# Patient Record
Sex: MALE | Race: BLACK OR AFRICAN AMERICAN | NOT HISPANIC OR LATINO | URBAN - METROPOLITAN AREA
[De-identification: names, ages, dates, MRNs, and addresses within clinical notes are randomized per-mention and may not be internally consistent; named-entity substitution may affect disease eponyms.]

---

## 2017-04-25 ENCOUNTER — INPATIENT (INPATIENT)
Facility: HOSPITAL | Age: 72
LOS: 0 days | Discharge: HOME | DRG: 682 | End: 2017-04-26
Attending: INTERNAL MEDICINE | Admitting: INTERNAL MEDICINE
Payer: COMMERCIAL

## 2017-04-25 VITALS
HEART RATE: 106 BPM | TEMPERATURE: 98 F | OXYGEN SATURATION: 97 % | DIASTOLIC BLOOD PRESSURE: 64 MMHG | RESPIRATION RATE: 18 BRPM | SYSTOLIC BLOOD PRESSURE: 112 MMHG

## 2017-04-25 DIAGNOSIS — I77.0 ARTERIOVENOUS FISTULA, ACQUIRED: Chronic | ICD-10-CM

## 2017-04-25 DIAGNOSIS — Z29.9 ENCOUNTER FOR PROPHYLACTIC MEASURES, UNSPECIFIED: ICD-10-CM

## 2017-04-25 DIAGNOSIS — Z98.89 OTHER SPECIFIED POSTPROCEDURAL STATES: Chronic | ICD-10-CM

## 2017-04-25 DIAGNOSIS — I48.91 UNSPECIFIED ATRIAL FIBRILLATION: ICD-10-CM

## 2017-04-25 DIAGNOSIS — Z86.39 PERSONAL HISTORY OF OTHER ENDOCRINE, NUTRITIONAL AND METABOLIC DISEASE: ICD-10-CM

## 2017-04-25 DIAGNOSIS — N18.6 END STAGE RENAL DISEASE: ICD-10-CM

## 2017-04-25 DIAGNOSIS — E78.5 HYPERLIPIDEMIA, UNSPECIFIED: ICD-10-CM

## 2017-04-25 DIAGNOSIS — I10 ESSENTIAL (PRIMARY) HYPERTENSION: ICD-10-CM

## 2017-04-25 DIAGNOSIS — D64.9 ANEMIA, UNSPECIFIED: ICD-10-CM

## 2017-04-25 DIAGNOSIS — Z78.9 OTHER SPECIFIED HEALTH STATUS: ICD-10-CM

## 2017-04-25 LAB
ABO RH CONFIRMATION: SIGNIFICANT CHANGE UP
ALBUMIN SERPL ELPH-MCNC: 1.8 G/DL — LOW (ref 3.5–5)
ALP SERPL-CCNC: 82 U/L — SIGNIFICANT CHANGE UP (ref 40–120)
ALT FLD-CCNC: 31 U/L DA — SIGNIFICANT CHANGE UP (ref 10–60)
ANION GAP SERPL CALC-SCNC: 6 MMOL/L — SIGNIFICANT CHANGE UP (ref 5–17)
APTT BLD: 24.1 SEC — LOW (ref 27.5–37.4)
AST SERPL-CCNC: 29 U/L — SIGNIFICANT CHANGE UP (ref 10–40)
BASOPHILS # BLD AUTO: 0.1 K/UL — SIGNIFICANT CHANGE UP (ref 0–0.2)
BASOPHILS NFR BLD AUTO: 1.2 % — SIGNIFICANT CHANGE UP (ref 0–2)
BILIRUB SERPL-MCNC: 0.3 MG/DL — SIGNIFICANT CHANGE UP (ref 0.2–1.2)
BUN SERPL-MCNC: 12 MG/DL — SIGNIFICANT CHANGE UP (ref 7–18)
CALCIUM SERPL-MCNC: 7.5 MG/DL — LOW (ref 8.4–10.5)
CHLORIDE SERPL-SCNC: 102 MMOL/L — SIGNIFICANT CHANGE UP (ref 96–108)
CO2 SERPL-SCNC: 32 MMOL/L — HIGH (ref 22–31)
CREAT SERPL-MCNC: 3.6 MG/DL — HIGH (ref 0.5–1.3)
EOSINOPHIL # BLD AUTO: 0.1 K/UL — SIGNIFICANT CHANGE UP (ref 0–0.5)
EOSINOPHIL NFR BLD AUTO: 0.8 % — SIGNIFICANT CHANGE UP (ref 0–6)
GLUCOSE SERPL-MCNC: 107 MG/DL — HIGH (ref 70–99)
HCT VFR BLD CALC: 22 % — LOW (ref 39–50)
HGB BLD-MCNC: 7 G/DL — CRITICAL LOW (ref 13–17)
INR BLD: 1.16 RATIO — SIGNIFICANT CHANGE UP (ref 0.88–1.16)
LYMPHOCYTES # BLD AUTO: 0.8 K/UL — LOW (ref 1–3.3)
LYMPHOCYTES # BLD AUTO: 7.6 % — LOW (ref 13–44)
MCHC RBC-ENTMCNC: 28.5 PG — SIGNIFICANT CHANGE UP (ref 27–34)
MCHC RBC-ENTMCNC: 31.8 GM/DL — LOW (ref 32–36)
MCV RBC AUTO: 89.7 FL — SIGNIFICANT CHANGE UP (ref 80–100)
MONOCYTES # BLD AUTO: 1.1 K/UL — HIGH (ref 0–0.9)
MONOCYTES NFR BLD AUTO: 10.3 % — SIGNIFICANT CHANGE UP (ref 2–14)
NEUTROPHILS # BLD AUTO: 8.4 K/UL — HIGH (ref 1.8–7.4)
NEUTROPHILS NFR BLD AUTO: 80.2 % — HIGH (ref 43–77)
OB PNL STL: NEGATIVE — SIGNIFICANT CHANGE UP
PLATELET # BLD AUTO: 181 K/UL — SIGNIFICANT CHANGE UP (ref 150–400)
POTASSIUM SERPL-MCNC: 3.2 MMOL/L — LOW (ref 3.5–5.3)
POTASSIUM SERPL-SCNC: 3.2 MMOL/L — LOW (ref 3.5–5.3)
PROT SERPL-MCNC: 6.8 G/DL — SIGNIFICANT CHANGE UP (ref 6–8.3)
PROTHROM AB SERPL-ACNC: 12.7 SEC — SIGNIFICANT CHANGE UP (ref 9.8–12.7)
RBC # BLD: 2.45 M/UL — LOW (ref 4.2–5.8)
RBC # FLD: 16.1 % — HIGH (ref 10.3–14.5)
SODIUM SERPL-SCNC: 140 MMOL/L — SIGNIFICANT CHANGE UP (ref 135–145)
WBC # BLD: 10.4 K/UL — SIGNIFICANT CHANGE UP (ref 3.8–10.5)
WBC # FLD AUTO: 10.4 K/UL — SIGNIFICANT CHANGE UP (ref 3.8–10.5)

## 2017-04-25 PROCEDURE — 71020: CPT | Mod: 26

## 2017-04-25 PROCEDURE — 99285 EMERGENCY DEPT VISIT HI MDM: CPT

## 2017-04-25 RX ORDER — INSULIN LISPRO 100/ML
VIAL (ML) SUBCUTANEOUS
Qty: 0 | Refills: 0 | Status: DISCONTINUED | OUTPATIENT
Start: 2017-04-25 | End: 2017-04-26

## 2017-04-25 RX ORDER — METOPROLOL TARTRATE 50 MG
12.5 TABLET ORAL
Qty: 0 | Refills: 0 | Status: DISCONTINUED | OUTPATIENT
Start: 2017-04-25 | End: 2017-04-26

## 2017-04-25 NOTE — ED PROVIDER NOTE - NS ED MD SCRIBE ATTENDING SCRIBE SECTIONS
VITAL SIGNS( Pullset)/HISTORY OF PRESENT ILLNESS/PHYSICAL EXAM/DISPOSITION/REVIEW OF SYSTEMS/PAST MEDICAL/SURGICAL/SOCIAL HISTORY

## 2017-04-25 NOTE — H&P ADULT. - NEUROLOGICAL DETAILS
responds to pain/sensation intact/cranial nerves intact/responds to verbal commands/alert and oriented x 3

## 2017-04-25 NOTE — ED ADULT NURSE NOTE - PMH
Atrial fibrillation    CAD (coronary artery disease)  LAD stent  ESRD (end stage renal disease) on dialysis    HLD (hyperlipidemia)    HTN (hypertension)    Obesity    Pre-diabetes    Prostate cancer  prostate cancer  PVD (peripheral vascular disease)

## 2017-04-25 NOTE — ED PROVIDER NOTE - PSH
AV fistula  h/o creation in 2005  AV fistula  creation in right arm on 12/1/2015  H/O cardiac catheterization  4/25/2014 non obstructive disease  History of prostatectomy

## 2017-04-25 NOTE — H&P ADULT. - RESPIRATORY COMMENTS
CTA b/l, no w/r/r, L upper chest (between midclavicular and midaxillary area) has ostomy bag, possibly ?pleurostomy; no fluid seen in bag, no blood seen.

## 2017-04-25 NOTE — H&P ADULT. - PROBLEM SELECTOR PLAN 4
-Patient does not know where his dialysis center is, and he does not know his medications. His pharmacy is [facility], on [facility] [address] [facility], [phone], primary team to PLEASE call pharmacy in order to finish admission med rec and for safe discharge.  -patient has meds already on med rec, but those were not entered by the admitting resident and it is unclear which medications he is currently taking.

## 2017-04-25 NOTE — H&P ADULT. - ASSESSMENT
71 M PMH ESRD on HD T/Th/Sat via L AV-graft (Dr. Shaffer primary nephrologist), a-fib (unknown if on AC or rate control, patient denies taking any “blood thinners” or “heart rate drugs such as lopressor”) , CAD, HTN, HLD, DM, prostate CA, PVD presented from dialysis center for Hb 6.1. Patient is asymptomatic and no active bleeding seen. Requires 2U PRBC with repeat CBC to monitor stability. Admitted for monitoring of transfusions in a dialysis-requiring patient with non-rate controlled a-fib.

## 2017-04-25 NOTE — H&P ADULT. - HISTORY OF PRESENT ILLNESS
71 M PMH ESRD on HD T/Th/Sat via L AV-graft (Dr. Shaffer primary nephrologist), a-fib (unknown if on AC or rate control, patient denies taking any “blood thinners” or “heart rate drugs such as lopressor”) , CAD, HTN, HLD, DM, prostate CA, PVD presented from dialysis center for Hb 6.1. Patient endorses dry cough developed 4 days ago, but denies any HA, CP, palpitations, sputum production, abdominal pain, hematochezia, melena, N/V/D, fevers/chills, or hematuria. Patient (AAOx3 at the bedside) states he was recently hospitalized (doesn’t know where) for altered mental status, where he received a L ?pleurostomy bag as well as a gastrostomy tube. He has a chronic suprapubic cath after having bladder surgery (unclear which kind).

## 2017-04-25 NOTE — H&P ADULT. - PROBLEM SELECTOR PLAN 2
-unknown what medication patient takes  -holding any AC for now in case patient was bleeding - follow up CBC to monitor appropriate response  -unknown if patient had rate controlled medication - will start low dose lopressor q12 with parameters (to give a stat dose now as pt's HR has been elevated and EKG showing a-fib).  -primary team to obtain actual medications for safe discharge. -unknown what medication patient takes  -holding any AC for now in case patient was bleeding - follow up CBC to monitor appropriate response  -unknown if patient had rate controlled medication - will start low dose lopressor q12 with parameters for now.  -primary team to obtain actual medications for safe discharge.

## 2017-04-25 NOTE — H&P ADULT. - PROBLEM SELECTOR PLAN 8
improve score 4+  chadsvasc score is at least 2. however, holding off on AC or DVT ppx for now as 1)unclear what patient takes at home for AC if at all, 2) low Hb, while bleed is doubtful, follow up am CBC. consider AC/obtain med regimen from primary pharmacy.

## 2017-04-25 NOTE — H&P ADULT. - ADDITIONAL PE
Patient received rectal exam by ED staff which was negative, and stool guiac was negative (sent by ED before patient was seen by admitting resident).

## 2017-04-25 NOTE — H&P ADULT. - PROBLEM SELECTOR PLAN 1
transfuse 2 PRBC (started after 9pm in the ED)  monitor am CBC  per Dr. Shaffer, renal consult, patient is cleared for discharge after 2U PRBC.

## 2017-04-25 NOTE — ED PROVIDER NOTE - OBJECTIVE STATEMENT
70 y/o M pt with PMHx of a-fib, CAD, ESRD on dialysis, HLD, HTN, prostate CA, DM, and PVD presents to the ED from dialysis center for low blood count. Pt notes non-productive cough. Pt reports he was home when he received a call saying his blood count was 6.1 and he should come in. Pt denies fever, chills, dark stool or any other complaints at this time. ALLERGIES: penicillin (hives)

## 2017-04-25 NOTE — H&P ADULT. - FAMILY HISTORY
Mother  Still living? Unknown  Family history of hypertension in mother, Age at diagnosis: Age Unknown

## 2017-04-26 ENCOUNTER — TRANSCRIPTION ENCOUNTER (OUTPATIENT)
Age: 72
End: 2017-04-26

## 2017-04-26 VITALS
RESPIRATION RATE: 16 BRPM | OXYGEN SATURATION: 100 % | TEMPERATURE: 98 F | HEART RATE: 90 BPM | SYSTOLIC BLOOD PRESSURE: 118 MMHG | DIASTOLIC BLOOD PRESSURE: 58 MMHG

## 2017-04-26 LAB
ANION GAP SERPL CALC-SCNC: 7 MMOL/L — SIGNIFICANT CHANGE UP (ref 5–17)
BUN SERPL-MCNC: 15 MG/DL — SIGNIFICANT CHANGE UP (ref 7–18)
CALCIUM SERPL-MCNC: 8.4 MG/DL — SIGNIFICANT CHANGE UP (ref 8.4–10.5)
CHLORIDE SERPL-SCNC: 102 MMOL/L — SIGNIFICANT CHANGE UP (ref 96–108)
CHOLEST SERPL-MCNC: 125 MG/DL — SIGNIFICANT CHANGE UP (ref 10–199)
CO2 SERPL-SCNC: 32 MMOL/L — HIGH (ref 22–31)
CREAT SERPL-MCNC: 4.63 MG/DL — HIGH (ref 0.5–1.3)
GLUCOSE SERPL-MCNC: 89 MG/DL — SIGNIFICANT CHANGE UP (ref 70–99)
HCT VFR BLD CALC: 22.3 % — LOW (ref 39–50)
HCT VFR BLD CALC: 31 % — LOW (ref 39–50)
HDLC SERPL-MCNC: 48 MG/DL — SIGNIFICANT CHANGE UP (ref 40–125)
HGB BLD-MCNC: 10 G/DL — LOW (ref 13–17)
HGB BLD-MCNC: 7.2 G/DL — LOW (ref 13–17)
LDH SERPL L TO P-CCNC: 177 U/L — SIGNIFICANT CHANGE UP (ref 120–225)
LIPID PNL WITH DIRECT LDL SERPL: 64 MG/DL — SIGNIFICANT CHANGE UP
MAGNESIUM SERPL-MCNC: 1.9 MG/DL — SIGNIFICANT CHANGE UP (ref 1.8–2.4)
MCHC RBC-ENTMCNC: 28.2 PG — SIGNIFICANT CHANGE UP (ref 27–34)
MCHC RBC-ENTMCNC: 28.5 PG — SIGNIFICANT CHANGE UP (ref 27–34)
MCHC RBC-ENTMCNC: 32.4 GM/DL — SIGNIFICANT CHANGE UP (ref 32–36)
MCHC RBC-ENTMCNC: 32.4 GM/DL — SIGNIFICANT CHANGE UP (ref 32–36)
MCV RBC AUTO: 87 FL — SIGNIFICANT CHANGE UP (ref 80–100)
MCV RBC AUTO: 88 FL — SIGNIFICANT CHANGE UP (ref 80–100)
PHOSPHATE SERPL-MCNC: 2.2 MG/DL — LOW (ref 2.5–4.5)
PLATELET # BLD AUTO: 178 K/UL — SIGNIFICANT CHANGE UP (ref 150–400)
PLATELET # BLD AUTO: 201 K/UL — SIGNIFICANT CHANGE UP (ref 150–400)
POTASSIUM SERPL-MCNC: 3 MMOL/L — LOW (ref 3.5–5.3)
POTASSIUM SERPL-SCNC: 3 MMOL/L — LOW (ref 3.5–5.3)
RBC # BLD: 2.56 M/UL — LOW (ref 4.2–5.8)
RBC # BLD: 3.52 M/UL — LOW (ref 4.2–5.8)
RBC # FLD: 15.3 % — HIGH (ref 10.3–14.5)
RBC # FLD: 15.4 % — HIGH (ref 10.3–14.5)
SODIUM SERPL-SCNC: 141 MMOL/L — SIGNIFICANT CHANGE UP (ref 135–145)
TOTAL CHOLESTEROL/HDL RATIO MEASUREMENT: 2.6 RATIO — LOW (ref 3.4–9.6)
TRIGL SERPL-MCNC: 67 MG/DL — SIGNIFICANT CHANGE UP (ref 10–149)
TSH SERPL-MCNC: 0.52 UU/ML — SIGNIFICANT CHANGE UP (ref 0.34–4.82)
WBC # BLD: 10.6 K/UL — HIGH (ref 3.8–10.5)
WBC # BLD: 11 K/UL — HIGH (ref 3.8–10.5)
WBC # FLD AUTO: 10.6 K/UL — HIGH (ref 3.8–10.5)
WBC # FLD AUTO: 11 K/UL — HIGH (ref 3.8–10.5)

## 2017-04-26 PROCEDURE — 84443 ASSAY THYROID STIM HORMONE: CPT

## 2017-04-26 PROCEDURE — 83036 HEMOGLOBIN GLYCOSYLATED A1C: CPT

## 2017-04-26 PROCEDURE — 71046 X-RAY EXAM CHEST 2 VIEWS: CPT

## 2017-04-26 PROCEDURE — 80048 BASIC METABOLIC PNL TOTAL CA: CPT

## 2017-04-26 PROCEDURE — 80061 LIPID PANEL: CPT

## 2017-04-26 PROCEDURE — 86900 BLOOD TYPING SEROLOGIC ABO: CPT

## 2017-04-26 PROCEDURE — P9040: CPT

## 2017-04-26 PROCEDURE — 83010 ASSAY OF HAPTOGLOBIN QUANT: CPT

## 2017-04-26 PROCEDURE — 99285 EMERGENCY DEPT VISIT HI MDM: CPT | Mod: 25

## 2017-04-26 PROCEDURE — 86901 BLOOD TYPING SEROLOGIC RH(D): CPT

## 2017-04-26 PROCEDURE — 36430 TRANSFUSION BLD/BLD COMPNT: CPT

## 2017-04-26 PROCEDURE — 85027 COMPLETE CBC AUTOMATED: CPT

## 2017-04-26 PROCEDURE — 86850 RBC ANTIBODY SCREEN: CPT

## 2017-04-26 PROCEDURE — 99261: CPT

## 2017-04-26 PROCEDURE — 85610 PROTHROMBIN TIME: CPT

## 2017-04-26 PROCEDURE — 80053 COMPREHEN METABOLIC PANEL: CPT

## 2017-04-26 PROCEDURE — 82272 OCCULT BLD FECES 1-3 TESTS: CPT

## 2017-04-26 PROCEDURE — 84100 ASSAY OF PHOSPHORUS: CPT

## 2017-04-26 PROCEDURE — 86920 COMPATIBILITY TEST SPIN: CPT

## 2017-04-26 PROCEDURE — 83735 ASSAY OF MAGNESIUM: CPT

## 2017-04-26 PROCEDURE — 83615 LACTATE (LD) (LDH) ENZYME: CPT

## 2017-04-26 PROCEDURE — 85730 THROMBOPLASTIN TIME PARTIAL: CPT

## 2017-04-26 PROCEDURE — 93005 ELECTROCARDIOGRAM TRACING: CPT

## 2017-04-26 PROCEDURE — 85045 AUTOMATED RETICULOCYTE COUNT: CPT

## 2017-04-26 RX ORDER — ACETAMINOPHEN 500 MG
650 TABLET ORAL ONCE
Qty: 0 | Refills: 0 | Status: COMPLETED | OUTPATIENT
Start: 2017-04-26 | End: 2017-04-26

## 2017-04-26 RX ORDER — FOLIC ACID 0.8 MG
1 TABLET ORAL
Qty: 0 | Refills: 0 | COMMUNITY

## 2017-04-26 RX ORDER — PANTOPRAZOLE SODIUM 20 MG/1
40 TABLET, DELAYED RELEASE ORAL
Qty: 0 | Refills: 0 | Status: DISCONTINUED | OUTPATIENT
Start: 2017-04-26 | End: 2017-04-26

## 2017-04-26 RX ADMIN — Medication 650 MILLIGRAM(S): at 02:31

## 2017-04-26 RX ADMIN — Medication 12.5 MILLIGRAM(S): at 18:28

## 2017-04-26 RX ADMIN — Medication 650 MILLIGRAM(S): at 01:05

## 2017-04-26 NOTE — DISCHARGE NOTE ADULT - HOSPITAL COURSE
PLEASE ADD REPEAT HEMOGLOBIN LEVEL TO HOSPITAL COURSE AT THE BLANK SPACE (SCROLL DOWN)    HPI:  71 M PMH ESRD on HD T/Th/Sat via L AV-graft (Dr. Shaffer primary nephrologist), a-fib (unknown if on AC or rate control, patient denies taking any “blood thinners” or “heart rate drugs such as lopressor”) , CAD, HTN, HLD, DM, prostate CA, PVD presented from dialysis center for Hb 6.1. Patient endorses dry cough developed 4 days ago, but denies any HA, CP, palpitations, sputum production, abdominal pain, hematochezia, melena, N/V/D, fevers/chills, or hematuria. Patient (AAOx3 at the bedside) states he was recently hospitalized (doesn’t know where) for altered mental status, where he received a L pleural-fluid drainage bag as well as a gastrostomy tube. He has a chronic suprapubic cath after having bladder surgery (unclear which kind).     Hospital Course:  Patient is asymptomatic and no active bleeding seen. Guaiac negative, no blood seen on rectal exam. Dr. Shaffer nephrology consulted, knows patient from dialysis, states patient requires 2U PRBC with repeat CBC to monitor stability. Admitted for monitoring of transfusions. Initial Hb was 7.0. Repeat CBC was 7.2 in the morning. Possible that there was correction with hemodilution. Patient was sent to inpatient dialysis by nephro where he received 2 more units PRBC. Repeat CBC showed Hb of _____. He is cleared for discharge. For history of A-fib, he should hold warfarin (was taking 7.5mg daily in February), as he had recently GI bleed (as per Dr. Shaffer). Per outpatient pharmacist, he was given prescriptions from [facility] and likely this is where he was seen. GI Dr. Evans was consulted, no inpatient workup recommended at this time. Patient to be discharged on his home medications. Per Dr. Shaffer, patient should next go to dialysis on SATRUDAY and should bring all of his current medications with him. PLEASE ADD REPEAT HEMOGLOBIN LEVEL TO HOSPITAL COURSE AT THE BLANK SPACE (SCROLL DOWN)    HPI:  71 M PMH ESRD on HD T/Th/Sat via L AV-graft (Dr. Shaffer primary nephrologist), a-fib (unknown if on AC or rate control, patient denies taking any “blood thinners” or “heart rate drugs such as lopressor”) , CAD, HTN, HLD, DM, prostate CA, PVD presented from dialysis center for Hb 6.1. Patient endorses dry cough developed 4 days ago, but denies any HA, CP, palpitations, sputum production, abdominal pain, hematochezia, melena, N/V/D, fevers/chills, or hematuria. Patient (AAOx3 at the bedside) states he was recently hospitalized (doesn’t know where) for altered mental status, where he received a L pleural-fluid drainage bag as well as a gastrostomy tube. He has a chronic suprapubic cath after having bladder surgery (unclear which kind).     Hospital Course:  Patient is asymptomatic and no active bleeding seen. Guaiac negative, no blood seen on rectal exam. Dr. Shaffer nephrology consulted, knows patient from dialysis, states patient requires 2U PRBC with repeat CBC to monitor stability. Admitted for monitoring of transfusions. Initial Hb was 7.0. Repeat CBC was 7.2 in the morning. Possible that there was correction with hemodilution. Patient was sent to inpatient dialysis by nephro where he received 2 more units PRBC. Repeat CBC showed Hb of 10.0. He is cleared for discharge. For history of A-fib, he should hold warfarin (was taking 7.5mg daily in February), as he had recently GI bleed (as per Dr. Shaffer). Per outpatient pharmacist, he was given prescriptions from [facility] and likely this is where he was seen. GI Dr. Evans was consulted, no inpatient workup recommended at this time. Patient to be discharged on his home medications. Per Dr. Shaffer, patient should next go to dialysis on SATRUDAY and should bring all of his current medications with him.

## 2017-04-26 NOTE — DISCHARGE NOTE ADULT - MEDICATION SUMMARY - MEDICATIONS TO TAKE
I will START or STAY ON the medications listed below when I get home from the hospital:    Imdur  -- 20 milligram(s) by mouth once a day  -- Indication: For Continuing outpatient medication    allopurinol 100 mg oral tablet  -- 1 tab(s) by mouth once a day  -- Indication: For Continue outpatient medication    furosemide 80 mg oral tablet  -- 1 tab(s) by mouth once a day  -- Indication: For continue outpatient medication (for hypertension/excess fluid)    Protonix 40 mg oral delayed release tablet  -- 1 tab(s) by mouth once a day  -- Indication: For Sent to your pharmacy by [facility], likely for GI bleed and or gastric reflux

## 2017-04-26 NOTE — DISCHARGE NOTE ADULT - CARE PLAN
Principal Discharge DX:	Anemia, unspecified type  Goal:	Hb above 9.0  Instructions for follow-up, activity and diet:	You were transfused a total of 4 units of packed red blood cells. Please follow up with your primary doctor within 1-2 days after discharge. You MUST bring a copy of the hospital discharge papers from [facility] as well as these discharge papers to your primary Dr's office. You MUST ALSO bring all of your current medications as prescribed from your last hospital stay ([facility]) to your primary doctor's office. Your pharmacy, [facility], has on file prescriptions from [facility] for Protonix 40 daily. You should continue this medicine as you were treated for a GI bleed there.  Secondary Diagnosis:	Atrial fibrillation  Goal:	Rate control  Instructions for follow-up, activity and diet:	Holding warfarin for now as hemoglobin was low and as per your nephrologist, you recently were treated for intestinal bleed at [facility]. You currently are not on any medications for rate control. Your heart rate was seen to be under 100bpm (normal limit is indeed  bpm) without medicine. PLEASE follow up with your primary doctor to see if you should restart any rate control medications.  Secondary Diagnosis:	ESRD (end stage renal disease) on dialysis  Goal:	Continued Dialysis  Instructions for follow-up, activity and diet:	You were dialyzed while in the hospital. Dr. Shaffer, your nephrologist, says that you MUST PLEASE bring all of your current medications to dialysis so that the dialysis center can also properly adjust your medication list. Bring to them a copy of both these discharge papers as well as your discharge papers from [facility].  Secondary Diagnosis:	HTN (hypertension)  Goal:	BP under 140/90  Instructions for follow-up, activity and diet:	Your BP was controlled during your admission. Imdur and furosemide should be continued. You MUST follow up, please, with your primary doctor to adjust and review your medicine and to have your BP monitored. Principal Discharge DX:	Anemia, unspecified type  Goal:	Hb above 9.0  Instructions for follow-up, activity and diet:	You were transfused a total of 4 units of packed red blood cells. Please follow up with your primary doctor within 1-2 days after discharge. You MUST bring a copy of the hospital discharge papers from [facility] as well as these discharge papers to your primary Dr's office. You MUST ALSO bring all of your current medications as prescribed from your last hospital stay ([facility]) to your primary doctor's office. Your pharmacy, [facility], has on file prescriptions from [facility] for Protonix 40 daily. You should continue this medicine as you were treated for a GI bleed there.  Secondary Diagnosis:	Atrial fibrillation  Goal:	Rate control  Instructions for follow-up, activity and diet:	Holding warfarin for now as hemoglobin was low and as per your nephrologist, you recently were treated for intestinal bleed at [facility]. You currently are not on any medications for rate control. Your heart rate was seen to be under 100bpm (normal limit is indeed  bpm) without medicine. PLEASE follow up with your primary doctor to see if you should restart any rate control medications.  Secondary Diagnosis:	ESRD (end stage renal disease) on dialysis  Goal:	Continued Dialysis  Instructions for follow-up, activity and diet:	You were dialyzed while in the hospital. Dr. Shaffer, your nephrologist, says that you MUST PLEASE bring all of your current medications to dialysis so that the dialysis center can also properly adjust your medication list. Bring to them a copy of both these discharge papers as well as your discharge papers from [facility].  Secondary Diagnosis:	HTN (hypertension)  Goal:	BP under 140/90  Instructions for follow-up, activity and diet:	Your BP was controlled during your admission. Imdur and furosemide should be continued. You MUST follow up, please, with your primary doctor to adjust and review your medicine and to have your BP monitored. Principal Discharge DX:	Anemia, unspecified type  Goal:	Hb above 9.0  Instructions for follow-up, activity and diet:	You were transfused a total of 4 units of packed red blood cells. Please follow up with your primary doctor within 1-2 days after discharge. You MUST bring a copy of the hospital discharge papers from [facility] as well as these discharge papers to your primary Dr's office. You MUST ALSO bring all of your current medications as prescribed from your last hospital stay ([facility]) to your primary doctor's office. Your pharmacy, [facility], has on file prescriptions from [facility] for Protonix 40 daily. You should continue this medicine as you were treated for a GI bleed there.  Secondary Diagnosis:	Atrial fibrillation  Goal:	Rate control  Instructions for follow-up, activity and diet:	Holding warfarin for now as hemoglobin was low and as per your nephrologist, you recently were treated for intestinal bleed at [facility]. You currently are not on any medications for rate control. Your heart rate was seen to be under 100bpm (normal limit is indeed  bpm) without medicine. PLEASE follow up with your primary doctor to see if you should restart any rate control medications.  Secondary Diagnosis:	ESRD (end stage renal disease) on dialysis  Goal:	Continued Dialysis  Instructions for follow-up, activity and diet:	You were dialyzed while in the hospital. Dr. Shaffer, your nephrologist, says that you MUST PLEASE bring all of your current medications to dialysis so that the dialysis center can also properly adjust your medication list. Bring to them a copy of both these discharge papers as well as your discharge papers from [facility].  Secondary Diagnosis:	HTN (hypertension)  Goal:	BP under 140/90  Instructions for follow-up, activity and diet:	Your BP was controlled during your admission. Imdur and furosemide should be continued. You MUST follow up, please, with your primary doctor to adjust and review your medicine and to have your BP monitored. Principal Discharge DX:	Anemia, unspecified type  Goal:	Hb above 9.0  Instructions for follow-up, activity and diet:	You were transfused a total of 4 units of packed red blood cells. Please follow up with your primary doctor within 1-2 days after discharge. You MUST bring a copy of the hospital discharge papers from [facility] as well as these discharge papers to your primary Dr's office. You MUST ALSO bring all of your current medications as prescribed from your last hospital stay ([facility]) to your primary doctor's office. Your pharmacy, [facility], has on file prescriptions from [facility] for Protonix 40 daily. You should continue this medicine as you were treated for a GI bleed there.  Secondary Diagnosis:	Atrial fibrillation  Goal:	Rate control  Instructions for follow-up, activity and diet:	Holding warfarin for now as hemoglobin was low and as per your nephrologist, you recently were treated for intestinal bleed at [facility]. You currently are not on any medications for rate control. Your heart rate was seen to be under 100bpm (normal limit is indeed  bpm) without medicine. PLEASE follow up with your primary doctor to see if you should restart any rate control medications.  Secondary Diagnosis:	ESRD (end stage renal disease) on dialysis  Goal:	Continued Dialysis  Instructions for follow-up, activity and diet:	You were dialyzed while in the hospital. Dr. Shaffer, your nephrologist, says that you MUST PLEASE bring all of your current medications to dialysis so that the dialysis center can also properly adjust your medication list. Bring to them a copy of both these discharge papers as well as your discharge papers from [facility].  Secondary Diagnosis:	HTN (hypertension)  Goal:	BP under 140/90  Instructions for follow-up, activity and diet:	Your BP was controlled during your admission. Imdur and furosemide should be continued. You MUST follow up, please, with your primary doctor to adjust and review your medicine and to have your BP monitored.

## 2017-04-26 NOTE — DISCHARGE NOTE ADULT - PLAN OF CARE
Hb above 9.0 You were transfused a total of 4 units of packed red blood cells. Please follow up with your primary doctor within 1-2 days after discharge. You MUST bring a copy of the hospital discharge papers from [facility] as well as these discharge papers to your primary Dr's office. You MUST ALSO bring all of your current medications as prescribed from your last hospital stay ([facility]) to your primary doctor's office. Your pharmacy, [facility], has on file prescriptions from [facility] for Protonix 40 daily. You should continue this medicine as you were treated for a GI bleed there. Rate control Holding warfarin for now as hemoglobin was low and as per your nephrologist, you recently were treated for intestinal bleed at [facility]. You currently are not on any medications for rate control. Your heart rate was seen to be under 100bpm (normal limit is indeed  bpm) without medicine. PLEASE follow up with your primary doctor to see if you should restart any rate control medications. Continued Dialysis You were dialyzed while in the hospital. Dr. Shaffer, your nephrologist, says that you MUST PLEASE bring all of your current medications to dialysis so that the dialysis center can also properly adjust your medication list. Bring to them a copy of both these discharge papers as well as your discharge papers from [facility]. BP under 140/90 Your BP was controlled during your admission. Imdur and furosemide should be continued. You MUST follow up, please, with your primary doctor to adjust and review your medicine and to have your BP monitored.

## 2017-04-26 NOTE — DISCHARGE NOTE ADULT - PATIENT PORTAL LINK FT
“You can access the [facility] Patient Portal, offered by [facility], by registering with the following website: [email or web address]”

## 2017-04-27 LAB
HAPTOGLOB SERPL-MCNC: 254 MG/DL — HIGH (ref 34–200)
RBC # BLD: 2.45 M/UL — LOW (ref 4.2–5.8)
RETICS #: 41.4 K/UL — SIGNIFICANT CHANGE UP (ref 25–125)
RETICS/RBC NFR: 1.7 % — SIGNIFICANT CHANGE UP (ref 0.5–2.5)

## 2017-04-28 LAB — HBA1C BLD-MCNC: 6.1 % — HIGH (ref 4–5.6)

## 2017-05-03 DIAGNOSIS — Z88.0 ALLERGY STATUS TO PENICILLIN: ICD-10-CM

## 2017-05-03 DIAGNOSIS — E11.22 TYPE 2 DIABETES MELLITUS WITH DIABETIC CHRONIC KIDNEY DISEASE: ICD-10-CM

## 2017-05-03 DIAGNOSIS — Z85.46 PERSONAL HISTORY OF MALIGNANT NEOPLASM OF PROSTATE: ICD-10-CM

## 2017-05-03 DIAGNOSIS — I73.9 PERIPHERAL VASCULAR DISEASE, UNSPECIFIED: ICD-10-CM

## 2017-05-03 DIAGNOSIS — I48.91 UNSPECIFIED ATRIAL FIBRILLATION: ICD-10-CM

## 2017-05-03 DIAGNOSIS — Z99.2 DEPENDENCE ON RENAL DIALYSIS: ICD-10-CM

## 2017-05-03 DIAGNOSIS — I25.10 ATHEROSCLEROTIC HEART DISEASE OF NATIVE CORONARY ARTERY WITHOUT ANGINA PECTORIS: ICD-10-CM

## 2017-05-03 DIAGNOSIS — D63.1 ANEMIA IN CHRONIC KIDNEY DISEASE: ICD-10-CM

## 2017-05-03 DIAGNOSIS — N18.6 END STAGE RENAL DISEASE: ICD-10-CM

## 2017-05-03 DIAGNOSIS — I12.0 HYPERTENSIVE CHRONIC KIDNEY DISEASE WITH STAGE 5 CHRONIC KIDNEY DISEASE OR END STAGE RENAL DISEASE: ICD-10-CM

## 2017-05-03 DIAGNOSIS — E78.5 HYPERLIPIDEMIA, UNSPECIFIED: ICD-10-CM

## 2017-06-16 ENCOUNTER — INPATIENT (INPATIENT)
Facility: HOSPITAL | Age: 72
LOS: 4 days | Discharge: HOME | DRG: 377 | End: 2017-06-21
Attending: INTERNAL MEDICINE | Admitting: INTERNAL MEDICINE
Payer: MEDICARE

## 2017-06-16 VITALS
RESPIRATION RATE: 18 BRPM | WEIGHT: 206.79 LBS | HEIGHT: 73 IN | DIASTOLIC BLOOD PRESSURE: 60 MMHG | TEMPERATURE: 98 F | SYSTOLIC BLOOD PRESSURE: 95 MMHG | HEART RATE: 116 BPM | OXYGEN SATURATION: 100 %

## 2017-06-16 DIAGNOSIS — Z29.9 ENCOUNTER FOR PROPHYLACTIC MEASURES, UNSPECIFIED: ICD-10-CM

## 2017-06-16 DIAGNOSIS — D64.9 ANEMIA, UNSPECIFIED: ICD-10-CM

## 2017-06-16 DIAGNOSIS — Z98.89 OTHER SPECIFIED POSTPROCEDURAL STATES: Chronic | ICD-10-CM

## 2017-06-16 DIAGNOSIS — I10 ESSENTIAL (PRIMARY) HYPERTENSION: ICD-10-CM

## 2017-06-16 DIAGNOSIS — D50.0 IRON DEFICIENCY ANEMIA SECONDARY TO BLOOD LOSS (CHRONIC): ICD-10-CM

## 2017-06-16 DIAGNOSIS — E11.9 TYPE 2 DIABETES MELLITUS WITHOUT COMPLICATIONS: ICD-10-CM

## 2017-06-16 DIAGNOSIS — I77.0 ARTERIOVENOUS FISTULA, ACQUIRED: Chronic | ICD-10-CM

## 2017-06-16 DIAGNOSIS — I48.0 PAROXYSMAL ATRIAL FIBRILLATION: ICD-10-CM

## 2017-06-16 DIAGNOSIS — N18.6 END STAGE RENAL DISEASE: ICD-10-CM

## 2017-06-16 DIAGNOSIS — I12.0 HYPERTENSIVE CHRONIC KIDNEY DISEASE WITH STAGE 5 CHRONIC KIDNEY DISEASE OR END STAGE RENAL DISEASE: ICD-10-CM

## 2017-06-16 DIAGNOSIS — K92.2 GASTROINTESTINAL HEMORRHAGE, UNSPECIFIED: ICD-10-CM

## 2017-06-16 LAB
ALBUMIN SERPL ELPH-MCNC: 1.8 G/DL — LOW (ref 3.5–5)
ALP SERPL-CCNC: 95 U/L — SIGNIFICANT CHANGE UP (ref 40–120)
ALT FLD-CCNC: 15 U/L DA — SIGNIFICANT CHANGE UP (ref 10–60)
ANION GAP SERPL CALC-SCNC: 9 MMOL/L — SIGNIFICANT CHANGE UP (ref 5–17)
ANISOCYTOSIS BLD QL: SLIGHT — SIGNIFICANT CHANGE UP
APTT BLD: 26.7 SEC — LOW (ref 27.5–37.4)
AST SERPL-CCNC: 23 U/L — SIGNIFICANT CHANGE UP (ref 10–40)
BASOPHILS # BLD AUTO: 0.1 K/UL — SIGNIFICANT CHANGE UP (ref 0–0.2)
BASOPHILS NFR BLD AUTO: 0.8 % — SIGNIFICANT CHANGE UP (ref 0–2)
BILIRUB SERPL-MCNC: 0.2 MG/DL — SIGNIFICANT CHANGE UP (ref 0.2–1.2)
BLD GP AB SCN SERPL QL: SIGNIFICANT CHANGE UP
BUN SERPL-MCNC: 17 MG/DL — SIGNIFICANT CHANGE UP (ref 7–18)
CALCIUM SERPL-MCNC: 8.3 MG/DL — LOW (ref 8.4–10.5)
CHLORIDE SERPL-SCNC: 98 MMOL/L — SIGNIFICANT CHANGE UP (ref 96–108)
CO2 SERPL-SCNC: 29 MMOL/L — SIGNIFICANT CHANGE UP (ref 22–31)
CREAT SERPL-MCNC: 4.91 MG/DL — HIGH (ref 0.5–1.3)
EOSINOPHIL # BLD AUTO: 0.2 K/UL — SIGNIFICANT CHANGE UP (ref 0–0.5)
EOSINOPHIL NFR BLD AUTO: 1.4 % — SIGNIFICANT CHANGE UP (ref 0–6)
GLUCOSE SERPL-MCNC: 150 MG/DL — HIGH (ref 70–99)
HCT VFR BLD CALC: 20 % — CRITICAL LOW (ref 39–50)
HGB BLD-MCNC: 6.2 G/DL — CRITICAL LOW (ref 13–17)
HYPOCHROMIA BLD QL: SLIGHT — SIGNIFICANT CHANGE UP
INR BLD: 1.34 RATIO — HIGH (ref 0.88–1.16)
IRON SATN MFR SERPL: 13 % — LOW (ref 20–55)
IRON SATN MFR SERPL: 18 UG/DL — LOW (ref 65–170)
LDH SERPL L TO P-CCNC: 136 U/L — SIGNIFICANT CHANGE UP (ref 120–225)
LYMPHOCYTES # BLD AUTO: 0.9 K/UL — LOW (ref 1–3.3)
LYMPHOCYTES # BLD AUTO: 8.5 % — LOW (ref 13–44)
MCHC RBC-ENTMCNC: 27 PG — SIGNIFICANT CHANGE UP (ref 27–34)
MCHC RBC-ENTMCNC: 31 GM/DL — LOW (ref 32–36)
MCV RBC AUTO: 86.8 FL — SIGNIFICANT CHANGE UP (ref 80–100)
MONOCYTES # BLD AUTO: 1.3 K/UL — HIGH (ref 0–0.9)
MONOCYTES NFR BLD AUTO: 11.6 % — SIGNIFICANT CHANGE UP (ref 2–14)
NEUTROPHILS # BLD AUTO: 8.7 K/UL — HIGH (ref 1.8–7.4)
NEUTROPHILS NFR BLD AUTO: 77.7 % — HIGH (ref 43–77)
OB PNL STL: POSITIVE
OVALOCYTES BLD QL SMEAR: SLIGHT — SIGNIFICANT CHANGE UP
PLAT MORPH BLD: NORMAL — SIGNIFICANT CHANGE UP
PLATELET # BLD AUTO: 196 K/UL — SIGNIFICANT CHANGE UP (ref 150–400)
POIKILOCYTOSIS BLD QL AUTO: SLIGHT — SIGNIFICANT CHANGE UP
POTASSIUM SERPL-MCNC: 3.3 MMOL/L — LOW (ref 3.5–5.3)
POTASSIUM SERPL-SCNC: 3.3 MMOL/L — LOW (ref 3.5–5.3)
PROT SERPL-MCNC: 6.4 G/DL — SIGNIFICANT CHANGE UP (ref 6–8.3)
PROTHROM AB SERPL-ACNC: 14.7 SEC — HIGH (ref 9.8–12.7)
RBC # BLD: 2.28 M/UL — LOW (ref 4.2–5.8)
RBC # BLD: 2.31 M/UL — LOW (ref 4.2–5.8)
RBC # FLD: 16.1 % — HIGH (ref 10.3–14.5)
RBC BLD AUTO: ABNORMAL
RETICS #: 47.7 K/UL — SIGNIFICANT CHANGE UP (ref 25–125)
RETICS/RBC NFR: 2.1 % — SIGNIFICANT CHANGE UP (ref 0.5–2.5)
SODIUM SERPL-SCNC: 136 MMOL/L — SIGNIFICANT CHANGE UP (ref 135–145)
TIBC SERPL-MCNC: 142 UG/DL — LOW (ref 250–450)
UIBC SERPL-MCNC: 124 UG/DL — SIGNIFICANT CHANGE UP (ref 110–370)
WBC # BLD: 11.2 K/UL — HIGH (ref 3.8–10.5)
WBC # FLD AUTO: 11.2 K/UL — HIGH (ref 3.8–10.5)

## 2017-06-16 PROCEDURE — 74177 CT ABD & PELVIS W/CONTRAST: CPT | Mod: 26

## 2017-06-16 PROCEDURE — 71010: CPT | Mod: 26

## 2017-06-16 PROCEDURE — 99223 1ST HOSP IP/OBS HIGH 75: CPT | Mod: AI,GC

## 2017-06-16 RX ORDER — FOLIC ACID 0.8 MG
1 TABLET ORAL DAILY
Qty: 0 | Refills: 0 | Status: DISCONTINUED | OUTPATIENT
Start: 2017-06-16 | End: 2017-06-21

## 2017-06-16 RX ORDER — PANTOPRAZOLE SODIUM 20 MG/1
40 TABLET, DELAYED RELEASE ORAL EVERY 12 HOURS
Qty: 0 | Refills: 0 | Status: DISCONTINUED | OUTPATIENT
Start: 2017-06-16 | End: 2017-06-16

## 2017-06-16 RX ORDER — ATENOLOL 25 MG/1
25 TABLET ORAL DAILY
Qty: 0 | Refills: 0 | Status: DISCONTINUED | OUTPATIENT
Start: 2017-06-16 | End: 2017-06-21

## 2017-06-16 RX ORDER — ERYTHROPOIETIN 10000 [IU]/ML
10000 INJECTION, SOLUTION INTRAVENOUS; SUBCUTANEOUS
Qty: 0 | Refills: 0 | Status: DISCONTINUED | OUTPATIENT
Start: 2017-06-16 | End: 2017-06-21

## 2017-06-16 RX ORDER — CILOSTAZOL 100 MG/1
50 TABLET ORAL DAILY
Qty: 0 | Refills: 0 | Status: DISCONTINUED | OUTPATIENT
Start: 2017-06-16 | End: 2017-06-21

## 2017-06-16 RX ORDER — BENZOCAINE AND MENTHOL 5; 1 G/100ML; G/100ML
1 LIQUID ORAL
Qty: 0 | Refills: 0 | Status: DISCONTINUED | OUTPATIENT
Start: 2017-06-16 | End: 2017-06-21

## 2017-06-16 RX ORDER — PANTOPRAZOLE SODIUM 20 MG/1
40 TABLET, DELAYED RELEASE ORAL
Qty: 0 | Refills: 0 | Status: DISCONTINUED | OUTPATIENT
Start: 2017-06-16 | End: 2017-06-21

## 2017-06-16 RX ADMIN — Medication 1 MILLIGRAM(S): at 21:56

## 2017-06-16 RX ADMIN — PANTOPRAZOLE SODIUM 40 MILLIGRAM(S): 20 TABLET, DELAYED RELEASE ORAL at 21:56

## 2017-06-16 RX ADMIN — ATENOLOL 25 MILLIGRAM(S): 25 TABLET ORAL at 21:55

## 2017-06-16 RX ADMIN — CILOSTAZOL 50 MILLIGRAM(S): 100 TABLET ORAL at 21:55

## 2017-06-16 RX ADMIN — ERYTHROPOIETIN 10000 UNIT(S): 10000 INJECTION, SOLUTION INTRAVENOUS; SUBCUTANEOUS at 18:20

## 2017-06-16 NOTE — CHART NOTE - NSCHARTNOTEFT_GEN_A_CORE
Patient is s/p 2 U PRBC for Hb: 6.2 at admission  Patient being a hard stick, RN could not draw blood despite couple attempts however finally got a sample that was found to be insufficient by the lab  Patient is now refusing another prick despite counselling  Hence Will get CBC with routine labs

## 2017-06-16 NOTE — ED ADULT NURSE NOTE - ED STAT RN HANDOFF DETAILS
Report given to Tomeka OLIVAREZ. Patient is stable, on continuos cardiac monitoring. No distress noted.

## 2017-06-16 NOTE — ED ADULT NURSE NOTE - OBJECTIVE STATEMENT
Patient went to dialysis and presented with low H/H and an appointment was made for patient to come in and have transfusion.

## 2017-06-16 NOTE — CONSULT NOTE ADULT - ASSESSMENT
70 y/o M  with ESRD on HD, HTN, DM, A fib (on coumadin), Prostate CA s/p sx with suprapubic catheter, Alzheimer's dementia, Anemia s/p 2 units prbc transfusion (2 weeks prior) presents for blood transfusion. a/w Anemia 2/2 GI bleed and Afib with RVR.

## 2017-06-16 NOTE — CONSULT NOTE ADULT - PROBLEM SELECTOR RECOMMENDATION 3
+FOBT. A/C held  Pt for CT abd/pelvis with  IV contrast  GI consulted (Dr. Fernández)  Pt with esophageal stent- plan for oupt removal as scheduled on 6/21/17  Monitor h/h

## 2017-06-16 NOTE — H&P ADULT - NEGATIVE CARDIOVASCULAR SYMPTOMS
no chest pain/no peripheral edema/no dyspnea on exertion/no orthopnea/no paroxysmal nocturnal dyspnea/no claudication/no palpitations

## 2017-06-16 NOTE — H&P ADULT - PROBLEM SELECTOR PLAN 5
pt doesn't take any medications for DM   c/w consistent carb diet  c/w HSS for further coverage   f/u HbA1C

## 2017-06-16 NOTE — H&P ADULT - ASSESSMENT
70 y/o M from home lives alone, walks with a cane with HHA 7hrs / 7 days. PMH of ESRD (on HD TTS via R AVF), HTN, DM, A fib (on coumadin), PNA, anemia (on iron supplements) and Alzheimer's dementia. Pt was recently admitted in [facility] for intra-abdominal fluid collections, likely pancreatic pseudocyst s/p cyst gastrostomy and possible percutaneous drainage. Patient is a poor historian and is unable to provide a clear hx, but reported was scheduled to get tube removed on 6/14 but missed his appointment and now rescheduled for 6/21. He reported intermittent black stool for the past 2 weeks, associated with mild epigastric pain. Pt also notes mild cough, and hiccups. Pt has received 2 pints of blood about 4 weeks ago. Pt states as he was presenting for dialysis yesterday when the doctors said that his blood levels were low. 70 y/o M from home lives alone, walks with a cane with HHA 7hrs / 7 days. PMH of ESRD (on HD TTS via R AVF), HTN, DM, A fib (no AC due to GIB?), PNA, anemia (on iron supplements), suprapubic catheter (placed 5 years ago, last changed 1 month ago), IVC filter and Alzheimer's dementia. Pt was recently admitted in [facility] for intra-abdominal fluid collections, likely pancreatic pseudocyst s/p cyst gastrostomy and possible percutaneous drainage. Patient is a poor historian and is unable to provide a clear hx, but was having difficulty swelling therefore had esophageal stent placed he said was scheduled to get esophageal stent removed on 6/14 but missed his appointment and now rescheduled for 6/21. He reported intermittent black stool for the past 2 weeks, associated with mild epigastric pain. Pt also notes mild cough, and hiccups. Pt has received 2 pints of blood about 4 weeks ago. Pt states as he was presenting for dialysis yesterday when the doctors said that his blood levels were low.

## 2017-06-16 NOTE — H&P ADULT - NEGATIVE NEUROLOGICAL SYMPTOMS
no tremors/no focal seizures/no confusion/no headache/no facial palsy/no weakness/no difficulty walking/no loss of consciousness/no hemiparesis/no syncope/no paresthesias/no generalized seizures/no vertigo/no loss of sensation/no transient paralysis

## 2017-06-16 NOTE — H&P ADULT - PROBLEM SELECTOR PLAN 1
pt presented with anemia likely 2/2 UGIB as pt reported recurrent melena for the past 2 weeks  pt currently taking coumadin 12mg daily  on admission Hb level of 6.2   will transfuse 2U of PRBC during HD and Epogen injections during HD   f/u CBC after transfusion with goal Hb level > 9   GI consulted Dr. Evans pt presented with anemia likely 2/2 UGIB as pt reported recurrent melena for the past 2 weeks  pt currently taking coumadin 12mg daily  on admission Hb level of 6.2   will transfuse 2U of PRBC during HD and Epogen injections during HD   f/u CBC after transfusion with goal Hb level > 8  f/u Hb level after transfusion   f/u anemia panel   f/u with outpatient GI on 6/21 at [facility]   GI consulted Dr. Evans pt presented with anemia likely 2/2 UGIB as pt reported recurrent melena for the past 2 weeks  pt currently taking coumadin 12mg daily   on admission Hb level of 6.2 and guaiac positive   will transfuse 2U of PRBC during HD and Epogen injections during HD   c/w clear diet and advance as tolerated   f/u CBC after transfusion with goal Hb level > 8  f/u Hb level after transfusion   f/u anemia panel   f/u with outpatient GI on 6/21 at [facility]   GI consulted Dr. Evans

## 2017-06-16 NOTE — H&P ADULT - GASTROINTESTINAL DETAILS
no rigidity/bowel sounds normal/no distention/no rebound tenderness/no organomegaly/no guarding/soft/normal/no bruit/no masses palpable

## 2017-06-16 NOTE — H&P ADULT - PMH
ESRD (end stage renal disease) on dialysis    Essential hypertension    Paroxysmal atrial fibrillation    Type 2 diabetes mellitus without complication, without long-term current use of insulin

## 2017-06-16 NOTE — H&P ADULT - ATTENDING COMMENTS
72 y/o M from home lives alone, walks with a cane with HHA 7hrs / 7 days. PMH of ESRD (on HD TTS via R AVF), HTN, DM, A fib (said on coumadin but last precripiton in january), PNA, anemia (on iron supplements), suprapubic catheter (placed 5 years ago, last changed 1 month ago) and Alzheimer's dementia. Pt was recently admitted in [facility] for intra-abdominal fluid collections, likely pancreatic pseudocyst s/p cyst gastrostomy and possible percutaneous drainage which has been removed. Patient is a poor historian and is unable to provide a clear hx, but was having difficulty swallowing therefore had esophageal stent placed he said was scheduled to get esophageal stent removed on 6/14 but missed his appointment and now rescheduled for 6/21. He reported intermittent black stool for the past 2 weeks, associated with mild epigastric pain. Pt also notes mild cough, and hiccups. Pt has received 2 pints of blood about 4 weeks ago. Pt states as he was presenting for dialysis yesterday when the doctors said that his blood levels were low. Pt denies SOB, fevers/chills, weakness, chest pain, hematuria, hematemesis or any other complaints. Off note pt still makes urine and currently with suprapubic catheter.  Patient intermittently takes NSAID's for pain    SH: Lives alone, independent, cooks sometimes; non smoker  FH: Non contributory to current presentation    ROS: As above; Limited Hx    Vitals: stable reviewed  Vital Signs Last 24 Hrs  T(C): 36.9, Max: 36.9 (06-16 @ 15:18)  T(F): 98.5, Max: 98.5 (06-16 @ 15:18)  HR: 76 (75 - 116)  BP: 100/50 (95/60 - 112/78)  RR: 18 (16 - 18)  SpO2: 96% (96% - 100%)    P/E:  As above  Neuro: AAO x3; but unable to clearly provide info  CVS: S1S2 present; Irregular  Resp: BLAE+, No wheeze or Rhonchi  Abdomen: soft, BS+, Non tender, old healed scars, suprapubic catheter  extr: No edema or calf tenderness b/l LE    labs: as above; low H/H stable  Complete Blood Count + Automated Diff (06.16.17 @ 09:23)    WBC Count: 11.2 K/uL    RBC Count: 2.31 M/uL    Mean Cell Volume: 86.8 fl    Mean Cell Hemoglobin: 27.0 pg    Mean Cell Hemoglobin Conc: 31.0 gm/dL    Red Cell Distrib Width: 16.1 %    Platelet Count - Automated: 196 K/uL    Comprehensive Metabolic Panel (06.16.17 @ 09:23)    Sodium, Serum: 136 mmol/L    Potassium, Serum: 3.3 mmol/L    Chloride, Serum: 98 mmol/L    Carbon Dioxide, Serum: 29 mmol/L    Anion Gap, Serum: 9 mmol/L    Blood Urea Nitrogen, Serum: 17 mg/dL    Creatinine, Serum: 4.91 mg/dL    Glucose, Serum: 150 mg/dL    Calcium, Total Serum: 8.3 mg/dL    Protein Total, Serum: 6.4 g/dL    Albumin, Serum: 1.8 g/dL    Bilirubin Total, Serum: 0.2 mg/dL    Alkaline Phosphatase, Serum: 95 U/L    Aspartate Aminotransferase (AST/SGOT): 23 U/L    Alanine Aminotransferase (ALT/SGPT): 15 U/L DA        CT Abdomen and Pelvis: IMPRESSION:   Acute appearing thrombus within the superior mesenteric vein.  Esophageal stent withinthe distal esophagus/proximal stomach. Debris is noted within the stent. No bowel obstruction.  Trace bilateral pleural effusions, right greater than left, with pleural thickening/enhancement which may be infectious or neoplastic in etiology.  Distended gallbladder with cholelithiasis. No wall thickening or ericholecystic fluid. Correlate clinically and with ultrasound if there is concern for cholecystitis.    D/D  Symptomatic anemia due to possible chronic blood loss anemia from possible gastritis with hemorrhage  A. fib with RVR  Hx dysphagia s/p Esophgeal stent s/p removal of G tube  Hx HTN with low normal BP    Plan:  Admit to Tele; Type and cross; Transfuse 2 units PRBC; IV PPI; Clear liquids; Hold anticoagulation  CT Abdomen and Pelvis done with contrast; Fir HD today s/p CT  Nephrology evaluation Dr. Cody appreciated; Epogen with HD  GI evaluation, Dr. Evans noted; No intervention planned as patient has scheduled appointment with outpatient GI at [facility] for Stent removal.  Decrease Atenolol to 25 ng daily due to low BP; Cariology eval Dr. Talavera    d/w PGY2 VIRAL Robertson  d/w Patient findings and plan of care

## 2017-06-16 NOTE — H&P ADULT - RS GEN PE MLT RESP DETAILS PC
no wheezes/no chest wall tenderness/normal/no rhonchi/airway patent/breath sounds equal/no rales/respirations non-labored/no subcutaneous emphysema/no intercostal retractions/good air movement/clear to auscultation bilaterally

## 2017-06-16 NOTE — H&P ADULT - NEGATIVE GASTROINTESTINAL SYMPTOMS
no jaundice/no constipation/no vomiting/no diarrhea/no steatorrhea/no hematochezia/no nausea/no change in bowel habits/no flatulence/no hiccoughs

## 2017-06-16 NOTE — CONSULT NOTE ADULT - SUBJECTIVE AND OBJECTIVE BOX
[facility] NEPHROLOGY- CONSULTATION NOTE    72 y/o M  with ESRD on HD (TTS at [facility] dialysis unit; Nephrologist Dr. Shaffer), HTN, DM, A fib (on coumadin), Prostate CA s/p sx with suprapubic catheter (placed 5 years ago, last changed 1 month ago), Alzheimer's dementia, Anemia s/p 2 units prbc transfusion (2 weeks prior) presents for blood transfusion. Pt c/o dark stools x 2 week. Pt c/o hiccups and epigastric tenderness x 1day. Pt denies any hematemesis, BRBPR, SOB, chest pain, n/v/d, fever or chills. Pt was accompanied by his sister Zuleima.  Pt was found go have hgb 6.2, +FOBT with Afib with RVR.  Pt agreed and consented for  2units prbc with HD today (off cycle), Cardiology and GI eval. Pt is a poor historian. Pt was advised previously to stop coumadin due to GI bleed but was still taking coumadin as of 2-3days prior to admission.     As per H&P, pt was recently admitted in [facility] for intra-abdominal fluid collections, likely pancreatic pseudocyst s/p cyst gastrostomy and possible percutaneous drainage. Patient is a poor historian and is unable to provide a clear hx, but was having difficulty swelling therefore had esophageal stent placed he said was scheduled to get esophageal stent removed on 6/14 but missed his appointment and now rescheduled for 6/21.       PAST MEDICAL & SURGICAL HISTORY:  Type 2 diabetes mellitus without complication, without long-term current use of insulin  Essential hypertension  Paroxysmal atrial fibrillation  ESRD (end stage renal disease) on dialysis    No Known Allergies    Home Medications Reviewed  Hospital Medications:   MEDICATIONS  (STANDING):  epoetin shell Injectable 92588Hxdh(s) IV Push <User Schedule>  pantoprazole    Tablet 40milliGRAM(s) Oral before breakfast  ATENolol  Tablet 25milliGRAM(s) Oral daily  folic acid 1milliGRAM(s) Oral daily  cilostazol 50milliGRAM(s) Oral daily    SOCIAL HISTORY:  Denies ETOh,Smoking,   FAMILY HISTORY:      REVIEW OF SYSTEMS:  Gen: no changes in weight  HEENT: no rhinorrhea  Neck: no sore throat  Cards: no chest pain  Resp: no dyspnea, + cough  GI: no nausea or vomiting or diarrhea + dark stools, +epigastric pain  : no dysuria or hematuria  Vascular: no LE edema  Derm: no rashes  Neuro: no numbness/tingling  All other review of systems is negative unless indicated above.    VITALS:  T(F): 98.5, Max: 98.5 (06-16 @ 15:18)  HR: 76  BP: 100/50  RR: 18  SpO2: 96%  Wt(kg): --    Height (cm): 185.4 (06-16 @ 08:04)  Weight (kg): 93.8 (06-16 @ 08:04)  BMI (kg/m2): 27.3 (06-16 @ 08:04)  BSA (m2): 2.18 (06-16 @ 08:04)    PHYSICAL EXAM:  Gen: NAD, calm  HEENT: MMM  Neck: no JVD  Cards: irregular, +S1/S2,   Resp: CTA B/L  GI: soft, ND, NABS +epigastric tenderness  : +supra pubic catheter with leg bad  Extremities: no LE edema B/L,   Vascular: +Rt AVG +thrill +bruit  Derm: no rashes  Neuro: non-focal    LABS:  06-16    136  |  98  |  17  ----------------------------<  150<H>  3.3<L>   |  29  |  4.91<H>    Ca    8.3<L>      16 Jun 2017 09:23    TPro  6.4  /  Alb  1.8<L>  /  TBili  0.2  /  DBili      /  AST  23  /  ALT  15  /  AlkPhos  95  06-16    Creatinine Trend: 4.91 <--                        6.2    11.2  )-----------( 196      ( 16 Jun 2017 09:23 )             20.0     Urine Studies:      RADIOLOGY & ADDITIONAL STUDIES:
HISTORY OF PRESENT ILLNESS: HPI:  72 y/o M from home lives alone, walks with a cane with HHA 7hrs / 7 days. PMH of ESRD (on HD TTS via R AVF), HTN, DM, A fib (on coumadin), PNA, anemia (on iron supplements), suprapubic catheter (placed 5 years ago, last changed 1 month ago) and Alzheimer's dementia. Pt was recently admitted in [facility] for intra-abdominal fluid collections, likely pancreatic pseudocyst s/p cyst gastrostomy and possible percutaneous drainage. Patient is a poor historian and is unable to provide a clear hx, but reported was scheduled to get tube removed on 6/14 but missed his appointment and now rescheduled for 6/21. He reported intermittent black stool for the past 2 weeks, associated with mild epigastric pain. Pt also notes mild cough, and hiccups. Pt has received 2 pints of blood about 4 weeks ago. Pt states as he was presenting for dialysis yesterday when the doctors said that his blood levels were low. Pt denies SOB, fevers/chills, weakness, chest pain, hematuria, hematemesis or any other complaints. Off note pt still makes urine and currently with Martínez catheter.     EGD / Colonoscopy: > 5 years ago was told WNL. (16 Jun 2017 12:06)      PAST MEDICAL & SURGICAL HISTORY:  Type 2 diabetes mellitus without complication, without long-term current use of insulin  Essential hypertension  Paroxysmal atrial fibrillation  ESRD (end stage renal disease) on dialysis          MEDICATIONS:  MEDICATIONS  (STANDING):  epoetin shell Injectable 16355Lsal(s) IV Push <User Schedule>  pantoprazole    Tablet 40milliGRAM(s) Oral before breakfast  ATENolol  Tablet 25milliGRAM(s) Oral daily  folic acid 1milliGRAM(s) Oral daily  cilostazol 50milliGRAM(s) Oral daily      Allergies    No Known Allergies    Intolerances        FAMILY HISTORY:    Non-contributary for premature coronary disease or sudden cardiac death    SOCIAL HISTORY:    [X ] Non-smoker  [ ] Smoker  [ ] Alcohol      REVIEW OF SYSTEMS:  [ ]chest pain  [  ]shortness of breath  [  ]palpitations  [  ]syncope  [ ]near syncope [ ]upper extremity weakness   [ ] lower extremity weakness  [  ]diplopia  [  ]altered mental status   [  ]fevers  [ ]chills [ ]nausea  [ ]vomitting  [  ]dysphagia    [ ]abdominal pain  [ ]melena  [ ]BRBPR    [  ]epistaxis  [  ]rash    [ ]lower extremity edema        [ X] All others negative	  [ ] Unable to obtain    PHYSICAL EXAM:  T(C): 36.4, Max: 36.8 (06-16 @ 08:04)  HR: 75 (75 - 116)  BP: 103/60 (95/60 - 112/78)  RR: 18 (16 - 18)  SpO2: 97% (97% - 100%)  Wt(kg): --  I&O's Summary        HEENT:   Normal oral mucosa, PERRL, EOMI	  Lymphatic: No obvious lymphadenopathy , no edema  Cardiovascular: Normal S1 S2, No JVD,  1/6 EDINSON murmur , Peripheral pulses palpable 2+ bilaterally  Respiratory: Lungs clear to auscultation, normal effort 	  Gastrointestinal:  Soft, Non-tender, + BS	  Skin: No rashes, No cyanosis, warm to touch  Musculoskeletal: Normal range of motion, normal strength  Psychiatry:  Appropriate Mood & affect     TELEMETRY: 	  afib  ECG:  	Afib 95 BPM RBBB  RADIOLOGY:         CXR:  Pending    	  	  LABS:	 	    CARDIAC MARKERS:                              6.2    11.2  )-----------( 196      ( 16 Jun 2017 09:23 )             20.0     Hb Trend: 6.2<--    06-16    136  |  98  |  17  ----------------------------<  150<H>  3.3<L>   |  29  |  4.91<H>    Ca    8.3<L>      16 Jun 2017 09:23    TPro  6.4  /  Alb  1.8<L>  /  TBili  0.2  /  DBili  x   /  AST  23  /  ALT  15  /  AlkPhos  95  06-16    Creatinine Trend: 4.91<--    Coags:  PT/INR - ( 16 Jun 2017 09:23 )   PT: 14.7 sec;   INR: 1.34 ratio         PTT - ( 16 Jun 2017 09:23 )  PTT:26.7 sec    proBNP:   Lipid Profile:   HgA1c:   TSH:     ASSESSMENT/PLAN: 	71y Male ALZ dementia, suprapubic catheter ESRD on HD, HTN, DM, Chol, afib on coumadin admitted with GI bleedd and rapid afib.    - Rapid likely due to profound anemia  - Tx per primary team  - AC on Hold  - Echo    I once again thank you for allowing me to participate in the care of your patient.  If you have any questions or concerns please do not hesitate to contact me.    Daniel Talavera MD, [facility], [facility]  [address] [place] [address]  PHONE:  [phone]  BEEPER : [phone]
Patient is a 71y old  Male with esrd of dialysis, recent hospitalization at [facility] for intrabodominal fluid collections, likely pancreatic pseudocyst s/p cyst gastrostomy and possible percutaneous drainage. Patient is a poor historian and is unable to provide a clear hx.  He reported intermittent black stool for 2 weeks.  No lightheadness or generalized weakness.  Mild epigastric pain x 2 weeks.  No nausea, vomiting or hematemesis.    MEDICATIONS  (STANDING):  epoetin shell Injectable 10283Nlpz(s) IV Push <User Schedule>    MEDICATIONS  (PRN):      Allergies    No Known Allergies    Intolerances        SOCIAL HISTORY:    FAMILY HISTORY:      REVIEW OF SYSTEMS:    CONSTITUTIONAL: No weakness, fevers or chills  EYES/ENT: No visual changes;  No vertigo or throat pain   NECK: No pain or stiffness  RESPIRATORY: No cough, wheezing, hemoptysis; No shortness of breath  CARDIOVASCULAR: No chest pain or palpitations  GENITOURINARY: No dysuria, frequency or hematuria  NEUROLOGICAL: No numbness or weakness  SKIN: No itching, burning, rashes, or lesions   All other review of systems is negative unless indicated above.    Vital Signs Last 24 Hrs  T(C): 36.8, Max: 36.8 (06-16 @ 08:04)  T(F): 98.2, Max: 98.2 (06-16 @ 08:04)  HR: 95 (95 - 116)  BP: 112/78 (95/60 - 112/78)  BP(mean): --  RR: 16 (16 - 18)  SpO2: 100% (100% - 100%)    PHYSICAL EXAM:    Constitutional: NAD, well-developed  Respiratory: CTA and P  Cardiovascular: S1 and S2,   Gastrointestinal: BS+, soft, mild epigastric tenderness  Extremities:1+ pittind edema b/l lower extremities  Vascular: 2+ peripheral pulses  Neurological: A/O x 3,     LABS:  CBC Full  -  ( 16 Jun 2017 09:23 )  WBC Count : 11.2 K/uL  Hemoglobin : 6.2 g/dL  Hematocrit : 20.0 %  Platelet Count - Automated : 196 K/uL  Mean Cell Volume : 86.8 fl  Mean Cell Hemoglobin : 27.0 pg  Mean Cell Hemoglobin Concentration : 31.0 gm/dL  Auto Neutrophil # : 8.7 K/uL  Auto Lymphocyte # : 0.9 K/uL  Auto Monocyte # : 1.3 K/uL  Auto Eosinophil # : 0.2 K/uL  Auto Basophil # : 0.1 K/uL  Auto Neutrophil % : 77.7 %  Auto Lymphocyte % : 8.5 %  Auto Monocyte % : 11.6 %  Auto Eosinophil % : 1.4 %  Auto Basophil % : 0.8 %    06-16    136  |  98  |  17  ----------------------------<  150<H>  3.3<L>   |  29  |  4.91<H>    Ca    8.3<L>      16 Jun 2017 09:23    TPro  6.4  /  Alb  1.8<L>  /  TBili  0.2  /  DBili  x   /  AST  23  /  ALT  15  /  AlkPhos  95  06-16    PT/INR - ( 16 Jun 2017 09:23 )   PT: 14.7 sec;   INR: 1.34 ratio         PTT - ( 16 Jun 2017 09:23 )  PTT:26.7 sec        RADIOLOGY & ADDITIONAL STUDIES:

## 2017-06-16 NOTE — H&P ADULT - PROBLEM SELECTOR PLAN 4
pt takes Atenolol 50mg daily  on admission BP low 103/60   c/w home medications with parameters  f/u TSH, lipid profile, HbA1C

## 2017-06-16 NOTE — H&P ADULT - NEGATIVE GENERAL SYMPTOMS
no weight gain/no anorexia/no malaise/no sweating/no polyuria/no chills/no weight loss/no fatigue/no polyphagia/no fever/no polydipsia

## 2017-06-16 NOTE — H&P ADULT - PROBLEM SELECTOR PLAN 2
CHADVasc score of 3   pt currently on coumadin 12mg daily and Atenolol 50mg daily  c/w home medications (atenolol with BP parameters) CHADVasc score of 3   on admission pt presented with rapid a fib with RVR likely 2/2 fluid depletion   pt currently on coumadin 12mg daily and Atenolol 50mg daily  c/w home medications (atenolol with BP parameters)  Cardiology consulted Dr. Talavera CHADVasc score of 3   on admission pt presented with rapid a fib with RVR likely 2/2 fluid depletion   pt currently not taking any AC as possible GIB (as per pharmacy last coumadin prescription filled in January 2017)  taking daily and Atenolol 50mg daily  c/w home medications (atenolol with BP parameters)  Cardiology consulted Dr. Talavera

## 2017-06-16 NOTE — H&P ADULT - NEGATIVE ENMT SYMPTOMS
no abnormal taste sensation/no nasal congestion/no post-nasal discharge/no gum bleeding/no dysphagia/no vertigo/no tinnitus/no recurrent cold sores/no ear pain/no nasal discharge/no hearing difficulty/no dry mouth/no nasal obstruction/no throat pain/no nose bleeds/no sinus symptoms

## 2017-06-16 NOTE — CONSULT NOTE ADULT - ATTENDING COMMENTS
[facility] NEPHROLOGY  Armando Shaffer M.D.  Kendall Morales D.O.  Lilliam Cody M.D.  Oxana Hernandez, MSN, ANP-C  [phone]    [address] [place] [address]

## 2017-06-16 NOTE — H&P ADULT - NEGATIVE MUSCULOSKELETAL SYMPTOMS
no arthritis/no joint swelling/no arthralgia/no muscle cramps/no neck pain/no muscle weakness/no myalgia/no stiffness

## 2017-06-16 NOTE — H&P ADULT - NEUROLOGICAL DETAILS
alert and oriented x 3/sensation intact/deep reflexes intact/cranial nerves intact/responds to pain/responds to verbal commands/superficial reflexes intact/normal strength/no spontaneous movement

## 2017-06-16 NOTE — H&P ADULT - HISTORY OF PRESENT ILLNESS
70 y/o M from home lives alone, walks with a cane with HHA 7hrs / 7 days. PMH of ESRD (on HD TTS via R AVF), HTN, DM, A fib (on coumadin), PNA, anemia (on iron supplements) and Alzheimer's dementia. Pt was recently admitted in [facility] for intra-abdominal fluid collections, likely pancreatic pseudocyst s/p cyst gastrostomy and possible percutaneous drainage. Patient is a poor historian and is unable to provide a clear hx, but reported was scheduled to get tube removed on 6/14 but missed his appointment and now rescheduled for 6/21. He reported intermittent black stool for the past 2 weeks, associated with mild epigastric pain. Pt also notes mild cough, and hiccups. Pt has received 2 pints of blood about 4 weeks ago. Pt states as he was presenting for dialysis yesterday when the doctors said that his blood levels were low. Pt denies SOB, fevers/chills, weakness, chest pain, hematuria, hematemesis or any other complaints. Off note pt still makes urine and currently with Martínez catheter.     EGD / Colonoscopy: > 5 years ago was told LOBO. 72 y/o M from home lives alone, walks with a cane with HHA 7hrs / 7 days. PMH of ESRD (on HD TTS via R AVF), HTN, DM, A fib (on coumadin), PNA, anemia (on iron supplements), suprapubic catheter (placed 5 years ago, last changed 1 month ago) and Alzheimer's dementia. Pt was recently admitted in [facility] for intra-abdominal fluid collections, likely pancreatic pseudocyst s/p cyst gastrostomy and possible percutaneous drainage. Patient is a poor historian and is unable to provide a clear hx, but reported was scheduled to get tube removed on 6/14 but missed his appointment and now rescheduled for 6/21. He reported intermittent black stool for the past 2 weeks, associated with mild epigastric pain. Pt also notes mild cough, and hiccups. Pt has received 2 pints of blood about 4 weeks ago. Pt states as he was presenting for dialysis yesterday when the doctors said that his blood levels were low. Pt denies SOB, fevers/chills, weakness, chest pain, hematuria, hematemesis or any other complaints. Off note pt still makes urine and currently with Martínez catheter.     EGD / Colonoscopy: > 5 years ago was told LOBO. 70 y/o M from home lives alone, walks with a cane with HHA 7hrs / 7 days. PMH of ESRD (on HD TTS via R AVF), HTN, DM, A fib (on coumadin), PNA, anemia (on iron supplements), suprapubic catheter (placed 5 years ago, last changed 1 month ago) and Alzheimer's dementia. Pt was recently admitted in [facility] for intra-abdominal fluid collections, likely pancreatic pseudocyst s/p cyst gastrostomy and possible percutaneous drainage. Patient is a poor historian and is unable to provide a clear hx, but was having difficulty swelling therefore had esophageal stent placed he said was scheduled to get esophageal stent removed on 6/14 but missed his appointment and now rescheduled for 6/21. He reported intermittent black stool for the past 2 weeks, associated with mild epigastric pain. Pt also notes mild cough, and hiccups. Pt has received 2 pints of blood about 4 weeks ago. Pt states as he was presenting for dialysis yesterday when the doctors said that his blood levels were low. Pt denies SOB, fevers/chills, weakness, chest pain, hematuria, hematemesis or any other complaints. Off note pt still makes urine and currently with Martínez catheter.     EGD / Colonoscopy: > 5 years ago was told LOBO. 70 y/o M from home lives alone, walks with a cane with HHA 7hrs / 7 days. PMH of ESRD (on HD TTS via R AVF), HTN, DM, A fib (no AC due to GIB?), PNA, anemia (on iron supplements), suprapubic catheter (placed 5 years ago), IVC filter and Alzheimer's dementia. Pt was recently admitted in [facility] for intra-abdominal fluid collections, likely pancreatic pseudocyst s/p cyst gastrostomy and possible percutaneous drainage. Patient is a poor historian and is unable to provide a clear hx, but was having difficulty swelling therefore had esophageal stent placed he said was scheduled to get esophageal stent removed on 6/14 but missed his appointment and now rescheduled for 6/21. He reported intermittent black stool for the past 2 weeks, associated with mild epigastric pain. Pt also notes mild cough, and hiccups. Pt has received 2 pints of blood about 4 weeks ago. Pt states as he was presenting for dialysis yesterday when the doctors said that his blood levels were low. Pt denies SOB, fevers/chills, weakness, chest pain, hematuria, hematemesis or any other complaints. Off note pt still makes urine and currently with suprapubic catheter, last changed 1 month ago.     EGD / Colonoscopy: > 5 years ago was told LOBO.

## 2017-06-16 NOTE — CONSULT NOTE ADULT - PROBLEM SELECTOR RECOMMENDATION 9
Last HD 6/15/17 at outpt facility. Pt usually TTS HD schedule. Will plan for HD today with 2 units PRBC after CT with contrast and dialyze in hospital on MWF schedule.   Check HepBsAg.  Check serum phos  Monitor BMP

## 2017-06-16 NOTE — CONSULT NOTE ADULT - PROBLEM SELECTOR RECOMMENDATION 5
Now rate controlled on Atenolol  A/C held due to GI bleed  Cardiology consulted (Dr. Talavera).  Pt should be off A/C due to recurrent GI bleeds. Now rate controlled on Atenolol  A/C held due to GI bleed  Cardiology consulted (Dr. Talavera).  Pt should be off A/C due to recurrent GI bleed.

## 2017-06-16 NOTE — H&P ADULT - NEGATIVE SKIN SYMPTOMS
no change in size/color of mole/no tumor/no pitted nails/no dryness/no hair loss/no rash/no itching/no brittle nails

## 2017-06-16 NOTE — CONSULT NOTE ADULT - PROBLEM SELECTOR RECOMMENDATION 2
in the setting of GI bleed  Plan for 2 units prbc with HD  Epogen 10,000 units tiw with HD  Check iron studies including ferritin  Monitor h/h. f/u GI

## 2017-06-16 NOTE — CONSULT NOTE ADULT - ASSESSMENT
71y old  Male with esrd of dialysis, recent hospitalization at [facility] for intrabodominal fluid collections, likely pancreatic pseudocyst s/p cyst gastrostomy and possible percutaneous drainage. Patient is a poor historian and is unable to provide a clear hx.  hx of melena    - CTA abd/pelvis  - transfuse to keep HGB > 9  - Renal follow up  - Obtain records from Outside hospital  - Protonix   - serial cbc's  - Call as needed

## 2017-06-16 NOTE — ED PROVIDER NOTE - OBJECTIVE STATEMENT
70 y/o w/ PMHx of CKD and an abdominal shunt presents to the ED for a blood transfusion today. Pt also notes melena, mild cough, and hiccups. Pt has received 2 pints of blood in the past. Pt states as he was presenting for dialysis yesterday when the doctors said that his blood levels were low. Pt denies SOB, fevers/chills, weakness, or any other complaints. NKDA.

## 2017-06-17 DIAGNOSIS — I48.91 UNSPECIFIED ATRIAL FIBRILLATION: ICD-10-CM

## 2017-06-17 DIAGNOSIS — D64.9 ANEMIA, UNSPECIFIED: ICD-10-CM

## 2017-06-17 DIAGNOSIS — K29.71 GASTRITIS, UNSPECIFIED, WITH BLEEDING: ICD-10-CM

## 2017-06-17 DIAGNOSIS — R13.10 DYSPHAGIA, UNSPECIFIED: ICD-10-CM

## 2017-06-17 DIAGNOSIS — E83.39 OTHER DISORDERS OF PHOSPHORUS METABOLISM: ICD-10-CM

## 2017-06-17 LAB
ALBUMIN SERPL ELPH-MCNC: 1.6 G/DL — LOW (ref 3.5–5)
ALP SERPL-CCNC: 94 U/L — SIGNIFICANT CHANGE UP (ref 40–120)
ALT FLD-CCNC: 12 U/L DA — SIGNIFICANT CHANGE UP (ref 10–60)
ANION GAP SERPL CALC-SCNC: 6 MMOL/L — SIGNIFICANT CHANGE UP (ref 5–17)
AST SERPL-CCNC: 14 U/L — SIGNIFICANT CHANGE UP (ref 10–40)
BASOPHILS # BLD AUTO: 0.1 K/UL — SIGNIFICANT CHANGE UP (ref 0–0.2)
BASOPHILS NFR BLD AUTO: 0.5 % — SIGNIFICANT CHANGE UP (ref 0–2)
BILIRUB SERPL-MCNC: 0.5 MG/DL — SIGNIFICANT CHANGE UP (ref 0.2–1.2)
BUN SERPL-MCNC: 12 MG/DL — SIGNIFICANT CHANGE UP (ref 7–18)
CALCIUM SERPL-MCNC: 8.3 MG/DL — LOW (ref 8.4–10.5)
CHLORIDE SERPL-SCNC: 102 MMOL/L — SIGNIFICANT CHANGE UP (ref 96–108)
CHOLEST SERPL-MCNC: 115 MG/DL — SIGNIFICANT CHANGE UP (ref 10–199)
CO2 SERPL-SCNC: 29 MMOL/L — SIGNIFICANT CHANGE UP (ref 22–31)
CREAT SERPL-MCNC: 3.91 MG/DL — HIGH (ref 0.5–1.3)
EOSINOPHIL # BLD AUTO: 0.2 K/UL — SIGNIFICANT CHANGE UP (ref 0–0.5)
EOSINOPHIL NFR BLD AUTO: 1.9 % — SIGNIFICANT CHANGE UP (ref 0–6)
FERRITIN SERPL-MCNC: 1549 NG/ML — HIGH (ref 30–400)
FOLATE SERPL-MCNC: 5.3 NG/ML — SIGNIFICANT CHANGE UP (ref 4.8–24.2)
GLUCOSE SERPL-MCNC: 86 MG/DL — SIGNIFICANT CHANGE UP (ref 70–99)
HAPTOGLOB SERPL-MCNC: 217 MG/DL — HIGH (ref 34–200)
HBA1C BLD-MCNC: 5.2 % — SIGNIFICANT CHANGE UP (ref 4–5.6)
HBV SURFACE AG SER-ACNC: SIGNIFICANT CHANGE UP
HCT VFR BLD CALC: 24 % — LOW (ref 39–50)
HDLC SERPL-MCNC: 25 MG/DL — LOW (ref 40–125)
HGB BLD-MCNC: 7.6 G/DL — LOW (ref 13–17)
LIPID PNL WITH DIRECT LDL SERPL: 71 MG/DL — SIGNIFICANT CHANGE UP
LYMPHOCYTES # BLD AUTO: 1 K/UL — SIGNIFICANT CHANGE UP (ref 1–3.3)
LYMPHOCYTES # BLD AUTO: 8.7 % — LOW (ref 13–44)
MAGNESIUM SERPL-MCNC: 1.9 MG/DL — SIGNIFICANT CHANGE UP (ref 1.6–2.6)
MCHC RBC-ENTMCNC: 27.7 PG — SIGNIFICANT CHANGE UP (ref 27–34)
MCHC RBC-ENTMCNC: 31.8 GM/DL — LOW (ref 32–36)
MCV RBC AUTO: 87 FL — SIGNIFICANT CHANGE UP (ref 80–100)
MONOCYTES # BLD AUTO: 1.2 K/UL — HIGH (ref 0–0.9)
MONOCYTES NFR BLD AUTO: 10.9 % — SIGNIFICANT CHANGE UP (ref 2–14)
NEUTROPHILS # BLD AUTO: 8.8 K/UL — HIGH (ref 1.8–7.4)
NEUTROPHILS NFR BLD AUTO: 77.9 % — HIGH (ref 43–77)
PHOSPHATE SERPL-MCNC: 1.8 MG/DL — LOW (ref 2.5–4.5)
PLATELET # BLD AUTO: 205 K/UL — SIGNIFICANT CHANGE UP (ref 150–400)
POTASSIUM SERPL-MCNC: 3.8 MMOL/L — SIGNIFICANT CHANGE UP (ref 3.5–5.3)
POTASSIUM SERPL-SCNC: 3.8 MMOL/L — SIGNIFICANT CHANGE UP (ref 3.5–5.3)
PROT SERPL-MCNC: 6.2 G/DL — SIGNIFICANT CHANGE UP (ref 6–8.3)
RBC # BLD: 2.75 M/UL — LOW (ref 4.2–5.8)
RBC # FLD: 15.3 % — HIGH (ref 10.3–14.5)
SODIUM SERPL-SCNC: 137 MMOL/L — SIGNIFICANT CHANGE UP (ref 135–145)
TOTAL CHOLESTEROL/HDL RATIO MEASUREMENT: 4.6 RATIO — SIGNIFICANT CHANGE UP (ref 3.4–9.6)
TRIGL SERPL-MCNC: 97 MG/DL — SIGNIFICANT CHANGE UP (ref 10–149)
TSH SERPL-MCNC: 0.75 UU/ML — SIGNIFICANT CHANGE UP (ref 0.34–4.82)
VIT B12 SERPL-MCNC: 596 PG/ML — SIGNIFICANT CHANGE UP (ref 243–894)
WBC # BLD: 11.3 K/UL — HIGH (ref 3.8–10.5)
WBC # FLD AUTO: 11.3 K/UL — HIGH (ref 3.8–10.5)

## 2017-06-17 PROCEDURE — 99233 SBSQ HOSP IP/OBS HIGH 50: CPT

## 2017-06-17 RX ORDER — SODIUM,POTASSIUM PHOSPHATES 278-250MG
1 POWDER IN PACKET (EA) ORAL
Qty: 0 | Refills: 0 | Status: COMPLETED | OUTPATIENT
Start: 2017-06-17 | End: 2017-06-19

## 2017-06-17 RX ADMIN — Medication 1 TABLET(S): at 21:56

## 2017-06-17 RX ADMIN — Medication 1 MILLIGRAM(S): at 12:49

## 2017-06-17 RX ADMIN — CILOSTAZOL 50 MILLIGRAM(S): 100 TABLET ORAL at 12:49

## 2017-06-17 RX ADMIN — PANTOPRAZOLE SODIUM 40 MILLIGRAM(S): 20 TABLET, DELAYED RELEASE ORAL at 06:17

## 2017-06-17 RX ADMIN — Medication 1 TABLET(S): at 12:49

## 2017-06-17 RX ADMIN — Medication 1 TABLET(S): at 18:01

## 2017-06-17 NOTE — PROGRESS NOTE ADULT - ATTENDING COMMENTS
Discussed with Patient at length and counseled for 10 mins, his clinical status and need for further PRBC transfusion and risk of active bleed and death. he finally verbalized understanding.   Discussed with GI and Nephro and on call PGY1 Dr. Perea

## 2017-06-17 NOTE — PROGRESS NOTE ADULT - SUBJECTIVE AND OBJECTIVE BOX
Patient is a 71y old  Male who presents with a chief complaint of low hemoglobin level (16 Jun 2017 12:06)  s/p 2 units PRBC during HD yesterday;       INTERVAL HPI/OVERNIGHT EVENTS: None; Patient wanted to leave this morning.       MEDICATIONS  (STANDING):  epoetin shell Injectable 34614Tkgt(s) IV Push <User Schedule>  pantoprazole    Tablet 40milliGRAM(s) Oral before breakfast  ATENolol  Tablet 25milliGRAM(s) Oral daily  folic acid 1milliGRAM(s) Oral daily  cilostazol 50milliGRAM(s) Oral daily  potassium acid phosphate/sodium acid phosphate tablet (K-PHOS No. 2) 1Tablet(s) Oral four times a day with meals    MEDICATIONS  (PRN):  guaiFENesin    Syrup 100milliGRAM(s) Oral every 6 hours PRN Cough  benzocaine 15 mG/menthol 3.6 mG Lozenge 1Lozenge Oral five times a day PRN Sore Throat    Allergies: No Known Allergies      REVIEW OF SYSTEMS:  CONSTITUTIONAL: No fever, weight loss, or fatigue  RESPIRATORY: No cough, wheezing, chills or hemoptysis; No shortness of breath  CARDIOVASCULAR: No chest pain, palpitations, dizziness, or leg swelling  GASTROINTESTINAL: No abdominal or epigastric pain. No nausea, vomiting, or hematemesis; No diarrhea or constipation. Guaiac Positive stools.  NEUROLOGICAL: No headaches, memory loss, loss of strength, numbness, or tremors  MUSCULOSKELETAL: No joint pain or swelling; No muscle, back, or extremity pain  SKIN: No rashes or lesions      Vital Signs Last 24 Hrs  T(C): 36.9, Max: 37.3 (06-17 @ 10:52)  T(F): 98.4, Max: 99.1 (06-17 @ 10:52)  HR: 81 (72 - 116)  BP: 91/42 (91/42 - 116/54)  RR: 16 (16 - 20)  SpO2: 95% (95% - 100%)    PHYSICAL EXAM:  GENERAL: NAD, well-groomed, well-developed  HEAD:  Atraumatic, Normocephalic  EYES: EOMI, PERRLA, conjunctiva and sclera clear  NECK: Supple, No JVD  NERVOUS SYSTEM:  Alert & Oriented X3, Forgetful;  No gross focal deficits  CHEST/LUNG: Clear to percussion bilaterally; No rales, rhonchi, wheezing, or rubs  HEART: Regular rate and rhythm; S1S2 present  ABDOMEN: Soft, Nontender, Nondistended; Bowel sounds present  EXTREMITIES:  No clubbing, cyanosis, or edema  SKIN: No rashes or lesions    LABS:                        7.6    11.3  )-----------( 205      ( 17 Jun 2017 06:20 )             24.0     06-17    137  |  102  |  12  ----------------------------<  86  3.8   |  29  |  3.91<H>    Ca    8.3<L>      17 Jun 2017 06:20  Phos  1.8     06-17  Mg     1.9     06-17    TPro  6.2  /  Alb  1.6<L>  /  TBili  0.5  /  DBili  x   /  AST  14  /  ALT  12  /  AlkPhos  94  06-17    PT/INR - ( 16 Jun 2017 09:23 )   PT: 14.7 sec;   INR: 1.34 ratio    PTT - ( 16 Jun 2017 09:23 )  PTT:26.7 sec    CAPILLARY BLOOD GLUCOSE  97 (17 Jun 2017 07:55)        Consultant(s) Notes Reviewed:  [x] YES  [ ] NO    Care Discussed with Consultants/Other Providers [x] YES  [ ] NO; Nephro; GI; RN;     Plan of Care discussed with Housestaff [ x]YES [ ] NO; On call PGY1

## 2017-06-17 NOTE — PROGRESS NOTE ADULT - PROBLEM SELECTOR PLAN 2
in the setting of GI bleed  low h/h despite 2 units prbc with HD on 6/16/17. Pt currently receiving a unit of PRBC with no signs of fluid overload.   c/w Epogen 10,000 units tiw with HD  Pt with elevated ferritin- no need for IV iron.   Monitor h/h. f/u GI.

## 2017-06-17 NOTE — PROGRESS NOTE ADULT - SUBJECTIVE AND OBJECTIVE BOX
Subjective:  pt seen and examined , no complaints, ROS -       epoetin shell Injectable [id number](s) IV Push <User Schedule>  pantoprazole    Tablet 40milliGRAM(s) Oral before breakfast  ATENolol  Tablet 25milliGRAM(s) Oral daily  folic acid 1milliGRAM(s) Oral daily  cilostazol 50milliGRAM(s) Oral daily  guaiFENesin    Syrup 100milliGRAM(s) Oral every 6 hours PRN  benzocaine 15 mG/menthol 3.6 mG Lozenge 1Lozenge Oral five times a day PRN                            6.2    11.2  )-----------( 196      ( 16 Jun 2017 09:23 )             20.0       Hemoglobin: 6.2 g/dL (06-16 @ 09:23)      06-16    136  |  98  |  17  ----------------------------<  150<H>  3.3<L>   |  29  |  4.91<H>    Ca    8.3<L>      16 Jun 2017 09:23    TPro  6.4  /  Alb  1.8<L>  /  TBili  0.2  /  DBili  x   /  AST  23  /  ALT  15  /  AlkPhos  95  06-16    Creatinine Trend: 4.91<--    COAGS:           T(C): 37.2, Max: 37.2 (06-17 @ 01:27)  HR: 74 (74 - 116)  BP: 97/50 (95/60 - 116/54)  RR: 18 (16 - 20)  SpO2: 98% (96% - 100%)  Wt(kg): --    I&O's Summary      Daily Height in cm: 185.42 (16 Jun 2017 08:04)    Daily     Appearance: Normal	  HEENT:   Normal oral mucosa, PERRL, EOMI	  Lymphatic: No lymphadenopathy , no edema  Cardiovascular: Normal S1 S2, No JVD, No murmurs , Peripheral pulses palpable 2+ bilaterally  Respiratory: Lungs clear to auscultation, normal effort 	  Gastrointestinal:  Soft, Non-tender, + BS	  Skin: No rashes, No ecchymoses, No cyanosis, warm to touch  Musculoskeletal: Normal range of motion, normal strength  Psychiatry:  Mood & affect appropriate    TELEMETRY: 	    ECG:  	  RADIOLOGY:   DIAGNOSTIC TESTING:  [ ] Echocardiogram:  [ ]  Catheterization:  [ ] Stress Test:    OTHER:     CT abd/ Pelvis:  IMPRESSION:     Acute appearing thrombus within the superior mesenteric vein.    Esophageal stent withinthe distal esophagus/proximal stomach. Debris is   noted within the stent. No bowel obstruction.    Trace bilateral pleural effusions, right greater than left, with pleural   thickening/enhancement which may be infectious or neoplastic in etiology.    Distended gallbladder with cholelithiasis. No wall thickening or   pericholecystic fluid. Correlate clinically and with ultrasound if there   is concern for cholecystitis.    Additional findings as above.    MARY MONTANO M.D., ATTENDING RADIOLOGIST  This document has been electronically signed. Jun 16 2017  1:46PM	        ASSESSMENT/PLAN: 	71y Male ALZ dementia, suprapubic catheter ESRD on HD, HTN, DM, Chol, esophageal stent,  afib on coumadin admitted with GI bleed  and rapid afib. . s/p PRBC    cont pletal  hold Systemic A/C due to anemia  cont B-blocker- rate controlled   HD per renal   Echo pending   D/W Dr Talavera

## 2017-06-17 NOTE — PROGRESS NOTE ADULT - ATTENDING COMMENTS
Patient seen and examined, agree with above assessment and plan as transcribed above.    - HR improved with improved hemoglobin   - F/u echo    Daniel Talavera MD, FACC  [facility], [facility]  [address] [address]  PHONE:  [phone]  BEEPER : [phone]

## 2017-06-17 NOTE — PROGRESS NOTE ADULT - ATTENDING COMMENTS
[facility] NEPHROLOGY  Armando Shaffer M.D.  Kendall Morales D.O.  Lilliam Cody M.D.  Oxana Hernandez, MSN, ANP-C  [phone]    [address] [place] [address]

## 2017-06-17 NOTE — PROGRESS NOTE ADULT - PROBLEM SELECTOR PLAN 1
Likely Chronic blood loss ongoing slow losses likely.  Patient with inadequate response to 2 units; d/w with GI and Nephrology.  Will transfuse one unit today and then one more unit tomorrow if Hgb less than 8.5gm and follow up with HD on monday as d/w Dr. mann  As per GI, will hold off any endoscopic interventions unless emergency, as patient scheduled for outpatient GI f/u on Wednesday for stent removal  F/U CBC in AM.

## 2017-06-17 NOTE — PROGRESS NOTE ADULT - PROBLEM SELECTOR PLAN 1
Last HD 6/16/17. Pt clotted dialyzer and finished 2 hours 46 mins of dialysis. Will plan for next maintenance HD 6/19/17. Monitor BMP.

## 2017-06-17 NOTE — PROGRESS NOTE ADULT - PROBLEM SELECTOR PLAN 5
rate controlled on Atenolol  A/C held due to GI bleed  Cardiology consulted (Dr. Talavera).  Pt should be off A/C due to recurrent GI bleed..

## 2017-06-17 NOTE — PROGRESS NOTE ADULT - PROBLEM SELECTOR PLAN 3
+FOBT. A/C held  s/p CT abd/pelvis with  IV contrast - pt esophageal stent.  Pt seen by Dr. Fernández; no interventions at this time.   Pt with esophageal stent- plan for oupt removal, f/u after discharge.   Monitor h/h.

## 2017-06-17 NOTE — PROGRESS NOTE ADULT - SUBJECTIVE AND OBJECTIVE BOX
[facility] NEPHROLOGY- PROGRESS NOTE    72 y/o M  with ESRD on HD, HTN, DM, A fib (on coumadin), Prostate CA s/p sx with suprapubic catheter, Alzheimer's dementia, Anemia s/p 2 units prbc transfusion (2 weeks prior) presents for blood transfusion. a/w Anemia 2/2 GI bleed and Afib with RVR.     Hospital Medications: Medications reviewed.  REVIEW OF SYSTEMS:  CONSTITUTIONAL: No fevers or chills  RESPIRATORY: No shortness of breath  CARDIOVASCULAR: No chest pain.  GASTROINTESTINAL: No nausea, vomiting, diarrhea or abdominal pain.   VASCULAR: No bilateral lower extremity edema.     VITALS:  T(F): 98.4, Max: 99.1 (06-17 @ 10:52)  HR: 81  BP: 91/42  RR: 16  SpO2: 95%  Wt(kg): --  Height (cm): 185.4 (06-16 @ 08:04)  Weight (kg): 93.8 (06-16 @ 08:04)  BMI (kg/m2): 27.3 (06-16 @ 08:04)  BSA (m2): 2.18 (06-16 @ 08:04)    PHYSICAL EXAM:  Gen: NAD, calm  Neck: no JVD  Cards: irregular, +S1/S2,   Resp: CTA B/L  GI: soft, ND, NABS +epigastric tenderness  : +supra pubic catheter   Extremities: trace LE edema B/L,   Vascular: +Rt AVG +thrill +bruit      LABS:  06-17    137  |  102  |  12  ----------------------------<  86  3.8   |  29  |  3.91<H>    Ca    8.3<L>      17 Jun 2017 06:20  Phos  1.8     06-17  Mg     1.9     06-17    TPro  6.2  /  Alb  1.6<L>  /  TBili  0.5  /  DBili      /  AST  14  /  ALT  12  /  AlkPhos  94  06-17    Creatinine Trend: 3.91 <--, 4.91 <--                        7.6    11.3  )-----------( 205      ( 17 Jun 2017 06:20 )             24.0     Urine Studies:      RADIOLOGY & ADDITIONAL STUDIES:

## 2017-06-18 DIAGNOSIS — I10 ESSENTIAL (PRIMARY) HYPERTENSION: ICD-10-CM

## 2017-06-18 LAB
ALBUMIN SERPL ELPH-MCNC: 1.8 G/DL — LOW (ref 3.5–5)
ALP SERPL-CCNC: 120 U/L — SIGNIFICANT CHANGE UP (ref 40–120)
ALT FLD-CCNC: 24 U/L DA — SIGNIFICANT CHANGE UP (ref 10–60)
ANION GAP SERPL CALC-SCNC: 9 MMOL/L — SIGNIFICANT CHANGE UP (ref 5–17)
ANION GAP SERPL CALC-SCNC: 9 MMOL/L — SIGNIFICANT CHANGE UP (ref 5–17)
AST SERPL-CCNC: 41 U/L — HIGH (ref 10–40)
BASOPHILS # BLD AUTO: 0.1 K/UL — SIGNIFICANT CHANGE UP (ref 0–0.2)
BASOPHILS NFR BLD AUTO: 0.4 % — SIGNIFICANT CHANGE UP (ref 0–2)
BILIRUB SERPL-MCNC: 0.4 MG/DL — SIGNIFICANT CHANGE UP (ref 0.2–1.2)
BUN SERPL-MCNC: 18 MG/DL — SIGNIFICANT CHANGE UP (ref 7–18)
BUN SERPL-MCNC: 24 MG/DL — HIGH (ref 7–18)
CALCIUM SERPL-MCNC: 8.4 MG/DL — SIGNIFICANT CHANGE UP (ref 8.4–10.5)
CALCIUM SERPL-MCNC: 8.6 MG/DL — SIGNIFICANT CHANGE UP (ref 8.4–10.5)
CHLORIDE SERPL-SCNC: 99 MMOL/L — SIGNIFICANT CHANGE UP (ref 96–108)
CHLORIDE SERPL-SCNC: 99 MMOL/L — SIGNIFICANT CHANGE UP (ref 96–108)
CK MB BLD-MCNC: <6.2 % — HIGH (ref 0–3.5)
CK MB CFR SERPL CALC: <1 NG/ML — SIGNIFICANT CHANGE UP (ref 0–3.6)
CK SERPL-CCNC: 16 U/L — LOW (ref 35–232)
CO2 SERPL-SCNC: 26 MMOL/L — SIGNIFICANT CHANGE UP (ref 22–31)
CO2 SERPL-SCNC: 28 MMOL/L — SIGNIFICANT CHANGE UP (ref 22–31)
CREAT SERPL-MCNC: 5.58 MG/DL — HIGH (ref 0.5–1.3)
CREAT SERPL-MCNC: 6.22 MG/DL — HIGH (ref 0.5–1.3)
EOSINOPHIL # BLD AUTO: 0.2 K/UL — SIGNIFICANT CHANGE UP (ref 0–0.5)
EOSINOPHIL NFR BLD AUTO: 1.3 % — SIGNIFICANT CHANGE UP (ref 0–6)
GLUCOSE SERPL-MCNC: 82 MG/DL — SIGNIFICANT CHANGE UP (ref 70–99)
GLUCOSE SERPL-MCNC: 99 MG/DL — SIGNIFICANT CHANGE UP (ref 70–99)
HCT VFR BLD CALC: 26.8 % — LOW (ref 39–50)
HCT VFR BLD CALC: 29.9 % — LOW (ref 39–50)
HGB BLD-MCNC: 8.6 G/DL — LOW (ref 13–17)
HGB BLD-MCNC: 9.7 G/DL — LOW (ref 13–17)
LACTATE SERPL-SCNC: 0.9 MMOL/L — SIGNIFICANT CHANGE UP (ref 0.7–2)
LYMPHOCYTES # BLD AUTO: 1.4 K/UL — SIGNIFICANT CHANGE UP (ref 1–3.3)
LYMPHOCYTES # BLD AUTO: 11.3 % — LOW (ref 13–44)
MAGNESIUM SERPL-MCNC: 2 MG/DL — SIGNIFICANT CHANGE UP (ref 1.6–2.6)
MCHC RBC-ENTMCNC: 28.1 PG — SIGNIFICANT CHANGE UP (ref 27–34)
MCHC RBC-ENTMCNC: 28.1 PG — SIGNIFICANT CHANGE UP (ref 27–34)
MCHC RBC-ENTMCNC: 32 GM/DL — SIGNIFICANT CHANGE UP (ref 32–36)
MCHC RBC-ENTMCNC: 32.5 GM/DL — SIGNIFICANT CHANGE UP (ref 32–36)
MCV RBC AUTO: 86.4 FL — SIGNIFICANT CHANGE UP (ref 80–100)
MCV RBC AUTO: 87.8 FL — SIGNIFICANT CHANGE UP (ref 80–100)
MONOCYTES # BLD AUTO: 1.1 K/UL — HIGH (ref 0–0.9)
MONOCYTES NFR BLD AUTO: 8.7 % — SIGNIFICANT CHANGE UP (ref 2–14)
NEUTROPHILS # BLD AUTO: 10 K/UL — HIGH (ref 1.8–7.4)
NEUTROPHILS NFR BLD AUTO: 78.3 % — HIGH (ref 43–77)
PHOSPHATE SERPL-MCNC: 2.3 MG/DL — LOW (ref 2.5–4.5)
PLATELET # BLD AUTO: 204 K/UL — SIGNIFICANT CHANGE UP (ref 150–400)
PLATELET # BLD AUTO: 207 K/UL — SIGNIFICANT CHANGE UP (ref 150–400)
POTASSIUM SERPL-MCNC: 4.3 MMOL/L — SIGNIFICANT CHANGE UP (ref 3.5–5.3)
POTASSIUM SERPL-MCNC: 4.4 MMOL/L — SIGNIFICANT CHANGE UP (ref 3.5–5.3)
POTASSIUM SERPL-SCNC: 4.3 MMOL/L — SIGNIFICANT CHANGE UP (ref 3.5–5.3)
POTASSIUM SERPL-SCNC: 4.4 MMOL/L — SIGNIFICANT CHANGE UP (ref 3.5–5.3)
PROT SERPL-MCNC: 7.2 G/DL — SIGNIFICANT CHANGE UP (ref 6–8.3)
RBC # BLD: 3.06 M/UL — LOW (ref 4.2–5.8)
RBC # BLD: 3.46 M/UL — LOW (ref 4.2–5.8)
RBC # FLD: 15.2 % — HIGH (ref 10.3–14.5)
RBC # FLD: 15.9 % — HIGH (ref 10.3–14.5)
SODIUM SERPL-SCNC: 134 MMOL/L — LOW (ref 135–145)
SODIUM SERPL-SCNC: 136 MMOL/L — SIGNIFICANT CHANGE UP (ref 135–145)
TROPONIN I SERPL-MCNC: <0.015 NG/ML — SIGNIFICANT CHANGE UP (ref 0–0.04)
WBC # BLD: 12.8 K/UL — HIGH (ref 3.8–10.5)
WBC # BLD: 15.1 K/UL — HIGH (ref 3.8–10.5)
WBC # FLD AUTO: 12.8 K/UL — HIGH (ref 3.8–10.5)
WBC # FLD AUTO: 15.1 K/UL — HIGH (ref 3.8–10.5)

## 2017-06-18 PROCEDURE — 99232 SBSQ HOSP IP/OBS MODERATE 35: CPT

## 2017-06-18 RX ORDER — INSULIN LISPRO 100/ML
VIAL (ML) SUBCUTANEOUS
Qty: 0 | Refills: 0 | Status: DISCONTINUED | OUTPATIENT
Start: 2017-06-18 | End: 2017-06-21

## 2017-06-18 RX ORDER — SODIUM CHLORIDE 9 MG/ML
1000 INJECTION, SOLUTION INTRAVENOUS
Qty: 0 | Refills: 0 | Status: DISCONTINUED | OUTPATIENT
Start: 2017-06-18 | End: 2017-06-21

## 2017-06-18 RX ORDER — DEXTROSE 50 % IN WATER 50 %
12.5 SYRINGE (ML) INTRAVENOUS ONCE
Qty: 0 | Refills: 0 | Status: DISCONTINUED | OUTPATIENT
Start: 2017-06-18 | End: 2017-06-21

## 2017-06-18 RX ORDER — ACETAMINOPHEN 500 MG
650 TABLET ORAL EVERY 6 HOURS
Qty: 0 | Refills: 0 | Status: DISCONTINUED | OUTPATIENT
Start: 2017-06-18 | End: 2017-06-21

## 2017-06-18 RX ORDER — DEXTROSE 50 % IN WATER 50 %
1 SYRINGE (ML) INTRAVENOUS ONCE
Qty: 0 | Refills: 0 | Status: DISCONTINUED | OUTPATIENT
Start: 2017-06-18 | End: 2017-06-21

## 2017-06-18 RX ORDER — DEXTROSE 50 % IN WATER 50 %
25 SYRINGE (ML) INTRAVENOUS ONCE
Qty: 0 | Refills: 0 | Status: DISCONTINUED | OUTPATIENT
Start: 2017-06-18 | End: 2017-06-21

## 2017-06-18 RX ORDER — GLUCAGON INJECTION, SOLUTION 0.5 MG/.1ML
1 INJECTION, SOLUTION SUBCUTANEOUS ONCE
Qty: 0 | Refills: 0 | Status: DISCONTINUED | OUTPATIENT
Start: 2017-06-18 | End: 2017-06-21

## 2017-06-18 RX ORDER — SODIUM CHLORIDE 9 MG/ML
250 INJECTION INTRAMUSCULAR; INTRAVENOUS; SUBCUTANEOUS ONCE
Qty: 0 | Refills: 0 | Status: COMPLETED | OUTPATIENT
Start: 2017-06-18 | End: 2017-06-18

## 2017-06-18 RX ADMIN — PANTOPRAZOLE SODIUM 40 MILLIGRAM(S): 20 TABLET, DELAYED RELEASE ORAL at 05:13

## 2017-06-18 RX ADMIN — Medication 1 TABLET(S): at 12:22

## 2017-06-18 RX ADMIN — Medication 1 TABLET(S): at 09:19

## 2017-06-18 RX ADMIN — Medication 650 MILLIGRAM(S): at 22:03

## 2017-06-18 RX ADMIN — SODIUM CHLORIDE 500 MILLILITER(S): 9 INJECTION INTRAMUSCULAR; INTRAVENOUS; SUBCUTANEOUS at 19:33

## 2017-06-18 RX ADMIN — SODIUM CHLORIDE 500 MILLILITER(S): 9 INJECTION INTRAMUSCULAR; INTRAVENOUS; SUBCUTANEOUS at 15:45

## 2017-06-18 RX ADMIN — Medication 1 TABLET(S): at 22:03

## 2017-06-18 RX ADMIN — Medication 1 MILLIGRAM(S): at 12:22

## 2017-06-18 RX ADMIN — Medication 1 TABLET(S): at 17:25

## 2017-06-18 RX ADMIN — CILOSTAZOL 50 MILLIGRAM(S): 100 TABLET ORAL at 12:23

## 2017-06-18 NOTE — CHART NOTE - NSCHARTNOTEFT_GEN_A_CORE
72 y/o M from home with PMH of ESRD (on HD TTS via R AVF), HTN, DM, A fib (no AC due to GIB?), PNA, anemia (on iron supplements), suprapubic catheter (placed 5 years ago-changed one month ago), SMV thrombus, IVC filter and Alzheimer's dementia, s/p esophageal stent for removal, s/p pancreatic pseudocyst drainage recently presented with low H/H and concern for GI bleed. So far transfused 3 U PRBC intermittently with HD along with epogen. Plan for outpatient stent removal and GI work up. The patient this morning had stable Hb of 9.7 with stable vitals however since 2pm today BP has been dropping to 88 (systolic initially) and then to 77. Received 250 ml x 2 bolus in total today. Assessed pt at bedside and pt c/o substernal chest discomfort and productive cough for 2 weeks. Feels warm, but denies fever, lightheadedness, dizziness, nausea, vomiting, diarrhoea. had 1 bowel movement today, non bloody. No appetite.     PHYSICAL EXAM:  GENERAL: NAD, poorly groomed. Moderately built.   HEAD:  Atraumatic, Normocephalic  EYES: EOMI, PERRLA, conjunctiva and sclera clear  ENMT: No tonsillar erythema, exudates, or enlargement; Moist mucous membranes, Poor dentition, No lesions  NECK: Supple, No JVD, Normal thyroid  NERVOUS SYSTEM:  MS: AAOx3, Higher cortical functions intact, CN 1-12 intact, Motor: 4/5 power B/L L/E. Sensory: sensation grossly intact  CHEST/LUNG: No rales, rhonchi, wheezing, or rubs  HEART: Irregular rate and rhythm; systolic murmur in the left 2nd ICS  ABDOMEN: Soft, Nontender, Nondistended; Bowel sounds present. Suprapubic catheter noted  EXTREMITIES:  1+ peripheral pulses. 2 failed fistula noted on the left arm with incision. One AVF on the right arm, could not feel thrill.   LYMPH: No lymphadenopathy noted    VITALS:  T-100.1 (rectal), HR- 87, BP-91/47, SaO2 - 100% on RA.   Orthostatics negative: Lyin/47, HR 87; Sittin/55, , Lyin/54, HR 81    LABS:  06-18    134<L>  |  99  |  18  ----------------------------<  82  4.3   |  26  |  5.58<H>    Ca    8.6      2017 07:14  Phos  2.3       Mg     2.0         TPro  7.2  /  Alb  1.8<L>  /  TBili  0.4  /  DBili      /  AST  41<H>  /  ALT  24  /  AlkPhos  120      Creatinine Trend: 5.58 <--, 3.91 <--, 4.91 <--                        9.7    12.8  )-----------( 207      ( 2017 07:14 )             29.9   Prothrombin Time, Plasma: 14.7 sec ( @ 09:23)  INR: 1.34 ratio ( @ 09:23)  Activated Partial Thromboplastin Time: 26.7 sec ( @ 09:23)    RADIOLOGY & ADDITIONAL TESTS:    1. CXR: There is a mild pleural density at the left base laterally. Heart obscures the left base.  2. CT abdomen: Acute appearing thrombus within the superior mesenteric vein. Esophageal stent within the distal esophagus/proximal stomach. Debris is noted within the stent. No bowel obstruction. Trace bilateral pleural effusions, right greater than left, with pleural thickening/enhancement which may be infectious or neoplastic in etiology. Distended gallbladder with cholelithiasis. No wall thickening or pericholecystic fluid.  3. EKG: No new changes compared to previous. Afib with RBBB, LAD, Non specific St-T wave changes.  4. Echo: . Mild concentric left ventricular hypertrophy. Normal Left Ventricular Systolic Function,  (EF = 55 to 60%) Grade II diastolic dysfunction. RV systolic pressure is mildly increased.=35mmhg. There is mild tricuspid regurgitation.      A/P:  1) Hypotension: Differentials can include Sepsis vs Acute bleed vs volume depletion vs MI. Pt has low baseline BP of systolic reading 90. Currently repeat BP performed by me at bedside is at pts baseline. rectal temp of 100.1, unlikely sepsis. However sent lactate and blood c/s. Also hypotensive episodes can be secondary to bleed. Will repeat CBC. Sent cardiac enzymes for chest pain. Will f/u EKG. 72 y/o M from home with PMH of ESRD (on HD TTS via R AVF), HTN, DM, A fib (no AC due to GIB?), PNA, anemia (on iron supplements), suprapubic catheter (placed 5 years ago-changed one month ago), SMV thrombus, IVC filter and Alzheimer's dementia, s/p esophageal stent for removal, s/p pancreatic pseudocyst drainage recently presented with low H/H and concern for GI bleed. So far transfused 3 U PRBC intermittently with HD along with epogen. Plan for outpatient stent removal and GI work up. The patient this morning had stable Hb of 9.7 with stable vitals however since 2pm today BP has been dropping to 88 (systolic initially) and then to 77. Received 250 ml x 2 bolus in total today. Assessed pt at bedside and pt c/o substernal chest discomfort and productive cough for 2 weeks. Feels warm, but denies fever, lightheadedness, dizziness, nausea, vomiting, diarrhoea. had 1 bowel movement today, non bloody. No appetite.     PHYSICAL EXAM:  GENERAL: NAD, poorly groomed. Moderately built.   HEAD:  Atraumatic, Normocephalic  EYES: EOMI, PERRLA, conjunctiva and sclera clear  ENMT: No tonsillar erythema, exudates, or enlargement; Moist mucous membranes, Poor dentition, No lesions  NECK: Supple, No JVD, Normal thyroid  NERVOUS SYSTEM:  MS: AAOx3, Higher cortical functions intact, CN 1-12 intact, Motor: 4/5 power B/L L/E. Sensory: sensation grossly intact  CHEST/LUNG: No rales, rhonchi, wheezing, or rubs  HEART: Irregular rate and rhythm; systolic murmur in the left 2nd ICS  ABDOMEN: Soft, Nontender, Nondistended; Bowel sounds present. Suprapubic catheter noted  EXTREMITIES:  1+ peripheral pulses. 2 failed fistula noted on the left arm with incision. One AVF on the right arm, could not feel thrill.   LYMPH: No lymphadenopathy noted    VITALS:  T-100.1 (rectal), HR- 87, BP-91/47, SaO2 - 100% on RA.   Orthostatics negative: Lyin/47, HR 87; Sittin/55, , Lyin/54, HR 81    LABS:  06-18    134<L>  |  99  |  18  ----------------------------<  82  4.3   |  26  |  5.58<H>    Ca    8.6      2017 07:14  Phos  2.3       Mg     2.0         TPro  7.2  /  Alb  1.8<L>  /  TBili  0.4  /  DBili      /  AST  41<H>  /  ALT  24  /  AlkPhos  120      Creatinine Trend: 5.58 <--, 3.91 <--, 4.91 <--                        9.7    12.8  )-----------( 207      ( 2017 07:14 )             29.9   Prothrombin Time, Plasma: 14.7 sec ( @ 09:23)  INR: 1.34 ratio ( @ 09:23)  Activated Partial Thromboplastin Time: 26.7 sec ( @ 09:23)    RADIOLOGY & ADDITIONAL TESTS:    1. CXR: There is a mild pleural density at the left base laterally. Heart obscures the left base.  2. CT abdomen: Acute appearing thrombus within the superior mesenteric vein. Esophageal stent within the distal esophagus/proximal stomach. Debris is noted within the stent. No bowel obstruction. Trace bilateral pleural effusions, right greater than left, with pleural thickening/enhancement which may be infectious or neoplastic in etiology. Distended gallbladder with cholelithiasis. No wall thickening or pericholecystic fluid.  3. EKG: No new changes compared to previous. Afib with RBBB, LAD, Non specific St-T wave changes.  4. Echo: . Mild concentric left ventricular hypertrophy. Normal Left Ventricular Systolic Function,  (EF = 55 to 60%) Grade II diastolic dysfunction. RV systolic pressure is mildly increased.=35mmhg. There is mild tricuspid regurgitation.      A/P:  1) Hypotension: Differentials can include Sepsis vs Acute bleed vs volume depletion vs MI. Pt has low baseline BP of systolic reading 90. Currently repeat BP performed by me at bedside is at pts baseline. rectal temp of 100.1, unlikely sepsis. However sent lactate and blood c/s. Also hypotensive episodes can be secondary to bleed. Will repeat CBC. Sent cardiac enzymes for chest pain. EKG no new changes compared to previous. - atenolol with BP parameters, so far on hold.    UPDATE: Hb 8.6, d/w Bob Condon, the previous Hb had overcorrected from 7 to 9 with one unit transfusion. Unlikely any active bleed. Also white count is trending up to 15 now. Could be reactive however given cough for 2 weeks and infiltrate in LLL on CXR, consider ABx if no improvement in pts vitals.

## 2017-06-18 NOTE — PROVIDER CONTACT NOTE (CHANGE IN STATUS NOTIFICATION) - ACTION/TREATMENT ORDERED:
250mLs NS bolus  Labs: CBC, lactate, troponin, and blood cultures (all drawn by Dr. Buck)  Orthostatics  EKG

## 2017-06-18 NOTE — PROGRESS NOTE ADULT - SUBJECTIVE AND OBJECTIVE BOX
[facility] NEPHROLOGY- PROGRESS NOTE    72 y/o M  with ESRD on HD, HTN, DM, A fib (on coumadin), Prostate CA s/p sx with suprapubic catheter, Alzheimer's dementia, Anemia s/p 2 units prbc transfusion (2 weeks prior) presents for blood transfusion. a/w Anemia 2/2 GI bleed and Afib with RVR.     Hospital Medications: Medications reviewed.  REVIEW OF SYSTEMS:  CONSTITUTIONAL: No fevers or chills  RESPIRATORY: No shortness of breath  CARDIOVASCULAR: No chest pain.  GASTROINTESTINAL: No nausea, vomiting, diarrhea or abdominal pain.   VASCULAR: No bilateral lower extremity edema.     VITALS:  T(F): 98.9, Max: 99.2 (06-18 @ 01:29)  HR: 91  BP: 95/56  RR: 16  SpO2: 99%  Wt(kg): --    I & Os for current day (as of 06-18 @ 13:33)  =============================================  IN: 590 ml / OUT: 50 ml / NET: 540 ml    PHYSICAL EXAM:  Gen: NAD, calm  Neck: no JVD  Cards: irregular, +S1/S2,   Resp: CTA B/L  GI: soft, ND, NT NABS   : +supra pubic catheter   Extremities: no LE edema B/L,   Vascular: +Rt AVG +thrill +bruit    LABS:  06-18    134<L>  |  99  |  18  ----------------------------<  82  4.3   |  26  |  5.58<H>    Ca    8.6      18 Jun 2017 07:14  Phos  2.3     06-18  Mg     2.0     06-18    TPro  7.2  /  Alb  1.8<L>  /  TBili  0.4  /  DBili      /  AST  41<H>  /  ALT  24  /  AlkPhos  120  06-18    Creatinine Trend: 5.58 <--, 3.91 <--, 4.91 <--                        9.7    12.8  )-----------( 207      ( 18 Jun 2017 07:14 )             29.9     Urine Studies:

## 2017-06-18 NOTE — PROGRESS NOTE ADULT - PROBLEM SELECTOR PLAN 5
pt takes Atenolol 50mg daily  reducing dose to 25mg with parameters due to borderline BP   f/u TSH pt takes Atenolol 50mg daily  reducing dose to 25mg with parameters due to borderline BP   f/u TSH;

## 2017-06-18 NOTE — PROGRESS NOTE ADULT - PROBLEM SELECTOR PLAN 4
Hb 5.2  not on home DM medications  c/w consistent carb diet  c/w HSS as needed  Fingersticks within normal limits

## 2017-06-18 NOTE — PROGRESS NOTE ADULT - ATTENDING COMMENTS
Spoke with pt and Zuleima (his helper): pt for possible d/c tomorrow after HD if stable. Plan for patient to be taken to [facility] to f/u his GI doctor; Dr. Mathew for esophageal stent removal and possible EGD to identify source of GI bleed. Patient and Zuleima agree with plan.     [facility] NEPHROLOGY  Armando Shaffer M.D.  Kendall Morales D.O.  Lilliam Cody M.D.  Oxana Hernandez, MSN, ANP-C  [phone]    [address] [place] [address]

## 2017-06-18 NOTE — PROGRESS NOTE ADULT - SUBJECTIVE AND OBJECTIVE BOX
Subjective:  pt seen and examined , no complaints,     epoetin shell Injectable [id number](s) IV Push <User Schedule>  pantoprazole    Tablet 40milliGRAM(s) Oral before breakfast  ATENolol  Tablet 25milliGRAM(s) Oral daily  folic acid 1milliGRAM(s) Oral daily  cilostazol 50milliGRAM(s) Oral daily  guaiFENesin    Syrup 100milliGRAM(s) Oral every 6 hours PRN  benzocaine 15 mG/menthol 3.6 mG Lozenge 1Lozenge Oral five times a day PRN  potassium acid phosphate/sodium acid phosphate tablet (K-PHOS No. 2) 1Tablet(s) Oral four times a day with meals                            7.6    11.3  )-----------( 205      ( 2017 06:20 )             24.0       Hemoglobin: 7.6 g/dL ( @ 06:20)  Hemoglobin: 6.2 g/dL ( @ 09:23)          137  |  102  |  12  ----------------------------<  86  3.8   |  29  |  3.91<H>    Ca    8.3<L>      2017 06:20  Phos  1.8       Mg     1.9         TPro  6.2  /  Alb  1.6<L>  /  TBili  0.5  /  DBili  x   /  AST  14  /  ALT  12  /  AlkPhos  94      Creatinine Trend: 3.91<--, 4.91<--    COAGS:           T(C): 36.5, Max: 37.3 ( @ 10:52)  HR: 74 (70 - 109)  BP: 103/54 (91/42 - 112/63)  RR: 17 (16 - 18)  SpO2: 98% (95% - 100%)  Wt(kg): --    I&O's Summary    I & Os for current day (as of 2017 05:45)  =============================================  IN: 590 ml / OUT: 0 ml / NET: 590 ml      Daily     Daily Weight in k.1 (2017 04:59)      Appearance: Normal	  HEENT:   Normal oral mucosa, PERRL, EOMI	  Lymphatic: No lymphadenopathy , no edema  Cardiovascular: Normal S1 S2, No JVD, No murmurs , Peripheral pulses palpable 2+ bilaterally  Respiratory: Lungs clear to auscultation, normal effort 	  Gastrointestinal:  Soft, Non-tender, + BS	  Skin: No rashes, No ecchymoses, No cyanosis, warm to touch  Musculoskeletal: Normal range of motion, normal strength  Psychiatry:  Mood & affect appropriate    TELEMETRY: 	    ECG:  	  RADIOLOGY:   DIAGNOSTIC TESTING:  [ ] Echocardiogram:  [ ]  Catheterization:  [ ] Stress Test:    OTHER:     CT abd/ Pelvis:  IMPRESSION:     Acute appearing thrombus within the superior mesenteric vein.    Esophageal stent withinthe distal esophagus/proximal stomach. Debris is   noted within the stent. No bowel obstruction.    Trace bilateral pleural effusions, right greater than left, with pleural   thickening/enhancement which may be infectious or neoplastic in etiology.    Distended gallbladder with cholelithiasis. No wall thickening or   pericholecystic fluid. Correlate clinically and with ultrasound if there   is concern for cholecystitis.    Additional findings as above.    MARY MONTANO M.D., ATTENDING RADIOLOGIST  This document has been electronically signed. 2017  1:46PM	        ASSESSMENT/PLAN: 	71y Male ALZ dementia, suprapubic catheter ESRD on HD, HTN, DM, Chol, esophageal stent,  afib on coumadin admitted with GI bleed  and rapid afib. . s/p PRBC    cont pletal  hold Systemic A/C due to anemia  cont B-blocker- rate controlled   HD per renal   Echo pending    GI / DVT prophylaxis.   keep K>4, mag >2.0   D/W Dr Talavera

## 2017-06-18 NOTE — PROGRESS NOTE ADULT - PROBLEM SELECTOR PLAN 3
dialyzed on Saturday and likely again will be dialyzed tomorrow  Dr. Cody- renal consult  c/w epogen dialyzed on Saturday and again to be dialyzed tomorrow  Dr. Cody- renal consult  c/w epogen

## 2017-06-18 NOTE — PROGRESS NOTE ADULT - PROBLEM SELECTOR PLAN 2
CHADVasc score of 3   rapid a fib with RVR likely 2/2 fluid depletion on admission, now rate controlled  holding coumadin due to suspicion for GI bleed  reduced atenolol to 25mg daily with parameters  Cardiology consulted Dr. Talavera CHADVasc score of 3   rapid a fib with RVR likely 2/2 fluid depletion on admission, now rate controlled  holding coumadin due to suspicion for GI bleed  reduced atenolol to 25mg daily with parameters being held  Cardiology consulted Dr. Talavera

## 2017-06-18 NOTE — PROGRESS NOTE ADULT - PROBLEM SELECTOR PLAN 2
in the setting of GI bleed  h/h improving s/p total 3 units prbc. Pt with no signs of fluid overload.   c/w Epogen 10,000 units tiw with HD  Pt with elevated ferritin- no need for IV iron.   Monitor h/h. Can transfuse prbc with HD tomorrow if needed. f/u GI.

## 2017-06-18 NOTE — PROGRESS NOTE ADULT - ATTENDING COMMENTS
Agree with above assessment and plan as outlined above.    -HR appropriate    Daniel Talavera MD, FACC  [facility], [facility]  [address] [place] [address]  PHONE:  [phone]  BEEPER : [phone]

## 2017-06-18 NOTE — PROGRESS NOTE ADULT - PROBLEM SELECTOR PROBLEM 5
Type 2 diabetes mellitus without complication, without long-term current use of insulin Hypertension

## 2017-06-18 NOTE — PROGRESS NOTE ADULT - PROBLEM SELECTOR PLAN 1
anemia likely 2/2 UGIB as pt reported recurrent melena for the past 2 weeks  as per GI, Dr. Evans, can follow up with patient's outpt GI at [facility] on 6/21 as scheduled for esophageal stent removal  holding patient's coumadin 12mg daily   on admission Hb level of 6.2 and guaiac positive --> s/p 3 PRBCs, now Hb 9.7   c/w epogen  advanced diet to mechanical soft from [facility]    transfuse if Hb < 8.5   anemia of chronic disease 2/2 CKD anemia likely 2/2 UGIB as pt reported recurrent melena for the past 2 weeks  as per GI, Dr. Evans, can follow up with patient's outpt GI at [facility] on 6/21 as scheduled for esophageal stent removal  holding patient's coumadin.  on admission Hb level of 6.2 and guaiac positive --> s/p 3 PRBCs, now Hb 9.7   c/w epogen  advanced diet to mechanical soft from clears.  transfuse if Hb < 8.5   anemia of chronic disease 2/2 CKD

## 2017-06-18 NOTE — PROGRESS NOTE ADULT - PROBLEM SELECTOR PROBLEM 4
ESRD (end stage renal disease) on dialysis Type 2 diabetes mellitus without complication, without long-term current use of insulin

## 2017-06-18 NOTE — PROGRESS NOTE ADULT - SUBJECTIVE AND OBJECTIVE BOX
70 y/o M with PMH of ESRD (on HD TTS via R AVF), HTN, DM, A fib (no AC due to GIB?), PNA, anemia (on iron supplements), suprapubic catheter (placed 5 years ago), IVC filter and Alzheimer's dementia, with chief complaint of black stool x 2wks, epigastric pain, and low Hb. Patient was seen and examined, was informed of results of Hb level this am, that we will recheck the level at night and that he will likely get another HD session tomorrow. He reports feeling well, is ready for solid diet. Denies fever, chills, SOB, palpitations, chest pain, abdominal pain, nausea, vomiting, diarrhea, constipation, dizziness.      INTERVAL HPI/OVERNIGHT EVENTS:  T(C): 36.5, Max: 37.3 (06-17 @ 10:52)  HR: 74 (70 - 109)  BP: 103/54 (91/42 - 112/63)  RR: 17 (16 - 17)  SpO2: 98% (95% - 100%)    I & Os for current day (as of 18 Jun 2017 09:49)  =============================================  IN: 590 ml / OUT: 50 ml / NET: 540 ml    MEDICATIONS  (STANDING):  epoetin shell Injectable 37803Azpi(s) IV Push <User Schedule>  pantoprazole    Tablet 40milliGRAM(s) Oral before breakfast  ATENolol  Tablet 25milliGRAM(s) Oral daily  folic acid 1milliGRAM(s) Oral daily  cilostazol 50milliGRAM(s) Oral daily  potassium acid phosphate/sodium acid phosphate tablet (K-PHOS No. 2) 1Tablet(s) Oral four times a day with meals    MEDICATIONS  (PRN):  guaiFENesin    Syrup 100milliGRAM(s) Oral every 6 hours PRN Cough  benzocaine 15 mG/menthol 3.6 mG Lozenge 1Lozenge Oral five times a day PRN Sore Throat    PHYSICAL EXAM:  GENERAL: NAD, well-groomed, well-developed  HEAD:  Atraumatic, Normocephalic  EYES: EOMI, PERRLA, conjunctiva and sclera clear  ENMT: No tonsillar erythema, exudates, or enlargement; Moist mucous membranes, Good dentition, No lesions  NECK: Supple, No JVD, Normal thyroid  NERVOUS SYSTEM:  Alert & Oriented X2, Motor Strength 5/5 B/L upper and lower extremities; DTRs 2+ intact and symmetric  CHEST/LUNG: Clear to percussion bilaterally; No rales, rhonchi, wheezing, or rubs  HEART: irregular rate and rhythm; No murmurs, rubs, or gallops  ABDOMEN: RLQ ventral hernia, Nontender, Nondistended; Bowel sounds present; suprapubic catheter  EXTREMITIES:  No clubbing, cyanosis; b/l LE venous stasis with skin changes, no edema noted  LYMPH: No lymphadenopathy noted  SKIN: b/l venous changes lower extremities  LABS:                        9.7    12.8  )-----------( 207      ( 18 Jun 2017 07:14 )             29.9     06-18    134<L>  |  99  |  18  ----------------------------<  82  4.3   |  26  |  5.58<H>    Ca    8.6      18 Jun 2017 07:14  Phos  2.3     06-18  Mg     2.0     06-18    TPro  7.2  /  Alb  1.8<L>  /  TBili  0.4  /  DBili  x   /  AST  41<H>  /  ALT  24  /  AlkPhos  120  06-18

## 2017-06-18 NOTE — PROGRESS NOTE ADULT - ATTENDING COMMENTS
Patient seen and examined earlier this afternoon. Agree with PGY1 A/P above; d/w PGY1 Dr. Martinez    Discussed with Dr. Cody earlier today who spoke with patients daughter friend (sister like); Plan for possible discharge after HD tomorrow with f/u outpatient with GI at [facility]    Patient was slightly hypotensive this afternoon given 250cc bolus; Atenolol was held    Informed by RN; Still running low BP after earlier response 94mm Hg Systolic; patient asymptomatic; will give another 250cc bolus  Will get CBC and Blood Cx; rectal temp    d/w RN; d/w PGY3 on call Dr. Buck    Monitor CBC q 12 hrs; If BP remains low, may hold off discharge tomorrow.

## 2017-06-18 NOTE — PROGRESS NOTE ADULT - PROBLEM SELECTOR PLAN 3
+FOBT. A/C held  s/p CT abd/pelvis with  IV contrast - pt esophageal stent.  Pt seen by Dr. Fernández; no interventions at this time.   Pt with esophageal stent- plan for oupt removal by Dr. Mathew at [facility] [phone]. Attempt to call but unable to reach. Will call again tomorrow.  f/u after discharge.   Monitor h/h.

## 2017-06-18 NOTE — PROGRESS NOTE ADULT - SUBJECTIVE AND OBJECTIVE BOX
stable  no melena  no major complaints  appropriate response to 3 units of prbcs    Vital Signs Last 24 Hrs  T(C): 37.8, Max: 37.8 (06-18 @ 18:47)  T(F): 100.1, Max: 100.1 (06-18 @ 18:47)  HR: 110 (70 - 112)  BP: 79/43 (79/43 - 112/63)  BP(mean): 52 (52 - 64)  RR: 16 (16 - 17)  SpO2: 98% (96% - 100%)    NAD  s1s2  bs b/l  abd: soft nt nd  ext ; 2 dps    Esophageal stent ( unknown reason why it was placed)  ESRD on dialysis    -awaiting records from outside hospital  -patient wants to f/u with GI at [facility] for further management  -If no evidence of active GI bleed, can follow up with his GI physician as oupatient  -Protonix BID

## 2017-06-19 DIAGNOSIS — N39.0 URINARY TRACT INFECTION, SITE NOT SPECIFIED: ICD-10-CM

## 2017-06-19 DIAGNOSIS — J18.9 PNEUMONIA, UNSPECIFIED ORGANISM: ICD-10-CM

## 2017-06-19 LAB
ALBUMIN SERPL ELPH-MCNC: 1.8 G/DL — LOW (ref 3.5–5)
ALP SERPL-CCNC: 135 U/L — HIGH (ref 40–120)
ALT FLD-CCNC: 27 U/L DA — SIGNIFICANT CHANGE UP (ref 10–60)
ANION GAP SERPL CALC-SCNC: 8 MMOL/L — SIGNIFICANT CHANGE UP (ref 5–17)
APPEARANCE UR: ABNORMAL
AST SERPL-CCNC: 40 U/L — SIGNIFICANT CHANGE UP (ref 10–40)
BASOPHILS # BLD AUTO: 0.1 K/UL — SIGNIFICANT CHANGE UP (ref 0–0.2)
BASOPHILS NFR BLD AUTO: 0.5 % — SIGNIFICANT CHANGE UP (ref 0–2)
BILIRUB SERPL-MCNC: 0.3 MG/DL — SIGNIFICANT CHANGE UP (ref 0.2–1.2)
BILIRUB UR-MCNC: NEGATIVE — SIGNIFICANT CHANGE UP
BUN SERPL-MCNC: 28 MG/DL — HIGH (ref 7–18)
CALCIUM SERPL-MCNC: 8.4 MG/DL — SIGNIFICANT CHANGE UP (ref 8.4–10.5)
CHLORIDE SERPL-SCNC: 100 MMOL/L — SIGNIFICANT CHANGE UP (ref 96–108)
CO2 SERPL-SCNC: 25 MMOL/L — SIGNIFICANT CHANGE UP (ref 22–31)
COLOR SPEC: YELLOW — SIGNIFICANT CHANGE UP
CREAT SERPL-MCNC: 7.12 MG/DL — HIGH (ref 0.5–1.3)
DIFF PNL FLD: ABNORMAL
EOSINOPHIL # BLD AUTO: 0.1 K/UL — SIGNIFICANT CHANGE UP (ref 0–0.5)
EOSINOPHIL NFR BLD AUTO: 0.7 % — SIGNIFICANT CHANGE UP (ref 0–6)
GLUCOSE SERPL-MCNC: 121 MG/DL — HIGH (ref 70–99)
GLUCOSE UR QL: NEGATIVE — SIGNIFICANT CHANGE UP
HCT VFR BLD CALC: 23.4 % — LOW (ref 39–50)
HCT VFR BLD CALC: 31 % — LOW (ref 39–50)
HGB BLD-MCNC: 7.6 G/DL — LOW (ref 13–17)
HGB BLD-MCNC: 9.6 G/DL — LOW (ref 13–17)
KETONES UR-MCNC: NEGATIVE — SIGNIFICANT CHANGE UP
LEUKOCYTE ESTERASE UR-ACNC: ABNORMAL
LYMPHOCYTES # BLD AUTO: 1 K/UL — SIGNIFICANT CHANGE UP (ref 1–3.3)
LYMPHOCYTES # BLD AUTO: 7.8 % — LOW (ref 13–44)
MAGNESIUM SERPL-MCNC: 2 MG/DL — SIGNIFICANT CHANGE UP (ref 1.6–2.6)
MCHC RBC-ENTMCNC: 27.3 PG — SIGNIFICANT CHANGE UP (ref 27–34)
MCHC RBC-ENTMCNC: 27.7 PG — SIGNIFICANT CHANGE UP (ref 27–34)
MCHC RBC-ENTMCNC: 31 GM/DL — LOW (ref 32–36)
MCHC RBC-ENTMCNC: 32.4 GM/DL — SIGNIFICANT CHANGE UP (ref 32–36)
MCV RBC AUTO: 85.4 FL — SIGNIFICANT CHANGE UP (ref 80–100)
MCV RBC AUTO: 88.2 FL — SIGNIFICANT CHANGE UP (ref 80–100)
MONOCYTES # BLD AUTO: 0.7 K/UL — SIGNIFICANT CHANGE UP (ref 0–0.9)
MONOCYTES NFR BLD AUTO: 5.5 % — SIGNIFICANT CHANGE UP (ref 2–14)
NEUTROPHILS # BLD AUTO: 11.4 K/UL — HIGH (ref 1.8–7.4)
NEUTROPHILS NFR BLD AUTO: 85.5 % — HIGH (ref 43–77)
NITRITE UR-MCNC: NEGATIVE — SIGNIFICANT CHANGE UP
PH UR: 7 — SIGNIFICANT CHANGE UP (ref 5–8)
PHOSPHATE SERPL-MCNC: 3.1 MG/DL — SIGNIFICANT CHANGE UP (ref 2.5–4.5)
PLATELET # BLD AUTO: 192 K/UL — SIGNIFICANT CHANGE UP (ref 150–400)
PLATELET # BLD AUTO: 222 K/UL — SIGNIFICANT CHANGE UP (ref 150–400)
POTASSIUM SERPL-MCNC: 4.3 MMOL/L — SIGNIFICANT CHANGE UP (ref 3.5–5.3)
POTASSIUM SERPL-SCNC: 4.3 MMOL/L — SIGNIFICANT CHANGE UP (ref 3.5–5.3)
PROT SERPL-MCNC: 7.2 G/DL — SIGNIFICANT CHANGE UP (ref 6–8.3)
PROT UR-MCNC: 500 MG/DL
RBC # BLD: 2.74 M/UL — LOW (ref 4.2–5.8)
RBC # BLD: 3.52 M/UL — LOW (ref 4.2–5.8)
RBC # FLD: 15.3 % — HIGH (ref 10.3–14.5)
RBC # FLD: 15.6 % — HIGH (ref 10.3–14.5)
SODIUM SERPL-SCNC: 133 MMOL/L — LOW (ref 135–145)
SP GR SPEC: 1.01 — SIGNIFICANT CHANGE UP (ref 1.01–1.02)
TRANSFERRIN SERPL-MCNC: 113 MG/DL — LOW (ref 200–360)
UROBILINOGEN FLD QL: NEGATIVE — SIGNIFICANT CHANGE UP
WBC # BLD: 13.3 K/UL — HIGH (ref 3.8–10.5)
WBC # BLD: 4.9 K/UL — SIGNIFICANT CHANGE UP (ref 3.8–10.5)
WBC # FLD AUTO: 13.3 K/UL — HIGH (ref 3.8–10.5)
WBC # FLD AUTO: 4.9 K/UL — SIGNIFICANT CHANGE UP (ref 3.8–10.5)

## 2017-06-19 PROCEDURE — 71010: CPT | Mod: 26

## 2017-06-19 RX ORDER — ATENOLOL 25 MG/1
1 TABLET ORAL
Qty: 0 | Refills: 0 | COMMUNITY
Start: 2017-06-19

## 2017-06-19 RX ORDER — CEFTRIAXONE 500 MG/1
1 INJECTION, POWDER, FOR SOLUTION INTRAMUSCULAR; INTRAVENOUS ONCE
Qty: 0 | Refills: 0 | Status: COMPLETED | OUTPATIENT
Start: 2017-06-19 | End: 2017-06-19

## 2017-06-19 RX ORDER — CEFTRIAXONE 500 MG/1
1 INJECTION, POWDER, FOR SOLUTION INTRAMUSCULAR; INTRAVENOUS
Qty: 0 | Refills: 0 | COMMUNITY
Start: 2017-06-19

## 2017-06-19 RX ORDER — CEFTRIAXONE 500 MG/1
1 INJECTION, POWDER, FOR SOLUTION INTRAMUSCULAR; INTRAVENOUS EVERY 24 HOURS
Qty: 0 | Refills: 0 | Status: DISCONTINUED | OUTPATIENT
Start: 2017-06-20 | End: 2017-06-21

## 2017-06-19 RX ORDER — CEFTRIAXONE 500 MG/1
INJECTION, POWDER, FOR SOLUTION INTRAMUSCULAR; INTRAVENOUS
Qty: 0 | Refills: 0 | Status: DISCONTINUED | OUTPATIENT
Start: 2017-06-19 | End: 2017-06-21

## 2017-06-19 RX ORDER — SODIUM CHLORIDE 9 MG/ML
250 INJECTION INTRAMUSCULAR; INTRAVENOUS; SUBCUTANEOUS ONCE
Qty: 0 | Refills: 0 | Status: COMPLETED | OUTPATIENT
Start: 2017-06-19 | End: 2017-06-19

## 2017-06-19 RX ADMIN — ERYTHROPOIETIN 10000 UNIT(S): 10000 INJECTION, SOLUTION INTRAVENOUS; SUBCUTANEOUS at 16:41

## 2017-06-19 RX ADMIN — CEFTRIAXONE 100 GRAM(S): 500 INJECTION, POWDER, FOR SOLUTION INTRAMUSCULAR; INTRAVENOUS at 11:42

## 2017-06-19 RX ADMIN — PANTOPRAZOLE SODIUM 40 MILLIGRAM(S): 20 TABLET, DELAYED RELEASE ORAL at 06:17

## 2017-06-19 RX ADMIN — Medication 1 MILLIGRAM(S): at 11:41

## 2017-06-19 RX ADMIN — CILOSTAZOL 50 MILLIGRAM(S): 100 TABLET ORAL at 11:41

## 2017-06-19 RX ADMIN — Medication 1 TABLET(S): at 10:24

## 2017-06-19 NOTE — PROGRESS NOTE ADULT - SUBJECTIVE AND OBJECTIVE BOX
Subjective:  pt seen and examined , no complaints,     epoetin shell Injectable 39126Puxh(s) IV Push <User Schedule>  pantoprazole    Tablet 40milliGRAM(s) Oral before breakfast  ATENolol  Tablet 25milliGRAM(s) Oral daily  folic acid 1milliGRAM(s) Oral daily  cilostazol 50milliGRAM(s) Oral daily  guaiFENesin    Syrup 100milliGRAM(s) Oral every 6 hours PRN  benzocaine 15 mG/menthol 3.6 mG Lozenge 1Lozenge Oral five times a day PRN  potassium acid phosphate/sodium acid phosphate tablet (K-PHOS No. 2) 1Tablet(s) Oral four times a day with meals  insulin lispro (HumaLOG) corrective regimen sliding scale  SubCutaneous three times a day before meals  dextrose 5%. 1000milliLiter(s) IV Continuous <Continuous>  dextrose Gel 1Dose(s) Oral once PRN  dextrose 50% Injectable 12.5Gram(s) IV Push once  dextrose 50% Injectable 25Gram(s) IV Push once  dextrose 50% Injectable 25Gram(s) IV Push once  glucagon  Injectable 1milliGRAM(s) IntraMuscular once PRN  acetaminophen   Tablet 650milliGRAM(s) Oral every 6 hours PRN                            8.6    15.1  )-----------( 204      ( 2017 19:35 )             26.8       Hemoglobin: 8.6 g/dL ( @ 19:35)  Hemoglobin: 9.7 g/dL ( @ 07:14)  Hemoglobin: 7.6 g/dL ( @ 06:20)  Hemoglobin: 6.2 g/dL ( @ 09:23)          136  |  99  |  24<H>  ----------------------------<  99  4.4   |  28  |  6.22<H>    Ca    8.4      2017 22:36  Phos  2.3       Mg     2.0         TPro  7.2  /  Alb  1.8<L>  /  TBili  0.4  /  DBili  x   /  AST  41<H>  /  ALT  24  /  AlkPhos  120      Creatinine Trend: 6.22<--, 5.58<--, 3.91<--, 4.91<--    COAGS:     CARDIAC MARKERS ( 2017 19:35 )  <0.015 ng/mL / x     / 16 U/L / x     / <1.0 ng/mL        T(C): 37.4, Max: 38.1 (-18 @ 21:32)  HR: 84 (74 - 112)  BP: 92/40 (79/43 - 108/55)  RR: 17 (16 - 19)  SpO2: 100% (96% - 100%)  Wt(kg): --    I&O's Summary  I & Os for 24h ending 2017 07:00  =============================================  IN: 590 ml / OUT: 50 ml / NET: 540 ml    I & Os for current day (as of 2017 02:18)  =============================================  IN: 500 ml / OUT: 0 ml / NET: 500 ml      Daily     Daily Weight in k.1 (2017 04:59)      Appearance: Normal	  HEENT:   Normal oral mucosa, PERRL, EOMI	  Lymphatic: No lymphadenopathy , no edema  Cardiovascular: Normal S1 S2, No JVD, No murmurs , Peripheral pulses palpable 2+ bilaterally  Respiratory: Lungs clear to auscultation, normal effort 	  Gastrointestinal:  Soft, Non-tender, + BS	  Skin: No rashes, No ecchymoses, No cyanosis, warm to touch  Musculoskeletal: Normal range of motion, normal strength  Psychiatry:  Mood & affect appropriate    TELEMETRY: 	    ECG:  	  RADIOLOGY:   DIAGNOSTIC TESTING:  [x ] Echocardiogram:   ------------------------------------------------------------------------  CONCLUSIONS:  1. Mild concentric left ventricular hypertrophy.  2. Normal Left Ventricular Systolic Function,  (EF = 55 to  60%)  3. Grade II diastolic dysfunction  4. RV systolic pressure is mildly increased.=35mmhg  5. There is mild tricuspid regurgitation.    ------------------------------------------------------------------------  Confirmed on  2017 - 12:25:51 by Pancho Dubois MD  ------------------------------------------------------------------------  [ ]  Catheterization:  [ ] Stress Test:    OTHER:     CT abd/ Pelvis:  IMPRESSION:     Acute appearing thrombus within the superior mesenteric vein.    Esophageal stent withinthe distal esophagus/proximal stomach. Debris is   noted within the stent. No bowel obstruction.    Trace bilateral pleural effusions, right greater than left, with pleural   thickening/enhancement which may be infectious or neoplastic in etiology.    Distended gallbladder with cholelithiasis. No wall thickening or   pericholecystic fluid. Correlate clinically and with ultrasound if there   is concern for cholecystitis.    Additional findings as above.    MARY MONTANO M.D., ATTENDING RADIOLOGIST  This document has been electronically signed. 2017  1:46PM	        ASSESSMENT/PLAN: 	71y Male ALZ dementia, suprapubic catheter ESRD on HD, HTN, DM, Chol, esophageal stent,  afib on coumadin admitted with GI bleed  and rapid afib. . s/p PRBC    cont pletal  hold Systemic A/C due to anemia  cont B-blocker- rate controlled   HD per renal   Echo noted- Nl LV fx - DD II.   GI / DVT prophylaxis.   keep K>4, mag >2.0   D/W Dr Talavera Subjective:  pt seen and examined , no complaints,     epoetin shell Injectable 24674Pmgc(s) IV Push <User Schedule>  pantoprazole    Tablet 40milliGRAM(s) Oral before breakfast  ATENolol  Tablet 25milliGRAM(s) Oral daily  folic acid 1milliGRAM(s) Oral daily  cilostazol 50milliGRAM(s) Oral daily  guaiFENesin    Syrup 100milliGRAM(s) Oral every 6 hours PRN  benzocaine 15 mG/menthol 3.6 mG Lozenge 1Lozenge Oral five times a day PRN  potassium acid phosphate/sodium acid phosphate tablet (K-PHOS No. 2) 1Tablet(s) Oral four times a day with meals  insulin lispro (HumaLOG) corrective regimen sliding scale  SubCutaneous three times a day before meals  dextrose 5%. 1000milliLiter(s) IV Continuous <Continuous>  dextrose Gel 1Dose(s) Oral once PRN  dextrose 50% Injectable 12.5Gram(s) IV Push once  dextrose 50% Injectable 25Gram(s) IV Push once  dextrose 50% Injectable 25Gram(s) IV Push once  glucagon  Injectable 1milliGRAM(s) IntraMuscular once PRN  acetaminophen   Tablet 650milliGRAM(s) Oral every 6 hours PRN                            8.6    15.1  )-----------( 204      ( 2017 19:35 )             26.8       Hemoglobin: 8.6 g/dL ( @ 19:35)  Hemoglobin: 9.7 g/dL ( @ 07:14)  Hemoglobin: 7.6 g/dL ( @ 06:20)  Hemoglobin: 6.2 g/dL ( @ 09:23)          136  |  99  |  24<H>  ----------------------------<  99  4.4   |  28  |  6.22<H>    Ca    8.4      2017 22:36  Phos  2.3       Mg     2.0         TPro  7.2  /  Alb  1.8<L>  /  TBili  0.4  /  DBili  x   /  AST  41<H>  /  ALT  24  /  AlkPhos  120      Creatinine Trend: 6.22<--, 5.58<--, 3.91<--, 4.91<--    COAGS:     CARDIAC MARKERS ( 2017 19:35 )  <0.015 ng/mL / x     / 16 U/L / x     / <1.0 ng/mL        T(C): 37.4, Max: 38.1 (-18 @ 21:32)  HR: 84 (74 - 112)  BP: 92/40 (79/43 - 108/55)  RR: 17 (16 - 19)  SpO2: 100% (96% - 100%)  Wt(kg): --    I&O's Summary  I & Os for 24h ending 2017 07:00  =============================================  IN: 590 ml / OUT: 50 ml / NET: 540 ml    I & Os for current day (as of 2017 02:18)  =============================================  IN: 500 ml / OUT: 0 ml / NET: 500 ml      Daily     Daily Weight in k.1 (2017 04:59)      Appearance: Normal	  HEENT:   Normal oral mucosa, PERRL, EOMI	  Lymphatic: No lymphadenopathy , no edema  Cardiovascular: Normal S1 S2, No JVD, No murmurs , Peripheral pulses palpable 2+ bilaterally  Respiratory: Lungs clear to auscultation, normal effort 	  Gastrointestinal:  Soft, Non-tender, + BS	  Skin: No rashes, No ecchymoses, No cyanosis, warm to touch  Musculoskeletal: Normal range of motion, normal strength  Psychiatry:  Mood & affect appropriate    TELEMETRY: 	  Afib    DIAGNOSTIC TESTING:  [x ] Echocardiogram:   ------------------------------------------------------------------------  CONCLUSIONS:  1. Mild concentric left ventricular hypertrophy.  2. Normal Left Ventricular Systolic Function,  (EF = 55 to  60%)  3. Grade II diastolic dysfunction  4. RV systolic pressure is mildly increased.=35mmhg  5. There is mild tricuspid regurgitation.    ------------------------------------------------------------------------  Confirmed on  2017 - 12:25:51 by Pancho Dubois MD  ------------------------------------------------------------------------  [ ]  Catheterization:  [ ] Stress Test:    OTHER:     CT abd/ Pelvis:  IMPRESSION:     Acute appearing thrombus within the superior mesenteric vein.    Esophageal stent withinthe distal esophagus/proximal stomach. Debris is   noted within the stent. No bowel obstruction.    Trace bilateral pleural effusions, right greater than left, with pleural   thickening/enhancement which may be infectious or neoplastic in etiology.    Distended gallbladder with cholelithiasis. No wall thickening or   pericholecystic fluid. Correlate clinically and with ultrasound if there   is concern for cholecystitis.    Additional findings as above.    MARY MONTANO M.D., ATTENDING RADIOLOGIST  This document has been electronically signed. 2017  1:46PM	        ASSESSMENT/PLAN: 	71y Male ALZ dementia, suprapubic catheter ESRD on HD, HTN, DM, Chol, esophageal stent,  afib on coumadin admitted with GI bleed  and rapid afib. . s/p PRBC    cont pletal  hold Systemic A/C due to anemia  cont B-blocker- rate controlled   HD per renal   Echo noted- Nl LV fx - DD II.   GI / DVT prophylaxis.   keep K>4, mag >2.0   D/W Dr Talavera

## 2017-06-19 NOTE — DISCHARGE NOTE ADULT - PROVIDER TOKENS
CARLOS:'[id number]' TOKEN:'[id number]',FREE:[LAST:[Puri],FIRST:[Edil],PHONE:[phone]],FAX:[(   )    -]]

## 2017-06-19 NOTE — PROGRESS NOTE ADULT - PROBLEM SELECTOR PLAN 3
fever 100.5 overnight  leukocytosis  mild non productive cough  CXR: mild L perihilar infiltrate  starting rocephin 1 g daily for CAP

## 2017-06-19 NOTE — DISCHARGE NOTE ADULT - MEDICATION SUMMARY - MEDICATIONS TO STOP TAKING
I will STOP taking the medications listed below when I get home from the hospital:    warfarin 10 mg oral tablet  -- 1 tab(s) by mouth once a day    warfarin 2.5 mg oral tablet  -- 1 tab(s) by mouth once a day    Percocet 10/325 oral tablet  -- 1 tab(s) by mouth every 6 hours, As Needed for pain    Lasix 80 mg oral tablet  -- 1 tab(s) by mouth once a day

## 2017-06-19 NOTE — DISCHARGE NOTE ADULT - CARE PLAN
Principal Discharge DX:	Anemia, unspecified type  Secondary Diagnosis:	Pneumonia  Secondary Diagnosis:	Urinary tract infection  Secondary Diagnosis:	Essential hypertension  Secondary Diagnosis:	ESRD (end stage renal disease) on dialysis  Secondary Diagnosis:	Paroxysmal atrial fibrillation  Secondary Diagnosis:	Type 2 diabetes mellitus without complication, without long-term current use of insulin Principal Discharge DX:	Anemia, unspecified type  Goal:	to have management of anemia  Instructions for follow-up, activity and diet:	monitor CBC, continue procrit, f/u with Dr. Gay at [facility] for esophageal stent removal and further GI work up  Secondary Diagnosis:	Pneumonia  Instructions for follow-up, activity and diet:	continue with antibiotics, follow up with PCP within 1 week  Secondary Diagnosis:	Urinary tract infection  Instructions for follow-up, activity and diet:	continue with antibiotics, follow up with PCP within 1 week  Secondary Diagnosis:	Essential hypertension  Instructions for follow-up, activity and diet:	take BP meds as directed with adjusted dosing, follow up with PCP wtihin 1 week and monitor BP  Secondary Diagnosis:	ESRD (end stage renal disease) on dialysis  Instructions for follow-up, activity and diet:	continue with dialysis as scheduled  Secondary Diagnosis:	Paroxysmal atrial fibrillation  Instructions for follow-up, activity and diet:	continue with lopressor, discontinue anticoagulation with coumadin due to GI bleed, follow up with PCP within 1 week to monitor status and to see if coumadin should be resumed  Secondary Diagnosis:	Type 2 diabetes mellitus without complication, without long-term current use of insulin  Instructions for follow-up, activity and diet:	HbA1C 5.2, encourage diet and lifestyle modification Principal Discharge DX:	Anemia, unspecified type  Goal:	to have management of anemia  Instructions for follow-up, activity and diet:	monitor CBC, continue procrit, f/u with Dr. Gay at [facility] for esophageal stent removal and further GI work up  Secondary Diagnosis:	Pneumonia  Instructions for follow-up, activity and diet:	continue with antibiotics, follow up with PCP within 1 week  Secondary Diagnosis:	Urinary tract infection  Instructions for follow-up, activity and diet:	continue with antibiotics, follow up with PCP within 1 week  Secondary Diagnosis:	Essential hypertension  Instructions for follow-up, activity and diet:	take BP meds as directed with adjusted dosing, follow up with PCP wtihin 1 week and monitor BP  Secondary Diagnosis:	ESRD (end stage renal disease) on dialysis  Instructions for follow-up, activity and diet:	continue with dialysis as scheduled  Secondary Diagnosis:	Paroxysmal atrial fibrillation  Instructions for follow-up, activity and diet:	continue with lopressor, discontinue anticoagulation with coumadin due to GI bleed, follow up with PCP within 1 week to monitor status and to see if coumadin should be resumed  Secondary Diagnosis:	Type 2 diabetes mellitus without complication, without long-term current use of insulin  Instructions for follow-up, activity and diet:	HbA1C 5.2, encourage diet and lifestyle modification Principal Discharge DX:	Anemia, unspecified type  Goal:	to have management of anemia  Instructions for follow-up, activity and diet:	monitor CBC, continue procrit, f/u with Dr. Gay at [facility] for esophageal stent removal and further GI work up  Secondary Diagnosis:	Pneumonia  Instructions for follow-up, activity and diet:	continue with antibiotics, follow up with PCP within 1 week  Secondary Diagnosis:	Urinary tract infection  Instructions for follow-up, activity and diet:	continue with antibiotics, follow up with PCP within 1 week  Secondary Diagnosis:	Essential hypertension  Instructions for follow-up, activity and diet:	take BP meds as directed with adjusted dosing, follow up with PCP wtihin 1 week and monitor BP  Secondary Diagnosis:	ESRD (end stage renal disease) on dialysis  Instructions for follow-up, activity and diet:	continue with dialysis as scheduled  Secondary Diagnosis:	Paroxysmal atrial fibrillation  Instructions for follow-up, activity and diet:	continue with lopressor, discontinue anticoagulation with coumadin due to GI bleed, follow up with PCP within 1 week to monitor status and to see if coumadin should be resumed  Secondary Diagnosis:	Type 2 diabetes mellitus without complication, without long-term current use of insulin  Instructions for follow-up, activity and diet:	HbA1C 5.2, encourage diet and lifestyle modification Principal Discharge DX:	Anemia, unspecified type  Goal:	to have management of anemia  Instructions for follow-up, activity and diet:	monitor CBC, continue procrit, f/u with Dr. Gay/ Dr. Puri at [facility] for esophageal stent removal and further GI work up on July 6th, Thursday, 9:30am  Secondary Diagnosis:	Pneumonia  Instructions for follow-up, activity and diet:	continue with antibiotics, follow up with PCP within 1 week  Secondary Diagnosis:	Urinary tract infection  Instructions for follow-up, activity and diet:	continue with antibiotics, follow up with PCP within 1 week  Secondary Diagnosis:	Essential hypertension  Instructions for follow-up, activity and diet:	take BP meds as directed with adjusted dosing, follow up with PCP within 1 week and monitor BP  Secondary Diagnosis:	ESRD (end stage renal disease) on dialysis  Instructions for follow-up, activity and diet:	continue with dialysis as scheduled  Secondary Diagnosis:	Paroxysmal atrial fibrillation  Instructions for follow-up, activity and diet:	continue with lopressor, discontinue anticoagulation with coumadin due to GI bleed, follow up with PCP within 1 week to monitor status and to see if coumadin should be resumed  Secondary Diagnosis:	Type 2 diabetes mellitus without complication, without long-term current use of insulin  Instructions for follow-up, activity and diet:	HbA1C 5.2, encourage diet and lifestyle modification Principal Discharge DX:	Anemia, unspecified type  Goal:	to have management of anemia  Instructions for follow-up, activity and diet:	monitor CBC, continue procrit, f/u with Dr. Gay/ Dr. Puri at [facility] for esophageal stent removal and further GI work up on July 6th, Thursday, 9:30am  Secondary Diagnosis:	Pneumonia  Instructions for follow-up, activity and diet:	continue with antibiotics, follow up with PCP within 1 week  Secondary Diagnosis:	Urinary tract infection  Instructions for follow-up, activity and diet:	continue with antibiotics, follow up with PCP within 1 week  Secondary Diagnosis:	Essential hypertension  Instructions for follow-up, activity and diet:	take BP meds as directed with adjusted dosing, follow up with PCP within 1 week and monitor BP  Secondary Diagnosis:	ESRD (end stage renal disease) on dialysis  Instructions for follow-up, activity and diet:	continue with dialysis as scheduled  Secondary Diagnosis:	Paroxysmal atrial fibrillation  Instructions for follow-up, activity and diet:	continue with atenolol at reduced dose, discontinue anticoagulation with coumadin due to GI bleed, follow up with PCP within 1 week to monitor status and to see if coumadin should be resumed  Secondary Diagnosis:	Type 2 diabetes mellitus without complication, without long-term current use of insulin  Instructions for follow-up, activity and diet:	HbA1C 5.2, encourage diet and lifestyle modification

## 2017-06-19 NOTE — PROGRESS NOTE ADULT - ATTENDING COMMENTS
Pt for possible transfer to [facility] for further GI management by patient's outpt GI doctor.     [facility] NEPHROLOGY  Armando Shaffer M.D.  Kendall Morales D.O.  Lilliam Cody M.D.  Oxana Hernandez, MSN, ANP-C  [phone]    [address] [place] [address]

## 2017-06-19 NOTE — DISCHARGE NOTE ADULT - HOSPITAL COURSE
72 y/o M from home lives alone, walks with a cane with HHA 7hrs / 7 days. PMH of ESRD (on HD TTS via R AVF), HTN, DM, A fib (no AC due to GIB?), PNA, anemia (on iron supplements), suprapubic catheter (placed 5 years ago), IVC filter and Alzheimer's dementia. Pt was recently admitted in [facility] for intra-abdominal fluid collections, likely pancreatic pseudocyst s/p cyst gastrostomy and possible percutaneous drainage. Patient is a poor historian and is unable to provide a clear hx, but was having difficulty swelling therefore had esophageal stent placed he said was scheduled to get esophageal stent removed on 6/14 but missed his appointment and now rescheduled for 6/21. He reported intermittent black stool for the past 2 weeks, associated with mild epigastric pain. Pt also notes mild cough, and hiccups. Pt has received 2 pints of blood about 4 weeks ago. Pt states as he was presenting for dialysis yesterday when the doctors said that his blood levels were low. Pt denies SOB, fevers/chills, weakness, chest pain, hematuria, hematemesis or any other complaints. Off note pt still makes urine and currently with suprapubic catheter, last changed 1 month ago. EGD / Colonoscopy: > 5 years ago was told WNL. (16 Jun 2017 12:06)    Problem/Plan - 1:  ·  Problem: Anemia, unspecified type.  Plan: anemia likely 2/2 UGIB as pt reported recurrent melena for the past 2 weeks  as per GI, Dr. Evans, can follow up with patient's outpt GI at [facility] on 6/21 as scheduled for esophageal stent removal  f/u Dr. Gay at [facility] [phone] as patient may never have been on the schedule  holding patient's coumadin  on admission Hb level of 6.2 and guaiac positive --> s/p 3 PRBCs, now Hb 9.7   c/w epogen  advanced diet to mechanical soft from clears.  transfuse if Hb < 8.5   anemia of chronic disease 2/2 CKD  off tele.     Problem/Plan - 2:  ·  Problem: Urinary tract infection.  Plan: fever 100.5 overnight  leukocytosis  UA+  f/u UCx  starting rocephin 1 g daily.     Problem/Plan - 3:  ·  Problem: Pneumonia.  Plan: fever 100.5 overnight  leukocytosis  mild non productive cough  CXR: mild L perihilar infiltrate  starting rocephin 1 g daily for CAP.     Problem/Plan - 4:  ·  Problem: Paroxysmal atrial fibrillation.  Plan: CHADVasc score of 3   rapid a fib with RVR likely 2/2 fluid depletion on admission, now rate controlled  holding coumadin due to suspicion for GI bleed  reduced atenolol to 25mg daily with parameters being held  Cardiology consulted Dr. Talavera  off tele.     Problem/Plan - 5:  ·  Problem: ESRD (end stage renal disease) on dialysis.  Plan: HD today  Dr. Cody- renal consult  c/w epogen.     Problem/Plan - 6:  Problem: Type 2 diabetes mellitus without complication, without long-term current use of insulin. Plan: HbA1C 5.2  not on home DM medications  c/w consistent carb diet  c/w HSS as needed  Fingersticks within normal limits.    Problem/Plan - 7:  ·  Problem: Hypertension.  Plan: pt takes Atenolol 50mg daily  reducing dose to 25mg with parameters due to borderline BP   TSH within normal limits.     Problem/Plan - 8:  ·  Problem: Prophylactic measure.  Plan: DVT ppx (Improve score of 3, will c/w SCD as concern for active GIB)  GI ppx- protonix. 70 y/o M from home lives alone, walks with a cane with HHA 7hrs / 7 days. PMH of ESRD (on HD TTS via R AVF), HTN, DM, A fib (no AC due to GIB?), PNA, anemia (on iron supplements), suprapubic catheter (placed 5 years ago), IVC filter and Alzheimer's dementia. Pt was recently admitted in [facility] for intra-abdominal fluid collections, likely pancreatic pseudocyst s/p cyst gastrostomy and possible percutaneous drainage. Patient is a poor historian and is unable to provide a clear hx, but was having difficulty swelling therefore had esophageal stent placed he said was scheduled to get esophageal stent removed on 6/14 but missed his appointment and now rescheduled for 6/21. He reported intermittent black stool for the past 2 weeks, associated with mild epigastric pain. Pt also notes mild cough, and hiccups. Pt has received 2 pints of blood about 4 weeks ago. Pt states as he was presenting for dialysis yesterday when the doctors said that his blood levels were low. Pt denies SOB, fevers/chills, weakness, chest pain, hematuria, hematemesis or any other complaints. Off note pt still makes urine and currently with suprapubic catheter, last changed 1 month ago. EGD / Colonoscopy: > 5 years ago was told WNL. (16 Jun 2017 12:06)    Was admitted to [facility] and treated for:    Problem/Plan - 1:  ·  Problem: Anemia, unspecified type.    Plan: anemia likely 2/2 UGIB as pt reported recurrent melena for the past 2 weeks  as per GI, Dr. Evans, can follow up with patient's outpt GI at [facility], Dr Kate/Dr Puri as he was scheduled for esophageal stent removal tomorrow, [phone], however due to active infection will postpone; GI fellow Dr. Edil Puri [phone], appointment made at his office for July 6th at 9:30am  No anticoagulation; Patient does not report melanotic stool at this time.   on admission Hb level of 6.2 and guaiac positive --> s/p 4 PRBCs (1 prbc overnight)  c/w epogen  transfused if Hb < 8.5   anemia of chronic disease 2/2 CKD  off tele.     Problem/Plan - 2:  ·  Problem: Urinary tract infection.  Plan: afebrile overnight; Blood Cx neg; mild leukocytosis  UA+; UCx >100,000 ecoli ESBL, will be discharged on bactrim     Problem/Plan - 3:  ·  Problem: Pneumonia.  Plan: afebrile  mild leukocytosis, mild non productive cough  CXR: mild L perihilar infiltrate  c/w Rocephin 1 g daily for CAP; will add Zithromax to cover atypical; will be discharged with zithromaxafebrile  mild leukocytosis  mild non productive cough  CXR: mild L perihilar infiltrate  c/w Rocephin 1 g daily for CAP; will add Zithromax   Problem/Plan - 4:  ·  Problem: Paroxysmal atrial fibrillation.  Plan: CHADVasc score of 3   rapid a fib with RVR likely 2/2 fluid depletion on admission, now rate controlled  holding coumadin due to suspicion for GI bleed  reduced atenolol to 25mg daily with parameters being held last 3 days not given  Cardiology consulted Dr. Talavera  off teleCHADVasc score of 3   rapid a fib with RVR likely 2/2 fluid depletion on admission, now rate controlled  holding coumadin due to suspicion for GI bleed  reduced atenolol to 25mg daily with parameters being held  Cardiology consulted Dr. Talavera  off tele  Problem/Plan - 5:  ·  Problem: ESRD (end stage renal disease) on dialysis.  Plan: HD tmw  Dr. Cody- renal consult  c/w epogen.     Problem/Plan - 6:  Problem: Type 2 diabetes mellitus without complication, without long-term current use of insulin. Plan: HbA1C 5.2  not on home DM medications  c/w consistent carb diet  c/w HSS as needed  Fingersticks within normal limits.    Problem/Plan - 7:  ·  Problem: Hypertension.  Plan: pt takes Atenolol 50mg daily  reducing dose to 25mg with parameters due to borderline BP   TSH within normal limits.     Problem/Plan - 8:  ·  Problem: Prophylactic measure.  Plan: DVT prophylaxis  (Improve score of 3, will c/w SCD as concern for active GIB)  GI prophylaxis px- Protonix.

## 2017-06-19 NOTE — DISCHARGE NOTE ADULT - SECONDARY DIAGNOSIS.
Pneumonia Urinary tract infection Essential hypertension ESRD (end stage renal disease) on dialysis Paroxysmal atrial fibrillation Type 2 diabetes mellitus without complication, without long-term current use of insulin

## 2017-06-19 NOTE — DISCHARGE NOTE ADULT - MEDICATION SUMMARY - MEDICATIONS TO TAKE
I will START or STAY ON the medications listed below when I get home from the hospital:    atenolol 25 mg oral tablet  -- 1 tab(s) by mouth once a day  -- Indication: For HTN    cefTRIAXone  -- 1 gram(s) intravenous once a day  -- Indication: For UTI/PNA    guaiFENesin 100 mg/5 mL oral liquid  -- 5 milliliter(s) by mouth every 6 hours, As needed, Cough  -- Indication: For cough    cilostazol 50 mg oral tablet  -- 1 tab(s) by mouth once a day  -- Indication: For Antiplatelet    pantoprazole 40 mg oral delayed release tablet  -- 1 tab(s) by mouth once a day  -- Indication: For GI prophylaxis    folic acid 1 mg oral tablet  -- 1 tab(s) by mouth once a day  -- Indication: For Supplement I will START or STAY ON the medications listed below when I get home from the hospital:    atenolol 25 mg oral tablet  -- 1 tab(s) by mouth once a day  -- Indication: For Afib    azithromycin 250 mg oral tablet  -- 1 tab(s) by mouth once a day  -- Do not take dairy products, antacids, or iron preparations within one hour of this medication.  Finish all this medication unless otherwise directed by prescriber.    -- Indication: For Pneumonia    Bactrim  mg-160 mg oral tablet  -- 1 tab(s) by mouth once a day  -- Avoid prolonged or excessive exposure to direct and/or artificial sunlight while taking this medication.  Finish all this medication unless otherwise directed by prescriber.  Medication should be taken with plenty of water.    -- Indication: For UTI    cilostazol 50 mg oral tablet  -- 1 tab(s) by mouth once a day  -- Indication: For cardiovascular    pantoprazole 40 mg oral delayed release tablet  -- 1 tab(s) by mouth once a day  -- Indication: For GI prophylaxis    folic acid 1 mg oral tablet  -- 1 tab(s) by mouth once a day  -- Indication: For Supplement

## 2017-06-19 NOTE — PROGRESS NOTE ADULT - PROBLEM SELECTOR PLAN 3
+FOBT. A/C held  s/p CT abd/pelvis with  IV contrast - pt w/ esophageal stent.  Pt seen by Dr. Fernández; no interventions at this time. No signs of active bleeding.   Pt with esophageal stent- plan for oupt removal by Dr. Mathew at [facility] [phone].   Monitor h/h. +FOBT. A/C held  s/p CT abd/pelvis with  IV contrast - pt w/ esophageal stent.  Pt seen by Dr. Fernández; no interventions at this time. No signs of active bleeding.   Pt with esophageal stent- plan for oupt removal by Dr. Gay at [facility] [phone].   Monitor h/h.

## 2017-06-19 NOTE — PROGRESS NOTE ADULT - PROBLEM SELECTOR PLAN 5
rate controlled on Atenolol  A/C held due to GI bleed  Cardiology following- Dr. Talavera  Pt should be off A/C due to recurrent GI bleed..

## 2017-06-19 NOTE — DISCHARGE NOTE ADULT - PLAN OF CARE
to have management of anemia monitor CBC, continue procrit, f/u with Dr. Gay at [facility] for esophageal stent removal and further GI work up continue with antibiotics, follow up with PCP within 1 week take BP meds as directed with adjusted dosing, follow up with PCP wtihin 1 week and monitor BP continue with dialysis as scheduled continue with lopressor, discontinue anticoagulation with coumadin due to GI bleed, follow up with PCP within 1 week to monitor status and to see if coumadin should be resumed HbA1C 5.2, encourage diet and lifestyle modification monitor CBC, continue procrit, f/u with Dr. Gay/ Dr. Puri at [facility] for esophageal stent removal and further GI work up on July 6th, Thursday, 9:30am take BP meds as directed with adjusted dosing, follow up with PCP within 1 week and monitor BP continue with atenolol at reduced dose, discontinue anticoagulation with coumadin due to GI bleed, follow up with PCP within 1 week to monitor status and to see if coumadin should be resumed

## 2017-06-19 NOTE — PROGRESS NOTE ADULT - SUBJECTIVE AND OBJECTIVE BOX
[facility] NEPHROLOGY- PROGRESS NOTE    72 y/o M  with ESRD on HD, HTN, DM, A fib (on coumadin), Prostate CA s/p sx with suprapubic catheter, Alzheimer's dementia, Anemia s/p 2 units prbc transfusion (2 weeks prior) presents for blood transfusion. a/w Anemia 2/2 GI bleed and Afib with RVR.   o/n pt febrile. Now found to have PNA and UTI.     Hospital Medications: Medications reviewed.  REVIEW OF SYSTEMS:  CONSTITUTIONAL: No fevers or chills  RESPIRATORY: No shortness of breath  CARDIOVASCULAR: No chest pain.  GASTROINTESTINAL: No nausea, vomiting, diarrhea or abdominal pain. No melena or BRBPR  VASCULAR: No bilateral lower extremity edema.     VITALS:  T(F): 97.8, Max: 100.5 (06-18 @ 21:32)  HR: 77  BP: 96/68  RR: 16  SpO2: 93%  Wt(kg): --    I & Os for current day (as of 06-19 @ 12:51)  =============================================  IN: 500 ml / OUT: 25 ml / NET: 475 ml        PHYSICAL EXAM:  Gen: NAD, calm  Neck: no JVD  Cards: irregular, +S1/S2,   Resp: CTA B/L  GI: soft, ND, NT NABS   : +supra pubic catheter w. leg bag  Extremities: no LE edema B/L,   Vascular: +Rt AVG +thrill +bruit    Hemoglobin: 9.6 g/dL (06-19 @ 11:10)  Hemoglobin: 8.6 g/dL (06-18 @ 19:35)    hemoglobin trend: 9.6 <---, 8.6 <---, 9.7 <---, 7.6 <---  Potassium, Serum: 4.3 mmol/L (06-19 @ 11:10)  Calcium, Total Serum: 8.4 mg/dL (06-19 @ 11:10)  Albumin, Serum: 1.8 g/dL (06-19 @ 11:10)  Phosphorus Level, Serum: 3.1 mg/dL (06-19 @ 11:10)  Potassium, Serum: 4.4 mmol/L (06-18 @ 22:36)  Calcium, Total Serum: 8.4 mg/dL (06-18 @ 22:36)

## 2017-06-19 NOTE — PROGRESS NOTE ADULT - PROBLEM SELECTOR PLAN 2
in the setting of GI bleed. s/p 3 units prbc  h/h stable. Monitor h/h.   c/w Epogen 10,000 units tiw with HD  Pt with elevated ferritin- no need for IV iron.   Monitor h/h.

## 2017-06-19 NOTE — PROGRESS NOTE ADULT - PROBLEM SELECTOR PLAN 1
anemia likely 2/2 UGIB as pt reported recurrent melena for the past 2 weeks  as per GI, Dr. Evans, can follow up with patient's outpt GI at [facility] on 6/21 as scheduled for esophageal stent removal  f/u Dr. Gay at [facility] [phone] as patient may never have been on the schedule  holding patient's coumadin  on admission Hb level of 6.2 and guaiac positive --> s/p 3 PRBCs, now Hb 9.7   c/w epogen  advanced diet to mechanical soft from clears.  transfuse if Hb < 8.5   anemia of chronic disease 2/2 CKD  off tele anemia likely 2/2 UGIB as pt reported recurrent melena for the past 2 weeks  as per GI, Dr. Evans, can follow up with patient's outpt GI at [facility] on 6/21 as scheduled for esophageal stent removal  f/u Dr. Gay at [facility] [phone], will return phone call regarding if patient should be transferred  holding patient's coumadin  on admission Hb level of 6.2 and guaiac positive --> s/p 3 PRBCs, now Hb 9.7   c/w epogen  advanced diet to mechanical soft from clears.  transfuse if Hb < 8.5   anemia of chronic disease 2/2 CKD  off tele

## 2017-06-19 NOTE — PROGRESS NOTE ADULT - PROBLEM SELECTOR PLAN 4
CHADVasc score of 3   rapid a fib with RVR likely 2/2 fluid depletion on admission, now rate controlled  holding coumadin due to suspicion for GI bleed  reduced atenolol to 25mg daily with parameters being held  Cardiology consulted Dr. Sadaf hall tele

## 2017-06-19 NOTE — PROGRESS NOTE ADULT - SUBJECTIVE AND OBJECTIVE BOX
72 y/o M from home lives alone, walks with a cane with HHA 7hrs / 7 days. PMH of ESRD (on HD TTS via R AVF), HTN, DM, A fib (no AC due to GIB?), PNA, anemia (on iron supplements), suprapubic catheter (placed 5 years ago), IVC filter and Alzheimer's dementia, with chief complaint of black stool x 2 weeks, mild epigastric pain, and low Hb. Patient was seen and examined, reports mild cough but denies fever, chills, SOB, palpitations, nausea, vomiting, diarrhea or constipation. Spoke to sister Zlueima at bedside, called Dr. Gay at [facility] [phone] who is GI doctor performing stent removal who states that he does not see patient on the list for Wednesday, will return phone call. Patient was informed that he has PNA and a UTI and will not be discharged today due to fever 100.5 overnight. He has no other complaints, both patient and sister understand plan for today.    INTERVAL HPI/OVERNIGHT EVENTS:  T(C): 36.6, Max: 38.1 (-18 @ 21:32)  HR: 77 (75 - 112)  BP: 96/68 (79/43 - 108/55)  RR: 16 (16 - 19)  SpO2: 93% (93% - 100%)    I & Os for current day (as of 2017 11:09)  =============================================  IN: 500 ml / OUT: 25 ml / NET: 475 ml    MEDICATIONS  (STANDING):  epoetin shell Injectable 83217Eyes(s) IV Push <User Schedule>  pantoprazole    Tablet 40milliGRAM(s) Oral before breakfast  ATENolol  Tablet 25milliGRAM(s) Oral daily  folic acid 1milliGRAM(s) Oral daily  cilostazol 50milliGRAM(s) Oral daily  insulin lispro (HumaLOG) corrective regimen sliding scale  SubCutaneous three times a day before meals  dextrose 5%. 1000milliLiter(s) IV Continuous <Continuous>  dextrose 50% Injectable 12.5Gram(s) IV Push once  dextrose 50% Injectable 25Gram(s) IV Push once  dextrose 50% Injectable 25Gram(s) IV Push once  cefTRIAXone   IVPB 1Gram(s) IV Intermittent once  cefTRIAXone   IVPB  IV Intermittent     MEDICATIONS  (PRN):  guaiFENesin    Syrup 100milliGRAM(s) Oral every 6 hours PRN Cough  benzocaine 15 mG/menthol 3.6 mG Lozenge 1Lozenge Oral five times a day PRN Sore Throat  dextrose Gel 1Dose(s) Oral once PRN Blood Glucose LESS THAN 70 milliGRAM(s)/deciliter  glucagon  Injectable 1milliGRAM(s) IntraMuscular once PRN Glucose LESS THAN 70 milligrams/deciliter  acetaminophen   Tablet 650milliGRAM(s) Oral every 6 hours PRN For Temp greater than 38 C (100.4 F)      PHYSICAL EXAM:  GENERAL: NAD, well-groomed, well-developed  HEAD:  Atraumatic, Normocephalic  EYES: EOMI, PERRLA, conjunctiva and sclera clear  ENMT: No tonsillar erythema, exudates, or enlargement; Moist mucous membranes, Good dentition, No lesions  NECK: Supple, No JVD, Normal thyroid  NERVOUS SYSTEM:  Alert & Oriented X3, Good concentration; Motor Strength 5/5 B/L upper and lower extremities; DTRs 2+ intact and symmetric  CHEST/LUNG: Clear to percussion bilaterally; No rales, rhonchi, wheezing, or rubs  HEART: Regular rate and rhythm; No murmurs, rubs, or gallops  ABDOMEN: Soft, Nontender, Nondistended; Bowel sounds present  EXTREMITIES:  2+ Peripheral Pulses, No clubbing, cyanosis, or edema  LYMPH: No lymphadenopathy noted  SKIN: No rashes or lesions  LABS:                        8.6    15.1  )-----------( 204      ( 2017 19:35 )             26.8     06-18    136  |  99  |  24<H>  ----------------------------<  99  4.4   |  28  |  6.22<H>    Ca    8.4      2017 22:36  Phos  2.3     06-18  Mg     2.0     -18    TPro  7.2  /  Alb  1.8<L>  /  TBili  0.4  /  DBili  x   /  AST  41<H>  /  ALT  24  /  AlkPhos  120  06-18      Urinalysis Basic - ( 2017 10:02 )    Color: Yellow / Appearance: very cloudy / S.010 / pH: x  Gluc: x / Ketone: Negative  / Bili: Negative / Urobili: Negative   Blood: x / Protein: 500 mg/dL / Nitrite: Negative   Leuk Esterase: Moderate / RBC: Negative /HPF / WBC >50 /HPF   Sq Epi: x / Non Sq Epi: Negative / Bacteria: Many /HPF      CAPILLARY BLOOD GLUCOSE  102 (2017 07:50)  97 (2017 21:32)  129 (2017 16:19)  102 (2017 12:21)        Urinalysis Basic - ( 2017 10:02 )    Color: Yellow / Appearance: very cloudy / S.010 / pH: x  Gluc: x / Ketone: Negative  / Bili: Negative / Urobili: Negative   Blood: x / Protein: 500 mg/dL / Nitrite: Negative   Leuk Esterase: Moderate / RBC: Negative /HPF / WBC >50 /HPF   Sq Epi: x / Non Sq Epi: Negative / Bacteria: Many /HPF 70 y/o M from home lives alone, walks with a cane with HHA 7hrs / 7 days. PMH of ESRD (on HD TTS via R AVF), HTN, DM, A fib (no AC due to GIB?), PNA, anemia (on iron supplements), suprapubic catheter (placed 5 years ago), IVC filter and Alzheimer's dementia, with chief complaint of black stool x 2 weeks, mild epigastric pain, and low Hb. Patient was seen and examined, reports mild cough but denies fever, chills, SOB, palpitations, nausea, vomiting, diarrhea or constipation. Spoke to sister Zuleima at bedside, called Dr. Gay at [facility] [phone] who is GI doctor performing stent removal who states that he does not see patient on the list for Wednesday, will return phone call. Patient was informed that he has PNA and a UTI and will not be discharged today due to fever 100.5 overnight. He has no other complaints, both patient and sister understand plan for today.    INTERVAL HPI/OVERNIGHT EVENTS:  T(C): 36.6, Max: 38.1 (-18 @ 21:32)  HR: 77 (75 - 112)  BP: 96/68 (79/43 - 108/55)  RR: 16 (16 - 19)  SpO2: 93% (93% - 100%)    I & Os for current day (as of 2017 11:09)  =============================================  IN: 500 ml / OUT: 25 ml / NET: 475 ml    MEDICATIONS  (STANDING):  epoetin shell Injectable 48142Pmxh(s) IV Push <User Schedule>  pantoprazole    Tablet 40milliGRAM(s) Oral before breakfast  ATENolol  Tablet 25milliGRAM(s) Oral daily  folic acid 1milliGRAM(s) Oral daily  cilostazol 50milliGRAM(s) Oral daily  insulin lispro (HumaLOG) corrective regimen sliding scale  SubCutaneous three times a day before meals  dextrose 5%. 1000milliLiter(s) IV Continuous <Continuous>  dextrose 50% Injectable 12.5Gram(s) IV Push once  dextrose 50% Injectable 25Gram(s) IV Push once  dextrose 50% Injectable 25Gram(s) IV Push once  cefTRIAXone   IVPB 1Gram(s) IV Intermittent once  cefTRIAXone   IVPB  IV Intermittent     MEDICATIONS  (PRN):  guaiFENesin    Syrup 100milliGRAM(s) Oral every 6 hours PRN Cough  benzocaine 15 mG/menthol 3.6 mG Lozenge 1Lozenge Oral five times a day PRN Sore Throat  dextrose Gel 1Dose(s) Oral once PRN Blood Glucose LESS THAN 70 milliGRAM(s)/deciliter  glucagon  Injectable 1milliGRAM(s) IntraMuscular once PRN Glucose LESS THAN 70 milligrams/deciliter  acetaminophen   Tablet 650milliGRAM(s) Oral every 6 hours PRN For Temp greater than 38 C (100.4 F)      PHYSICAL EXAM:  GENERAL: NAD, well-groomed, well-developed  HEAD:  Atraumatic, Normocephalic  EYES: EOMI, PERRLA, conjunctiva and sclera clear  ENMT: No tonsillar erythema, exudates, or enlargement; Moist mucous membranes, Good dentition, No lesions  NECK: Supple, No JVD, Normal thyroid  NERVOUS SYSTEM:  Alert & Oriented X2, Motor Strength 5/5 B/L upper and lower extremities; DTRs 2+ intact and symmetric  CHEST/LUNG: Clear to percussion bilaterally; No rales, rhonchi, wheezing, or rubs  HEART: irregular rate and rhythm; No murmurs, rubs, or gallops  ABDOMEN: Soft, RLQ ventral hernia, Nontender, Nondistended; Bowel sounds present; suprapubic catheter  EXTREMITIES:  2+ Peripheral Pulses, No clubbing, cyanosis; 1+ peripheral edema non pitting  SKIN: b/l LE venous stasis  LABS:                        8.6    15.1  )-----------( 204      ( 2017 19:35 )             26.8     06-18    136  |  99  |  24<H>  ----------------------------<  99  4.4   |  28  |  6.22<H>    Ca    8.4      2017 22:36  Phos  2.3     06-18  Mg     2.0     06-18    TPro  7.2  /  Alb  1.8<L>  /  TBili  0.4  /  DBili  x   /  AST  41<H>  /  ALT  24  /  AlkPhos  120        Urinalysis Basic - ( 2017 10:02 )    Color: Yellow / Appearance: very cloudy / S.010 / pH: x  Gluc: x / Ketone: Negative  / Bili: Negative / Urobili: Negative   Blood: x / Protein: 500 mg/dL / Nitrite: Negative   Leuk Esterase: Moderate / RBC: Negative /HPF / WBC >50 /HPF   Sq Epi: x / Non Sq Epi: Negative / Bacteria: Many /HPF      CAPILLARY BLOOD GLUCOSE  102 (2017 07:50)  97 (2017 21:32)  129 (2017 16:19)  102 (2017 12:21)      EXAM:  CHEST-PORTABLE URGENT                            PROCEDURE DATE:  2017        INTERPRETATION:  Portable chest x-ray    Indication: Fever.    Portable chest x-ray is compared to a previous examination dated   2017.    Impression: Mild left pleural effusion, unchanged.    New small pleural effusion at the right costophrenic angle.    Mild left perihilar pulmonary infiltrate, grossly unchanged. Follow-up   chest x-rays until resolution are recommended.    No evidence for pneumothorax.    The trachea is midline.    Stable cardiac silhouette. 72 y/o M from home lives alone, walks with a cane with HHA 7hrs / 7 days. PMH of ESRD (on HD TTS via R AVF), HTN, DM, A fib (no AC due to GIB?), PNA, anemia (on iron supplements), suprapubic catheter (placed 5 years ago), IVC filter and Alzheimer's dementia, with chief complaint of black stool x 2 weeks, mild epigastric pain, and low Hb. Patient was seen and examined, reports mild cough but denies fever, chills, SOB, palpitations, nausea, vomiting, diarrhea or constipation. Spoke to sister Zuleima at bedside, called Dr. Gay at [facility] [phone] who is GI doctor performing stent removal, on the schedule for . Patient was informed that he has PNA and a UTI and will not be discharged today due to fever 100.5 overnight. He has no other complaints, both patient and sister understand plan for today.    INTERVAL HPI/OVERNIGHT EVENTS:  T(C): 36.6, Max: 38.1 (-18 @ 21:32)  HR: 77 (75 - 112)  BP: 96/68 (79/43 - 108/55)  RR: 16 (16 - 19)  SpO2: 93% (93% - 100%)    I & Os for current day (as of 2017 11:09)  =============================================  IN: 500 ml / OUT: 25 ml / NET: 475 ml    MEDICATIONS  (STANDING):  epoetin shell Injectable 13846Ecie(s) IV Push <User Schedule>  pantoprazole    Tablet 40milliGRAM(s) Oral before breakfast  ATENolol  Tablet 25milliGRAM(s) Oral daily  folic acid 1milliGRAM(s) Oral daily  cilostazol 50milliGRAM(s) Oral daily  insulin lispro (HumaLOG) corrective regimen sliding scale  SubCutaneous three times a day before meals  dextrose 5%. 1000milliLiter(s) IV Continuous <Continuous>  dextrose 50% Injectable 12.5Gram(s) IV Push once  dextrose 50% Injectable 25Gram(s) IV Push once  dextrose 50% Injectable 25Gram(s) IV Push once  cefTRIAXone   IVPB 1Gram(s) IV Intermittent once  cefTRIAXone   IVPB  IV Intermittent     MEDICATIONS  (PRN):  guaiFENesin    Syrup 100milliGRAM(s) Oral every 6 hours PRN Cough  benzocaine 15 mG/menthol 3.6 mG Lozenge 1Lozenge Oral five times a day PRN Sore Throat  dextrose Gel 1Dose(s) Oral once PRN Blood Glucose LESS THAN 70 milliGRAM(s)/deciliter  glucagon  Injectable 1milliGRAM(s) IntraMuscular once PRN Glucose LESS THAN 70 milligrams/deciliter  acetaminophen   Tablet 650milliGRAM(s) Oral every 6 hours PRN For Temp greater than 38 C (100.4 F)      PHYSICAL EXAM:  GENERAL: NAD, well-groomed, well-developed  HEAD:  Atraumatic, Normocephalic  EYES: EOMI, PERRLA, conjunctiva and sclera clear  ENMT: No tonsillar erythema, exudates, or enlargement; Moist mucous membranes, Good dentition, No lesions  NECK: Supple, No JVD, Normal thyroid  NERVOUS SYSTEM:  Alert & Oriented X2, Motor Strength 5/5 B/L upper and lower extremities; DTRs 2+ intact and symmetric  CHEST/LUNG: Clear to percussion bilaterally; No rales, rhonchi, wheezing, or rubs  HEART: irregular rate and rhythm; No murmurs, rubs, or gallops  ABDOMEN: Soft, RLQ ventral hernia, Nontender, Nondistended; Bowel sounds present; suprapubic catheter  EXTREMITIES:  2+ Peripheral Pulses, No clubbing, cyanosis; 1+ peripheral edema non pitting  SKIN: b/l LE venous stasis  LABS:                        8.6    15.1  )-----------( 204      ( 2017 19:35 )             26.8     -18    136  |  99  |  24<H>  ----------------------------<  99  4.4   |  28  |  6.22<H>    Ca    8.4      2017 22:36  Phos  2.3     -18  Mg     2.0     -18    TPro  7.2  /  Alb  1.8<L>  /  TBili  0.4  /  DBili  x   /  AST  41<H>  /  ALT  24  /  AlkPhos  120  06-18      Urinalysis Basic - ( 2017 10:02 )    Color: Yellow / Appearance: very cloudy / S.010 / pH: x  Gluc: x / Ketone: Negative  / Bili: Negative / Urobili: Negative   Blood: x / Protein: 500 mg/dL / Nitrite: Negative   Leuk Esterase: Moderate / RBC: Negative /HPF / WBC >50 /HPF   Sq Epi: x / Non Sq Epi: Negative / Bacteria: Many /HPF      CAPILLARY BLOOD GLUCOSE  102 (2017 07:50)  97 (2017 21:32)  129 (2017 16:19)  102 (2017 12:21)      EXAM:  CHEST-PORTABLE URGENT                            PROCEDURE DATE:  2017        INTERPRETATION:  Portable chest x-ray    Indication: Fever.    Portable chest x-ray is compared to a previous examination dated   2017.    Impression: Mild left pleural effusion, unchanged.    New small pleural effusion at the right costophrenic angle.    Mild left perihilar pulmonary infiltrate, grossly unchanged. Follow-up   chest x-rays until resolution are recommended.    No evidence for pneumothorax.    The trachea is midline.    Stable cardiac silhouette.

## 2017-06-19 NOTE — DISCHARGE NOTE ADULT - PATIENT PORTAL LINK FT
“You can access the [employer] Patient Portal, offered by [facility], by registering with the following website: [email or web address]”

## 2017-06-19 NOTE — PROGRESS NOTE ADULT - ATTENDING COMMENTS
Patient was seen and examined by myself with team. Case was discussed with house staff in details. Patient was seen and examined by myself with team. Case was discussed with house staff in details.  This is a 72 y/o M admitted for anemia with possible GI bleed; received PRBC transfusion with appropriate increase in Hb.   Had fever overnight; etiology - possible PNA and UTI. Cultures sent; empirically started on IV Rocephin. Follow up cultures.  - H/O esophageal stent- outpatient follow up with primary gastroenterologists upon discharge.  - ESRD on HD- plan for dialysis today  - Prostate cancer- stable; has chronic indwelling Martínez catheter; urology follow up outpatient to reassess further need for indwelling Martínez.  Other plan as detailed above in Dr Bustillo note.   Plan discussed with patient and family in details at bedside and all their questions / concerns were addressed  GI consult note and recommendations reviewed.

## 2017-06-19 NOTE — PROGRESS NOTE ADULT - SUBJECTIVE AND OBJECTIVE BOX
stable   no evidence of active GI bleed  spiked temp last night    Vital Signs Last 24 Hrs  T(C): 36.6, Max: 38.1 (06-18 @ 21:32)  T(F): 97.8, Max: 100.5 (06-18 @ 21:32)  HR: 77 (75 - 112)  BP: 96/68 (79/43 - 108/55)  BP(mean): 52 (52 - 52)  RR: 16 (16 - 19)  SpO2: 93% (93% - 100%)    NAD  s1s2  bs b/l  abd: soft NT nd  Ext : 2 + dps    Esophageal stent   PEG  ESRD on Dialysis  ANEMIA  UTI    -stable hgb with adequate response to blood transfusion  -no further episodes of melena  -f/u regarding UTI, ? fever  -call as needed  -follow up with GI outpatient

## 2017-06-19 NOTE — PROGRESS NOTE ADULT - ATTENDING COMMENTS
Patient seen and examined, agree with above assessment and plan as transcribed above.    - HR much improved with PRBC  - D/C tele    Daniel Talavera MD, FACC  [facility], [facility]  [address] [place] [address]  PHONE:  [phone]  BEEPER : [phone]

## 2017-06-19 NOTE — DISCHARGE NOTE ADULT - CARE PROVIDER_API CALL
Tommie Nava (MD), Surgery; Thoracic Surgery; Vascular Surgery  [address] [place] [address]  Phone: [phone]  Fax: [phone] Tommie Nava (MD), Surgery; Thoracic Surgery; Vascular Surgery  [address] [place] [address]  Phone: [phone]  Fax: [phone]    Edil Puri  Phone: [phone]  Fax: (       -

## 2017-06-19 NOTE — PROGRESS NOTE ADULT - PROBLEM SELECTOR PLAN 8
DVT ppx (Improve score of 3, will c/w SCD as concern for active GIB)  GI ppx- protonix DVT prophylaxis  (Improve score of 3, will c/w SCD as concern for active GIB)  GI prophylaxis px- Protonix

## 2017-06-20 LAB
ANION GAP SERPL CALC-SCNC: 6 MMOL/L — SIGNIFICANT CHANGE UP (ref 5–17)
BASOPHILS # BLD AUTO: 0.1 K/UL — SIGNIFICANT CHANGE UP (ref 0–0.2)
BASOPHILS NFR BLD AUTO: 0.5 % — SIGNIFICANT CHANGE UP (ref 0–2)
BUN SERPL-MCNC: 18 MG/DL — SIGNIFICANT CHANGE UP (ref 7–18)
CALCIUM SERPL-MCNC: 8 MG/DL — LOW (ref 8.4–10.5)
CHLORIDE SERPL-SCNC: 105 MMOL/L — SIGNIFICANT CHANGE UP (ref 96–108)
CO2 SERPL-SCNC: 27 MMOL/L — SIGNIFICANT CHANGE UP (ref 22–31)
CREAT SERPL-MCNC: 5.21 MG/DL — HIGH (ref 0.5–1.3)
EOSINOPHIL # BLD AUTO: 0.1 K/UL — SIGNIFICANT CHANGE UP (ref 0–0.5)
EOSINOPHIL NFR BLD AUTO: 0.9 % — SIGNIFICANT CHANGE UP (ref 0–6)
GLUCOSE SERPL-MCNC: 137 MG/DL — HIGH (ref 70–99)
HCT VFR BLD CALC: 27.5 % — LOW (ref 39–50)
HCT VFR BLD CALC: 28.3 % — LOW (ref 39–50)
HGB BLD-MCNC: 8.9 G/DL — LOW (ref 13–17)
HGB BLD-MCNC: 9 G/DL — LOW (ref 13–17)
LYMPHOCYTES # BLD AUTO: 1.2 K/UL — SIGNIFICANT CHANGE UP (ref 1–3.3)
LYMPHOCYTES # BLD AUTO: 10.2 % — LOW (ref 13–44)
MAGNESIUM SERPL-MCNC: 1.9 MG/DL — SIGNIFICANT CHANGE UP (ref 1.6–2.6)
MCHC RBC-ENTMCNC: 27.7 PG — SIGNIFICANT CHANGE UP (ref 27–34)
MCHC RBC-ENTMCNC: 28.5 PG — SIGNIFICANT CHANGE UP (ref 27–34)
MCHC RBC-ENTMCNC: 31.4 GM/DL — LOW (ref 32–36)
MCHC RBC-ENTMCNC: 32.6 GM/DL — SIGNIFICANT CHANGE UP (ref 32–36)
MCV RBC AUTO: 87.5 FL — SIGNIFICANT CHANGE UP (ref 80–100)
MCV RBC AUTO: 88.1 FL — SIGNIFICANT CHANGE UP (ref 80–100)
MONOCYTES # BLD AUTO: 0.7 K/UL — SIGNIFICANT CHANGE UP (ref 0–0.9)
MONOCYTES NFR BLD AUTO: 6.1 % — SIGNIFICANT CHANGE UP (ref 2–14)
NEUTROPHILS # BLD AUTO: 9.4 K/UL — HIGH (ref 1.8–7.4)
NEUTROPHILS NFR BLD AUTO: 82.4 % — HIGH (ref 43–77)
PHOSPHATE SERPL-MCNC: 2.5 MG/DL — SIGNIFICANT CHANGE UP (ref 2.5–4.5)
PLATELET # BLD AUTO: 185 K/UL — SIGNIFICANT CHANGE UP (ref 150–400)
PLATELET # BLD AUTO: 190 K/UL — SIGNIFICANT CHANGE UP (ref 150–400)
POTASSIUM SERPL-MCNC: 3.8 MMOL/L — SIGNIFICANT CHANGE UP (ref 3.5–5.3)
POTASSIUM SERPL-SCNC: 3.8 MMOL/L — SIGNIFICANT CHANGE UP (ref 3.5–5.3)
RBC # BLD: 3.14 M/UL — LOW (ref 4.2–5.8)
RBC # BLD: 3.21 M/UL — LOW (ref 4.2–5.8)
RBC # FLD: 15.3 % — HIGH (ref 10.3–14.5)
RBC # FLD: 15.3 % — HIGH (ref 10.3–14.5)
SODIUM SERPL-SCNC: 138 MMOL/L — SIGNIFICANT CHANGE UP (ref 135–145)
WBC # BLD: 11.4 K/UL — HIGH (ref 3.8–10.5)
WBC # BLD: 11.5 K/UL — HIGH (ref 3.8–10.5)
WBC # FLD AUTO: 11.4 K/UL — HIGH (ref 3.8–10.5)
WBC # FLD AUTO: 11.5 K/UL — HIGH (ref 3.8–10.5)

## 2017-06-20 PROCEDURE — 99233 SBSQ HOSP IP/OBS HIGH 50: CPT | Mod: GC

## 2017-06-20 RX ORDER — AZITHROMYCIN 500 MG/1
500 TABLET, FILM COATED ORAL ONCE
Qty: 0 | Refills: 0 | Status: COMPLETED | OUTPATIENT
Start: 2017-06-20 | End: 2017-06-20

## 2017-06-20 RX ORDER — AZITHROMYCIN 500 MG/1
TABLET, FILM COATED ORAL
Qty: 0 | Refills: 0 | Status: DISCONTINUED | OUTPATIENT
Start: 2017-06-20 | End: 2017-06-21

## 2017-06-20 RX ORDER — AZITHROMYCIN 500 MG/1
500 TABLET, FILM COATED ORAL EVERY 24 HOURS
Qty: 0 | Refills: 0 | Status: DISCONTINUED | OUTPATIENT
Start: 2017-06-21 | End: 2017-06-21

## 2017-06-20 RX ADMIN — AZITHROMYCIN 250 MILLIGRAM(S): 500 TABLET, FILM COATED ORAL at 18:47

## 2017-06-20 RX ADMIN — PANTOPRAZOLE SODIUM 40 MILLIGRAM(S): 20 TABLET, DELAYED RELEASE ORAL at 05:54

## 2017-06-20 RX ADMIN — Medication 650 MILLIGRAM(S): at 21:01

## 2017-06-20 RX ADMIN — CILOSTAZOL 50 MILLIGRAM(S): 100 TABLET ORAL at 14:26

## 2017-06-20 RX ADMIN — Medication 1 MILLIGRAM(S): at 14:26

## 2017-06-20 RX ADMIN — CEFTRIAXONE 100 GRAM(S): 500 INJECTION, POWDER, FOR SOLUTION INTRAMUSCULAR; INTRAVENOUS at 14:25

## 2017-06-20 NOTE — PROGRESS NOTE ADULT - SUBJECTIVE AND OBJECTIVE BOX
70 y/o M with PMH of ESRD (on HD TTS via R AVF), HTN, DM, A fib (no AC due to GIB?), PNA, anemia (on iron supplements), suprapubic catheter (placed 5 years ago), IVC filter and Alzheimer's dementia, with chief complaint of black stool x 2wks, epigastric pain, and low Hb. Patient was seen and examined, forgot that we told him he has pneumonia and UTI. AAOx2. Informed that he received 1 unit of blood overnight and that we need to repeat blood work and he agreed. No episodes of melena overnight. He denies fever, chills, SOB, palpitations, nausea, vomiting, diarrhea, constipation, dysuria, abdominal pain, chest pain or any other issues. Feels well. Informed that the esophageal stent removal is not an emergency as per Dr. Gay at [facility] and that he will have an appointment in 1 month to meet with him and schedule the procedure. Was informed that after the result of the urine culture, he will likely be discharged.     INTERVAL HPI/OVERNIGHT EVENTS:  T(C): 36.1, Max: 37.2 (06-20 @ 00:35)  HR: 110 (16 - 113)  BP: 97/54 (80/56 - 109/54)  RR: 15 (15 - 18)  SpO2: 98% (96% - 100%)    MEDICATIONS  (STANDING):  epoetin shell Injectable 25403Naac(s) IV Push <User Schedule>  pantoprazole    Tablet 40milliGRAM(s) Oral before breakfast  ATENolol  Tablet 25milliGRAM(s) Oral daily  folic acid 1milliGRAM(s) Oral daily  cilostazol 50milliGRAM(s) Oral daily  insulin lispro (HumaLOG) corrective regimen sliding scale  SubCutaneous three times a day before meals  dextrose 5%. 1000milliLiter(s) IV Continuous <Continuous>  dextrose 50% Injectable 12.5Gram(s) IV Push once  dextrose 50% Injectable 25Gram(s) IV Push once  dextrose 50% Injectable 25Gram(s) IV Push once  cefTRIAXone   IVPB 1Gram(s) IV Intermittent every 24 hours  cefTRIAXone   IVPB  IV Intermittent     MEDICATIONS  (PRN):  guaiFENesin    Syrup 100milliGRAM(s) Oral every 6 hours PRN Cough  benzocaine 15 mG/menthol 3.6 mG Lozenge 1Lozenge Oral five times a day PRN Sore Throat  dextrose Gel 1Dose(s) Oral once PRN Blood Glucose LESS THAN 70 milliGRAM(s)/deciliter  glucagon  Injectable 1milliGRAM(s) IntraMuscular once PRN Glucose LESS THAN 70 milligrams/deciliter  acetaminophen   Tablet 650milliGRAM(s) Oral every 6 hours PRN For Temp greater than 38 C (100.4 F)    PHYSICAL EXAM:    GENERAL: NAD, well-groomed, well-developed  HEAD:  Atraumatic, Normocephalic  EYES: EOMI, PERRLA, conjunctiva and sclera clear  ENMT: No tonsillar erythema, exudates, or enlargement; Moist mucous membranes  NECK: Supple, No JVD, Normal thyroid  NERVOUS SYSTEM:  Alert & Oriented X2, Motor Strength 5/5 B/L upper and lower extremities; DTRs 2+ intact and symmetric  CHEST/LUNG: Clear to percussion bilaterally; No rales, rhonchi, wheezing, or rubs  HEART: irregular rate and rhythm; No murmurs, rubs, or gallops  ABDOMEN: RLQ ventral hernia, Nontender, Nondistended; Bowel sounds present; suprapubic catheter  EXTREMITIES:  No clubbing, cyanosis; b/l LE venous stasis with skin changes, no edema noted  LYMPH: No lymphadenopathy noted  SKIN: b/l venous changes lower extremities    LABS:                        9.0    11.4  )-----------( 190      ( 2017 11:02 )             27.5     06-20    138  |  105  |  18  ----------------------------<  137<H>  3.8   |  27  |  5.21<H>    Ca    8.0<L>      2017 11:02  Phos  2.5     06-20  Mg     1.9     06-20    TPro  7.2  /  Alb  1.8<L>  /  TBili  0.3  /  DBili  x   /  AST  40  /  ALT  27  /  AlkPhos  135<H>  06-19      Urinalysis Basic - ( 2017 10:02 )    Color: Yellow / Appearance: very cloudy / S.010 / pH: x  Gluc: x / Ketone: Negative  / Bili: Negative / Urobili: Negative   Blood: x / Protein: 500 mg/dL / Nitrite: Negative   Leuk Esterase: Moderate / RBC: Negative /HPF / WBC >50 /HPF   Sq Epi: x / Non Sq Epi: Negative / Bacteria: Many /HPF      CAPILLARY BLOOD GLUCOSE  133 (2017 11:29)  97 (2017 07:54)  136 (2017 21:45)  104 (2017 16:00) 70 y/o M with PMH of ESRD (on HD TTS via R AVF), HTN, DM, A fib (no AC due to GIB?), PNA, anemia (on iron supplements), suprapubic catheter (placed 5 years ago), IVC filter and Alzheimer's dementia, with chief complaint of black stool x 2wks, epigastric pain, and low Hb. Patient was seen and examined, forgot that we told him he has pneumonia and UTI. AAOx2. Informed that he received 1 unit of blood overnight and that we need to repeat blood work and he agreed. No episodes of melena overnight. He denies fever, chills, SOB, palpitations, nausea, vomiting, diarrhea, constipation, dysuria, abdominal pain, chest pain or any other issues. Feels well. Informed that the esophageal stent removal is not an emergency as per Dr. Gay at [facility] and that he will have an appointment  with Dr. Puri (GI fellow) to schedule the procedure. Was informed that after the result of the urine culture, he will likely be discharged.     INTERVAL HPI/OVERNIGHT EVENTS:  T(C): 36.1, Max: 37.2 (06-20 @ 00:35)  HR: 110 (16 - 113)  BP: 97/54 (80/56 - 109/54)  RR: 15 (15 - 18)  SpO2: 98% (96% - 100%)    MEDICATIONS  (STANDING):  epoetin shell Injectable 39092Imqs(s) IV Push <User Schedule>  pantoprazole    Tablet 40milliGRAM(s) Oral before breakfast  ATENolol  Tablet 25milliGRAM(s) Oral daily  folic acid 1milliGRAM(s) Oral daily  cilostazol 50milliGRAM(s) Oral daily  insulin lispro (HumaLOG) corrective regimen sliding scale  SubCutaneous three times a day before meals  dextrose 5%. 1000milliLiter(s) IV Continuous <Continuous>  dextrose 50% Injectable 12.5Gram(s) IV Push once  dextrose 50% Injectable 25Gram(s) IV Push once  dextrose 50% Injectable 25Gram(s) IV Push once  cefTRIAXone   IVPB 1Gram(s) IV Intermittent every 24 hours  cefTRIAXone   IVPB  IV Intermittent     MEDICATIONS  (PRN):  guaiFENesin    Syrup 100milliGRAM(s) Oral every 6 hours PRN Cough  benzocaine 15 mG/menthol 3.6 mG Lozenge 1Lozenge Oral five times a day PRN Sore Throat  dextrose Gel 1Dose(s) Oral once PRN Blood Glucose LESS THAN 70 milliGRAM(s)/deciliter  glucagon  Injectable 1milliGRAM(s) IntraMuscular once PRN Glucose LESS THAN 70 milligrams/deciliter  acetaminophen   Tablet 650milliGRAM(s) Oral every 6 hours PRN For Temp greater than 38 C (100.4 F)    PHYSICAL EXAM:    GENERAL: NAD, well-groomed, well-developed  HEAD:  Atraumatic, Normocephalic  EYES: EOMI, PERRLA, conjunctiva and sclera clear  ENMT: No tonsillar erythema, exudates, or enlargement; Moist mucous membranes  NECK: Supple, No JVD, Normal thyroid  NERVOUS SYSTEM:  Alert & Oriented X2, Motor Strength 5/5 B/L upper and lower extremities; DTRs 2+ intact and symmetric  CHEST/LUNG: Clear to percussion bilaterally; No rales, rhonchi, wheezing, or rubs  HEART: irregular rate and rhythm; No murmurs, rubs, or gallops  ABDOMEN: RLQ ventral hernia, Nontender, Nondistended; Bowel sounds present; suprapubic catheter  EXTREMITIES:  No clubbing, cyanosis; b/l LE venous stasis with skin changes, no edema noted  LYMPH: No lymphadenopathy noted  SKIN: b/l venous changes lower extremities    LABS:                        9.0    11.4  )-----------( 190      ( 2017 11:02 )             27.5     06-20    138  |  105  |  18  ----------------------------<  137<H>  3.8   |  27  |  5.21<H>    Ca    8.0<L>      2017 11:02  Phos  2.5     06-20  Mg     1.9     06-20    TPro  7.2  /  Alb  1.8<L>  /  TBili  0.3  /  DBili  x   /  AST  40  /  ALT  27  /  AlkPhos  135<H>  06-19      Urinalysis Basic - ( 2017 10:02 )    Color: Yellow / Appearance: very cloudy / S.010 / pH: x  Gluc: x / Ketone: Negative  / Bili: Negative / Urobili: Negative   Blood: x / Protein: 500 mg/dL / Nitrite: Negative   Leuk Esterase: Moderate / RBC: Negative /HPF / WBC >50 /HPF   Sq Epi: x / Non Sq Epi: Negative / Bacteria: Many /HPF      CAPILLARY BLOOD GLUCOSE  133 (2017 11:29)  97 (2017 07:54)  136 (2017 21:45)  104 (2017 16:00) 72 y/o M with PMH of ESRD (on HD TTS via R AVF), HTN, DM, A fib (no AC due to GIB?), PNA, anemia (on iron supplements), suprapubic catheter (placed 5 years ago), IVC filter and Alzheimer's dementia, with chief complaint of black stool x 2wks, epigastric pain, and low Hb. Patient was seen and examined, forgot that we told him he has pneumonia and UTI. AAOx2. Informed that he received 1 unit of blood overnight and that we need to repeat blood work and he agreed. No episodes of melena overnight. He denies fever, chills, SOB, palpitations, nausea, vomiting, diarrhea, constipation, dysuria, abdominal pain, chest pain or any other issues. Feels well. Informed that the esophageal stent removal is not an emergency as per Dr. Gay at [facility] and that he will have an appointment  with Dr. Puri (GI fellow) to schedule the procedure. Was informed that after the result of the urine culture, he will likely be discharged.     INTERVAL HPI/OVERNIGHT EVENTS:  T(C): 36.1, Max: 37.2 (06-20 @ 00:35)  HR: 110 (16 - 113)  BP: 97/54 (80/56 - 109/54)  RR: 15 (15 - 18)  SpO2: 98% (96% - 100%)    MEDICATIONS  (STANDING):  epoetin shell Injectable 71312Gxwg(s) IV Push <User Schedule>  pantoprazole    Tablet 40milliGRAM(s) Oral before breakfast  ATENolol  Tablet 25milliGRAM(s) Oral daily  folic acid 1milliGRAM(s) Oral daily  cilostazol 50milliGRAM(s) Oral daily  insulin lispro (HumaLOG) corrective regimen sliding scale  SubCutaneous three times a day before meals  dextrose 5%. 1000milliLiter(s) IV Continuous <Continuous>  dextrose 50% Injectable 12.5Gram(s) IV Push once  dextrose 50% Injectable 25Gram(s) IV Push once  dextrose 50% Injectable 25Gram(s) IV Push once  cefTRIAXone   IVPB 1Gram(s) IV Intermittent every 24 hours  cefTRIAXone   IVPB  IV Intermittent     MEDICATIONS  (PRN):  guaiFENesin    Syrup 100milliGRAM(s) Oral every 6 hours PRN Cough  benzocaine 15 mG/menthol 3.6 mG Lozenge 1Lozenge Oral five times a day PRN Sore Throat  dextrose Gel 1Dose(s) Oral once PRN Blood Glucose LESS THAN 70 milliGRAM(s)/deciliter  glucagon  Injectable 1milliGRAM(s) IntraMuscular once PRN Glucose LESS THAN 70 milligrams/deciliter  acetaminophen   Tablet 650milliGRAM(s) Oral every 6 hours PRN For Temp greater than 38 C (100.4 F)    PHYSICAL EXAM: OOB to chair    GENERAL: NAD, well-groomed, well-developed  HEAD:  Atraumatic, Normocephalic  EYES: EOMI, PERRLA, conjunctiva and sclera clear  ENMT: No tonsillar erythema, exudates, or enlargement; Moist mucous membranes  NECK: Supple, No JVD, Normal thyroid  NERVOUS SYSTEM:  Alert & Oriented X 2, Motor Strength 5/5 B/L upper and lower extremities; DTRs 2+ intact and symmetric  CHEST/LUNG: Clear to percussion bilaterally; No rales, rhonchi, wheezing, or rubs  HEART: irregular rate and rhythm; No murmurs, rubs, or gallops  ABDOMEN: RLQ ventral hernia, Nontender, Nondistended; Bowel sounds present; suprapubic catheter  EXTREMITIES:  No clubbing, cyanosis; b/l LE venous stasis with skin changes, no edema noted  LYMPH: No lymphadenopathy noted  SKIN: b/l venous changes lower extremities    LABS:                        9.0    11.4  )-----------( 190      ( 2017 11:02 )             27.5     06-20    138  |  105  |  18  ----------------------------<  137<H>  3.8   |  27  |  5.21<H>    Ca    8.0<L>      2017 11:02  Phos  2.5     06-20  Mg     1.9     06-20    TPro  7.2  /  Alb  1.8<L>  /  TBili  0.3  /  DBili  x   /  AST  40  /  ALT  27  /  AlkPhos  135<H>  06-19      Urinalysis Basic - ( 2017 10:02 )    Color: Yellow / Appearance: very cloudy / S.010 / pH: x  Gluc: x / Ketone: Negative  / Bili: Negative / Urobili: Negative   Blood: x / Protein: 500 mg/dL / Nitrite: Negative   Leuk Esterase: Moderate / RBC: Negative /HPF / WBC >50 /HPF   Sq Epi: x / Non Sq Epi: Negative / Bacteria: Many /HPF      CAPILLARY BLOOD GLUCOSE  133 (2017 11:29)  97 (2017 07:54)  136 (2017 21:45)  104 (2017 16:00)

## 2017-06-20 NOTE — PROGRESS NOTE ADULT - PROBLEM SELECTOR PLAN 2
afebrile overnight  BCx neg  mild leukocytosis  UA+  UCx >100,000 ecoli, pending sensitivity  c/w rocephin 1 g daily afebrile overnight; Blood Cx neg; mild leukocytosis  UA+; UCx >100,000 ecoli, pending sensitivity  c/w rocephin 1 g daily

## 2017-06-20 NOTE — PROGRESS NOTE ADULT - ATTENDING COMMENTS
[facility] NEPHROLOGY  Armando Shaffer M.D.  Kendall Morales D.O.  Lilliam Cody M.D.  Oxana Hernandez, MSN, ANP-C  [phone]    [address] [place] [address]

## 2017-06-20 NOTE — PROGRESS NOTE ADULT - PROBLEM SELECTOR PLAN 5
Pt on Atenolol for rate control  A/C held due to GI bleed  Cardiology following- Dr. Talavera  Pt should be off A/C due to recurrent GI bleed..

## 2017-06-20 NOTE — PROGRESS NOTE ADULT - PROBLEM SELECTOR PLAN 4
CHADVasc score of 3   rapid a fib with RVR likely 2/2 fluid depletion on admission, now rate controlled  holding coumadin due to suspicion for GI bleed  reduced atenolol to 25mg daily with parameters being held  Cardiology consulted Dr. Sadaf hall tele CHADVasc score of 3   rapid a fib with RVR likely 2/2 fluid depletion on admission, now rate controlled  holding coumadin due to suspicion for GI bleed  reduced atenolol to 25mg daily with parameters being held last 3 days not given  Cardiology consulted Dr. Talavera  off tele

## 2017-06-20 NOTE — PROGRESS NOTE ADULT - SUBJECTIVE AND OBJECTIVE BOX
[facility] NEPHROLOGY- PROGRESS NOTE    72 y/o M  with ESRD on HD, HTN, DM, A fib (on coumadin), Prostate CA s/p sx with suprapubic catheter, Alzheimer's dementia, Anemia s/p 2 units prbc transfusion (2 weeks prior) presents for blood transfusion. a/w Anemia 2/2 GI bleed and Afib with RVR. Now found to have PNA and UTI.     Hospital Medications: Medications reviewed.  REVIEW OF SYSTEMS:  CONSTITUTIONAL: No fevers or chills  RESPIRATORY: No shortness of breath  CARDIOVASCULAR: No chest pain.  GASTROINTESTINAL: No nausea, vomiting, diarrhea or abdominal pain. No melena or BRBPR  VASCULAR: No bilateral lower extremity edema.     VITALS:  T(F): 98.7, Max: 98.9 (06-20 @ 00:35)  HR: 109  BP: 99/55  RR: 16  SpO2: 100%  Wt(kg): --      PHYSICAL EXAM:  Gen: NAD, calm  Neck: no JVD  Cards: irregular, +S1/S2,   Resp: CTA B/L  GI: soft, ND, NT NABS   : +supra pubic catheter w. leg bag  Extremities: no LE edema B/L,   Vascular: +Rt AVG +thrill +bruit    Hemoglobin: 9.0 g/dL (06-20 @ 11:02)  Hemoglobin: 7.6 g/dL (06-19 @ 18:14)    hemoglobin trend: 9.0 <---, 7.6 <---, 9.6 <---, 8.6 <---, 9.7 <---  Calcium, Total Serum: 8.0 mg/dL (06-20 @ 11:02)  Potassium, Serum: 3.8 mmol/L (06-20 @ 11:02)  Phosphorus Level, Serum: 2.5 mg/dL (06-20 @ 11:02)

## 2017-06-20 NOTE — PROGRESS NOTE ADULT - PROBLEM SELECTOR PLAN 3
afebrile  mild leukocytosis  mild non productive cough  CXR: mild L perihilar infiltrate  c/w rocephin 1 g daily for CAP afebrile  mild leukocytosis  mild non productive cough  CXR: mild L perihilar infiltrate  c/w Rocephin 1 g daily for CAP; will add Zithromax afebrile  mild leukocytosis  mild non productive cough  CXR: mild L perihilar infiltrate  c/w Rocephin 1 g daily for CAP; will add Zithromax to cover atypicals

## 2017-06-20 NOTE — PROGRESS NOTE ADULT - ATTENDING COMMENTS
Patient seen and examined with close family friend Zuleima at bedside; OOB to chair; Agree with PGY1 A/P above with editing as needed.    Cough + No other complaints    Vitals: BP in 90 to 100 systolic    P/E: As above    Labs: reviewed    CXR: Impression: Mild left pleural effusion, unchanged. New small pleural effusion at the right costophrenic angle.  Mild left perihilar pulmonary infiltrate, grossly unchanged. Follow-up chest x-rays until resolution are recommended.  No evidence for pneumothorax. The trachea is midline. Stable cardiac silhouette.    Culture Results:   Urine Cx: >100,000 CFU/ml Escherichia coli (06.19.17 @ 14:33)    A/P:  Hypotension concerning for infectious etiology UTI sec. to E. Coli plus Pneumonia Left sided  Agree with Ceftriaxone; add Zithromax to cover atypicals  Continue to hold Atenolol; tachycardic  ESRD on HD d/w nephro; No evidence of fluid overload. Will put back on TTS schedule.  may need to HD here on Thursday and then discharge if BP continues to be low tomorrow; Renal agrees.   will d/w Zuleima tomorrow depending on Vitals next 24 hrs    d/w PGY1/ RN/ Zuleima earlier today and Nephro

## 2017-06-20 NOTE — PROGRESS NOTE ADULT - PROBLEM SELECTOR PLAN 8
DVT prophylaxis  (Improve score of 3, will c/w SCD as concern for active GIB)  GI prophylaxis px- Protonix

## 2017-06-20 NOTE — PROGRESS NOTE ADULT - PROBLEM SELECTOR PLAN 3
+FOBT. A/C held  s/p CT abd/pelvis with  IV contrast - pt w/ esophageal stent.  Pt seen by Dr. Fernández; no interventions at this time. No signs of active bleeding.   Pt with esophageal stent- plan for oupt removal by Dr. Gay/ J[facility]son at [facility] [phone].   Monitor h/h.

## 2017-06-20 NOTE — PROGRESS NOTE ADULT - ATTENDING COMMENTS
Patient seen and examined, agree with above assessment and plan as transcribed above.    - Off AC secondary to GI Bleed  - ? If safe to use  QD    Daniel Talavera MD, FACC  [facility], [facility]  [address] [place] [address]  PHONE:  [phone]  BEEPER : [phone]

## 2017-06-20 NOTE — PROGRESS NOTE ADULT - PROBLEM SELECTOR PLAN 2
in the setting of GI bleed. s/p 1 unit prbc last night. h/h improved. (total 4 units prbc during this admission)  c/w Epogen 10,000 units tiw with HD  Pt with elevated ferritin- no need for IV iron.   Monitor h/h.

## 2017-06-20 NOTE — PROGRESS NOTE ADULT - PROBLEM SELECTOR PLAN 1
Last HD 6/19/17. Will plan for next maintenance HD 6/22/17 to switch back to TTS schedule. Monitor BMP.

## 2017-06-20 NOTE — PROGRESS NOTE ADULT - PROBLEM SELECTOR PLAN 1
anemia likely 2/2 UGIB as pt reported recurrent melena for the past 2 weeks  as per GI, Dr. Evans, can follow up with patient's outpt GI at [facility], Dr Kate as he was scheduled for esophageal stent removal tomorrow, [phone], however due to active infection will postpone; as per Dr. Kate outpatient office visit scheduled for 11:30am July 18th  holding patient's coumadin  on admission Hb level of 6.2 and guaiac positive --> s/p 4 PRBCs (1 prbc overnight)  c/w epogen  advanced diet to mechanical soft from clears  transfuse if Hb < 8.5   anemia of chronic disease 2/2 CKD  off tele anemia likely 2/2 UGIB as pt reported recurrent melena for the past 2 weeks  as per GI, Dr. Evans, can follow up with patient's outpt GI at [facility], Dr Kate as he was scheduled for esophageal stent removal tomorrow, [phone], however due to active infection will postpone; GI fellow Dr. Edil Puri  holding patient's coumadin  on admission Hb level of 6.2 and guaiac positive --> s/p 4 PRBCs (1 prbc overnight)  c/w epogen  advanced diet to mechanical soft from clears  transfuse if Hb < 8.5   anemia of chronic disease 2/2 CKD  off tele anemia likely 2/2 UGIB as pt reported recurrent melena for the past 2 weeks  as per GI, Dr. Evans, can follow up with patient's outpt GI at [facility], Dr Kate as he was scheduled for esophageal stent removal tomorrow, [phone], however due to active infection will postpone; GI fellow Dr. Edil Puri [phone], appointment made at his office for July 6th at 9:30am  holding patient's coumadin  on admission Hb level of 6.2 and guaiac positive --> s/p 4 PRBCs (1 prbc overnight)  c/w epogen  advanced diet to mechanical soft from clears  transfuse if Hb < 8.5   anemia of chronic disease 2/2 CKD  off tele anemia likely 2/2 UGIB as pt reported recurrent melena for the past 2 weeks  as per GI, Dr. Evans, can follow up with patient's outpt GI at [facility], Dr Kate as he was scheduled for esophageal stent removal tomorrow, [phone], however due to active infection will postpone; GI fellow Dr. Edil Puri [phone], appointment made at his office for July 6th at 9:30am  No anticoagulation; Patient does not report melanotic stool at this time.   on admission Hb level of 6.2 and guaiac positive --> s/p 4 PRBCs (1 prbc overnight)  c/w epogen  advanced diet to mechanical soft from clears  transfuse if Hb < 8.5   anemia of chronic disease 2/2 CKD  off tele

## 2017-06-20 NOTE — PROGRESS NOTE ADULT - PROBLEM SELECTOR PLAN 7
pt takes Atenolol 50mg daily  reducing dose to 25mg with parameters due to borderline BP   TSH within normal limits
pt takes Atenolol 50mg daily  reducing dose to 25mg with parameters due to borderline BP   TSH within normal limits
s/p esophageal stent for exchange next Wednesday.  s/p removal of Peg tube eating well orally.

## 2017-06-21 VITALS
HEART RATE: 94 BPM | SYSTOLIC BLOOD PRESSURE: 107 MMHG | RESPIRATION RATE: 18 BRPM | DIASTOLIC BLOOD PRESSURE: 54 MMHG | TEMPERATURE: 98 F | OXYGEN SATURATION: 100 %

## 2017-06-21 LAB
-  AMIKACIN: SIGNIFICANT CHANGE UP
-  AMPICILLIN/SULBACTAM: SIGNIFICANT CHANGE UP
-  AMPICILLIN: SIGNIFICANT CHANGE UP
-  AZTREONAM: SIGNIFICANT CHANGE UP
-  CEFAZOLIN: SIGNIFICANT CHANGE UP
-  CEFEPIME: SIGNIFICANT CHANGE UP
-  CEFOXITIN: SIGNIFICANT CHANGE UP
-  CEFTAZIDIME: SIGNIFICANT CHANGE UP
-  CEFTRIAXONE: SIGNIFICANT CHANGE UP
-  CIPROFLOXACIN: SIGNIFICANT CHANGE UP
-  ERTAPENEM: SIGNIFICANT CHANGE UP
-  GENTAMICIN: SIGNIFICANT CHANGE UP
-  IMIPENEM: SIGNIFICANT CHANGE UP
-  LEVOFLOXACIN: SIGNIFICANT CHANGE UP
-  MEROPENEM: SIGNIFICANT CHANGE UP
-  NITROFURANTOIN: SIGNIFICANT CHANGE UP
-  PIPERACILLIN/TAZOBACTAM: SIGNIFICANT CHANGE UP
-  TOBRAMYCIN: SIGNIFICANT CHANGE UP
-  TRIMETHOPRIM/SULFAMETHOXAZOLE: SIGNIFICANT CHANGE UP
ANION GAP SERPL CALC-SCNC: 8 MMOL/L — SIGNIFICANT CHANGE UP (ref 5–17)
BASOPHILS # BLD AUTO: 0 K/UL — SIGNIFICANT CHANGE UP (ref 0–0.2)
BASOPHILS NFR BLD AUTO: 0.4 % — SIGNIFICANT CHANGE UP (ref 0–2)
BUN SERPL-MCNC: 24 MG/DL — HIGH (ref 7–18)
CALCIUM SERPL-MCNC: 8.1 MG/DL — LOW (ref 8.4–10.5)
CHLORIDE SERPL-SCNC: 105 MMOL/L — SIGNIFICANT CHANGE UP (ref 96–108)
CO2 SERPL-SCNC: 25 MMOL/L — SIGNIFICANT CHANGE UP (ref 22–31)
CREAT SERPL-MCNC: 6.31 MG/DL — HIGH (ref 0.5–1.3)
CULTURE RESULTS: SIGNIFICANT CHANGE UP
EOSINOPHIL # BLD AUTO: 0.1 K/UL — SIGNIFICANT CHANGE UP (ref 0–0.5)
EOSINOPHIL NFR BLD AUTO: 1.1 % — SIGNIFICANT CHANGE UP (ref 0–6)
GLUCOSE SERPL-MCNC: 92 MG/DL — SIGNIFICANT CHANGE UP (ref 70–99)
HCT VFR BLD CALC: 28.3 % — LOW (ref 39–50)
HGB BLD-MCNC: 9.2 G/DL — LOW (ref 13–17)
LYMPHOCYTES # BLD AUTO: 1.3 K/UL — SIGNIFICANT CHANGE UP (ref 1–3.3)
LYMPHOCYTES # BLD AUTO: 10.7 % — LOW (ref 13–44)
MAGNESIUM SERPL-MCNC: 2 MG/DL — SIGNIFICANT CHANGE UP (ref 1.6–2.6)
MCHC RBC-ENTMCNC: 28 PG — SIGNIFICANT CHANGE UP (ref 27–34)
MCHC RBC-ENTMCNC: 32.4 GM/DL — SIGNIFICANT CHANGE UP (ref 32–36)
MCV RBC AUTO: 86.3 FL — SIGNIFICANT CHANGE UP (ref 80–100)
METHOD TYPE: SIGNIFICANT CHANGE UP
MONOCYTES # BLD AUTO: 0.8 K/UL — SIGNIFICANT CHANGE UP (ref 0–0.9)
MONOCYTES NFR BLD AUTO: 6.9 % — SIGNIFICANT CHANGE UP (ref 2–14)
NEUTROPHILS # BLD AUTO: 9.5 K/UL — HIGH (ref 1.8–7.4)
NEUTROPHILS NFR BLD AUTO: 80.9 % — HIGH (ref 43–77)
ORGANISM # SPEC MICROSCOPIC CNT: SIGNIFICANT CHANGE UP
ORGANISM # SPEC MICROSCOPIC CNT: SIGNIFICANT CHANGE UP
PLATELET # BLD AUTO: 232 K/UL — SIGNIFICANT CHANGE UP (ref 150–400)
POTASSIUM SERPL-MCNC: 4.6 MMOL/L — SIGNIFICANT CHANGE UP (ref 3.5–5.3)
POTASSIUM SERPL-SCNC: 4.6 MMOL/L — SIGNIFICANT CHANGE UP (ref 3.5–5.3)
RBC # BLD: 3.28 M/UL — LOW (ref 4.2–5.8)
RBC # FLD: 15.3 % — HIGH (ref 10.3–14.5)
SODIUM SERPL-SCNC: 138 MMOL/L — SIGNIFICANT CHANGE UP (ref 135–145)
SPECIMEN SOURCE: SIGNIFICANT CHANGE UP
WBC # BLD: 11.8 K/UL — HIGH (ref 3.8–10.5)
WBC # FLD AUTO: 11.8 K/UL — HIGH (ref 3.8–10.5)

## 2017-06-21 PROCEDURE — 99239 HOSP IP/OBS DSCHRG MGMT >30: CPT

## 2017-06-21 RX ORDER — AZITHROMYCIN 500 MG/1
1 TABLET, FILM COATED ORAL
Qty: 3 | Refills: 0 | OUTPATIENT
Start: 2017-06-21 | End: 2017-06-24

## 2017-06-21 RX ORDER — ATENOLOL 25 MG/1
25 TABLET ORAL ONCE
Qty: 0 | Refills: 0 | Status: DISCONTINUED | OUTPATIENT
Start: 2017-06-21 | End: 2017-06-21

## 2017-06-21 RX ORDER — ERYTHROPOIETIN 10000 [IU]/ML
10000 INJECTION, SOLUTION INTRAVENOUS; SUBCUTANEOUS
Qty: 0 | Refills: 0 | Status: DISCONTINUED | OUTPATIENT
Start: 2017-06-21 | End: 2017-06-21

## 2017-06-21 RX ORDER — AZTREONAM 2 G
1 VIAL (EA) INJECTION
Qty: 7 | Refills: 0 | OUTPATIENT
Start: 2017-06-21 | End: 2017-06-28

## 2017-06-21 RX ORDER — ATENOLOL 25 MG/1
1 TABLET ORAL
Qty: 30 | Refills: 0 | OUTPATIENT
Start: 2017-06-21 | End: 2017-07-21

## 2017-06-21 RX ADMIN — CILOSTAZOL 50 MILLIGRAM(S): 100 TABLET ORAL at 14:07

## 2017-06-21 RX ADMIN — Medication 1 MILLIGRAM(S): at 14:06

## 2017-06-21 RX ADMIN — ATENOLOL 25 MILLIGRAM(S): 25 TABLET ORAL at 05:20

## 2017-06-21 RX ADMIN — PANTOPRAZOLE SODIUM 40 MILLIGRAM(S): 20 TABLET, DELAYED RELEASE ORAL at 05:20

## 2017-06-21 NOTE — PROGRESS NOTE ADULT - SUBJECTIVE AND OBJECTIVE BOX
[facility] NEPHROLOGY- PROGRESS NOTE    72 y/o M  with ESRD on HD, HTN, DM, A fib (on coumadin), Prostate CA s/p sx with suprapubic catheter, Alzheimer's dementia, Anemia s/p 2 units prbc transfusion (2 weeks prior) presents for blood transfusion. a/w Anemia 2/2 GI bleed and Afib with RVR. Now found to have PNA and UTI.     Hospital Medications: Medications reviewed.  REVIEW OF SYSTEMS:  CONSTITUTIONAL: No fevers or chills  RESPIRATORY: No shortness of breath  CARDIOVASCULAR: No chest pain.  GASTROINTESTINAL: No nausea, vomiting, diarrhea or abdominal pain. No melena or BRBPR  VASCULAR: No bilateral lower extremity edema.     VITALS:  T(F): 98, Max: 98.7 (06-20 @ 14:28)  HR: 94  BP: 107/54  RR: 18  SpO2: 100%  Wt(kg): --      PHYSICAL EXAM:  Gen: NAD, calm  Neck: no JVD  Cards: irregular, +S1/S2,   Resp: CTA B/L  GI: soft, ND, NT NABS   : +supra pubic catheter w. leg bag  Extremities: trace LE edema B/L,   Vascular: +Rt AVG +thrill +bruit    Labs:   Hemoglobin: 9.2 g/dL (06-21 @ 11:11)  Hemoglobin: 8.9 g/dL (06-20 @ 21:29)    hemoglobin trend: 9.2 <---, 8.9 <---, 9.0 <---, 7.6 <---, 9.6 <---, 8.6 <---

## 2017-06-21 NOTE — PROGRESS NOTE ADULT - SUBJECTIVE AND OBJECTIVE BOX
Subjective:  pt seen and examined , no complaints,     epoetin shell Injectable [id number](s) IV Push <User Schedule>  pantoprazole    Tablet 40milliGRAM(s) Oral before breakfast  ATENolol  Tablet 25milliGRAM(s) Oral daily  folic acid 1milliGRAM(s) Oral daily  cilostazol 50milliGRAM(s) Oral daily  guaiFENesin    Syrup 100milliGRAM(s) Oral every 6 hours PRN  benzocaine 15 mG/menthol 3.6 mG Lozenge 1Lozenge Oral five times a day PRN  insulin lispro (HumaLOG) corrective regimen sliding scale  SubCutaneous three times a day before meals  dextrose 5%. 1000milliLiter(s) IV Continuous <Continuous>  dextrose Gel 1Dose(s) Oral once PRN  dextrose 50% Injectable 12.5Gram(s) IV Push once  dextrose 50% Injectable 25Gram(s) IV Push once  dextrose 50% Injectable 25Gram(s) IV Push once  glucagon  Injectable 1milliGRAM(s) IntraMuscular once PRN  acetaminophen   Tablet 650milliGRAM(s) Oral every 6 hours PRN  cefTRIAXone   IVPB 1Gram(s) IV Intermittent every 24 hours  cefTRIAXone   IVPB  IV Intermittent   azithromycin  IVPB 500milliGRAM(s) IV Intermittent every 24 hours  azithromycin  IVPB  IV Intermittent                             8.9    11.5  )-----------( 185      ( 20 Jun 2017 21:29 )             28.3       Hemoglobin: 8.9 g/dL (06-20 @ 21:29)  Hemoglobin: 9.0 g/dL (06-20 @ 11:02)  Hemoglobin: 7.6 g/dL (06-19 @ 18:14)  Hemoglobin: 9.6 g/dL (06-19 @ 11:10)  Hemoglobin: 8.6 g/dL (06-18 @ 19:35)      06-20    138  |  105  |  18  ----------------------------<  137<H>  3.8   |  27  |  5.21<H>    Ca    8.0<L>      20 Jun 2017 11:02  Phos  2.5     06-20  Mg     1.9     06-20    TPro  7.2  /  Alb  1.8<L>  /  TBili  0.3  /  DBili  x   /  AST  40  /  ALT  27  /  AlkPhos  135<H>  06-19    Creatinine Trend: 5.21<--, 7.12<--, 6.22<--, 5.58<--, 3.91<--, 4.91<--    COAGS:     CARDIAC MARKERS ( 18 Jun 2017 19:35 )  <0.015 ng/mL / x     / 16 U/L / x     / <1.0 ng/mL        T(C): 36.6, Max: 37.1 (06-20 @ 14:28)  HR: 107 (107 - 110)  BP: 116/69 (97/54 - 116/69)  RR: 16 (15 - 16)  SpO2: 100% (98% - 100%)  Wt(kg): --    I&O's Summary      Daily     Daily     Appearance: Normal	  HEENT:   Normal oral mucosa, PERRL, EOMI	  Lymphatic: No lymphadenopathy , no edema  Cardiovascular: Normal S1 S2, No JVD, No murmurs , Peripheral pulses palpable 2+ bilaterally  Respiratory: Lungs clear to auscultation, normal effort 	  Gastrointestinal:  Soft, Non-tender, + BS	  Skin: No rashes, No ecchymoses, No cyanosis, warm to touch  Musculoskeletal: Normal range of motion, normal strength  Psychiatry:  Mood & affect appropriate    TELEMETRY: 	  off     DIAGNOSTIC TESTING:  [x ] Echocardiogram:   ------------------------------------------------------------------------  CONCLUSIONS:  1. Mild concentric left ventricular hypertrophy.  2. Normal Left Ventricular Systolic Function,  (EF = 55 to  60%)  3. Grade II diastolic dysfunction  4. RV systolic pressure is mildly increased.=35mmhg  5. There is mild tricuspid regurgitation.    ------------------------------------------------------------------------  Confirmed on  6/18/2017 - 12:25:51 by Pancho Dubois MD  ------------------------------------------------------------------------  [ ]  Catheterization:  [ ] Stress Test:    OTHER:     CT abd/ Pelvis:  IMPRESSION:     Acute appearing thrombus within the superior mesenteric vein.    Esophageal stent withinthe distal esophagus/proximal stomach. Debris is   noted within the stent. No bowel obstruction.    Trace bilateral pleural effusions, right greater than left, with pleural   thickening/enhancement which may be infectious or neoplastic in etiology.    Distended gallbladder with cholelithiasis. No wall thickening or   pericholecystic fluid. Correlate clinically and with ultrasound if there   is concern for cholecystitis.    Additional findings as above.    MARY MONTANO M.D., ATTENDING RADIOLOGIST  This document has been electronically signed. Jun 16 2017  1:46PM	        ASSESSMENT/PLAN: 	71y Male ALZ dementia, suprapubic catheter ESRD on HD, HTN, DM, Chol, esophageal stent,  afib on coumadin admitted with GI bleed  and rapid afib. . s/p PRBC    cont pletal  hold Systemic A/C due to anemia  B-blocker- rate controlled   HD per renal   Echo noted- Nl LV fx - DD II.   GI / DVT prophylaxis.   keep K>4, mag >2.0  no further cardiac work up needed  ABX per 1 team   start  if OK by GI for AFIB   D/W Dr Talavera

## 2017-06-21 NOTE — PROGRESS NOTE ADULT - PROBLEM SELECTOR PLAN 6
HbA1C 5.2  not on home DM medications  c/w consistent carb diet  c/w HSS as needed  Fingersticks within normal limits
HbA1C 5.2  not on home DM medications  c/w consistent carb diet  c/w HSS as needed  Fingersticks within normal limits
Pt given Kphos repletion. Monitor serum phos. (Not on low phos diet).
Serum phos wnl. Monitor serum phos. (No need for low phos diet or binders at this time)
Replaced orally for 2 days.
Pt given Kphos repletion. Monitor serum phos. (Not on low phos diet).
DVT ppx (Improve score of 3, will c/w SCD as concern for active GIB)  GI ppx- protonix

## 2017-06-21 NOTE — PROGRESS NOTE ADULT - PROBLEM SELECTOR PLAN 2
in the setting of GI bleed. h/h stable. s/p tota 4 unit prbc during this admission  c/w Epogen 10,000 units tiw with HD  Pt with elevated ferritin- no need for IV iron.   Monitor h/h.

## 2017-06-21 NOTE — PROGRESS NOTE ADULT - PROBLEM SELECTOR PLAN 3
+FOBT. A/C held. No overt signs of bleeding.   s/p CT abd/pelvis with  IV contrast - pt w/ esophageal stent.  Pt seen by Dr. Fernández; no interventions at this time. No signs of active bleeding.   Pt with esophageal stent- plan for oupt removal by Dr. Gay/ Jaxson at [facility] [phone] on July 6th  Monitor h/h.

## 2017-06-21 NOTE — PROGRESS NOTE ADULT - ATTENDING COMMENTS
[facility] NEPHROLOGY  Armando Shaffer M.D.  Kendall Morales D.O.  Lilliam Cody M.D.  Oxana Hernandez, MSN, ANP-C  [phone]    [address] [place] [address]

## 2017-06-21 NOTE — PROGRESS NOTE ADULT - PROBLEM SELECTOR PROBLEM 1
Anemia, unspecified type
Anemia, unspecified type
ESRD (end stage renal disease) on dialysis
Anemia, unspecified type
ESRD (end stage renal disease) on dialysis
Symptomatic anemia

## 2017-06-21 NOTE — PROGRESS NOTE ADULT - ATTENDING COMMENTS
Patient seen and examined, agree with above assessment and plan as transcribed above.    - GI f/u off AC secondary to GI bleed  - ? if atleast ASA is acceptable if he is deemed unsafe to restart AC    Daniel Talavera MD, FACC  [facility], [facility]  [address] [address]  PHONE:  [phone]  BEEPER : [phone]

## 2017-06-21 NOTE — PROGRESS NOTE ADULT - ASSESSMENT
70 y/o M  with ESRD on HD, HTN, DM, A fib (on coumadin), Prostate CA s/p sx with suprapubic catheter, Alzheimer's dementia, Anemia s/p 2 units prbc transfusion (2 weeks prior) presents for blood transfusion. a/w Anemia 2/2 GI bleed and Afib with RVR.
70 y/o M from home admitted with chief complaint of black stool x 2 weeks, mild epigastric pain, and low Hb. Has required PRBC transfusion with improved Hb. Admit for GI bleed, UTI and PNA.
72 y/o M  with ESRD on HD, HTN, DM, A fib (on coumadin), Prostate CA s/p sx with suprapubic catheter, Alzheimer's dementia, Anemia s/p 2 units prbc transfusion (2 weeks prior) presents for blood transfusion. a/w Anemia 2/2 GI bleed and Afib with RVR.
72 y/o M  with ESRD on HD, HTN, DM, A fib (on coumadin), Prostate CA s/p sx with suprapubic catheter, Alzheimer's dementia, Anemia s/p 2 units prbc transfusion (2 weeks prior) presents for blood transfusion. a/w Anemia 2/2 GI bleed and Afib with RVR. Now with PNA/ UTI.
Esophageal stent   PEG  ESRD on Dialysis  ANEMIA  UTI    -no episodes of melena, hematemesis or hematochezia  -obtain records from outside hosp  -call as needed  -resume medications as needed for cardiac condition  -follow up with GI outpatient
Patient is a 71y old  Male who presents with a chief complaint of low hemoglobin level (16 Jun 2017 12:06)  Found to have Melena and Guaiac positive stools; Inadequate response to PRBC
72 y/o M from home admitted with chief complaint of black stool x 2 weeks, mild epigastric pain, and low Hb. Has required PRBC transfusion with improved Hb.    Developed fever overnight with temp of 100.5 - cultures sent and results pending. Started on empiric antibiotics.  .

## 2017-06-21 NOTE — PROGRESS NOTE ADULT - SUBJECTIVE AND OBJECTIVE BOX
stable   no evidence of active GI bleed          PAST MEDICAL & SURGICAL HISTORY:  Type 2 diabetes mellitus without complication, without long-term current use of insulin  Essential hypertension  Paroxysmal atrial fibrillation  ESRD (end stage renal disease) on dialysis  No significant past surgical history      MEDICATIONS  (STANDING):  epoetin shell Injectable 77729Vsec(s) IV Push <User Schedule>  pantoprazole    Tablet 40milliGRAM(s) Oral before breakfast  ATENolol  Tablet 25milliGRAM(s) Oral daily  folic acid 1milliGRAM(s) Oral daily  cilostazol 50milliGRAM(s) Oral daily  insulin lispro (HumaLOG) corrective regimen sliding scale  SubCutaneous three times a day before meals  dextrose 5%. 1000milliLiter(s) IV Continuous <Continuous>  dextrose 50% Injectable 12.5Gram(s) IV Push once  dextrose 50% Injectable 25Gram(s) IV Push once  dextrose 50% Injectable 25Gram(s) IV Push once  cefTRIAXone   IVPB 1Gram(s) IV Intermittent every 24 hours  cefTRIAXone   IVPB  IV Intermittent   azithromycin  IVPB 500milliGRAM(s) IV Intermittent every 24 hours  azithromycin  IVPB  IV Intermittent     MEDICATIONS  (PRN):  guaiFENesin    Syrup 100milliGRAM(s) Oral every 6 hours PRN Cough  benzocaine 15 mG/menthol 3.6 mG Lozenge 1Lozenge Oral five times a day PRN Sore Throat  dextrose Gel 1Dose(s) Oral once PRN Blood Glucose LESS THAN 70 milliGRAM(s)/deciliter  glucagon  Injectable 1milliGRAM(s) IntraMuscular once PRN Glucose LESS THAN 70 milligrams/deciliter  acetaminophen   Tablet 650milliGRAM(s) Oral every 6 hours PRN For Temp greater than 38 C (100.4 F)      Allergies        Vital Signs Last 24 Hrs  T(C): 36.7, Max: 37.1 (06-20 @ 14:28)  T(F): 98, Max: 98.7 (06-20 @ 14:28)  HR: 94 (94 - 110)  BP: 107/54 (97/54 - 116/69)  BP(mean): --  RR: 18 (15 - 18)  SpO2: 100% (98% - 100%)    PHYSICAL EXAM:    Constitutional: NAD  Respiratory: CTA and P  Cardiovascular: S1 and S2  Gastrointestinal: BS+, soft, NT  Vascular: 2+ peripheral pulses  Neurological: A/O x 3,    LABS:  CBC Full  -  ( 20 Jun 2017 21:29 )  WBC Count : 11.5 K/uL  Hemoglobin : 8.9 g/dL  Hematocrit : 28.3 %  Platelet Count - Automated : 185 K/uL  Mean Cell Volume : 88.1 fl  Mean Cell Hemoglobin : 27.7 pg  Mean Cell Hemoglobin Concentration : 31.4 gm/dL  Auto Neutrophil # : x  Auto Lymphocyte # : x  Auto Monocyte # : x  Auto Eosinophil # : x  Auto Basophil # : x  Auto Neutrophil % : x  Auto Lymphocyte % : x  Auto Monocyte % : x  Auto Eosinophil % : x  Auto Basophil % : x    06-20    138  |  105  |  18  ----------------------------<  137<H>  3.8   |  27  |  5.21<H>    Ca    8.0<L>      20 Jun 2017 11:02  Phos  2.5     06-20  Mg     1.9     06-20    TPro  7.2  /  Alb  1.8<L>  /  TBili  0.3  /  DBili  x   /  AST  40  /  ALT  27  /  AlkPhos  135<H>  06-19            RADIOLOGY & ADDITIONAL STUDIES:

## 2017-06-23 DIAGNOSIS — Z93.1 GASTROSTOMY STATUS: ICD-10-CM

## 2017-06-23 DIAGNOSIS — J18.9 PNEUMONIA, UNSPECIFIED ORGANISM: ICD-10-CM

## 2017-06-23 DIAGNOSIS — Z96.89 PRESENCE OF OTHER SPECIFIED FUNCTIONAL IMPLANTS: ICD-10-CM

## 2017-06-23 DIAGNOSIS — Z99.2 DEPENDENCE ON RENAL DIALYSIS: ICD-10-CM

## 2017-06-23 DIAGNOSIS — N39.0 URINARY TRACT INFECTION, SITE NOT SPECIFIED: ICD-10-CM

## 2017-06-23 DIAGNOSIS — E11.22 TYPE 2 DIABETES MELLITUS WITH DIABETIC CHRONIC KIDNEY DISEASE: ICD-10-CM

## 2017-06-23 DIAGNOSIS — K29.71 GASTRITIS, UNSPECIFIED, WITH BLEEDING: ICD-10-CM

## 2017-06-23 DIAGNOSIS — J91.8 PLEURAL EFFUSION IN OTHER CONDITIONS CLASSIFIED ELSEWHERE: ICD-10-CM

## 2017-06-23 DIAGNOSIS — Z85.46 PERSONAL HISTORY OF MALIGNANT NEOPLASM OF PROSTATE: ICD-10-CM

## 2017-06-23 DIAGNOSIS — F02.80 DEMENTIA IN OTHER DISEASES CLASSIFIED ELSEWHERE, UNSPECIFIED SEVERITY, WITHOUT BEHAVIORAL DISTURBANCE, PSYCHOTIC DISTURBANCE, MOOD DISTURBANCE, AND ANXIETY: ICD-10-CM

## 2017-06-23 DIAGNOSIS — Z79.01 LONG TERM (CURRENT) USE OF ANTICOAGULANTS: ICD-10-CM

## 2017-06-23 DIAGNOSIS — I12.0 HYPERTENSIVE CHRONIC KIDNEY DISEASE WITH STAGE 5 CHRONIC KIDNEY DISEASE OR END STAGE RENAL DISEASE: ICD-10-CM

## 2017-06-23 DIAGNOSIS — D50.0 IRON DEFICIENCY ANEMIA SECONDARY TO BLOOD LOSS (CHRONIC): ICD-10-CM

## 2017-06-23 DIAGNOSIS — I48.0 PAROXYSMAL ATRIAL FIBRILLATION: ICD-10-CM

## 2017-06-23 DIAGNOSIS — I95.9 HYPOTENSION, UNSPECIFIED: ICD-10-CM

## 2017-06-23 DIAGNOSIS — N18.6 END STAGE RENAL DISEASE: ICD-10-CM

## 2017-06-23 DIAGNOSIS — G30.9 ALZHEIMER'S DISEASE, UNSPECIFIED: ICD-10-CM

## 2017-06-23 DIAGNOSIS — B96.20 UNSPECIFIED ESCHERICHIA COLI [E. COLI] AS THE CAUSE OF DISEASES CLASSIFIED ELSEWHERE: ICD-10-CM

## 2017-06-23 LAB
CULTURE RESULTS: SIGNIFICANT CHANGE UP
CULTURE RESULTS: SIGNIFICANT CHANGE UP
SPECIMEN SOURCE: SIGNIFICANT CHANGE UP
SPECIMEN SOURCE: SIGNIFICANT CHANGE UP

## 2017-06-28 RX ORDER — FUROSEMIDE 40 MG
1 TABLET ORAL
Qty: 0 | Refills: 0 | COMMUNITY

## 2017-06-28 RX ORDER — WARFARIN SODIUM 2.5 MG/1
1 TABLET ORAL
Qty: 0 | Refills: 0 | COMMUNITY

## 2017-06-28 RX ORDER — ATENOLOL 25 MG/1
1 TABLET ORAL
Qty: 0 | Refills: 0 | COMMUNITY

## 2017-07-03 ENCOUNTER — EMERGENCY (EMERGENCY)
Facility: HOSPITAL | Age: 72
LOS: 1 days | Discharge: HOME | End: 2017-07-03
Attending: EMERGENCY MEDICINE
Payer: MEDICARE

## 2017-07-03 VITALS
TEMPERATURE: 98 F | DIASTOLIC BLOOD PRESSURE: 59 MMHG | OXYGEN SATURATION: 98 % | HEART RATE: 69 BPM | RESPIRATION RATE: 18 BRPM | SYSTOLIC BLOOD PRESSURE: 114 MMHG

## 2017-07-03 VITALS
HEIGHT: 73 IN | RESPIRATION RATE: 18 BRPM | HEART RATE: 90 BPM | SYSTOLIC BLOOD PRESSURE: 141 MMHG | TEMPERATURE: 98 F | OXYGEN SATURATION: 100 % | DIASTOLIC BLOOD PRESSURE: 94 MMHG | WEIGHT: 217.6 LBS

## 2017-07-03 DIAGNOSIS — E78.00 PURE HYPERCHOLESTEROLEMIA, UNSPECIFIED: ICD-10-CM

## 2017-07-03 DIAGNOSIS — I25.10 ATHEROSCLEROTIC HEART DISEASE OF NATIVE CORONARY ARTERY WITHOUT ANGINA PECTORIS: ICD-10-CM

## 2017-07-03 DIAGNOSIS — N18.6 END STAGE RENAL DISEASE: ICD-10-CM

## 2017-07-03 DIAGNOSIS — I12.0 HYPERTENSIVE CHRONIC KIDNEY DISEASE WITH STAGE 5 CHRONIC KIDNEY DISEASE OR END STAGE RENAL DISEASE: ICD-10-CM

## 2017-07-03 DIAGNOSIS — Z90.79 ACQUIRED ABSENCE OF OTHER GENITAL ORGAN(S): ICD-10-CM

## 2017-07-03 DIAGNOSIS — M79.89 OTHER SPECIFIED SOFT TISSUE DISORDERS: ICD-10-CM

## 2017-07-03 DIAGNOSIS — E66.9 OBESITY, UNSPECIFIED: ICD-10-CM

## 2017-07-03 DIAGNOSIS — Z98.89 OTHER SPECIFIED POSTPROCEDURAL STATES: Chronic | ICD-10-CM

## 2017-07-03 DIAGNOSIS — I77.0 ARTERIOVENOUS FISTULA, ACQUIRED: Chronic | ICD-10-CM

## 2017-07-03 DIAGNOSIS — Z88.0 ALLERGY STATUS TO PENICILLIN: ICD-10-CM

## 2017-07-03 DIAGNOSIS — Z85.46 PERSONAL HISTORY OF MALIGNANT NEOPLASM OF PROSTATE: ICD-10-CM

## 2017-07-03 DIAGNOSIS — I73.9 PERIPHERAL VASCULAR DISEASE, UNSPECIFIED: ICD-10-CM

## 2017-07-03 DIAGNOSIS — I82.5Y2 CHRONIC EMBOLISM AND THROMBOSIS OF UNSPECIFIED DEEP VEINS OF LEFT PROXIMAL LOWER EXTREMITY: ICD-10-CM

## 2017-07-03 DIAGNOSIS — Z99.2 DEPENDENCE ON RENAL DIALYSIS: ICD-10-CM

## 2017-07-03 DIAGNOSIS — I48.91 UNSPECIFIED ATRIAL FIBRILLATION: ICD-10-CM

## 2017-07-03 PROCEDURE — 99284 EMERGENCY DEPT VISIT MOD MDM: CPT | Mod: 25

## 2017-07-03 PROCEDURE — 99284 EMERGENCY DEPT VISIT MOD MDM: CPT

## 2017-07-03 PROCEDURE — 93971 EXTREMITY STUDY: CPT

## 2017-07-03 PROCEDURE — 93005 ELECTROCARDIOGRAM TRACING: CPT

## 2017-07-03 PROCEDURE — 93971 EXTREMITY STUDY: CPT | Mod: 26,LT

## 2017-07-03 NOTE — ED PROVIDER NOTE - PMH
Atrial fibrillation    CAD (coronary artery disease)  LAD stent  ESRD (end stage renal disease) on dialysis    ESRD (end stage renal disease) on dialysis    Essential hypertension    HLD (hyperlipidemia)    HTN (hypertension)    Obesity    Paroxysmal atrial fibrillation    Pre-diabetes    Prostate cancer  prostate cancer  PVD (peripheral vascular disease)    Type 2 diabetes mellitus without complication, without long-term current use of insulin

## 2017-07-03 NOTE — ED PROVIDER NOTE - PSH
AV fistula  creation in right arm on 12/1/2015  AV fistula  h/o creation in 2005  H/O cardiac catheterization  4/25/2014 non obstructive disease  History of prostatectomy    No significant past surgical history

## 2017-07-03 NOTE — ED PROVIDER NOTE - CARE PLAN
Principal Discharge DX:	Leg swelling  Secondary Diagnosis:	Chronic deep vein thrombosis (DVT) of proximal vein of left lower extremity

## 2017-07-03 NOTE — ED PROVIDER NOTE - OBJECTIVE STATEMENT
72 y/o male with PMHx of CAD, HTN, HLD, ESRD on dialysis and PMSx of AV fistula presents to the ED c/o lower extremity swelling x 4 days. Pt notes swelling in both lower extremities but worse in the L. Pt notes while trying to walk today he felt heaviness in his legs, which prompted him to visit the ED. Pt notes his last dialysis was yesterday, he has an indwelling wright catheter and used to be on Lasix until he was taken off. Pt denies SOB, fever, chills or any other complaints. Pt allergic to penicillin (hives). 72 y/o male with PMHx of CAD, HTN, HLD, ESRD on dialysis and PMSx of AV fistula presents to the ED c/o L lower extremity swelling x 4 days. Chronic LE swelling. Pt notes his last dialysis was yesterday, he has an indwelling wright catheter and used to be on Lasix until he was taken off. Pt denies SOB, fever, chills or any other complaints. Pt allergic to penicillin (hives).

## 2017-07-03 NOTE — ED ADULT NURSE NOTE - OBJECTIVE STATEMENT
Patient stated about 4 days now he hasn't been having difficulty putting his shoe on to left foot due to swelling so he came in to ER.

## 2017-07-03 NOTE — ED PROVIDER NOTE - PHYSICAL EXAMINATION
CON: ao x 3, HENMT: clear oropharynx, soft neck, HEAD: atraumatic, CV: rrr, equal pulses b/l, RESP: cta b/l, GI: +BS, soft, nontender, no rebound, no guarding, SKIN: LE b/l discoloration, c/w chronic venous insufficiency findings, MSK: b/l 2+ pitting edema to pedal area, L>R, mild tenderness to L calf

## 2017-07-03 NOTE — ED PROVIDER NOTE - MEDICAL DECISION MAKING DETAILS
chronic LE swelling, now 4 days of LLE swelling > R w/ tenderness, no erythema or discharge on exam, no obvious deformity, will obtain US eval for DVT

## 2017-10-08 ENCOUNTER — INPATIENT (INPATIENT)
Facility: HOSPITAL | Age: 72
LOS: 2 days | Discharge: HOME | DRG: 353 | End: 2017-10-11
Attending: FAMILY MEDICINE | Admitting: FAMILY MEDICINE
Payer: MEDICARE

## 2017-10-08 VITALS
SYSTOLIC BLOOD PRESSURE: 149 MMHG | OXYGEN SATURATION: 96 % | HEART RATE: 76 BPM | DIASTOLIC BLOOD PRESSURE: 93 MMHG | WEIGHT: 214.95 LBS | RESPIRATION RATE: 18 BRPM | TEMPERATURE: 98 F | HEIGHT: 73 IN

## 2017-10-08 DIAGNOSIS — Z98.89 OTHER SPECIFIED POSTPROCEDURAL STATES: Chronic | ICD-10-CM

## 2017-10-08 DIAGNOSIS — D63.1 ANEMIA IN CHRONIC KIDNEY DISEASE: ICD-10-CM

## 2017-10-08 DIAGNOSIS — I77.0 ARTERIOVENOUS FISTULA, ACQUIRED: Chronic | ICD-10-CM

## 2017-10-08 DIAGNOSIS — K43.9 VENTRAL HERNIA WITHOUT OBSTRUCTION OR GANGRENE: ICD-10-CM

## 2017-10-08 DIAGNOSIS — N25.81 SECONDARY HYPERPARATHYROIDISM OF RENAL ORIGIN: ICD-10-CM

## 2017-10-08 DIAGNOSIS — K46.9 UNSPECIFIED ABDOMINAL HERNIA WITHOUT OBSTRUCTION OR GANGRENE: ICD-10-CM

## 2017-10-08 DIAGNOSIS — I12.0 HYPERTENSIVE CHRONIC KIDNEY DISEASE WITH STAGE 5 CHRONIC KIDNEY DISEASE OR END STAGE RENAL DISEASE: ICD-10-CM

## 2017-10-08 DIAGNOSIS — N18.6 END STAGE RENAL DISEASE: ICD-10-CM

## 2017-10-08 LAB
ALBUMIN SERPL ELPH-MCNC: 3.4 G/DL — LOW (ref 3.5–5)
ALP SERPL-CCNC: 294 U/L — HIGH (ref 40–120)
ALT FLD-CCNC: 23 U/L DA — SIGNIFICANT CHANGE UP (ref 10–60)
ANION GAP SERPL CALC-SCNC: 10 MMOL/L — SIGNIFICANT CHANGE UP (ref 5–17)
AST SERPL-CCNC: 13 U/L — SIGNIFICANT CHANGE UP (ref 10–40)
BASOPHILS # BLD AUTO: 0 K/UL — SIGNIFICANT CHANGE UP (ref 0–0.2)
BASOPHILS NFR BLD AUTO: 0.8 % — SIGNIFICANT CHANGE UP (ref 0–2)
BILIRUB SERPL-MCNC: 0.3 MG/DL — SIGNIFICANT CHANGE UP (ref 0.2–1.2)
BUN SERPL-MCNC: 48 MG/DL — HIGH (ref 7–18)
CALCIUM SERPL-MCNC: 9.8 MG/DL — SIGNIFICANT CHANGE UP (ref 8.4–10.5)
CHLORIDE SERPL-SCNC: 98 MMOL/L — SIGNIFICANT CHANGE UP (ref 96–108)
CO2 SERPL-SCNC: 28 MMOL/L — SIGNIFICANT CHANGE UP (ref 22–31)
CREAT SERPL-MCNC: 6.98 MG/DL — HIGH (ref 0.5–1.3)
EOSINOPHIL # BLD AUTO: 0 K/UL — SIGNIFICANT CHANGE UP (ref 0–0.5)
EOSINOPHIL NFR BLD AUTO: 0.8 % — SIGNIFICANT CHANGE UP (ref 0–6)
GLUCOSE SERPL-MCNC: 109 MG/DL — HIGH (ref 70–99)
HCT VFR BLD CALC: 36.8 % — LOW (ref 39–50)
HGB BLD-MCNC: 10.9 G/DL — LOW (ref 13–17)
LACTATE SERPL-SCNC: 1.4 MMOL/L — SIGNIFICANT CHANGE UP (ref 0.7–2)
LIDOCAIN IGE QN: 174 U/L — SIGNIFICANT CHANGE UP (ref 73–393)
LYMPHOCYTES # BLD AUTO: 1.1 K/UL — SIGNIFICANT CHANGE UP (ref 1–3.3)
LYMPHOCYTES # BLD AUTO: 23.2 % — SIGNIFICANT CHANGE UP (ref 13–44)
MCHC RBC-ENTMCNC: 27 PG — SIGNIFICANT CHANGE UP (ref 27–34)
MCHC RBC-ENTMCNC: 29.6 GM/DL — LOW (ref 32–36)
MCV RBC AUTO: 91.2 FL — SIGNIFICANT CHANGE UP (ref 80–100)
MONOCYTES # BLD AUTO: 0.5 K/UL — SIGNIFICANT CHANGE UP (ref 0–0.9)
MONOCYTES NFR BLD AUTO: 10.8 % — SIGNIFICANT CHANGE UP (ref 2–14)
NEUTROPHILS # BLD AUTO: 3 K/UL — SIGNIFICANT CHANGE UP (ref 1.8–7.4)
NEUTROPHILS NFR BLD AUTO: 64.4 % — SIGNIFICANT CHANGE UP (ref 43–77)
PLATELET # BLD AUTO: 120 K/UL — LOW (ref 150–400)
POTASSIUM SERPL-MCNC: 4.4 MMOL/L — SIGNIFICANT CHANGE UP (ref 3.5–5.3)
POTASSIUM SERPL-SCNC: 4.4 MMOL/L — SIGNIFICANT CHANGE UP (ref 3.5–5.3)
PROT SERPL-MCNC: 8.7 G/DL — HIGH (ref 6–8.3)
RBC # BLD: 4.03 M/UL — LOW (ref 4.2–5.8)
RBC # FLD: 17.6 % — HIGH (ref 10.3–14.5)
SODIUM SERPL-SCNC: 136 MMOL/L — SIGNIFICANT CHANGE UP (ref 135–145)
TROPONIN I SERPL-MCNC: <0.015 NG/ML — SIGNIFICANT CHANGE UP (ref 0–0.04)
WBC # BLD: 4.6 K/UL — SIGNIFICANT CHANGE UP (ref 3.8–10.5)
WBC # FLD AUTO: 4.6 K/UL — SIGNIFICANT CHANGE UP (ref 3.8–10.5)

## 2017-10-08 PROCEDURE — 99285 EMERGENCY DEPT VISIT HI MDM: CPT

## 2017-10-08 PROCEDURE — 74176 CT ABD & PELVIS W/O CONTRAST: CPT | Mod: 26

## 2017-10-08 RX ORDER — HYDROMORPHONE HYDROCHLORIDE 2 MG/ML
0.5 INJECTION INTRAMUSCULAR; INTRAVENOUS; SUBCUTANEOUS ONCE
Qty: 0 | Refills: 0 | Status: DISCONTINUED | OUTPATIENT
Start: 2017-10-08 | End: 2017-10-08

## 2017-10-08 RX ORDER — SODIUM CHLORIDE 9 MG/ML
250 INJECTION INTRAMUSCULAR; INTRAVENOUS; SUBCUTANEOUS ONCE
Qty: 0 | Refills: 0 | Status: DISCONTINUED | OUTPATIENT
Start: 2017-10-08 | End: 2017-10-09

## 2017-10-08 RX ADMIN — HYDROMORPHONE HYDROCHLORIDE 0.5 MILLIGRAM(S): 2 INJECTION INTRAMUSCULAR; INTRAVENOUS; SUBCUTANEOUS at 16:07

## 2017-10-08 RX ADMIN — HYDROMORPHONE HYDROCHLORIDE 0.5 MILLIGRAM(S): 2 INJECTION INTRAMUSCULAR; INTRAVENOUS; SUBCUTANEOUS at 16:31

## 2017-10-08 NOTE — ED PROVIDER NOTE - PROGRESS NOTE DETAILS
spoke to surgical consult, dr olivas aware of consult. advised to keep patient NPO. discussed results with dr callahan from nephro, agrees with plan to admit.

## 2017-10-08 NOTE — ED PROVIDER NOTE - OBJECTIVE STATEMENT
71yoM with h/o ESRD on HD Tu,Th,Sa (completed yesterday), HTN, DM, afib not on anticoagulation, presents with LUQ abd pain, onset this AM, constant, P-like. Recently seen and started on phenazopyridine and cipro. Denies vomiting, fever, CP, SOB.

## 2017-10-08 NOTE — ED PROVIDER NOTE - PHYSICAL EXAMINATION
Afebrile, hemodynamically stable  NAD, well appearing  Head NCAT  EOMI grossly  MM dry  RRR, nml S1/S2, no m/r/g  Lungs CTAB, no w/r/r  Abd: Noted L sided abdominal bulge c/w hernia, TTP, no overlying skin changes, nml BS  AAO, CN's 3-12 grossly intact  HALL spontaneously, symmetric b/l LE 1+ edema  Skin warm, dry, no rashes or hives

## 2017-10-08 NOTE — CONSULT NOTE ADULT - ATTENDING COMMENTS
[facility] NEPHROLOGY  Armando Shaffer M.D.  Kendall Morales D.O.  Lilliam Cody M.D.  Oxana Hernandez, MSN, ANP-C    Telephone: [phone]  Facsimile: [phone]    [address] [place] [address]

## 2017-10-08 NOTE — ED ADULT TRIAGE NOTE - CHIEF COMPLAINT QUOTE
c/o LLQ abdominal pain started this am with no nausea/vomiting pt dialysis  pt last dialysis was yestyerday

## 2017-10-08 NOTE — CONSULT NOTE ADULT - PROBLEM SELECTOR RECOMMENDATION 4
Continue phosphate binders. Monitor serum phosphorus.   Continue Vitamin D analog. Monitor serum calcium.

## 2017-10-08 NOTE — ED PROVIDER NOTE - CARE PLAN
Principal Discharge DX:	Abdominal hernia  Secondary Diagnosis:	ESRD (end stage renal disease) on dialysis

## 2017-10-09 ENCOUNTER — TRANSCRIPTION ENCOUNTER (OUTPATIENT)
Age: 72
End: 2017-10-09

## 2017-10-09 DIAGNOSIS — I73.9 PERIPHERAL VASCULAR DISEASE, UNSPECIFIED: ICD-10-CM

## 2017-10-09 DIAGNOSIS — I10 ESSENTIAL (PRIMARY) HYPERTENSION: ICD-10-CM

## 2017-10-09 DIAGNOSIS — I25.10 ATHEROSCLEROTIC HEART DISEASE OF NATIVE CORONARY ARTERY WITHOUT ANGINA PECTORIS: ICD-10-CM

## 2017-10-09 DIAGNOSIS — E11.9 TYPE 2 DIABETES MELLITUS WITHOUT COMPLICATIONS: ICD-10-CM

## 2017-10-09 DIAGNOSIS — I48.91 UNSPECIFIED ATRIAL FIBRILLATION: ICD-10-CM

## 2017-10-09 DIAGNOSIS — E78.5 HYPERLIPIDEMIA, UNSPECIFIED: ICD-10-CM

## 2017-10-09 DIAGNOSIS — K56.600 PARTIAL INTESTINAL OBSTRUCTION, UNSPECIFIED AS TO CAUSE: ICD-10-CM

## 2017-10-09 DIAGNOSIS — K43.0 INCISIONAL HERNIA WITH OBSTRUCTION, WITHOUT GANGRENE: ICD-10-CM

## 2017-10-09 DIAGNOSIS — I48.0 PAROXYSMAL ATRIAL FIBRILLATION: ICD-10-CM

## 2017-10-09 DIAGNOSIS — K43.6 OTHER AND UNSPECIFIED VENTRAL HERNIA WITH OBSTRUCTION, WITHOUT GANGRENE: ICD-10-CM

## 2017-10-09 DIAGNOSIS — N18.6 END STAGE RENAL DISEASE: ICD-10-CM

## 2017-10-09 DIAGNOSIS — Z29.9 ENCOUNTER FOR PROPHYLACTIC MEASURES, UNSPECIFIED: ICD-10-CM

## 2017-10-09 LAB
ALBUMIN SERPL ELPH-MCNC: 3.3 G/DL — LOW (ref 3.5–5)
ALP SERPL-CCNC: 304 U/L — HIGH (ref 40–120)
ALT FLD-CCNC: 14 U/L DA — SIGNIFICANT CHANGE UP (ref 10–60)
ANION GAP SERPL CALC-SCNC: 12 MMOL/L — SIGNIFICANT CHANGE UP (ref 5–17)
APPEARANCE UR: ABNORMAL
APTT BLD: 30.3 SEC — SIGNIFICANT CHANGE UP (ref 27.5–37.4)
AST SERPL-CCNC: 13 U/L — SIGNIFICANT CHANGE UP (ref 10–40)
BACTERIA # UR AUTO: ABNORMAL /HPF
BASOPHILS # BLD AUTO: 0 K/UL — SIGNIFICANT CHANGE UP (ref 0–0.2)
BASOPHILS NFR BLD AUTO: 0.9 % — SIGNIFICANT CHANGE UP (ref 0–2)
BILIRUB SERPL-MCNC: 0.5 MG/DL — SIGNIFICANT CHANGE UP (ref 0.2–1.2)
BILIRUB UR-MCNC: NEGATIVE — SIGNIFICANT CHANGE UP
BUN SERPL-MCNC: 56 MG/DL — HIGH (ref 7–18)
CALCIUM SERPL-MCNC: 9.5 MG/DL — SIGNIFICANT CHANGE UP (ref 8.4–10.5)
CALCIUM SERPL-MCNC: 9.9 MG/DL — SIGNIFICANT CHANGE UP (ref 8.4–10.5)
CHLORIDE SERPL-SCNC: 100 MMOL/L — SIGNIFICANT CHANGE UP (ref 96–108)
CHOLEST SERPL-MCNC: 172 MG/DL — SIGNIFICANT CHANGE UP (ref 10–199)
CO2 SERPL-SCNC: 24 MMOL/L — SIGNIFICANT CHANGE UP (ref 22–31)
COLOR SPEC: YELLOW — SIGNIFICANT CHANGE UP
CREAT SERPL-MCNC: 8.54 MG/DL — HIGH (ref 0.5–1.3)
DIFF PNL FLD: ABNORMAL
EOSINOPHIL # BLD AUTO: 0.1 K/UL — SIGNIFICANT CHANGE UP (ref 0–0.5)
EOSINOPHIL NFR BLD AUTO: 2.3 % — SIGNIFICANT CHANGE UP (ref 0–6)
EPI CELLS # UR: ABNORMAL
FOLATE SERPL-MCNC: 6.9 NG/ML — SIGNIFICANT CHANGE UP (ref 4.8–24.2)
GLUCOSE SERPL-MCNC: 85 MG/DL — SIGNIFICANT CHANGE UP (ref 70–99)
GLUCOSE UR QL: NEGATIVE — SIGNIFICANT CHANGE UP
GRAN CASTS # UR COMP ASSIST: ABNORMAL
HAPTOGLOB SERPL-MCNC: 118 MG/DL — SIGNIFICANT CHANGE UP (ref 34–200)
HBA1C BLD-MCNC: 5.8 % — HIGH (ref 4–5.6)
HCT VFR BLD CALC: 36.3 % — LOW (ref 39–50)
HDLC SERPL-MCNC: 71 MG/DL — SIGNIFICANT CHANGE UP (ref 40–125)
HGB BLD-MCNC: 10.8 G/DL — LOW (ref 13–17)
HYALINE CASTS # UR AUTO: ABNORMAL
INR BLD: 1.14 RATIO — SIGNIFICANT CHANGE UP (ref 0.88–1.16)
IRON SATN MFR SERPL: 22 % — SIGNIFICANT CHANGE UP (ref 20–55)
IRON SATN MFR SERPL: 49 UG/DL — LOW (ref 65–170)
KETONES UR-MCNC: NEGATIVE — SIGNIFICANT CHANGE UP
LDH SERPL L TO P-CCNC: 158 U/L — SIGNIFICANT CHANGE UP (ref 120–225)
LEUKOCYTE ESTERASE UR-ACNC: ABNORMAL
LIPID PNL WITH DIRECT LDL SERPL: 85 MG/DL — SIGNIFICANT CHANGE UP
LYMPHOCYTES # BLD AUTO: 1.2 K/UL — SIGNIFICANT CHANGE UP (ref 1–3.3)
LYMPHOCYTES # BLD AUTO: 29.9 % — SIGNIFICANT CHANGE UP (ref 13–44)
MAGNESIUM SERPL-MCNC: 2.2 MG/DL — SIGNIFICANT CHANGE UP (ref 1.6–2.6)
MCHC RBC-ENTMCNC: 26.7 PG — LOW (ref 27–34)
MCHC RBC-ENTMCNC: 29.8 GM/DL — LOW (ref 32–36)
MCV RBC AUTO: 89.8 FL — SIGNIFICANT CHANGE UP (ref 80–100)
MONOCYTES # BLD AUTO: 0.4 K/UL — SIGNIFICANT CHANGE UP (ref 0–0.9)
MONOCYTES NFR BLD AUTO: 9.7 % — SIGNIFICANT CHANGE UP (ref 2–14)
NEUTROPHILS # BLD AUTO: 2.2 K/UL — SIGNIFICANT CHANGE UP (ref 1.8–7.4)
NEUTROPHILS NFR BLD AUTO: 57.2 % — SIGNIFICANT CHANGE UP (ref 43–77)
NITRITE UR-MCNC: NEGATIVE — SIGNIFICANT CHANGE UP
PH UR: 8 — SIGNIFICANT CHANGE UP (ref 5–8)
PHOSPHATE SERPL-MCNC: 5.3 MG/DL — HIGH (ref 2.5–4.5)
PLATELET # BLD AUTO: 144 K/UL — LOW (ref 150–400)
POTASSIUM SERPL-MCNC: 4.3 MMOL/L — SIGNIFICANT CHANGE UP (ref 3.5–5.3)
POTASSIUM SERPL-SCNC: 4.3 MMOL/L — SIGNIFICANT CHANGE UP (ref 3.5–5.3)
PROT SERPL-MCNC: 8.5 G/DL — HIGH (ref 6–8.3)
PROT UR-MCNC: 500 MG/DL
PROTHROM AB SERPL-ACNC: 12.5 SEC — SIGNIFICANT CHANGE UP (ref 9.8–12.7)
PTH-INTACT FLD-MCNC: 1101 PG/ML — HIGH (ref 15–65)
RBC # BLD: 3.96 M/UL — LOW (ref 4.2–5.8)
RBC # BLD: 4.05 M/UL — LOW (ref 4.2–5.8)
RBC # FLD: 16.9 % — HIGH (ref 10.3–14.5)
RBC CASTS # UR COMP ASSIST: ABNORMAL /HPF (ref 0–2)
RETICS #: 47.9 K/UL — SIGNIFICANT CHANGE UP (ref 25–125)
RETICS/RBC NFR: 1.2 % — SIGNIFICANT CHANGE UP (ref 0.5–2.5)
SODIUM SERPL-SCNC: 136 MMOL/L — SIGNIFICANT CHANGE UP (ref 135–145)
SP GR SPEC: 1.01 — SIGNIFICANT CHANGE UP (ref 1.01–1.02)
TIBC SERPL-MCNC: 225 UG/DL — LOW (ref 250–450)
TOTAL CHOLESTEROL/HDL RATIO MEASUREMENT: 2.4 RATIO — LOW (ref 3.4–9.6)
TRIGL SERPL-MCNC: 82 MG/DL — SIGNIFICANT CHANGE UP (ref 10–149)
TSH SERPL-MCNC: 1.18 UU/ML — SIGNIFICANT CHANGE UP (ref 0.34–4.82)
UIBC SERPL-MCNC: 176 UG/DL — SIGNIFICANT CHANGE UP (ref 110–370)
UROBILINOGEN FLD QL: NEGATIVE — SIGNIFICANT CHANGE UP
VIT B12 SERPL-MCNC: 296 PG/ML — SIGNIFICANT CHANGE UP (ref 243–894)
WBC # BLD: 3.9 K/UL — SIGNIFICANT CHANGE UP (ref 3.8–10.5)
WBC # FLD AUTO: 3.9 K/UL — SIGNIFICANT CHANGE UP (ref 3.8–10.5)
WBC UR QL: ABNORMAL /HPF (ref 0–5)

## 2017-10-09 RX ORDER — DEXTROSE 50 % IN WATER 50 %
25 SYRINGE (ML) INTRAVENOUS ONCE
Qty: 0 | Refills: 0 | Status: DISCONTINUED | OUTPATIENT
Start: 2017-10-09 | End: 2017-10-09

## 2017-10-09 RX ORDER — SODIUM CHLORIDE 9 MG/ML
1000 INJECTION, SOLUTION INTRAVENOUS
Qty: 0 | Refills: 0 | Status: DISCONTINUED | OUTPATIENT
Start: 2017-10-09 | End: 2017-10-11

## 2017-10-09 RX ORDER — INFLUENZA VIRUS VACCINE 15; 15; 15; 15 UG/.5ML; UG/.5ML; UG/.5ML; UG/.5ML
0.5 SUSPENSION INTRAMUSCULAR ONCE
Qty: 0 | Refills: 0 | Status: DISCONTINUED | OUTPATIENT
Start: 2017-10-09 | End: 2017-10-11

## 2017-10-09 RX ORDER — CHLORHEXIDINE GLUCONATE 213 G/1000ML
1 SOLUTION TOPICAL ONCE
Qty: 0 | Refills: 0 | Status: COMPLETED | OUTPATIENT
Start: 2017-10-09 | End: 2017-10-09

## 2017-10-09 RX ORDER — ERYTHROPOIETIN 10000 [IU]/ML
10000 INJECTION, SOLUTION INTRAVENOUS; SUBCUTANEOUS
Qty: 0 | Refills: 0 | Status: DISCONTINUED | OUTPATIENT
Start: 2017-10-09 | End: 2017-10-10

## 2017-10-09 RX ORDER — PANTOPRAZOLE SODIUM 20 MG/1
1 TABLET, DELAYED RELEASE ORAL
Qty: 0 | Refills: 0 | COMMUNITY

## 2017-10-09 RX ORDER — ATENOLOL 25 MG/1
0 TABLET ORAL
Qty: 30 | Refills: 0 | COMMUNITY

## 2017-10-09 RX ORDER — FOLIC ACID 0.8 MG
1 TABLET ORAL
Qty: 0 | Refills: 0 | COMMUNITY

## 2017-10-09 RX ORDER — FUROSEMIDE 40 MG
80 TABLET ORAL DAILY
Qty: 0 | Refills: 0 | Status: DISCONTINUED | OUTPATIENT
Start: 2017-10-09 | End: 2017-10-09

## 2017-10-09 RX ORDER — DEXTROSE 50 % IN WATER 50 %
50 SYRINGE (ML) INTRAVENOUS ONCE
Qty: 0 | Refills: 0 | Status: COMPLETED | OUTPATIENT
Start: 2017-10-09 | End: 2017-10-09

## 2017-10-09 RX ORDER — SEVELAMER CARBONATE 2400 MG/1
800 POWDER, FOR SUSPENSION ORAL
Qty: 0 | Refills: 0 | Status: DISCONTINUED | OUTPATIENT
Start: 2017-10-09 | End: 2017-10-10

## 2017-10-09 RX ORDER — METOPROLOL TARTRATE 50 MG
2.5 TABLET ORAL EVERY 6 HOURS
Qty: 0 | Refills: 0 | Status: DISCONTINUED | OUTPATIENT
Start: 2017-10-09 | End: 2017-10-10

## 2017-10-09 RX ORDER — CILOSTAZOL 100 MG/1
50 TABLET ORAL DAILY
Qty: 0 | Refills: 0 | Status: DISCONTINUED | OUTPATIENT
Start: 2017-10-09 | End: 2017-10-10

## 2017-10-09 RX ORDER — CINACALCET 30 MG/1
60 TABLET, FILM COATED ORAL DAILY
Qty: 0 | Refills: 0 | Status: DISCONTINUED | OUTPATIENT
Start: 2017-10-09 | End: 2017-10-10

## 2017-10-09 RX ORDER — HEPARIN SODIUM 5000 [USP'U]/ML
5000 INJECTION INTRAVENOUS; SUBCUTANEOUS EVERY 8 HOURS
Qty: 0 | Refills: 0 | Status: DISCONTINUED | OUTPATIENT
Start: 2017-10-09 | End: 2017-10-10

## 2017-10-09 RX ORDER — ASPIRIN/CALCIUM CARB/MAGNESIUM 324 MG
81 TABLET ORAL DAILY
Qty: 0 | Refills: 0 | Status: DISCONTINUED | OUTPATIENT
Start: 2017-10-09 | End: 2017-10-10

## 2017-10-09 RX ORDER — INSULIN LISPRO 100/ML
VIAL (ML) SUBCUTANEOUS
Qty: 0 | Refills: 0 | Status: DISCONTINUED | OUTPATIENT
Start: 2017-10-09 | End: 2017-10-10

## 2017-10-09 RX ORDER — ALLOPURINOL 300 MG
1 TABLET ORAL
Qty: 0 | Refills: 0 | COMMUNITY

## 2017-10-09 RX ORDER — ATENOLOL 25 MG/1
50 TABLET ORAL DAILY
Qty: 0 | Refills: 0 | Status: DISCONTINUED | OUTPATIENT
Start: 2017-10-09 | End: 2017-10-09

## 2017-10-09 RX ORDER — FUROSEMIDE 40 MG
40 TABLET ORAL DAILY
Qty: 0 | Refills: 0 | Status: DISCONTINUED | OUTPATIENT
Start: 2017-10-09 | End: 2017-10-09

## 2017-10-09 RX ADMIN — Medication 50 MILLILITER(S): at 22:55

## 2017-10-09 RX ADMIN — SODIUM CHLORIDE 30 MILLILITER(S): 9 INJECTION, SOLUTION INTRAVENOUS at 12:07

## 2017-10-09 RX ADMIN — CHLORHEXIDINE GLUCONATE 1 APPLICATION(S): 213 SOLUTION TOPICAL at 21:21

## 2017-10-09 RX ADMIN — HEPARIN SODIUM 5000 UNIT(S): 5000 INJECTION INTRAVENOUS; SUBCUTANEOUS at 16:15

## 2017-10-09 RX ADMIN — Medication 2.5 MILLIGRAM(S): at 08:35

## 2017-10-09 RX ADMIN — Medication 2.5 MILLIGRAM(S): at 17:52

## 2017-10-09 RX ADMIN — HEPARIN SODIUM 5000 UNIT(S): 5000 INJECTION INTRAVENOUS; SUBCUTANEOUS at 08:10

## 2017-10-09 RX ADMIN — Medication 2.5 MILLIGRAM(S): at 23:30

## 2017-10-09 NOTE — CONSULT NOTE ADULT - SUBJECTIVE AND OBJECTIVE BOX
HPI:   71yoM with h/o ESRD on HD Tu,Th,Sa (completed yesterday), HTN, DM, afib not on anticoagulation, presents with LUQ abd pain, onset this AM, constant. Recently seen and started on phenazopyridine and cipro. Denies vomiting, fever, CP, SOB.    PAST MEDICAL & SURGICAL HISTORY:  Type 2 diabetes mellitus without complication, without long-term current use of insulin  Essential hypertension  Paroxysmal atrial fibrillation  ESRD (end stage renal disease) on dialysis  Pre-diabetes  CAD (coronary artery disease): LAD stent  PVD (peripheral vascular disease)  Atrial fibrillation  Obesity  Prostate cancer: prostate cancer  ESRD (end stage renal disease) on dialysis  HLD (hyperlipidemia)  HTN (hypertension)  No significant past surgical history  H/O cardiac catheterization: 4/25/2014 non obstructive disease  AV fistula: creation in right arm on 12/1/2015  AV fistula: h/o creation in 2005  History of prostatectomy      MEDICATIONS  (STANDING):  sodium chloride 0.9% Bolus 250 milliLiter(s) IV Bolus once    MEDICATIONS  (PRN):      Allergies    penicillin (Hives)    Intolerances        SOCIAL HISTORY:  No drug use    FAMILY HISTORY:  Family history of hypertension in mother      REVIEW OF SYSTEMS:  CONSTITUTIONAL: In no acute distress.  EYES: No eye pain, visual disturbances, or discharge  ENMT:  No difficulty hearing, tinnitus, vertigo; No sinus or throat pain  NECK: No pain or stiffness  BREASTS: No pain, masses, or nipple discharge  RESPIRATORY: No cough, wheezing, chills or hemoptysis; No shortness of breath  CARDIOVASCULAR: No chest pain, palpitations, dizziness, or leg swelling  GASTROINTESTINAL: abd pain  GENITOURINARY: No dysuria, frequency, hematuria, or incontinence  NEUROLOGICAL: No headaches, memory loss, loss of strength, numbness, or tremors  SKIN: No itching, burning, rashes, or lesions   LYMPH NODES: No enlarged glands  ENDOCRINE: No heat or cold intolerance; No hair loss  MUSCULOSKELETAL: No joint pain or swelling; No muscle, back, or extremity pain  PSYCHIATRIC: No depression, anxiety, mood swings, or difficulty sleeping  HEME/LYMPH: No easy bruising, or bleeding gums  ALLERGY AND IMMUNOLOGIC: No hives or eczema      Vital Signs Last 24 Hrs  T(C): 36.4 (09 Oct 2017 00:12), Max: 36.8 (08 Oct 2017 13:32)  T(F): 97.6 (09 Oct 2017 00:12), Max: 98.3 (08 Oct 2017 13:32)  HR: 106 (09 Oct 2017 00:12) (71 - 106)  BP: 115/90 (09 Oct 2017 00:12) (115/90 - 152/75)  BP(mean): --  RR: 18 (09 Oct 2017 00:12) (18 - 18)  SpO2: 97% (09 Oct 2017 00:12) (96% - 99%)    Daily Height in cm: 185.42 (08 Oct 2017 13:32)    Daily     PHYSICAL EXAM:  GENERAL: NAD, well-groomed, well-developed  HEAD:  Atraumatic, Normocephalic  EYES: EOMI, PERRL, conjunctiva and sclera clear  ENMT: Moist mucous membranes, Good dentition, No lesions  NECK: Supple, No JVD  NERVOUS SYSTEM:  Alert & Oriented X3, Good concentration  CHEST/LUNG: Clear to percussion bilaterally; Normal respiratory effort  HEART: Regular rate and rhythm  ABDOMEN: Soft, Nontender, Nondistended; Bowel sounds present  chronically incarcerated ventral hernia, non tender  : normal external genitalia  BREASTS: no breast lumps  EXTREMITIES:  No clubbing, cyanosis, or edema  VASC: equal peripheral pulses  LYMPH: No lymphadenopathy noted  SKIN: No rashes or lesions  PSYCH: normal affect    LABS:                        10.9   4.6   )-----------( 120      ( 08 Oct 2017 14:21 )             36.8     Neutrophil %:   10-08    136  |  98  |  48<H>  ----------------------------<  109<H>  4.4   |  28  |  6.98<H>    Ca    9.8      08 Oct 2017 14:21    TPro  8.7<H>  /  Alb  3.4<L>  /  TBili  0.3  /  DBili  x   /  AST  13  /  ALT  23  /  AlkPhos  294<H>  10-08          RADIOLOGY & ADDITIONAL STUDIES:  < from: CT Abdomen and Pelvis No Cont (10.08.17 @ 22:03) >  Bowel: Oral contrast isseen as distally as the sigmoid colon. There are   mildly dilated contrast-filled loops of small bowel loops proximal to a   ventral herniation containing small bowel. Some of the proximal small   bowel loops demonstrate mild thickening and interloopfluid. The exiting   terminal ileum is collapsed. The patient is status post right   hemicolectomy.    Vasculature: The aorta is not dilated. There is mild atherosclerotic   vascular calcification. There is an IVC filter in place.  There is no significant adenopathy.  Bones: Chronic degenerative changes of the spine.  Subcutaneous tissues: Numerous subcutaneous collaterals.    IMPRESSION:    Partial small bowel obstruction related to a central ventral herniation   of small bowel and fat. The hernia neck measures 6.8 x 5.5 cm. There is   an adjacent right ventral hernia containing fat and saline reservoir from     < end of copied text >
CHIEF COMPLAINT:Patient is a 71y old  Male who presents with a chief complaint of LLQ pain (09 Oct 2017 04:52)      HPI:71 yr old Male  from home live alone ambulate with cane AAO x3 with h/o ESRD on HD Tu,Th,Sa (completed yesterday), HTN, DM, afib (not on anticoagulation, unknown reason), presents with LUQ abd pain today. Pain is constant, non radiating and confined to LLQ with tenderness as per patient. Patient denied nausea, vomiting, diarrhea, constipation and any other complaint. Patient had last BM yesterday AM. Colonoscopy done 6 year ago outpatient for screening and normal as per patient. No recent travel or antri biotics use.   CT abd done which showed Partial small bowel obstruction related to a central ventral herniation of small bowel and fat. The hernia neck measures 6.8 x 5.5 cm. There is an adjacent right ventral hernia containing fat and saline reservoir from   the patient's prosthesis. Contrast is seen as distally as sigmoid colon. Proximal small bowel loops are mildly distended with mild thickening and mild interloop fluid. (09 Oct 2017 04:52)      PAST MEDICAL & SURGICAL HISTORY:  Type 2 diabetes mellitus without complication, without long-term current use of insulin  Essential hypertension  Paroxysmal atrial fibrillation  ESRD (end stage renal disease) on dialysis  PVD (peripheral vascular disease)  Atrial fibrillation  Obesity  Prostate cancer: prostate cancer  ESRD (end stage renal disease) on dialysis  HLD (hyperlipidemia  H/O cardiac catheterization: 4/25/2014 non obstructive disease  AV fistula: creation in right arm on 12/1/2015  AV fistula: h/o creation in 2005  History of prostatectomy      MEDICATIONS  (STANDING):  cilostazol 50 milliGRAM(s) Oral daily  cinacalcet 60 milliGRAM(s) Oral daily  dextrose 5% + sodium chloride 0.9%. 1000 milliLiter(s) (30 mL/Hr) IV Continuous <Continuous>  heparin  Injectable 5000 Unit(s) SubCutaneous every 8 hours  influenza   Vaccine 0.5 milliLiter(s) IntraMuscular once  insulin lispro (HumaLOG) corrective regimen sliding scale   SubCutaneous three times a day before meals  metoprolol Injectable 2.5 milliGRAM(s) IV Push every 6 hours  sevelamer hydrochloride 800 milliGRAM(s) Oral three times a day with meals    MEDICATIONS  (PRN):      FAMILY HISTORY:  Family history of hypertension in mother (Mother)      SOCIAL HISTORY:    [X ] Non-smoker    [X ] Alcohol-social    Allergies    penicillin (Hives)    Intolerances    	    REVIEW OF SYSTEMS:  CONSTITUTIONAL: No fever, weight loss, or fatigue  EYES: No eye pain, visual disturbances, or discharge  ENT:  No difficulty hearing, tinnitus, vertigo; No sinus or throat pain  NECK: No pain or stiffness  RESPIRATORY: No cough, wheezing, chills or hemoptysis; No Shortness of Breath  CARDIOVASCULAR: No chest pain, palpitations, passing out, dizziness, or leg swelling  GASTROINTESTINAL: + abdominal pain. No nausea, vomiting, or hematemesis; No diarrhea or constipation. No melena or hematochezia.  GENITOURINARY: No dysuria, frequency, hematuria, or incontinence  NEUROLOGICAL: No headaches, memory loss, loss of strength, numbness, or tremors  SKIN: No itching, burning, rashes, or lesions   LYMPH Nodes: No enlarged glands  ENDOCRINE: No heat or cold intolerance; No hair loss  MUSCULOSKELETAL: No joint pain or swelling; No muscle, back, or extremity pain  PSYCHIATRIC: No depression, anxiety, mood swings, or difficulty sleeping  HEME/LYMPH: No easy bruising, or bleeding gums  ALLERGY AND IMMUNOLOGIC: No hives or eczema	    PHYSICAL EXAM:  T(C): 36.3 (10-09-17 @ 05:25), Max: 36.8 (10-09-17 @ 04:18)  HR: 70 (10-09-17 @ 11:19) (70 - 107)  BP: 127/66 (10-09-17 @ 11:19) (111/70 - 152/75)  RR: 14 (10-09-17 @ 11:19) (14 - 18)  SpO2: 100% (10-09-17 @ 11:19) (97% - 100%)      Appearance: Normal	  HEENT:   Normal oral mucosa, PERRL, EOMI	  Lymphatic: No lymphadenopathy  Cardiovascular: Normal S1 S2, No JVD, No murmurs, No edema  Respiratory: Lungs clear to auscultation	  Psychiatry: A & O x 3, Mood & affect appropriate  Gastrointestinal:  Soft, Non-tender, + BS	  Skin: No rashes, No ecchymoses, No cyanosis	  Neurologic: Non-focal  Extremities: Normal range of motion, No clubbing, cyanosis or edema  Vascular: Peripheral pulses palpable 2+ bilaterally    	    ECG:  	Sinus tach,,RBBB,LAFB,LVH  	  LABS:	 	    CARDIAC MARKERS:  CARDIAC MARKERS ( 08 Oct 2017 14:21 )  <0.015 ng/mL / x     / x     / x     / x                            10.8   3.9   )-----------( 144      ( 09 Oct 2017 10:57 )             36.3     10-09    136  |  100  |  56<H>  ----------------------------<  85  4.3   |  24  |  8.54<H>    Ca    9.5      09 Oct 2017 10:57  Phos  5.3     10-09  Mg     2.2     10-09    TPro  8.5<H>  /  Alb  3.3<L>  /  TBili  0.5  /  DBili  x   /  AST  13  /  ALT  14  /  AlkPhos  304<H>  10-09      Lipid Profile: Cholesterol 172  LDL 85  HDL 71  TG 82      TSH: Thyroid Stimulating Hormone, Serum: 1.18 uU/mL (10-09 @ 10:57)        [X ] Echocardiogram:6/17    CONCLUSIONS:  1. Mild concentric left ventricular hypertrophy.  2. Normal Left Ventricular Systolic Function,  (EF = 55 to  60%)  3. Grade II diastolic dysfunction  4. RV systolic pressure is mildly increased.=35mmhg  5. There is mild tricuspid regurgitation.    CT scan:    IMPRESSION:    Partial small bowel obstruction related to a central ventral herniation   of small bowel and fat. The hernia neck measures 6.8 x 5.5 cm. There is   an adjacent right ventral hernia containing fat and saline reservoir from   the patient's prosthesis. Contrast is seen as distally as sigmoid colon.   Proximal small bowel loops are mildly distended with mild thickening and   mild interloop fluid.
[facility] NEPHROLOGY- CONSULTATION NOTE    Patient is a 71y Male with ESRD on HD who presented to the hospital with abd pain X 1 day and points to a ventral hernia.  He had HD yesterday, uneventful and felt well after the procedure.  Abd pain started overnight.  Poor po intake today.    PAST MEDICAL & SURGICAL HISTORY:  Type 2 diabetes mellitus without complication, without long-term current use of insulin  Essential hypertension  Paroxysmal atrial fibrillation  ESRD (end stage renal disease) on dialysis  Pre-diabetes  CAD (coronary artery disease): LAD stent  PVD (peripheral vascular disease)  Atrial fibrillation  Obesity  Prostate cancer: prostate cancer  ESRD (end stage renal disease) on dialysis  HLD (hyperlipidemia)  HTN (hypertension)  No significant past surgical history  H/O cardiac catheterization: 4/25/2014 non obstructive disease  AV fistula: creation in right arm on 12/1/2015  AV fistula: h/o creation in 2005  History of prostatectomy    penicillin (Hives)    Home Medications Reviewed    SOCIAL HISTORY:  Denies ETOh,Smoking,   FAMILY HISTORY:  Family history of hypertension in mother (Mother)    REVIEW OF SYSTEMS:  CONSTITUTIONAL: No weakness, fevers or chills  EYES/ENT: No visual changes;  No vertigo or throat pain   NECK: No pain or stiffness  RESPIRATORY: No cough, wheezing, hemoptysis; No shortness of breath  CARDIOVASCULAR: No chest pain or palpitations.  GASTROINTESTINAL:+ abd pain  GENITOURINARY: No dysuria, frequency, foamy urine, urinary urgency, incontinence or hematuria  NEUROLOGICAL: No numbness or weakness.  Poor memory at baseline per [facility].  SKIN: No itching, burning, rashes, or lesions   VASCULAR: No bilateral lower extremity edema.   All other review of systems is negative unless indicated above.    VITALS:  T(F): 97.9 (10-08-17 @ 15:26), Max: 98.3 (10-08-17 @ 13:32)  HR: 71 (10-08-17 @ 15:26)  BP: 152/75 (10-08-17 @ 15:26)  RR: 18 (10-08-17 @ 15:26)  SpO2: 99% (10-08-17 @ 15:26)  Wt(kg): --    Height (cm): 185.42 (10-08 @ 13:32)  Weight (kg): 97.5 (10-08 @ 13:32)  BMI (kg/m2): 28.4 (10-08 @ 13:32)  BSA (m2): 2.22 (10-08 @ 13:32)  PHYSICAL EXAM:  Constitutional: NAD  HEENT: anicteric sclera, oropharynx clear, MMM  Neck: No JVD  Respiratory: CTAB, no wheezes, rales or rhonchi  Cardiovascular: S1, S2, RRR  Gastrointestinal: BS+, soft, NT/ND, + large ventral hernia, not reducible, tender.  Extremities: No cyanosis or clubbing. No peripheral edema  Neurological: A/O x 3, no focal deficits  Psychiatric: Normal mood, normal affect  : No CVA tenderness. No wright.   Skin: No rashes  Vascular Access:  RUE AVG benign, +thrill/bruit    LABS:  10-08    136  |  98  |  48<H>  ----------------------------<  109<H>  4.4   |  28  |  6.98<H>    Ca    9.8      08 Oct 2017 14:21    TPro  8.7<H>  /  Alb  3.4<L>  /  TBili  0.3  /  DBili      /  AST  13  /  ALT  23  /  AlkPhos  294<H>  10-08    Creatinine Trend: 6.98 <--                        10.9   4.6   )-----------( 120      ( 08 Oct 2017 14:21 )             36.8         RADIOLOGY & ADDITIONAL STUDIES:  PENDING
[  ] STAT REQUEST              [  ] ROUTINE REQUEST    Patient is a 71 year old male admitted with abdominal pain. GI consulted to evaluate.      HPI:  71 year old with multiple medical problem including ESRD on H.D.  presented with abdominal pain in his let side. Patient denies nausea, vomiting, hematemesis, hematochezia, melena, fever, chills, chest pain, SOB, cough or diarrhea.        PAIN MANAGEMENT:  Pain Scale:                 3-4/10  Pain Location:  Left side abdominal pain      Prior Colonoscopy:  6 years ago      PAST MEDICAL HISTORY  DM  HTN  Paroxysmal atrial fibrillation  ESRD    CAD (coronary artery disease)  PVD (peripheral vascular disease)  Atrial fibrillation  Obesity  Prostate cancer  HLD (hyperlipidemia)         PAST SURGICAL HISTORY  H/O cardiac catheterization  AV fistula  Prostatectomy      Allergies    penicillin (Hives)       MEDICATIONS  (STANDING):  aspirin  chewable 81 milliGRAM(s) Oral daily  chlorhexidine 4% Liquid 1 Application(s) Topical once  cilostazol 50 milliGRAM(s) Oral daily  cinacalcet 60 milliGRAM(s) Oral daily  dextrose 5% + sodium chloride 0.9%. 1000 milliLiter(s) (30 mL/Hr) IV Continuous <Continuous>  heparin  Injectable 5000 Unit(s) SubCutaneous every 8 hours  influenza   Vaccine 0.5 milliLiter(s) IntraMuscular once  insulin lispro (HumaLOG) corrective regimen sliding scale   SubCutaneous three times a day before meals  metoprolol Injectable 2.5 milliGRAM(s) IV Push every 6 hours  sevelamer hydrochloride 800 milliGRAM(s) Oral three times a day with meals    MEDICATIONS  (PRN):      SOCIAL HISTORY  Advanced Directives:       [ X ] Full Code       [  ] DNR  Marital Status:         [ X ] M      [  ] S      [  ] D       [  ] W  Children:       [ X ] Yes      [  ] No  Occupation:        [  ] Employed       [ X ] Unemployed       [  ] Retired  Diet:       [ X ] Regular       [  ] PEG feeding          [  ] NG tube feeding  Drug Use:           [ X ] Patient denied          [  ] Yes  Alcohol:           [ X ] No             [  ] Yes (socially)         [  ] Yes (chronic)  Tobacco:           [  ] Yes           [ X ] No    FAMILY HISTORY  [ X ] Heart Disease  (father)            [  ] Diabetes             [  ] HTN             [  ] Colon Cancer             [  ] Stomach Cancer              [  ] Pancreatic Cancer    VITAL SIGNS   Vital Signs Last 24 Hrs  T(C): 36.8 (09 Oct 2017 14:38), Max: 36.8 (09 Oct 2017 04:18)  T(F): 98.2 (09 Oct 2017 14:38), Max: 98.2 (09 Oct 2017 04:18)  HR: 73 (09 Oct 2017 14:38) (70 - 107)  BP: 127/67 (09 Oct 2017 14:38) (111/70 - 152/75)  RR: 17 (09 Oct 2017 14:38) (14 - 18)  SpO2: 99% (09 Oct 2017 14:38) (97% - 100%)    Daily Weight in k.3 (09 Oct 2017 05:25)         CBC Full  -  ( 09 Oct 2017 10:57 )  WBC Count : 3.9 K/uL  Hemoglobin : 10.8 g/dL  Hematocrit : 36.3 %  Platelet Count - Automated : 144 K/uL  Mean Cell Volume : 89.8 fl  Mean Cell Hemoglobin : 26.7 pg  Mean Cell Hemoglobin Concentration : 29.8 gm/dL  Auto Neutrophil # : 2.2 K/uL  Auto Lymphocyte # : 1.2 K/uL  Auto Monocyte # : 0.4 K/uL  Auto Eosinophil # : 0.1 K/uL  Auto Basophil # : 0.0 K/uL  Auto Neutrophil % : 57.2 %  Auto Lymphocyte % : 29.9 %  Auto Monocyte % : 9.7 %  Auto Eosinophil % : 2.3 %  Auto Basophil % : 0.9 %      10-09    136  |  100  |  56<H>  ----------------------------<  85  4.3   |  24  |  8.54<H>    Ca    9.5      09 Oct 2017 10:57  Phos  5.3     10-09  Mg     2.2     10-09    TPro  8.5<H>  /  Alb  3.3<L>  /  TBili  0.5  /  DBili  x   /  AST  13  /  ALT  14  /  AlkPhos  304<H>  10-09    PT/INR - ( 09 Oct 2017 10:57 )   PT: 12.5 sec;   INR: 1.14 ratio       PTT - ( 09 Oct 2017 10:57 )  PTT:30.3 sec    Occult Blood, Feces (17 @ 12:54)    Occult Blood, Feces: Positive  Iron with Total Binding Capacity (10.09.17 @ 10:57)    Iron - Total Binding Capacity.: 225 ug/dL    % Saturation, Iron: 22 %    Iron Total, Serum: 49 ug/dL    Unsaturated Iron Binding Capacity: 176 ug/dL    Urinalysis (17 @ 10:02)    Glucose Qualitative, Urine: Negative    Blood, Urine: Moderate    pH Urine: 7.0    Color: Yellow    Urine Appearance: very cloudy    Bilirubin: Negative    Ketone - Urine: Negative    Specific Gravity: 1.010    Protein, Urine: 500 mg/dL    Urobilinogen: Negative    Nitrite: Negative    Leukocyte Esterase Concentration: Moderate      ECG  Ventricular Rate 95 BPM    Atrial Rate 95 BPM    P-R Interval 232 ms    QRS Duration 152 ms     ms    QTc 483 ms    P Axis -59 degrees    R Axis 47 degrees    T Axis 73 degrees    Diagnosis Line Unusual P axis, possible ectopic atrial rhythm with undetermined rhythm irregularity  Right bundle branch block  Anteroseptal infarct , age undetermined  Abnormal ECG      RADIOLOGY/IMAGING                EXAM:  CT ABDOMEN AND PELVIS                            PROCEDURE DATE:  10/08/2017          INTERPRETATION:  Abdominal/Pelvic CT     Indication: Left lower quadrant pain, history of hernia, evaluate for   small bowel obstruction      Technique:  Axial images were obtained from lung bases through pubic   symphysis without contrast.  Reformatted coronal and sagittal images were   submitted.    COMPARISON: CT of 2017    FINDINGS:    Lung bases:  There are trace bilateral pleural effusions.. Coronary   atherosclerosis. Interval removal of previously visualized esophageal   stent. There is mild distal esophageal soft tissue thickening.  Peritoneum:  There is no free air.  No free fluid.    Evaluation of solid abdominal organ is diminished without IV contrast.    Liver: Unremarkable.  Spleen: Unremarkable.  Gallbladder: Gallstones.  Biliary tree: Unremarkable.  Pancreas: Unremarkable.  Adrenal glands: Mild nonspecific adrenal gland thickening.    Kidneys: Atrophic with innumerable small cysts and subcentimeter   lucencies too small to characterize    Urinary bladder: Decompressed.  Pelvic organs: Penile implant again is noted. Extensive streak artifact   in the pelvis likely related to cholecystectomy.    Bowel: Oral contrast isseen as distally as the sigmoid colon. There are   mildly dilated contrast-filled loops of small bowel loops proximal to a   ventral herniation containing small bowel. Some of the proximal small   bowel loops demonstrate mild thickening and interloopfluid. The exiting   terminal ileum is collapsed. The patient is status post right   hemicolectomy.    Vasculature: The aorta is not dilated. There is mild atherosclerotic   vascular calcification. There is an IVC filter in place.  There is no significant adenopathy.  Bones: Chronic degenerative changes of the spine.  Subcutaneous tissues: Numerous subcutaneous collaterals.    IMPRESSION:    Partial small bowel obstruction related to a central ventral herniation   of small bowel and fat. The hernia neck measures 6.8 x 5.5 cm. There is   an adjacent right ventral hernia containing fat and saline reservoir from   the patient's prosthesis. Contrast is seen as distally as sigmoid colon.   Proximal small bowel loops are mildly distended with mild thickening and   mild interloop fluid.          EXAM:  CHEST-PORTABLE URGENT                            PROCEDURE DATE:  2017        INTERPRETATION:  Portable chest x-ray    Indication: Fever.    Portable chest x-ray is compared to a previous examination dated   2017.    Impression: Mild left pleural effusion, unchanged.    New small pleural effusion at the right costophrenic angle.    Mild left perihilar pulmonary infiltrate, grossly unchanged. Follow-up   chest x-rays until resolution are recommended.    No evidence for pneumothorax.    The trachea is midline.    Stable cardiac silhouette.

## 2017-10-09 NOTE — H&P ADULT - PROBLEM SELECTOR PLAN 5
Continue Lasix and atenolol as per pharmacy record.   F/u social work to get patient most recent medication from home. Continue as IV metoprolol instead of atenolol as pt is NPO.  F/u social work to get patient most recent medication from home.

## 2017-10-09 NOTE — H&P ADULT - HISTORY OF PRESENT ILLNESS
71yoM from home live alone ambulate with cane AAO x3 with h/o ESRD on HD Tu,Th,Sa (completed yesterday), HTN, DM, afib (not on anticoagulation, unknown reason), presents with LUQ abd pain today. Pain is constant, non radiating and confined to LLQ with tenderness as per patient. Patient denied nausea, vomiting, diarrhea, constipation and any other complaint. Patient had last BM yesterday AM. Colonoscopy done 6 year ago outpatient for screening and normal as per patient. No recent travel or antri biotics use.   CT abd done which showed Partial small bowel obstruction related to a central ventral herniation of small bowel and fat. The hernia neck measures 6.8 x 5.5 cm. There is an adjacent right ventral hernia containing fat and saline reservoir from   the patient's prosthesis. Contrast is seen as distally as sigmoid colon. Proximal small bowel loops are mildly distended with mild thickening and mild interloop fluid.

## 2017-10-09 NOTE — CONSULT NOTE ADULT - ASSESSMENT
71 year-old man with ESRD on HD TTS with ventral hernia with possible incarceration.  HTN.  Anemia of esrd.  Secondary hyperparathyroidism.
71 yr old Male  from home live alone ambulate with cane AAO x3 with h/o ESRD on HD Tu,Th,Sa (completed yesterday), HTN, DM, afib (not on anticoagulation, unknown reason), presents with LUQ abd pain ,found to have SBO.  1.Pt reports rescent stress test with Dr. Olsen, called to have report faxed.  2.Add ASA 81mg qd.  3.HD as per renal.  4.Continue cardiac medication.  5.If recent stress test negative  for ischemia then can proceed for surgery at moderate risk for karuna-operative cardiac complication.  6.GI and DVT prophylaxis.
1. Abdominal pain  2. Partial small bowel obstruction  3. Ventral hernia  4. Gall stone (pt is asymptomatic)    Suggestions:    1. NPO  2. IVF hydration  3. Monitor electrolytes  4. Avoid NSAID  5. Surgical follow up  6. DVT prophylaxis

## 2017-10-09 NOTE — CONSULT NOTE ADULT - NEGATIVE ENMT SYMPTOMS
no nose bleeds/no hearing difficulty/no ear pain/no dry mouth/no dysphagia/no gum bleeding/no throat pain

## 2017-10-09 NOTE — H&P ADULT - PROBLEM SELECTOR PLAN 1
with ventral wall hernia. Bowel rest.   No IVF as patient is not dehydrated and ESRD.   Currently not in pain and never vomited. electrolytes monitor. NPO.   Surgery seen. No surgical intervention and medical management for now. with ventral wall hernia. Bowel rest.   No IVF as patient is not dehydrated and ESRD.   Currently not in pain and never vomited. electrolytes monitor. NPO.   Surgery seen. No surgical intervention and medical management for now.  Dr Hodgson for GI with ventral wall hernia. Bowel rest.   Slow IVF after discussed with Dr Shaffer as pt is NPO for partial obstruction and ESRD.   Currently not in pain and never vomited. electrolytes monitor. NPO.   Surgery seen. No surgical intervention and medical management for now.  Dr Hodgson for GI  f/u patient home medication list with Dr Shaffer.

## 2017-10-09 NOTE — CONSULT NOTE ADULT - NEGATIVE CARDIOVASCULAR SYMPTOMS
no orthopnea/no peripheral edema/no paroxysmal nocturnal dyspnea/no chest pain/no claudication/no palpitations

## 2017-10-09 NOTE — DISCHARGE NOTE ADULT - SECONDARY DIAGNOSIS.
Atrial fibrillation CAD (coronary artery disease) ESRD (end stage renal disease) on dialysis Essential hypertension HLD (hyperlipidemia) Ventral hernia with obstruction but no gangrene

## 2017-10-09 NOTE — CONSULT NOTE ADULT - RS GEN PE MLT RESP DETAILS PC
breath sounds equal/no wheezes/no rales/no rhonchi/airway patent/good air movement/respirations non-labored

## 2017-10-09 NOTE — PROGRESS NOTE ADULT - PROBLEM SELECTOR PLAN 7
-continue insulin sliding scale regimen  -monitor fingersticks per floor protocol  -Diabetic diet when patient taking PO

## 2017-10-09 NOTE — CONSULT NOTE ADULT - PROBLEM SELECTOR RECOMMENDATION 9
Bowel rest (NPO)  IV Fluid  Serial exam  Plan per clinical course
Continue with maintenance hemodialysis treatment. Monitor BMP.  Next dialysis treatment on tuesday, 10/10

## 2017-10-09 NOTE — PROGRESS NOTE ADULT - SUBJECTIVE AND OBJECTIVE BOX
Patient is a 71y old  Male who presents with a chief complaint of LLQ pain (09 Oct 2017 04:52)    penicillin (Hives)      INTERVAL HPI /OVERNIGHT EVENTS: no acute overnight events. Plan is for patient to have HD tomorrow AM, then surgery. Patient is now in agreement with this plan and will no longer leave AMA. Aware we are still waiting for stress test to be faxed from cardiologist office. Cardiologist Raúl called again at [phone].     T(C): 36.8 (10-09-17 @ 14:38), Max: 36.8 (10-09-17 @ 04:18)  HR: 73 (10-09-17 @ 14:38) (70 - 107)  BP: 127/67 (10-09-17 @ 14:38) (111/70 - 129/65)  RR: 17 (10-09-17 @ 14:38) (14 - 18)  SpO2: 99% (10-09-17 @ 14:38) (97% - 100%)  I&O's Summary    Vital Signs Last 24 Hrs  T(C): 36.8 (09 Oct 2017 14:38), Max: 36.8 (09 Oct 2017 04:18)  T(F): 98.2 (09 Oct 2017 14:38), Max: 98.2 (09 Oct 2017 04:18)  HR: 73 (09 Oct 2017 14:38) (70 - 107)  BP: 127/67 (09 Oct 2017 14:38) (111/70 - 129/65)  BP(mean): --  RR: 17 (09 Oct 2017 14:38) (14 - 18)  SpO2: 99% (09 Oct 2017 14:38) (97% - 100%)  PAST MEDICAL & SURGICAL HISTORY:  Type 2 diabetes mellitus without complication, without long-term current use of insulin  Essential hypertension  Paroxysmal atrial fibrillation  ESRD (end stage renal disease) on dialysis  Pre-diabetes  CAD (coronary artery disease): LAD stent  PVD (peripheral vascular disease)  Atrial fibrillation  Obesity  Prostate cancer: prostate cancer  ESRD (end stage renal disease) on dialysis  HLD (hyperlipidemia)  HTN (hypertension)  No significant past surgical history  H/O cardiac catheterization: 4/25/2014 non obstructive disease  AV fistula: creation in right arm on 12/1/2015  AV fistula: h/o creation in 2005  History of prostatectomy      SOCIAL HISTORY  Alcohol:  Tobacco:  Illicit substance use:      FAMILY HISTORY:      LABS:                        10.8   3.9   )-----------( 144      ( 09 Oct 2017 10:57 )             36.3     10-09    136  |  100  |  56<H>  ----------------------------<  85  4.3   |  24  |  8.54<H>    Ca    9.5      09 Oct 2017 10:57  Phos  5.3     10-09  Mg     2.2     10-09    TPro  8.5<H>  /  Alb  3.3<L>  /  TBili  0.5  /  DBili  x   /  AST  13  /  ALT  14  /  AlkPhos  304<H>  10-09    PT/INR - ( 09 Oct 2017 10:57 )   PT: 12.5 sec;   INR: 1.14 ratio         PTT - ( 09 Oct 2017 10:57 )  PTT:30.3 sec  CAPILLARY BLOOD GLUCOSE  79 (09 Oct 2017 11:19)  86 (09 Oct 2017 07:38)                MEDICATIONS  (STANDING):  aspirin  chewable 81 milliGRAM(s) Oral daily  chlorhexidine 4% Liquid 1 Application(s) Topical once  cilostazol 50 milliGRAM(s) Oral daily  cinacalcet 60 milliGRAM(s) Oral daily  dextrose 5% + sodium chloride 0.9%. 1000 milliLiter(s) (30 mL/Hr) IV Continuous <Continuous>  heparin  Injectable 5000 Unit(s) SubCutaneous every 8 hours  influenza   Vaccine 0.5 milliLiter(s) IntraMuscular once  insulin lispro (HumaLOG) corrective regimen sliding scale   SubCutaneous three times a day before meals  metoprolol Injectable 2.5 milliGRAM(s) IV Push every 6 hours  sevelamer hydrochloride 800 milliGRAM(s) Oral three times a day with meals    MEDICATIONS  (PRN):      REVIEW OF SYSTEMS:  CONSTITUTIONAL: No fever, weight loss, or fatigue  EYES: No eye pain, visual disturbances, or discharge  ENMT:  No difficulty hearing, tinnitus, vertigo; No sinus or throat pain  NECK: No pain or stiffness  RESPIRATORY: No cough, wheezing, chills or hemoptysis; No shortness of breath  CARDIOVASCULAR: No chest pain, palpitations, dizziness, or leg swelling  GASTROINTESTINAL: No abdominal or epigastric pain. No nausea, vomiting, or hematemesis; No diarrhea or constipation. No melena or hematochezia.  GENITOURINARY: No dysuria, frequency, hematuria, or incontinence  NEUROLOGICAL: No headaches, memory loss, loss of strength, numbness, or tremors  SKIN: No itching, burning, rashes, or lesions   LYMPH NODES: No enlarged glands  ENDOCRINE: No heat or cold intolerance; No hair loss  MUSCULOSKELETAL: No joint pain or swelling; No muscle, back, or extremity pain  PSYCHIATRIC: No depression, anxiety, mood swings, or difficulty sleeping  HEME/LYMPH: No easy bruising, or bleeding gums  ALLERGY AND IMMUNOLOGIC: No hives or eczema    RADIOLOGY & ADDITIONAL TESTS:      Consultant(s) Notes Reviewed:  [ ] YES  [ ] NO    PHYSICAL EXAM:  GENERAL: NAD, well-groomed, well-developed  HEAD:  Atraumatic, Normocephalic  EYES: EOMI, PERRLA, conjunctiva and sclera clear  ENMT: No tonsillar erythema, exudates, or enlargement; Moist mucous membranes, Good dentition, No lesions  NECK: Supple, No JVD, Normal thyroid  NERVOUS SYSTEM:  Alert & Oriented X3, Good concentration; Motor Strength 5/5 B/L upper and lower extremities; DTRs 2+ intact and symmetric  CHEST/LUNG: Good air movement bilaterally; No rales, rhonchi, wheezing, or rubs  HEART: S1, S2; No murmurs, rubs, or gallops  ABDOMEN: Soft, Nontender, Nondistended; Bowel sounds present  EXTREMITIES:  2+ Peripheral Pulses, No clubbing, cyanosis, or edema  LYMPH: No lymphadenopathy noted  SKIN: No rashes or lesions    Care Collaborated Discussed with Consultants/Other Providers [ ] YES  [ ] NO Patient is a 71y old  Male who presents with a chief complaint of LLQ pain (09 Oct 2017 04:52)    penicillin (Hives)      INTERVAL HPI /OVERNIGHT EVENTS: no acute overnight events. Plan is for patient to have HD tomorrow AM, then surgery. Patient is now in agreement with this plan and will no longer leave AMA. Aware we are still waiting for stress test to be faxed from cardiologist office. Cardiologist Raúl called again at [phone]. Office states that they do not have any records for patient except a few progress notes; patient is non-compliant and has missed several appointments. Discussed with MD Del Valle and stress test ordered for tomorrow AM. Patient aware.     T(C): 36.8 (10-09-17 @ 14:38), Max: 36.8 (10-09-17 @ 04:18)  HR: 73 (10-09-17 @ 14:38) (70 - 107)  BP: 127/67 (10-09-17 @ 14:38) (111/70 - 129/65)  RR: 17 (10-09-17 @ 14:38) (14 - 18)  SpO2: 99% (10-09-17 @ 14:38) (97% - 100%)  I&O's Summary    Vital Signs Last 24 Hrs  T(C): 36.8 (09 Oct 2017 14:38), Max: 36.8 (09 Oct 2017 04:18)  T(F): 98.2 (09 Oct 2017 14:38), Max: 98.2 (09 Oct 2017 04:18)  HR: 73 (09 Oct 2017 14:38) (70 - 107)  BP: 127/67 (09 Oct 2017 14:38) (111/70 - 129/65)  BP(mean): --  RR: 17 (09 Oct 2017 14:38) (14 - 18)  SpO2: 99% (09 Oct 2017 14:38) (97% - 100%)  PAST MEDICAL & SURGICAL HISTORY:  Type 2 diabetes mellitus without complication, without long-term current use of insulin  Essential hypertension  Paroxysmal atrial fibrillation  ESRD (end stage renal disease) on dialysis  Pre-diabetes  CAD (coronary artery disease): LAD stent  PVD (peripheral vascular disease)  Atrial fibrillation  Obesity  Prostate cancer: prostate cancer  ESRD (end stage renal disease) on dialysis  HLD (hyperlipidemia)  HTN (hypertension)  No significant past surgical history  H/O cardiac catheterization: 4/25/2014 non obstructive disease  AV fistula: creation in right arm on 12/1/2015  AV fistula: h/o creation in 2005  History of prostatectomy      SOCIAL HISTORY  Alcohol:  Tobacco:  Illicit substance use:      FAMILY HISTORY:      LABS:                        10.8   3.9   )-----------( 144      ( 09 Oct 2017 10:57 )             36.3     10-09    136  |  100  |  56<H>  ----------------------------<  85  4.3   |  24  |  8.54<H>    Ca    9.5      09 Oct 2017 10:57  Phos  5.3     10-09  Mg     2.2     10-09    TPro  8.5<H>  /  Alb  3.3<L>  /  TBili  0.5  /  DBili  x   /  AST  13  /  ALT  14  /  AlkPhos  304<H>  10-09    PT/INR - ( 09 Oct 2017 10:57 )   PT: 12.5 sec;   INR: 1.14 ratio         PTT - ( 09 Oct 2017 10:57 )  PTT:30.3 sec  CAPILLARY BLOOD GLUCOSE  79 (09 Oct 2017 11:19)  86 (09 Oct 2017 07:38)                MEDICATIONS  (STANDING):  aspirin  chewable 81 milliGRAM(s) Oral daily  chlorhexidine 4% Liquid 1 Application(s) Topical once  cilostazol 50 milliGRAM(s) Oral daily  cinacalcet 60 milliGRAM(s) Oral daily  dextrose 5% + sodium chloride 0.9%. 1000 milliLiter(s) (30 mL/Hr) IV Continuous <Continuous>  heparin  Injectable 5000 Unit(s) SubCutaneous every 8 hours  influenza   Vaccine 0.5 milliLiter(s) IntraMuscular once  insulin lispro (HumaLOG) corrective regimen sliding scale   SubCutaneous three times a day before meals  metoprolol Injectable 2.5 milliGRAM(s) IV Push every 6 hours  sevelamer hydrochloride 800 milliGRAM(s) Oral three times a day with meals    MEDICATIONS  (PRN):      REVIEW OF SYSTEMS:  CONSTITUTIONAL: No fever, weight loss, or fatigue  EYES: No eye pain, visual disturbances, or discharge  ENMT:  No difficulty hearing, tinnitus, vertigo; No sinus or throat pain  NECK: No pain or stiffness  RESPIRATORY: No cough, wheezing, chills or hemoptysis; No shortness of breath  CARDIOVASCULAR: No chest pain, palpitations, dizziness, or leg swelling  GASTROINTESTINAL: No abdominal or epigastric pain. No nausea, vomiting, or hematemesis; No diarrhea or constipation. No melena or hematochezia.  GENITOURINARY: No dysuria, frequency, hematuria, or incontinence  NEUROLOGICAL: No headaches, memory loss, loss of strength, numbness, or tremors  SKIN: No itching, burning, rashes, or lesions   LYMPH NODES: No enlarged glands  ENDOCRINE: No heat or cold intolerance; No hair loss  MUSCULOSKELETAL: No joint pain or swelling; No muscle, back, or extremity pain  PSYCHIATRIC: No depression, anxiety, mood swings, or difficulty sleeping  HEME/LYMPH: No easy bruising, or bleeding gums  ALLERGY AND IMMUNOLOGIC: No hives or eczema    RADIOLOGY & ADDITIONAL TESTS:  < from: CT Abdomen and Pelvis No Cont (10.08.17 @ 22:03) >  IMPRESSION:  Partial small bowel obstruction related to a central ventral herniation   of small bowel and fat. The hernia neck measures 6.8 x 5.5 cm. There is   an adjacent right ventral hernia containing fat and saline reservoir from   the patient's prosthesis. Contrast is seen as distally as sigmoid colon.   Proximal small bowel loops are mildly distended with mild thickening and   mild interloop fluid.          Consultant(s) Notes Reviewed:  [x] YES  [ ] NO    PHYSICAL EXAM: Limited due to patient level of cooperation  GENERAL: NAD, well-groomed, well-developed male sitting OOB to chair  HEAD:  Atraumatic, Normocephalic  EYES: EOMI, PERRLA, conjunctiva and sclera clear  ENMT: No tonsillar erythema, exudates, or enlargement; Moist mucous membranes, Good dentition, No lesions  NECK: Supple, No JVD, Normal thyroid  NERVOUS SYSTEM:  Alert & Oriented X3,  Motor Strength 5/5 B/L upper and lower extremities; DTRs 2+ intact and symmetric  CHEST/LUNG: Good air movement bilaterally; No rales, rhonchi, wheezing, or rubs  HEART: S1, S2; No murmurs, rubs, or gallops  ABDOMEN: Soft, Nontender, Nondistended; Bowel sounds present      Care Collaborated Discussed with Consultants/Other Providers [ ] YES  [ ] NO Patient is a 71y old  Male who presents with a chief complaint of LLQ pain (09 Oct 2017 04:52)    penicillin (Hives)      INTERVAL HPI /OVERNIGHT EVENTS: no acute overnight events. Plan is for patient to have HD tomorrow AM, then surgery. Patient is now in agreement with this plan and will no longer leave AMA. Aware we are still waiting for stress test to be faxed from cardiologist office. Cardiologist Raúl called again at [phone]. Office states that they do not have any records for patient except a few progress notes; patient is non-compliant and has missed several appointments. Discussed with MD Del Valle and stress test ordered for tomorrow AM. Patient aware.     T(C): 36.8 (10-09-17 @ 14:38), Max: 36.8 (10-09-17 @ 04:18)  HR: 73 (10-09-17 @ 14:38) (70 - 107)  BP: 127/67 (10-09-17 @ 14:38) (111/70 - 129/65)  RR: 17 (10-09-17 @ 14:38) (14 - 18)  SpO2: 99% (10-09-17 @ 14:38) (97% - 100%)  I&O's Summary    Vital Signs Last 24 Hrs  T(C): 36.8 (09 Oct 2017 14:38), Max: 36.8 (09 Oct 2017 04:18)  T(F): 98.2 (09 Oct 2017 14:38), Max: 98.2 (09 Oct 2017 04:18)  HR: 73 (09 Oct 2017 14:38) (70 - 107)  BP: 127/67 (09 Oct 2017 14:38) (111/70 - 129/65)  BP(mean): --  RR: 17 (09 Oct 2017 14:38) (14 - 18)  SpO2: 99% (09 Oct 2017 14:38) (97% - 100%)  PAST MEDICAL & SURGICAL HISTORY:  Type 2 diabetes mellitus without complication, without long-term current use of insulin  Essential hypertension  Paroxysmal atrial fibrillation  ESRD (end stage renal disease) on dialysis  Pre-diabetes  CAD (coronary artery disease): LAD stent  PVD (peripheral vascular disease)  Atrial fibrillation  Obesity  Prostate cancer: prostate cancer  ESRD (end stage renal disease) on dialysis  HLD (hyperlipidemia)  HTN (hypertension)  No significant past surgical history  H/O cardiac catheterization: 4/25/2014 non obstructive disease  AV fistula: creation in right arm on 12/1/2015  AV fistula: h/o creation in 2005  History of prostatectomy          LABS:                        10.8   3.9   )-----------( 144      ( 09 Oct 2017 10:57 )             36.3     10-09    136  |  100  |  56<H>  ----------------------------<  85  4.3   |  24  |  8.54<H>    Ca    9.5      09 Oct 2017 10:57  Phos  5.3     10-09  Mg     2.2     10-09    TPro  8.5<H>  /  Alb  3.3<L>  /  TBili  0.5  /  DBili  x   /  AST  13  /  ALT  14  /  AlkPhos  304<H>  10-09    PT/INR - ( 09 Oct 2017 10:57 )   PT: 12.5 sec;   INR: 1.14 ratio         PTT - ( 09 Oct 2017 10:57 )  PTT:30.3 sec  CAPILLARY BLOOD GLUCOSE  79 (09 Oct 2017 11:19)  86 (09 Oct 2017 07:38)                MEDICATIONS  (STANDING):  aspirin  chewable 81 milliGRAM(s) Oral daily  chlorhexidine 4% Liquid 1 Application(s) Topical once  cilostazol 50 milliGRAM(s) Oral daily  cinacalcet 60 milliGRAM(s) Oral daily  dextrose 5% + sodium chloride 0.9%. 1000 milliLiter(s) (30 mL/Hr) IV Continuous <Continuous>  heparin  Injectable 5000 Unit(s) SubCutaneous every 8 hours  influenza   Vaccine 0.5 milliLiter(s) IntraMuscular once  insulin lispro (HumaLOG) corrective regimen sliding scale   SubCutaneous three times a day before meals  metoprolol Injectable 2.5 milliGRAM(s) IV Push every 6 hours  sevelamer hydrochloride 800 milliGRAM(s) Oral three times a day with meals    MEDICATIONS  (PRN):      REVIEW OF SYSTEMS:  CONSTITUTIONAL: No fever, weight loss, or fatigue  EYES: No eye pain, visual disturbances, or discharge  ENMT:  No difficulty hearing, tinnitus, vertigo; No sinus or throat pain  NECK: No pain or stiffness  RESPIRATORY: No cough, wheezing, chills or hemoptysis; No shortness of breath  CARDIOVASCULAR: No chest pain, palpitations, dizziness, or leg swelling  GASTROINTESTINAL: No abdominal or epigastric pain. No nausea, vomiting, or hematemesis; No diarrhea or constipation. No melena or hematochezia.  GENITOURINARY: No dysuria, frequency, hematuria, or incontinence  NEUROLOGICAL: No headaches, memory loss, loss of strength, numbness, or tremors  SKIN: No itching, burning, rashes, or lesions   LYMPH NODES: No enlarged glands  ENDOCRINE: No heat or cold intolerance; No hair loss  MUSCULOSKELETAL: No joint pain or swelling; No muscle, back, or extremity pain  PSYCHIATRIC: No depression, anxiety, mood swings, or difficulty sleeping  HEME/LYMPH: No easy bruising, or bleeding gums  ALLERGY AND IMMUNOLOGIC: No hives or eczema    RADIOLOGY & ADDITIONAL TESTS:  < from: CT Abdomen and Pelvis No Cont (10.08.17 @ 22:03) >  IMPRESSION:  Partial small bowel obstruction related to a central ventral herniation   of small bowel and fat. The hernia neck measures 6.8 x 5.5 cm. There is   an adjacent right ventral hernia containing fat and saline reservoir from   the patient's prosthesis. Contrast is seen as distally as sigmoid colon.   Proximal small bowel loops are mildly distended with mild thickening and   mild interloop fluid.          Consultant(s) Notes Reviewed:  [x] YES  [ ] NO    PHYSICAL EXAM: Limited due to patient level of cooperation  GENERAL: NAD, well-groomed, well-developed male sitting OOB to chair  HEAD:  Atraumatic, Normocephalic  EYES: EOMI, PERRLA, conjunctiva and sclera clear  ENMT: No tonsillar erythema, exudates, or enlargement; Moist mucous membranes, No lesions  NECK: Supple, No JVD, Normal thyroid  NERVOUS SYSTEM:  Alert & Oriented X3 - does not seem oriented to situation  CHEST/LUNG: Good air movement bilaterally; No rales, rhonchi, wheezing, or rubs  HEART: S1, S2; No murmurs, rubs, or gallops        Care Collaborated Discussed with Consultants/Other Providers [x] YES  [ ] NO

## 2017-10-09 NOTE — PROGRESS NOTE ADULT - SUBJECTIVE AND OBJECTIVE BOX
PRE-OP NOTE    Dx: PSBO, incisional hernia  Procedure: Repair of Incisional hernia, possible mesh, possible bowel resection  Surgeon: Dr Adamson	    patient will need medical optimization  as per cardio if recent stress is neg for ischemia, pt is cleared @ mod cardiac risk. awaiting fax results of old stress  informed consent to be obtained  PRE-OP for 10/10 with Dr Adamson

## 2017-10-09 NOTE — ED ADULT NURSE REASSESSMENT NOTE - NS ED NURSE REASSESS COMMENT FT1
pt resting comfortably no distress noted, av fistula right arm positive thrill and bruit, ed observation continues.

## 2017-10-09 NOTE — CONSULT NOTE ADULT - PROBLEM SELECTOR PROBLEM 2
Ventral hernia with obstruction but no gangrene
Hypertensive kidney disease with stage 5 chronic kidney disease

## 2017-10-09 NOTE — PROGRESS NOTE ADULT - SUBJECTIVE AND OBJECTIVE BOX
INTERVAL HPI/OVERNIGHT EVENTS:  Pt stable.   NPO.   No flatus/BM.  No further nausea or vomiting    Vital Signs Last 24 Hrs  T(C): 36.3 (09 Oct 2017 05:25), Max: 36.8 (08 Oct 2017 13:32)  T(F): 97.3 (09 Oct 2017 05:25), Max: 98.3 (08 Oct 2017 13:32)  HR: 70 (09 Oct 2017 11:19) (70 - 107)  BP: 127/66 (09 Oct 2017 11:19) (111/70 - 152/75)  BP(mean): --  RR: 14 (09 Oct 2017 11:19) (14 - 18)  SpO2: 100% (09 Oct 2017 11:19) (96% - 100%)    Physical:  Abdomen: Soft nondistended, nontender.  Large incisional hernia, non tender, defect is relatively small, reducible  I&O's Summary      LABS:                        10.8   3.9   )-----------( 144      ( 09 Oct 2017 10:57 )             36.3             10-09    136  |  100  |  56<H>  ----------------------------<  85  4.3   |  24  |  8.54<H>    Ca    9.5      09 Oct 2017 10:57  Phos  5.3     10-09  Mg     2.2     10-09    TPro  8.5<H>  /  Alb  3.3<L>  /  TBili  0.5  /  DBili  x   /  AST  13  /  ALT  14  /  AlkPhos  304<H>  10-09

## 2017-10-09 NOTE — PATIENT PROFILE ADULT. - PRO PAIN LIFE ADAPT
decreased activity level/inability to sleep/withdrawal from social contact/inability or reluctance to perform ADLs/decreased appetite/inability to enjoy life

## 2017-10-09 NOTE — H&P ADULT - NSHPPHYSICALEXAM_GEN_ALL_CORE
PHYSICAL EXAM:    GENERAL: NAD, well-groomed, well-developed  HEAD:  Atraumatic, Normocephalic  EYES: EOMI, PERRL, conjunctiva and sclera clear  ENMT: Moist mucous membranes, Good dentition, No lesions  NECK: Supple, No JVD  NERVOUS SYSTEM:  Alert & Oriented X3, Good concentration  CHEST/LUNG: Clear to percussion bilaterally; Normal respiratory effort  HEART: Regular rate and rhythm  ABDOMEN: Soft, Nontender, Nondistended; Bowel sounds present. (patient stated that he was having pain on admission and now that it gets better.)  + large ventral hernia, not reducible, tender.  chronically incarcerated ventral hernia, non tender  : normal external genitalia  BREASTS: no breast lumps  EXTREMITIES:  No clubbing, cyanosis, or edema  VASC: equal peripheral pulses  LYMPH: No lymphadenopathy noted  SKIN: No rashes or lesions  PSYCH: normal affect

## 2017-10-09 NOTE — H&P ADULT - NSHPREVIEWOFSYSTEMS_GEN_ALL_CORE
REVIEW OF SYSTEMS:    CONSTITUTIONAL: In no acute distress.  EYES: No eye pain, visual disturbances, or discharge  ENMT:  No difficulty hearing, tinnitus, vertigo; No sinus or throat pain  NECK: No pain or stiffness  BREASTS: No pain, masses, or nipple discharge  RESPIRATORY: No cough, wheezing, chills or hemoptysis; No shortness of breath  CARDIOVASCULAR: No chest pain, palpitations, dizziness, or leg swelling  GASTROINTESTINAL: LLQ abd pain  GENITOURINARY: No dysuria, frequency, hematuria, or incontinence  NEUROLOGICAL: No headaches, memory loss, loss of strength, numbness, or tremors  SKIN: No itching, burning, rashes, or lesions   LYMPH NODES: No enlarged glands  ENDOCRINE: No heat or cold intolerance; No hair loss  MUSCULOSKELETAL: No joint pain or swelling; No muscle, back, or extremity pain  PSYCHIATRIC: No depression, anxiety, mood swings, or difficulty sleeping  HEME/LYMPH: No easy bruising, or bleeding gums  ALLERGY AND IMMUNOLOGIC: No hives or eczema

## 2017-10-09 NOTE — H&P ADULT - ATTENDING COMMENTS
Patient seen and examined. Case fully discussed with  ER attending and medical resident. Reviewed chief complaint. Reviewed review of systems. Reviewed list of medications.  Reviewed physical exam.  Reviewed assessment and plan. agree with full H and P. Will follow up clinically. Will follow up with surgery and GI For small bowel obstruction.

## 2017-10-09 NOTE — H&P ADULT - ASSESSMENT
71yoM from home live alone ambulate with cane AAO x3 with h/o ESRD on HD Tu,Th,Sa (completed yesterday), HTN, DM, afib (not on anticoagulation, unknown reason), presents with LUQ abd pain today. Pain is constant, non radiating and confined to LLQ with tenderness as per patient. Patient denied nausea, vomiting, diarrhea, constipation and any other complaint. Patient had last BM yesterday AM. Colonoscopy done 6 year ago outpatient for screening and normal as per patient. No recent travel or antri biotics use.   CT abd done which showed Partial small bowel obstruction

## 2017-10-09 NOTE — H&P ADULT - NSHPLABSRESULTS_GEN_ALL_CORE
Vital Signs Last 24 Hrs      T(C): 36.8 (09 Oct 2017 04:18), Max: 36.8 (08 Oct 2017 13:32)  T(F): 98.2 (09 Oct 2017 04:18), Max: 98.3 (08 Oct 2017 13:32)  HR: 107 (09 Oct 2017 04:18) (71 - 107)  BP: 111/70 (09 Oct 2017 04:18) (111/70 - 152/75)  BP(mean): --  RR: 18 (09 Oct 2017 04:18) (18 - 18)  SpO2: 97% (09 Oct 2017 04:18) (96% - 99%)          Labs:                        10.9   4.6   )-----------( 120      ( 08 Oct 2017 14:21 )             36.8     10-08    136  |  98  |  48<H>  ----------------------------<  109<H>  4.4   |  28  |  6.98<H>    Ca    9.8      08 Oct 2017 14:21    TPro  8.7<H>  /  Alb  3.4<L>  /  TBili  0.3  /  DBili  x   /  AST  13  /  ALT  23  /  AlkPhos  294<H>  10-08

## 2017-10-10 ENCOUNTER — RESULT REVIEW (OUTPATIENT)
Age: 72
End: 2017-10-10

## 2017-10-10 LAB
ALBUMIN SERPL ELPH-MCNC: 3.3 G/DL — LOW (ref 3.5–5)
ALP SERPL-CCNC: 300 U/L — HIGH (ref 40–120)
ALT FLD-CCNC: 13 U/L DA — SIGNIFICANT CHANGE UP (ref 10–60)
ANION GAP SERPL CALC-SCNC: 13 MMOL/L — SIGNIFICANT CHANGE UP (ref 5–17)
AST SERPL-CCNC: 13 U/L — SIGNIFICANT CHANGE UP (ref 10–40)
BASOPHILS # BLD AUTO: 0 K/UL — SIGNIFICANT CHANGE UP (ref 0–0.2)
BASOPHILS NFR BLD AUTO: 1.2 % — SIGNIFICANT CHANGE UP (ref 0–2)
BILIRUB SERPL-MCNC: 0.4 MG/DL — SIGNIFICANT CHANGE UP (ref 0.2–1.2)
BUN SERPL-MCNC: 74 MG/DL — HIGH (ref 7–18)
CALCIUM SERPL-MCNC: 9 MG/DL — SIGNIFICANT CHANGE UP (ref 8.4–10.5)
CHLORIDE SERPL-SCNC: 99 MMOL/L — SIGNIFICANT CHANGE UP (ref 96–108)
CO2 SERPL-SCNC: 23 MMOL/L — SIGNIFICANT CHANGE UP (ref 22–31)
CREAT SERPL-MCNC: 10.5 MG/DL — HIGH (ref 0.5–1.3)
EOSINOPHIL # BLD AUTO: 0 K/UL — SIGNIFICANT CHANGE UP (ref 0–0.5)
EOSINOPHIL NFR BLD AUTO: 1.1 % — SIGNIFICANT CHANGE UP (ref 0–6)
GLUCOSE BLDC GLUCOMTR-MCNC: 127 MG/DL — HIGH (ref 70–99)
GLUCOSE BLDC GLUCOMTR-MCNC: 76 MG/DL — SIGNIFICANT CHANGE UP (ref 70–99)
GLUCOSE BLDC GLUCOMTR-MCNC: 78 MG/DL — SIGNIFICANT CHANGE UP (ref 70–99)
GLUCOSE BLDC GLUCOMTR-MCNC: 96 MG/DL — SIGNIFICANT CHANGE UP (ref 70–99)
GLUCOSE SERPL-MCNC: 77 MG/DL — SIGNIFICANT CHANGE UP (ref 70–99)
HCT VFR BLD CALC: 37 % — LOW (ref 39–50)
HGB BLD-MCNC: 11.2 G/DL — LOW (ref 13–17)
LYMPHOCYTES # BLD AUTO: 0.9 K/UL — LOW (ref 1–3.3)
LYMPHOCYTES # BLD AUTO: 21.7 % — SIGNIFICANT CHANGE UP (ref 13–44)
MAGNESIUM SERPL-MCNC: 2.2 MG/DL — SIGNIFICANT CHANGE UP (ref 1.6–2.6)
MCHC RBC-ENTMCNC: 27.1 PG — SIGNIFICANT CHANGE UP (ref 27–34)
MCHC RBC-ENTMCNC: 30.1 GM/DL — LOW (ref 32–36)
MCV RBC AUTO: 90 FL — SIGNIFICANT CHANGE UP (ref 80–100)
MONOCYTES # BLD AUTO: 0.4 K/UL — SIGNIFICANT CHANGE UP (ref 0–0.9)
MONOCYTES NFR BLD AUTO: 9.5 % — SIGNIFICANT CHANGE UP (ref 2–14)
NEUTROPHILS # BLD AUTO: 2.6 K/UL — SIGNIFICANT CHANGE UP (ref 1.8–7.4)
NEUTROPHILS NFR BLD AUTO: 66.5 % — SIGNIFICANT CHANGE UP (ref 43–77)
PHOSPHATE SERPL-MCNC: 6 MG/DL — HIGH (ref 2.5–4.5)
PLATELET # BLD AUTO: 141 K/UL — LOW (ref 150–400)
POTASSIUM SERPL-MCNC: 4.5 MMOL/L — SIGNIFICANT CHANGE UP (ref 3.5–5.3)
POTASSIUM SERPL-SCNC: 4.5 MMOL/L — SIGNIFICANT CHANGE UP (ref 3.5–5.3)
PROT SERPL-MCNC: 8.4 G/DL — HIGH (ref 6–8.3)
RBC # BLD: 4.11 M/UL — LOW (ref 4.2–5.8)
RBC # FLD: 17 % — HIGH (ref 10.3–14.5)
SODIUM SERPL-SCNC: 135 MMOL/L — SIGNIFICANT CHANGE UP (ref 135–145)
WBC # BLD: 4 K/UL — SIGNIFICANT CHANGE UP (ref 3.8–10.5)
WBC # FLD AUTO: 4 K/UL — SIGNIFICANT CHANGE UP (ref 3.8–10.5)

## 2017-10-10 PROCEDURE — 71010: CPT | Mod: 26

## 2017-10-10 PROCEDURE — 88302 TISSUE EXAM BY PATHOLOGIST: CPT | Mod: 26

## 2017-10-10 PROCEDURE — 49568: CPT | Mod: AS

## 2017-10-10 PROCEDURE — 49561: CPT | Mod: AS

## 2017-10-10 RX ORDER — HEPARIN SODIUM 5000 [USP'U]/ML
5000 INJECTION INTRAVENOUS; SUBCUTANEOUS EVERY 8 HOURS
Qty: 0 | Refills: 0 | Status: DISCONTINUED | OUTPATIENT
Start: 2017-10-10 | End: 2017-10-11

## 2017-10-10 RX ORDER — INSULIN LISPRO 100/ML
VIAL (ML) SUBCUTANEOUS
Qty: 0 | Refills: 0 | Status: DISCONTINUED | OUTPATIENT
Start: 2017-10-10 | End: 2017-10-11

## 2017-10-10 RX ORDER — SEVELAMER CARBONATE 2400 MG/1
1600 POWDER, FOR SUSPENSION ORAL
Qty: 0 | Refills: 0 | Status: DISCONTINUED | OUTPATIENT
Start: 2017-10-10 | End: 2017-10-10

## 2017-10-10 RX ORDER — SODIUM CHLORIDE 9 MG/ML
1000 INJECTION, SOLUTION INTRAVENOUS
Qty: 0 | Refills: 0 | Status: DISCONTINUED | OUTPATIENT
Start: 2017-10-10 | End: 2017-10-10

## 2017-10-10 RX ORDER — SEVELAMER CARBONATE 2400 MG/1
1600 POWDER, FOR SUSPENSION ORAL
Qty: 0 | Refills: 0 | Status: DISCONTINUED | OUTPATIENT
Start: 2017-10-10 | End: 2017-10-11

## 2017-10-10 RX ORDER — CINACALCET 30 MG/1
60 TABLET, FILM COATED ORAL DAILY
Qty: 0 | Refills: 0 | Status: DISCONTINUED | OUTPATIENT
Start: 2017-10-10 | End: 2017-10-11

## 2017-10-10 RX ORDER — HEPARIN SODIUM 5000 [USP'U]/ML
5000 INJECTION INTRAVENOUS; SUBCUTANEOUS EVERY 8 HOURS
Qty: 0 | Refills: 0 | Status: DISCONTINUED | OUTPATIENT
Start: 2017-10-10 | End: 2017-10-10

## 2017-10-10 RX ORDER — METOPROLOL TARTRATE 50 MG
2.5 TABLET ORAL EVERY 6 HOURS
Qty: 0 | Refills: 0 | Status: DISCONTINUED | OUTPATIENT
Start: 2017-10-10 | End: 2017-10-11

## 2017-10-10 RX ORDER — CILOSTAZOL 100 MG/1
50 TABLET ORAL DAILY
Qty: 0 | Refills: 0 | Status: DISCONTINUED | OUTPATIENT
Start: 2017-10-10 | End: 2017-10-11

## 2017-10-10 RX ORDER — ERYTHROPOIETIN 10000 [IU]/ML
10000 INJECTION, SOLUTION INTRAVENOUS; SUBCUTANEOUS
Qty: 0 | Refills: 0 | Status: DISCONTINUED | OUTPATIENT
Start: 2017-10-10 | End: 2017-10-11

## 2017-10-10 RX ORDER — OXYCODONE AND ACETAMINOPHEN 5; 325 MG/1; MG/1
1 TABLET ORAL EVERY 6 HOURS
Qty: 0 | Refills: 0 | Status: DISCONTINUED | OUTPATIENT
Start: 2017-10-10 | End: 2017-10-11

## 2017-10-10 RX ORDER — HYDROMORPHONE HYDROCHLORIDE 2 MG/ML
0.5 INJECTION INTRAMUSCULAR; INTRAVENOUS; SUBCUTANEOUS
Qty: 0 | Refills: 0 | Status: DISCONTINUED | OUTPATIENT
Start: 2017-10-10 | End: 2017-10-10

## 2017-10-10 RX ORDER — ASPIRIN/CALCIUM CARB/MAGNESIUM 324 MG
81 TABLET ORAL DAILY
Qty: 0 | Refills: 0 | Status: DISCONTINUED | OUTPATIENT
Start: 2017-10-10 | End: 2017-10-11

## 2017-10-10 RX ORDER — DEXTROSE 50 % IN WATER 50 %
25 SYRINGE (ML) INTRAVENOUS ONCE
Qty: 0 | Refills: 0 | Status: COMPLETED | OUTPATIENT
Start: 2017-10-10 | End: 2017-10-10

## 2017-10-10 RX ORDER — FENTANYL CITRATE 50 UG/ML
25 INJECTION INTRAVENOUS
Qty: 0 | Refills: 0 | Status: DISCONTINUED | OUTPATIENT
Start: 2017-10-10 | End: 2017-10-10

## 2017-10-10 RX ADMIN — Medication 2.5 MILLIGRAM(S): at 22:57

## 2017-10-10 RX ADMIN — Medication 25 MILLILITER(S): at 13:19

## 2017-10-10 RX ADMIN — HEPARIN SODIUM 5000 UNIT(S): 5000 INJECTION INTRAVENOUS; SUBCUTANEOUS at 22:57

## 2017-10-10 NOTE — PROGRESS NOTE ADULT - SUBJECTIVE AND OBJECTIVE BOX
CHIEF COMPLAINT:Patient is a 71y old  Male who presents with a chief complaint of LLQ pain .Pt appears comfortable.    	  REVIEW OF SYSTEMS:  CONSTITUTIONAL: No fever, weight loss, or fatigue  EYES: No eye pain, visual disturbances, or discharge  ENT:  No difficulty hearing, tinnitus, vertigo; No sinus or throat pain  NECK: No pain or stiffness  RESPIRATORY: No cough, wheezing, chills or hemoptysis; No Shortness of Breath  CARDIOVASCULAR: No chest pain, palpitations, passing out, dizziness, or leg swelling  GASTROINTESTINAL: No abdominal or epigastric pain. No nausea, vomiting, or hematemesis; No diarrhea or constipation. No melena or hematochezia.  GENITOURINARY: No dysuria, frequency, hematuria, or incontinence  NEUROLOGICAL: No headaches, memory loss, loss of strength, numbness, or tremors  SKIN: No itching, burning, rashes, or lesions   LYMPH Nodes: No enlarged glands  ENDOCRINE: No heat or cold intolerance; No hair loss  MUSCULOSKELETAL: No joint pain or swelling; No muscle, back, or extremity pain  PSYCHIATRIC: No depression, anxiety, mood swings, or difficulty sleeping  HEME/LYMPH: No easy bruising, or bleeding gums  ALLERGY AND IMMUNOLOGIC: No hives or eczema	      PHYSICAL EXAM:  T(C): 36.9 (10-10-17 @ 05:48), Max: 36.9 (10-10-17 @ 05:48)  HR: 82 (10-10-17 @ 05:48) (70 - 87)  BP: 107/63 (10-10-17 @ 05:48) (107/63 - 135/65)  RR: 16 (10-10-17 @ 05:48) (14 - 18)  SpO2: 95% (10-10-17 @ 05:48) (95% - 100%)      Appearance: Normal	  HEENT:   Normal oral mucosa, PERRL, EOMI	  Lymphatic: No lymphadenopathy  Cardiovascular: Normal S1 S2, No JVD, No murmurs, No edema  Respiratory: Lungs clear to auscultation	  Psychiatry: A & O x 3, Mood & affect appropriate  Gastrointestinal:  Soft, Non-tender, + BS	  Skin: No rashes, No ecchymoses, No cyanosis	  Neurologic: Non-focal  Extremities: Normal range of motion, No clubbing, cyanosis or edema  Vascular: Peripheral pulses palpable 2+ bilaterally    MEDICATIONS  (STANDING):  aspirin  chewable 81 milliGRAM(s) Oral daily  cilostazol 50 milliGRAM(s) Oral daily  cinacalcet 60 milliGRAM(s) Oral daily  dextrose 5% + sodium chloride 0.9%. 1000 milliLiter(s) (30 mL/Hr) IV Continuous <Continuous>  epoetin shell Injectable 93333 Unit(s) IV Push <User Schedule>  heparin  Injectable 5000 Unit(s) SubCutaneous every 8 hours  influenza   Vaccine 0.5 milliLiter(s) IntraMuscular once  insulin lispro (HumaLOG) corrective regimen sliding scale   SubCutaneous three times a day before meals  metoprolol Injectable 2.5 milliGRAM(s) IV Push every 6 hours  sevelamer hydrochloride 800 milliGRAM(s) Oral three times a day with meals      	  LABS:	 	    CARDIAC MARKERS:  CARDIAC MARKERS ( 08 Oct 2017 14:21 )  <0.015 ng/mL / x     / x     / x     / x                               10.8   3.9   )-----------( 144      ( 09 Oct 2017 10:57 )             36.3     10-09    136  |  100  |  56<H>  ----------------------------<  85  4.3   |  24  |  8.54<H>    Ca    9.5      09 Oct 2017 10:57  Phos  5.3     10-09  Mg     2.2     10-09    TPro  8.5<H>  /  Alb  3.3<L>  /  TBili  0.5  /  DBili  x   /  AST  13  /  ALT  14  /  AlkPhos  304<H>  10-09      Lipid Profile: Cholesterol 172  LDL 85  HDL 71  TG 82    HgA1c: Hemoglobin A1C, Whole Blood: 5.8 % (10-09 @ 13:18)    TSH: Thyroid Stimulating Hormone, Serum: 1.18 uU/mL (10-09 @ 10:57)

## 2017-10-10 NOTE — PROGRESS NOTE ADULT - SUBJECTIVE AND OBJECTIVE BOX
[facility] NEPHROLOGY- CONSULTATION NOTE    Patient is a 71y Male with ESRD on HD with ventral hernia with parital SBO.  Pt for HD today and then surgery afterwards.     REVIEW OF SYSTEMS: Denies any fevers, chills, SOB, chest pain, abd pain or LE edema     VITALS:  T(F): 98.5 (10-10-17 @ 05:48), Max: 98.5 (10-10-17 @ 05:48)  HR: 82 (10-10-17 @ 05:48)  BP: 107/63 (10-10-17 @ 05:48)  RR: 16 (10-10-17 @ 05:48)  SpO2: 95% (10-10-17 @ 05:48)  Wt(kg): --      PHYSICAL EXAM:  Constitutional: NAD  HEENT: anicteric sclera,  Neck: No JVD  Respiratory: CTA b/l No rales  Cardiovascular: S1, S2, RRR  Gastrointestinal: BS+, soft, ND, + large ventral hernia, not reducible, tender.  Extremities: No peripheral edema  Vascular Access:  RUE AVG benign, +thrill/bruit    LABS:  10-09    136  |  100  |  56<H>  ----------------------------<  85  4.3   |  24  |  8.54<H>    Ca    9.5      09 Oct 2017 10:57  Phos  5.3     10-09  Mg     2.2     10-09    TPro  8.5<H>  /  Alb  3.3<L>  /  TBili  0.5  /  DBili      /  AST  13  /  ALT  14  /  AlkPhos  304<H>  10-09    Creatinine Trend: 8.54 <--, 6.98 <--                        10.8   3.9   )-----------( 144      ( 09 Oct 2017 10:57 )             36.3     Urine Studies:  Urinalysis Basic - ( 09 Oct 2017 19:47 )    Color: Yellow / Appearance: Slightly Turbid / S.010 / pH:   Gluc:  / Ketone: Negative  / Bili: Negative / Urobili: Negative   Blood:  / Protein: 500 mg/dL / Nitrite: Negative   Leuk Esterase: Moderate / RBC: 5-10 /HPF / WBC 26-50 /HPF   Sq Epi:  / Non Sq Epi: Moderate / Bacteria: Many /HPF

## 2017-10-10 NOTE — PROGRESS NOTE ADULT - SUBJECTIVE AND OBJECTIVE BOX
CHIEF COMPLAINT:Patient is a 71y old  Male who presents with a chief complaint of LLQ pain (09 Oct 2017 04:52)    	  REVIEW OF SYSTEMS:  CONSTITUTIONAL: No fever, weight loss, or fatigue  EYES: No eye pain, visual disturbances, or discharge  ENMT:  No difficulty hearing, tinnitus, vertigo; No sinus or throat pain  NECK: No pain or stiffness  RESPIRATORY: No cough, wheezing, chills or hemoptysis; No Shortness of Breath  CARDIOVASCULAR: No chest pain, palpitations, passing out, dizziness, or leg swelling  GASTROINTESTINAL: No abdominal or epigastric pain. No nausea, vomiting, or hematemesis; No diarrhea or constipation. No melena or hematochezia.  GENITOURINARY: No dysuria, frequency, hematuria, or incontinence  NEUROLOGICAL: No headaches, memory loss, loss of strength, numbness, or tremors  SKIN: No itching, burning, rashes, or lesions   LYMPH Nodes: No enlarged glands  ENDOCRINE: No heat or cold intolerance; No hair loss  MUSCULOSKELETAL: No joint pain or swelling; No muscle, back, or extremity pain  PSYCHIATRIC: No depression, anxiety, mood swings, or difficulty sleeping  HEME/LYMPH: No easy bruising, or bleeding gums  ALLERY AND IMMUNOLOGIC: No hives or eczema	    [ ] All others negative	  [ ] Unable to obtain    PHYSICAL EXAM:  T(C): 36.4 (10-10-17 @ 22:35), Max: 36.9 (10-10-17 @ 05:48)  HR: 62 (10-10-17 @ 22:35) (56 - 93)  BP: 158/80 (10-10-17 @ 22:35) (107/63 - 161/84)  RR: 16 (10-10-17 @ 22:35) (16 - 19)  SpO2: 100% (10-10-17 @ 22:35) (95% - 100%)  Wt(kg): --  I&O's Summary    10 Oct 2017 07:01  -  10 Oct 2017 23:52  --------------------------------------------------------  IN: 1000 mL / OUT: 50 mL / NET: 950 mL        Appearance: Normal	  HEENT:   Normal oral mucosa, PERRL, EOMI	  Lymphatic: No lymphadenopathy  Cardiovascular: Normal S1 S2, No JVD, No murmurs, No edema  Respiratory: Lungs clear to auscultation	  Psychiatry: A & O x 3, Mood & affect appropriate  Gastrointestinal:  Soft, Non-tender, + BS	  Skin: No rashes, No ecchymoses, No cyanosis	  Neurologic: Non-focal  Extremities: Normal range of motion, No clubbing, cyanosis or edema  Vascular: Peripheral pulses palpable 2+ bilaterally    MEDICATIONS  (STANDING):  aspirin  chewable 81 milliGRAM(s) Oral daily  cilostazol 50 milliGRAM(s) Oral daily  cinacalcet 60 milliGRAM(s) Oral daily  dextrose 5% + sodium chloride 0.9%. 1000 milliLiter(s) (30 mL/Hr) IV Continuous <Continuous>  epoetin shell Injectable 76547 Unit(s) IV Push <User Schedule>  heparin  Injectable 5000 Unit(s) SubCutaneous every 8 hours  influenza   Vaccine 0.5 milliLiter(s) IntraMuscular once  insulin lispro (HumaLOG) corrective regimen sliding scale   SubCutaneous three times a day before meals  metoprolol Injectable 2.5 milliGRAM(s) IV Push every 6 hours  sevelamer hydrochloride 1600 milliGRAM(s) Oral three times a day with meals      TELEMETRY: 	    ECG:  	  RADIOLOGY:  OTHER: 	  	  CBC Full  -  ( 10 Oct 2017 11:40 )  WBC Count : 4.0 K/uL  Hemoglobin : 11.2 g/dL  Hematocrit : 37.0 %  Platelet Count - Automated : 141 K/uL  Mean Cell Volume : 90.0 fl  Mean Cell Hemoglobin : 27.1 pg  Mean Cell Hemoglobin Concentration : 30.1 gm/dL  Auto Neutrophil # : 2.6 K/uL  Auto Lymphocyte # : 0.9 K/uL  Auto Monocyte # : 0.4 K/uL  Auto Eosinophil # : 0.0 K/uL  Auto Basophil # : 0.0 K/uL  Auto Neutrophil % : 66.5 %  Auto Lymphocyte % : 21.7 %  Auto Monocyte % : 9.5 %  Auto Eosinophil % : 1.1 %  Auto Basophil % : 1.2 %        CARDIAC MARKERS:                              11.2   4.0   )-----------( 141      ( 10 Oct 2017 11:40 )             37.0       10-10    135  |  99  |  74<H>  ----------------------------<  77  4.5   |  23  |  10.50<H>    Ca    9.0      10 Oct 2017 11:40  Phos  6.0     10-10  Mg     2.2     10-10    TPro  8.4<H>  /  Alb  3.3<L>  /  TBili  0.4  /  DBili  x   /  AST  13  /  ALT  13  /  AlkPhos  300<H>  10-10      PT/INR - ( 09 Oct 2017 10:57 )   PT: 12.5 sec;   INR: 1.14 ratio         PTT - ( 09 Oct 2017 10:57 )  PTT:30.3 sec    Urinalysis Basic - ( 09 Oct 2017 19:47 )    Color: Yellow / Appearance: Slightly Turbid / S.010 / pH: x  Gluc: x / Ketone: Negative  / Bili: Negative / Urobili: Negative   Blood: x / Protein: 500 mg/dL / Nitrite: Negative   Leuk Esterase: Moderate / RBC: 5-10 /HPF / WBC 26-50 /HPF   Sq Epi: x / Non Sq Epi: Moderate / Bacteria: Many /HPF      proBNP:   Lipid Profile: Cholesterol 172  LDL 85  HDL 71  TG 82    HgA1c: Hemoglobin A1C, Whole Blood: 5.8 % (10-09 @ 13:18)    TSH: Thyroid Stimulating Hormone, Serum: 1.18 uU/mL (10-09 @ 10:57)

## 2017-10-10 NOTE — BRIEF OPERATIVE NOTE - PROCEDURE
<<-----Click on this checkbox to enter Procedure Central line placement  10/10/2017    Active  RLIND  Incisional hernia repair with mesh  10/10/2017    Active  RLIND  Lysis of abdominal adhesions  10/10/2017    Active  RLIND

## 2017-10-11 ENCOUNTER — TRANSCRIPTION ENCOUNTER (OUTPATIENT)
Age: 72
End: 2017-10-11

## 2017-10-11 VITALS
RESPIRATION RATE: 16 BRPM | SYSTOLIC BLOOD PRESSURE: 112 MMHG | OXYGEN SATURATION: 100 % | HEART RATE: 16 BPM | DIASTOLIC BLOOD PRESSURE: 63 MMHG | TEMPERATURE: 98 F

## 2017-10-11 DIAGNOSIS — K43.2 INCISIONAL HERNIA WITHOUT OBSTRUCTION OR GANGRENE: ICD-10-CM

## 2017-10-11 LAB — GLUCOSE BLDC GLUCOMTR-MCNC: 196 MG/DL — HIGH (ref 70–99)

## 2017-10-11 PROCEDURE — 83735 ASSAY OF MAGNESIUM: CPT

## 2017-10-11 PROCEDURE — 86901 BLOOD TYPING SEROLOGIC RH(D): CPT

## 2017-10-11 PROCEDURE — 83550 IRON BINDING TEST: CPT

## 2017-10-11 PROCEDURE — 85610 PROTHROMBIN TIME: CPT

## 2017-10-11 PROCEDURE — 80053 COMPREHEN METABOLIC PANEL: CPT

## 2017-10-11 PROCEDURE — 83010 ASSAY OF HAPTOGLOBIN QUANT: CPT

## 2017-10-11 PROCEDURE — 36415 COLL VENOUS BLD VENIPUNCTURE: CPT

## 2017-10-11 PROCEDURE — 71045 X-RAY EXAM CHEST 1 VIEW: CPT

## 2017-10-11 PROCEDURE — 96374 THER/PROPH/DIAG INJ IV PUSH: CPT

## 2017-10-11 PROCEDURE — 82310 ASSAY OF CALCIUM: CPT

## 2017-10-11 PROCEDURE — 84100 ASSAY OF PHOSPHORUS: CPT

## 2017-10-11 PROCEDURE — 82962 GLUCOSE BLOOD TEST: CPT

## 2017-10-11 PROCEDURE — 82746 ASSAY OF FOLIC ACID SERUM: CPT

## 2017-10-11 PROCEDURE — 85027 COMPLETE CBC AUTOMATED: CPT

## 2017-10-11 PROCEDURE — 83970 ASSAY OF PARATHORMONE: CPT

## 2017-10-11 PROCEDURE — 84443 ASSAY THYROID STIM HORMONE: CPT

## 2017-10-11 PROCEDURE — C1781: CPT

## 2017-10-11 PROCEDURE — 74176 CT ABD & PELVIS W/O CONTRAST: CPT

## 2017-10-11 PROCEDURE — 83605 ASSAY OF LACTIC ACID: CPT

## 2017-10-11 PROCEDURE — 83036 HEMOGLOBIN GLYCOSYLATED A1C: CPT

## 2017-10-11 PROCEDURE — 88302 TISSUE EXAM BY PATHOLOGIST: CPT

## 2017-10-11 PROCEDURE — 99285 EMERGENCY DEPT VISIT HI MDM: CPT | Mod: 25

## 2017-10-11 PROCEDURE — 93017 CV STRESS TEST TRACING ONLY: CPT

## 2017-10-11 PROCEDURE — A9502: CPT

## 2017-10-11 PROCEDURE — 93005 ELECTROCARDIOGRAM TRACING: CPT

## 2017-10-11 PROCEDURE — 80061 LIPID PANEL: CPT

## 2017-10-11 PROCEDURE — 99261: CPT

## 2017-10-11 PROCEDURE — 82607 VITAMIN B-12: CPT

## 2017-10-11 PROCEDURE — 86900 BLOOD TYPING SEROLOGIC ABO: CPT

## 2017-10-11 PROCEDURE — 85730 THROMBOPLASTIN TIME PARTIAL: CPT

## 2017-10-11 PROCEDURE — 83615 LACTATE (LD) (LDH) ENZYME: CPT

## 2017-10-11 PROCEDURE — 81001 URINALYSIS AUTO W/SCOPE: CPT

## 2017-10-11 PROCEDURE — 78452 HT MUSCLE IMAGE SPECT MULT: CPT

## 2017-10-11 PROCEDURE — 83690 ASSAY OF LIPASE: CPT

## 2017-10-11 PROCEDURE — 84484 ASSAY OF TROPONIN QUANT: CPT

## 2017-10-11 PROCEDURE — 86850 RBC ANTIBODY SCREEN: CPT

## 2017-10-11 PROCEDURE — 85045 AUTOMATED RETICULOCYTE COUNT: CPT

## 2017-10-11 RX ORDER — ERYTHROPOIETIN 10000 [IU]/ML
5000 INJECTION, SOLUTION INTRAVENOUS; SUBCUTANEOUS
Qty: 0 | Refills: 0 | Status: DISCONTINUED | OUTPATIENT
Start: 2017-10-11 | End: 2017-10-11

## 2017-10-11 RX ADMIN — OXYCODONE AND ACETAMINOPHEN 1 TABLET(S): 5; 325 TABLET ORAL at 05:47

## 2017-10-11 RX ADMIN — SEVELAMER CARBONATE 1600 MILLIGRAM(S): 2400 POWDER, FOR SUSPENSION ORAL at 08:26

## 2017-10-11 RX ADMIN — HEPARIN SODIUM 5000 UNIT(S): 5000 INJECTION INTRAVENOUS; SUBCUTANEOUS at 05:44

## 2017-10-11 RX ADMIN — Medication 1: at 11:41

## 2017-10-11 RX ADMIN — Medication 2.5 MILLIGRAM(S): at 05:45

## 2017-10-11 RX ADMIN — CILOSTAZOL 50 MILLIGRAM(S): 100 TABLET ORAL at 11:15

## 2017-10-11 RX ADMIN — CINACALCET 60 MILLIGRAM(S): 30 TABLET, FILM COATED ORAL at 11:16

## 2017-10-11 RX ADMIN — OXYCODONE AND ACETAMINOPHEN 1 TABLET(S): 5; 325 TABLET ORAL at 06:15

## 2017-10-11 RX ADMIN — Medication 2.5 MILLIGRAM(S): at 11:40

## 2017-10-11 RX ADMIN — Medication 81 MILLIGRAM(S): at 11:16

## 2017-10-11 RX ADMIN — SEVELAMER CARBONATE 1600 MILLIGRAM(S): 2400 POWDER, FOR SUSPENSION ORAL at 11:40

## 2017-10-11 NOTE — DISCHARGE NOTE ADULT - COMMUNITY RESOURCES
[facility]/ [address] [place] [address], [phone], 10/12/2017. 6:00 AM.

## 2017-10-11 NOTE — PROGRESS NOTE ADULT - SUBJECTIVE AND OBJECTIVE BOX
INTERVAL HPI/OVERNIGHT EVENTS:  Pt resting comfortably. No acute complaints.   NPO  Denies N/V    MEDICATIONS  (STANDING):  aspirin  chewable 81 milliGRAM(s) Oral daily  cilostazol 50 milliGRAM(s) Oral daily  cinacalcet 60 milliGRAM(s) Oral daily  dextrose 5% + sodium chloride 0.9%. 1000 milliLiter(s) (30 mL/Hr) IV Continuous <Continuous>  epoetin shell Injectable 92311 Unit(s) IV Push <User Schedule>  heparin  Injectable 5000 Unit(s) SubCutaneous every 8 hours  influenza   Vaccine 0.5 milliLiter(s) IntraMuscular once  insulin lispro (HumaLOG) corrective regimen sliding scale   SubCutaneous three times a day before meals  metoprolol Injectable 2.5 milliGRAM(s) IV Push every 6 hours  sevelamer hydrochloride 1600 milliGRAM(s) Oral three times a day with meals    MEDICATIONS  (PRN):  oxyCODONE    5 mG/acetaminophen 325 mG 1 Tablet(s) Oral every 6 hours PRN Moderate Pain (4 - 6)      Vital Signs Last 24 Hrs  T(C): 36.9 (11 Oct 2017 06:01), Max: 36.9 (11 Oct 2017 06:01)  T(F): 98.5 (11 Oct 2017 06:01), Max: 98.5 (11 Oct 2017 06:01)  HR: 97 (11 Oct 2017 06:01) (56 - 97)  BP: 125/65 (11 Oct 2017 06:01) (113/72 - 161/84)  BP(mean): 103 (10 Oct 2017 22:35) (93 - 107)  RR: 16 (11 Oct 2017 06:01) (16 - 19)  SpO2: 96% (11 Oct 2017 06:01) (96% - 100%)    Physical:  General: A&Ox3. NAD.  Abdomen: Soft nondistended, Dressing C/D/I     I&O's Detail    10 Oct 2017 07:01  -  11 Oct 2017 07:00  --------------------------------------------------------  IN:    0.9% NaCl: 1000 mL  Total IN: 1000 mL    OUT:    Bulb: 80 mL serosanguinous    Estimated Blood Loss: 20 mL    Indwelling Catheter - Suprapubic: 50 mL  Total OUT: 150 mL    Total NET: 850 mL          LABS:                        11.2   4.0   )-----------( 141      ( 10 Oct 2017 11:40 )             37.0             10-10    135  |  99  |  74<H>  ----------------------------<  77  4.5   |  23  |  10.50<H>    Ca    9.0      10 Oct 2017 11:40  Phos  6.0     10-10  Mg     2.2     10-10    TPro  8.4<H>  /  Alb  3.3<L>  /  TBili  0.4  /  DBili  x   /  AST  13  /  ALT  13  /  AlkPhos  300<H>  10-10

## 2017-10-11 NOTE — PROGRESS NOTE ADULT - PROBLEM SELECTOR PLAN 4
-patient not on any AC  -continue BB  -cardiology Ivon consulted for cardiac clearance
Continue phosphate binders. Monitor serum phosphorus.   Continue Vitamin D analog. Monitor serum calcium.
Serum calcium acceptable. Elevated serum phosphorus. c/w Sensipar. Renagel increased to 1600mg PO tid. Monitor serum phosphorus and serum calcium.
Serum calcium and serum phosphorus acceptable. c/w Sensipar and Renagel. Monitor serum phosphorus and serum calcium.

## 2017-10-11 NOTE — PROGRESS NOTE ADULT - PROBLEM SELECTOR PROBLEM 1
End-stage renal disease (ESRD)
Incisional hernia
Incisional hernia with obstruction
Incisional hernia with obstruction
Ventral hernia with obstruction but no gangrene

## 2017-10-11 NOTE — PROGRESS NOTE ADULT - PROBLEM SELECTOR PLAN 1
CT abd showed Partial small bowel obstruction related to a central ventral herniation of small bowel and fat. The hernia neck measures 6.8 x 5.5 cm.  SOPHY Hodgson consulted and following  Surgery Snow consulted and following: plan is for possible surgery tomorrow pending cardiac clearance  -continue IVF as patient is NPO
HD tomorrow am.  Continue with maintenance hemodialysis treatment. Monitor BMP.    While NPO, can give IVF NS @ 30ml/hr X 12h/day.
1-npo after midnight  2- informed consent to be obtained  3- pre op labs ordered  4- f/u med clearance  5- ivf while npo
Advance to clear liquid diet  Dressing change POD#2  Pain control prn  OOB
Last HD 10/10/17. Pt tolerated 2 hours of HD with net 1100 ml fluid removal (tx cut short due to OR schedule). Plan for next maintenance HD on 10/12/17.  Monitor BMP.
OOB, IS  CLD  Pain management  GI/DVT prophylaxis
Plan for maintenance hemodialysis today. Monitor BMP.  Please d/c IVF now.

## 2017-10-11 NOTE — PROGRESS NOTE ADULT - PROBLEM SELECTOR PLAN 5
-continue ASA and BB  -cardiology Ivon consulted for cardiac clearance  -follow up NP stress test
Surgery tomorrow.
For Surgery today
s/p incisional hernia repair with mesh 10/10/17.    f/u surgery

## 2017-10-11 NOTE — PROGRESS NOTE ADULT - ASSESSMENT
71 year-old man with ESRD on HD TTS with ventral hernia with SBO.  HTN.  Anemia of esrd.  Secondary hyperparathyroidism.
71 yr old Male  from home live alone ambulate with cane AAO x3 with h/o ESRD on HD Tu,Th,Sat (completed yesterday), HTN, DM, afib (not on anticoagulation, unknown reason), presents with LUQ abd pain ,found to have SBO-s/p hernia repair.  1.Advance diet as tolerated.  2.HD as per renal.  3.Continue cardiac medication.  4.GI and DVT prophylaxis.
71 yr old Male  from home live alone ambulate with cane AAO x3 with h/o ESRD on HD Tu,Th,Sat (completed yesterday), HTN, DM, afib (not on anticoagulation, unknown reason), presents with LUQ abd pain ,found to have SBO.  1.Pt for stress test this AM.  2.Add ASA 81mg qd.  3.HD as per renal.  4.Continue cardiac medication.  5.If stress test negative  for ischemia then can proceed for surgery at moderate risk for karuna-operative cardiac complication.  6.GI and DVT prophylaxis.
71y.o. Male s/p incisional hernia repair with mesh POD#1
Condition discussed with patient, options risks and benefits explained  Incisional hernia repair was advised if cleared by Medicine
Patient is going for hemodialysis and then to OR for hernia repair. Will follow up clinically.
Preop for 10/10
S/P incisional hernia repair with mesh, LOS, left subclavian CVL  Hemodynamically stable
71yoM from home lives alone ambulate with cane AAO x3 with h/o ESRD on HD Tu,Th,Sa,, HTN, DM, afib (not on anticoagulation, unknown reason), presents with LUQ abd pain x 1 day. Pain is constant, non radiating and confined to LLQ with tenderness as per patient. Patient denied nausea, vomiting, diarrhea, constipation and any other complaint. Patient had last BM AM prior to admission. Colonoscopy done 6 year ago outpatient for screening and normal as per patient. No recent travel or antibiotics use.   CT abd done which showed Partial small bowel obstruction related to a central ventral herniation of small bowel and fat. The hernia neck measures 6.8 x 5.5 cm. There is an adjacent right ventral hernia containing fat and saline reservoir from   the patient's prosthesis. Contrast is seen as distally as sigmoid colon. Proximal small bowel loops are mildly distended with mild thickening and mild interloop fluid.    Patient admitted to medicine for further management

## 2017-10-11 NOTE — DISCHARGE NOTE ADULT - PATIENT PORTAL LINK FT
“You can access the [employer] Patient Portal, offered by [facility], by registering with the following website: [email or web address]”
“You can access the [employer] Patient Portal, offered by [facility], by registering with the following website: [email or web address]”

## 2017-10-11 NOTE — PROGRESS NOTE ADULT - PROBLEM SELECTOR PROBLEM 2
ESRD (end stage renal disease) on dialysis
Hypertensive kidney disease with stage 5 chronic kidney disease

## 2017-10-11 NOTE — PROGRESS NOTE ADULT - SUBJECTIVE AND OBJECTIVE BOX
Post Op    Pt comfortable, pin well-controlled, good UO via suprapubic catheter    PE: comfortable, NAD  Vital Signs Last 24 Hrs  T(C): 36.6 (11 Oct 2017 00:28), Max: 36.9 (10 Oct 2017 05:48)  T(F): 97.9 (11 Oct 2017 00:28), Max: 98.5 (10 Oct 2017 05:48)  HR: 60 (11 Oct 2017 00:28) (56 - 93)  BP: 134/67 (11 Oct 2017 00:28) (107/63 - 161/84)  BP(mean): 103 (10 Oct 2017 22:35) (93 - 107)  RR: 16 (11 Oct 2017 00:28) (16 - 19)  SpO2: 100% (11 Oct 2017 00:28) (95% - 100%)    Abdomen: soft, ND, mild incisional tenderness; no BS, dressing C/D/I; LUDIVINA with  rachid    I&O's Detail    10 Oct 2017 07:01  -  11 Oct 2017 03:24  --------------------------------------------------------  IN:    0.9% NaCl: 1000 mL  Total IN: 1000 mL    OUT:    Bulb: 30 mL    Estimated Blood Loss: 20 mL  Total OUT: 50 mL    Total NET: 950 mL      MEDICATIONS  (STANDING):  aspirin  chewable 81 milliGRAM(s) Oral daily  cilostazol 50 milliGRAM(s) Oral daily  cinacalcet 60 milliGRAM(s) Oral daily  dextrose 5% + sodium chloride 0.9%. 1000 milliLiter(s) (30 mL/Hr) IV Continuous <Continuous>  epoetin shell Injectable 44594 Unit(s) IV Push <User Schedule>  heparin  Injectable 5000 Unit(s) SubCutaneous every 8 hours  influenza   Vaccine 0.5 milliLiter(s) IntraMuscular once  insulin lispro (HumaLOG) corrective regimen sliding scale   SubCutaneous three times a day before meals  metoprolol Injectable 2.5 milliGRAM(s) IV Push every 6 hours  sevelamer hydrochloride 1600 milliGRAM(s) Oral three times a day with meals    MEDICATIONS  (PRN):  oxyCODONE    5 mG/acetaminophen 325 mG 1 Tablet(s) Oral every 6 hours PRN Moderate Pain (4 - 6)

## 2017-10-11 NOTE — DISCHARGE NOTE ADULT - PLAN OF CARE
wound healing Please follow up with Dr. Adamson within 1 week   You will be discharged with LUDIVINA drains. You will need to empty them and record outputs accurately. This will be taught to you by the nursing staff. Please do not remove the LUDIVINA drains. They will be removed in the office. Please bring to the office accurate records of output.  Diet as tolerated   no heavy lifting or straining continue home regimen and follow up with PCP

## 2017-10-11 NOTE — DISCHARGE NOTE ADULT - HOSPITAL COURSE
71yoM from home live alone ambulate with cane AAO x3 with h/o ESRD on HD Tu,Th,Sa (completed yesterday), HTN, DM, afib (not on anticoagulation, unknown reason), presents with LUQ abd pain today. Pain is constant, non radiating and confined to LLQ with tenderness as per patient. Patient denied nausea, vomiting, diarrhea, constipation and any other complaint. Patient had last BM yesterday AM. Colonoscopy done 6 year ago outpatient for screening and normal as per patient. No recent travel or antri biotics use.   CT abd done which showed Partial small bowel obstruction related to a central ventral herniation of small bowel and fat. The hernia neck measures 6.8 x 5.5 cm. There is an adjacent right ventral hernia containing fat and saline reservoir from   the patient's prosthesis. Contrast is seen as distally as sigmoid colon. Proximal small bowel loops are mildly distended with mild thickening and mild interloop fluid.    Patient underwent HD on 10/10 and was then taken to the OR on 10/10 and underwent incisional hernia repair with mesh. Patient tolerated procedure well. Patients diet was advanced and tolerated. Patient for discharge home with LUDIVINA and follow up with Dr. Adamson within 1 week.

## 2017-10-11 NOTE — PROGRESS NOTE ADULT - PROBLEM SELECTOR PROBLEM 4
Atrial fibrillation
Secondary hyperparathyroidism

## 2017-10-11 NOTE — DISCHARGE NOTE ADULT - SECONDARY DIAGNOSIS.
CAD (coronary artery disease) Atrial fibrillation ESRD (end stage renal disease) on dialysis Essential hypertension

## 2017-10-11 NOTE — PROGRESS NOTE ADULT - PROBLEM SELECTOR PLAN 3
patient currently normotensive  -continue metoprolol   -continue to monitor BP per floor protocol  -DASH diet when patient taking PO
Continue OZZIE with hemodialysis. Monitor hemoglobin.
hgb elevated. Will decrease Epogen from 10,000 units to 5000 units IV tiw with HD. Monitor hemoglobin.
hgb stable. Continue OZZIE with hemodialysis. Monitor hemoglobin.

## 2017-10-11 NOTE — DISCHARGE NOTE ADULT - CARE PLAN
Principal Discharge DX:	Partial small bowel obstruction  Secondary Diagnosis:	Atrial fibrillation  Secondary Diagnosis:	CAD (coronary artery disease)  Secondary Diagnosis:	ESRD (end stage renal disease) on dialysis  Secondary Diagnosis:	Essential hypertension  Secondary Diagnosis:	HLD (hyperlipidemia)  Secondary Diagnosis:	Ventral hernia with obstruction but no gangrene
Principal Discharge DX:	Incisional hernia with obstruction  Goal:	wound healing  Instructions for follow-up, activity and diet:	Please follow up with Dr. Adamson within 1 week   You will be discharged with LUDIVINA drains. You will need to empty them and record outputs accurately. This will be taught to you by the nursing staff. Please do not remove the LUDIVINA drains. They will be removed in the office. Please bring to the office accurate records of output.  Diet as tolerated   no heavy lifting or straining  Secondary Diagnosis:	CAD (coronary artery disease)  Goal:	continue home regimen and follow up with PCP  Secondary Diagnosis:	Atrial fibrillation  Goal:	continue home regimen and follow up with PCP  Secondary Diagnosis:	ESRD (end stage renal disease) on dialysis  Goal:	continue home regimen and follow up with PCP  Secondary Diagnosis:	Essential hypertension  Goal:	continue home regimen and follow up with PCP

## 2017-10-11 NOTE — PROGRESS NOTE ADULT - PROBLEM SELECTOR PLAN 2
Neph Dr Shaffer following patient  -next HD 10/10/17 prior to surgery  -continue renagel  -continue epogen with HD session
Continue with current anti-hypertensive medications. Monitor BP.
BP controlled. Continue with current anti-hypertensive medications. Monitor BP.
BP controlled. Continue with current anti-hypertensive medications. Monitor BP.

## 2017-10-11 NOTE — PROGRESS NOTE ADULT - PROVIDER SPECIALTY LIST ADULT
Cardiology
Cardiology
Internal Medicine
Internal Medicine
Nephrology
Nephrology
Surgery
Nephrology

## 2017-10-11 NOTE — PROGRESS NOTE ADULT - SUBJECTIVE AND OBJECTIVE BOX
[facility] NEPHROLOGY- CONSULTATION NOTE    Patient is a 71y Male with ESRD on HD with ventral hernia with parital SBO.  s/p incisional hernia repair with mesh 10/10/17.      REVIEW OF SYSTEMS: Denies any fevers, chills, SOB, chest pain, abd pain or LE edema     VITALS:  T(F): 98.5 (10-11-17 @ 06:01), Max: 98.5 (10-11-17 @ 06:01)  HR: 97 (10-11-17 @ 06:01)  BP: 125/65 (10-11-17 @ 06:01)  RR: 16 (10-11-17 @ 06:01)  SpO2: 96% (10-11-17 @ 06:01)  Wt(kg): --    10-10 @ 07:01  -  10-11 @ 07:00  --------------------------------------------------------  IN: 1000 mL / OUT: 150 mL / NET: 850 mL      PHYSICAL EXAM:  Constitutional: NAD  HEENT: anicteric sclera,  Neck: No JVD  Respiratory: CTA b/l No rales  Cardiovascular: S1, S2, RRR  Gastrointestinal: + abd dressing CDI; +LUDIVINA drain with sanginous fluid  Extremities: No peripheral edema  Vascular Access:  RUE AVG benign, +thrill/bruit    LABS:  10-10    135  |  99  |  74<H>  ----------------------------<  77  4.5   |  23  |  10.50<H>    Ca    9.0      10 Oct 2017 11:40  Phos  6.0     10-10  Mg     2.2     10-10    TPro  8.4<H>  /  Alb  3.3<L>  /  TBili  0.4  /  DBili      /  AST  13  /  ALT  13  /  AlkPhos  300<H>  10-10    Creatinine Trend: 10.50 <--, 8.54 <--, 6.98 <--                        11.2   4.0   )-----------( 141      ( 10 Oct 2017 11:40 )             37.0     Urine Studies:  Urinalysis Basic - ( 09 Oct 2017 19:47 )    Color: Yellow / Appearance: Slightly Turbid / S.010 / pH:   Gluc:  / Ketone: Negative  / Bili: Negative / Urobili: Negative   Blood:  / Protein: 500 mg/dL / Nitrite: Negative   Leuk Esterase: Moderate / RBC: 5-10 /HPF / WBC 26-50 /HPF   Sq Epi:  / Non Sq Epi: Moderate / Bacteria: Many /HPF [facility] NEPHROLOGY- CONSULTATION NOTE    Patient is a 71y Male with ESRD on HD with ventral hernia with parital SBO.  s/p incisional hernia repair with mesh 10/10/17.      REVIEW OF SYSTEMS: Denies any fevers, chills, SOB, chest pain, abd pain or LE edema     VITALS:  T(F): 98.5 (10-11-17 @ 06:01), Max: 98.5 (10-11-17 @ 06:01)  HR: 97 (10-11-17 @ 06:01)  BP: 125/65 (10-11-17 @ 06:01)  RR: 16 (10-11-17 @ 06:01)  SpO2: 96% (10-11-17 @ 06:01)  Wt(kg): --    10-10 @ 07:01  -  10-11 @ 07:00  --------------------------------------------------------  IN: 1000 mL / OUT: 150 mL / NET: 850 mL      PHYSICAL EXAM:  Constitutional: NAD  HEENT: anicteric sclera,  Neck: left subclavian TLC- benign  Respiratory: CTA b/l No rales  Cardiovascular: S1, S2, RRR  Gastrointestinal: + abd dressing CDI; +LUDIVINA drain with sanginous fluid  Extremities: No peripheral edema  Vascular Access:  RUE AVG benign, +thrill/bruit    LABS:  10-10    135  |  99  |  74<H>  ----------------------------<  77  4.5   |  23  |  10.50<H>    Ca    9.0      10 Oct 2017 11:40  Phos  6.0     10-10  Mg     2.2     10-10    TPro  8.4<H>  /  Alb  3.3<L>  /  TBili  0.4  /  DBili      /  AST  13  /  ALT  13  /  AlkPhos  300<H>  10-10    Creatinine Trend: 10.50 <--, 8.54 <--, 6.98 <--                        11.2   4.0   )-----------( 141      ( 10 Oct 2017 11:40 )             37.0     Urine Studies:  Urinalysis Basic - ( 09 Oct 2017 19:47 )    Color: Yellow / Appearance: Slightly Turbid / S.010 / pH:   Gluc:  / Ketone: Negative  / Bili: Negative / Urobili: Negative   Blood:  / Protein: 500 mg/dL / Nitrite: Negative   Leuk Esterase: Moderate / RBC: 5-10 /HPF / WBC 26-50 /HPF   Sq Epi:  / Non Sq Epi: Moderate / Bacteria: Many /HPF

## 2017-10-11 NOTE — DISCHARGE NOTE ADULT - MEDICATION SUMMARY - MEDICATIONS TO TAKE
I will START or STAY ON the medications listed below when I get home from the hospital:    Imdur  -- 20 milligram(s) by mouth once a day  -- Indication: For Essential hypertension    atenolol 50 mg oral tablet  -- 1 tab(s) by mouth once a day  -- Indication: For Paroxysmal atrial fibrillation    SENSIPAR 60 MG TABLET  -- Indication: For Need for prophylactic measure    FUROSEMIDE 80 MG TABLET  -- Indication: For Need for prophylactic measure    cilostazol 50 mg oral tablet  -- 1 tab(s) by mouth once a day  -- Indication: For Need for prophylactic measure    RENVELA 800 MG TABLET  -- Indication: For ESRD (end stage renal disease) on dialysis
I will START or STAY ON the medications listed below when I get home from the hospital:    oxyCODONE-acetaminophen 5 mg-325 mg oral tablet  -- 1 tab(s) by mouth every 6 hours, As needed, Moderate Pain (4 - 6) MDD:4 tabs  -- Indication: For Prn pain     Imdur  -- 20 milligram(s) by mouth once a day  -- Indication: For CAD (coronary artery disease)    atenolol 50 mg oral tablet  -- 1 tab(s) by mouth once a day  -- Indication: For Htn     SENSIPAR 60 MG TABLET  -- Indication: For ESRD (end stage renal disease) on dialysis    FUROSEMIDE 80 MG TABLET  -- Indication: For End-stage renal disease (ESRD)    cilostazol 50 mg oral tablet  -- 1 tab(s) by mouth once a day  -- Indication: For CAD (coronary artery disease)    RENVELA 800 MG TABLET  -- Indication: For ESRD (end stage renal disease) on dialysis

## 2017-10-11 NOTE — PROGRESS NOTE ADULT - PROBLEM SELECTOR PROBLEM 5
CAD (coronary artery disease)
Ventral hernia without obstruction or gangrene

## 2017-10-11 NOTE — PROGRESS NOTE ADULT - ATTENDING COMMENTS
[facility] NEPHROLOGY  Armando Shaffer M.D.  Kendall Morales D.O.  Lilliam Cody M.D.  Oxana Hernandez, MSN, ANP-C    Telephone: [phone]  Facsimile: [phone]    [address] [place] [address]
[facility] NEPHROLOGY  Armando Shaffer M.D.  Kendall Morales D.O.  Lilliam Cody M.D.  Oxana Hernandez, MSN, ANP-C    Telephone: [phone]  Facsimile: [phone]    [address] [place] [address]
[facility] NEPHROLOGY  Armando Shaffer M.D.  Kendall Morales D.O.  Lilliam Cody M.D.  Oxana Hernandez, MSN, ANP-C    Telephone: [phone]  Facsimile: [phone]    [address] [place] [address]
Patient is seen and examined. Case reviewed with the medical team. Above note is appreciated. Will follow up clinically. Continue DVT prophylaxis. Case discussed with Cardiology, and GI and surgery. Patient needs cardiology clearance before going for above Hernia surgery. Patient had refused to go for stress test. Spoke with patient in detail. Patient now agrees to go for stress test prior to surgery. Nephrology follow up is appreciated.

## 2017-10-12 LAB — SURGICAL PATHOLOGY FINAL REPORT - CH: SIGNIFICANT CHANGE UP

## 2017-10-15 DIAGNOSIS — N25.81 SECONDARY HYPERPARATHYROIDISM OF RENAL ORIGIN: ICD-10-CM

## 2017-10-15 DIAGNOSIS — E13.22 OTHER SPECIFIED DIABETES MELLITUS WITH DIABETIC CHRONIC KIDNEY DISEASE: ICD-10-CM

## 2017-10-15 DIAGNOSIS — E66.9 OBESITY, UNSPECIFIED: ICD-10-CM

## 2017-10-15 DIAGNOSIS — Z83.79 FAMILY HISTORY OF OTHER DISEASES OF THE DIGESTIVE SYSTEM: ICD-10-CM

## 2017-10-15 DIAGNOSIS — Z90.79 ACQUIRED ABSENCE OF OTHER GENITAL ORGAN(S): ICD-10-CM

## 2017-10-15 DIAGNOSIS — Z82.49 FAMILY HISTORY OF ISCHEMIC HEART DISEASE AND OTHER DISEASES OF THE CIRCULATORY SYSTEM: ICD-10-CM

## 2017-10-15 DIAGNOSIS — D63.1 ANEMIA IN CHRONIC KIDNEY DISEASE: ICD-10-CM

## 2017-10-15 DIAGNOSIS — I25.10 ATHEROSCLEROTIC HEART DISEASE OF NATIVE CORONARY ARTERY WITHOUT ANGINA PECTORIS: ICD-10-CM

## 2017-10-15 DIAGNOSIS — I73.9 PERIPHERAL VASCULAR DISEASE, UNSPECIFIED: ICD-10-CM

## 2017-10-15 DIAGNOSIS — E78.5 HYPERLIPIDEMIA, UNSPECIFIED: ICD-10-CM

## 2017-10-15 DIAGNOSIS — K43.0 INCISIONAL HERNIA WITH OBSTRUCTION, WITHOUT GANGRENE: ICD-10-CM

## 2017-10-15 DIAGNOSIS — Z99.2 DEPENDENCE ON RENAL DIALYSIS: ICD-10-CM

## 2017-10-15 DIAGNOSIS — Z88.0 ALLERGY STATUS TO PENICILLIN: ICD-10-CM

## 2017-10-15 DIAGNOSIS — Z85.46 PERSONAL HISTORY OF MALIGNANT NEOPLASM OF PROSTATE: ICD-10-CM

## 2017-10-15 DIAGNOSIS — N18.6 END STAGE RENAL DISEASE: ICD-10-CM

## 2017-10-15 DIAGNOSIS — I12.0 HYPERTENSIVE CHRONIC KIDNEY DISEASE WITH STAGE 5 CHRONIC KIDNEY DISEASE OR END STAGE RENAL DISEASE: ICD-10-CM

## 2017-10-15 DIAGNOSIS — I48.0 PAROXYSMAL ATRIAL FIBRILLATION: ICD-10-CM

## 2017-10-18 PROBLEM — Z86.2 HISTORY OF ANEMIA: Status: RESOLVED | Noted: 2017-10-18 | Resolved: 2017-10-18

## 2017-10-18 PROBLEM — Z86.79 HISTORY OF ESSENTIAL HYPERTENSION: Status: RESOLVED | Noted: 2017-10-18 | Resolved: 2017-10-18

## 2017-10-18 PROBLEM — Z00.00 ENCOUNTER FOR PREVENTIVE HEALTH EXAMINATION: Status: ACTIVE | Noted: 2017-10-18

## 2017-10-18 PROBLEM — N18.6 ESRD ON DIALYSIS: Status: RESOLVED | Noted: 2017-10-18 | Resolved: 2017-10-18

## 2017-10-19 ENCOUNTER — APPOINTMENT (OUTPATIENT)
Dept: SURGERY | Facility: CLINIC | Age: 72
End: 2017-10-19

## 2017-10-19 VITALS
TEMPERATURE: 98.1 F | HEIGHT: 73 IN | BODY MASS INDEX: 28.9 KG/M2 | DIASTOLIC BLOOD PRESSURE: 82 MMHG | WEIGHT: 218.03 LBS | OXYGEN SATURATION: 98 % | HEART RATE: 91 BPM | SYSTOLIC BLOOD PRESSURE: 136 MMHG

## 2017-10-19 DIAGNOSIS — Z86.2 PERSONAL HISTORY OF DISEASES OF THE BLOOD AND BLOOD-FORMING ORGANS AND CERTAIN DISORDERS INVOLVING THE IMMUNE MECHANISM: ICD-10-CM

## 2017-10-19 DIAGNOSIS — N18.6 END STAGE RENAL DISEASE: ICD-10-CM

## 2017-10-19 DIAGNOSIS — Z99.2 END STAGE RENAL DISEASE: ICD-10-CM

## 2017-10-19 DIAGNOSIS — Z86.79 PERSONAL HISTORY OF OTHER DISEASES OF THE CIRCULATORY SYSTEM: ICD-10-CM

## 2017-10-19 DIAGNOSIS — Z63.4 DISAPPEARANCE AND DEATH OF FAMILY MEMBER: ICD-10-CM

## 2017-10-19 DIAGNOSIS — Z83.3 FAMILY HISTORY OF DIABETES MELLITUS: ICD-10-CM

## 2017-10-19 DIAGNOSIS — Z78.9 OTHER SPECIFIED HEALTH STATUS: ICD-10-CM

## 2017-10-19 DIAGNOSIS — Z86.39 PERSONAL HISTORY OF OTHER ENDOCRINE, NUTRITIONAL AND METABOLIC DISEASE: ICD-10-CM

## 2017-10-19 SDOH — SOCIAL STABILITY - SOCIAL INSECURITY: DISSAPEARANCE AND DEATH OF FAMILY MEMBER: Z63.4

## 2017-10-26 ENCOUNTER — INPATIENT (INPATIENT)
Facility: HOSPITAL | Age: 72
LOS: 0 days | Discharge: LEFT BEFORE DISCHARGE | DRG: 871 | End: 2017-10-26
Attending: SPECIALIST | Admitting: SPECIALIST
Payer: MEDICARE

## 2017-10-26 ENCOUNTER — APPOINTMENT (OUTPATIENT)
Dept: SURGERY | Facility: CLINIC | Age: 72
End: 2017-10-26

## 2017-10-26 VITALS
HEART RATE: 112 BPM | OXYGEN SATURATION: 100 % | SYSTOLIC BLOOD PRESSURE: 109 MMHG | TEMPERATURE: 99 F | HEIGHT: 73 IN | DIASTOLIC BLOOD PRESSURE: 89 MMHG | WEIGHT: 179.9 LBS | RESPIRATION RATE: 18 BRPM

## 2017-10-26 VITALS — TEMPERATURE: 102 F

## 2017-10-26 DIAGNOSIS — Z98.89 OTHER SPECIFIED POSTPROCEDURAL STATES: Chronic | ICD-10-CM

## 2017-10-26 DIAGNOSIS — E83.39 OTHER DISORDERS OF PHOSPHORUS METABOLISM: ICD-10-CM

## 2017-10-26 DIAGNOSIS — N18.6 END STAGE RENAL DISEASE: ICD-10-CM

## 2017-10-26 DIAGNOSIS — N25.81 SECONDARY HYPERPARATHYROIDISM OF RENAL ORIGIN: ICD-10-CM

## 2017-10-26 DIAGNOSIS — I77.0 ARTERIOVENOUS FISTULA, ACQUIRED: Chronic | ICD-10-CM

## 2017-10-26 DIAGNOSIS — D63.1 ANEMIA IN CHRONIC KIDNEY DISEASE: ICD-10-CM

## 2017-10-26 DIAGNOSIS — I12.0 HYPERTENSIVE CHRONIC KIDNEY DISEASE WITH STAGE 5 CHRONIC KIDNEY DISEASE OR END STAGE RENAL DISEASE: ICD-10-CM

## 2017-10-26 DIAGNOSIS — Z98.890 OTHER SPECIFIED POSTPROCEDURAL STATES: Chronic | ICD-10-CM

## 2017-10-26 DIAGNOSIS — K65.1 PERITONEAL ABSCESS: ICD-10-CM

## 2017-10-26 LAB
ALBUMIN SERPL ELPH-MCNC: 2.7 G/DL — LOW (ref 3.5–5)
ALP SERPL-CCNC: 198 U/L — HIGH (ref 40–120)
ALT FLD-CCNC: 16 U/L DA — SIGNIFICANT CHANGE UP (ref 10–60)
ANION GAP SERPL CALC-SCNC: 8 MMOL/L — SIGNIFICANT CHANGE UP (ref 5–17)
APTT BLD: 24.8 SEC — LOW (ref 27.5–37.4)
AST SERPL-CCNC: 19 U/L — SIGNIFICANT CHANGE UP (ref 10–40)
BASOPHILS # BLD AUTO: 0.1 K/UL — SIGNIFICANT CHANGE UP (ref 0–0.2)
BASOPHILS NFR BLD AUTO: 0.6 % — SIGNIFICANT CHANGE UP (ref 0–2)
BILIRUB SERPL-MCNC: 0.6 MG/DL — SIGNIFICANT CHANGE UP (ref 0.2–1.2)
BUN SERPL-MCNC: 26 MG/DL — HIGH (ref 7–18)
CALCIUM SERPL-MCNC: 9.2 MG/DL — SIGNIFICANT CHANGE UP (ref 8.4–10.5)
CHLORIDE SERPL-SCNC: 93 MMOL/L — LOW (ref 96–108)
CO2 SERPL-SCNC: 31 MMOL/L — SIGNIFICANT CHANGE UP (ref 22–31)
CREAT SERPL-MCNC: 4.46 MG/DL — HIGH (ref 0.5–1.3)
EOSINOPHIL # BLD AUTO: 0 K/UL — SIGNIFICANT CHANGE UP (ref 0–0.5)
EOSINOPHIL NFR BLD AUTO: 0.2 % — SIGNIFICANT CHANGE UP (ref 0–6)
GLUCOSE SERPL-MCNC: 116 MG/DL — HIGH (ref 70–99)
HCT VFR BLD CALC: 32.1 % — LOW (ref 39–50)
HGB BLD-MCNC: 9.8 G/DL — LOW (ref 13–17)
INR BLD: 1.4 RATIO — HIGH (ref 0.88–1.16)
LACTATE SERPL-SCNC: 1.6 MMOL/L — SIGNIFICANT CHANGE UP (ref 0.7–2)
LYMPHOCYTES # BLD AUTO: 0.7 K/UL — LOW (ref 1–3.3)
LYMPHOCYTES # BLD AUTO: 6.2 % — LOW (ref 13–44)
MCHC RBC-ENTMCNC: 26.4 PG — LOW (ref 27–34)
MCHC RBC-ENTMCNC: 30.6 GM/DL — LOW (ref 32–36)
MCV RBC AUTO: 86.3 FL — SIGNIFICANT CHANGE UP (ref 80–100)
MONOCYTES # BLD AUTO: 1.5 K/UL — HIGH (ref 0–0.9)
MONOCYTES NFR BLD AUTO: 12.3 % — SIGNIFICANT CHANGE UP (ref 2–14)
NEUTROPHILS # BLD AUTO: 9.7 K/UL — HIGH (ref 1.8–7.4)
NEUTROPHILS NFR BLD AUTO: 80.7 % — HIGH (ref 43–77)
PLATELET # BLD AUTO: 133 K/UL — LOW (ref 150–400)
POTASSIUM SERPL-MCNC: 3 MMOL/L — LOW (ref 3.5–5.3)
POTASSIUM SERPL-SCNC: 3 MMOL/L — LOW (ref 3.5–5.3)
PROT SERPL-MCNC: 7.9 G/DL — SIGNIFICANT CHANGE UP (ref 6–8.3)
PROTHROM AB SERPL-ACNC: 15.4 SEC — HIGH (ref 9.8–12.7)
RBC # BLD: 3.72 M/UL — LOW (ref 4.2–5.8)
RBC # FLD: 16.3 % — HIGH (ref 10.3–14.5)
SODIUM SERPL-SCNC: 132 MMOL/L — LOW (ref 135–145)
WBC # BLD: 12 K/UL — HIGH (ref 3.8–10.5)
WBC # FLD AUTO: 12 K/UL — HIGH (ref 3.8–10.5)

## 2017-10-26 PROCEDURE — 74176 CT ABD & PELVIS W/O CONTRAST: CPT | Mod: 26

## 2017-10-26 PROCEDURE — 71010: CPT | Mod: 26

## 2017-10-26 PROCEDURE — 99285 EMERGENCY DEPT VISIT HI MDM: CPT

## 2017-10-26 RX ORDER — PIPERACILLIN AND TAZOBACTAM 4; .5 G/20ML; G/20ML
3.38 INJECTION, POWDER, LYOPHILIZED, FOR SOLUTION INTRAVENOUS ONCE
Qty: 0 | Refills: 0 | Status: COMPLETED | OUTPATIENT
Start: 2017-10-26 | End: 2017-10-26

## 2017-10-26 RX ORDER — SODIUM CHLORIDE 9 MG/ML
3 INJECTION INTRAMUSCULAR; INTRAVENOUS; SUBCUTANEOUS ONCE
Qty: 0 | Refills: 0 | Status: COMPLETED | OUTPATIENT
Start: 2017-10-26 | End: 2017-10-26

## 2017-10-26 RX ORDER — ACETAMINOPHEN 500 MG
975 TABLET ORAL ONCE
Qty: 0 | Refills: 0 | Status: COMPLETED | OUTPATIENT
Start: 2017-10-26 | End: 2017-10-26

## 2017-10-26 RX ORDER — VANCOMYCIN HCL 1 G
1000 VIAL (EA) INTRAVENOUS ONCE
Qty: 0 | Refills: 0 | Status: COMPLETED | OUTPATIENT
Start: 2017-10-26 | End: 2017-10-26

## 2017-10-26 RX ORDER — SODIUM CHLORIDE 9 MG/ML
1000 INJECTION INTRAMUSCULAR; INTRAVENOUS; SUBCUTANEOUS ONCE
Qty: 0 | Refills: 0 | Status: COMPLETED | OUTPATIENT
Start: 2017-10-26 | End: 2017-10-26

## 2017-10-26 RX ADMIN — Medication 975 MILLIGRAM(S): at 14:44

## 2017-10-26 RX ADMIN — PIPERACILLIN AND TAZOBACTAM 200 GRAM(S): 4; .5 INJECTION, POWDER, LYOPHILIZED, FOR SOLUTION INTRAVENOUS at 14:22

## 2017-10-26 RX ADMIN — SODIUM CHLORIDE 3 MILLILITER(S): 9 INJECTION INTRAMUSCULAR; INTRAVENOUS; SUBCUTANEOUS at 14:02

## 2017-10-26 RX ADMIN — SODIUM CHLORIDE 2000 MILLILITER(S): 9 INJECTION INTRAMUSCULAR; INTRAVENOUS; SUBCUTANEOUS at 14:03

## 2017-10-26 RX ADMIN — Medication 250 MILLIGRAM(S): at 14:44

## 2017-10-26 NOTE — ED PROVIDER NOTE - PROGRESS NOTE DETAILS
Patient has very limited iv access, has fistulas in both arms. multiple attempts to place iv and draw blood by multiple nurses unsuccessful. I attempted IV in arm, unsuccessfully, pt initially refused EJ placement, but eventually agreed and 20 gauge right EJ placed by me and labs drawn. Pe was evaluated by surgery. IV abx given, pt evaluated by surgery, will admit, only 1 L of NS given because of ESRD.

## 2017-10-26 NOTE — CONSULT NOTE ADULT - ASSESSMENT
Patient is a 70yo Male with ESRD on HD s/p recent incisional hernia repair presents from the dialysis unit with purulent, foul discharge from LUDIVINA drain removal site.

## 2017-10-26 NOTE — CONSULT NOTE ADULT - PROBLEM SELECTOR RECOMMENDATION 4
Check serum calcium and serum phosphorus. c/w Sensipar 60mg PO daily.   Monitor serum phosphorus and serum calcium.

## 2017-10-26 NOTE — CONSULT NOTE ADULT - ATTENDING COMMENTS
[facility] NEPHROLOGY  Armando Shaffer M.D.  Kendall Morales D.O.  Lilliam Cody M.D.  Oxana Hernandez, MSN, ANP-C  [phone]    [address] [place] [address]

## 2017-10-26 NOTE — CONSULT NOTE ADULT - PROBLEM SELECTOR RECOMMENDATION 9
Last HG 10/26/17 at outpt dialysis facility. Plan for next maintenance HD 10/28/17. Check HepBsAg. Check BMP.

## 2017-10-26 NOTE — PROGRESS NOTE ADULT - SUBJECTIVE AND OBJECTIVE BOX
Called to see this patient in the ED who came in from hemodialysis with purulent, foul smelling drainage from his abdominal site where his [place] drain was removed by Dr. Adamson last week (Thursday), he's s/p an Incisional Hernia repair with vargas on 10/9/17. Since last thursday, the site was draining a small amount of fluid but the drainage began to increase in amount over the past few days. He states the drainage was foul smelling and while in HD yesterday his pants all of a sudden became saturated with pus. His temperature was taken at HD and he said it was elevated but he was not sure of the actual number. When he came to the ED from dialysis, he had a temp of 101.8 and was tachycardic to 112bpm, BP was wnl. Code sepsis was called, he was given a dose of zosyn & vancomycin by the ED attending (Dr. Alberts). Labs were drawn and wbc count was 12, lactate was1.2 and potassium was 3.0. A CT was then obtained which revealed an abdominal wall collection/abscess that was 8cm x 6cm.     In the ED, consent was obtained and 1% lidocaine with Epi was injected into the skin overlying the incision site and an 18G needle was inserted & pus was aspirated & cultured. Then a 16Fr wright was placed into the abdominal wall collection via the site of spontaneous drainage under normal sterile conditions. 500cc of sterile saline was used to irrigate the cavity. The cavity was irrigated until clear of pus & debris. Pressure dressing was applied to obliterate the space and then am abdominal binder was placed.     I had a lengthy conversation with the patient about the importance of being admitted and the need for IV antibiotics as to prevent the mesh from getting infected. The patient understands the importance of being admitted but he continues to stress over the fact that he lives alone and has numerous animals at home that need to be cared for. He did not have his phonebook or his cell phone so he is unable to call someone to take care of his apartment and his animals. The patient lost his wife fairly recently and is very worried about his home and animals. He made the decision to sign out against medical advice so he can get his affairs in order. He states that he will return back to the hospital later this evening after getting everything at home taken care of. It was conveyed to him that since he is leaving [place], he needs to be aware that after leaving the hospital despite complete treatment, he may become very ill at home, that delaying treatment could lead to an infection of his mesh that would require mesh removal, potential return of the hernia, possible bacteremia, sepsis and even death. The patient understands these risks and still wishes to sign out to take care of his animals and to get his affairs in order before committing to admission. The patient states that he will return to the ED later tonight.    This was conveyed to Dr. Adamson & the ED attending.     Patient signed the necessary AMA form. Called to see this patient in the ED who came in from hemodialysis with purulent, foul smelling drainage from his abdominal site where his [place] drain was removed by Dr. Adamson last week (Thursday), he's s/p an Incisional Hernia repair with sepramesh on 10/9/17. Since last thursday, the site was draining a small amount of fluid but the drainage began to increase in amount over the past few days. He states the drainage was foul smelling and while in HD yesterday his pants all of a sudden became saturated with pus. His temperature was taken at HD and he said it was elevated but he was not sure of the actual number. When he came to the ED from dialysis, he had a temp of 101.8 and was tachycardic to 112bpm, BP was wnl. Code sepsis was called, he was given a dose of zosyn & vancomycin by the ED attending (Dr. Alberts). Labs were drawn and wbc count was 12, lactate was1.2 and potassium was 3.0. A CT was then obtained which revealed an abdominal wall collection/abscess that was 8cm x 6cm. Patient reports passing flatus and having regular BMs.     Abdominal exam was significant for an indurated and fluctuant area over the incision site. Small drainage hole where the the wound dehisced was actively draining foul smelling, dark brown pus. His underwear was completely saturated with pus. Rest of the abdomen was soft, nontender, nondistended, no rebound, no guarding. He has a catheter coming from his right lower abdomen which is draining urine to a leg bag (suprapubic tube vs ileal conduit?).       In the ED, consent was obtained and 1% lidocaine with Epi was injected into the skin overlying the incision site and an 18G needle was inserted & pus was aspirated & cultured. Then a 16Fr wright was placed into the abdominal wall collection via the site of spontaneous drainage under normal sterile conditions. 500cc of sterile saline was used to irrigate the cavity. The cavity was irrigated until clear of pus & debris. Pressure dressing was applied to obliterate the space and then am abdominal binder was placed.     I had a lengthy conversation with the patient about the importance of being admitted and the need for IV antibiotics as to prevent the mesh from getting infected. The patient understands the importance of being admitted but he continues to stress over the fact that he lives alone and has numerous animals at home that need to be cared for. He did not have his phonebook or his cell phone so he is unable to call someone to take care of his apartment and his animals. The patient lost his wife fairly recently and is very worried about his home and animals. He made the decision to sign out against medical advice so he can get his affairs in order. He states that he will return back to the hospital later this evening after getting everything at home taken care of. It was conveyed to him that since he is leaving [place], he needs to be aware that after leaving the hospital despite complete treatment, he may become very ill at home, that delaying treatment could lead to an infection of his mesh that would require mesh removal, potential return of the hernia, possible bacteremia, sepsis and even death. The patient understands these risks and still wishes to sign out to take care of his animals and to get his affairs in order before committing to admission. The patient states that he will return to the ED later tonight.    This was conveyed to Dr. Adamson & the ED attending.     Patient signed the necessary AMA form.

## 2017-10-26 NOTE — ED PROVIDER NOTE - OBJECTIVE STATEMENT
71 male with PMHx of Afib, CAD, HTN, HLD, Obesity, DM, Prostate Cancer, PVD, and ESRD, on dialysis, and PSHx of AV fistula, Cardiac Catheterization, Prostatectomy and Abdominal surgery on 10/10, had drain removed last week, BIB EMS to the ED sent by dialysis center after wound on abdomen was found draining foul smelling pus all over pt's clothing. Pt denies fever, chills, abdominal pain, nausea, vomiting, diarrhea, or any other complaints. Allergies: Penicillin (hives).

## 2017-10-26 NOTE — CONSULT NOTE ADULT - SUBJECTIVE AND OBJECTIVE BOX
[facility] NEPHROLOGY- CONSULTATION NOTE  Pt well known to our service.   Patient is a 72yo Male with ESRD on HD (TTS @ [facility]/ [facility] dialysis unit; Nephrologist Dr. Shaffer), s/p recent incisional hernia repair presents from the dialysis unit with purulent, foul discharge from LUDIVINA drain removal site.   Pt had a recent repair of incarcerated incisional hernia with Sepramesh and lysis of adhesions on 10/10/17. Pt was d/c from [facility] on 10/11/17 with LUDIVINA drain. As per pt, the LUDIVINA drain was removed last week with no issues. He was getting dialyzed tody and toward the end of dialysis he noticed his clothes were all wet and there was foul odourous discharge from the LUDIVINA removal site. Pt denies any discharge prior to today.   Pt denies any fevers, chills, SOB, chest pain, abd pain or LE edema.     Last HD 10/26/17 at [facility]    PAST MEDICAL & SURGICAL HISTORY:  Type 2 diabetes mellitus without complication, without long-term current use of insulin  Essential hypertension  Paroxysmal atrial fibrillation  ESRD (end stage renal disease) on dialysis  Pre-diabetes  CAD (coronary artery disease): LAD stent  PVD (peripheral vascular disease)  Atrial fibrillation  Obesity  Prostate cancer: prostate cancer  ESRD (end stage renal disease) on dialysis  HLD (hyperlipidemia)  HTN (hypertension)  H/O cardiac catheterization: 4/25/2014 non obstructive disease  AV fistula: creation in right arm on 12/1/2015  AV fistula: h/o creation in 2005  History of prostatectomy    penicillin (Hives)    Home Medications Reviewed  Hospital Medications:   MEDICATIONS  (STANDING):  acetaminophen   Tablet 975 milliGRAM(s) Oral once  piperacillin/tazobactam IVPB. 3.375 Gram(s) IV Intermittent once  sodium chloride 0.9% Bolus 1000 milliLiter(s) IV Bolus once  sodium chloride 0.9% lock flush 3 milliLiter(s) IV Push once  vancomycin  IVPB 1000 milliGRAM(s) IV Intermittent once    FAMILY HISTORY:  Family history of hypertension in mother (Mother)      REVIEW OF SYSTEMS:  Gen: no changes in weight  HEENT: no rhinorrhea  Neck: no sore throat  Cards: no chest pain  Resp: no dyspnea  GI: as above. Denies any n/v/d.  : no dysuria or hematuria  Vascular: no LE edema  Derm: no rashes  Neuro: no numbness/tingling  All other review of systems is negative unless indicated above.    VITALS:  T(F): 101.8 (10-26-17 @ 11:57), Max: 101.8 (10-26-17 @ 11:57)  HR: 112 (10-26-17 @ 11:27)  BP: 109/89 (10-26-17 @ 11:27)  RR: 18 (10-26-17 @ 11:27)  SpO2: 100% (10-26-17 @ 11:27)  Wt(kg): --    Height (cm): 185.42 (10-26 @ 11:27)  Weight (kg): 81.6 (10-26 @ 11:27)  BMI (kg/m2): 23.7 (10-26 @ 11:27)  BSA (m2): 2.06 (10-26 @ 11:27)    PHYSICAL EXAM:  Gen: NAD, calm  HEENT: MMM  Neck: no JVD  Cards: RRR, +S1/S2, no M/G/R  Resp: CTA B/L  GI: soft, NT/ND, + abd bandage with mild drainage  : +supra-pubic catheter with leg bag  Extremities: trace LE edema B/L  Access: Right UE AVG +thrill +bruit    LABS:        Creatinine Trend:                         9.8    12.0  )-----------( 133      ( 26 Oct 2017 13:44 )             32.1     Urine Studies:      RADIOLOGY & ADDITIONAL STUDIES:

## 2017-10-26 NOTE — ED PROVIDER NOTE - PSH
AV fistula  creation in right arm on 12/1/2015  AV fistula  h/o creation in 2005  H/O abdominal surgery  10/10/17  H/O cardiac catheterization  4/25/2014 non obstructive disease  History of prostatectomy

## 2017-10-27 ENCOUNTER — INPATIENT (INPATIENT)
Facility: HOSPITAL | Age: 72
LOS: 2 days | Discharge: HOME HEALTH | DRG: 862 | End: 2017-10-30
Attending: SPECIALIST | Admitting: SPECIALIST
Payer: MEDICARE

## 2017-10-27 VITALS
RESPIRATION RATE: 19 BRPM | DIASTOLIC BLOOD PRESSURE: 74 MMHG | HEART RATE: 108 BPM | WEIGHT: 218.48 LBS | TEMPERATURE: 97 F | OXYGEN SATURATION: 100 % | SYSTOLIC BLOOD PRESSURE: 124 MMHG | HEIGHT: 73 IN

## 2017-10-27 DIAGNOSIS — K65.1 PERITONEAL ABSCESS: ICD-10-CM

## 2017-10-27 DIAGNOSIS — T14.8XXA OTHER INJURY OF UNSPECIFIED BODY REGION, INITIAL ENCOUNTER: ICD-10-CM

## 2017-10-27 DIAGNOSIS — I25.118 ATHEROSCLEROTIC HEART DISEASE OF NATIVE CORONARY ARTERY WITH OTHER FORMS OF ANGINA PECTORIS: ICD-10-CM

## 2017-10-27 DIAGNOSIS — I48.0 PAROXYSMAL ATRIAL FIBRILLATION: ICD-10-CM

## 2017-10-27 DIAGNOSIS — Z98.890 OTHER SPECIFIED POSTPROCEDURAL STATES: Chronic | ICD-10-CM

## 2017-10-27 DIAGNOSIS — I77.0 ARTERIOVENOUS FISTULA, ACQUIRED: Chronic | ICD-10-CM

## 2017-10-27 DIAGNOSIS — I10 ESSENTIAL (PRIMARY) HYPERTENSION: ICD-10-CM

## 2017-10-27 DIAGNOSIS — Z29.9 ENCOUNTER FOR PROPHYLACTIC MEASURES, UNSPECIFIED: ICD-10-CM

## 2017-10-27 DIAGNOSIS — Z98.89 OTHER SPECIFIED POSTPROCEDURAL STATES: Chronic | ICD-10-CM

## 2017-10-27 DIAGNOSIS — T81.4XXA INFECTION FOLLOWING A PROCEDURE, INITIAL ENCOUNTER: ICD-10-CM

## 2017-10-27 DIAGNOSIS — N18.6 END STAGE RENAL DISEASE: ICD-10-CM

## 2017-10-27 LAB
ANION GAP SERPL CALC-SCNC: 11 MMOL/L — SIGNIFICANT CHANGE UP (ref 5–17)
BASOPHILS # BLD AUTO: 0.1 K/UL — SIGNIFICANT CHANGE UP (ref 0–0.2)
BASOPHILS NFR BLD AUTO: 0.5 % — SIGNIFICANT CHANGE UP (ref 0–2)
BUN SERPL-MCNC: 34 MG/DL — HIGH (ref 7–18)
CALCIUM SERPL-MCNC: 9.9 MG/DL — SIGNIFICANT CHANGE UP (ref 8.4–10.5)
CHLORIDE SERPL-SCNC: 95 MMOL/L — LOW (ref 96–108)
CO2 SERPL-SCNC: 23 MMOL/L — SIGNIFICANT CHANGE UP (ref 22–31)
CREAT SERPL-MCNC: 5.93 MG/DL — HIGH (ref 0.5–1.3)
EOSINOPHIL # BLD AUTO: 0.1 K/UL — SIGNIFICANT CHANGE UP (ref 0–0.5)
EOSINOPHIL NFR BLD AUTO: 0.7 % — SIGNIFICANT CHANGE UP (ref 0–6)
GLUCOSE SERPL-MCNC: 78 MG/DL — SIGNIFICANT CHANGE UP (ref 70–99)
HCT VFR BLD CALC: 33.8 % — LOW (ref 39–50)
HGB BLD-MCNC: 10.3 G/DL — LOW (ref 13–17)
LYMPHOCYTES # BLD AUTO: 1 K/UL — SIGNIFICANT CHANGE UP (ref 1–3.3)
LYMPHOCYTES # BLD AUTO: 8.7 % — LOW (ref 13–44)
MCHC RBC-ENTMCNC: 27 PG — SIGNIFICANT CHANGE UP (ref 27–34)
MCHC RBC-ENTMCNC: 30.4 GM/DL — LOW (ref 32–36)
MCV RBC AUTO: 88.6 FL — SIGNIFICANT CHANGE UP (ref 80–100)
MONOCYTES # BLD AUTO: 1.2 K/UL — HIGH (ref 0–0.9)
MONOCYTES NFR BLD AUTO: 10.1 % — SIGNIFICANT CHANGE UP (ref 2–14)
NEUTROPHILS # BLD AUTO: 9.5 K/UL — HIGH (ref 1.8–7.4)
NEUTROPHILS NFR BLD AUTO: 80.1 % — HIGH (ref 43–77)
PLATELET # BLD AUTO: 138 K/UL — LOW (ref 150–400)
POTASSIUM SERPL-MCNC: 4.9 MMOL/L — SIGNIFICANT CHANGE UP (ref 3.5–5.3)
POTASSIUM SERPL-SCNC: 4.9 MMOL/L — SIGNIFICANT CHANGE UP (ref 3.5–5.3)
RBC # BLD: 3.81 M/UL — LOW (ref 4.2–5.8)
RBC # FLD: 16.9 % — HIGH (ref 10.3–14.5)
SODIUM SERPL-SCNC: 129 MMOL/L — LOW (ref 135–145)
WBC # BLD: 11.9 K/UL — HIGH (ref 3.8–10.5)
WBC # FLD AUTO: 11.9 K/UL — HIGH (ref 3.8–10.5)

## 2017-10-27 PROCEDURE — 99285 EMERGENCY DEPT VISIT HI MDM: CPT | Mod: 25

## 2017-10-27 RX ORDER — SEVELAMER CARBONATE 2400 MG/1
800 POWDER, FOR SUSPENSION ORAL
Qty: 0 | Refills: 0 | Status: DISCONTINUED | OUTPATIENT
Start: 2017-10-27 | End: 2017-10-27

## 2017-10-27 RX ORDER — CILOSTAZOL 100 MG/1
50 TABLET ORAL DAILY
Qty: 0 | Refills: 0 | Status: ACTIVE | OUTPATIENT
Start: 2017-10-27 | End: 2018-09-25

## 2017-10-27 RX ORDER — ATENOLOL 25 MG/1
50 TABLET ORAL DAILY
Qty: 0 | Refills: 0 | Status: DISCONTINUED | OUTPATIENT
Start: 2017-10-27 | End: 2017-10-27

## 2017-10-27 RX ORDER — PIPERACILLIN AND TAZOBACTAM 4; .5 G/20ML; G/20ML
3.38 INJECTION, POWDER, LYOPHILIZED, FOR SOLUTION INTRAVENOUS EVERY 12 HOURS
Qty: 0 | Refills: 0 | Status: DISCONTINUED | OUTPATIENT
Start: 2017-10-27 | End: 2017-10-28

## 2017-10-27 RX ORDER — OXYCODONE AND ACETAMINOPHEN 5; 325 MG/1; MG/1
1 TABLET ORAL EVERY 6 HOURS
Qty: 0 | Refills: 0 | Status: DISCONTINUED | OUTPATIENT
Start: 2017-10-27 | End: 2017-10-27

## 2017-10-27 RX ORDER — SODIUM CHLORIDE 9 MG/ML
1000 INJECTION INTRAMUSCULAR; INTRAVENOUS; SUBCUTANEOUS
Qty: 0 | Refills: 0 | Status: ACTIVE | OUTPATIENT
Start: 2017-10-27 | End: 2018-09-25

## 2017-10-27 RX ORDER — SEVELAMER CARBONATE 2400 MG/1
800 POWDER, FOR SUSPENSION ORAL DAILY
Qty: 0 | Refills: 0 | Status: ACTIVE | OUTPATIENT
Start: 2017-10-27 | End: 2018-09-25

## 2017-10-27 RX ORDER — ISOSORBIDE MONONITRATE 60 MG/1
20 TABLET, EXTENDED RELEASE ORAL
Qty: 0 | Refills: 0 | Status: DISCONTINUED | OUTPATIENT
Start: 2017-10-27 | End: 2017-10-27

## 2017-10-27 RX ORDER — CINACALCET 30 MG/1
60 TABLET, FILM COATED ORAL DAILY
Qty: 0 | Refills: 0 | Status: ACTIVE | OUTPATIENT
Start: 2017-10-27 | End: 2018-09-25

## 2017-10-27 RX ORDER — CINACALCET 30 MG/1
60 TABLET, FILM COATED ORAL DAILY
Qty: 0 | Refills: 0 | Status: DISCONTINUED | OUTPATIENT
Start: 2017-10-27 | End: 2017-10-30

## 2017-10-27 RX ORDER — HEPARIN SODIUM 5000 [USP'U]/ML
5000 INJECTION INTRAVENOUS; SUBCUTANEOUS EVERY 8 HOURS
Qty: 0 | Refills: 0 | Status: DISCONTINUED | OUTPATIENT
Start: 2017-10-27 | End: 2017-10-30

## 2017-10-27 RX ORDER — CILOSTAZOL 100 MG/1
50 TABLET ORAL DAILY
Qty: 0 | Refills: 0 | Status: DISCONTINUED | OUTPATIENT
Start: 2017-10-27 | End: 2017-10-30

## 2017-10-27 RX ORDER — FUROSEMIDE 40 MG
80 TABLET ORAL DAILY
Qty: 0 | Refills: 0 | Status: ACTIVE | OUTPATIENT
Start: 2017-10-27 | End: 2018-09-25

## 2017-10-27 RX ORDER — ISOSORBIDE MONONITRATE 60 MG/1
20 TABLET, EXTENDED RELEASE ORAL DAILY
Qty: 0 | Refills: 0 | Status: DISCONTINUED | OUTPATIENT
Start: 2017-10-27 | End: 2017-10-27

## 2017-10-27 RX ORDER — SEVELAMER CARBONATE 2400 MG/1
1600 POWDER, FOR SUSPENSION ORAL
Qty: 0 | Refills: 0 | Status: DISCONTINUED | OUTPATIENT
Start: 2017-10-27 | End: 2017-10-29

## 2017-10-27 RX ORDER — ATENOLOL 25 MG/1
50 TABLET ORAL DAILY
Qty: 0 | Refills: 0 | Status: DISCONTINUED | OUTPATIENT
Start: 2017-10-27 | End: 2017-10-30

## 2017-10-27 RX ORDER — ATENOLOL 25 MG/1
50 TABLET ORAL DAILY
Qty: 0 | Refills: 0 | Status: ACTIVE | OUTPATIENT
Start: 2017-10-27 | End: 2018-09-25

## 2017-10-27 RX ORDER — ERYTHROPOIETIN 10000 [IU]/ML
5000 INJECTION, SOLUTION INTRAVENOUS; SUBCUTANEOUS
Qty: 0 | Refills: 0 | Status: DISCONTINUED | OUTPATIENT
Start: 2017-10-27 | End: 2017-10-30

## 2017-10-27 RX ORDER — ISOSORBIDE MONONITRATE 60 MG/1
30 TABLET, EXTENDED RELEASE ORAL DAILY
Qty: 0 | Refills: 0 | Status: DISCONTINUED | OUTPATIENT
Start: 2017-10-27 | End: 2017-10-30

## 2017-10-27 RX ORDER — FUROSEMIDE 40 MG
80 TABLET ORAL DAILY
Qty: 0 | Refills: 0 | Status: DISCONTINUED | OUTPATIENT
Start: 2017-10-27 | End: 2017-10-30

## 2017-10-27 RX ORDER — OXYCODONE AND ACETAMINOPHEN 5; 325 MG/1; MG/1
1 TABLET ORAL EVERY 6 HOURS
Qty: 0 | Refills: 0 | Status: DISCONTINUED | OUTPATIENT
Start: 2017-10-27 | End: 2017-10-30

## 2017-10-27 RX ORDER — ISOSORBIDE MONONITRATE 60 MG/1
20 TABLET, EXTENDED RELEASE ORAL
Qty: 0 | Refills: 0 | Status: ACTIVE | OUTPATIENT
Start: 2017-10-27 | End: 2018-09-25

## 2017-10-27 RX ADMIN — HEPARIN SODIUM 5000 UNIT(S): 5000 INJECTION INTRAVENOUS; SUBCUTANEOUS at 15:51

## 2017-10-27 RX ADMIN — SEVELAMER CARBONATE 1600 MILLIGRAM(S): 2400 POWDER, FOR SUSPENSION ORAL at 13:30

## 2017-10-27 RX ADMIN — ISOSORBIDE MONONITRATE 30 MILLIGRAM(S): 60 TABLET, EXTENDED RELEASE ORAL at 13:32

## 2017-10-27 RX ADMIN — SEVELAMER CARBONATE 1600 MILLIGRAM(S): 2400 POWDER, FOR SUSPENSION ORAL at 19:49

## 2017-10-27 RX ADMIN — CILOSTAZOL 50 MILLIGRAM(S): 100 TABLET ORAL at 13:31

## 2017-10-27 RX ADMIN — HEPARIN SODIUM 5000 UNIT(S): 5000 INJECTION INTRAVENOUS; SUBCUTANEOUS at 21:07

## 2017-10-27 RX ADMIN — PIPERACILLIN AND TAZOBACTAM 25 GRAM(S): 4; .5 INJECTION, POWDER, LYOPHILIZED, FOR SOLUTION INTRAVENOUS at 07:46

## 2017-10-27 RX ADMIN — CINACALCET 60 MILLIGRAM(S): 30 TABLET, FILM COATED ORAL at 13:31

## 2017-10-27 RX ADMIN — PIPERACILLIN AND TAZOBACTAM 25 GRAM(S): 4; .5 INJECTION, POWDER, LYOPHILIZED, FOR SOLUTION INTRAVENOUS at 20:23

## 2017-10-27 NOTE — CONSULT NOTE ADULT - SUBJECTIVE AND OBJECTIVE BOX
Patient is a 71yoM from home, ambulate with cane, PMH of ESRD on HD Tu,Th,Sa (completed yesterday), HTN, DM, afib (not on anticoagulation, unknown reason) and PSH of abdominal hernia repair on October 10.2017 by Dr Camilo presented with purulent drainage from the incision site that was noticed yesterday during dialysis. Patient has a  admitted to surgery for abdominal abscess. Medicine was called to follow up on medical issues.         PMH/PSH: Type 2 diabetes mellitus without complication, without long-term current use of insulin  Essential hypertension  Paroxysmal atrial fibrillation  ESRD (end stage renal disease) on dialysis  Pre-diabetes  CAD (coronary artery disease)  PVD (peripheral vascular disease)  Atrial fibrillation  Obesity  Prostate cancer  ESRD (end stage renal disease) on dialysis  HLD (hyperlipidemia)  HTN (hypertension)  H/O abdominal surgery  No significant past surgical history  H/O cardiac catheterization  AV fistula  AV fistula  History of prostatectomy  ,   Allergy: penicillin (Hives)  ,   Social history: ,   Family History: Family history of hypertension in mother (Mother)    Anesthesia reaction (prior) -    ECHO/ stress test: .   Colonoscopy/EGD- .   Code status:     Meds at home:  Meds in hospital:  ATENolol  Tablet 50 milliGRAM(s) Oral daily  cilostazol 50 milliGRAM(s) Oral daily  cinacalcet 60 milliGRAM(s) Oral daily  furosemide    Tablet 80 milliGRAM(s) Oral daily  heparin  Injectable 5000 Unit(s) SubCutaneous every 8 hours  isosorbide   mononitrate ER Tablet (IMDUR) 30 milliGRAM(s) Oral daily  oxyCODONE    5 mG/acetaminophen 325 mG 1 Tablet(s) Oral every 6 hours PRN  piperacillin/tazobactam IVPB. 3.375 Gram(s) IV Intermittent every 12 hours  sevelamer hydrochloride 800 milliGRAM(s) Oral three times a day with meals      VITALS:  T(F): 97.7 (10-27-17 @ 07:29), Max: 101.8 (10-26-17 @ 11:57)  HR: 104 (10-27-17 @ 07:29)  BP: 136/71 (10-27-17 @ 07:29)  RR: 19 (10-27-17 @ 07:29)  SpO2: 100% (10-27-17 @ 07:29)  Wt(kg): --    Height (cm): 185.42 (10-27 @ 05:08), 185.42 (10-26 @ 11:27)  Weight (kg): 99.1 (10-27 @ 05:08), 81.6 (10-26 @ 11:27)  BMI (kg/m2): 28.8 (10-27 @ 05:08), 23.7 (10-26 @ 11:27)  BSA (m2): 2.23 (10-27 @ 05:08), 2.06 (10-26 @ 11:27)    LABS:                        10.3   11.9  )-----------( 138      ( 27 Oct 2017 07:06 )             33.8     10-27    129<L>  |  95<L>  |  34<H>  ----------------------------<  78  4.9   |  23  |  5.93<H>    Ca    9.9      27 Oct 2017 06:35    TPro  7.9  /  Alb  2.7<L>  /  TBili  0.6  /  DBili  x   /  AST  19  /  ALT  16  /  AlkPhos  198<H>  10-26    PT/INR - ( 26 Oct 2017 13:44 )   PT: 15.4 sec;   INR: 1.40 ratio         PTT - ( 26 Oct 2017 13:44 )  PTT:24.8 sec  Lactate, Blood: 1.6 mmol/L (10-26 @ 13:44)    CAPILLARY BLOOD GLUCOSE          REVIEW OF SYSTEMS:  CONSTITUTIONAL: No fever, weight loss, or fatigue  EYES: No eye pain, visual disturbances, or discharge  ENMT:  No difficulty hearing, tinnitus, vertigo; No sinus or throat pain  NECK: No pain or stiffness  RESPIRATORY: No cough, wheezing, chills or hemoptysis; No shortness of breath  CARDIOVASCULAR: No chest pain, palpitations, dizziness, or leg swelling  GASTROINTESTINAL: Noticed foul smelling, pus like discharge from the hernia surgical site.  No abdominal or epigastric pain. No nausea, vomiting, or hematemesis; No diarrhea or constipation. No melena or hematochezia.  GENITOURINARY: Patient makes urine, has a wright's catheter. No dysuria, frequency, hematuria, or incontinence  NEUROLOGICAL: No headaches, memory loss, loss of strength, numbness,   SKIN: No itching, burning, rashes, or lesions       RADIOLOGY & ADDITIONAL TESTS:    1. CXR: Heart is likely enlarged. Elevated left hemidiaphragm again noted.    On October 10 there was slight fluid at the left base and amorphous   density in the left midlung field. Heart and rotation obscure the left   lung on both films.    Left lung findings show improvement with slight residual.    Possible nodule at theright base is similar to the earlier study.      2. CT abdomen: Cholelithiasis.    Collapse small hiatal hernia versus distal esophageal mural thickening.   If clinically indicated, EGD may be pursued for further evaluation.    Interval ventral hernia repair. There is a 5.8 x 8.0 cm complex   subcutaneous soft tissue density structure in the anterior abdominal wall   at the ventral hernia repair site; this may represent a phlegmon and/or   abscesses. Further differentiation is difficult without IV contrast.     New apparent mural thickening of the cecummay represent nonspecific   colitis.      Imaging Personally Reviewed:  [+ ] YES  [ ] NO    Consultant(s) Notes Reviewed:  [+ ] YES  [ ] NO    PHYSICAL EXAM:  GENERAL: NAD, well-groomed, well-developed  HEAD:  Atraumatic, Normocephalic  EYES: EOMI, PERRL, conjunctiva and sclera clear  ENMT: No tonsillar erythema, exudates, or enlargement; Moist mucous membranes, Good dentition  NECK: Supple, No JVD, Normal thyroid  NERVOUS SYSTEM:  Alert & Oriented X3, Motor Strength 5/5 B/L upper and lower extremities;  CHEST/LUNG: Clear to percussion bilaterally; No rales, rhonchi, wheezing, or rubs  HEART: Regular rate and rhythm; No murmurs, rubs, or gallops  ABDOMEN: Surgical site bandaged, TRUSS present. otherwise soft, Nontender, Nondistended;   EXTREMITIES:  2+ Peripheral Pulses, No clubbing, cyanosis, or edema  LYMPH: No lymphadenopathy noted  SKIN: No rashes or lesions    Care Discussed with Consultants/Other Providers [ +] YES  [ ] NO Patient is a 71yoM from home, ambulate with cane, PMH of ESRD on HD Tu,Th,Sa (completed yesterday), HTN, DM, afib (not on anticoagulation, unknown reason) and PSH of abdominal hernia repair on October 10.2017 by Dr Camilo presented with purulent drainage from the incision site that was noticed yesterday during dialysis. After signing out AMA yesterday(had a code sepsis, s/p vancomycin and zosyn x 1 dose), patient returned today with similar complaints. Patient has a  admitted to surgery for abdominal abscess. Medicine was called to follow up on medical issues.         PMH/PSH: Type 2 diabetes mellitus without complication, without long-term current use of insulin  Essential hypertension  Paroxysmal atrial fibrillation  ESRD (end stage renal disease) on dialysis  Pre-diabetes  CAD (coronary artery disease)  PVD (peripheral vascular disease)  Atrial fibrillation  Obesity  Prostate cancer  ESRD (end stage renal disease) on dialysis  HLD (hyperlipidemia)  HTN (hypertension)  H/O abdominal surgery  No significant past surgical history  H/O cardiac catheterization  AV fistula  AV fistula  History of prostatectomy  ,   Allergy: penicillin (Hives)  ,   Social history: ,   Family History: Family history of hypertension in mother (Mother)    Anesthesia reaction (prior) -    ECHO/ stress test: .   Colonoscopy/EGD- .   Code status:     Meds at home:  Meds in hospital:  ATENolol  Tablet 50 milliGRAM(s) Oral daily  cilostazol 50 milliGRAM(s) Oral daily  cinacalcet 60 milliGRAM(s) Oral daily  furosemide    Tablet 80 milliGRAM(s) Oral daily  heparin  Injectable 5000 Unit(s) SubCutaneous every 8 hours  isosorbide   mononitrate ER Tablet (IMDUR) 30 milliGRAM(s) Oral daily  oxyCODONE    5 mG/acetaminophen 325 mG 1 Tablet(s) Oral every 6 hours PRN  piperacillin/tazobactam IVPB. 3.375 Gram(s) IV Intermittent every 12 hours  sevelamer hydrochloride 800 milliGRAM(s) Oral three times a day with meals      VITALS:  T(F): 97.7 (10-27-17 @ 07:29), Max: 101.8 (10-26-17 @ 11:57)  HR: 104 (10-27-17 @ 07:29)  BP: 136/71 (10-27-17 @ 07:29)  RR: 19 (10-27-17 @ 07:29)  SpO2: 100% (10-27-17 @ 07:29)  Wt(kg): --    Height (cm): 185.42 (10-27 @ 05:08), 185.42 (10-26 @ 11:27)  Weight (kg): 99.1 (10-27 @ 05:08), 81.6 (10-26 @ 11:27)  BMI (kg/m2): 28.8 (10-27 @ 05:08), 23.7 (10-26 @ 11:27)  BSA (m2): 2.23 (10-27 @ 05:08), 2.06 (10-26 @ 11:27)    LABS:                        10.3   11.9  )-----------( 138      ( 27 Oct 2017 07:06 )             33.8     10-27    129<L>  |  95<L>  |  34<H>  ----------------------------<  78  4.9   |  23  |  5.93<H>    Ca    9.9      27 Oct 2017 06:35    TPro  7.9  /  Alb  2.7<L>  /  TBili  0.6  /  DBili  x   /  AST  19  /  ALT  16  /  AlkPhos  198<H>  10-26    PT/INR - ( 26 Oct 2017 13:44 )   PT: 15.4 sec;   INR: 1.40 ratio         PTT - ( 26 Oct 2017 13:44 )  PTT:24.8 sec  Lactate, Blood: 1.6 mmol/L (10-26 @ 13:44)    CAPILLARY BLOOD GLUCOSE          REVIEW OF SYSTEMS:  CONSTITUTIONAL: No fever, weight loss, or fatigue  EYES: No eye pain, visual disturbances, or discharge  ENMT:  No difficulty hearing, tinnitus, vertigo; No sinus or throat pain  NECK: No pain or stiffness  RESPIRATORY: No cough, wheezing, chills or hemoptysis; No shortness of breath  CARDIOVASCULAR: No chest pain, palpitations, dizziness, or leg swelling  GASTROINTESTINAL: Noticed foul smelling, pus like discharge from the hernia surgical site.  No abdominal or epigastric pain. No nausea, vomiting, or hematemesis; No diarrhea or constipation. No melena or hematochezia.  GENITOURINARY: Patient makes urine, has a wright's catheter. No dysuria, frequency, hematuria, or incontinence  NEUROLOGICAL: No headaches, memory loss, loss of strength, numbness,   SKIN: No itching, burning, rashes, or lesions       RADIOLOGY & ADDITIONAL TESTS:    1. CXR: Heart is likely enlarged. Elevated left hemidiaphragm again noted.    On October 10 there was slight fluid at the left base and amorphous   density in the left midlung field. Heart and rotation obscure the left   lung on both films.    Left lung findings show improvement with slight residual.    Possible nodule at theright base is similar to the earlier study.      2. CT abdomen: Cholelithiasis.    Collapse small hiatal hernia versus distal esophageal mural thickening.   If clinically indicated, EGD may be pursued for further evaluation.    Interval ventral hernia repair. There is a 5.8 x 8.0 cm complex   subcutaneous soft tissue density structure in the anterior abdominal wall   at the ventral hernia repair site; this may represent a phlegmon and/or   abscesses. Further differentiation is difficult without IV contrast.     New apparent mural thickening of the cecummay represent nonspecific   colitis.      Imaging Personally Reviewed:  [+ ] YES  [ ] NO    Consultant(s) Notes Reviewed:  [+ ] YES  [ ] NO    PHYSICAL EXAM:  GENERAL: NAD, well-groomed, well-developed  HEAD:  Atraumatic, Normocephalic  EYES: EOMI, PERRL, conjunctiva and sclera clear  ENMT: No tonsillar erythema, exudates, or enlargement; Moist mucous membranes, Good dentition  NECK: Supple, No JVD, Normal thyroid  NERVOUS SYSTEM:  Alert & Oriented X3, Motor Strength 5/5 B/L upper and lower extremities;  CHEST/LUNG: Clear to percussion bilaterally; No rales, rhonchi, wheezing, or rubs  HEART: Regular rate and rhythm; No murmurs, rubs, or gallops  ABDOMEN: Surgical site bandaged, TRUSS present. otherwise soft, Nontender, Nondistended;   EXTREMITIES:  2+ Peripheral Pulses, No clubbing, cyanosis, or edema  LYMPH: No lymphadenopathy noted  SKIN: No rashes or lesions    Care Discussed with Consultants/Other Providers [ +] YES  [ ] NO

## 2017-10-27 NOTE — H&P ADULT - NSHPPHYSICALEXAM_GEN_ALL_CORE
Abdominal exam was significant for an indurated and fluctuant area over the incision site. Small drainage hole where the the wound dehisced was actively draining foul smelling, dark brown pus. His underwear was completely saturated with pus. Rest of the abdomen was soft, nontender, nondistended, no rebound, no guarding. He has a catheter coming from his right lower abdomen which is draining urine to a leg bag (suprapubic tube vs ileal conduit?)

## 2017-10-27 NOTE — CONSULT NOTE ADULT - PROBLEM SELECTOR RECOMMENDATION 6
- IMPROVE VTE is 1, no need for chemical DVT ppx. ( if needs surgical intervention, expect being bedbound>24 hours then would need chemical DVTppx)
abdominal wound infection; abscesses vs phlegmon  Pt on Zosyn IV q12hrs.  As per ID/ primary team.

## 2017-10-27 NOTE — CONSULT NOTE ADULT - ASSESSMENT
Patient is a 71yoM from home, ambulate with cane, PMH of ESRD on HD Tu,Th,Sa (completed yesterday), HTN, DM, afib (not on anticoagulation, unknown reason) and PSH of abdominal hernia repair on October 10.2017 by Dr Camilo presented with purulent drainage from the incision site that was noticed yesterday during dialysis. Patient has a  admitted to surgery for abdominal abscess. Medicine was called to follow up on medical issues. Patient is a 71yoM from home, ambulate with cane, PMH of ESRD on HD Tu,Th,Sa (completed yesterday), HTN, DM, afib (not on anticoagulation, unknown reason) and PSH of abdominal hernia repair on October 10.2017 by Dr Camilo presented with purulent drainage from the incision site that was noticed yesterday during dialysis. After signing out AMA yesterday(had a code sepsis, s/p vancomycin and zosyn x 1 dose), patient returned today with similar complaints. Patient has a  admitted to surgery for abdominal abscess. Medicine was called to follow up on medical issues.

## 2017-10-27 NOTE — H&P ADULT - PROBLEM SELECTOR PLAN 1
Abdominal wall abscess  H/O Incisional hernia repair with mesh  Abd dressing  Vanco, Zosyn  Repeat labs  Follow cultures  Med & ID consult

## 2017-10-27 NOTE — CONSULT NOTE ADULT - PROBLEM SELECTOR RECOMMENDATION 9
- continue zosyn for abscess   - recommend lactate and blood cultures   - Dr Dr Rice - continue zosyn for abscess   - no current plans for surgical intervention   - recommend lactate and blood and abscess fluid cultures (since patient had antibiotics the cultures may not be accurate)  - patient lives alone and said that his daughter was staying with him during surgery and then he was alone, not sure if was keeping up with the surgical care as recommended on discharge. He might benefit for HHA/ visiting nurse.   - Dr Dr Riec - Patient had purulent discharge from the surgical site and CT scan showed 5.8 x 8.0 cm complex subcutaneous soft tissue density that could be post-op abscess or a phlegmon   - continue zosyn   - no current plans for surgical intervention   - recommend lactate and blood and abscess fluid cultures (since patient had antibiotics the cultures may not be accurate)  - patient lives alone and said that his daughter was staying with him during surgery and then he was alone, not sure if was keeping up with the surgical care as recommended on discharge. He might benefit for HHA/ visiting nurse.   - Dr Dr Rice

## 2017-10-27 NOTE — H&P ADULT - HISTORY OF PRESENT ILLNESS
71 who came in from hemodialysis with purulent, foul smelling drainage from his abdominal site where his [place] drain was removed by Dr. Adamson last week (Thursday), he's s/p an Incisional Hernia repair with sepramesh on 10/9/17. Since last thursday, the site was draining a small amount of fluid but the drainage began to increase in amount over the past few days. He states the drainage was foul smelling and while in HD yesterday his pants all of a sudden became saturated with pus. His temperature was taken at HD and he said it was elevated but he was not sure of the actual number. When he came to the ED from dialysis, he had a temp of 101.8 and was tachycardic to 112bpm, BP was wnl. Code sepsis was called, he was given a dose of zosyn & vancomycin by the ED attending (Dr. Alberts). Labs were drawn and wbc count was 12, lactate was1.2 and potassium was 3.0. A CT was then obtained which revealed an abdominal wall collection/abscess that was 8cm x 6cm. Patient reports passing flatus and having regular BMs.

## 2017-10-27 NOTE — CONSULT NOTE ADULT - ASSESSMENT
1.	Infected abdominal wall mesh ?abscesses v s phlegmon  2.	Fever  3.	Leukocytosis  ·	cont zosyn 3.375 gm IV q12h D2  ·	received a dose of vanco 1gm IV yesterday  ·	awaiting culture results

## 2017-10-27 NOTE — CONSULT NOTE ADULT - ASSESSMENT
Patient is a 70yo Male with ESRD on HD s/p recent incisional hernia repair a/w abdominal wound infection; infected abdominal wall mesh ?abscesses vs phlegmon. Nephrology consulted for ESRD status.

## 2017-10-27 NOTE — CONSULT NOTE ADULT - PROBLEM SELECTOR RECOMMENDATION 5
- continue aspirin,   - continue Imdur for heart failure - continue Aspirin,   - continue Imdur for heart failure

## 2017-10-27 NOTE — ED PROVIDER NOTE - OBJECTIVE STATEMENT
70 y/o M pt with PMHx of A-Fib, CAD, ESRD (last dialysis yesterday), HLD, HTN, DM, presents to ED c/o wound infection. Pt was seen in the ED yesterday but left AMA after being admitted for wound infection. At the time, pt stated he needed to take care of something personal and would return for admission. Pt is now back in the ED and denies any new complaints. Pt denies fever, chills, nausea, vomiting, diarrhea, rash, or any other complaints. ALLERGIES: Penicillin (Hives)

## 2017-10-27 NOTE — CONSULT NOTE ADULT - SUBJECTIVE AND OBJECTIVE BOX
[facility] NEPHROLOGY- CONSULTATION NOTE  Pt well known to our service.   Patient is a 70yo Male with ESRD on HD (TTS @ [facility]/ [facility] dialysis unit; Nephrologist Dr. Shaffer), s/p recent incisional hernia repair a/w abdominal wound infection; infected abdominal wall mesh ?abscesses v s phlegmon  Pt was sent to the ER  from the dialysis unit with purulent, foul discharge from [place] drain removal site yesterday but signed out AMA and returned to the ER this am. .   Pt had a recent repair of incarcerated incisional hernia with Sepramesh and lysis of adhesions on 10/10/17. Pt was d/c from [facility] on 10/11/17 with [place] drain. As per pt, the [place] drain was removed last week with no issues. He was getting dialyzed tody and toward the end of dialysis he noticed his clothes were all wet and there was foul odourous discharge from the LUDIV[place] removal site. Pt denies any discharge prior to yesterday and no further drainage since.   Pt denies any fevers, chills, SOB, chest pain, abd pain or LE edema.     Last HD 10/26/17 at [facility]    PAST MEDICAL & SURGICAL HISTORY:  Type 2 diabetes mellitus without complication, without long-term current use of insulin  Essential hypertension  Paroxysmal atrial fibrillation  ESRD (end stage renal disease) on dialysis  Pre-diabetes  CAD (coronary artery disease): LAD stent  PVD (peripheral vascular disease)  Atrial fibrillation  Obesity  Prostate cancer: prostate cancer  ESRD (end stage renal disease) on dialysis  HLD (hyperlipidemia)  HTN (hypertension)  H/O cardiac catheterization: 4/25/2014 non obstructive disease  AV fistula: creation in right arm on 12/1/2015  AV fistula: h/o creation in 2005  History of prostatectomy    penicillin (Hives)    Home Medications Reviewed  Hospital Medications:   MEDICATIONS  (STANDING):  acetaminophen   Tablet 975 milliGRAM(s) Oral once  piperacillin/tazobactam IVPB. 3.375 Gram(s) IV Intermittent once  sodium chloride 0.9% Bolus 1000 milliLiter(s) IV Bolus once  sodium chloride 0.9% lock flush 3 milliLiter(s) IV Push once  vancomycin  IVPB 1000 milliGRAM(s) IV Intermittent once    FAMILY HISTORY:  Family history of hypertension in mother (Mother)      REVIEW OF SYSTEMS:  Gen: no changes in weight  HEENT: no rhinorrhea  Neck: no sore throat  Cards: no chest pain  Resp: no dyspnea  GI: as above. Denies any n/v/d.  : no dysuria or hematuria  Vascular: no LE edema  Derm: no rashes  Neuro: no numbness/tingling  All other review of systems is negative unless indicated above.    VITALS:  T(F): 101.8 (10-26-17 @ 11:57), Max: 101.8 (10-26-17 @ 11:57)  HR: 112 (10-26-17 @ 11:27)  BP: 109/89 (10-26-17 @ 11:27)  RR: 18 (10-26-17 @ 11:27)  SpO2: 100% (10-26-17 @ 11:27)  Wt(kg): --    Height (cm): 185.42 (10-26 @ 11:27)  Weight (kg): 81.6 (10-26 @ 11:27)  BMI (kg/m2): 23.7 (10-26 @ 11:27)  BSA (m2): 2.06 (10-26 @ 11:27)    PHYSICAL EXAM:  Gen: NAD, calm  HEENT: MMM  Neck: no JVD  Cards: RRR, +S1/S2, no M/G/R  Resp: CTA B/L  GI: soft, NT/ND, + abd drainage   : +supra-pubic catheter with leg bag  Extremities: trace LE edema B/L  Access: Right UE AVG +thrill +bruit    LABS:  10-27    129<L>  |  95<L>  |  34<H>  ----------------------------<  78  4.9   |  23  |  5.93<H>    Ca    9.9      27 Oct 2017 06:35    TPro  7.9  /  Alb  2.7<L>  /  TBili  0.6  /  DBili      /  AST  19  /  ALT  16  /  AlkPhos  198<H>  10-26    Creatinine Trend: 5.93 <--, 4.46 <--                        10.3   11.9  )-----------( 138      ( 27 Oct 2017 07:06 )             33.8     Urine Studies:

## 2017-10-27 NOTE — CONSULT NOTE ADULT - CONSULT REASON
Infected abdominal mesh ?abscess; s/p hernia repair in 10/09/17 Infected abdominal mesh ?abscess; s/p hernia repair on 10/09/17

## 2017-10-27 NOTE — CONSULT NOTE ADULT - PROBLEM SELECTOR RECOMMENDATION 3
BP controlled. c/w home medications (Imdur/ Atenolol/ Lasix). Monitor BP.
-continue lasix and atenolol

## 2017-10-27 NOTE — CONSULT NOTE ADULT - ATTENDING COMMENTS
[facility] NEPHROLOGY  Armando Shaffer M.D.  Kendall Morales D.O.  Lilliam Cody M.D.  Oxana Hernandez, MSN, ANP-C  [phone]    [address] [place] [address]

## 2017-10-27 NOTE — ED ADULT NURSE REASSESSMENT NOTE - NS ED NURSE REASSESS COMMENT FT1
Patient received from JUANIS Delgadillo, alert, responsive, resting in stretcher. 20G to upper left arm. Awaiting inpatient bed.

## 2017-10-27 NOTE — PATIENT PROFILE ADULT. - PRO PAIN LIFE ADAPT
inability or reluctance to perform ADLs/inability to sleep/decreased activity level/decreased appetite/inability to enjoy life/withdrawal from social contact

## 2017-10-27 NOTE — CONSULT NOTE ADULT - PROBLEM SELECTOR RECOMMENDATION 4
Serum calcium acceptable. Check serum phosphorus. c/w Sensipar 60mg PO daily. Check PTHi in am.    Monitor serum phosphorus and serum calcium.
- rate control with atenolol

## 2017-10-27 NOTE — H&P ADULT - NSHPLABSRESULTS_GEN_ALL_CORE
< from: CT Abdomen and Pelvis No Cont (10.26.17 @ 15:34) >    Collapse small hiatal hernia versus distal esophageal mural thickening.   If clinically indicated, EGD may be pursued for further evaluation.    Interval ventral hernia repair. There is a 5.8 x 8.0 cm complex   subcutaneous soft tissue density structure in the anterior abdominal wall   at the ventral hernia repair site; this may represent a phlegmon and/or   abscesses. Further differentiation is difficult without IV contrast.     New apparent mural thickening of the cecummay represent nonspecific   colitis.    < end of copied text >

## 2017-10-27 NOTE — ED ADULT NURSE NOTE - OBJECTIVE STATEMENT
came to Ed due to abdominal pain s/p appendectomy a week ago as verbalized, pt on dialysis, TTH , AVG right arm, seen and examined by DR Hicks. Evaluated by surgery. Noted with suprapubic cath to ubag . came to Ed due to abdominal pain s/p appendectomy a 2-3 weeks ago as verbalized, pt on dialysis, TTH , AVG right arm, seen and examined by DR Hicks. Evaluated by surgery. Noted with suprapubic catheter to  to urobag . Urine yellow non bloody.

## 2017-10-27 NOTE — CONSULT NOTE ADULT - PROBLEM SELECTOR RECOMMENDATION 2
- continue HD TTS  - continue sevelemer and sensipar - continue HD TTS  - continue Sevelemer and Sensipar  - patient to get dialysis tomorrow   - f/u Hep B level in am   - Dr Cody, Nephrology - continue HD TTS  - continue Sevelemer and Sensipar  - patient to get dialysis tomorrow   - f/u Hep B level in am   - hyponatremia; likely hypovolemic as patient looked dry on examination. Patient is alert- oriented times 3   - as per Renal, scheduled for HD tomorrow; f/p BMP in am   - Dr Cody, Nephrology

## 2017-10-27 NOTE — CONSULT NOTE ADULT - PROBLEM SELECTOR PROBLEM 5
Hyperphosphatemia
Coronary artery disease involving native coronary artery with other forms of angina pectoris, unspecified whether native or transplanted heart

## 2017-10-27 NOTE — CONSULT NOTE ADULT - SUBJECTIVE AND OBJECTIVE BOX
HPI:  72 y/o male with multiple medical problems returns to [facility] early this AM after signing AMA yesterday afternoon. States he needed to leave all things at home organized and his pets settled before coming back to deal with his condition.  Patient originally came to ED yesterday after finding himself soaked with purulent, foul smelling drainage from his abdominal site while he was in HD. In first ED visit, he had temp of 101.8F, code sepsis was called.  +CT A/P showing soft tissue structure by the anterior abdominal wall by the repair site that can either be a phlegmon vs abscesses. Reports that his [place] drain was removed by Dr. Adamson last Thursday( 10/19), he's s/p an Incisional Hernia repair with mesh on 10/9/17. Since last Thursday, the site was draining a small amount of fluid but the drainage began to increase in amount over the past few days. No recurrent fevers since yesterday and wbc count has slightly improved.      REVIEW OF SYSTEMS:  [  ] Not able to illicit  General:	  Chest:	  GI:	  :  Skin:	  Musculoskeletal:	  Neuro:    PAST MEDICAL & SURGICAL HISTORY:  Type 2 diabetes mellitus without complication, without long-term current use of insulin  Essential hypertension  Paroxysmal atrial fibrillation  ESRD (end stage renal disease) on dialysis  Pre-diabetes  CAD (coronary artery disease): LAD stent  PVD (peripheral vascular disease)  Atrial fibrillation  Obesity  Prostate cancer: prostate cancer  ESRD (end stage renal disease) on dialysis  HLD (hyperlipidemia)  HTN (hypertension)  H/O abdominal surgery: 10/10/17  H/O cardiac catheterization: 4/25/2014 non obstructive disease  AV fistula: creation in right arm on 12/1/2015  AV fistula: h/o creation in 2005  History of prostatectomy    ALLERGIES: penicillin (Hives)    MEDS:  ATENolol  Tablet 50 milliGRAM(s) Oral daily  cilostazol 50 milliGRAM(s) Oral daily  cinacalcet 60 milliGRAM(s) Oral daily  furosemide    Tablet 80 milliGRAM(s) Oral daily  heparin  Injectable 5000 Unit(s) SubCutaneous every 8 hours  isosorbide   mononitrate ER Tablet (IMDUR) 30 milliGRAM(s) Oral daily  oxyCODONE    5 mG/acetaminophen 325 mG 1 Tablet(s) Oral every 6 hours PRN  piperacillin/tazobactam IVPB. 3.375 Gram(s) IV Intermittent every 12 hours(10/27)  sevelamer hydrochloride 800 milliGRAM(s) Oral three times a day with meals  Vanco 1gm IV x 1 on 10/26    SOCIAL HISTORY:  Smoker:      FAMILY HISTORY:  Family history of hypertension in mother (Mother): HTN    VITALS:  Vital Signs Last 24 Hrs  T(C): 36.5 (27 Oct 2017 07:29), Max: 38.8 (26 Oct 2017 11:57)  T(F): 97.7 (27 Oct 2017 07:29), Max: 101.8 (26 Oct 2017 11:57)  HR: 104 (27 Oct 2017 07:29) (104 - 112)  BP: 136/71 (27 Oct 2017 07:29) (109/89 - 136/71)  BP(mean): --  RR: 19 (27 Oct 2017 07:29) (18 - 19)  SpO2: 100% (27 Oct 2017 07:29) (100% - 100%)      PHYSICAL EXAM:  Constitutional:  HEENT:  Neck:  Respiratory:  Cardiovascular:  Gastrointestinal:  Extremities:  Skin:  Ortho:  Neuro:      LABS/DIAGNOSTIC TESTS:                        10.3   11.9  )-----------( 138      ( 27 Oct 2017 07:06 )             33.8     WBC Count: 11.9 K/uL (10-27 @ 07:06)  WBC Count: 12.0 K/uL (10-26 @ 13:44)    10-27    129<L>  |  95<L>  |  34<H>  ----------------------------<  78  4.9   |  23  |  5.93<H>    Ca    9.9      27 Oct 2017 06:35    TPro  7.9  /  Alb  2.7<L>  /  TBili  0.6  /  DBili  x   /  AST  19  /  ALT  16  /  AlkPhos  198<H>  10-26      LIVER FUNCTIONS - ( 26 Oct 2017 13:44 )  Alb: 2.7 g/dL / Pro: 7.9 g/dL / ALK PHOS: 198 U/L / ALT: 16 U/L DA / AST: 19 U/L / GGT: x           PT/INR - ( 26 Oct 2017 13:44 )   PT: 15.4 sec;   INR: 1.40 ratio         PTT - ( 26 Oct 2017 13:44 )  PTT:24.8 sec  Lactate, Blood: 1.6 mmol/L (10-26 @ 13:44)    ABG -     CULTURES:       RADIOLOGY: NOT COMPLETE      HPI:  70 y/o male with multiple medical problems returns to [facility] early this AM after signing AMA yesterday afternoon. States he needed to leave all things at home organized and his pets settled before coming back to deal with his condition.  Patient originally came to ED yesterday after finding himself soaked with purulent, foul smelling drainage from his abdominal site while he was in HD. In first ED visit, he had temp of 101.8F, code sepsis was called.  +CT A/P showing soft tissue structure by the anterior abdominal wall by the repair site that can either be a phlegmon vs abscesses. Reports that his [place] drain was removed by Dr. Adamson last Thursday( 10/19), he's s/p an Incisional Hernia repair with mesh on 10/9/17. Since last Thursday, the site was draining a small amount of fluid but the drainage began to increase in amount over the past few days. No recurrent fevers since yesterday and wbc count has slightly improved. Waiting on BC and abdominal culture results.      REVIEW OF SYSTEMS:  [  ] Not able to illicit  General:	  Chest:	  GI:	  :  Skin:	  Musculoskeletal:	  Neuro:    PAST MEDICAL & SURGICAL HISTORY:  Type 2 diabetes mellitus without complication, without long-term current use of insulin  Essential hypertension  Paroxysmal atrial fibrillation  ESRD (end stage renal disease) on dialysis  Pre-diabetes  CAD (coronary artery disease): LAD stent  PVD (peripheral vascular disease)  Atrial fibrillation  Obesity  Prostate cancer: prostate cancer  ESRD (end stage renal disease) on dialysis  HLD (hyperlipidemia)  HTN (hypertension)  H/O abdominal surgery: 10/10/17  H/O cardiac catheterization: 4/25/2014 non obstructive disease  AV fistula: creation in right arm on 12/1/2015  AV fistula: h/o creation in 2005  History of prostatectomy    ALLERGIES: penicillin (Hives)    MEDS:  ATENolol  Tablet 50 milliGRAM(s) Oral daily  cilostazol 50 milliGRAM(s) Oral daily  cinacalcet 60 milliGRAM(s) Oral daily  furosemide    Tablet 80 milliGRAM(s) Oral daily  heparin  Injectable 5000 Unit(s) SubCutaneous every 8 hours  isosorbide   mononitrate ER Tablet (IMDUR) 30 milliGRAM(s) Oral daily  oxyCODONE    5 mG/acetaminophen 325 mG 1 Tablet(s) Oral every 6 hours PRN  piperacillin/tazobactam IVPB. 3.375 Gram(s) IV Intermittent every 12 hours(10/27)  sevelamer hydrochloride 800 milliGRAM(s) Oral three times a day with meals  Vanco 1gm IV x 1 on 10/26    SOCIAL HISTORY:  Smoker:      FAMILY HISTORY:  Family history of hypertension in mother (Mother): HTN    VITALS:  Vital Signs Last 24 Hrs  T(C): 36.5 (27 Oct 2017 07:29), Max: 38.8 (26 Oct 2017 11:57)  T(F): 97.7 (27 Oct 2017 07:29), Max: 101.8 (26 Oct 2017 11:57)  HR: 104 (27 Oct 2017 07:29) (104 - 112)  BP: 136/71 (27 Oct 2017 07:29) (109/89 - 136/71)  BP(mean): --  RR: 19 (27 Oct 2017 07:29) (18 - 19)  SpO2: 100% (27 Oct 2017 07:29) (100% - 100%)      PHYSICAL EXAM:  Constitutional:  HEENT:  Neck:  Respiratory:  Cardiovascular:  Gastrointestinal:  Extremities:  Skin:  Ortho:  Neuro:      LABS/DIAGNOSTIC TESTS:                        10.3   11.9  )-----------( 138      ( 27 Oct 2017 07:06 )             33.8     WBC Count: 11.9 K/uL (10-27 @ 07:06)  WBC Count: 12.0 K/uL (10-26 @ 13:44)    10-27    129<L>  |  95<L>  |  34<H>  ----------------------------<  78  4.9   |  23  |  5.93<H>    Ca    9.9      27 Oct 2017 06:35    TPro  7.9  /  Alb  2.7<L>  /  TBili  0.6  /  DBili  x   /  AST  19  /  ALT  16  /  AlkPhos  198<H>  10-26      LIVER FUNCTIONS - ( 26 Oct 2017 13:44 )  Alb: 2.7 g/dL / Pro: 7.9 g/dL / ALK PHOS: 198 U/L / ALT: 16 U/L DA / AST: 19 U/L / GGT: x           PT/INR - ( 26 Oct 2017 13:44 )   PT: 15.4 sec;   INR: 1.40 ratio    PTT - ( 26 Oct 2017 13:44 )  PTT:24.8 sec    Lactate, Blood: 1.6 mmol/L (10-26 @ 13:44)      CULTURES:     10/26 BC - pending  10/26 abdominal culture - pending      RADIOLOGY:  EXAM:  CT ABDOMEN AND PELVIS                        PROCEDURE DATE:  10/26/2017    INTERPRETATION:  CT of the abdomen and pelvis without IV or oral contrast    Clinical Indication: abdominal abscess    Technique: Axial multidetector CT images of the abdomen and pelvis are   acquired following the administration of oral contrast only    Comparison: 10/8/2017.    Findings:    Abdomen: Limited sections through the lung bases demonstrates small   bilateral atelectasis. There is a small calcified granuloma in the right   lower lobe. Examination of the abdomen and pelvis is limited by lack of   IV contrast. IVC filter is present. Stable small gallstone in the   gallbladder. No CT evidence for thickened gallbladder wall or   pericholecystic fluid. Allowing for the non-IV contrast technique, the   liver, pancreas, spleen, and adrenals appear grossly unremarkable.    Multiple small hypodense lesions in the kidneys bilaterally, representing   probable cysts.    Surgical clips are again seen adjacent to the cecum. The appendix is not   visualized. No secondary signs for acute appendicitis. Collapse small   hiatal hernia versus distal esophageal mural thickening. No evidence for   bowel obstruction. New apparent mural thickening of the cecum may   represent nonspecific colitis.    Interval ventral hernia repair. There is a 5.8 x 8.0 cm complex   subcutaneous soft tissue density structure in the anterior abdominal wall   at the ventral hernia repair site; this may represent a phlegmon and/or   abscesses. Further differentiation is difficult without IV contrast. No   evidence for free air, ascites, or enlarged lymph node.    Pelvis: Multiple surgical clips are again seen in the pelvis with   associated streaky artifact which limits evaluation. The urinary bladder   is not visualized due to the streaky artifact. The bowel structures are   grossly intact. No gross pelvic free fluid, or enlarged lymph node.   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    Impression: Cholelithiasis.    Collapse small hiatal hernia versus distal esophageal mural thickening.   If clinically indicated, EGD may be pursued for further evaluation.    Interval ventral hernia repair. There is a 5.8 x 8.0 cm complex   subcutaneous soft tissue density structure in the anterior abdominal wall   at the ventral hernia repair site; this may represent a phlegmon and/or   abscesses. Further differentiation is difficult without IV contrast.     New apparent mural thickening of the cecum may represent nonspecific   colitis. HPI:  70 y/o male with multiple medical problems returns to [facility] early this AM after signing AMA yesterday afternoon. States he needed to leave all things at home organized and his pets settled before coming back to deal with his condition.  Patient originally came to ED yesterday after finding himself soaked with purulent, foul smelling drainage from his abdominal site while he was in HD. In first ED visit, he had temp of 101.8F, code sepsis was called.  +CT A/P showing soft tissue structure by the anterior abdominal wall by the repair site that can either be a phlegmon vs abscesses. Reports that his [place] drain was removed by Dr. Adamson last Thursday( 10/19), he's s/p an incisional hernia repair with mesh on 10/9/17. Since last Thursday, the site was draining a small amount of fluid but the drainage began to increase in amount over the past few days. No recurrent fevers since yesterday and wbc count has slightly improved. Waiting on BC and abdominal culture results.      REVIEW OF SYSTEMS:  [  ] Not able to illicit  General: +fevers no malaise	  Chest: no cough no sob  GI: no nvd +abd pain	  : no urinary symptoms   Skin: no rashes	  Musculoskeletal: no LBP no trauma	  Neuro: no ha's no dizziness    PAST MEDICAL & SURGICAL HISTORY:  Type 2 diabetes mellitus without complication, without long-term current use of insulin  Essential hypertension  Paroxysmal atrial fibrillation  ESRD (end stage renal disease) on dialysis  Pre-diabetes  CAD (coronary artery disease): LAD stent  PVD (peripheral vascular disease)  Atrial fibrillation  Obesity  Prostate cancer: prostate cancer  ESRD (end stage renal disease) on dialysis  HLD (hyperlipidemia)  HTN (hypertension)  H/O abdominal surgery: 10/10/17  H/O cardiac catheterization: 4/25/2014 non obstructive disease  AV fistula: creation in right arm on 12/1/2015  AV fistula: h/o creation in 2005  History of prostatectomy    ALLERGIES: penicillin (Hives)    MEDS:  ATENolol  Tablet 50 milliGRAM(s) Oral daily  cilostazol 50 milliGRAM(s) Oral daily  cinacalcet 60 milliGRAM(s) Oral daily  furosemide    Tablet 80 milliGRAM(s) Oral daily  heparin  Injectable 5000 Unit(s) SubCutaneous every 8 hours  isosorbide   mononitrate ER Tablet (IMDUR) 30 milliGRAM(s) Oral daily  oxyCODONE    5 mG/acetaminophen 325 mG 1 Tablet(s) Oral every 6 hours PRN  piperacillin/tazobactam IVPB. 3.375 Gram(s) IV Intermittent every 12 hours(10/27)  sevelamer hydrochloride 800 milliGRAM(s) Oral three times a day with meals  Vanco 1gm IV x 1 on 10/26    SOCIAL HISTORY:  Smoker:      FAMILY HISTORY:  Family history of hypertension in mother (Mother): HTN    VITALS:  Vital Signs Last 24 Hrs  T(C): 36.5 (27 Oct 2017 07:29), Max: 38.8 (26 Oct 2017 11:57)  T(F): 97.7 (27 Oct 2017 07:29), Max: 101.8 (26 Oct 2017 11:57)  HR: 104 (27 Oct 2017 07:29) (104 - 112)  BP: 136/71 (27 Oct 2017 07:29) (109/89 - 136/71)  BP(mean): --  RR: 19 (27 Oct 2017 07:29) (18 - 19)  SpO2: 100% (27 Oct 2017 07:29) (100% - 100%)      PHYSICAL EXAM:  Constitutional: thin, elderly male in NAD  HEENT: normocephalic with moist oral mucosa  Neck: supple no LN's  Respiratory: lungs clear no rales no rhonchi  Cardiovascular: S1 S2 reg  Gastrointestinal: +BS with indurated, tender lower anterior abdominal wall.  +new drain inserted with bloody purulent malodorous drainage going into bag +suprapubic cath with minimal clear yellow urine  Extremities: bilateral lower leg trace pitting edema  Skin: chronic bilateral venous stasis; +erythema and warmth along lower aspect of anterior abdominal wall.  Ortho: no jt swelling  Neuro: AAO x 3      LABS/DIAGNOSTIC TESTS:                        10.3   11.9  )-----------( 138      ( 27 Oct 2017 07:06 )             33.8     WBC Count: 11.9 K/uL (10-27 @ 07:06)  WBC Count: 12.0 K/uL (10-26 @ 13:44)    10-27    129<L>  |  95<L>  |  34<H>  ----------------------------<  78  4.9   |  23  |  5.93<H>    Ca    9.9      27 Oct 2017 06:35    TPro  7.9  /  Alb  2.7<L>  /  TBili  0.6  /  DBili  x   /  AST  19  /  ALT  16  /  AlkPhos  198<H>  10-26      LIVER FUNCTIONS - ( 26 Oct 2017 13:44 )  Alb: 2.7 g/dL / Pro: 7.9 g/dL / ALK PHOS: 198 U/L / ALT: 16 U/L DA / AST: 19 U/L / GGT: x           PT/INR - ( 26 Oct 2017 13:44 )   PT: 15.4 sec;   INR: 1.40 ratio    PTT - ( 26 Oct 2017 13:44 )  PTT:24.8 sec    Lactate, Blood: 1.6 mmol/L (10-26 @ 13:44)      CULTURES:     10/26 BC - pending  10/26 abdominal culture - pending      RADIOLOGY:  EXAM:  CT ABDOMEN AND PELVIS                        PROCEDURE DATE:  10/26/2017    INTERPRETATION:  CT of the abdomen and pelvis without IV or oral contrast    Clinical Indication: abdominal abscess    Technique: Axial multidetector CT images of the abdomen and pelvis are   acquired following the administration of oral contrast only    Comparison: 10/8/2017.    Findings:    Abdomen: Limited sections through the lung bases demonstrates small   bilateral atelectasis. There is a small calcified granuloma in the right   lower lobe. Examination of the abdomen and pelvis is limited by lack of   IV contrast. IVC filter is present. Stable small gallstone in the   gallbladder. No CT evidence for thickened gallbladder wall or   pericholecystic fluid. Allowing for the non-IV contrast technique, the   liver, pancreas, spleen, and adrenals appear grossly unremarkable.    Multiple small hypodense lesions in the kidneys bilaterally, representing   probable cysts.    Surgical clips are again seen adjacent to the cecum. The appendix is not   visualized. No secondary signs for acute appendicitis. Collapse small   hiatal hernia versus distal esophageal mural thickening. No evidence for   bowel obstruction. New apparent mural thickening of the cecum may   represent nonspecific colitis.    Interval ventral hernia repair. There is a 5.8 x 8.0 cm complex   subcutaneous soft tissue density structure in the anterior abdominal wall   at the ventral hernia repair site; this may represent a phlegmon and/or   abscesses. Further differentiation is difficult without IV contrast. No   evidence for free air, ascites, or enlarged lymph node.    Pelvis: Multiple surgical clips are again seen in the pelvis with   associated streaky artifact which limits evaluation. The urinary bladder   is not visualized due to the streaky artifact. The bowel structures are   grossly intact. No gross pelvic free fluid, or enlarged lymph node.   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    Impression: Cholelithiasis.    Collapse small hiatal hernia versus distal esophageal mural thickening.   If clinically indicated, EGD may be pursued for further evaluation.    Interval ventral hernia repair. There is a 5.8 x 8.0 cm complex   subcutaneous soft tissue density structure in the anterior abdominal wall   at the ventral hernia repair site; this may represent a phlegmon and/or   abscesses. Further differentiation is difficult without IV contrast.     New apparent mural thickening of the cecum may represent nonspecific   colitis.

## 2017-10-27 NOTE — CONSULT NOTE ADULT - PROBLEM SELECTOR RECOMMENDATION 9
Last HD 10/26/17 at outpt dialysis facility. Plan for next maintenance HD 10/28/17. Pt with mild hypovolemia; will minimize UF with HD. Check HepBsAg. Check BMP.

## 2017-10-27 NOTE — ED ADULT NURSE NOTE - ED STAT RN HANDOFF DETAILS 2
Endorsed to RN Anyi, alert, responsive, resting in stretcher. Awaiting inpatient bed. Endorsed to RN Anyi, alert, responsive, resting in stretcher. Awaiting inpatient bed. Patient tolerated meal well.

## 2017-10-28 LAB
-  AMPICILLIN: SIGNIFICANT CHANGE UP
-  CIPROFLOXACIN: SIGNIFICANT CHANGE UP
-  ERYTHROMYCIN: SIGNIFICANT CHANGE UP
-  TETRACYCLINE: SIGNIFICANT CHANGE UP
-  VANCOMYCIN: SIGNIFICANT CHANGE UP
ANION GAP SERPL CALC-SCNC: 9 MMOL/L — SIGNIFICANT CHANGE UP (ref 5–17)
BUN SERPL-MCNC: 55 MG/DL — HIGH (ref 7–18)
CALCIUM SERPL-MCNC: 8.1 MG/DL — LOW (ref 8.4–10.5)
CHLORIDE SERPL-SCNC: 96 MMOL/L — SIGNIFICANT CHANGE UP (ref 96–108)
CO2 SERPL-SCNC: 29 MMOL/L — SIGNIFICANT CHANGE UP (ref 22–31)
CREAT SERPL-MCNC: 8.11 MG/DL — HIGH (ref 0.5–1.3)
GLUCOSE SERPL-MCNC: 121 MG/DL — HIGH (ref 70–99)
HCT VFR BLD CALC: 28.5 % — LOW (ref 39–50)
HGB BLD-MCNC: 8.8 G/DL — LOW (ref 13–17)
MCHC RBC-ENTMCNC: 26.9 PG — LOW (ref 27–34)
MCHC RBC-ENTMCNC: 30.9 GM/DL — LOW (ref 32–36)
MCV RBC AUTO: 87.1 FL — SIGNIFICANT CHANGE UP (ref 80–100)
METHOD TYPE: SIGNIFICANT CHANGE UP
PHOSPHATE SERPL-MCNC: 2.8 MG/DL — SIGNIFICANT CHANGE UP (ref 2.5–4.5)
PLATELET # BLD AUTO: 160 K/UL — SIGNIFICANT CHANGE UP (ref 150–400)
POTASSIUM SERPL-MCNC: 3.7 MMOL/L — SIGNIFICANT CHANGE UP (ref 3.5–5.3)
POTASSIUM SERPL-SCNC: 3.7 MMOL/L — SIGNIFICANT CHANGE UP (ref 3.5–5.3)
RBC # BLD: 3.27 M/UL — LOW (ref 4.2–5.8)
RBC # FLD: 16.5 % — HIGH (ref 10.3–14.5)
SODIUM SERPL-SCNC: 134 MMOL/L — LOW (ref 135–145)
WBC # BLD: 6.3 K/UL — SIGNIFICANT CHANGE UP (ref 3.8–10.5)
WBC # FLD AUTO: 6.3 K/UL — SIGNIFICANT CHANGE UP (ref 3.8–10.5)

## 2017-10-28 PROCEDURE — 71010: CPT | Mod: 26

## 2017-10-28 RX ORDER — CEFUROXIME AXETIL 250 MG
250 TABLET ORAL EVERY 12 HOURS
Qty: 0 | Refills: 0 | Status: DISCONTINUED | OUTPATIENT
Start: 2017-10-28 | End: 2017-10-29

## 2017-10-28 RX ORDER — VANCOMYCIN HCL 1 G
VIAL (EA) INTRAVENOUS
Qty: 0 | Refills: 0 | Status: DISCONTINUED | OUTPATIENT
Start: 2017-10-28 | End: 2017-10-28

## 2017-10-28 RX ORDER — VANCOMYCIN HCL 1 G
1000 VIAL (EA) INTRAVENOUS ONCE
Qty: 0 | Refills: 0 | Status: COMPLETED | OUTPATIENT
Start: 2017-10-28 | End: 2017-10-28

## 2017-10-28 RX ORDER — VANCOMYCIN HCL 1 G
1000 VIAL (EA) INTRAVENOUS ONCE
Qty: 0 | Refills: 0 | Status: DISCONTINUED | OUTPATIENT
Start: 2017-10-28 | End: 2017-10-28

## 2017-10-28 RX ORDER — METRONIDAZOLE 500 MG
500 TABLET ORAL EVERY 8 HOURS
Qty: 0 | Refills: 0 | Status: DISCONTINUED | OUTPATIENT
Start: 2017-10-28 | End: 2017-10-29

## 2017-10-28 RX ADMIN — OXYCODONE AND ACETAMINOPHEN 1 TABLET(S): 5; 325 TABLET ORAL at 14:04

## 2017-10-28 RX ADMIN — HEPARIN SODIUM 5000 UNIT(S): 5000 INJECTION INTRAVENOUS; SUBCUTANEOUS at 13:02

## 2017-10-28 RX ADMIN — ISOSORBIDE MONONITRATE 30 MILLIGRAM(S): 60 TABLET, EXTENDED RELEASE ORAL at 13:01

## 2017-10-28 RX ADMIN — SEVELAMER CARBONATE 1600 MILLIGRAM(S): 2400 POWDER, FOR SUSPENSION ORAL at 08:13

## 2017-10-28 RX ADMIN — CINACALCET 60 MILLIGRAM(S): 30 TABLET, FILM COATED ORAL at 14:04

## 2017-10-28 RX ADMIN — Medication 500 MILLIGRAM(S): at 22:01

## 2017-10-28 RX ADMIN — HEPARIN SODIUM 5000 UNIT(S): 5000 INJECTION INTRAVENOUS; SUBCUTANEOUS at 22:00

## 2017-10-28 RX ADMIN — ERYTHROPOIETIN 5000 UNIT(S): 10000 INJECTION, SOLUTION INTRAVENOUS; SUBCUTANEOUS at 18:18

## 2017-10-28 RX ADMIN — OXYCODONE AND ACETAMINOPHEN 1 TABLET(S): 5; 325 TABLET ORAL at 14:23

## 2017-10-28 RX ADMIN — Medication 250 MILLIGRAM(S): at 19:23

## 2017-10-28 RX ADMIN — HEPARIN SODIUM 5000 UNIT(S): 5000 INJECTION INTRAVENOUS; SUBCUTANEOUS at 05:56

## 2017-10-28 RX ADMIN — SEVELAMER CARBONATE 1600 MILLIGRAM(S): 2400 POWDER, FOR SUSPENSION ORAL at 13:01

## 2017-10-28 RX ADMIN — Medication 250 MILLIGRAM(S): at 21:59

## 2017-10-28 RX ADMIN — CILOSTAZOL 50 MILLIGRAM(S): 100 TABLET ORAL at 14:04

## 2017-10-28 NOTE — PROGRESS NOTE ADULT - PROBLEM SELECTOR PLAN 4
Serum calcium acceptable. Check serum phosphorus. c/w Sensipar 60mg PO daily. F/U iPTH.   Monitor serum phosphorus and serum calcium.

## 2017-10-28 NOTE — PROGRESS NOTE ADULT - SUBJECTIVE AND OBJECTIVE BOX
SUBJECTIVE    Nausea: [ ] YES [ X] NO           Vomiting: [ ] YES [X ] NO  Flatus: [X ] YES [ ] NO             Bowel Movement: [X ] YES [ ] NO            Diarrhea: [ ] YES [X ] NO  Pain under control: [ X] YES [ ] NO  Tolerating renal diet  Ambulated: [X ] YES [ ]NO      VITALS  Vital Signs Last 24 Hrs  T(C): 36.6 (28 Oct 2017 05:29), Max: 37 (28 Oct 2017 00:10)  T(F): 97.9 (28 Oct 2017 05:29), Max: 98.6 (28 Oct 2017 00:10)  HR: 88 (28 Oct 2017 05:29) (77 - 108)  BP: 103/62 (28 Oct 2017 05:29) (103/62 - 140/68)  BP(mean): --  RR: 18 (28 Oct 2017 05:29) (17 - 18)  SpO2: 100% (28 Oct 2017 05:29) (100% - 100%)    I&O's Summary    27 Oct 2017 07:01  -  28 Oct 2017 07:00  --------------------------------------------------------  IN: 0 mL / OUT: 5 mL / NET: -5 mL        PHYSICAL EXAMINATION    Abdomen: obese, soft, NT, ND; wright catheter in the wound is draining pus      LABS                        10.3   11.9  )-----------( 138      ( 27 Oct 2017 07:06 )             33.8     10-27    129<L>  |  95<L>  |  34<H>  ----------------------------<  78  4.9   |  23  |  5.93<H>    Ca    9.9      27 Oct 2017 06:35    TPro  7.9  /  Alb  2.7<L>  /  TBili  0.6  /  DBili  x   /  AST  19  /  ALT  16  /  AlkPhos  198<H>  10-26     LIVER FUNCTIONS - ( 26 Oct 2017 13:44 )  Alb: 2.7 g/dL / Pro: 7.9 g/dL / ALK PHOS: 198 U/L / ALT: 16 U/L DA / AST: 19 U/L / GGT: x             MEDICATIONS  MEDICATIONS  (STANDING):  ATENolol  Tablet 50 milliGRAM(s) Oral daily  cilostazol 50 milliGRAM(s) Oral daily  cinacalcet 60 milliGRAM(s) Oral daily  epoetin shell Injectable 5000 Unit(s) IV Push <User Schedule>  furosemide    Tablet 80 milliGRAM(s) Oral daily  heparin  Injectable 5000 Unit(s) SubCutaneous every 8 hours  isosorbide   mononitrate ER Tablet (IMDUR) 30 milliGRAM(s) Oral daily  piperacillin/tazobactam IVPB. 3.375 Gram(s) IV Intermittent every 12 hours  sevelamer hydrochloride 1600 milliGRAM(s) Oral three times a day with meals    MEDICATIONS  (PRN):  oxyCODONE    5 mG/acetaminophen 325 mG 1 Tablet(s) Oral every 6 hours PRN Moderate Pain (4 - 6)

## 2017-10-28 NOTE — PROGRESS NOTE ADULT - ASSESSMENT
Patient is seen and examined. Case reviewed with the medical team. Above note is appreciated. Will follow up clinically. Continue DVT prophylaxis. Case discussed with Surgery, Nephrology and ID. Unable to place access toda Due to patient discomfort . Will give vancomycin during HD and continue ceftin and flagy. Patient needs a PICC line on monday for IV antibiotics.

## 2017-10-28 NOTE — PROGRESS NOTE ADULT - PROBLEM SELECTOR PLAN 1
Last HD 10/26/17 at outpt dialysis facility. Plan for next maintenance HD today. Monitor electrolytes.

## 2017-10-28 NOTE — PROGRESS NOTE ADULT - SUBJECTIVE AND OBJECTIVE BOX
[facility] NEPHROLOGY- PROGRESS NOTE    Patient is a 72 yo male with ESRD on HD s/p recent incisional hernia repair a/w abdominal wound infection; infected abdominal wall mesh ?abscesses vs phlegmon. Nephrology consulted for ESRD status.     REVIEW OF SYSTEMS:  Gen: no changes in weight  Cards: no chest pain  Resp: no dyspnea  GI: no nausea or vomiting or diarrhea  Vascular: no LE edema    penicillin (Hives)      Hospital Medications: Medications reviewed    VITALS:  T(F): 97.9 (10-28-17 @ 05:29), Max: 98.6 (10-28-17 @ 00:10)  HR: 88 (10-28-17 @ 05:29)  BP: 103/62 (10-28-17 @ 05:29)  RR: 18 (10-28-17 @ 05:29)  SpO2: 100% (10-28-17 @ 05:29)  Wt(kg): --  Height (cm): 185.42 (10-27 @ 05:08), 185.42 (10-26 @ 11:27), 185.42 (10-08 @ 13:32)  Weight (kg): 99.1 (10-27 @ 05:08), 81.6 (10-26 @ 11:27), 97.5 (10-08 @ 13:32)  BMI (kg/m2): 28.8 (10-27 @ 05:08), 23.7 (10-26 @ 11:27), 28.4 (10-08 @ 13:32)  BSA (m2): 2.23 (10-27 @ 05:08), 2.06 (10-26 @ 11:27), 2.22 (10-08 @ 13:32)    10-27 @ 07:01  -  10-28 @ 07:00  --------------------------------------------------------  IN: 0 mL / OUT: 5 mL / NET: -5 mL      PHYSICAL EXAM:    Gen: NAD, calm  Cards: RRR, +S1/S2, no M/G/R  Resp: CTA B/L  GI: soft, NT/ND, NABS  Vascular: no LE edema B/L, RUE AVG + bruit/thrill    LABS:  10-27    129<L>  |  95<L>  |  34<H>  ----------------------------<  78  4.9   |  23  |  5.93<H>    Ca    9.9      27 Oct 2017 06:35      Creatinine Trend: 5.93 <--, 4.46 <--                        10.3   11.9  )-----------( 138      ( 27 Oct 2017 07:06 )             33.8

## 2017-10-28 NOTE — PROGRESS NOTE ADULT - ASSESSMENT
71M s/p incisional hernia repair with mesh and abscess s/p placement of wright into cavity to drain. Afebrile and hemodynamically normal.    1) HD today  2) fu cx and ID rec's for abx  3) consider PICC line on Monday

## 2017-10-28 NOTE — PROGRESS NOTE ADULT - ASSESSMENT
Patient is a 72yo Male with ESRD on HD s/p recent incisional hernia repair a/w abdominal wound infection; infected abdominal wall mesh ?abscesses vs phlegmon. Nephrology consulted for ESRD status.

## 2017-10-28 NOTE — CHART NOTE - NSCHARTNOTEFT_GEN_A_CORE
Upon Nutritional Assessment by the Registered Dietitian your patient was determined to meet criteria / has evidence of the following diagnosis/diagnoses:          [ ]  Mild Protein Calorie Malnutrition        [ ]  Moderate Protein Calorie Malnutrition        [ x] Severe Protein Calorie Malnutrition        [ ] Unspecified Protein Calorie Malnutrition        [ ] Underweight / BMI <19        [ ] Morbid Obesity / BMI > 40      Findings as based on:  •  Comprehensive nutrition assessment and consultation  •  Calorie counts (nutrient intake analysis)  •  Food acceptance and intake status from observations by staff  •  Follow up  •  Patient education  •  Intervention secondary to interdisciplinary rounds  •   concerns      Treatment:    The following diet has been recommended:      PROVIDER Section:     By signing this assessment you are acknowledging and agree with the diagnosis/diagnoses assigned by the Registered Dietitian    Comments:  continue with renal replacement diet, add ensure clear 1 can/d,

## 2017-10-28 NOTE — PROGRESS NOTE ADULT - SUBJECTIVE AND OBJECTIVE BOX
CHIEF COMPLAINT:Patient is a 71y old  Male who presents with a chief complaint of   	  REVIEW OF SYSTEMS:  CONSTITUTIONAL: No fever, weight loss, or fatigue  EYES: No eye pain, visual disturbances, or discharge  ENMT:  No difficulty hearing, tinnitus, vertigo; No sinus or throat pain  NECK: No pain or stiffness  RESPIRATORY: No cough, wheezing, chills or hemoptysis; No Shortness of Breath  CARDIOVASCULAR: No chest pain, palpitations, passing out, dizziness, or leg swelling  GASTROINTESTINAL: No abdominal or epigastric pain. No nausea, vomiting, or hematemesis; No diarrhea or constipation. No melena or hematochezia.  GENITOURINARY: No dysuria, frequency, hematuria, or incontinence  NEUROLOGICAL: No headaches, memory loss, loss of strength, numbness, or tremors  SKIN: No itching, burning, rashes, or lesions   LYMPH Nodes: No enlarged glands  ENDOCRINE: No heat or cold intolerance; No hair loss  MUSCULOSKELETAL: No joint pain or swelling; No muscle, back, or extremity pain  PSYCHIATRIC: No depression, anxiety, mood swings, or difficulty sleeping  HEME/LYMPH: No easy bruising, or bleeding gums  ALLERY AND IMMUNOLOGIC: No hives or eczema	    [ ] All others negative	  [ ] Unable to obtain    PHYSICAL EXAM:  T(C): 36.4 (10-28-17 @ 22:11), Max: 37 (10-28-17 @ 00:10)  HR: 80 (10-28-17 @ 22:11) (68 - 108)  BP: 132/62 (10-28-17 @ 22:11) (103/62 - 140/68)  RR: 18 (10-28-17 @ 22:11) (16 - 18)  SpO2: 100% (10-28-17 @ 22:11) (100% - 100%)  Wt(kg): --  I&O's Summary    27 Oct 2017 07:01  -  28 Oct 2017 07:00  --------------------------------------------------------  IN: 0 mL / OUT: 5 mL / NET: -5 mL    28 Oct 2017 07:01  -  28 Oct 2017 23:38  --------------------------------------------------------  IN: 0 mL / OUT: 15 mL / NET: -15 mL        Appearance: Normal	  HEENT:   Normal oral mucosa, PERRL, EOMI	  Lymphatic: No lymphadenopathy  Cardiovascular: Normal S1 S2, No JVD, No murmurs, No edema  Respiratory: Lungs clear to auscultation	  Psychiatry: A & O x 3, Mood & affect appropriate  Gastrointestinal:  Soft, Non-tender, + BS	  Skin: No rashes, No ecchymoses, No cyanosis	  Neurologic: Non-focal  Extremities: Normal range of motion, No clubbing, cyanosis or edema  Vascular: Peripheral pulses palpable 2+ bilaterally    MEDICATIONS  (STANDING):  ATENolol  Tablet 50 milliGRAM(s) Oral daily  cefuroxime   Tablet 250 milliGRAM(s) Oral every 12 hours  cilostazol 50 milliGRAM(s) Oral daily  cinacalcet 60 milliGRAM(s) Oral daily  epoetin shell Injectable 5000 Unit(s) IV Push <User Schedule>  furosemide    Tablet 80 milliGRAM(s) Oral daily  heparin  Injectable 5000 Unit(s) SubCutaneous every 8 hours  isosorbide   mononitrate ER Tablet (IMDUR) 30 milliGRAM(s) Oral daily  metroNIDAZOLE    Tablet 500 milliGRAM(s) Oral every 8 hours  sevelamer hydrochloride 1600 milliGRAM(s) Oral three times a day with meals      TELEMETRY: 	    ECG:  	  RADIOLOGY:  OTHER: 	  	  CBC Full  -  ( 28 Oct 2017 17:43 )  WBC Count : 6.3 K/uL  Hemoglobin : 8.8 g/dL  Hematocrit : 28.5 %  Platelet Count - Automated : 160 K/uL  Mean Cell Volume : 87.1 fl  Mean Cell Hemoglobin : 26.9 pg  Mean Cell Hemoglobin Concentration : 30.9 gm/dL  Auto Neutrophil # : x  Auto Lymphocyte # : x  Auto Monocyte # : x  Auto Eosinophil # : x  Auto Basophil # : x  Auto Neutrophil % : x  Auto Lymphocyte % : x  Auto Monocyte % : x  Auto Eosinophil % : x  Auto Basophil % : x        CARDIAC MARKERS:                              8.8    6.3   )-----------( 160      ( 28 Oct 2017 17:43 )             28.5       10-28    134<L>  |  96  |  55<H>  ----------------------------<  121<H>  3.7   |  29  |  8.11<H>    Ca    8.1<L>      28 Oct 2017 17:43  Phos  2.8     10-28              proBNP:   Lipid Profile: Cholesterol 172  LDL 85  HDL 71  TG 82    HgA1c: Hemoglobin A1C, Whole Blood: 5.8 % (10-09 @ 13:18)    TSH: Thyroid Stimulating Hormone, Serum: 1.18 uU/mL (10-09 @ 10:57)

## 2017-10-29 LAB
ANION GAP SERPL CALC-SCNC: 6 MMOL/L — SIGNIFICANT CHANGE UP (ref 5–17)
BUN SERPL-MCNC: 30 MG/DL — HIGH (ref 7–18)
CALCIUM SERPL-MCNC: 8.2 MG/DL — LOW (ref 8.4–10.5)
CALCIUM SERPL-MCNC: 8.6 MG/DL — SIGNIFICANT CHANGE UP (ref 8.4–10.5)
CHLORIDE SERPL-SCNC: 98 MMOL/L — SIGNIFICANT CHANGE UP (ref 96–108)
CO2 SERPL-SCNC: 30 MMOL/L — SIGNIFICANT CHANGE UP (ref 22–31)
CREAT SERPL-MCNC: 5.91 MG/DL — HIGH (ref 0.5–1.3)
GLUCOSE SERPL-MCNC: 87 MG/DL — SIGNIFICANT CHANGE UP (ref 70–99)
HBV SURFACE AG SER-ACNC: SIGNIFICANT CHANGE UP
HCT VFR BLD CALC: 31 % — LOW (ref 39–50)
HGB BLD-MCNC: 9.1 G/DL — LOW (ref 13–17)
MCHC RBC-ENTMCNC: 26.1 PG — LOW (ref 27–34)
MCHC RBC-ENTMCNC: 29.5 GM/DL — LOW (ref 32–36)
MCV RBC AUTO: 88.5 FL — SIGNIFICANT CHANGE UP (ref 80–100)
PHOSPHATE SERPL-MCNC: 2.7 MG/DL — SIGNIFICANT CHANGE UP (ref 2.5–4.5)
PLATELET # BLD AUTO: 152 K/UL — SIGNIFICANT CHANGE UP (ref 150–400)
POTASSIUM SERPL-MCNC: 3.9 MMOL/L — SIGNIFICANT CHANGE UP (ref 3.5–5.3)
POTASSIUM SERPL-SCNC: 3.9 MMOL/L — SIGNIFICANT CHANGE UP (ref 3.5–5.3)
PTH-INTACT FLD-MCNC: 454 PG/ML — HIGH (ref 15–65)
RBC # BLD: 3.5 M/UL — LOW (ref 4.2–5.8)
RBC # FLD: 16.6 % — HIGH (ref 10.3–14.5)
SODIUM SERPL-SCNC: 134 MMOL/L — LOW (ref 135–145)
WBC # BLD: 5.1 K/UL — SIGNIFICANT CHANGE UP (ref 3.8–10.5)
WBC # FLD AUTO: 5.1 K/UL — SIGNIFICANT CHANGE UP (ref 3.8–10.5)

## 2017-10-29 RX ORDER — PARICALCITOL 2 UG/1
1 CAPSULE, GELATIN COATED ORAL
Qty: 0 | Refills: 0 | Status: DISCONTINUED | OUTPATIENT
Start: 2017-10-31 | End: 2017-10-30

## 2017-10-29 RX ORDER — VANCOMYCIN HCL 1 G
1000 VIAL (EA) INTRAVENOUS ONCE
Qty: 0 | Refills: 0 | Status: COMPLETED | OUTPATIENT
Start: 2017-10-29 | End: 2017-10-29

## 2017-10-29 RX ORDER — DOXERCALCIFEROL 2.5 UG/1
1 CAPSULE ORAL
Qty: 0 | Refills: 0 | Status: DISCONTINUED | OUTPATIENT
Start: 2017-10-29 | End: 2017-10-29

## 2017-10-29 RX ORDER — SEVELAMER CARBONATE 2400 MG/1
800 POWDER, FOR SUSPENSION ORAL
Qty: 0 | Refills: 0 | Status: DISCONTINUED | OUTPATIENT
Start: 2017-10-29 | End: 2017-10-30

## 2017-10-29 RX ADMIN — CILOSTAZOL 50 MILLIGRAM(S): 100 TABLET ORAL at 12:12

## 2017-10-29 RX ADMIN — SEVELAMER CARBONATE 1600 MILLIGRAM(S): 2400 POWDER, FOR SUSPENSION ORAL at 12:12

## 2017-10-29 RX ADMIN — HEPARIN SODIUM 5000 UNIT(S): 5000 INJECTION INTRAVENOUS; SUBCUTANEOUS at 06:08

## 2017-10-29 RX ADMIN — HEPARIN SODIUM 5000 UNIT(S): 5000 INJECTION INTRAVENOUS; SUBCUTANEOUS at 21:33

## 2017-10-29 RX ADMIN — HEPARIN SODIUM 5000 UNIT(S): 5000 INJECTION INTRAVENOUS; SUBCUTANEOUS at 14:36

## 2017-10-29 RX ADMIN — ISOSORBIDE MONONITRATE 30 MILLIGRAM(S): 60 TABLET, EXTENDED RELEASE ORAL at 12:12

## 2017-10-29 RX ADMIN — Medication 500 MILLIGRAM(S): at 06:08

## 2017-10-29 RX ADMIN — CINACALCET 60 MILLIGRAM(S): 30 TABLET, FILM COATED ORAL at 12:12

## 2017-10-29 RX ADMIN — Medication 250 MILLIGRAM(S): at 06:08

## 2017-10-29 NOTE — PROGRESS NOTE ADULT - ASSESSMENT
71M s/p incisional hernia repair with mesh and abscess s/p placement of wright into cavity to drain. Afebrile and hemodynamically normal.    1) HD per renal  2) consider PICC line on Monday  4) daily dsg change

## 2017-10-29 NOTE — PROGRESS NOTE ADULT - SUBJECTIVE AND OBJECTIVE BOX
PT seen at bedside, NAD, denies abdominal pain, no nausea or vomiting      VITALS  T(C): 36.8 (29 Oct 2017 05:53), Max: 36.8 (29 Oct 2017 05:53)  T(F): 98.3 (29 Oct 2017 05:53), Max: 98.3 (29 Oct 2017 05:53)  HR: 69 (29 Oct 2017 05:53) (68 - 86)  BP: 99/40 (29 Oct 2017 05:53) (99/40 - 132/62)  RR: 17 (29 Oct 2017 05:53) (16 - 18)  SpO2: 99% (29 Oct 2017 05:53) (99% - 100%)      PHYSICAL EXAMINATION    Abdomen: obese, soft, NT, ND; wright catheter in the wound is draining pus      LABS                           9.1    5.1   )-----------( 152      ( 29 Oct 2017 07:23 )             31.0   10-29    134<L>  |  98  |  30<H>  ----------------------------<  87  3.9   |  30  |  5.91<H>    Ca    8.6      29 Oct 2017 07:23  Phos  2.7     10-29      MEDICATIONS  MEDICATIONS  (STANDING):  ATENolol  Tablet 50 milliGRAM(s) Oral daily  cilostazol 50 milliGRAM(s) Oral daily  cinacalcet 60 milliGRAM(s) Oral daily  epoetin shell Injectable 5000 Unit(s) IV Push <User Schedule>  furosemide    Tablet 80 milliGRAM(s) Oral daily  heparin  Injectable 5000 Unit(s) SubCutaneous every 8 hours  isosorbide   mononitrate ER Tablet (IMDUR) 30 milliGRAM(s) Oral daily  piperacillin/tazobactam IVPB. 3.375 Gram(s) IV Intermittent every 12 hours  sevelamer hydrochloride 1600 milliGRAM(s) Oral three times a day with meals    MEDICATIONS  (PRN):  oxyCODONE    5 mG/acetaminophen 325 mG 1 Tablet(s) Oral every 6 hours PRN Moderate Pain (4 - 6)

## 2017-10-29 NOTE — PROGRESS NOTE ADULT - SUBJECTIVE AND OBJECTIVE BOX
71y Male    Meds:  cefuroxime   Tablet 250 milliGRAM(s) Oral every 12 hours  metroNIDAZOLE    Tablet 500 milliGRAM(s) Oral every 8 hours    Allergies    penicillin (Hives)    Intolerances        VITALS:  Vital Signs Last 24 Hrs  T(C): 36.8 (29 Oct 2017 05:53), Max: 36.8 (29 Oct 2017 05:53)  T(F): 98.3 (29 Oct 2017 05:53), Max: 98.3 (29 Oct 2017 05:53)  HR: 69 (29 Oct 2017 05:53) (68 - 86)  BP: 99/40 (29 Oct 2017 05:53) (99/40 - 132/62)  BP(mean): --  RR: 17 (29 Oct 2017 05:53) (16 - 18)  SpO2: 99% (29 Oct 2017 05:53) (99% - 100%)    LABS/DIAGNOSTIC TESTS:                          9.1    5.1   )-----------( 152      ( 29 Oct 2017 07:23 )             31.0         10-29    134<L>  |  98  |  30<H>  ----------------------------<  87  3.9   |  30  |  5.91<H>    Ca    8.6      29 Oct 2017 07:23  Phos  2.7     10-29            CULTURES: .Surgical Swab Abdominal Wall Abscess  10-27 @ 01:59   Rare Enterococcus avium  --  Enterococcus avium      .Blood Blood-Peripheral  10-26 @ 18:30   No growth to date.  --  --            RADIOLOGY:      ROS:  [  ] UNABLE TO ELICIT 71y Male who is doing much better overall, his abdominal pain is much better and has much less drainage of pus. He still has a drain in place and is growing out enterococcus avium. No fevers or chills. No diarrhea.    Meds:  cefuroxime   Tablet 250 milliGRAM(s) Oral every 12 hours  metroNIDAZOLE    Tablet 500 milliGRAM(s) Oral every 8 hours    Allergies    penicillin (Hives)    Intolerances        VITALS:  Vital Signs Last 24 Hrs  T(C): 36.8 (29 Oct 2017 05:53), Max: 36.8 (29 Oct 2017 05:53)  T(F): 98.3 (29 Oct 2017 05:53), Max: 98.3 (29 Oct 2017 05:53)  HR: 69 (29 Oct 2017 05:53) (68 - 86)  BP: 99/40 (29 Oct 2017 05:53) (99/40 - 132/62)  BP(mean): --  RR: 17 (29 Oct 2017 05:53) (16 - 18)  SpO2: 99% (29 Oct 2017 05:53) (99% - 100%)    LABS/DIAGNOSTIC TESTS:                          9.1    5.1   )-----------( 152      ( 29 Oct 2017 07:23 )             31.0         10-29    134<L>  |  98  |  30<H>  ----------------------------<  87  3.9   |  30  |  5.91<H>    Ca    8.6      29 Oct 2017 07:23  Phos  2.7     10-29            CULTURES: .Surgical Swab Abdominal Wall Abscess  10-27 @ 01:59   Rare Enterococcus avium  --  Enterococcus avium      .Blood Blood-Peripheral  10-26 @ 18:30   No growth to date.  --  --            RADIOLOGY:      ROS:  [  ] UNABLE TO ELICIT

## 2017-10-29 NOTE — PROGRESS NOTE ADULT - SUBJECTIVE AND OBJECTIVE BOX
[facility] NEPHROLOGY- PROGRESS NOTE    Patient is a 72 yo male with ESRD on HD s/p recent incisional hernia repair a/w abdominal wound infection; infected abdominal wall mesh ?abscesses vs phlegmon. Nephrology consulted for ESRD status.     REVIEW OF SYSTEMS:  Gen: no changes in weight  Cards: no chest pain  Resp: no dyspnea  GI: no nausea or vomiting or diarrhea  Vascular: no LE edema    penicillin (Hives)      Hospital Medications: Medications reviewed    VITALS:  T(F): 98.1 (10-29-17 @ 14:30), Max: 98.3 (10-29-17 @ 05:53)  HR: 110 (10-29-17 @ 14:30)  BP: 122/62 (10-29-17 @ 14:30)  RR: 17 (10-29-17 @ 14:30)  SpO2: 100% (10-29-17 @ 14:30)  Wt(kg): --    10-28 @ 07:01  -  10-29 @ 07:00  --------------------------------------------------------  IN: 0 mL / OUT: 16 mL / NET: -16 mL      PHYSICAL EXAM:    Gen: NAD, calm  Cards: RRR, +S1/S2, no M/G/R  Resp: CTA B/L  GI: soft, NT/ND, NABS  Vascular: no LE edema B/L, RUE AVG + bruit/thrill    LABS:  10-29    134<L>  |  98  |  30<H>  ----------------------------<  87  3.9   |  30  |  5.91<H>    Ca    8.6      29 Oct 2017 07:23  Phos  2.7     10-29      Creatinine Trend: 5.91 <--, 8.11 <--, 5.93 <--, 4.46 <--                        9.1    5.1   )-----------( 152      ( 29 Oct 2017 07:23 )             31.0

## 2017-10-29 NOTE — PROGRESS NOTE ADULT - PROBLEM SELECTOR PLAN 5
Serum phos low normal pre-HD yesterday. Decrease renagel to 1 tablet TID with meals and continue with low phos diet. Monitor serum phos.

## 2017-10-29 NOTE — PROGRESS NOTE ADULT - ASSESSMENT
abdominal wall abscess  ?infected mesh    plan- dc ceftin and flagyl  start levaquin 250mgs iv q 24hrs  will give vanco 1 gm iv x 1 today  if okay with Dr Adamson will consider dcing home tomorrow on po abxs abdominal wall abscess  ?infected mesh    plan- dc ceftin and flagyl  start levaquin 250mgs iv q 24hrs  if okay with Dr Adamson will consider dcing home tomorrow on po abxs

## 2017-10-29 NOTE — PROGRESS NOTE ADULT - SUBJECTIVE AND OBJECTIVE BOX
CHIEF COMPLAINT:Patient is a 71y old  Male who presents with a chief complaint of   	  REVIEW OF SYSTEMS:  CONSTITUTIONAL: No fever, weight loss, or fatigue  EYES: No eye pain, visual disturbances, or discharge  ENMT:  No difficulty hearing, tinnitus, vertigo; No sinus or throat pain  NECK: No pain or stiffness  RESPIRATORY: No cough, wheezing, chills or hemoptysis; No Shortness of Breath  CARDIOVASCULAR: No chest pain, palpitations, passing out, dizziness, or leg swelling  GASTROINTESTINAL: No abdominal or epigastric pain. No nausea, vomiting, or hematemesis; No diarrhea or constipation. No melena or hematochezia.  GENITOURINARY: No dysuria, frequency, hematuria, or incontinence  NEUROLOGICAL: No headaches, memory loss, loss of strength, numbness, or tremors  SKIN: No itching, burning, rashes, or lesions   LYMPH Nodes: No enlarged glands  ENDOCRINE: No heat or cold intolerance; No hair loss  MUSCULOSKELETAL: No joint pain or swelling; No muscle, back, or extremity pain  PSYCHIATRIC: No depression, anxiety, mood swings, or difficulty sleeping  HEME/LYMPH: No easy bruising, or bleeding gums  ALLERY AND IMMUNOLOGIC: No hives or eczema	    [ ] All others negative	  [ ] Unable to obtain    PHYSICAL EXAM:  T(C): 36.9 (10-29-17 @ 21:51), Max: 36.9 (10-29-17 @ 21:51)  HR: 67 (10-29-17 @ 21:51) (67 - 110)  BP: 112/57 (10-29-17 @ 21:51) (99/40 - 122/62)  RR: 18 (10-29-17 @ 21:51) (17 - 18)  SpO2: 99% (10-29-17 @ 21:51) (99% - 100%)  Wt(kg): --  I&O's Summary    28 Oct 2017 07:01  -  29 Oct 2017 07:00  --------------------------------------------------------  IN: 0 mL / OUT: 16 mL / NET: -16 mL    29 Oct 2017 07:01  -  29 Oct 2017 23:55  --------------------------------------------------------  IN: 0 mL / OUT: 10 mL / NET: -10 mL        Appearance: Normal	  HEENT:   Normal oral mucosa, PERRL, EOMI	  Lymphatic: No lymphadenopathy  Cardiovascular: Normal S1 S2, No JVD, No murmurs, No edema  Respiratory: Lungs clear to auscultation	  Psychiatry: A & O x 3, Mood & affect appropriate  Gastrointestinal:  Soft, Non-tender, + BS	  Skin: No rashes, No ecchymoses, No cyanosis	  Neurologic: Non-focal  Extremities: Normal range of motion, No clubbing, cyanosis or edema  Vascular: Peripheral pulses palpable 2+ bilaterally    MEDICATIONS  (STANDING):  ATENolol  Tablet 50 milliGRAM(s) Oral daily  cilostazol 50 milliGRAM(s) Oral daily  cinacalcet 60 milliGRAM(s) Oral daily  epoetin shell Injectable 5000 Unit(s) IV Push <User Schedule>  furosemide    Tablet 80 milliGRAM(s) Oral daily  heparin  Injectable 5000 Unit(s) SubCutaneous every 8 hours  isosorbide   mononitrate ER Tablet (IMDUR) 30 milliGRAM(s) Oral daily  levoFLOXacin  Tablet 250 milliGRAM(s) Oral every 24 hours  paricalcitol Injectable 1 MICROGram(s) IV Push <User Schedule>  sevelamer hydrochloride 800 milliGRAM(s) Oral three times a day with meals      TELEMETRY: 	    ECG:  	  RADIOLOGY:  OTHER: 	  	  CBC Full  -  ( 29 Oct 2017 07:23 )  WBC Count : 5.1 K/uL  Hemoglobin : 9.1 g/dL  Hematocrit : 31.0 %  Platelet Count - Automated : 152 K/uL  Mean Cell Volume : 88.5 fl  Mean Cell Hemoglobin : 26.1 pg  Mean Cell Hemoglobin Concentration : 29.5 gm/dL  Auto Neutrophil # : x  Auto Lymphocyte # : x  Auto Monocyte # : x  Auto Eosinophil # : x  Auto Basophil # : x  Auto Neutrophil % : x  Auto Lymphocyte % : x  Auto Monocyte % : x  Auto Eosinophil % : x  Auto Basophil % : x        CARDIAC MARKERS:                              9.1    5.1   )-----------( 152      ( 29 Oct 2017 07:23 )             31.0       10-29    134<L>  |  98  |  30<H>  ----------------------------<  87  3.9   |  30  |  5.91<H>    Ca    8.6      29 Oct 2017 07:23  Phos  2.7     10-29              proBNP:   Lipid Profile: Cholesterol 172  LDL 85  HDL 71  TG 82    HgA1c: Hemoglobin A1C, Whole Blood: 5.8 % (10-09 @ 13:18)    TSH: Thyroid Stimulating Hormone, Serum: 1.18 uU/mL (10-09 @ 10:57)

## 2017-10-29 NOTE — PROGRESS NOTE ADULT - PROBLEM SELECTOR PLAN 1
Last HD 10/28/17 tolerated well with 2L removed. Plan for next maintenance HD on 10/31. Monitor electrolytes.

## 2017-10-29 NOTE — PROGRESS NOTE ADULT - PROBLEM SELECTOR PLAN 4
Serum calcium acceptable. c/w Sensipar 60mg PO daily but start hectorol 1 mcg with HD given high normal iPTH.  Monitor serum phosphorus and serum calcium.

## 2017-10-30 ENCOUNTER — TRANSCRIPTION ENCOUNTER (OUTPATIENT)
Age: 72
End: 2017-10-30

## 2017-10-30 VITALS
HEART RATE: 66 BPM | TEMPERATURE: 98 F | DIASTOLIC BLOOD PRESSURE: 55 MMHG | RESPIRATION RATE: 16 BRPM | SYSTOLIC BLOOD PRESSURE: 112 MMHG | OXYGEN SATURATION: 100 %

## 2017-10-30 LAB
ANION GAP SERPL CALC-SCNC: 9 MMOL/L — SIGNIFICANT CHANGE UP (ref 5–17)
BUN SERPL-MCNC: 42 MG/DL — HIGH (ref 7–18)
CALCIUM SERPL-MCNC: 8.8 MG/DL — SIGNIFICANT CHANGE UP (ref 8.4–10.5)
CHLORIDE SERPL-SCNC: 98 MMOL/L — SIGNIFICANT CHANGE UP (ref 96–108)
CO2 SERPL-SCNC: 28 MMOL/L — SIGNIFICANT CHANGE UP (ref 22–31)
CREAT SERPL-MCNC: 7.81 MG/DL — HIGH (ref 0.5–1.3)
GLUCOSE SERPL-MCNC: 84 MG/DL — SIGNIFICANT CHANGE UP (ref 70–99)
HCT VFR BLD CALC: 31.4 % — LOW (ref 39–50)
HGB BLD-MCNC: 9.3 G/DL — LOW (ref 13–17)
MCHC RBC-ENTMCNC: 26.2 PG — LOW (ref 27–34)
MCHC RBC-ENTMCNC: 29.8 GM/DL — LOW (ref 32–36)
MCV RBC AUTO: 87.9 FL — SIGNIFICANT CHANGE UP (ref 80–100)
PLATELET # BLD AUTO: 170 K/UL — SIGNIFICANT CHANGE UP (ref 150–400)
POTASSIUM SERPL-MCNC: 3.7 MMOL/L — SIGNIFICANT CHANGE UP (ref 3.5–5.3)
POTASSIUM SERPL-SCNC: 3.7 MMOL/L — SIGNIFICANT CHANGE UP (ref 3.5–5.3)
RBC # BLD: 3.57 M/UL — LOW (ref 4.2–5.8)
RBC # FLD: 16.3 % — HIGH (ref 10.3–14.5)
SODIUM SERPL-SCNC: 135 MMOL/L — SIGNIFICANT CHANGE UP (ref 135–145)
WBC # BLD: 4.9 K/UL — SIGNIFICANT CHANGE UP (ref 3.8–10.5)
WBC # FLD AUTO: 4.9 K/UL — SIGNIFICANT CHANGE UP (ref 3.8–10.5)

## 2017-10-30 RX ORDER — CILOSTAZOL 100 MG/1
1 TABLET ORAL
Qty: 0 | Refills: 0 | COMMUNITY
Start: 2017-10-30

## 2017-10-30 RX ORDER — CILOSTAZOL 100 MG/1
1 TABLET ORAL
Qty: 0 | Refills: 0 | COMMUNITY

## 2017-10-30 RX ADMIN — Medication 80 MILLIGRAM(S): at 05:31

## 2017-10-30 RX ADMIN — ATENOLOL 50 MILLIGRAM(S): 25 TABLET ORAL at 05:32

## 2017-10-30 RX ADMIN — ISOSORBIDE MONONITRATE 30 MILLIGRAM(S): 60 TABLET, EXTENDED RELEASE ORAL at 11:59

## 2017-10-30 RX ADMIN — CINACALCET 60 MILLIGRAM(S): 30 TABLET, FILM COATED ORAL at 11:59

## 2017-10-30 RX ADMIN — SEVELAMER CARBONATE 800 MILLIGRAM(S): 2400 POWDER, FOR SUSPENSION ORAL at 17:07

## 2017-10-30 RX ADMIN — HEPARIN SODIUM 5000 UNIT(S): 5000 INJECTION INTRAVENOUS; SUBCUTANEOUS at 05:31

## 2017-10-30 RX ADMIN — SEVELAMER CARBONATE 800 MILLIGRAM(S): 2400 POWDER, FOR SUSPENSION ORAL at 13:21

## 2017-10-30 RX ADMIN — CILOSTAZOL 50 MILLIGRAM(S): 100 TABLET ORAL at 11:59

## 2017-10-30 RX ADMIN — SEVELAMER CARBONATE 800 MILLIGRAM(S): 2400 POWDER, FOR SUSPENSION ORAL at 10:52

## 2017-10-30 NOTE — PROGRESS NOTE ADULT - PROBLEM SELECTOR PLAN 1
Last HD 10/28/17 tolerated well with 2L removed. Plan for next maintenance HD on 10/31 at outpt HD unit. Monitor electrolytes.

## 2017-10-30 NOTE — PROGRESS NOTE ADULT - SKIN COMMENTS
some induration around opening of abdominal wound, no erythema or warmth
induration around abdominal abscess site present but no more erythema or warmth.

## 2017-10-30 NOTE — DISCHARGE NOTE ADULT - CARE PLAN
Principal Discharge DX:	Abdominal abscess  Goal:	wound healing  Instructions for follow-up, activity and diet:	Please pack wound daily with 1/2 sterile packing and then cover with dry dressing   Please follow up with Dr. Adamson within 1 week  Secondary Diagnosis:	CAD (coronary artery disease)  Secondary Diagnosis:	ESRD (end stage renal disease) on dialysis  Goal:	continue dialysis  Secondary Diagnosis:	Essential hypertension

## 2017-10-30 NOTE — PROGRESS NOTE ADULT - ATTENDING COMMENTS
Patient is seen and examined. Case reviewed with the medical team. Above note is appreciated. Will follow up clinically. Continue DVT prophylaxis. Case discussed with surgery, ID  and nephrology. Will continue antibiotics. For HD in am.
[facility] NEPHROLOGY  Armando Shaffer M.D.  Kendall Morales D.O.  Lilliam Cody M.D.  Oxana Hernandez, MSN, ANP-C  [phone]    [address] [place] [address]
[facility] NEPHROLOGY  Armando Shaffer M.D.  Kendall Morales D.O.  Lilliam Cody M.D.  Oxana Hernandez, MSN, ANP-C  [phone]    [address] [place] [address]
[facility] NEPHROLOGY  Armando Shaffer M.D.  Kendall Morales D.O.  Lilliam Cody M.D.  Oxana Hernandez, MSN, ANP-C  [phone]    [address] [place] [address]

## 2017-10-30 NOTE — PROGRESS NOTE ADULT - PROBLEM SELECTOR PLAN 4
Serum calcium acceptable. c/w Sensipar 60mg PO daily  & Zemplar.   Monitor serum phosphorus and serum calcium.

## 2017-10-30 NOTE — DISCHARGE NOTE ADULT - HOSPITAL COURSE
70 y/o male with multiple medical problems returns to [facility] early this AM after signing AMA yesterday afternoon. States he needed to leave all things at home organized and his pets settled before coming back to deal with his condition.  Patient originally came to ED yesterday after finding himself soaked with purulent, foul smelling drainage from his abdominal site while he was in HD. In first ED visit, he had temp of 101.8F, code sepsis was called.  +CT A/P showing soft tissue structure by the anterior abdominal wall by the repair site that can either be a phlegmon vs abscesses. Reports that his [place] drain was removed by Dr. Adamson last Thursday( 10/19), he's s/p an incisional hernia repair with mesh on 10/9/17. Since last Thursday, the site was draining a small amount of fluid but the drainage began to increase in amount over the past few days. No recurrent fevers since yesterday and wbc count has slightly improved. Waiting on BC and abdominal culture results.  Patient was irrigated at bedside, cultures grew out enterococcus avium. Patient for d/c home with home care and oral antibiotics

## 2017-10-30 NOTE — PROGRESS NOTE ADULT - PROBLEM SELECTOR PLAN 5
Check serum phos. c/w renagel to 1 tablet TID with meals and continue with low phos diet. If serum phos, continues to decline would hold phos binders. Monitor serum phos.

## 2017-10-30 NOTE — PROGRESS NOTE ADULT - ASSESSMENT
71M s/p incisional hernia repair with mesh and abscess s/p placement of wright into cavity to drain. Afebrile and hemodynamically normal.    1) HD per renal  2) possible PICC line today pending ID recommendations  4) daily dsg change

## 2017-10-30 NOTE — PROGRESS NOTE ADULT - RS GEN PE MLT RESP DETAILS PC
clear to auscultation bilaterally/no rhonchi/no rales
no wheezes/no rales/clear to auscultation bilaterally/no rhonchi

## 2017-10-30 NOTE — DISCHARGE NOTE ADULT - OTHER SIGNIFICANT FINDINGS
Patient ID: [id number] Patient Name: MAGGIE THAO   YOB: 1945 Sex: M        EXAM: CT ABDOMEN AND PELVIS      PROCEDURE DATE: 10/26/2017        INTERPRETATION: CT of the abdomen and pelvis without IV or oral contrast    Clinical Indication: abdominal abscess    Technique: Axial multidetector CT images of the abdomen and pelvis are  acquired following the administration of oral contrast only    Comparison: 10/8/2017.    Findings:    Abdomen: Limited sections through the lung bases demonstrates small  bilateral atelectasis. There is a small calcified granuloma in the right  lower lobe. Examination of the abdomen and pelvis is limited by lack of IV  contrast. IVC filter is present. Stable small gallstone in the gallbladder.  No CT evidence for thickened gallbladder wall or pericholecystic fluid.  Allowing for the non-IV contrast technique, the liver, pancreas, spleen, and  adrenals appear grossly unremarkable.    Multiple small hypodense lesions in the kidneys bilaterally, representing  probable cysts.    Surgical clips are again seen adjacent to the cecum. The appendix is not  visualized. No secondary signs for acute appendicitis. Collapse small hiatal  hernia versus distal esophageal mural thickening. No evidence for bowel  obstruction. New apparent mural thickening of the cecum may represent  nonspecific colitis.    Interval ventral hernia repair. There is a 5.8 x 8.0 cm complex subcutaneous  soft tissue density structure in the anterior abdominal wall at the ventral  hernia repair site; this may represent a phlegmon and/or abscesses. Further  differentiation is difficult without IV contrast. No evidence for free air,  ascites, or enlarged lymph node.    Pelvis: Multiple surgical clips are again seen in the pelvis with associated  streaky artifact which limits evaluation. The urinary bladder is not  visualized due to the streaky artifact. The bowel structures are grossly  intact. No gross pelvic free fluid, or enlarged lymph node. 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    Impression: Cholelithiasis.    Collapse small hiatal hernia versus distal esophageal mural thickening. If  clinically indicated, EGD may be pursued for further evaluation.    Interval ventral hernia repair. There is a 5.8 x 8.0 cm complex subcutaneous  soft tissue density structure in the anterior abdominal wall at the ventral  hernia repair site; this may represent a phlegmon and/or abscesses. Further  differentiation is difficult without IV contrast.    New apparent mural thickening of the cecum may represent nonspecific colitis.                          NOHEMY DIXON M.D., ATTENDING RADIOLOGIST  This document has been electronically signed. Oct 26 2017 4:01PM

## 2017-10-30 NOTE — PROGRESS NOTE ADULT - TENDERNESS
over wound and is minimal, drain in place
mild over left side of drain, some induration still present. drain in place

## 2017-10-30 NOTE — DISCHARGE NOTE ADULT - COMMUNITY RESOURCES
[facility]/ [address] [place] [address] [place] [address], [phone], Fax: [phone], 10/31/17, 6:00 AM.

## 2017-10-30 NOTE — DISCHARGE NOTE ADULT - PATIENT PORTAL LINK FT
“You can access the Follow[employer] Patient Portal, offered by [facility], by registering with the following website: [email or web address]”

## 2017-10-30 NOTE — PROGRESS NOTE ADULT - SUBJECTIVE AND OBJECTIVE BOX
PT with abdominal wall abscess s/p incisional hernia repair with mesh on 10/10.  PT seen at bedside, NAD, denies abdominal pain, no nausea or vomiting      VITALS  T(C): 37 (30 Oct 2017 05:42), Max: 37 (30 Oct 2017 05:42)  T(F): 98.6 (30 Oct 2017 05:42), Max: 98.6 (30 Oct 2017 05:42)  HR: 78 (30 Oct 2017 05:42) (67 - 110)  BP: 120/51 (30 Oct 2017 05:42) (112/57 - 122/62)  RR: 17 (30 Oct 2017 05:42) (17 - 18)  SpO2: 100% (30 Oct 2017 05:42) (99% - 100%)        PHYSICAL EXAMINATION    Abdomen: obese, soft, NT, ND; wright catheter in the wound is draining pus      LABS                        9.1    5.1   )-----------( 152      ( 29 Oct 2017 07:23 )             31.0   10-30    135  |  98  |  42<H>  ----------------------------<  84  3.7   |  28  |  7.81<H>    Ca    8.8      30 Oct 2017 07:36  Phos  2.7     10-29        MEDICATIONS  MEDICATIONS  (STANDING):  ATENolol  Tablet 50 milliGRAM(s) Oral daily  cilostazol 50 milliGRAM(s) Oral daily  cinacalcet 60 milliGRAM(s) Oral daily  epoetin shell Injectable 5000 Unit(s) IV Push <User Schedule>  furosemide    Tablet 80 milliGRAM(s) Oral daily  heparin  Injectable 5000 Unit(s) SubCutaneous every 8 hours  isosorbide   mononitrate ER Tablet (IMDUR) 30 milliGRAM(s) Oral daily  piperacillin/tazobactam IVPB. 3.375 Gram(s) IV Intermittent every 12 hours  sevelamer hydrochloride 1600 milliGRAM(s) Oral three times a day with meals    MEDICATIONS  (PRN):  oxyCODONE    5 mG/acetaminophen 325 mG 1 Tablet(s) Oral every 6 hours PRN Moderate Pain (4 - 6)

## 2017-10-30 NOTE — PROGRESS NOTE ADULT - SUBJECTIVE AND OBJECTIVE BOX
[facility] NEPHROLOGY- PROGRESS NOTE    Patient is a 72 yo male with ESRD on HD s/p recent incisional hernia repair a/w abdominal wound infection; infected abdominal wall mesh ?abscesses vs phlegmon. Nephrology consulted for ESRD status.     REVIEW OF SYSTEMS:  Gen: no changes in weight  Cards: no chest pain  Resp: no dyspnea  GI: no nausea or vomiting or diarrhea  Vascular: no LE edema    penicillin (Hives)      Hospital Medications: Medications reviewed    VITALS:  T(F): 98.6 (10-30-17 @ 05:42), Max: 98.6 (10-30-17 @ 05:42)  HR: 78 (10-30-17 @ 05:42)  BP: 120/51 (10-30-17 @ 05:42)  RR: 17 (10-30-17 @ 05:42)  SpO2: 100% (10-30-17 @ 05:42)  Wt(kg): --    10-29 @ 07:01  -  10-30 @ 07:00  --------------------------------------------------------  IN: 0 mL / OUT: 25 mL / NET: -25 mL      PHYSICAL EXAM:    Gen: NAD, calm  Cards: RRR, +S1/S2, no M/G/R  Resp: CTA B/L  GI: soft, NT/ND,  +abd drain  : +supra-pubic catheter  Vascular: no LE edema B/L, RUE AVG + bruit/thrill    LABS:  10-30    135  |  98  |  42<H>  ----------------------------<  84  3.7   |  28  |  7.81<H>    Ca    8.8      30 Oct 2017 07:36  Phos  2.7     10-29      Creatinine Trend: 7.81 <--, 5.91 <--, 8.11 <--, 5.93 <--, 4.46 <--                        9.3    4.9   )-----------( 170      ( 30 Oct 2017 07:36 )             31.4     Urine Studies:

## 2017-10-30 NOTE — PROGRESS NOTE ADULT - ASSESSMENT
Patient is seen and examined. Case reviewed with the medical team. Above note is appreciated. Will follow up clinically. Continue DVT prophylaxis. Case discussed with Surgery, Nephrology and ID. Unable to place access toda Due to patient discomfort . Will give vancomycin during HD and continue ceftin and flagy. Patient needs a PICC line on monday for IV antibiotics. Will follow up with ID about duration of antibiotics and with surgery for clearance for discharge planning.

## 2017-10-30 NOTE — PROGRESS NOTE ADULT - SUBJECTIVE AND OBJECTIVE BOX
71y Male who is doing well, he is feeling much better, he has much less abominal pain and drainage from his wound, no fevers, chills or diarrhea. He is going to be discharged home today with home care.    Meds:  levoFLOXacin  Tablet 250 milliGRAM(s) Oral every 24 hours    Allergies    penicillin (Hives)    Intolerances        VITALS:  Vital Signs Last 24 Hrs  T(C): 37 (30 Oct 2017 05:42), Max: 37 (30 Oct 2017 05:42)  T(F): 98.6 (30 Oct 2017 05:42), Max: 98.6 (30 Oct 2017 05:42)  HR: 78 (30 Oct 2017 05:42) (67 - 110)  BP: 120/51 (30 Oct 2017 05:42) (112/57 - 122/62)  BP(mean): --  RR: 17 (30 Oct 2017 05:42) (17 - 18)  SpO2: 100% (30 Oct 2017 05:42) (99% - 100%)    LABS/DIAGNOSTIC TESTS:                          9.3    4.9   )-----------( 170      ( 30 Oct 2017 07:36 )             31.4         10-30    135  |  98  |  42<H>  ----------------------------<  84  3.7   |  28  |  7.81<H>    Ca    8.8      30 Oct 2017 07:36  Phos  2.7     10-29            CULTURES: .Surgical Swab Abdominal Wall Abscess  10-27 @ 01:59   Rare Enterococcus avium  --  Enterococcus avium      .Blood Blood-Peripheral  10-26 @ 18:30   No growth to date.  --  --            RADIOLOGY:      ROS:  [  ] UNABLE TO ELICIT

## 2017-10-30 NOTE — PROGRESS NOTE ADULT - GASTROINTESTINAL DETAILS
bowel sounds normal/no guarding/no rigidity/soft/no distention
no rigidity/no guarding/no distention/soft/bowel sounds normal

## 2017-10-30 NOTE — DISCHARGE NOTE ADULT - PLAN OF CARE
wound healing Please pack wound daily with 1/2 sterile packing and then cover with dry dressing   Please follow up with Dr. Adamson within 1 week continue dialysis

## 2017-10-30 NOTE — DISCHARGE NOTE ADULT - MEDICATION SUMMARY - MEDICATIONS TO TAKE
I will START or STAY ON the medications listed below when I get home from the hospital:    Percocet 5/325 oral tablet  -- 1 tab(s) by mouth every 6 hours, As Needed -for moderate pain MDD:4 tabs   -- Caution federal law prohibits the transfer of this drug to any person other  than the person for whom it was prescribed.  May cause drowsiness.  Alcohol may intensify this effect.  Use care when operating dangerous machinery.  This prescription cannot be refilled.  This product contains acetaminophen.  Do not use  with any other product containing acetaminophen to prevent possible liver damage.  Using more of this medication than prescribed may cause serious breathing problems.    -- Indication: For Prn pain     Imdur  -- 20 milligram(s) by mouth once a day  -- Indication: For Cad    atenolol 50 mg oral tablet  -- 1 tab(s) by mouth once a day  -- Indication: For HTN (hypertension)    SENSIPAR 60 MG TABLET  -- Indication: For ESRD (end stage renal disease) on dialysis    FUROSEMIDE 80 MG TABLET  -- Indication: For ESRD (end stage renal disease) on dialysis    cilostazol 50 mg oral tablet  -- 1 tab(s) by mouth once a day  -- Indication: For Cad    RENVELA 800 MG TABLET  -- Indication: For ESRD (end stage renal disease) on dialysis    levoFLOXacin 250 mg oral tablet  -- 1 tab(s) by mouth every 24 hours  -- Indication: For Woubd infection

## 2017-10-30 NOTE — PROGRESS NOTE ADULT - SUBJECTIVE AND OBJECTIVE BOX
CHIEF COMPLAINT:Patient is a 71y old  Male who presents with a chief complaint of   	  REVIEW OF SYSTEMS:  CONSTITUTIONAL: No fever, weight loss, or fatigue  EYES: No eye pain, visual disturbances, or discharge  ENMT:  No difficulty hearing, tinnitus, vertigo; No sinus or throat pain  NECK: No pain or stiffness  RESPIRATORY: No cough, wheezing, chills or hemoptysis; No Shortness of Breath  CARDIOVASCULAR: No chest pain, palpitations, passing out, dizziness, or leg swelling  GASTROINTESTINAL: No abdominal or epigastric pain. No nausea, vomiting, or hematemesis; No diarrhea or constipation. No melena or hematochezia.  GENITOURINARY: No dysuria, frequency, hematuria, or incontinence  NEUROLOGICAL: No headaches, memory loss, loss of strength, numbness, or tremors  SKIN: No itching, burning, rashes, or lesions   LYMPH Nodes: No enlarged glands  ENDOCRINE: No heat or cold intolerance; No hair loss  MUSCULOSKELETAL: No joint pain or swelling; No muscle, back, or extremity pain  PSYCHIATRIC: No depression, anxiety, mood swings, or difficulty sleeping  HEME/LYMPH: No easy bruising, or bleeding gums  ALLERY AND IMMUNOLOGIC: No hives or eczema	    [ ] All others negative	  [ ] Unable to obtain    PHYSICAL EXAM:  T(C): 36.4 (10-30-17 @ 14:48), Max: 37 (10-30-17 @ 05:42)  HR: 66 (10-30-17 @ 14:48) (66 - 78)  BP: 112/55 (10-30-17 @ 14:48) (112/55 - 120/51)  RR: 16 (10-30-17 @ 14:48) (16 - 17)  SpO2: 100% (10-30-17 @ 14:48) (100% - 100%)  Wt(kg): --  I&O's Summary    29 Oct 2017 07:01  -  30 Oct 2017 07:00  --------------------------------------------------------  IN: 0 mL / OUT: 25 mL / NET: -25 mL        Appearance: Normal	  HEENT:   Normal oral mucosa, PERRL, EOMI	  Lymphatic: No lymphadenopathy  Cardiovascular: Normal S1 S2, No JVD, No murmurs, No edema  Respiratory: Lungs clear to auscultation	  Psychiatry: A & O x 3, Mood & affect appropriate  Gastrointestinal:  Soft, Non-tender, + BS	  Skin: No rashes, No ecchymoses, No cyanosis	  Neurologic: Non-focal  Extremities: Normal range of motion, No clubbing, cyanosis or edema  Vascular: Peripheral pulses palpable 2+ bilaterally    MEDICATIONS  (STANDING):  ATENolol  Tablet 50 milliGRAM(s) Oral daily  cilostazol 50 milliGRAM(s) Oral daily  cinacalcet 60 milliGRAM(s) Oral daily  epoetin shell Injectable 5000 Unit(s) IV Push <User Schedule>  furosemide    Tablet 80 milliGRAM(s) Oral daily  heparin  Injectable 5000 Unit(s) SubCutaneous every 8 hours  isosorbide   mononitrate ER Tablet (IMDUR) 30 milliGRAM(s) Oral daily  levoFLOXacin  Tablet 250 milliGRAM(s) Oral every 24 hours  paricalcitol Injectable 1 MICROGram(s) IV Push <User Schedule>  sevelamer hydrochloride 800 milliGRAM(s) Oral three times a day with meals      TELEMETRY: 	    ECG:  	  RADIOLOGY:  OTHER: 	  	  CBC Full  -  ( 30 Oct 2017 07:36 )  WBC Count : 4.9 K/uL  Hemoglobin : 9.3 g/dL  Hematocrit : 31.4 %  Platelet Count - Automated : 170 K/uL  Mean Cell Volume : 87.9 fl  Mean Cell Hemoglobin : 26.2 pg  Mean Cell Hemoglobin Concentration : 29.8 gm/dL  Auto Neutrophil # : x  Auto Lymphocyte # : x  Auto Monocyte # : x  Auto Eosinophil # : x  Auto Basophil # : x  Auto Neutrophil % : x  Auto Lymphocyte % : x  Auto Monocyte % : x  Auto Eosinophil % : x  Auto Basophil % : x        CARDIAC MARKERS:                              9.3    4.9   )-----------( 170      ( 30 Oct 2017 07:36 )             31.4       10-30    135  |  98  |  42<H>  ----------------------------<  84  3.7   |  28  |  7.81<H>    Ca    8.8      30 Oct 2017 07:36  Phos  2.7     10-29              proBNP:   Lipid Profile: Cholesterol 172  LDL 85  HDL 71  TG 82    HgA1c: Hemoglobin A1C, Whole Blood: 5.8 % (10-09 @ 13:18)    TSH: Thyroid Stimulating Hormone, Serum: 1.18 uU/mL (10-09 @ 10:57)

## 2017-10-31 LAB
CULTURE RESULTS: SIGNIFICANT CHANGE UP
ORGANISM # SPEC MICROSCOPIC CNT: SIGNIFICANT CHANGE UP
ORGANISM # SPEC MICROSCOPIC CNT: SIGNIFICANT CHANGE UP
SPECIMEN SOURCE: SIGNIFICANT CHANGE UP

## 2017-11-06 DIAGNOSIS — D63.1 ANEMIA IN CHRONIC KIDNEY DISEASE: ICD-10-CM

## 2017-11-06 DIAGNOSIS — Z53.21 PROCEDURE AND TREATMENT NOT CARRIED OUT DUE TO PATIENT LEAVING PRIOR TO BEING SEEN BY HEALTH CARE PROVIDER: ICD-10-CM

## 2017-11-06 DIAGNOSIS — E11.22 TYPE 2 DIABETES MELLITUS WITH DIABETIC CHRONIC KIDNEY DISEASE: ICD-10-CM

## 2017-11-06 DIAGNOSIS — I73.9 PERIPHERAL VASCULAR DISEASE, UNSPECIFIED: ICD-10-CM

## 2017-11-06 DIAGNOSIS — E78.5 HYPERLIPIDEMIA, UNSPECIFIED: ICD-10-CM

## 2017-11-06 DIAGNOSIS — L02.211 CUTANEOUS ABSCESS OF ABDOMINAL WALL: ICD-10-CM

## 2017-11-06 DIAGNOSIS — I48.91 UNSPECIFIED ATRIAL FIBRILLATION: ICD-10-CM

## 2017-11-06 DIAGNOSIS — Z88.0 ALLERGY STATUS TO PENICILLIN: ICD-10-CM

## 2017-11-06 DIAGNOSIS — I12.0 HYPERTENSIVE CHRONIC KIDNEY DISEASE WITH STAGE 5 CHRONIC KIDNEY DISEASE OR END STAGE RENAL DISEASE: ICD-10-CM

## 2017-11-06 DIAGNOSIS — E83.39 OTHER DISORDERS OF PHOSPHORUS METABOLISM: ICD-10-CM

## 2017-11-06 DIAGNOSIS — I25.10 ATHEROSCLEROTIC HEART DISEASE OF NATIVE CORONARY ARTERY WITHOUT ANGINA PECTORIS: ICD-10-CM

## 2017-11-06 DIAGNOSIS — Z82.49 FAMILY HISTORY OF ISCHEMIC HEART DISEASE AND OTHER DISEASES OF THE CIRCULATORY SYSTEM: ICD-10-CM

## 2017-11-06 DIAGNOSIS — I48.0 PAROXYSMAL ATRIAL FIBRILLATION: ICD-10-CM

## 2017-11-06 DIAGNOSIS — I77.0 ARTERIOVENOUS FISTULA, ACQUIRED: ICD-10-CM

## 2017-11-06 DIAGNOSIS — Z99.2 DEPENDENCE ON RENAL DIALYSIS: ICD-10-CM

## 2017-11-06 DIAGNOSIS — Z95.5 PRESENCE OF CORONARY ANGIOPLASTY IMPLANT AND GRAFT: ICD-10-CM

## 2017-11-06 DIAGNOSIS — Z85.46 PERSONAL HISTORY OF MALIGNANT NEOPLASM OF PROSTATE: ICD-10-CM

## 2017-11-06 DIAGNOSIS — N18.6 END STAGE RENAL DISEASE: ICD-10-CM

## 2017-11-07 DIAGNOSIS — A41.9 SEPSIS, UNSPECIFIED ORGANISM: ICD-10-CM

## 2017-11-09 NOTE — ED ADULT TRIAGE NOTE - DIRECT TO ROOM CARE INITIATED:
27-Oct-2017 05:08 Patient initially p/w confusion in the setting of electrolyte derangements, which has since improved with fluid resuscitation and correction of electrolyte abnormalities indicating likely metabolic encephalopathy. Patient initially p/w confusion in the setting of electrolyte derangements, which has since improved with fluid resuscitation and correction of electrolyte abnormalities indicating likely metabolic encephalopathy. Patient initially p/w confusion in the setting of electrolyte derangements, which has since improved with fluid resuscitation and correction of electrolyte abnormalities indicating likely metabolic encephalopathy. Patient initially p/w confusion in the setting of electrolyte derangements, which has since improved with fluid resuscitation and correction of electrolyte abnormalities indicating likely metabolic encephalopathy. Patient initially p/w confusion in the setting of electrolyte derangements, which has since improved with fluid resuscitation and correction of electrolyte abnormalities indicating likely metabolic encephalopathy. Patient initially p/w confusion in the setting of electrolyte derangements, which has since improved with fluid resuscitation and correction of electrolyte abnormalities indicating likely metabolic encephalopathy.

## 2017-11-10 DIAGNOSIS — K80.80 OTHER CHOLELITHIASIS WITHOUT OBSTRUCTION: ICD-10-CM

## 2017-11-10 DIAGNOSIS — D63.1 ANEMIA IN CHRONIC KIDNEY DISEASE: ICD-10-CM

## 2017-11-10 DIAGNOSIS — Z88.0 ALLERGY STATUS TO PENICILLIN: ICD-10-CM

## 2017-11-10 DIAGNOSIS — I25.10 ATHEROSCLEROTIC HEART DISEASE OF NATIVE CORONARY ARTERY WITHOUT ANGINA PECTORIS: ICD-10-CM

## 2017-11-10 DIAGNOSIS — N25.81 SECONDARY HYPERPARATHYROIDISM OF RENAL ORIGIN: ICD-10-CM

## 2017-11-10 DIAGNOSIS — E66.9 OBESITY, UNSPECIFIED: ICD-10-CM

## 2017-11-10 DIAGNOSIS — E78.5 HYPERLIPIDEMIA, UNSPECIFIED: ICD-10-CM

## 2017-11-10 DIAGNOSIS — K65.1 PERITONEAL ABSCESS: ICD-10-CM

## 2017-11-10 DIAGNOSIS — T81.4XXA INFECTION FOLLOWING A PROCEDURE, INITIAL ENCOUNTER: ICD-10-CM

## 2017-11-10 DIAGNOSIS — I48.0 PAROXYSMAL ATRIAL FIBRILLATION: ICD-10-CM

## 2017-11-10 DIAGNOSIS — Z71.3 DIETARY COUNSELING AND SURVEILLANCE: ICD-10-CM

## 2017-11-10 DIAGNOSIS — K52.9 NONINFECTIVE GASTROENTERITIS AND COLITIS, UNSPECIFIED: ICD-10-CM

## 2017-11-10 DIAGNOSIS — K44.9 DIAPHRAGMATIC HERNIA WITHOUT OBSTRUCTION OR GANGRENE: ICD-10-CM

## 2017-11-10 DIAGNOSIS — B95.2 ENTEROCOCCUS AS THE CAUSE OF DISEASES CLASSIFIED ELSEWHERE: ICD-10-CM

## 2017-11-10 DIAGNOSIS — Z90.79 ACQUIRED ABSENCE OF OTHER GENITAL ORGAN(S): ICD-10-CM

## 2017-11-10 DIAGNOSIS — E11.22 TYPE 2 DIABETES MELLITUS WITH DIABETIC CHRONIC KIDNEY DISEASE: ICD-10-CM

## 2017-11-10 DIAGNOSIS — E86.1 HYPOVOLEMIA: ICD-10-CM

## 2017-11-10 DIAGNOSIS — E83.39 OTHER DISORDERS OF PHOSPHORUS METABOLISM: ICD-10-CM

## 2017-11-10 DIAGNOSIS — I12.0 HYPERTENSIVE CHRONIC KIDNEY DISEASE WITH STAGE 5 CHRONIC KIDNEY DISEASE OR END STAGE RENAL DISEASE: ICD-10-CM

## 2017-11-10 DIAGNOSIS — E43 UNSPECIFIED SEVERE PROTEIN-CALORIE MALNUTRITION: ICD-10-CM

## 2017-11-10 DIAGNOSIS — I73.9 PERIPHERAL VASCULAR DISEASE, UNSPECIFIED: ICD-10-CM

## 2017-11-10 DIAGNOSIS — N18.6 END STAGE RENAL DISEASE: ICD-10-CM
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
## 2017-11-10 DIAGNOSIS — Z79.4 LONG TERM (CURRENT) USE OF INSULIN: ICD-10-CM
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
## 2017-12-19 PROCEDURE — 85730 THROMBOPLASTIN TIME PARTIAL: CPT

## 2017-12-19 PROCEDURE — 85027 COMPLETE CBC AUTOMATED: CPT

## 2017-12-19 PROCEDURE — 96375 TX/PRO/DX INJ NEW DRUG ADDON: CPT

## 2017-12-19 PROCEDURE — 80048 BASIC METABOLIC PNL TOTAL CA: CPT

## 2017-12-19 PROCEDURE — 80053 COMPREHEN METABOLIC PANEL: CPT

## 2017-12-19 PROCEDURE — 87070 CULTURE OTHR SPECIMN AEROBIC: CPT

## 2017-12-19 PROCEDURE — 87340 HEPATITIS B SURFACE AG IA: CPT

## 2017-12-19 PROCEDURE — 93005 ELECTROCARDIOGRAM TRACING: CPT

## 2017-12-19 PROCEDURE — 85610 PROTHROMBIN TIME: CPT

## 2017-12-19 PROCEDURE — 96374 THER/PROPH/DIAG INJ IV PUSH: CPT

## 2017-12-19 PROCEDURE — 83970 ASSAY OF PARATHORMONE: CPT

## 2017-12-19 PROCEDURE — 99285 EMERGENCY DEPT VISIT HI MDM: CPT | Mod: 25

## 2017-12-19 PROCEDURE — 71045 X-RAY EXAM CHEST 1 VIEW: CPT

## 2017-12-19 PROCEDURE — 84100 ASSAY OF PHOSPHORUS: CPT

## 2017-12-19 PROCEDURE — 87040 BLOOD CULTURE FOR BACTERIA: CPT

## 2017-12-19 PROCEDURE — 74176 CT ABD & PELVIS W/O CONTRAST: CPT

## 2017-12-19 PROCEDURE — 82962 GLUCOSE BLOOD TEST: CPT

## 2017-12-19 PROCEDURE — 82310 ASSAY OF CALCIUM: CPT

## 2017-12-19 PROCEDURE — 87186 SC STD MICRODIL/AGAR DIL: CPT

## 2017-12-19 PROCEDURE — 99261: CPT

## 2017-12-19 PROCEDURE — 83605 ASSAY OF LACTIC ACID: CPT

## 2018-01-08 ENCOUNTER — APPOINTMENT (OUTPATIENT)
Dept: SURGERY | Facility: CLINIC | Age: 73
End: 2018-01-08
Payer: MEDICARE

## 2018-01-08 VITALS
TEMPERATURE: 97.5 F | DIASTOLIC BLOOD PRESSURE: 61 MMHG | WEIGHT: 217 LBS | HEART RATE: 69 BPM | OXYGEN SATURATION: 100 % | BODY MASS INDEX: 28.76 KG/M2 | HEIGHT: 73 IN | SYSTOLIC BLOOD PRESSURE: 164 MMHG

## 2018-01-08 PROCEDURE — 99024 POSTOP FOLLOW-UP VISIT: CPT

## 2018-04-19 ENCOUNTER — INPATIENT (INPATIENT)
Facility: HOSPITAL | Age: 73
LOS: 1 days | Discharge: HOME | DRG: 689 | End: 2018-04-21
Attending: STUDENT IN AN ORGANIZED HEALTH CARE EDUCATION/TRAINING PROGRAM | Admitting: STUDENT IN AN ORGANIZED HEALTH CARE EDUCATION/TRAINING PROGRAM
Payer: MEDICARE

## 2018-04-19 VITALS
SYSTOLIC BLOOD PRESSURE: 185 MMHG | OXYGEN SATURATION: 100 % | HEART RATE: 79 BPM | RESPIRATION RATE: 16 BRPM | TEMPERATURE: 97 F | DIASTOLIC BLOOD PRESSURE: 73 MMHG

## 2018-04-19 DIAGNOSIS — Z98.890 OTHER SPECIFIED POSTPROCEDURAL STATES: Chronic | ICD-10-CM

## 2018-04-19 DIAGNOSIS — N30.90 CYSTITIS, UNSPECIFIED WITHOUT HEMATURIA: ICD-10-CM

## 2018-04-19 DIAGNOSIS — Z98.89 OTHER SPECIFIED POSTPROCEDURAL STATES: Chronic | ICD-10-CM

## 2018-04-19 DIAGNOSIS — I10 ESSENTIAL (PRIMARY) HYPERTENSION: ICD-10-CM

## 2018-04-19 DIAGNOSIS — I77.0 ARTERIOVENOUS FISTULA, ACQUIRED: Chronic | ICD-10-CM

## 2018-04-19 DIAGNOSIS — Z29.9 ENCOUNTER FOR PROPHYLACTIC MEASURES, UNSPECIFIED: ICD-10-CM

## 2018-04-19 DIAGNOSIS — N18.6 END STAGE RENAL DISEASE: ICD-10-CM

## 2018-04-19 LAB
ALBUMIN SERPL ELPH-MCNC: 3.7 G/DL — SIGNIFICANT CHANGE UP (ref 3.5–5)
ALP SERPL-CCNC: 275 U/L — HIGH (ref 40–120)
ALT FLD-CCNC: 31 U/L DA — SIGNIFICANT CHANGE UP (ref 10–60)
ANION GAP SERPL CALC-SCNC: 6 MMOL/L — SIGNIFICANT CHANGE UP (ref 5–17)
APPEARANCE UR: ABNORMAL
AST SERPL-CCNC: 76 U/L — HIGH (ref 10–40)
BASOPHILS # BLD AUTO: 0 K/UL — SIGNIFICANT CHANGE UP (ref 0–0.2)
BASOPHILS NFR BLD AUTO: 0.8 % — SIGNIFICANT CHANGE UP (ref 0–2)
BILIRUB SERPL-MCNC: 0.5 MG/DL — SIGNIFICANT CHANGE UP (ref 0.2–1.2)
BILIRUB UR-MCNC: NEGATIVE — SIGNIFICANT CHANGE UP
BUN SERPL-MCNC: 25 MG/DL — HIGH (ref 7–18)
CALCIUM SERPL-MCNC: 9.2 MG/DL — SIGNIFICANT CHANGE UP (ref 8.4–10.5)
CHLORIDE SERPL-SCNC: 101 MMOL/L — SIGNIFICANT CHANGE UP (ref 96–108)
CO2 SERPL-SCNC: 32 MMOL/L — HIGH (ref 22–31)
COLOR SPEC: YELLOW — SIGNIFICANT CHANGE UP
CREAT SERPL-MCNC: 5.15 MG/DL — HIGH (ref 0.5–1.3)
DIFF PNL FLD: ABNORMAL
EOSINOPHIL # BLD AUTO: 0.1 K/UL — SIGNIFICANT CHANGE UP (ref 0–0.5)
EOSINOPHIL NFR BLD AUTO: 1.1 % — SIGNIFICANT CHANGE UP (ref 0–6)
GLUCOSE SERPL-MCNC: 98 MG/DL — SIGNIFICANT CHANGE UP (ref 70–99)
GLUCOSE UR QL: NEGATIVE — SIGNIFICANT CHANGE UP
HCT VFR BLD CALC: 38.5 % — LOW (ref 39–50)
HGB BLD-MCNC: 11.5 G/DL — LOW (ref 13–17)
KETONES UR-MCNC: ABNORMAL
LACTATE SERPL-SCNC: 3.4 MMOL/L — HIGH (ref 0.7–2)
LEUKOCYTE ESTERASE UR-ACNC: ABNORMAL
LIDOCAIN IGE QN: 283 U/L — SIGNIFICANT CHANGE UP (ref 73–393)
LYMPHOCYTES # BLD AUTO: 0.8 K/UL — LOW (ref 1–3.3)
LYMPHOCYTES # BLD AUTO: 15.2 % — SIGNIFICANT CHANGE UP (ref 13–44)
MCHC RBC-ENTMCNC: 27.7 PG — SIGNIFICANT CHANGE UP (ref 27–34)
MCHC RBC-ENTMCNC: 29.8 GM/DL — LOW (ref 32–36)
MCV RBC AUTO: 93.2 FL — SIGNIFICANT CHANGE UP (ref 80–100)
MONOCYTES # BLD AUTO: 0.5 K/UL — SIGNIFICANT CHANGE UP (ref 0–0.9)
MONOCYTES NFR BLD AUTO: 10.1 % — SIGNIFICANT CHANGE UP (ref 2–14)
NEUTROPHILS # BLD AUTO: 3.9 K/UL — SIGNIFICANT CHANGE UP (ref 1.8–7.4)
NEUTROPHILS NFR BLD AUTO: 72.7 % — SIGNIFICANT CHANGE UP (ref 43–77)
NITRITE UR-MCNC: NEGATIVE — SIGNIFICANT CHANGE UP
PH UR: 9 — HIGH (ref 5–8)
PLATELET # BLD AUTO: 141 K/UL — LOW (ref 150–400)
POTASSIUM SERPL-MCNC: 5.5 MMOL/L — HIGH (ref 3.5–5.3)
POTASSIUM SERPL-SCNC: 5.5 MMOL/L — HIGH (ref 3.5–5.3)
PROT SERPL-MCNC: 8.7 G/DL — HIGH (ref 6–8.3)
PROT UR-MCNC: 100
RBC # BLD: 4.14 M/UL — LOW (ref 4.2–5.8)
RBC # FLD: 15.6 % — HIGH (ref 10.3–14.5)
SODIUM SERPL-SCNC: 139 MMOL/L — SIGNIFICANT CHANGE UP (ref 135–145)
SP GR SPEC: 1.01 — SIGNIFICANT CHANGE UP (ref 1.01–1.02)
TROPONIN I SERPL-MCNC: <0.015 NG/ML — SIGNIFICANT CHANGE UP (ref 0–0.04)
UROBILINOGEN FLD QL: NEGATIVE — SIGNIFICANT CHANGE UP
WBC # BLD: 5.4 K/UL — SIGNIFICANT CHANGE UP (ref 3.8–10.5)
WBC # FLD AUTO: 5.4 K/UL — SIGNIFICANT CHANGE UP (ref 3.8–10.5)

## 2018-04-19 PROCEDURE — 74018 RADEX ABDOMEN 1 VIEW: CPT | Mod: 26

## 2018-04-19 PROCEDURE — 74176 CT ABD & PELVIS W/O CONTRAST: CPT | Mod: 26

## 2018-04-19 PROCEDURE — 99285 EMERGENCY DEPT VISIT HI MDM: CPT

## 2018-04-19 PROCEDURE — 71046 X-RAY EXAM CHEST 2 VIEWS: CPT | Mod: 26

## 2018-04-19 RX ORDER — HEPARIN SODIUM 5000 [USP'U]/ML
5000 INJECTION INTRAVENOUS; SUBCUTANEOUS EVERY 12 HOURS
Qty: 0 | Refills: 0 | Status: DISCONTINUED | OUTPATIENT
Start: 2018-04-19 | End: 2018-04-21

## 2018-04-19 RX ORDER — ATENOLOL 25 MG/1
25 TABLET ORAL DAILY
Qty: 0 | Refills: 0 | Status: DISCONTINUED | OUTPATIENT
Start: 2018-04-19 | End: 2018-04-21

## 2018-04-19 RX ORDER — SODIUM CHLORIDE 9 MG/ML
500 INJECTION INTRAMUSCULAR; INTRAVENOUS; SUBCUTANEOUS ONCE
Qty: 0 | Refills: 0 | Status: COMPLETED | OUTPATIENT
Start: 2018-04-19 | End: 2018-04-19

## 2018-04-19 RX ORDER — SEVELAMER CARBONATE 2400 MG/1
0 POWDER, FOR SUSPENSION ORAL
Qty: 180 | Refills: 0 | COMMUNITY

## 2018-04-19 RX ORDER — ISOSORBIDE MONONITRATE 60 MG/1
20 TABLET, EXTENDED RELEASE ORAL
Qty: 0 | Refills: 0 | COMMUNITY

## 2018-04-19 RX ORDER — MORPHINE SULFATE 50 MG/1
4 CAPSULE, EXTENDED RELEASE ORAL ONCE
Qty: 0 | Refills: 0 | Status: DISCONTINUED | OUTPATIENT
Start: 2018-04-19 | End: 2018-04-19

## 2018-04-19 RX ORDER — DEXTROSE 50 % IN WATER 50 %
50 SYRINGE (ML) INTRAVENOUS ONCE
Qty: 0 | Refills: 0 | Status: COMPLETED | OUTPATIENT
Start: 2018-04-19 | End: 2018-04-19

## 2018-04-19 RX ORDER — CILOSTAZOL 100 MG/1
50 TABLET ORAL
Qty: 0 | Refills: 0 | Status: DISCONTINUED | OUTPATIENT
Start: 2018-04-19 | End: 2018-04-21

## 2018-04-19 RX ORDER — CALCIUM GLUCONATE 100 MG/ML
1 VIAL (ML) INTRAVENOUS ONCE
Qty: 0 | Refills: 0 | Status: COMPLETED | OUTPATIENT
Start: 2018-04-19 | End: 2018-04-19

## 2018-04-19 RX ORDER — ATENOLOL 25 MG/1
1 TABLET ORAL
Qty: 0 | Refills: 0 | COMMUNITY

## 2018-04-19 RX ORDER — CEFTRIAXONE 500 MG/1
1 INJECTION, POWDER, FOR SOLUTION INTRAMUSCULAR; INTRAVENOUS EVERY 24 HOURS
Qty: 0 | Refills: 0 | Status: DISCONTINUED | OUTPATIENT
Start: 2018-04-19 | End: 2018-04-20

## 2018-04-19 RX ORDER — ACETAMINOPHEN 500 MG
650 TABLET ORAL EVERY 6 HOURS
Qty: 0 | Refills: 0 | Status: DISCONTINUED | OUTPATIENT
Start: 2018-04-19 | End: 2018-04-21

## 2018-04-19 RX ORDER — SODIUM POLYSTYRENE SULFONATE 4.1 MEQ/G
15 POWDER, FOR SUSPENSION ORAL ONCE
Qty: 0 | Refills: 0 | Status: COMPLETED | OUTPATIENT
Start: 2018-04-19 | End: 2018-04-19

## 2018-04-19 RX ORDER — CINACALCET 30 MG/1
0 TABLET, FILM COATED ORAL
Qty: 30 | Refills: 0 | COMMUNITY

## 2018-04-19 RX ORDER — FUROSEMIDE 40 MG
0 TABLET ORAL
Qty: 30 | Refills: 0 | COMMUNITY

## 2018-04-19 RX ORDER — INSULIN HUMAN 100 [IU]/ML
10 INJECTION, SOLUTION SUBCUTANEOUS ONCE
Qty: 0 | Refills: 0 | Status: COMPLETED | OUTPATIENT
Start: 2018-04-19 | End: 2018-04-19

## 2018-04-19 RX ADMIN — MORPHINE SULFATE 4 MILLIGRAM(S): 50 CAPSULE, EXTENDED RELEASE ORAL at 14:32

## 2018-04-19 RX ADMIN — MORPHINE SULFATE 4 MILLIGRAM(S): 50 CAPSULE, EXTENDED RELEASE ORAL at 14:48

## 2018-04-19 RX ADMIN — SODIUM POLYSTYRENE SULFONATE 15 GRAM(S): 4.1 POWDER, FOR SUSPENSION ORAL at 23:20

## 2018-04-19 RX ADMIN — Medication 50 MILLILITER(S): at 22:40

## 2018-04-19 RX ADMIN — SODIUM CHLORIDE 1000 MILLILITER(S): 9 INJECTION INTRAMUSCULAR; INTRAVENOUS; SUBCUTANEOUS at 13:47

## 2018-04-19 NOTE — H&P ADULT - PROBLEM SELECTOR PLAN 1
Patient presented with right periumbilical pain, hx suprapubic catheter which is replaced every other month. Afebrile, no leukocytosis  Lactate: 3.4 s/p 1L NS bolus  UA positive, f/up UCx and BCx  s/p Levaquin 500 mg IVPB, will c/w Rocephin 1g q24hrs

## 2018-04-19 NOTE — H&P ADULT - PROBLEM SELECTOR PLAN 2
Continue with Maintenance HD on Tuesday, Thursday and Saturday  f/up intact PTH, vitamin D levels  Monitor BMP daily  Renal diet

## 2018-04-19 NOTE — ED PROVIDER NOTE - MEDICAL DECISION MAKING DETAILS
labs pertinent for uti, likely bladder infection given catheter, and location of pt pain is suprapubic, urine cloudy.   lactate 3.4, judicious hydrating, pending repeat lactate, careful for fluid overload given dialysis pt.  richardson cultured, iv abx given

## 2018-04-19 NOTE — H&P ADULT - GASTROINTESTINAL
Pt wheelchaired out of ED with discharge instructions and prescriptions in hand given by Dr. Ijeoma Santamaria; pt verbalized understanding of discharge paperwork and time allotted for questions. VSS. Pt alert and oriented. Pt accompanied by family members. details… detailed exam

## 2018-04-19 NOTE — H&P ADULT - ATTENDING COMMENTS
Patient seen and examined at bedside. HE is a 72M from home, HHA 7/7, PMHx HTN, Afib (not on medications), ESRD on HD via right AVF (T/T/S) for more 14 years, ventral hernia, bowel obstruction, prostate CA, suprapubic catheter, PVD on Cilostazol; who presented to ED  c/o R abdominal pain that started in AM after HD. Patient referred right periumbilical dull pain. Denies fever, chills, nausea, vomiting, diarrhea, or drainage from catheter insertion site. Patient refers having suprapubic catheter changed every other month, and has Rx Cipro 250 mg prior to catheter change.    ROS and PE as above   Vital Signs Last 24 Hrs  T(C): 36.8 (20 Apr 2018 05:10), Max: 37.2 (19 Apr 2018 19:09)  T(F): 98.2 (20 Apr 2018 05:10), Max: 99 (19 Apr 2018 19:09)  HR: 67 (20 Apr 2018 05:10) (67 - 89)  BP: 156/75 (20 Apr 2018 05:10) (128/66 - 156/75)  BP(mean): --  RR: 18 (20 Apr 2018 05:10) (18 - 18)  SpO2: 100% (20 Apr 2018 05:10) (98% - 100%)    1. UTI   Afebrile, no leukocytosis   UA (+) for WBC 10-25   UC not sent  d/c Ceftriaxone and start Levaquin 500 mg PO one dose , followed by 250 mg PO q48   Patient does not have an IV access and very hard to stick. Many attempts alyssia unsuccessful to get an access.     Spoke to Dr. Palacios - patient's outpatient urologist, who informed that patient has history of bladder CA, s/p cystectomy and [place] pouch   procedure which complicated by strictures and unsuccessful revisions. His catheter gets changed monthly at his office. He does not recommend to touch the catheter and prefers to do it himself. Patient has appointment with him next week.     2. ESRD on HD; HD per schedule   3. HTN: c/w Atenolol     Plan of care discussed with patient in detail

## 2018-04-19 NOTE — H&P ADULT - PMH
Atrial fibrillation    CAD (coronary artery disease)  LAD stent  ESRD (end stage renal disease) on dialysis    Essential hypertension    HLD (hyperlipidemia)    HTN (hypertension)    Obesity    Pre-diabetes    Prostate cancer  prostate cancer  PVD (peripheral vascular disease)    Type 2 diabetes mellitus without complication, without long-term current use of insulin Atrial fibrillation    CAD (coronary artery disease)  LAD stent  ESRD (end stage renal disease) on dialysis    Essential hypertension    HLD (hyperlipidemia)    HTN (hypertension)    Obesity    Pre-diabetes    Prostate cancer  prostate cancer  PVD (peripheral vascular disease)

## 2018-04-19 NOTE — ED PROVIDER NOTE - PHYSICAL EXAMINATION
suprapubic catheter that doesn't look infected. straining cloudly yellow urine approximately 20 CC. Last change 1 month ago per home aid. suprapubic catheter that doesn't look infected. straining cloudly yellow urine approximately 20 CC. Last change of catheter 1 month ago per home aid, leg bag gets emptied a few times a day.

## 2018-04-19 NOTE — ED PROVIDER NOTE - OBJECTIVE STATEMENT
71 y/o M w/ PMHx of CAD, HTN, obesity, ESRD, a Fib and pertinent PSHx of abd surgery, dialysis, AV fistula, cardiac catheterization presents to ED c/o R abd pain x this morning. Pt reports going to dialysis this morning where he felt pain.  Pt notes a fistula on his right arm for dialysis access. Pt has a suprapubic catheter (x last month). PMD: David Cruz. Pt denies nausea, diarrhea, fever, CP, SOB, or any other complaints. Penicillin. 73 y/o M w/ PMHx of CAD, HTN, obesity, ESRD, a Fib and pertinent PSHx of abd surgery, dialysis, AV fistula, cardiac catheterization presents to ED c/o R abd pain x this morning. Pt reports going to dialysis this morning where he felt pain.  Pt notes a fistula on his right arm for dialysis access. Pt has a suprapubic catheter (x last month). PMD: David Cruz. Pt denies nausea, diarrhea, fever, CP, SOB, or any other complaints. Penicillin.  [facility] dialysis center. HD T/R/S, last HD today.

## 2018-04-19 NOTE — H&P ADULT - PROBLEM SELECTOR PLAN 4
IMPROVE VTE Individual Risk Assessment          RISK                                                          Points  [  ] Previous VTE                                                3  [  ] Thrombophilia                                             2  [  ] Lower limb paralysis                                   2        (unable to hold up >15 seconds)    [  ] Current Cancer                                             2         (within 6 months)  [ x ] Immobilization > 24 hrs                              1  [  ] ICU/CCU stay > 24 hours                             1  [ x ] Age > 60                                                         1    IMPROVE VTE Score: 2  HSQ for DVT chemoppx  No Need for GI ppx

## 2018-04-19 NOTE — H&P ADULT - ASSESSMENT
72M from home, HHA 7/7, PMHx HTN, Afib (not on medications), ESRD on HD via right AVF (T/T/S) for more 14 years, ventral hernia, bowel obstruction, prostate CA, suprapubic catheter; who presented to ED  c/o R abdominal pain that started in AM after HD. Patient referred right periumbilical dull pain. Denies fever, chills, nausea, vomiting, diarrhea, or drainage from catheter insertion site. Patient refers having suprapubic catheter changed every other month, and has Rx Cipro 250 mg prior to catheter change.    Patient being admitted for UTI.

## 2018-04-19 NOTE — H&P ADULT - HISTORY OF PRESENT ILLNESS
72M from home, HHA 7/7, PMHx HTN, Afib (not on medications), ESRD on HD via right AVF (T/T/S) for more 14 years, ventral hernia, bowel obstruction, prostate CA, suprapubic catheter; who presented to ED  c/o R abdominal pain that started in AM after HD. Patient referred right periumbilical dull pain. Denies fever, chills, nausea, vomiting, diarrhea, or drainage from catheter insertion site. Patient refers having suprapubic catheter changed every other month, and has Rx Cipro 250 mg prior to catheter change.    ED course:  Vital signs were: BP: 185/73 mmHg, HR: 79 bpm,, RR: 16 rpm, SaO2: 100% RA  EKG: Afib VR@70 bpm, RBBB. CE negative  No Leukocytosis on CBC, BMP significant for  K 5.5 and lactate 3.4. s/p 1L NS bolus  UA positive s/p Levaquin 500 mg IVPB. (Patient refers penicillin allergy but is just rash, no angioedema/anaphylactic reaction)  Abdominal XR: no obstruction.  CT abdomen/pelvis: no obstruction, no hydronephrosis, no urolithiasis 72M from home, HHA 7/7, PMHx HTN, Afib (not on medications), ESRD on HD via right AVF (T/T/S) for more 14 years, ventral hernia, bowel obstruction, prostate CA, suprapubic catheter, PVD on Cilostazol; who presented to ED  c/o R abdominal pain that started in AM after HD. Patient referred right periumbilical dull pain. Denies fever, chills, nausea, vomiting, diarrhea, or drainage from catheter insertion site. Patient refers having suprapubic catheter changed every other month, and has Rx Cipro 250 mg prior to catheter change.    ED course:  Vital signs were: BP: 185/73 mmHg, HR: 79 bpm,, RR: 16 rpm, SaO2: 100% RA  EKG: Afib VR@70 bpm, RBBB. CE negative  No Leukocytosis on CBC, BMP significant for  K 5.5 and lactate 3.4. s/p 1L NS bolus  UA positive s/p Levaquin 500 mg IVPB. (Patient refers penicillin allergy but is just rash, no angioedema/anaphylactic reaction)  Abdominal XR: no obstruction.  CT abdomen/pelvis: no obstruction, no hydronephrosis, no urolithiasis

## 2018-04-19 NOTE — ED ADULT NURSE NOTE - OBJECTIVE STATEMENT
BIB via wheelchair complaining on generalized abd pain denies N/V, pt on hemodialysis Tu Th Sat last Tx today suprapubic catheter noted

## 2018-04-20 DIAGNOSIS — E83.39 OTHER DISORDERS OF PHOSPHORUS METABOLISM: ICD-10-CM

## 2018-04-20 DIAGNOSIS — I12.0 HYPERTENSIVE CHRONIC KIDNEY DISEASE WITH STAGE 5 CHRONIC KIDNEY DISEASE OR END STAGE RENAL DISEASE: ICD-10-CM

## 2018-04-20 DIAGNOSIS — D63.1 ANEMIA IN CHRONIC KIDNEY DISEASE: ICD-10-CM

## 2018-04-20 DIAGNOSIS — N18.6 END STAGE RENAL DISEASE: ICD-10-CM

## 2018-04-20 PROCEDURE — 99223 1ST HOSP IP/OBS HIGH 75: CPT | Mod: AI,GC

## 2018-04-20 PROCEDURE — 99222 1ST HOSP IP/OBS MODERATE 55: CPT

## 2018-04-20 RX ADMIN — HEPARIN SODIUM 5000 UNIT(S): 5000 INJECTION INTRAVENOUS; SUBCUTANEOUS at 06:00

## 2018-04-20 RX ADMIN — CILOSTAZOL 50 MILLIGRAM(S): 100 TABLET ORAL at 06:00

## 2018-04-20 RX ADMIN — ATENOLOL 25 MILLIGRAM(S): 25 TABLET ORAL at 06:00

## 2018-04-20 RX ADMIN — HEPARIN SODIUM 5000 UNIT(S): 5000 INJECTION INTRAVENOUS; SUBCUTANEOUS at 17:20

## 2018-04-20 NOTE — CHART NOTE - NSCHARTNOTEFT_GEN_A_CORE
Paged by RN for pt refused blood work. Pt is very hard stick, tried multiple times (7-8 ) to place IV peripheral line by ER nurse and Nurse Manger, could not get it. Now pt refused all the blood work and IV lines. Explained the risk and benefit, pt understood, and all questions answered at bedside. Agreed to reorder all the Blood work in the morning. Primary care team please follow up.

## 2018-04-20 NOTE — CONSULT NOTE ADULT - SUBJECTIVE AND OBJECTIVE BOX
[facility] NEPHROLOGY- CONSULTATION NOTE    Pt well known to our service.   Patient is a 71 yo Male with ESRD on HD (TTS @ [facility] dialysis unit; Nephrologist Dr. Shaffer), HTN, anemia of renal disease, h/o Prostate ca s/p suprapubic catheter p/w karuna-umbilical pain a/w UTI. CT abd/pelvis with no acute pathology. Last hD 18 at outpt dialysis facility.   Pt denies any fevers, chills, SOB, chest pain, abd pain or LE edema.    PAST MEDICAL & SURGICAL HISTORY:  Essential hypertension  ESRD (end stage renal disease) on dialysis  Pre-diabetes  CAD (coronary artery disease): LAD stent  PVD (peripheral vascular disease)  Atrial fibrillation  Obesity  Prostate cancer: prostate cancer  HLD (hyperlipidemia)  HTN (hypertension)  H/O abdominal surgery: 10/10/17  H/O cardiac catheterization: 2014 non obstructive disease  AV fistula: creation in right arm on 2015  AV fistula: h/o creation in   History of prostatectomy    penicillin (Rash)    Home Medications Reviewed  Hospital Medications:   MEDICATIONS  (STANDING):  ATENolol  Tablet 25 milliGRAM(s) Oral daily  cilostazol 50 milliGRAM(s) Oral <User Schedule>  heparin  Injectable 5000 Unit(s) SubCutaneous every 12 hours  levoFLOXacin  Tablet 500 milliGRAM(s) Oral once  levoFLOXacin  Tablet 250 milliGRAM(s) Oral every 48 hours     FAMILY HISTORY:  Family history of hypertension in mother (Mother): HTN      REVIEW OF SYSTEMS:  Gen: no changes in weight  HEENT: no rhinorrhea  Neck: no sore throat  Cards: no chest pain  Resp: no dyspnea  GI: no nausea or vomiting or diarrhea, +mild abd pain  : no dysuria or hematuria  Vascular: no LE edema  Derm: no rashes  Neuro: no numbness/tingling  All other review of systems is negative unless indicated above.    VITALS:  T(F): 98.2 (18 @ 05:10), Max: 99 (18 @ 19:09)  HR: 67 (18 @ 05:10)  BP: 156/75 (18 @ 05:10)  RR: 18 (18 @ 05:10)  SpO2: 100% (18 @ 05:10)  Wt(kg): --    Height (cm): 185.42 ( @ 12:08)  Weight (kg): 99.2 ( @ 12:08)  BMI (kg/m2): 28.9 ( @ 12:08)  BSA (m2): 2.24 ( @ 12:08)    PHYSICAL EXAM:  Gen: NAD, calm  HEENT: MMM  Neck: no JVD  Cards: RRR, +S1/S2, no M/G/R  Resp: CTA B/L  GI: soft, NT/ND, NABS  : +suprapubic catheter with leg bag  Extremities: no LE edema B/L  Derm: no rashes  Neuro: non-focal  Access:  Right UE AVG +thrill +bruit    LABS:      139  |  101  |  25<H>  ----------------------------<  98  5.5<H>   |  32<H>  |  5.15<H>    Ca    9.2      2018 14:22    TPro  8.7<H>  /  Alb  3.7  /  TBili  0.5  /  DBili      /  AST  76<H>  /  ALT  31  /  AlkPhos  275<H>      Creatinine Trend: 5.15 <--                        11.5   5.4   )-----------( 141      ( 2018 14:22 )             38.5     Urine Studies:  Urinalysis Basic - ( 2018 16:08 )    Color: Yellow / Appearance: very cloudy / S.015 / pH:   Gluc:  / Ketone: Trace  / Bili: Negative / Urobili: Negative   Blood:  / Protein: 100 / Nitrite: Negative   Leuk Esterase: Moderate / RBC: 5-10 /HPF / WBC 11-25 /HPF   Sq Epi:  / Non Sq Epi: Moderate /HPF / Bacteria: TNTC /HPF        RADIOLOGY & ADDITIONAL STUDIES:

## 2018-04-20 NOTE — PROGRESS NOTE ADULT - PROBLEM SELECTOR PLAN 1
(+) Suprapeubic catheter changed monthly  Blood culture-not collected d/t poor IV access-to be drawn at HD on Sat 4/21 (+) Suprapeubic catheter changed monthly  Blood culture-not collected d/t poor IV access-to be drawn at HD on Sat 4/21  D/t NO IV Access-Levofloxacin renally dosed as noted

## 2018-04-20 NOTE — CONSULT NOTE ADULT - SUBJECTIVE AND OBJECTIVE BOX
Urology  Consultation Note    Patient is a 72y old  Male who presents with a chief complaint of periumbilical pain (2018 22:57)  Interval HPI:  Urology called to evaluate patient for abdominal pain.  73 yo M PMH ESRD on HD via R AVF, CAD, PVD, Afib c/o R sided abdominal pain that began yesterday.  Pt  denied nausea, vomiting, fever, chills.  Pt states pain is now resolved.  Pt states had an abdominal surgery several years ago for what he describes as a bladder infection. Pt states doesn't know what type of surgery he had or why but notes it was a urologic surgery.  Pt states Dr. Efrain Govea did his surgery and he has routine follow-up with Dr. Zbigniew Palacios for exchange of catheter in abdomen.  Pt states catheter was last changed 1 month ago.  Pt is currently on regular diet and tolerating. Called and spoke with office of Dr. Govea and Dr. Palacios ([phone]). Pt has a history of bladder cancer for which he had cystectomy and creation of [place] Pouch.        PAST MEDICAL & SURGICAL HISTORY:  Essential hypertension  ESRD (end stage renal disease) on dialysis  Pre-diabetes  CAD (coronary artery disease): LAD stent  PVD (peripheral vascular disease)  Atrial fibrillation  Obesity  Prostate cancer: prostate cancer  HLD (hyperlipidemia)  HTN (hypertension)  H/O abdominal surgery: 10/10/17  H/O cardiac catheterization: 2014 non obstructive disease  AV fistula: creation in right arm on 2015  AV fistula: h/o creation in 2005  History of prostatectomy      Allergies    penicillin (Rash)    MEDICATIONS  (STANDING):  ATENolol  Tablet 25 milliGRAM(s) Oral daily  cilostazol 50 milliGRAM(s) Oral <User Schedule>  heparin  Injectable 5000 Unit(s) SubCutaneous every 12 hours    MEDICATIONS  (PRN):  acetaminophen   Tablet 650 milliGRAM(s) Oral every 6 hours PRN For Temp greater than 38 C (100.4 F)      Vital Signs Last 24 Hrs  T(C): 36.8 (2018 05:10), Max: 37.2 (2018 19:09)  T(F): 98.2 (2018 05:10), Max: 99 (2018 19:09)  HR: 67 (2018 05:10) (67 - 89)  BP: 156/75 (2018 05:10) (128/66 - 185/73)  BP(mean): --  RR: 18 (2018 05:10) (16 - 18)  SpO2: 100% (2018 05:10) (98% - 100%)    Physical Exam:  Gen: awake, alert oriented NAD  Abd: soft NT ND RLQ catheter in place, no surrounding erythema or lesions. Concentrated urine  Back: no CVA tenderness bilaterally  Pelvic: Penile prosthesis noted, no erythema, lesions on penis noted.   Ext: R AVF in place, + thrill    Labs:                          11.5   5.4   )-----------( 141      ( 2018 14:22 )             38.5         139  |  101  |  25<H>  ----------------------------<  98  5.5<H>   |  32<H>  |  5.15<H>    Ca    9.2      2018 14:22    TPro  8.7<H>  /  Alb  3.7  /  TBili  0.5  /  DBili  x   /  AST  76<H>  /  ALT  31  /  AlkPhos  275<H>        Urinalysis Basic - ( 2018 16:08 )    Color: Yellow / Appearance: very cloudy / S.015 / pH: x  Gluc: x / Ketone: Trace  / Bili: Negative / Urobili: Negative   Blood: x / Protein: 100 / Nitrite: Negative   Leuk Esterase: Moderate / RBC: 5-10 /HPF / WBC 11-25 /HPF   Sq Epi: x / Non Sq Epi: Moderate /HPF / Bacteria: TNTC /HPF      Radiological Exams:  < from: CT Abdomen and Pelvis No Cont (18 @ 17:13) >  INTERPRETATION:  Abdominal pain.  CT abdomen and pelvis no oral or IV contrast. Prior 10/26/2017.  Small hiatal hernia with thickened wall similar to prior. Recommend   endoscopic correlation if this has not been performed. Remaining with   coronary artery calcification.  Calcified gallstone. No biliary dilatation. Unenhanced liver pancreas   spleen not remarkable.  Multiple bilateral renal cysts similar to prior. Nonspecific bilateral   perirenal stranding similar to prior probably chronic. No hydronephrosis   or urolithiasis.  Normal caliber abdominal aorta. IVC filter in situ.  No suspicious adenopathy by CT size criteria.  No bowel obstruction or active inflammation. Suture line associated with   bowel in the right hemiabdomen.  Penile prosthesis apparatus noted. There is a right lower abdominal   paramidline ventral hernia containing fat and penile prosthetic   apparatus. Varices anterior abdominal wall.  Evaluation pelvic structures extremely limited due to extensive artifact   from multiple surgical clips. Prostate bladder not adequately visualized.  Stable osseous structures.    < end of copied text >

## 2018-04-20 NOTE — CONSULT NOTE ADULT - ASSESSMENT
71yo M with h/o bladder ca s/p cystectomy and [place] pouch now with UTI, ESRD via R AVF and makes urine  1. agree with exchange of [place] pouch catheter with IR  2. cont antibiotics  3. will D/w Dr. Milton 71yo M with h/o bladder ca s/p cystectomy and [place] pouch now with UTI, ESRD via R AVF and makes urine. pt will always have urine contaminant as [place] pouch is created from the small bowel.   1. agree with exchange of [place] pouch catheter with IR  2. cont antibiotics  3. will D/w Dr. Milton

## 2018-04-20 NOTE — CONSULT NOTE ADULT - ATTENDING COMMENTS
pt unclear of his surgical history per nurse at urologist office has had cystectomy and indiana pouch   reports he has it changed in the office every month over a wire . last a few weeks ago   urine drawn from bag will always be contaminated so unclear if true uti   feeling better now abd soft nt nd   urine dark yellow but doesn't make much urine
[facility] NEPHROLOGY  Armando Shaffer M.D.  Kendall Morales D.O.  Lilliam Cody M.D.  Oxana Hernandez, MSN, ANP-C  [phone]    [address] [place] [address]

## 2018-04-20 NOTE — PROGRESS NOTE ADULT - SUBJECTIVE AND OBJECTIVE BOX
NP Note discussed with  primary attending    72y old Male who presented with periumbilical pain.  LOS # 1.  States to "not feeling bad".  Denies abdominal pain.  LBM today.      INTERVAL HPI/OVERNIGHT EVENTS: no new complaints    MEDICATIONS  (STANDING):  ATENolol  Tablet 25 milliGRAM(s) Oral daily  cilostazol 50 milliGRAM(s) Oral <User Schedule>  heparin  Injectable 5000 Unit(s) SubCutaneous every 12 hours  levoFLOXacin  Tablet 250 milliGRAM(s) Oral every 48 hours    MEDICATIONS  (PRN):  acetaminophen   Tablet 650 milliGRAM(s) Oral every 6 hours PRN For Temp greater than 38 C (100.4 F)      __________________________________________________  REVIEW OF SYSTEMS:    CONSTITUTIONAL: No fever,   EYES: No acute visual disturbances  NECK: No pain or stiffness  RESPIRATORY: No cough; No shortness of breath  CARDIOVASCULAR: No chest pain, no palpitations  GASTROINTESTINAL: No pain. No nausea or vomiting.  No diarrhea   NEUROLOGICAL: No headache or numbness, no tremors  MUSCULOSKELETAL: No joint pain, no muscle pain  GENITOURINARY: (+) Suprapubic catheter   PSYCHIATRY: No depression , no anxiety  ALL OTHER  ROS negative        Vital Signs Last 24 Hrs  T(C): 36.6 (2018 14:04), Max: 37.2 (2018 19:09)  T(F): 97.8 (2018 14:04), Max: 99 (2018 19:09)  HR: 66 (2018 14:04) (66 - 89)  BP: 114/62 (2018 14:04) (114/62 - 156/75)  BP(mean): --  RR: 18 (2018 14:04) (18 - 18)  SpO2: 100% (2018 14:04) (98% - 100%)    ________________________________________________  PHYSICAL EXAM:  GENERAL: NAD  HEENT: Normocephalic;  conjunctivae and sclerae clear; moist mucous membranes;   NECK : Supple  CHEST/LUNG: Clear to auscultation bilaterally with good air entry   HEART: S1 S2, regular; no murmurs, gallops or rubs  ABDOMEN: Soft, Nontender, Nondistended; Bowel sounds present x 4 quad  EXTREMITIES: No cyanosis; no edema; no calf tenderness  SKIN: Warm and dry  NERVOUS SYSTEM:  Awake and alert; Oriented  to place, person and time ; no new deficits    _________________________________________________  LABS:                        11.5   5.4   )-----------( 141      ( 2018 14:22 )             38.5     -    139  |  101  |  25<H>  ----------------------------<  98  5.5<H>   |  32<H>  |  5.15<H>    Ca    9.2      2018 14:22    TPro  8.7<H>  /  Alb  3.7  /  TBili  0.5  /  DBili  x   /  AST  76<H>  /  ALT  31  /  AlkPhos  275<H>        Urinalysis Basic - ( 2018 16:08 )    Color: Yellow / Appearance: very cloudy / S.015 / pH: x  Gluc: x / Ketone: Trace  / Bili: Negative / Urobili: Negative   Blood: x / Protein: 100 / Nitrite: Negative   Leuk Esterase: Moderate / RBC: 5-10 /HPF / WBC 11-25 /HPF   Sq Epi: x / Non Sq Epi: Moderate /HPF / Bacteria: TNTC /HPF      CAPILLARY BLOOD GLUCOSE      POCT Blood Glucose.: 94 mg/dL (2018 11:45)  POCT Blood Glucose.: 84 mg/dL (2018 07:36)  POCT Blood Glucose.: 69 mg/dL (2018 22:36)      Consultant(s) Notes Reviewed:   YES    Care Discussed with Consultants :     Plan of care was discussed with patient and /or primary care giver; all questions and concerns were addressed and care was aligned with patient's wishes.

## 2018-04-20 NOTE — CONSULT NOTE ADULT - PROBLEM SELECTOR RECOMMENDATION 9
Last HD 4/19/18. Plan for next maintenance HD 4/21/18. Monitor BMP  Pt with no IV access (difficult stick) will check BCX during HD tomorrow.

## 2018-04-20 NOTE — CONSULT NOTE ADULT - ASSESSMENT
Patient is a 71 yo Male with ESRD on HD, HTN, anemia of renal disease, h/o Prostate ca s/p suprapubic catheter a/w UTI. Renal consulted for ESRD status.

## 2018-04-21 ENCOUNTER — TRANSCRIPTION ENCOUNTER (OUTPATIENT)
Age: 73
End: 2018-04-21

## 2018-04-21 VITALS
OXYGEN SATURATION: 99 % | DIASTOLIC BLOOD PRESSURE: 61 MMHG | SYSTOLIC BLOOD PRESSURE: 120 MMHG | RESPIRATION RATE: 18 BRPM | TEMPERATURE: 98 F | HEART RATE: 57 BPM

## 2018-04-21 LAB
-  AMIKACIN: SIGNIFICANT CHANGE UP
-  AMOXICILLIN/CLAVULANIC ACID: SIGNIFICANT CHANGE UP
-  AMPICILLIN/SULBACTAM: SIGNIFICANT CHANGE UP
-  AMPICILLIN: SIGNIFICANT CHANGE UP
-  AZTREONAM: SIGNIFICANT CHANGE UP
-  CEFAZOLIN: SIGNIFICANT CHANGE UP
-  CEFEPIME: SIGNIFICANT CHANGE UP
-  CEFOXITIN: SIGNIFICANT CHANGE UP
-  CEFTRIAXONE: SIGNIFICANT CHANGE UP
-  CIPROFLOXACIN: SIGNIFICANT CHANGE UP
-  ERTAPENEM: SIGNIFICANT CHANGE UP
-  GENTAMICIN: SIGNIFICANT CHANGE UP
-  IMIPENEM: SIGNIFICANT CHANGE UP
-  LEVOFLOXACIN: SIGNIFICANT CHANGE UP
-  MEROPENEM: SIGNIFICANT CHANGE UP
-  NITROFURANTOIN: SIGNIFICANT CHANGE UP
-  PIPERACILLIN/TAZOBACTAM: SIGNIFICANT CHANGE UP
-  TIGECYCLINE: SIGNIFICANT CHANGE UP
-  TOBRAMYCIN: SIGNIFICANT CHANGE UP
-  TRIMETHOPRIM/SULFAMETHOXAZOLE: SIGNIFICANT CHANGE UP
ALBUMIN SERPL ELPH-MCNC: 3.2 G/DL — LOW (ref 3.5–5)
ALP SERPL-CCNC: 255 U/L — HIGH (ref 40–120)
ALT FLD-CCNC: 46 U/L DA — SIGNIFICANT CHANGE UP (ref 10–60)
ANION GAP SERPL CALC-SCNC: 9 MMOL/L — SIGNIFICANT CHANGE UP (ref 5–17)
AST SERPL-CCNC: 37 U/L — SIGNIFICANT CHANGE UP (ref 10–40)
BILIRUB SERPL-MCNC: 0.4 MG/DL — SIGNIFICANT CHANGE UP (ref 0.2–1.2)
BUN SERPL-MCNC: 60 MG/DL — HIGH (ref 7–18)
CALCIUM SERPL-MCNC: 8.3 MG/DL — LOW (ref 8.4–10.5)
CHLORIDE SERPL-SCNC: 96 MMOL/L — SIGNIFICANT CHANGE UP (ref 96–108)
CO2 SERPL-SCNC: 28 MMOL/L — SIGNIFICANT CHANGE UP (ref 22–31)
CREAT SERPL-MCNC: 9.98 MG/DL — HIGH (ref 0.5–1.3)
GLUCOSE SERPL-MCNC: 140 MG/DL — HIGH (ref 70–99)
HCT VFR BLD CALC: 35.8 % — LOW (ref 39–50)
HGB BLD-MCNC: 10.7 G/DL — LOW (ref 13–17)
MCHC RBC-ENTMCNC: 27.7 PG — SIGNIFICANT CHANGE UP (ref 27–34)
MCHC RBC-ENTMCNC: 29.8 GM/DL — LOW (ref 32–36)
MCV RBC AUTO: 92.8 FL — SIGNIFICANT CHANGE UP (ref 80–100)
METHOD TYPE: SIGNIFICANT CHANGE UP
PLATELET # BLD AUTO: 145 K/UL — LOW (ref 150–400)
POTASSIUM SERPL-MCNC: 5.1 MMOL/L — SIGNIFICANT CHANGE UP (ref 3.5–5.3)
POTASSIUM SERPL-SCNC: 5.1 MMOL/L — SIGNIFICANT CHANGE UP (ref 3.5–5.3)
PROT SERPL-MCNC: 7.7 G/DL — SIGNIFICANT CHANGE UP (ref 6–8.3)
RBC # BLD: 3.86 M/UL — LOW (ref 4.2–5.8)
RBC # FLD: 16.2 % — HIGH (ref 10.3–14.5)
SODIUM SERPL-SCNC: 133 MMOL/L — LOW (ref 135–145)
WBC # BLD: 3.7 K/UL — LOW (ref 3.8–10.5)
WBC # FLD AUTO: 3.7 K/UL — LOW (ref 3.8–10.5)

## 2018-04-21 PROCEDURE — 84484 ASSAY OF TROPONIN QUANT: CPT

## 2018-04-21 PROCEDURE — 80053 COMPREHEN METABOLIC PANEL: CPT

## 2018-04-21 PROCEDURE — 87340 HEPATITIS B SURFACE AG IA: CPT

## 2018-04-21 PROCEDURE — 87086 URINE CULTURE/COLONY COUNT: CPT

## 2018-04-21 PROCEDURE — 87186 SC STD MICRODIL/AGAR DIL: CPT

## 2018-04-21 PROCEDURE — 99261: CPT

## 2018-04-21 PROCEDURE — 83605 ASSAY OF LACTIC ACID: CPT

## 2018-04-21 PROCEDURE — 99239 HOSP IP/OBS DSCHRG MGMT >30: CPT

## 2018-04-21 PROCEDURE — 74018 RADEX ABDOMEN 1 VIEW: CPT

## 2018-04-21 PROCEDURE — 85027 COMPLETE CBC AUTOMATED: CPT

## 2018-04-21 PROCEDURE — 82962 GLUCOSE BLOOD TEST: CPT

## 2018-04-21 PROCEDURE — 74176 CT ABD & PELVIS W/O CONTRAST: CPT

## 2018-04-21 PROCEDURE — 99285 EMERGENCY DEPT VISIT HI MDM: CPT | Mod: 25

## 2018-04-21 PROCEDURE — 96374 THER/PROPH/DIAG INJ IV PUSH: CPT

## 2018-04-21 PROCEDURE — 71046 X-RAY EXAM CHEST 2 VIEWS: CPT

## 2018-04-21 PROCEDURE — 87040 BLOOD CULTURE FOR BACTERIA: CPT

## 2018-04-21 PROCEDURE — 81001 URINALYSIS AUTO W/SCOPE: CPT

## 2018-04-21 PROCEDURE — 93005 ELECTROCARDIOGRAM TRACING: CPT

## 2018-04-21 PROCEDURE — 83690 ASSAY OF LIPASE: CPT

## 2018-04-21 RX ORDER — CILOSTAZOL 100 MG/1
1 TABLET ORAL
Qty: 0 | Refills: 0 | COMMUNITY
Start: 2018-04-21

## 2018-04-21 RX ADMIN — ATENOLOL 25 MILLIGRAM(S): 25 TABLET ORAL at 06:09

## 2018-04-21 RX ADMIN — HEPARIN SODIUM 5000 UNIT(S): 5000 INJECTION INTRAVENOUS; SUBCUTANEOUS at 06:09

## 2018-04-21 RX ADMIN — CILOSTAZOL 50 MILLIGRAM(S): 100 TABLET ORAL at 06:09

## 2018-04-21 RX ADMIN — HEPARIN SODIUM 5000 UNIT(S): 5000 INJECTION INTRAVENOUS; SUBCUTANEOUS at 18:51

## 2018-04-21 NOTE — DISCHARGE NOTE ADULT - HOSPITAL COURSE
72M from home, HHA 7/7, PMHx HTN, Afib (not on medications), ESRD on HD via right AVF (T/T/S) for more 14 years, ventral hernia, bowel obstruction, prostate CA, suprapubic catheter, PVD on Cilostazol; who presented to ED  c/o R abdominal pain that started in AM after HD. Patient referred right periumbilical dull pain. Denies fever, chills, nausea, vomiting, diarrhea, or drainage from catheter insertion site. Patient refers having suprapubic catheter changed every other month, and has Rx Cipro 250 mg prior to catheter change.  During this admission found to have positive UA without fever or leukocytosis. UC (+) for E. Coli, no sensitively as of yet. CT abdomen was done which did not show any obstructions. Received oral Levaquin renal dose.   Patient with history of complicated urological procedures. Spoke to Patients outpatient urologist, who recommended not to change the catheter. Patient has appointment next week and the catheter will be changed at that time.   During the hospital stay he received HD.   Patient is medically stable for discharge. He will continue with 5 more doses of Levaquin.

## 2018-04-21 NOTE — PROGRESS NOTE ADULT - SUBJECTIVE AND OBJECTIVE BOX
[facility] NEPHROLOGY- CONSULTATION NOTE    Pt well known to our service.   Patient is a 71 yo Male with ESRD on HD with suprapubic catheter presented to [facility] with UTI.    Pt reports feeling better.  No pain at suprapubic catheter site.      REVIEW OF SYSTEMS: no h/a, cp, sob, abd pain.    VITALS:  T(F): 98.2 (18 @ 05:33), Max: 98.3 (18 @ 20:20)  HR: 63 (18 @ 05:33)  BP: 136/74 (18 @ 05:33)  RR: 15 (18 @ 05:33)  SpO2: 100% (18 @ 05:33)  Wt(kg): --    Height (cm): 185.42 ( @ 12:08)  Weight (kg): 99.2 ( @ 12:08)  BMI (kg/m2): 28.9 ( @ 12:08)  BSA (m2): 2.24 ( @ 12:08)    PHYSICAL EXAM:  Gen: NAD, calm  HEENT: MMM  Neck: no JVD  Cards: RRR, +S1/S2, no M/G/R  Resp: CTA B/L  GI: soft, NT/ND, NABS  : +suprapubic catheter with leg bag  Extremities: no LE edema B/L  Derm: no rashes  Neuro: non-focal  Access:  Right UE AVG +thrill +bruit    LABS:      139  |  101  |  25<H>  ----------------------------<  98  5.5<H>   |  32<H>  |  5.15<H>    Ca    9.2      2018 14:22    TPro  8.7<H>  /  Alb  3.7  /  TBili  0.5  /  DBili      /  AST  76<H>  /  ALT  31  /  AlkPhos  275<H>      Creatinine Trend: 5.15 <--                        11.5   5.4   )-----------( 141      ( 2018 14:22 )             38.5     Urine Studies:  Urinalysis Basic - ( 2018 16:08 )    Color: Yellow / Appearance: very cloudy / S.015 / pH:   Gluc:  / Ketone: Trace  / Bili: Negative / Urobili: Negative   Blood:  / Protein: 100 / Nitrite: Negative   Leuk Esterase: Moderate / RBC: 5-10 /HPF / WBC 11-25 /HPF   Sq Epi:  / Non Sq Epi: Moderate /HPF / Bacteria: TNTC /HPF

## 2018-04-21 NOTE — DISCHARGE NOTE ADULT - MEDICATION SUMMARY - MEDICATIONS TO TAKE
I will START or STAY ON the medications listed below when I get home from the hospital:    atenolol 25 mg oral tablet  -- 1 tab(s) by mouth once a day  -- Indication: For HTN (hypertension)    cilostazol 50 mg oral tablet  -- 1 tab(s) by mouth   -- Indication: For PAD    RENVELA 800 MG TABLET  -- orally 3 times a day  -- Indication: For Phsphate binder I will START or STAY ON the medications listed below when I get home from the hospital:    atenolol 25 mg oral tablet  -- 1 tab(s) by mouth once a day  -- Indication: For HTN (hypertension)    cilostazol 50 mg oral tablet  -- 1 tab(s) by mouth   -- Indication: For PAD    RENVELA 800 MG TABLET  -- orally 3 times a day  -- Indication: For Phsphate binder     levoFLOXacin 250 mg oral tablet  -- 1 tab(s) by mouth every 48 hours  -- Indication: For UTI (urinary tract infection)

## 2018-04-21 NOTE — DISCHARGE NOTE ADULT - CARE PLAN
Principal Discharge DX:	UTI (urinary tract infection)  Goal:	Resolution  Assessment and plan of treatment:	You were admitted for abdominal pain. Found to have infection in your urine. You were started on oral antibiotics. Your symptoms resolved. Continue antibiotics for 5 more doses, 1 tablet every 2 days  Secondary Diagnosis:	End-stage renal disease (ESRD)  Goal:	HD  Assessment and plan of treatment:	You received your last HD on Saturday. Continue with HD as scheduled  Secondary Diagnosis:	HTN (hypertension)  Assessment and plan of treatment:	c/w your prescribed medications

## 2018-04-21 NOTE — PROGRESS NOTE ADULT - ATTENDING COMMENTS
[facility] NEPHROLOGY  Armando Shaffer M.D.  Kendall Morales D.O.  Lilliam Cody M.D.  Oxana Hernandez, MSN, ANP-C  [phone]    [address] [place] [address]

## 2018-04-21 NOTE — DISCHARGE NOTE ADULT - PATIENT PORTAL LINK FT
You can access the [facility], offered by [facility], by registering with the following website: [email or web address]

## 2018-04-22 LAB
-  AMIKACIN: SIGNIFICANT CHANGE UP
-  AZTREONAM: SIGNIFICANT CHANGE UP
-  CEFEPIME: SIGNIFICANT CHANGE UP
-  CEFTAZIDIME: SIGNIFICANT CHANGE UP
-  CIPROFLOXACIN: SIGNIFICANT CHANGE UP
-  GENTAMICIN: SIGNIFICANT CHANGE UP
-  IMIPENEM: SIGNIFICANT CHANGE UP
-  LEVOFLOXACIN: SIGNIFICANT CHANGE UP
-  MEROPENEM: SIGNIFICANT CHANGE UP
-  PIPERACILLIN/TAZOBACTAM: SIGNIFICANT CHANGE UP
-  TOBRAMYCIN: SIGNIFICANT CHANGE UP
CULTURE RESULTS: SIGNIFICANT CHANGE UP
HBV SURFACE AG SER-ACNC: SIGNIFICANT CHANGE UP
METHOD TYPE: SIGNIFICANT CHANGE UP
ORGANISM # SPEC MICROSCOPIC CNT: SIGNIFICANT CHANGE UP
SPECIMEN SOURCE: SIGNIFICANT CHANGE UP

## 2018-04-23 NOTE — CHART NOTE - NSCHARTNOTEFT_GEN_A_CORE
UC came positive for ESBL E.Coli and carbapenem resistant pseudomonas. Given patient history of complicated urological procedures and having chronic catheter, will not treat. Likely its colonization of the bacteria.  Patient did not have fever or leukocytosis.   Called patient and notified him about the results.

## 2018-06-25 ENCOUNTER — APPOINTMENT (OUTPATIENT)
Dept: SURGERY | Facility: CLINIC | Age: 73
End: 2018-06-25
Payer: MEDICARE

## 2018-06-25 VITALS
TEMPERATURE: 98.5 F | HEIGHT: 73 IN | HEART RATE: 79 BPM | WEIGHT: 232 LBS | DIASTOLIC BLOOD PRESSURE: 78 MMHG | BODY MASS INDEX: 30.75 KG/M2 | SYSTOLIC BLOOD PRESSURE: 181 MMHG

## 2018-06-25 PROCEDURE — 99213 OFFICE O/P EST LOW 20 MIN: CPT

## 2018-07-16 PROBLEM — E11.9 TYPE 2 DIABETES MELLITUS WITHOUT COMPLICATIONS: Chronic | Status: INACTIVE | Noted: 2017-06-16 | Resolved: 2018-04-19

## 2018-07-16 PROBLEM — I48.0 PAROXYSMAL ATRIAL FIBRILLATION: Chronic | Status: INACTIVE | Noted: 2017-06-16 | Resolved: 2018-04-19

## 2018-09-01 ENCOUNTER — EMERGENCY (EMERGENCY)
Facility: HOSPITAL | Age: 73
LOS: 1 days | Discharge: HOME | End: 2018-09-01
Attending: EMERGENCY MEDICINE
Payer: MEDICARE

## 2018-09-01 VITALS
DIASTOLIC BLOOD PRESSURE: 78 MMHG | TEMPERATURE: 98 F | HEART RATE: 64 BPM | OXYGEN SATURATION: 100 % | SYSTOLIC BLOOD PRESSURE: 158 MMHG | RESPIRATION RATE: 17 BRPM

## 2018-09-01 VITALS
HEIGHT: 73 IN | DIASTOLIC BLOOD PRESSURE: 80 MMHG | SYSTOLIC BLOOD PRESSURE: 180 MMHG | OXYGEN SATURATION: 99 % | HEART RATE: 65 BPM | TEMPERATURE: 98 F | WEIGHT: 225.09 LBS | RESPIRATION RATE: 18 BRPM

## 2018-09-01 DIAGNOSIS — I77.0 ARTERIOVENOUS FISTULA, ACQUIRED: Chronic | ICD-10-CM

## 2018-09-01 DIAGNOSIS — Z98.890 OTHER SPECIFIED POSTPROCEDURAL STATES: Chronic | ICD-10-CM

## 2018-09-01 DIAGNOSIS — Z98.89 OTHER SPECIFIED POSTPROCEDURAL STATES: Chronic | ICD-10-CM

## 2018-09-01 PROBLEM — N18.6 END STAGE RENAL DISEASE: Chronic | Status: ACTIVE | Noted: 2017-06-16

## 2018-09-01 PROBLEM — I10 ESSENTIAL (PRIMARY) HYPERTENSION: Chronic | Status: ACTIVE | Noted: 2017-06-16

## 2018-09-01 LAB
ALBUMIN SERPL ELPH-MCNC: 3.6 G/DL — SIGNIFICANT CHANGE UP (ref 3.5–5)
ALP SERPL-CCNC: 171 U/L — HIGH (ref 40–120)
ALT FLD-CCNC: 15 U/L DA — SIGNIFICANT CHANGE UP (ref 10–60)
AMYLASE P1 CFR SERPL: 99 U/L — SIGNIFICANT CHANGE UP (ref 25–115)
ANION GAP SERPL CALC-SCNC: 8 MMOL/L — SIGNIFICANT CHANGE UP (ref 5–17)
APPEARANCE UR: ABNORMAL
AST SERPL-CCNC: 17 U/L — SIGNIFICANT CHANGE UP (ref 10–40)
BACTERIA # UR AUTO: ABNORMAL /HPF
BASOPHILS # BLD AUTO: 0 K/UL — SIGNIFICANT CHANGE UP (ref 0–0.2)
BASOPHILS NFR BLD AUTO: 0.7 % — SIGNIFICANT CHANGE UP (ref 0–2)
BILIRUB SERPL-MCNC: 0.6 MG/DL — SIGNIFICANT CHANGE UP (ref 0.2–1.2)
BILIRUB UR-MCNC: NEGATIVE — SIGNIFICANT CHANGE UP
BUN SERPL-MCNC: 17 MG/DL — SIGNIFICANT CHANGE UP (ref 7–18)
CALCIUM SERPL-MCNC: 8.7 MG/DL — SIGNIFICANT CHANGE UP (ref 8.4–10.5)
CHLORIDE SERPL-SCNC: 96 MMOL/L — SIGNIFICANT CHANGE UP (ref 96–108)
CO2 SERPL-SCNC: 31 MMOL/L — SIGNIFICANT CHANGE UP (ref 22–31)
COLOR SPEC: YELLOW — SIGNIFICANT CHANGE UP
COMMENT - URINE: SIGNIFICANT CHANGE UP
CREAT SERPL-MCNC: 5.72 MG/DL — HIGH (ref 0.5–1.3)
DIFF PNL FLD: ABNORMAL
EOSINOPHIL # BLD AUTO: 0.1 K/UL — SIGNIFICANT CHANGE UP (ref 0–0.5)
EOSINOPHIL NFR BLD AUTO: 1.7 % — SIGNIFICANT CHANGE UP (ref 0–6)
EPI CELLS # UR: SIGNIFICANT CHANGE UP /HPF
GLUCOSE SERPL-MCNC: 93 MG/DL — SIGNIFICANT CHANGE UP (ref 70–99)
GLUCOSE UR QL: NEGATIVE — SIGNIFICANT CHANGE UP
HCT VFR BLD CALC: 35.9 % — LOW (ref 39–50)
HGB BLD-MCNC: 10.8 G/DL — LOW (ref 13–17)
KETONES UR-MCNC: NEGATIVE — SIGNIFICANT CHANGE UP
LEUKOCYTE ESTERASE UR-ACNC: ABNORMAL
LIDOCAIN IGE QN: 149 U/L — SIGNIFICANT CHANGE UP (ref 73–393)
LYMPHOCYTES # BLD AUTO: 1 K/UL — SIGNIFICANT CHANGE UP (ref 1–3.3)
LYMPHOCYTES # BLD AUTO: 22.2 % — SIGNIFICANT CHANGE UP (ref 13–44)
MCHC RBC-ENTMCNC: 27.2 PG — SIGNIFICANT CHANGE UP (ref 27–34)
MCHC RBC-ENTMCNC: 30.2 GM/DL — LOW (ref 32–36)
MCV RBC AUTO: 89.9 FL — SIGNIFICANT CHANGE UP (ref 80–100)
MONOCYTES # BLD AUTO: 0.7 K/UL — SIGNIFICANT CHANGE UP (ref 0–0.9)
MONOCYTES NFR BLD AUTO: 14.6 % — HIGH (ref 2–14)
NEUTROPHILS # BLD AUTO: 2.7 K/UL — SIGNIFICANT CHANGE UP (ref 1.8–7.4)
NEUTROPHILS NFR BLD AUTO: 60.7 % — SIGNIFICANT CHANGE UP (ref 43–77)
NITRITE UR-MCNC: NEGATIVE — SIGNIFICANT CHANGE UP
PH UR: 8 — SIGNIFICANT CHANGE UP (ref 5–8)
PLATELET # BLD AUTO: 144 K/UL — LOW (ref 150–400)
POTASSIUM SERPL-MCNC: 3.9 MMOL/L — SIGNIFICANT CHANGE UP (ref 3.5–5.3)
POTASSIUM SERPL-SCNC: 3.9 MMOL/L — SIGNIFICANT CHANGE UP (ref 3.5–5.3)
PROT SERPL-MCNC: 8.4 G/DL — HIGH (ref 6–8.3)
PROT UR-MCNC: 100
RBC # BLD: 3.99 M/UL — LOW (ref 4.2–5.8)
RBC # FLD: 16.6 % — HIGH (ref 10.3–14.5)
RBC CASTS # UR COMP ASSIST: ABNORMAL /HPF (ref 0–2)
SODIUM SERPL-SCNC: 135 MMOL/L — SIGNIFICANT CHANGE UP (ref 135–145)
SP GR SPEC: 1.01 — SIGNIFICANT CHANGE UP (ref 1.01–1.02)
TRI-PHOS CRY UR QL COMP ASSIST: ABNORMAL /HPF
UROBILINOGEN FLD QL: NEGATIVE — SIGNIFICANT CHANGE UP
WBC # BLD: 4.5 K/UL — SIGNIFICANT CHANGE UP (ref 3.8–10.5)
WBC # FLD AUTO: 4.5 K/UL — SIGNIFICANT CHANGE UP (ref 3.8–10.5)
WBC UR QL: ABNORMAL /HPF (ref 0–5)

## 2018-09-01 PROCEDURE — 99283 EMERGENCY DEPT VISIT LOW MDM: CPT

## 2018-09-01 PROCEDURE — 80053 COMPREHEN METABOLIC PANEL: CPT

## 2018-09-01 PROCEDURE — 83690 ASSAY OF LIPASE: CPT

## 2018-09-01 PROCEDURE — 85027 COMPLETE CBC AUTOMATED: CPT

## 2018-09-01 PROCEDURE — 99284 EMERGENCY DEPT VISIT MOD MDM: CPT

## 2018-09-01 PROCEDURE — 82150 ASSAY OF AMYLASE: CPT

## 2018-09-01 PROCEDURE — 81001 URINALYSIS AUTO W/SCOPE: CPT

## 2018-09-01 RX ORDER — SODIUM CHLORIDE 9 MG/ML
3 INJECTION INTRAMUSCULAR; INTRAVENOUS; SUBCUTANEOUS ONCE
Qty: 0 | Refills: 0 | Status: COMPLETED | OUTPATIENT
Start: 2018-09-01 | End: 2018-09-01

## 2018-09-01 RX ADMIN — SODIUM CHLORIDE 3 MILLILITER(S): 9 INJECTION INTRAMUSCULAR; INTRAVENOUS; SUBCUTANEOUS at 11:22

## 2018-09-01 NOTE — ED PROVIDER NOTE - OBJECTIVE STATEMENT
71 y/o M  with PMHx of Afib, CAD, ESRD (on dialysis Tu, Th, Sat), HLD, HTN, obesity, pre-diabetes, and prostate cancer s/p suprapubic catheter presents to ED c/o lower abd pain and lump x this morning. Pt reports he went to dialysis this morning and came to ED for evaluation of lower abd pain. Pt describes pain as constant and dull. Pt relates that he usually drains his catheter bag in the morning, stating that he drained the bag this morning (nl amount of urine in bag). Pt has had the catheter for seven years; placed for prostate issues. Pt's catheter is changed every month by Dr. Carty, urologist. Pt reports similar abd pain in the past when he had a UTI. Patient denies fever, chills, nausea, vomiting, diarrhea, or any other complaints. ALLERGIES: penicillin (rash).

## 2018-09-01 NOTE — ED ADULT NURSE NOTE - OBJECTIVE STATEMENT
trae enamorado , from dialysis center for RLQ pain since this morning , dialysis  pt with suprapubic cath on ,UA and U CUL pending

## 2018-09-01 NOTE — ED PROVIDER NOTE - MEDICAL DECISION MAKING DETAILS
73 y/o M pt with suprapubic catheter presents with lower abd pain. Will obtain labs, UA, and reassess.

## 2018-09-01 NOTE — ED PROVIDER NOTE - CARE PLAN
Principal Discharge DX:	UTI (urinary tract infection)  Secondary Diagnosis:	Ventral hernia  Secondary Diagnosis:	Suprapubic catheter

## 2018-09-01 NOTE — ED PROVIDER NOTE - ABDOMINAL TENDER
suprapubic tenderness at catheter site, no guarding, no rebound suprapubic tenderness at catheter site, no guarding, no rebound, reducible ventral hernia

## 2018-09-01 NOTE — ED ADULT NURSE NOTE - PMH
Atrial fibrillation    CAD (coronary artery disease)  LAD stent  ESRD (end stage renal disease) on dialysis    Essential hypertension    HLD (hyperlipidemia)    HTN (hypertension)    Obesity    Pre-diabetes    Prostate cancer  prostate cancer  PVD (peripheral vascular disease)

## 2018-09-01 NOTE — ED ADULT NURSE NOTE - NSIMPLEMENTINTERV_GEN_ALL_ED
Implemented All Universal Safety Interventions:  [place] to call system. Call bell, personal items and telephone within reach. Instruct patient to call for assistance. Room bathroom lighting operational. Non-slip footwear when patient is off stretcher. Physically safe environment: no spills, clutter or unnecessary equipment. Stretcher in lowest position, wheels locked, appropriate side rails in place.

## 2018-11-01 ENCOUNTER — TRANSCRIPTION ENCOUNTER (OUTPATIENT)
Age: 73
End: 2018-11-01

## 2018-11-01 ENCOUNTER — INPATIENT (INPATIENT)
Facility: HOSPITAL | Age: 73
LOS: 2 days | Discharge: HOME | DRG: 353 | End: 2018-11-04
Attending: SPECIALIST | Admitting: SPECIALIST
Payer: MEDICARE

## 2018-11-01 VITALS
WEIGHT: 235.23 LBS | DIASTOLIC BLOOD PRESSURE: 84 MMHG | OXYGEN SATURATION: 97 % | HEART RATE: 92 BPM | HEIGHT: 72 IN | RESPIRATION RATE: 18 BRPM | TEMPERATURE: 98 F | SYSTOLIC BLOOD PRESSURE: 167 MMHG

## 2018-11-01 DIAGNOSIS — C61 MALIGNANT NEOPLASM OF PROSTATE: ICD-10-CM

## 2018-11-01 DIAGNOSIS — N25.81 SECONDARY HYPERPARATHYROIDISM OF RENAL ORIGIN: ICD-10-CM

## 2018-11-01 DIAGNOSIS — N18.6 END STAGE RENAL DISEASE: ICD-10-CM

## 2018-11-01 DIAGNOSIS — Z29.9 ENCOUNTER FOR PROPHYLACTIC MEASURES, UNSPECIFIED: ICD-10-CM

## 2018-11-01 DIAGNOSIS — I25.10 ATHEROSCLEROTIC HEART DISEASE OF NATIVE CORONARY ARTERY WITHOUT ANGINA PECTORIS: ICD-10-CM

## 2018-11-01 DIAGNOSIS — I77.0 ARTERIOVENOUS FISTULA, ACQUIRED: Chronic | ICD-10-CM

## 2018-11-01 DIAGNOSIS — I73.9 PERIPHERAL VASCULAR DISEASE, UNSPECIFIED: ICD-10-CM

## 2018-11-01 DIAGNOSIS — I10 ESSENTIAL (PRIMARY) HYPERTENSION: ICD-10-CM

## 2018-11-01 DIAGNOSIS — E83.39 OTHER DISORDERS OF PHOSPHORUS METABOLISM: ICD-10-CM

## 2018-11-01 DIAGNOSIS — Z98.89 OTHER SPECIFIED POSTPROCEDURAL STATES: Chronic | ICD-10-CM

## 2018-11-01 DIAGNOSIS — I48.91 UNSPECIFIED ATRIAL FIBRILLATION: ICD-10-CM

## 2018-11-01 DIAGNOSIS — Z98.890 OTHER SPECIFIED POSTPROCEDURAL STATES: Chronic | ICD-10-CM

## 2018-11-01 DIAGNOSIS — R73.03 PREDIABETES: ICD-10-CM

## 2018-11-01 DIAGNOSIS — K56.609 UNSPECIFIED INTESTINAL OBSTRUCTION, UNSPECIFIED AS TO PARTIAL VERSUS COMPLETE OBSTRUCTION: ICD-10-CM

## 2018-11-01 DIAGNOSIS — E78.5 HYPERLIPIDEMIA, UNSPECIFIED: ICD-10-CM

## 2018-11-01 DIAGNOSIS — N13.30 UNSPECIFIED HYDRONEPHROSIS: ICD-10-CM

## 2018-11-01 LAB
ALBUMIN SERPL ELPH-MCNC: 4 G/DL — SIGNIFICANT CHANGE UP (ref 3.5–5)
ALP SERPL-CCNC: 143 U/L — HIGH (ref 40–120)
ALT FLD-CCNC: 17 U/L DA — SIGNIFICANT CHANGE UP (ref 10–60)
ANION GAP SERPL CALC-SCNC: 9 MMOL/L — SIGNIFICANT CHANGE UP (ref 5–17)
APTT BLD: 29.6 SEC — SIGNIFICANT CHANGE UP (ref 27.5–36.3)
AST SERPL-CCNC: 13 U/L — SIGNIFICANT CHANGE UP (ref 10–40)
BASOPHILS # BLD AUTO: 0.1 K/UL — SIGNIFICANT CHANGE UP (ref 0–0.2)
BASOPHILS NFR BLD AUTO: 0.9 % — SIGNIFICANT CHANGE UP (ref 0–2)
BILIRUB SERPL-MCNC: 0.4 MG/DL — SIGNIFICANT CHANGE UP (ref 0.2–1.2)
BUN SERPL-MCNC: 59 MG/DL — HIGH (ref 7–18)
CALCIUM SERPL-MCNC: 9.1 MG/DL — SIGNIFICANT CHANGE UP (ref 8.4–10.5)
CHLORIDE SERPL-SCNC: 99 MMOL/L — SIGNIFICANT CHANGE UP (ref 96–108)
CO2 SERPL-SCNC: 27 MMOL/L — SIGNIFICANT CHANGE UP (ref 22–31)
CREAT SERPL-MCNC: 9.64 MG/DL — HIGH (ref 0.5–1.3)
EOSINOPHIL # BLD AUTO: 0.1 K/UL — SIGNIFICANT CHANGE UP (ref 0–0.5)
EOSINOPHIL NFR BLD AUTO: 1.2 % — SIGNIFICANT CHANGE UP (ref 0–6)
GLUCOSE SERPL-MCNC: 93 MG/DL — SIGNIFICANT CHANGE UP (ref 70–99)
HBA1C BLD-MCNC: 5.5 % — SIGNIFICANT CHANGE UP (ref 4–5.6)
HCT VFR BLD CALC: 36.7 % — LOW (ref 39–50)
HGB BLD-MCNC: 11.4 G/DL — LOW (ref 13–17)
INR BLD: 1.03 RATIO — SIGNIFICANT CHANGE UP (ref 0.88–1.16)
LIDOCAIN IGE QN: 625 U/L — HIGH (ref 73–393)
LYMPHOCYTES # BLD AUTO: 1.1 K/UL — SIGNIFICANT CHANGE UP (ref 1–3.3)
LYMPHOCYTES # BLD AUTO: 16.2 % — SIGNIFICANT CHANGE UP (ref 13–44)
MCHC RBC-ENTMCNC: 27.4 PG — SIGNIFICANT CHANGE UP (ref 27–34)
MCHC RBC-ENTMCNC: 31.2 GM/DL — LOW (ref 32–36)
MCV RBC AUTO: 87.7 FL — SIGNIFICANT CHANGE UP (ref 80–100)
MONOCYTES # BLD AUTO: 0.5 K/UL — SIGNIFICANT CHANGE UP (ref 0–0.9)
MONOCYTES NFR BLD AUTO: 6.9 % — SIGNIFICANT CHANGE UP (ref 2–14)
NEUTROPHILS # BLD AUTO: 4.9 K/UL — SIGNIFICANT CHANGE UP (ref 1.8–7.4)
NEUTROPHILS NFR BLD AUTO: 74.8 % — SIGNIFICANT CHANGE UP (ref 43–77)
PLATELET # BLD AUTO: 156 K/UL — SIGNIFICANT CHANGE UP (ref 150–400)
POTASSIUM SERPL-MCNC: 5.1 MMOL/L — SIGNIFICANT CHANGE UP (ref 3.5–5.3)
POTASSIUM SERPL-SCNC: 5.1 MMOL/L — SIGNIFICANT CHANGE UP (ref 3.5–5.3)
PROT SERPL-MCNC: 9.3 G/DL — HIGH (ref 6–8.3)
PROTHROM AB SERPL-ACNC: 11.5 SEC — SIGNIFICANT CHANGE UP (ref 10–12.9)
RBC # BLD: 4.18 M/UL — LOW (ref 4.2–5.8)
RBC # FLD: 16 % — HIGH (ref 10.3–14.5)
SODIUM SERPL-SCNC: 135 MMOL/L — SIGNIFICANT CHANGE UP (ref 135–145)
TROPONIN I SERPL-MCNC: 0.02 NG/ML — SIGNIFICANT CHANGE UP (ref 0–0.04)
WBC # BLD: 6.6 K/UL — SIGNIFICANT CHANGE UP (ref 3.8–10.5)
WBC # FLD AUTO: 6.6 K/UL — SIGNIFICANT CHANGE UP (ref 3.8–10.5)

## 2018-11-01 PROCEDURE — 74176 CT ABD & PELVIS W/O CONTRAST: CPT | Mod: 26

## 2018-11-01 PROCEDURE — 71045 X-RAY EXAM CHEST 1 VIEW: CPT | Mod: 26

## 2018-11-01 PROCEDURE — 99285 EMERGENCY DEPT VISIT HI MDM: CPT | Mod: 25

## 2018-11-01 PROCEDURE — 99223 1ST HOSP IP/OBS HIGH 75: CPT | Mod: GC

## 2018-11-01 RX ORDER — SEVELAMER CARBONATE 2400 MG/1
800 POWDER, FOR SUSPENSION ORAL
Qty: 0 | Refills: 0 | Status: DISCONTINUED | OUTPATIENT
Start: 2018-11-01 | End: 2018-11-01

## 2018-11-01 RX ORDER — ISOSORBIDE MONONITRATE 60 MG/1
20 TABLET, EXTENDED RELEASE ORAL DAILY
Qty: 0 | Refills: 0 | Status: DISCONTINUED | OUTPATIENT
Start: 2018-11-01 | End: 2018-11-01

## 2018-11-01 RX ORDER — ISOSORBIDE DINITRATE 5 MG/1
1 TABLET ORAL
Qty: 0 | Refills: 0 | COMMUNITY

## 2018-11-01 RX ORDER — CILOSTAZOL 100 MG/1
50 TABLET ORAL
Qty: 0 | Refills: 0 | Status: DISCONTINUED | OUTPATIENT
Start: 2018-11-01 | End: 2018-11-01

## 2018-11-01 RX ORDER — DOXERCALCIFEROL 2.5 UG/1
8 CAPSULE ORAL
Qty: 0 | Refills: 0 | Status: DISCONTINUED | OUTPATIENT
Start: 2018-11-01 | End: 2018-11-01

## 2018-11-01 RX ORDER — PARICALCITOL 2 UG/1
17 CAPSULE, GELATIN COATED ORAL
Qty: 0 | Refills: 0 | Status: DISCONTINUED | OUTPATIENT
Start: 2018-11-01 | End: 2018-11-02

## 2018-11-01 RX ORDER — DARBEPOETIN ALFA IN POLYSORBAT 200MCG/0.4
25 PEN INJECTOR (ML) SUBCUTANEOUS
Qty: 0 | Refills: 0 | Status: DISCONTINUED | OUTPATIENT
Start: 2018-11-01 | End: 2018-11-01

## 2018-11-01 RX ORDER — FUROSEMIDE 40 MG
80 TABLET ORAL DAILY
Qty: 0 | Refills: 0 | Status: DISCONTINUED | OUTPATIENT
Start: 2018-11-01 | End: 2018-11-01

## 2018-11-01 RX ORDER — ALLOPURINOL 300 MG
100 TABLET ORAL DAILY
Qty: 0 | Refills: 0 | Status: DISCONTINUED | OUTPATIENT
Start: 2018-11-01 | End: 2018-11-01

## 2018-11-01 RX ORDER — SODIUM CHLORIDE 9 MG/ML
3 INJECTION INTRAMUSCULAR; INTRAVENOUS; SUBCUTANEOUS ONCE
Qty: 0 | Refills: 0 | Status: COMPLETED | OUTPATIENT
Start: 2018-11-01 | End: 2018-11-01

## 2018-11-01 RX ORDER — HEPARIN SODIUM 5000 [USP'U]/ML
5000 INJECTION INTRAVENOUS; SUBCUTANEOUS EVERY 8 HOURS
Qty: 0 | Refills: 0 | Status: DISCONTINUED | OUTPATIENT
Start: 2018-11-01 | End: 2018-11-02

## 2018-11-01 RX ORDER — ERYTHROPOIETIN 10000 [IU]/ML
4000 INJECTION, SOLUTION INTRAVENOUS; SUBCUTANEOUS
Qty: 0 | Refills: 0 | Status: DISCONTINUED | OUTPATIENT
Start: 2018-11-01 | End: 2018-11-01

## 2018-11-01 RX ORDER — SEVELAMER CARBONATE 2400 MG/1
0 POWDER, FOR SUSPENSION ORAL
Qty: 0 | Refills: 0 | COMMUNITY

## 2018-11-01 RX ORDER — ATENOLOL 25 MG/1
25 TABLET ORAL DAILY
Qty: 0 | Refills: 0 | Status: DISCONTINUED | OUTPATIENT
Start: 2018-11-01 | End: 2018-11-01

## 2018-11-01 RX ORDER — LABETALOL HCL 100 MG
5 TABLET ORAL EVERY 6 HOURS
Qty: 0 | Refills: 0 | Status: DISCONTINUED | OUTPATIENT
Start: 2018-11-01 | End: 2018-11-02

## 2018-11-01 RX ORDER — ATENOLOL 25 MG/1
1 TABLET ORAL
Qty: 0 | Refills: 0 | COMMUNITY

## 2018-11-01 RX ORDER — PANTOPRAZOLE SODIUM 20 MG/1
40 TABLET, DELAYED RELEASE ORAL DAILY
Qty: 0 | Refills: 0 | Status: DISCONTINUED | OUTPATIENT
Start: 2018-11-01 | End: 2018-11-01

## 2018-11-01 RX ADMIN — SODIUM CHLORIDE 3 MILLILITER(S): 9 INJECTION INTRAMUSCULAR; INTRAVENOUS; SUBCUTANEOUS at 03:57

## 2018-11-01 RX ADMIN — PARICALCITOL 17 MICROGRAM(S): 2 CAPSULE, GELATIN COATED ORAL at 20:12

## 2018-11-01 RX ADMIN — HEPARIN SODIUM 5000 UNIT(S): 5000 INJECTION INTRAVENOUS; SUBCUTANEOUS at 13:17

## 2018-11-01 RX ADMIN — HEPARIN SODIUM 5000 UNIT(S): 5000 INJECTION INTRAVENOUS; SUBCUTANEOUS at 21:33

## 2018-11-01 NOTE — ED PROVIDER NOTE - MEDICAL DECISION MAKING DETAILS
+SBO reported on CT. Surgical SANTHOSH Islas endorsed and will evalaute pt. Dr. Lipscomb endorsed. Pt agrees with admission. Pt is due for HD today. Dr. Armando Shaffer was nephrologist consulted on previous admission. MAR was paged. +SBO reported on CT. Surgical SANTHOSH Islas endorsed and will evaluate pt. Dr. Lipscomb endorsed. Pt agrees with admission. Pt is due for HD today. Dr. Armando Shaffer was nephrologist consulted on previous admission. MAR was paged.

## 2018-11-01 NOTE — CONSULT NOTE ADULT - PROBLEM SELECTOR RECOMMENDATION 2
Elevated - likely due to acute pain.   Not on any medication per patient , pharmacy and NP Socorro at dialysis despite med list given from from HD center . Elevated - likely due to acute pain.   Not on any medication per patient, pharmacy and NP [place] at dialysis despite med list given from from HD center  Can continue with IV labetalol as needed.

## 2018-11-01 NOTE — CONSULT NOTE ADULT - ASSESSMENT
Patient is a 72y Male whom presented to the hospital with c/o abd pain 10/10 and emesis x two days, imaging in ED reveals SBO.  This is a known patient to our service with treatment for ESRD on HD at [facility] .  Last HD 10/27/18. Currently not on any standing home medications per patient and pharmacy, [facility] .  Pharmacy reports antibiotic Levaquin in September 2018 and topical ointment no other recent meds.  Per [facility] HD center they have meds listed 10/2017 Atenolol 25mg once daily  Cilostazol 50mg once daily  Renvela 800mg 3x day  Isosorbide mono  20mg once daily  Allopurinol 100mg once daily  Lasix 80mg once daily  Pantaprazole 20mg once daily
71 y/o Male w/ SBO, incarcerated/ non reducible ventral hernia; extensive PMHX, hx cystectomy and creation of [place] Pouch, hx ventral hernia repair w/ mesh, c/w abdominal wall abscess    -NPO   -IVF while NPO   -NGT placed, pt tolerated procedure well; C/w NGT to lws   -Pre op mode   -Medical clearance needed, please note in chart   -D/w Dr Adamson and agrees

## 2018-11-01 NOTE — H&P ADULT - HISTORY OF PRESENT ILLNESS
71 y/o M  with PMHx of Afib, CAD, ESRD (on dialysis Tu, Th, Sat), HLD, HTN, obesity, pre-diabetes, and prostate cancer s/p suprapubic catheter presents to ED with complaints of lower abdominal pain starting this morning. Pt describes pain as constant and sharp, nonradiating. Patient states he has had similar abdominal pain in the past when he has had a UTI. Pt relates that he usually drains his catheter bag in the morning, stating that he drained the bag this morning (nl amount of urine in bag). Pt has had the catheter for seven years; placed for prostate issues. Pt's catheter is changed every month by Dr. Carty, urologist. Patient otherwise denies fever, chills, nausea, vomiting, diarrhea, or any other complaints. Patient reports last bowel movement yesterday and reports passing flatus this morning. 71 y/o M  with PMHx of DVT (previous on coumadin, now stopped), CAD, ESRD (on dialysis Tu, Th, Sat), HLD, HTN, obesity, pre-diabetes, and prostate cancer s/p suprapubic catheter presents to ED with complaints of lower abdominal pain starting this morning. Pt describes pain as constant and sharp, nonradiating. Patient states he has had similar abdominal pain in the past when he has had a UTI. Pt relates that he usually drains his catheter bag in the morning, stating that he drained the bag this morning (nl amount of urine in bag). Pt has had the catheter for seven years; placed for prostate issues. Pt's catheter is changed every month by Dr. Carty, urologist. Patient otherwise denies fever, chills, nausea, vomiting, diarrhea, or any other complaints. Patient reports last bowel movement yesterday and reports passing flatus this morning.

## 2018-11-01 NOTE — CONSULT NOTE ADULT - PROBLEM SELECTOR RECOMMENDATION 9
Plan maint HD today.  Minimal fluid removal in light of NPO and with emesis.  Last HD at [facility] [place] 10/27/18.   Consent obtained and placed in chart.   Monitor electrolytes Plan maintenance HD today with UF given interstitial prominence on CXR. Last HD at [facility] 10/30/18.   Consent obtained and placed in chart.   Monitor electrolytes.    Given hydronephrosis on CT, recommend non-urgent urology consult (patient still voids).

## 2018-11-01 NOTE — H&P ADULT - PROBLEM SELECTOR PLAN 2
CT abdomen reports new hydroureteronephrosis of left kidney, possible anatomic obstruction from prior ileal conduit vs infection  patient afebrile, no leukocytosis

## 2018-11-01 NOTE — CONSULT NOTE ADULT - ATTENDING COMMENTS
[facility] NEPHROLOGY  Armando Shaffer M.D.  Kendall Morales D.O.  Lilliam Cody M.D.  Oxana Hernandez, MSN, ANP-C    Telephone: [phone]  Facsimile: [phone]    [address] [place] [address]. Patient seen and examined. Agree with above A and P.    [facility] NEPHROLOGY  Armando Shaffer M.D.  Kendall Morales D.O.  Lilliam Cody M.D.  Oxana Hernandez, MSN, ANP-C    Telephone: [phone]  Facsimile: [phone]    [address] [place] [address].

## 2018-11-01 NOTE — CONSULT NOTE ADULT - PROBLEM SELECTOR RECOMMENDATION 4
NPO   Plan Renvela when diet resumed pending Phos and Ca levels Continue with zemplar with HD. Holding renvela as patient NPO but can restart once diet started. Monitor serum calcium and phosphorus.

## 2018-11-01 NOTE — H&P ADULT - PROBLEM SELECTOR PLAN 9
IMPROVE VTE Individual Risk Assessment          RISK                                                          Points  [ x ] Previous VTE                                                3  [  ] Thrombophilia                                             2  [  ] Lower limb paralysis                                   2        (unable to hold up >15 seconds)    [  ] Current Cancer                                             2         (within 6 months)  [ x ] Immobilization > 24 hrs                              1  [  ] ICU/CCU stay > 24 hours                             1  [x  ] Age > 60                                                         1    IMPROVE VTE Score: 5    holding heparin due to possible OR procedure today for hernia repair IMPROVE VTE Individual Risk Assessment          RISK                                                          Points  [ x ] Previous VTE                                                3  [  ] Thrombophilia                                             2  [  ] Lower limb paralysis                                   2        (unable to hold up >15 seconds)    [  ] Current Cancer                                             2         (within 6 months)  [ x ] Immobilization > 24 hrs                              1  [  ] ICU/CCU stay > 24 hours                             1  [x  ] Age > 60                                                         1    IMPROVE VTE Score: 5    c/w heparin for vte prophylaxis, hold morning dose due to OR in AM

## 2018-11-01 NOTE — PROGRESS NOTE ADULT - SUBJECTIVE AND OBJECTIVE BOX
INTERVAL HPI/OVERNIGHT EVENTS:  Pt stable.   NPO  No flatus/BM.  NG tube minimal drainage    Vital Signs Last 24 Hrs  T(C): 36.8 (01 Nov 2018 11:16), Max: 36.8 (01 Nov 2018 11:16)  T(F): 98.2 (01 Nov 2018 11:16), Max: 98.2 (01 Nov 2018 11:16)  HR: 94 (01 Nov 2018 11:16) (92 - 94)  BP: 163/102 (01 Nov 2018 11:16) (163/102 - 175/85)  BP(mean): --  RR: 18 (01 Nov 2018 11:16) (18 - 18)  SpO2: 98% (01 Nov 2018 11:16) (97% - 98%)    Physical:  Abdomen: Soft nondistended, nontender.  Chronically incarcerated incisional hernia, non tender    I&O's Summary      LABS:                        11.4   6.6   )-----------( 156      ( 01 Nov 2018 03:59 )             36.7             11-01    135  |  99  |  59<H>  ----------------------------<  93  5.1   |  27  |  9.64<H>    Ca    9.1      01 Nov 2018 03:59  Phos  5.7     11-01  Mg     2.4     11-01    TPro  9.3<H>  /  Alb  4.0  /  TBili  0.4  /  DBili  x   /  AST  13  /  ALT  17  /  AlkPhos  143<H>  11-01

## 2018-11-01 NOTE — H&P ADULT - PROBLEM SELECTOR PLAN 3
on TTS schedule, due for dialysis today, either before or after OR  potassium 5.1 this morning on TTS schedule, due for dialysis today, either before or after OR  potassium 5.1 in AM  f/u nephrology - Dr. Shaffer on TTS schedule, due for dialysis today, either before or after OR  potassium 5.1 in AM  f/u nephrology - Dr. Morales (covering for Dr. Shaffer today) on TTS schedule, due for dialysis today  potassium 5.1 in AM  f/u nephrology - Dr. Morales (covering for Dr. Shaffer today)

## 2018-11-01 NOTE — H&P ADULT - ATTENDING COMMENTS
Seen and examined in ED together with Dr. Todd -surgeon.   Patient is a 73 y/o M  with PMHx of DVT (previous on coumadin, now stopped), CAD, ESRD (on dialysis Tu, Th, Sat), HLD, HTN, obesity, pre-diabetes, and prostate cancer s/p suprapubic catheter presents to ED with complaints of lower abdominal pain. Per patient, he has the pain for the past month, but it got worst today associated with nausea and vomiting, hence he came to ED.   CT abdomen done in ED showing incarcerated abdominal hernia .   Dr. Todd spoke to patient in detail about the need for surgery.     Vital signs and labs reviewed    Vital Signs Last 24 Hrs  T(C): 36.8 (01 Nov 2018 11:16), Max: 36.8 (01 Nov 2018 11:16)  T(F): 98.2 (01 Nov 2018 11:16), Max: 98.2 (01 Nov 2018 11:16)  HR: 94 (01 Nov 2018 11:16) (92 - 94)  BP: 176/72 (01 Nov 2018 12:32) (163/102 - 176/72)  BP(mean): --  RR: 18 (01 Nov 2018 11:16) (18 - 18)  SpO2: 98% (01 Nov 2018 11:16) (97% - 98%)    1. Incarcerated abdominal hernia: surgery in AM   Currently OG tube in place   Keep NPO, can have small amounts of ice chips     2. ESRD on HD, but still makes urine   Spoke to Dr. Mcdonald - nephrologist: plan for HD today     3. HTN: oral BP meds on hold  Started Labetalol 5 mg IV q6 for now     4. UTI: afebrile, no leukocytosis  UA (+) in patient with suprapubic catheter   Will hold of on treatment for now   5. DVT prophylaxis: heparin sc     Plan of care discussed with patient. Seen and examined in ED together with Dr. Todd -surgeon.   Patient is a 73 y/o M  with PMHx of DVT (previous on coumadin, now stopped), CAD, ESRD (on dialysis Tu, Th, Sat), HLD, HTN, obesity, pre-diabetes, and prostate cancer s/p suprapubic catheter presents to ED with complaints of lower abdominal pain. Per patient, he has the pain for the past month, but it got worst today associated with nausea and vomiting, hence he came to ED.   CT abdomen done in ED showing incarcerated abdominal hernia .   Dr. Todd spoke to patient in detail about the need for surgery.     Vital signs and labs reviewed    Vital Signs Last 24 Hrs  T(C): 36.8 (01 Nov 2018 11:16), Max: 36.8 (01 Nov 2018 11:16)  T(F): 98.2 (01 Nov 2018 11:16), Max: 98.2 (01 Nov 2018 11:16)  HR: 94 (01 Nov 2018 11:16) (92 - 94)  BP: 176/72 (01 Nov 2018 12:32) (163/102 - 176/72)  BP(mean): --  RR: 18 (01 Nov 2018 11:16) (18 - 18)  SpO2: 98% (01 Nov 2018 11:16) (97% - 98%)    1. Incarcerated abdominal hernia: surgery in AM   Currently OG tube in place   Keep NPO, can have small amounts of ice chips     2. ESRD on HD, but still makes urine   Spoke to Dr. Morales - nephrologist: plan for HD today     3. HTN: oral BP meds on hold  Started Labetalol 5 mg IV q6 for now     4. UTI: afebrile, no leukocytosis  UA (+) in patient with suprapubic catheter   Will hold of on treatment for now   5. DVT prophylaxis: heparin sc     Plan of care discussed with patient.

## 2018-11-01 NOTE — CONSULT NOTE ADULT - PROBLEM SELECTOR RECOMMENDATION 3
Hgb at goal Hold OZZIE for now monitor Hgb   Check Iron panel Hb acceptable. Hold Epogen for now. Monitor Hb.

## 2018-11-01 NOTE — H&P ADULT - PROBLEM SELECTOR PLAN 7
not currently on any medication  continue to monitor BP c/w isosorbide mononitrate, atenolol  continue to monitor blood pressure c/w IV labetalol for now as patient NPO due to NGT  continue to monitor blood pressure

## 2018-11-01 NOTE — CONSULT NOTE ADULT - SUBJECTIVE AND OBJECTIVE BOX
[facility] NEPHROLOGY- CONSULTATION NOTE    Patient is a 72y Male whom presented to the hospital with c/o abd pain 10/10 and emesis x two days, imaging in ED reveals SBO.  This is a known patient to our service with treatment for ESRD on HD at [facility]TS .  Last HD 10/27/18. Currently only on Renvela as standing home medications per patient, NP and pharmacy, Medicine Cabinet .  Pharmacy reports antibiotic Levaquin in September 2018 and topical ointment no other recent meds.  Per [facility] HD center they have meds listed 10/2017 Atenolol 25mg once daily  Cilostazol 50mg once daily  Renvela 800mg 3x day  Isosorbide mono  20mg once daily  Allopurinol 100mg once daily  Lasix 80mg once daily  Pantaprazole 20mg once daily though patient denies use of these meds.     PAST MEDICAL & SURGICAL HISTORY:  History of DVT of lower extremity- previously on Coumadin though not presently   Essential hypertension  ESRD (end stage renal disease) on dialysis  Pre-diabetes  CAD (coronary artery disease): LAD stent  PVD (peripheral vascular disease)  Obesity  Prostate cancer: prostate cancer  HTN (hypertension)  H/O abdominal surgery: 10/10/17  H/O cardiac catheterization: 4/25/2014 non obstructive disease  AV fistula/Graft: creation in right arm on 12/1/2015  AV fistula: h/o creation in 2005  History of prostatectomy    penicillin (Rash)    Home Medications - RENVELA only     Hospital Medications:   MEDICATIONS  (STANDING):  allopurinol 100 milliGRAM(s) Oral daily  ATENolol  Tablet 25 milliGRAM(s) Oral daily  cilostazol 50 milliGRAM(s) Oral <User Schedule>  furosemide    Tablet 80 milliGRAM(s) Oral daily  heparin  Injectable 5000 Unit(s) SubCutaneous every 8 hours  isosorbide    mononitrate Tablet (ISMO) 20 milliGRAM(s) Oral daily  pantoprazole  Injectable 40 milliGRAM(s) IV Push daily  paricalcitol Injectable 17 MICROGram(s) IV Push <User Schedule>  sevelamer hydrochloride 800 milliGRAM(s) Oral three times a day with meals    SOCIAL HISTORY:  Denies ETOh,Smoking,   FAMILY HISTORY:  Family history of hypertension in mother (Mother): HTN    REVIEW OF SYSTEMS:  CONSTITUTIONAL: No weakness, fevers or chills  EYES/ENT: No visual changes;  No vertigo or throat pain   NECK: No pain or stiffness  RESPIRATORY: No cough, wheezing, hemoptysis; No shortness of breath  CARDIOVASCULAR: No chest pain or palpitations.  GASTROINTESTINAL:+sharp  abdominal pain 10/10 intermittent. + nausea and vomiting.  No or hematemesis; No diarrhea or constipation. Last BM 10/31/18. No melena or hematochezia.  GENITOURINARY: Does not make urine.  HD Q TTS   NEUROLOGICAL: No numbness or weakness. + forgtfullness more so since wife passed away   SKIN: No itching, burning, rashes, or lesions   VASCULAR: No bilateral lower extremity edema.   All other review of systems is negative unless indicated above.    VITALS:  T(F): 98.2 (11-01-18 @ 11:16), Max: 98.2 (11-01-18 @ 11:16)  HR: 94 (11-01-18 @ 11:16)  BP: 163/102 (11-01-18 @ 11:16)  RR: 18 (11-01-18 @ 11:16)  SpO2: 98% (11-01-18 @ 11:16)  Wt(kg): --    Height (cm): 182.88 (11-01 @ 02:34)  Weight (kg): 106.7 (11-01 @ 02:34)  BMI (kg/m2): 31.9 (11-01 @ 02:34)  BSA (m2): 2.28 (11-01 @ 02:34)  PHYSICAL EXAM:  Constitutional: NAD  HEENT: NC AT,anicteric sclera, oropharynx clear, MMM  Neck: No JVD  Respiratory: CTAB, no wheezes, rales or rhonchi  Cardiovascular: S1, S2, RRR  Gastrointestinal: + trnderness at RLQ and navel area, Hypoactive BS. Old Sx scar to LLQ/LUQ  Extremities: No cyanosis or clubbing. No peripheral edema  Neurological: A/O x 4, no focal deficits though inattentive   Psychiatric: Normal mood, normal affect with periods of inattente  : No CVA tenderness. No wright.   Skin: warm and dry   Vascular Access: Right arm AVG + bruit     LABS:  11-01    135  |  99  |  59<H>  ----------------------------<  93  5.1   |  27  |  9.64<H>    Ca    9.1      01 Nov 2018 03:59  Phos  5.7     11-01  Mg     2.4     11-01    TPro  9.3<H>  /  Alb  4.0  /  TBili  0.4  /  DBili      /  AST  13  /  ALT  17  /  AlkPhos  143<H>  11-01    Creatinine Trend: 9.64 <--                        11.4   6.6   )-----------( 156      ( 01 Nov 2018 03:59 )             36.7     Urine Studies:      RADIOLOGY & ADDITIONAL STUDIES:        < from: Xray Chest 1 View-PORTABLE IMMEDIATE (11.01.18 @ 05:05) >  EXAM:  XR CHEST PORTABLE IMMED 1V                            PROCEDURE DATE:  11/01/2018          INTERPRETATION:  INDICATION: Shortness of breath; evaluate for effusion.    PRIORS: April 19, 2018    VIEWS: Portable AP radiography of the chest performed. Evaluation limited   secondary to shallow inspiration.    FINDINGS: Heart size cannot be quantitated on this portable evaluation.   No superior mediastinal widening is identified. There is interstitial   prominence identified bilaterally which may reflect shallow inspiration;   however, an element of interstitial edema cannot be excluded. Clinical   correlation and follow-up suggested. There is no evidence for lobar   consolidation. No pleural effusion or pneumothorax is demonstrated. No   mediastinal shift is noted. No osseous fractures are identified.   Degenerative changes of the right shoulder region noted.    IMPRESSION: There is interstitial prominence identified bilaterally which   may reflect shallow inspiration; however, an element of interstitial   edema cannot be excluded. Clinical correlation and follow-up suggested.                 ROSARIO BARRIOS   This document has been electronically signed. Nov 1 2018  8:29AM        < end of copied text >

## 2018-11-01 NOTE — ED PROVIDER NOTE - PHYSICAL EXAMINATION
Right forearm AV fistula, +bruits and thrills   Generalized lower abdominal pain, colostomy intact and functional Right forearm AV fistula, +bruits and thrills  Generalized lower abdominal pain, colostomy intact and functional

## 2018-11-01 NOTE — ED PROVIDER NOTE - OBJECTIVE STATEMENT
Chief complaint of generalized lower abdominal pain, no fever, no vomiting, no chest pain. Pt states abdominal pain started 6pm yesterday.  Denies recent outside [place] travels, sick contacts or known spoiled food consumption..   Pt is s/p colostomy (8 years ago) and ESRD  HD: T/Thr Sat.  Pt received 10/30/2018 HD.

## 2018-11-01 NOTE — ED ADULT NURSE NOTE - NSIMPLEMENTINTERV_GEN_ALL_ED
Implemented All Universal Safety Interventions:  [place] to call system. Call bell, personal items and telephone within reach. Instruct patient to call for assistance. Room bathroom lighting operational. Non-slip footwear when patient is off stretcher. Physically safe environment: no spills, clutter or unnecessary equipment. Stretcher in lowest position, wheels locked, appropriate side rails in place.

## 2018-11-01 NOTE — H&P ADULT - ASSESSMENT
Patient admitted for further evaluation/management of possible incarcerated hernia cause bowel obstruction, possible UTI Patient admitted for further evaluation/management of possible incarcerated hernia causing bowel obstruction, possible UTI Patient admitted for further evaluation/management of possible incarcerated hernia causing bowel obstruction, possible UTI    EKG: sinus rhythm with bifascicular block - unchanged from previous EKG

## 2018-11-01 NOTE — PROGRESS NOTE ADULT - ASSESSMENT
Condition discussed with patient, options risks and benefits explained  Hemodialysis today   Repair hernia in AM

## 2018-11-01 NOTE — H&P ADULT - NSHPLABSRESULTS_GEN_ALL_CORE
LABS:      CBC Full  -  ( 01 Nov 2018 03:59 )  WBC Count : 6.6 K/uL  Hemoglobin : 11.4 g/dL  Hematocrit : 36.7 %  Platelet Count - Automated : 156 K/uL  Mean Cell Volume : 87.7 fl  Mean Cell Hemoglobin : 27.4 pg  Mean Cell Hemoglobin Concentration : 31.2 gm/dL  Auto Neutrophil # : 4.9 K/uL  Auto Lymphocyte # : 1.1 K/uL  Auto Monocyte # : 0.5 K/uL  Auto Eosinophil # : 0.1 K/uL  Auto Basophil # : 0.1 K/uL  Auto Neutrophil % : 74.8 %  Auto Lymphocyte % : 16.2 %  Auto Monocyte % : 6.9 %  Auto Eosinophil % : 1.2 %  Auto Basophil % : 0.9 %    11-01    135  |  99  |  59<H>  ----------------------------<  93  5.1   |  27  |  9.64<H>    Ca    9.1      01 Nov 2018 03:59    TPro  9.3<H>  /  Alb  4.0  /  TBili  0.4  /  DBili  x   /  AST  13  /  ALT  17  /  AlkPhos  143<H>  11-01    PT/INR - ( 01 Nov 2018 03:59 )   PT: 11.5 sec;   INR: 1.03 ratio         PTT - ( 01 Nov 2018 03:59 )  PTT:29.6 sec    RADIOLOGY & ADDITIONAL STUDIES (The following images were personally reviewed): CBC Full  -  ( 01 Nov 2018 03:59 )  WBC Count : 6.6 K/uL  Hemoglobin : 11.4 g/dL  Hematocrit : 36.7 %  Platelet Count - Automated : 156 K/uL  Mean Cell Volume : 87.7 fl  Mean Cell Hemoglobin : 27.4 pg  Mean Cell Hemoglobin Concentration : 31.2 gm/dL  Auto Neutrophil # : 4.9 K/uL  Auto Lymphocyte # : 1.1 K/uL  Auto Monocyte # : 0.5 K/uL  Auto Eosinophil # : 0.1 K/uL  Auto Basophil # : 0.1 K/uL  Auto Neutrophil % : 74.8 %  Auto Lymphocyte % : 16.2 %  Auto Monocyte % : 6.9 %  Auto Eosinophil % : 1.2 %  Auto Basophil % : 0.9 %    11-01    135  |  99  |  59<H>  ----------------------------<  93  5.1   |  27  |  9.64<H>    Ca    9.1      01 Nov 2018 03:59    TPro  9.3<H>  /  Alb  4.0  /  TBili  0.4  /  DBili  x   /  AST  13  /  ALT  17  /  AlkPhos  143<H>  11-01    PT/INR - ( 01 Nov 2018 03:59 )   PT: 11.5 sec;   INR: 1.03 ratio       PTT - ( 01 Nov 2018 03:59 )  PTT:29.6 sec    RADIOLOGY & ADDITIONAL STUDIES (The following images were personally reviewed):    CT ABDOMEN PELVIS WITH ORAL CONTRAST:    IMPRESSION:    Several small right para midline ventral abdominal wall hernias   containing small bowel loops adjacent to the right ileal conduit. Most   inferior right para midline ventral abdominal wall hernia contains a   short segment of dilated fecalized small bowel with mildly dilated   fluid-filled proximal small bowel loops consistent with a small bowel   obstruction secondary to the abdominal wall hernia.    New mild to moderate left-sided hydroureteronephrosis to the level of the   ileal conduit.    Cholelithiasis.

## 2018-11-01 NOTE — H&P ADULT - PMH
Atrial fibrillation    CAD (coronary artery disease)  LAD stent  ESRD (end stage renal disease) on dialysis    Essential hypertension    HLD (hyperlipidemia)    HTN (hypertension)    Obesity    Pre-diabetes    Prostate cancer  prostate cancer  PVD (peripheral vascular disease) CAD (coronary artery disease)  LAD stent  ESRD (end stage renal disease) on dialysis    Essential hypertension    History of DVT of lower extremity    HTN (hypertension)    Obesity    Pre-diabetes    Prostate cancer  prostate cancer  PVD (peripheral vascular disease)

## 2018-11-01 NOTE — ED PROVIDER NOTE - CONDUCTED A DETAILED DISCUSSION WITH PATIENT AND/OR GUARDIAN REGARDING, MDM
radiology results/need for outpatient follow-up/return to ED if symptoms worsen, persist or questions arise/need to admit

## 2018-11-01 NOTE — ED PROVIDER NOTE - QRS
Was brought up at team meeting that family wants meeting prior to discharge. Call placed to mother (Rosalinda Griffin) cell # and land line leaving message that meeting time assigned is tomorrow, 12/27/17 @ 8:30 am. Asked for confirmation call.       158 ms

## 2018-11-01 NOTE — ED ADULT NURSE NOTE - OBJECTIVE STATEMENT
Pt. c/o abdominal pain that is localized around the naval area. pt. has a colostomy bag that was placed around 5 years ago. Pt. denies any nausea and vomiting.

## 2018-11-01 NOTE — H&P ADULT - PROBLEM SELECTOR PLAN 1
NGT for decompression  plan for OR today with dialysis either before or after procedure  NPO SBO 2/2 incarcerated hernia  NGT for decompression  dialysis planned for today  NPO  OR in AM- surgery Dr. Adamson

## 2018-11-01 NOTE — CONSULT NOTE ADULT - SUBJECTIVE AND OBJECTIVE BOX
Attending:  Dr Adamson    HPI:  73 y/o M  with PMHx of Afib, CAD, ESRD (on dialysis Tu, Th, Sat), HLD, HTN, obesity, pre-diabetes, hx prostate cancer, bladder Ca s/p cystectomy and creation of [place] Pouch (Dr. Govea and Dr. Palacios ([phone]), s/p ventral hernia repair w/ mesh w/ Dr Adamson 10/2017 c/w abdominal wall abscess, s/p bedside drainage 10/2017, presented to ED with complaints of lower abdominal pain, pain present since 6 AM this morning, sharp, non radiating, pain located in periumbilical area, a/w nausea and vomiting, vomitus NBNB. Admits to BM yesterday, no flatus. Denies fever, chills, SOB or CP.       PAST MEDICAL & SURGICAL HISTORY:  History of DVT of lower extremity  Essential hypertension  ESRD (end stage renal disease) on dialysis  Pre-diabetes  CAD (coronary artery disease): LAD stent  PVD (peripheral vascular disease)  Obesity  Prostate cancer: prostate cancer  HTN (hypertension)  H/O abdominal surgery: 10/10/17  H/O cardiac catheterization: 4/25/2014 non obstructive disease  AV fistula: creation in right arm on 12/1/2015  AV fistula: h/o creation in 2005  History of prostatectomy      MEDICATIONS  (STANDING):  heparin  Injectable 5000 Unit(s) SubCutaneous every 8 hours    MEDICATIONS  (PRN):      Allergies    penicillin (Rash)    Intolerances        SOCIAL HISTORY:    FAMILY HISTORY:  Family history of hypertension in mother (Mother): HTN      Vital Signs Last 24 Hrs  T(C): 36.4 (01 Nov 2018 07:05), Max: 36.4 (01 Nov 2018 02:34)  T(F): 97.6 (01 Nov 2018 07:05), Max: 97.6 (01 Nov 2018 02:34)  HR: 94 (01 Nov 2018 07:05) (92 - 94)  BP: 175/85 (01 Nov 2018 07:05) (167/84 - 175/85)  BP(mean): --  RR: 18 (01 Nov 2018 07:05) (18 - 18)  SpO2: 98% (01 Nov 2018 07:05) (97% - 98%)    I&O's Summary      LABS:                        11.4   6.6   )-----------( 156      ( 01 Nov 2018 03:59 )             36.7     11-01    135  |  99  |  59<H>  ----------------------------<  93  5.1   |  27  |  9.64<H>    Ca    9.1      01 Nov 2018 03:59    TPro  9.3<H>  /  Alb  4.0  /  TBili  0.4  /  DBili  x   /  AST  13  /  ALT  17  /  AlkPhos  143<H>  11-01    PT/INR - ( 01 Nov 2018 03:59 )   PT: 11.5 sec;   INR: 1.03 ratio         PTT - ( 01 Nov 2018 03:59 )  PTT:29.6 sec    CAPILLARY BLOOD GLUCOSE        LIVER FUNCTIONS - ( 01 Nov 2018 03:59 )  Alb: 4.0 g/dL / Pro: 9.3 g/dL / ALK PHOS: 143 U/L / ALT: 17 U/L DA / AST: 13 U/L / GGT: x             RADIOLOGY & ADDITIONAL STUDIES:  < from: CT Abdomen and Pelvis w/ Oral Cont (11.01.18 @ 06:25) >  FINDINGS:    There is platelike atelectasis at the left lung base. There is a trace   right pleural effusion. The heart is mildly enlarged. There are coronary   artery atherosclerotic calcifications. Prominent intercostal vessels in   the left posterior thorax are similar to the prior exam.    The unenhanced liver is unremarkable. The gallbladder is normally   distended. There is cholelithiasis. There are no pericholecystic   inflammatory changes. There is no biliary ductal dilatation. The   unenhanced pancreas, spleen, and adrenal glands are unremarkable. The   kidneys are atrophic with multiple bilateral renal cysts and   subcentimeter low-attenuation lesions that are too small to characterize.   There is new mild to moderate left-sided hydroureteronephrosis to the   level of the ileal conduit. The bladder is surgically absent. There are   numerous surgical clips in the pelvis resulting in regional streak   artifact. There is a penile prosthesis reservoir in theleft lower   quadrant.    There is a small hiatus hernia. There is an ileocolonic anastomosis.   There are several small right para midline ventral abdominal wall hernias   containing small bowel loops adjacent to the right ileal conduit. The   most inferior right para midline ventral abdominal wall hernia contains a   short segment of dilated fecalized small bowel. Small bowel loops   proximal to this segment are mildly dilated and fluid-filled. There is   slight infiltration of the adjacent mesenteric fat. There is no   pneumoperitoneum or pneumatosis.    There is no significant abdominal lymphadenopathy. There is   atherosclerotic calcification of the aortoiliac tree. The abdominal aorta   is normal in caliber. There is an infrarenal IVC filter. There are   numerous venous collaterals within the inguinal region and ventral   abdominal wall subcutaneous fat.    The sacroiliac joints are partially fused. There is overall sclerosis of   the osseous structures there is fusion of the L3 through L5 vertebral   bodies. There are multilevel degenerative changes of the spine.    IMPRESSION:    Several small right para midline ventral abdominal wall hernias   containing small bowel loops adjacent to the right ileal conduit. Most   inferior right para midline ventral abdominal wall hernia contains a   short segment of dilated fecalized small bowel with mildly dilated   fluid-filled proximal small bowel loops consistent with a small bowel   obstruction secondary to the abdominal wall hernia.    New mild to moderate left-sided hydroureteronephrosis to the level of the   ileal conduit.    Cholelithiasis.    < end of copied text > Attending:  Dr Adamson    HPI:  71 y/o M  with PMHx of Afib, CAD, ESRD (on dialysis Tu, Th, Sat) s/p rt AVF, HLD, HTN, obesity, pre-diabetes, hx prostate cancer, bladder Ca s/p cystectomy and creation of [place] Pouch (Dr. Govea and Dr. Palacios ([phone]), s/p ventral hernia repair w/ mesh w/ Dr Adamson 10/2017 c/w abdominal wall abscess, s/p bedside drainage 10/2017, presented to ED with complaints of lower abdominal pain, pain present since 6 AM this morning, sharp, non radiating, pain located in periumbilical area, a/w nausea and vomiting, vomitus NBNB. Admits to BM yesterday, no flatus. Denies fever, chills, SOB or CP.       PAST MEDICAL & SURGICAL HISTORY:  History of DVT of lower extremity  Essential hypertension  ESRD (end stage renal disease) on dialysis  Pre-diabetes  CAD (coronary artery disease): LAD stent  PVD (peripheral vascular disease)  Obesity  Prostate cancer: prostate cancer  HTN (hypertension)  H/O abdominal surgery: 10/10/17  H/O cardiac catheterization: 4/25/2014 non obstructive disease  AV fistula: creation in right arm on 12/1/2015  AV fistula: h/o creation in 2005  History of prostatectomy      MEDICATIONS  (STANDING):  heparin  Injectable 5000 Unit(s) SubCutaneous every 8 hours    MEDICATIONS  (PRN):      Allergies    penicillin (Rash)    Intolerances        SOCIAL HISTORY:    FAMILY HISTORY:  Family history of hypertension in mother (Mother): HTN      Vital Signs Last 24 Hrs  T(C): 36.4 (01 Nov 2018 07:05), Max: 36.4 (01 Nov 2018 02:34)  T(F): 97.6 (01 Nov 2018 07:05), Max: 97.6 (01 Nov 2018 02:34)  HR: 94 (01 Nov 2018 07:05) (92 - 94)  BP: 175/85 (01 Nov 2018 07:05) (167/84 - 175/85)  BP(mean): --  RR: 18 (01 Nov 2018 07:05) (18 - 18)  SpO2: 98% (01 Nov 2018 07:05) (97% - 98%)    I&O's Summary      LABS:                        11.4   6.6   )-----------( 156      ( 01 Nov 2018 03:59 )             36.7     11-01    135  |  99  |  59<H>  ----------------------------<  93  5.1   |  27  |  9.64<H>    Ca    9.1      01 Nov 2018 03:59    TPro  9.3<H>  /  Alb  4.0  /  TBili  0.4  /  DBili  x   /  AST  13  /  ALT  17  /  AlkPhos  143<H>  11-01    PT/INR - ( 01 Nov 2018 03:59 )   PT: 11.5 sec;   INR: 1.03 ratio         PTT - ( 01 Nov 2018 03:59 )  PTT:29.6 sec    CAPILLARY BLOOD GLUCOSE        LIVER FUNCTIONS - ( 01 Nov 2018 03:59 )  Alb: 4.0 g/dL / Pro: 9.3 g/dL / ALK PHOS: 143 U/L / ALT: 17 U/L DA / AST: 13 U/L / GGT: x             RADIOLOGY & ADDITIONAL STUDIES:  < from: CT Abdomen and Pelvis w/ Oral Cont (11.01.18 @ 06:25) >  FINDINGS:    There is platelike atelectasis at the left lung base. There is a trace   right pleural effusion. The heart is mildly enlarged. There are coronary   artery atherosclerotic calcifications. Prominent intercostal vessels in   the left posterior thorax are similar to the prior exam.    The unenhanced liver is unremarkable. The gallbladder is normally   distended. There is cholelithiasis. There are no pericholecystic   inflammatory changes. There is no biliary ductal dilatation. The   unenhanced pancreas, spleen, and adrenal glands are unremarkable. The   kidneys are atrophic with multiple bilateral renal cysts and   subcentimeter low-attenuation lesions that are too small to characterize.   There is new mild to moderate left-sided hydroureteronephrosis to the   level of the ileal conduit. The bladder is surgically absent. There are   numerous surgical clips in the pelvis resulting in regional streak   artifact. There is a penile prosthesis reservoir in theleft lower   quadrant.    There is a small hiatus hernia. There is an ileocolonic anastomosis.   There are several small right para midline ventral abdominal wall hernias   containing small bowel loops adjacent to the right ileal conduit. The   most inferior right para midline ventral abdominal wall hernia contains a   short segment of dilated fecalized small bowel. Small bowel loops   proximal to this segment are mildly dilated and fluid-filled. There is   slight infiltration of the adjacent mesenteric fat. There is no   pneumoperitoneum or pneumatosis.    There is no significant abdominal lymphadenopathy. There is   atherosclerotic calcification of the aortoiliac tree. The abdominal aorta   is normal in caliber. There is an infrarenal IVC filter. There are   numerous venous collaterals within the inguinal region and ventral   abdominal wall subcutaneous fat.    The sacroiliac joints are partially fused. There is overall sclerosis of   the osseous structures there is fusion of the L3 through L5 vertebral   bodies. There are multilevel degenerative changes of the spine.    IMPRESSION:    Several small right para midline ventral abdominal wall hernias   containing small bowel loops adjacent to the right ileal conduit. Most   inferior right para midline ventral abdominal wall hernia contains a   short segment of dilated fecalized small bowel with mildly dilated   fluid-filled proximal small bowel loops consistent with a small bowel   obstruction secondary to the abdominal wall hernia.    New mild to moderate left-sided hydroureteronephrosis to the level of the   ileal conduit.    Cholelithiasis.    < end of copied text >

## 2018-11-01 NOTE — H&P ADULT - NSHPPHYSICALEXAM_GEN_ALL_CORE
Vital Signs Last 24 Hrs  T(C): 36.4 (01 Nov 2018 07:05), Max: 36.4 (01 Nov 2018 02:34)  T(F): 97.6 (01 Nov 2018 07:05), Max: 97.6 (01 Nov 2018 02:34)  HR: 94 (01 Nov 2018 07:05) (92 - 94)  BP: 175/85 (01 Nov 2018 07:05) (167/84 - 175/85)  RR: 18 (01 Nov 2018 07:05) (18 - 18)  SpO2: 98% (01 Nov 2018 07:05) (97% - 98%)    ________________________________________________  PHYSICAL EXAM:  GENERAL: NAD  HEENT: Normocephalic;  conjunctivae and sclerae clear; moist mucous membranes;   NECK : supple, no JVD  CHEST/LUNG: Clear to auscultation bilaterally with good air entry   HEART: S1 S2  regular; no murmurs, gallops or rubs  ABDOMEN: Soft, mild tenderness approx 2 cm right of umbilicus with hard, palpable mass, Nondistended; Bowel sounds present  EXTREMITIES: no cyanosis; no edema; no calf tenderness  NERVOUS SYSTEM:  Awake and alert; Oriented  to place, person and time ; no new deficits  SKIN: shiny, scaly skin on bilateral LE  VASCULAR: DP and PT pulses palpable, right AV fistula with +thrill, + bruit

## 2018-11-02 ENCOUNTER — RESULT REVIEW (OUTPATIENT)
Age: 73
End: 2018-11-02

## 2018-11-02 ENCOUNTER — APPOINTMENT (OUTPATIENT)
Dept: SURGERY | Facility: HOSPITAL | Age: 73
End: 2018-11-02

## 2018-11-02 LAB
24R-OH-CALCIDIOL SERPL-MCNC: 27.3 NG/ML — LOW (ref 30–80)
ANION GAP SERPL CALC-SCNC: 10 MMOL/L — SIGNIFICANT CHANGE UP (ref 5–17)
BASOPHILS # BLD AUTO: 0 K/UL — SIGNIFICANT CHANGE UP (ref 0–0.2)
BASOPHILS NFR BLD AUTO: 0.6 % — SIGNIFICANT CHANGE UP (ref 0–2)
BUN SERPL-MCNC: 50 MG/DL — HIGH (ref 7–18)
CALCIUM SERPL-MCNC: 9 MG/DL — SIGNIFICANT CHANGE UP (ref 8.4–10.5)
CALCIUM SERPL-MCNC: 9.1 MG/DL — SIGNIFICANT CHANGE UP (ref 8.4–10.5)
CHLORIDE SERPL-SCNC: 96 MMOL/L — SIGNIFICANT CHANGE UP (ref 96–108)
CHOLEST SERPL-MCNC: 174 MG/DL — SIGNIFICANT CHANGE UP (ref 10–199)
CO2 SERPL-SCNC: 30 MMOL/L — SIGNIFICANT CHANGE UP (ref 22–31)
CREAT SERPL-MCNC: 8.93 MG/DL — HIGH (ref 0.5–1.3)
EOSINOPHIL # BLD AUTO: 0 K/UL — SIGNIFICANT CHANGE UP (ref 0–0.5)
EOSINOPHIL NFR BLD AUTO: 0.5 % — SIGNIFICANT CHANGE UP (ref 0–6)
GLUCOSE SERPL-MCNC: 107 MG/DL — HIGH (ref 70–99)
HAV IGM SER-ACNC: SIGNIFICANT CHANGE UP
HBV CORE IGM SER-ACNC: SIGNIFICANT CHANGE UP
HBV SURFACE AG SER-ACNC: SIGNIFICANT CHANGE UP
HCT VFR BLD CALC: 38 % — LOW (ref 39–50)
HCV AB S/CO SERPL IA: 0.16 S/CO — SIGNIFICANT CHANGE UP
HCV AB SERPL-IMP: SIGNIFICANT CHANGE UP
HDLC SERPL-MCNC: 85 MG/DL — SIGNIFICANT CHANGE UP
HGB BLD-MCNC: 12 G/DL — LOW (ref 13–17)
LIPID PNL WITH DIRECT LDL SERPL: 79 MG/DL — SIGNIFICANT CHANGE UP
LYMPHOCYTES # BLD AUTO: 0.7 K/UL — LOW (ref 1–3.3)
LYMPHOCYTES # BLD AUTO: 7.7 % — LOW (ref 13–44)
MCHC RBC-ENTMCNC: 27.2 PG — SIGNIFICANT CHANGE UP (ref 27–34)
MCHC RBC-ENTMCNC: 31.5 GM/DL — LOW (ref 32–36)
MCV RBC AUTO: 86.4 FL — SIGNIFICANT CHANGE UP (ref 80–100)
MONOCYTES # BLD AUTO: 0.7 K/UL — SIGNIFICANT CHANGE UP (ref 0–0.9)
MONOCYTES NFR BLD AUTO: 8.6 % — SIGNIFICANT CHANGE UP (ref 2–14)
NEUTROPHILS # BLD AUTO: 7.2 K/UL — SIGNIFICANT CHANGE UP (ref 1.8–7.4)
NEUTROPHILS NFR BLD AUTO: 82.6 % — HIGH (ref 43–77)
PLATELET # BLD AUTO: 153 K/UL — SIGNIFICANT CHANGE UP (ref 150–400)
POTASSIUM SERPL-MCNC: 4.6 MMOL/L — SIGNIFICANT CHANGE UP (ref 3.5–5.3)
POTASSIUM SERPL-SCNC: 4.6 MMOL/L — SIGNIFICANT CHANGE UP (ref 3.5–5.3)
PTH-INTACT FLD-MCNC: 261 PG/ML — HIGH (ref 15–65)
RBC # BLD: 4.4 M/UL — SIGNIFICANT CHANGE UP (ref 4.2–5.8)
RBC # FLD: 15.9 % — HIGH (ref 10.3–14.5)
SODIUM SERPL-SCNC: 136 MMOL/L — SIGNIFICANT CHANGE UP (ref 135–145)
TOTAL CHOLESTEROL/HDL RATIO MEASUREMENT: 2 RATIO — LOW (ref 3.4–9.6)
TRIGL SERPL-MCNC: 49 MG/DL — SIGNIFICANT CHANGE UP (ref 10–149)
VIT B12 SERPL-MCNC: 434 PG/ML — SIGNIFICANT CHANGE UP (ref 232–1245)
WBC # BLD: 8.7 K/UL — SIGNIFICANT CHANGE UP (ref 3.8–10.5)
WBC # FLD AUTO: 8.7 K/UL — SIGNIFICANT CHANGE UP (ref 3.8–10.5)

## 2018-11-02 PROCEDURE — 49568: CPT | Mod: AS

## 2018-11-02 PROCEDURE — 49566: CPT

## 2018-11-02 PROCEDURE — 49566: CPT | Mod: AS

## 2018-11-02 PROCEDURE — 49568: CPT

## 2018-11-02 RX ORDER — MORPHINE SULFATE 50 MG/1
4 CAPSULE, EXTENDED RELEASE ORAL EVERY 6 HOURS
Qty: 0 | Refills: 0 | Status: DISCONTINUED | OUTPATIENT
Start: 2018-11-02 | End: 2018-11-03

## 2018-11-02 RX ORDER — LABETALOL HCL 100 MG
5 TABLET ORAL EVERY 6 HOURS
Qty: 0 | Refills: 0 | Status: DISCONTINUED | OUTPATIENT
Start: 2018-11-02 | End: 2018-11-02

## 2018-11-02 RX ORDER — ERYTHROPOIETIN 10000 [IU]/ML
4000 INJECTION, SOLUTION INTRAVENOUS; SUBCUTANEOUS
Qty: 0 | Refills: 0 | Status: DISCONTINUED | OUTPATIENT
Start: 2018-11-02 | End: 2018-11-02

## 2018-11-02 RX ORDER — ISOSORBIDE MONONITRATE 60 MG/1
20 TABLET, EXTENDED RELEASE ORAL DAILY
Qty: 0 | Refills: 0 | Status: DISCONTINUED | OUTPATIENT
Start: 2018-11-02 | End: 2018-11-03

## 2018-11-02 RX ORDER — DOXERCALCIFEROL 2.5 UG/1
8 CAPSULE ORAL
Qty: 0 | Refills: 0 | Status: DISCONTINUED | OUTPATIENT
Start: 2018-11-02 | End: 2018-11-02

## 2018-11-02 RX ORDER — LABETALOL HCL 100 MG
5 TABLET ORAL EVERY 6 HOURS
Qty: 0 | Refills: 0 | Status: DISCONTINUED | OUTPATIENT
Start: 2018-11-02 | End: 2018-11-03

## 2018-11-02 RX ORDER — PANTOPRAZOLE SODIUM 20 MG/1
40 TABLET, DELAYED RELEASE ORAL DAILY
Qty: 0 | Refills: 0 | Status: DISCONTINUED | OUTPATIENT
Start: 2018-11-02 | End: 2018-11-04

## 2018-11-02 RX ORDER — PARICALCITOL 2 UG/1
17 CAPSULE, GELATIN COATED ORAL
Qty: 0 | Refills: 0 | Status: DISCONTINUED | OUTPATIENT
Start: 2018-11-02 | End: 2018-11-04

## 2018-11-02 RX ORDER — HEPARIN SODIUM 5000 [USP'U]/ML
5000 INJECTION INTRAVENOUS; SUBCUTANEOUS EVERY 8 HOURS
Qty: 0 | Refills: 0 | Status: DISCONTINUED | OUTPATIENT
Start: 2018-11-02 | End: 2018-11-04

## 2018-11-02 RX ORDER — ACETAMINOPHEN 500 MG
1000 TABLET ORAL ONCE
Qty: 0 | Refills: 0 | Status: DISCONTINUED | OUTPATIENT
Start: 2018-11-02 | End: 2018-11-04

## 2018-11-02 RX ORDER — SODIUM CHLORIDE 9 MG/ML
1000 INJECTION INTRAMUSCULAR; INTRAVENOUS; SUBCUTANEOUS
Qty: 0 | Refills: 0 | Status: DISCONTINUED | OUTPATIENT
Start: 2018-11-02 | End: 2018-11-02

## 2018-11-02 RX ORDER — ONDANSETRON 8 MG/1
4 TABLET, FILM COATED ORAL ONCE
Qty: 0 | Refills: 0 | Status: DISCONTINUED | OUTPATIENT
Start: 2018-11-02 | End: 2018-11-02

## 2018-11-02 RX ORDER — OXYCODONE AND ACETAMINOPHEN 5; 325 MG/1; MG/1
1 TABLET ORAL EVERY 6 HOURS
Qty: 0 | Refills: 0 | Status: DISCONTINUED | OUTPATIENT
Start: 2018-11-02 | End: 2018-11-04

## 2018-11-02 RX ORDER — HYDROMORPHONE HYDROCHLORIDE 2 MG/ML
0.5 INJECTION INTRAMUSCULAR; INTRAVENOUS; SUBCUTANEOUS
Qty: 0 | Refills: 0 | Status: DISCONTINUED | OUTPATIENT
Start: 2018-11-02 | End: 2018-11-02

## 2018-11-02 RX ADMIN — MORPHINE SULFATE 4 MILLIGRAM(S): 50 CAPSULE, EXTENDED RELEASE ORAL at 17:59

## 2018-11-02 RX ADMIN — HEPARIN SODIUM 5000 UNIT(S): 5000 INJECTION INTRAVENOUS; SUBCUTANEOUS at 18:00

## 2018-11-02 RX ADMIN — HEPARIN SODIUM 5000 UNIT(S): 5000 INJECTION INTRAVENOUS; SUBCUTANEOUS at 05:14

## 2018-11-02 RX ADMIN — PANTOPRAZOLE SODIUM 40 MILLIGRAM(S): 20 TABLET, DELAYED RELEASE ORAL at 18:00

## 2018-11-02 RX ADMIN — MORPHINE SULFATE 4 MILLIGRAM(S): 50 CAPSULE, EXTENDED RELEASE ORAL at 18:15

## 2018-11-02 RX ADMIN — Medication 5 MILLIGRAM(S): at 18:00

## 2018-11-02 RX ADMIN — Medication 5 MILLIGRAM(S): at 05:14

## 2018-11-02 NOTE — PROGRESS NOTE ADULT - ASSESSMENT
S/p repair of recurrent incarcerated incisional hernia repair 11/2, SBO  1. NPO  2. IV fluids  3. Incentive spirometry  4. DVT prophylaxis  5. HD in AM  6. OOB to chair in AM

## 2018-11-02 NOTE — PROGRESS NOTE ADULT - ASSESSMENT
Patient admitted for further evaluation/management of possible incarcerated hernia causing bowel obstruction, possible UTI    EKG: sinus rhythm with bifascicular block - unchanged from previous EKG

## 2018-11-02 NOTE — BRIEF OPERATIVE NOTE - PROCEDURE
<<-----Click on this checkbox to enter Procedure Incisional hernia repair with mesh  11/02/2018    [facility]  [facility]

## 2018-11-02 NOTE — PROGRESS NOTE ADULT - SUBJECTIVE AND OBJECTIVE BOX
[facility] NEPHROLOGY- PROGRESS NOTE    72y Male with history of ESRD on HD presents with SBO secondary to incarcerated hernia. Nephrology consulted for ESRD status.    REVIEW OF SYSTEMS:  Gen: no changes in weight  Cards: no chest pain  Resp: no dyspnea  GI: no nausea or vomiting or diarrhea  Vascular: no LE edema    penicillin (Rash)      Hospital Medications: Medications reviewed    VITALS:  T(F): 97.7 (11-02-18 @ 12:07), Max: 99.1 (11-02-18 @ 04:36)  HR: 81 (11-02-18 @ 12:07)  BP: 168/68 (11-02-18 @ 12:07)  RR: 17 (11-02-18 @ 12:07)  SpO2: 95% (11-02-18 @ 12:07)  Wt(kg): --  Height (cm): 182.88 (11-02 @ 07:57)  Weight (kg): 106.7 (11-02 @ 07:57)  BMI (kg/m2): 31.9 (11-02 @ 07:57)  BSA (m2): 2.28 (11-02 @ 07:57)    11-01 @ 07:01  -  11-02 @ 07:00  --------------------------------------------------------  IN: 0 mL / OUT: 100 mL / NET: -100 mL    11-02 @ 07:01  -  11-02 @ 15:30  --------------------------------------------------------  IN: 250 mL / OUT: 150 mL / NET: 100 mL        PHYSICAL EXAM:    Gen: NAD, calm  Cards: RRR, +S1/S2, no M/G/R  Resp: CTA B/L  GI: soft, NT/ND, NABS  Vascular: no LE edema B/L, RUE AVF + bruit/thrill    LABS:  11-02    136  |  96  |  50<H>  ----------------------------<  107<H>  4.6   |  30  |  8.93<H>    Ca    9.0      02 Nov 2018 08:16  Phos  5.7     11-01  Mg     2.4     11-01    TPro  9.3<H>  /  Alb  4.0  /  TBili  0.4  /  DBili      /  AST  13  /  ALT  17  /  AlkPhos  143<H>  11-01    Creatinine Trend: 8.93 <--, 9.64 <--                        12.0   8.7   )-----------( 153      ( 02 Nov 2018 08:16 )             38.0

## 2018-11-02 NOTE — PROGRESS NOTE ADULT - PROBLEM SELECTOR PLAN 2
BP uncontrolled as patient has not been receiving labetalol as ordered. Please give labetalol as ordered and hold SBP < 160. Can start oral CCB once diet started if BP remains elevated. Monitor BP.

## 2018-11-02 NOTE — PROGRESS NOTE ADULT - PROBLEM SELECTOR PLAN 1
secondary to incarcerated hernia   S/p repair earlier today   Keep NPO for now   Incentive spirometry

## 2018-11-02 NOTE — PROGRESS NOTE ADULT - PROBLEM SELECTOR PLAN 4
Phosphorus and iPTH acceptable. Continue with zemplar 17 mcg with HD. Restart renagel with meals once diet restarted. Monitor serum calcium and phosphorus.

## 2018-11-02 NOTE — PROGRESS NOTE ADULT - PROBLEM SELECTOR PLAN 1
Last HD on 11/1 tolerated well with 2.5L removed. Plan for next maintenance HD on 11/3. Monitor electrolytes. Recommend urology evaluation given hydronephrosis with h/o ileal conduit.

## 2018-11-02 NOTE — PROGRESS NOTE ADULT - SUBJECTIVE AND OBJECTIVE BOX
Surgery Post-Operative Note    Subjective:  Patient seen at bedside  C/o pain at surgical site  NO other complaints  Denies nausea or vomiting    T(C): 36.5 (11-02-18 @ 12:07), Max: 37.3 (11-02-18 @ 04:36)  HR: 81 (11-02-18 @ 12:07) (74 - 105)  BP: 168/68 (11-02-18 @ 12:07) (136/81 - 175/95)  RR: 17 (11-02-18 @ 12:07) (17 - 21)  SpO2: 95% (11-02-18 @ 12:07) (95% - 100%)  Wt(kg): --    Physical Exam:    Gen: awake, alert oriented NAD  HEENT: anicteric  Abd: surgical wound dressed, incisional tenderness, abd soft, not distended  Ext: warm to touch no c/c/e    MEDICATIONS  (STANDING):  heparin  Injectable 5000 Unit(s) SubCutaneous every 8 hours  isosorbide    mononitrate Tablet (ISMO) 20 milliGRAM(s) Oral daily  labetalol Injectable 5 milliGRAM(s) IV Push every 6 hours  pantoprazole  Injectable 40 milliGRAM(s) IV Push daily  paricalcitol Injectable 17 MICROGram(s) IV Push <User Schedule>    MEDICATIONS  (PRN):          I&O's Detail    01 Nov 2018 07:01  -  02 Nov 2018 07:00  --------------------------------------------------------  IN:  Total IN: 0 mL    OUT:    Nasoenteral Tube: 100 mL  Total OUT: 100 mL    Total NET: -100 mL      02 Nov 2018 07:01  -  02 Nov 2018 17:00  --------------------------------------------------------  IN:    0.9% NaCl: 250 mL  Total IN: 250 mL    OUT:    Indwelling Catheter - Suprapubic: 150 mL  Total OUT: 150 mL    Total NET: 100 mL

## 2018-11-02 NOTE — PROGRESS NOTE ADULT - SUBJECTIVE AND OBJECTIVE BOX
Patient is a 72y old  Male who presents with a chief complaint of weakness, abdominal pain (02 Nov 2018 15:30)      INTERVAL HPI/OVERNIGHT EVENTS: seen and examined at bedside. s/p hernia repair earlier on. Denies any pain.      MEDICATIONS  (STANDING):  heparin  Injectable 5000 Unit(s) SubCutaneous every 8 hours  isosorbide    mononitrate Tablet (ISMO) 20 milliGRAM(s) Oral daily  labetalol Injectable 5 milliGRAM(s) IV Push every 6 hours  pantoprazole  Injectable 40 milliGRAM(s) IV Push daily  paricalcitol Injectable 17 MICROGram(s) IV Push <User Schedule>    MEDICATIONS  (PRN):      Allergies    penicillin (Rash)    Intolerances        REVIEW OF SYSTEMS:  CONSTITUTIONAL: No fever, weight loss, or fatigue  RESPIRATORY: No cough, wheezing, chills or hemoptysis; No shortness of breath  CARDIOVASCULAR: No chest pain, palpitations, dizziness, or leg swelling  GASTROINTESTINAL: No abdominal or epigastric pain. No nausea, vomiting, or hematemesis; No diarrhea or constipation. No melena or hematochezia.  NEUROLOGICAL: No headaches, memory loss, loss of strength, numbness, or tremors  MUSCULOSKELETAL: No joint pain or swelling; No muscle, back, or extremity pain      Vital Signs Last 24 Hrs  T(C): 36.5 (02 Nov 2018 12:07), Max: 37.3 (02 Nov 2018 04:36)  T(F): 97.7 (02 Nov 2018 12:07), Max: 99.1 (02 Nov 2018 04:36)  HR: 81 (02 Nov 2018 12:07) (74 - 105)  BP: 168/68 (02 Nov 2018 12:07) (136/81 - 175/95)  BP(mean): 94 (02 Nov 2018 11:19) (86 - 113)  RR: 17 (02 Nov 2018 12:07) (17 - 21)  SpO2: 95% (02 Nov 2018 12:07) (95% - 100%)    PHYSICAL EXAM:  GENERAL: NAD, well-groomed, well-developed, laying in bed   HEAD:  Atraumatic, Normocephalic  EYES: conjunctiva and sclera clear  NECK: Supple, No JVD, Normal thyroid  NERVOUS SYSTEM:  Alert & Oriented X2, No gross focal deficits  CHEST/LUNG: Clear to percussion bilaterally; No rales, rhonchi, wheezing, or rubs  HEART: Regular rate and rhythm; No murmurs, rubs, or gallops  ABDOMEN: mildly tender, large dressing in place, suprapubic catheter   EXTREMITIES:  No clubbing, cyanosis, or edema, AVF on both arms   SKIN: No rashes or lesions    LABS:                        12.0   8.7   )-----------( 153      ( 02 Nov 2018 08:16 )             38.0     11-02    136  |  96  |  50<H>  ----------------------------<  107<H>  4.6   |  30  |  8.93<H>    Ca    9.0      02 Nov 2018 08:16  Phos  5.7     11-01  Mg     2.4     11-01    TPro  9.3<H>  /  Alb  4.0  /  TBili  0.4  /  DBili  x   /  AST  13  /  ALT  17  /  AlkPhos  143<H>  11-01    PT/INR - ( 01 Nov 2018 03:59 )   PT: 11.5 sec;   INR: 1.03 ratio         PTT - ( 01 Nov 2018 03:59 )  PTT:29.6 sec    CAPILLARY BLOOD GLUCOSE          RADIOLOGY & ADDITIONAL TESTS:    Imaging Personally Reviewed:  [ ] YES  [ ] NO    Consultant(s) Notes Reviewed:  [ ] YES  [ ] NO    Care Discussed with Consultants/Other Providers [ ] YES  [ ] NO    Plan of Care discussed with Housestaff [ ]YES [ ] NO

## 2018-11-02 NOTE — PROGRESS NOTE ADULT - ASSESSMENT
72y Male with history of ESRD on HD presents with SBO secondary to incarcerated hernia. Nephrology consulted for ESRD status.

## 2018-11-02 NOTE — PROGRESS NOTE ADULT - ATTENDING COMMENTS
[facility] NEPHROLOGY  Armando Shaffer M.D.  Kendall Morales D.O.  Lilliam Cody M.D.  Oxana Hernandez, MSN, ANP-C    Telephone: [phone]  Facsimile: [phone]    [address] [place] [address].

## 2018-11-02 NOTE — BRIEF OPERATIVE NOTE - OPERATION/FINDINGS
Hernia of portion of small bowel and large intestine, with thickened defect. [place] pouch stoma noted. ? purulence

## 2018-11-02 NOTE — PROGRESS NOTE ADULT - PROBLEM SELECTOR PLAN 5
BP borderline elevated   No BP meds were given today   c/w IV labetalol for now  continue to monitor blood pressure

## 2018-11-03 LAB
ANION GAP SERPL CALC-SCNC: 10 MMOL/L — SIGNIFICANT CHANGE UP (ref 5–17)
BASOPHILS # BLD AUTO: 0 K/UL — SIGNIFICANT CHANGE UP (ref 0–0.2)
BASOPHILS NFR BLD AUTO: 0.6 % — SIGNIFICANT CHANGE UP (ref 0–2)
BUN SERPL-MCNC: 64 MG/DL — HIGH (ref 7–18)
CALCIUM SERPL-MCNC: 8.5 MG/DL — SIGNIFICANT CHANGE UP (ref 8.4–10.5)
CHLORIDE SERPL-SCNC: 97 MMOL/L — SIGNIFICANT CHANGE UP (ref 96–108)
CO2 SERPL-SCNC: 28 MMOL/L — SIGNIFICANT CHANGE UP (ref 22–31)
CREAT SERPL-MCNC: 10.7 MG/DL — HIGH (ref 0.5–1.3)
EOSINOPHIL # BLD AUTO: 0.1 K/UL — SIGNIFICANT CHANGE UP (ref 0–0.5)
EOSINOPHIL NFR BLD AUTO: 2.1 % — SIGNIFICANT CHANGE UP (ref 0–6)
GLUCOSE SERPL-MCNC: 83 MG/DL — SIGNIFICANT CHANGE UP (ref 70–99)
HCT VFR BLD CALC: 35 % — LOW (ref 39–50)
HGB BLD-MCNC: 10.8 G/DL — LOW (ref 13–17)
LYMPHOCYTES # BLD AUTO: 0.6 K/UL — LOW (ref 1–3.3)
LYMPHOCYTES # BLD AUTO: 11.2 % — LOW (ref 13–44)
MCHC RBC-ENTMCNC: 26.8 PG — LOW (ref 27–34)
MCHC RBC-ENTMCNC: 30.8 GM/DL — LOW (ref 32–36)
MCV RBC AUTO: 86.9 FL — SIGNIFICANT CHANGE UP (ref 80–100)
MONOCYTES # BLD AUTO: 0.8 K/UL — SIGNIFICANT CHANGE UP (ref 0–0.9)
MONOCYTES NFR BLD AUTO: 14.6 % — HIGH (ref 2–14)
NEUTROPHILS # BLD AUTO: 4.1 K/UL — SIGNIFICANT CHANGE UP (ref 1.8–7.4)
NEUTROPHILS NFR BLD AUTO: 71.5 % — SIGNIFICANT CHANGE UP (ref 43–77)
PHOSPHATE SERPL-MCNC: 7.4 MG/DL — HIGH (ref 2.5–4.5)
PLATELET # BLD AUTO: 123 K/UL — LOW (ref 150–400)
POTASSIUM SERPL-MCNC: 5 MMOL/L — SIGNIFICANT CHANGE UP (ref 3.5–5.3)
POTASSIUM SERPL-SCNC: 5 MMOL/L — SIGNIFICANT CHANGE UP (ref 3.5–5.3)
RBC # BLD: 4.03 M/UL — LOW (ref 4.2–5.8)
RBC # FLD: 16.2 % — HIGH (ref 10.3–14.5)
SODIUM SERPL-SCNC: 135 MMOL/L — SIGNIFICANT CHANGE UP (ref 135–145)
WBC # BLD: 5.8 K/UL — SIGNIFICANT CHANGE UP (ref 3.8–10.5)
WBC # FLD AUTO: 5.8 K/UL — SIGNIFICANT CHANGE UP (ref 3.8–10.5)

## 2018-11-03 RX ORDER — AMLODIPINE BESYLATE 2.5 MG/1
5 TABLET ORAL DAILY
Qty: 0 | Refills: 0 | Status: DISCONTINUED | OUTPATIENT
Start: 2018-11-03 | End: 2018-11-04

## 2018-11-03 RX ORDER — SEVELAMER CARBONATE 2400 MG/1
1600 POWDER, FOR SUSPENSION ORAL
Qty: 0 | Refills: 0 | Status: DISCONTINUED | OUTPATIENT
Start: 2018-11-03 | End: 2018-11-04

## 2018-11-03 RX ORDER — ISOSORBIDE MONONITRATE 60 MG/1
20 TABLET, EXTENDED RELEASE ORAL
Qty: 0 | Refills: 0 | Status: DISCONTINUED | OUTPATIENT
Start: 2018-11-03 | End: 2018-11-03

## 2018-11-03 RX ORDER — ISOSORBIDE MONONITRATE 60 MG/1
20 TABLET, EXTENDED RELEASE ORAL DAILY
Qty: 0 | Refills: 0 | Status: DISCONTINUED | OUTPATIENT
Start: 2018-11-03 | End: 2018-11-03

## 2018-11-03 RX ADMIN — HEPARIN SODIUM 5000 UNIT(S): 5000 INJECTION INTRAVENOUS; SUBCUTANEOUS at 05:55

## 2018-11-03 RX ADMIN — PANTOPRAZOLE SODIUM 40 MILLIGRAM(S): 20 TABLET, DELAYED RELEASE ORAL at 17:32

## 2018-11-03 RX ADMIN — PARICALCITOL 17 MICROGRAM(S): 2 CAPSULE, GELATIN COATED ORAL at 14:38

## 2018-11-03 RX ADMIN — SEVELAMER CARBONATE 1600 MILLIGRAM(S): 2400 POWDER, FOR SUSPENSION ORAL at 17:32

## 2018-11-03 RX ADMIN — Medication 5 MILLIGRAM(S): at 05:53

## 2018-11-03 RX ADMIN — HEPARIN SODIUM 5000 UNIT(S): 5000 INJECTION INTRAVENOUS; SUBCUTANEOUS at 17:32

## 2018-11-03 RX ADMIN — AMLODIPINE BESYLATE 5 MILLIGRAM(S): 2.5 TABLET ORAL at 17:33

## 2018-11-03 RX ADMIN — Medication 5 MILLIGRAM(S): at 00:24

## 2018-11-03 NOTE — PROGRESS NOTE ADULT - SUBJECTIVE AND OBJECTIVE BOX
[facility] NEPHROLOGY- PROGRESS NOTE, Follow up     Patient is a 72y Male with history of ESRD on HD presents with SBO secondary to incarcerated hernia. Nephrology consulted for ESRD status.    REVIEW OF SYSTEMS:  Gen: no changes in weight  Cards: no chest pain  Resp: no dyspnea  GI: no nausea or vomiting or diarrhea  Vascular: no LE edema      Allergy:  penicillin (Rash)    Hospital Medications:   MEDICATIONS  (STANDING):  acetaminophen  IVPB .. 1000 milliGRAM(s) IV Intermittent once  amLODIPine   Tablet 5 milliGRAM(s) Oral daily  heparin  Injectable 5000 Unit(s) SubCutaneous every 8 hours  isosorbide    mononitrate Tablet (ISMO) 20 milliGRAM(s) Oral daily  pantoprazole  Injectable 40 milliGRAM(s) IV Push daily  paricalcitol Injectable 17 MICROGram(s) IV Push <User Schedule>  sevelamer hydrochloride 1600 milliGRAM(s) Oral three times a day with meals      VITALS:  T(F): 97.9 (11-03-18 @ 15:50), Max: 100 (11-03-18 @ 05:29)  HR: 93 (11-03-18 @ 15:50)  BP: 125/57 (11-03-18 @ 15:50)  RR: 17 (11-03-18 @ 15:50)  SpO2: 97% (11-03-18 @ 15:50)  Wt(kg): --    11-02 @ 07:01  -  11-03 @ 07:00  --------------------------------------------------------  IN: 250 mL / OUT: 250 mL / NET: 0 mL        PHYSICAL EXAM:  Gen: NAD, calm  Cards: RRR, +S1/S2, no M/G/R  Resp: CTA B/L  GI: soft, NT/ND, NABS  Vascular: no LE edema B/L, RUE AVF + bruit/thrill    LABS:  11-03    135  |  97  |  64<H>  ----------------------------<  83  5.0   |  28  |  10.70<H>    Ca    8.5      03 Nov 2018 06:09  Phos  7.4     11-03      Creatinine Trend: 10.70 <--, 8.93 <--, 9.64 <--                        10.8   5.8   )-----------( 123      ( 03 Nov 2018 06:09 )             35.0     Urine Studies:      RADIOLOGY & ADDITIONAL STUDIES:

## 2018-11-03 NOTE — PROGRESS NOTE ADULT - SUBJECTIVE AND OBJECTIVE BOX
INTERVAL HPI/OVERNIGHT EVENTS:    Pt s/p recurr incarcerted incisional hernia rpr w/mesh 11/2, seen and examined at bedside. Admits to incisional pain, no nausea or vomiting. +flatus, no BM.      Vital Signs Last 24 Hrs  T(C): 36.8 (03 Nov 2018 12:20), Max: 37.8 (03 Nov 2018 05:29)  T(F): 98.2 (03 Nov 2018 12:20), Max: 100 (03 Nov 2018 05:29)  HR: 92 (03 Nov 2018 12:20) (87 - 95)  BP: 151/64 (03 Nov 2018 12:20) (151/64 - 169/79)  BP(mean): --  RR: 16 (03 Nov 2018 12:20) (16 - 18)  SpO2: 98% (03 Nov 2018 12:20) (95% - 98%)  I&O's Detail    02 Nov 2018 07:01  -  03 Nov 2018 07:00  --------------------------------------------------------  IN:    0.9% NaCl: 250 mL  Total IN: 250 mL    OUT:    Indwelling Catheter - Suprapubic: 250 mL  Total OUT: 250 mL    Total NET: 0 mL        pantoprazole  Injectable 40 milliGRAM(s) IV Push daily      Physical Exam  General: AAOx3, No acute distress  Skin: No jaundice, no icterus  Abdomen: obese, soft,  nondistended, incisional TTP, dressing c/d/i, no rebound tenderness, no guarding, no palpable masses, SPT in place           Labs:                        10.8   5.8   )-----------( 123      ( 03 Nov 2018 06:09 )             35.0     11-03    135  |  97  |  64<H>  ----------------------------<  83  5.0   |  28  |  10.70<H>    Ca    8.5      03 Nov 2018 06:09  Phos  7.4     11-03

## 2018-11-03 NOTE — PROGRESS NOTE ADULT - PROBLEM SELECTOR PLAN 4
Phosphorus and iPTH acceptable. Continue with zemplar 17 mcg with HD. Restart renagel with meals once diet restarted. Monitor serum calcium and phosphorus. Phosphorus and iPTH acceptable. Continue with zemplar 17 mcg with HD. Restart renagel 1600 mg TID with meals. Monitor serum calcium and phosphorus.

## 2018-11-03 NOTE — PROGRESS NOTE ADULT - PROBLEM SELECTOR PLAN 1
Last HD on 11/1 tolerated well with 2.5L removed. Plan for next maintenance HD on 11/3. Monitor electrolytes. Recommend urology evaluation given hydronephrosis with h/o ileal conduit. Last HD on 11/1 tolerated well with 2.5L removed. Plan for next maintenance HD today. Monitor electrolytes. Recommend urology evaluation given hydronephrosis with h/o ileal conduit. D/C IV morphine as unsafe in ESRD.

## 2018-11-03 NOTE — PROGRESS NOTE ADULT - PROBLEM SELECTOR PLAN 2
BP on admission and yesterday uncontrolled 2/2 patient with non adherence to outpatient medication regimen, abd. pain and not receiving Labetalol as ordered.   Currently BP within goal . Monitor BP.  Amlodipine 5mg ordered today (has not received at present)  as patient diet advanced to clears. BP uncontrolled. Change IV labetalol to PO amlodipine and titrate as needed. Change diet to low sodium. Monitor BP.

## 2018-11-03 NOTE — PROGRESS NOTE ADULT - ATTENDING COMMENTS
[facility] NEPHROLOGY  Armando Shaffer M.D.  Kendall Morales D.O.  Lilliam Cody M.D.  Oxana Hernandez, MSN, ANP-C    Telephone: [phone]  Facsimile: [phone]    [address] [place] [address].

## 2018-11-03 NOTE — PROGRESS NOTE ADULT - ASSESSMENT
71 y/o s/p recurr incarcerted incisional hernia rpr w/mesh 11/2    -Advance diet to clears  -Pain medication PRN  -OOB/Ambulate   -DVT ppx   -C/w Home medication   -HD as per renal

## 2018-11-04 ENCOUNTER — TRANSCRIPTION ENCOUNTER (OUTPATIENT)
Age: 73
End: 2018-11-04

## 2018-11-04 VITALS
SYSTOLIC BLOOD PRESSURE: 153 MMHG | TEMPERATURE: 99 F | OXYGEN SATURATION: 100 % | HEART RATE: 94 BPM | DIASTOLIC BLOOD PRESSURE: 82 MMHG | RESPIRATION RATE: 16 BRPM

## 2018-11-04 LAB
-  AMIKACIN: SIGNIFICANT CHANGE UP
-  AMOXICILLIN/CLAVULANIC ACID: SIGNIFICANT CHANGE UP
-  AMPICILLIN/SULBACTAM: SIGNIFICANT CHANGE UP
-  AMPICILLIN: SIGNIFICANT CHANGE UP
-  AZTREONAM: SIGNIFICANT CHANGE UP
-  CEFAZOLIN: SIGNIFICANT CHANGE UP
-  CEFEPIME: SIGNIFICANT CHANGE UP
-  CEFOXITIN: SIGNIFICANT CHANGE UP
-  CEFTRIAXONE: SIGNIFICANT CHANGE UP
-  CIPROFLOXACIN: SIGNIFICANT CHANGE UP
-  ERTAPENEM: SIGNIFICANT CHANGE UP
-  GENTAMICIN: SIGNIFICANT CHANGE UP
-  IMIPENEM: SIGNIFICANT CHANGE UP
-  LEVOFLOXACIN: SIGNIFICANT CHANGE UP
-  MEROPENEM: SIGNIFICANT CHANGE UP
-  PIPERACILLIN/TAZOBACTAM: SIGNIFICANT CHANGE UP
-  TOBRAMYCIN: SIGNIFICANT CHANGE UP
-  TRIMETHOPRIM/SULFAMETHOXAZOLE: SIGNIFICANT CHANGE UP
ANION GAP SERPL CALC-SCNC: 8 MMOL/L — SIGNIFICANT CHANGE UP (ref 5–17)
BUN SERPL-MCNC: 43 MG/DL — HIGH (ref 7–18)
CALCIUM SERPL-MCNC: 9.3 MG/DL — SIGNIFICANT CHANGE UP (ref 8.4–10.5)
CHLORIDE SERPL-SCNC: 99 MMOL/L — SIGNIFICANT CHANGE UP (ref 96–108)
CO2 SERPL-SCNC: 32 MMOL/L — HIGH (ref 22–31)
CREAT SERPL-MCNC: 8.74 MG/DL — HIGH (ref 0.5–1.3)
GLUCOSE SERPL-MCNC: 92 MG/DL — SIGNIFICANT CHANGE UP (ref 70–99)
HCT VFR BLD CALC: 35.1 % — LOW (ref 39–50)
HGB BLD-MCNC: 10.6 G/DL — LOW (ref 13–17)
MCHC RBC-ENTMCNC: 26.6 PG — LOW (ref 27–34)
MCHC RBC-ENTMCNC: 30.3 GM/DL — LOW (ref 32–36)
MCV RBC AUTO: 87.9 FL — SIGNIFICANT CHANGE UP (ref 80–100)
METHOD TYPE: SIGNIFICANT CHANGE UP
PLATELET # BLD AUTO: 137 K/UL — LOW (ref 150–400)
POTASSIUM SERPL-MCNC: 4.7 MMOL/L — SIGNIFICANT CHANGE UP (ref 3.5–5.3)
POTASSIUM SERPL-SCNC: 4.7 MMOL/L — SIGNIFICANT CHANGE UP (ref 3.5–5.3)
RBC # BLD: 3.99 M/UL — LOW (ref 4.2–5.8)
RBC # FLD: 16.1 % — HIGH (ref 10.3–14.5)
SODIUM SERPL-SCNC: 139 MMOL/L — SIGNIFICANT CHANGE UP (ref 135–145)
WBC # BLD: 6.1 K/UL — SIGNIFICANT CHANGE UP (ref 3.8–10.5)
WBC # FLD AUTO: 6.1 K/UL — SIGNIFICANT CHANGE UP (ref 3.8–10.5)

## 2018-11-04 RX ORDER — OXYCODONE AND ACETAMINOPHEN 5; 325 MG/1; MG/1
1 TABLET ORAL
Qty: 8 | Refills: 0
Start: 2018-11-04 | End: 2018-11-05

## 2018-11-04 RX ADMIN — SEVELAMER CARBONATE 1600 MILLIGRAM(S): 2400 POWDER, FOR SUSPENSION ORAL at 08:07

## 2018-11-04 RX ADMIN — AMLODIPINE BESYLATE 5 MILLIGRAM(S): 2.5 TABLET ORAL at 05:59

## 2018-11-04 RX ADMIN — HEPARIN SODIUM 5000 UNIT(S): 5000 INJECTION INTRAVENOUS; SUBCUTANEOUS at 05:59

## 2018-11-04 NOTE — DISCHARGE NOTE ADULT - MEDICATION SUMMARY - MEDICATIONS TO TAKE
I will START or STAY ON the medications listed below when I get home from the hospital:    oxyCODONE-acetaminophen 5 mg-325 mg oral tablet  -- 1 tab(s) by mouth every 6 hours, As needed, Moderate Pain (4 - 6) MDD:4  -- Indication: For Pain     isosorbide mononitrate 20 mg oral tablet  -- 1 tab(s) by mouth once a day  -- Indication: For As directed    allopurinol 100 mg oral tablet  -- 1 tab(s) by mouth once a day  -- Indication: For As directed    atenolol 25 mg oral tablet  -- 1 tab(s) by mouth once a day  -- Indication: For As directed    Parsabiv 5 mg/mL intravenous solution  -- 2 milliliter(s) intravenous 3 times a day with dialysis  -- Indication: For As directed    furosemide 80 mg oral tablet  -- 1 tab(s) by mouth once a day  -- Indication: For As directed    Aranesp 25 mcg/mL injectable solution  -- 1 milliliter(s) injectable once a week with Saturday dialysis session  -- Indication: For As directed    cilostazol 50 mg oral tablet  -- 1 tab(s) by mouth once a day  -- Indication: For As directed    Renvela 800 mg oral tablet  -- 1 tab(s) by mouth 3 times a day (with meals)  -- Indication: For As directed    Protonix 40 mg oral delayed release tablet  -- 1 tab(s) by mouth once a day  -- Indication: For As directed    Zemplar 5 mcg/mL intravenous solution  -- 3.5 milliliter(s) intravenous 3 times a week with dialysis  -- Indication: For As directed

## 2018-11-04 NOTE — DISCHARGE NOTE ADULT - PATIENT PORTAL LINK FT
You can access the [facility] Patient Portal, offered by [facility], by registering with the following website: [email or web address]

## 2018-11-04 NOTE — DISCHARGE NOTE ADULT - PLAN OF CARE
wound healing Follow up with Dr. Adamson  in 1 week in office, eat healthy and well balanced diet continue all home medication, follow up with PCP

## 2018-11-04 NOTE — PROGRESS NOTE ADULT - ASSESSMENT
71 y/o s/p recurr incarcerted incisional hernia rpr w/mesh 11/2    -Advance diet to reg  -Pain medication PRN  -OOB/Ambulate   -DVT ppx   -C/w Home medication   -HD as per renal   -Dc planning

## 2018-11-04 NOTE — DISCHARGE NOTE ADULT - SECONDARY DIAGNOSIS.
CAD (coronary artery disease) ESRD (end stage renal disease) on dialysis Essential hypertension Atrial fibrillation HLD (hyperlipidemia) Pre-diabetes

## 2018-11-04 NOTE — PROGRESS NOTE ADULT - SUBJECTIVE AND OBJECTIVE BOX
INTERVAL HPI/OVERNIGHT EVENTS:    Pt s/p recurr incarcerted incisional hernia rpr w/mesh 11/2, seen and examined at bedside. No acute complaints at this time, +flatus and BMs.       Vital Signs Last 24 Hrs  T(C): 37.1 (04 Nov 2018 06:03), Max: 37.1 (04 Nov 2018 06:03)  T(F): 98.7 (04 Nov 2018 06:03), Max: 98.7 (04 Nov 2018 06:03)  HR: 94 (04 Nov 2018 06:03) (92 - 114)  BP: 153/82 (04 Nov 2018 06:03) (125/57 - 153/82)  BP(mean): --  RR: 16 (04 Nov 2018 06:03) (16 - 18)  SpO2: 100% (04 Nov 2018 06:03) (97% - 100%)  I&O's Detail    03 Nov 2018 08:01  -  04 Nov 2018 07:00  --------------------------------------------------------  IN:  Total IN: 0 mL    OUT:    Indwelling Catheter - Suprapubic: 50 mL  Total OUT: 50 mL    Total NET: -50 mL        ISOSORBIDE MONONITRATE 20 milliGRAM(s) 20 milliGRAM(s) Oral daily  pantoprazole  Injectable 40 milliGRAM(s) IV Push daily  sevelamer hydrochloride 1600 milliGRAM(s) Oral three times a day with meals      Physical Exam  General: AAOx3, No acute distress  Skin: No jaundice, no icterus  Abdomen: obese, soft,  nondistended, incisional TTP, dressing c/d/i, no rebound tenderness, no guarding, no palpable masses, SPT in place       Labs:                        10.6   6.1   )-----------( 137      ( 04 Nov 2018 06:20 )             35.1     11-04    139  |  99  |  43<H>  ----------------------------<  92  4.7   |  32<H>  |  8.74<H>    Ca    9.3      04 Nov 2018 06:20  Phos  7.4     11-03

## 2018-11-04 NOTE — PROGRESS NOTE ADULT - REASON FOR ADMISSION
weakness, abdominal pain

## 2018-11-04 NOTE — DISCHARGE NOTE ADULT - HOSPITAL COURSE
71 y/o M  with PMHx of Afib, CAD, ESRD (on dialysis Tu, Th, Sat) s/p rt AVF, HLD, HTN, obesity, pre-diabetes, hx prostate cancer, bladder Ca s/p cystectomy and creation of [place] Pouch (Dr. Govea and Dr. Palacios ([phone]), s/p ventral hernia repair w/ mesh w/ Dr Adamson 10/2017 c/w abdominal wall abscess, s/p bedside drainage 10/2017, presented to ED with complaints of lower abdominal pain, pain present since 6 AM this morning, sharp, non radiating, pain located in periumbilical area, a/w nausea and vomiting, vomitus NBNB. Admits to BM yesterday, no flatus. Denies fever, chills, SOB or CP.   Pt underwent incarcerated incisional hernia repair with mesh 11/02, post operative period uncomplicated, pt tolerating diet, stable for discharge. Pt to follow up with Dr Adamson in office.

## 2018-11-04 NOTE — DISCHARGE NOTE ADULT - CARE PLAN
Principal Discharge DX:	SBO (small bowel obstruction)  Goal:	wound healing  Assessment and plan of treatment:	Follow up with Dr. Adamson  in 1 week in office, eat healthy and well balanced diet  Secondary Diagnosis:	CAD (coronary artery disease)  Assessment and plan of treatment:	continue all home medication, follow up with PCP  Secondary Diagnosis:	ESRD (end stage renal disease) on dialysis  Assessment and plan of treatment:	continue all home medication, follow up with PCP  Secondary Diagnosis:	Essential hypertension  Assessment and plan of treatment:	continue all home medication, follow up with PCP  Secondary Diagnosis:	Atrial fibrillation  Assessment and plan of treatment:	continue all home medication, follow up with PCP  Secondary Diagnosis:	HLD (hyperlipidemia)  Assessment and plan of treatment:	continue all home medication, follow up with PCP  Secondary Diagnosis:	Pre-diabetes  Assessment and plan of treatment:	continue all home medication, follow up with PCP

## 2018-11-05 PROBLEM — Z86.718 PERSONAL HISTORY OF OTHER VENOUS THROMBOSIS AND EMBOLISM: Chronic | Status: ACTIVE | Noted: 2018-11-01

## 2018-11-05 LAB
-  AMIKACIN: SIGNIFICANT CHANGE UP
-  AMOXICILLIN/CLAVULANIC ACID: SIGNIFICANT CHANGE UP
-  AMPICILLIN/SULBACTAM: SIGNIFICANT CHANGE UP
-  AMPICILLIN: SIGNIFICANT CHANGE UP
-  AMPICILLIN: SIGNIFICANT CHANGE UP
-  AZTREONAM: SIGNIFICANT CHANGE UP
-  CEFAZOLIN: SIGNIFICANT CHANGE UP
-  CEFEPIME: SIGNIFICANT CHANGE UP
-  CEFOXITIN: SIGNIFICANT CHANGE UP
-  CEFTRIAXONE: SIGNIFICANT CHANGE UP
-  CIPROFLOXACIN: SIGNIFICANT CHANGE UP
-  DAPTOMYCIN: SIGNIFICANT CHANGE UP
-  ERTAPENEM: SIGNIFICANT CHANGE UP
-  GENTAMICIN: SIGNIFICANT CHANGE UP
-  LEVOFLOXACIN: SIGNIFICANT CHANGE UP
-  LEVOFLOXACIN: SIGNIFICANT CHANGE UP
-  LINEZOLID: SIGNIFICANT CHANGE UP
-  MEROPENEM: SIGNIFICANT CHANGE UP
-  PIPERACILLIN/TAZOBACTAM: SIGNIFICANT CHANGE UP
-  TETRACYCLINE: SIGNIFICANT CHANGE UP
-  TOBRAMYCIN: SIGNIFICANT CHANGE UP
-  TRIMETHOPRIM/SULFAMETHOXAZOLE: SIGNIFICANT CHANGE UP
-  VANCOMYCIN: SIGNIFICANT CHANGE UP
METHOD TYPE: SIGNIFICANT CHANGE UP
METHOD TYPE: SIGNIFICANT CHANGE UP

## 2018-11-06 ENCOUNTER — EMERGENCY (EMERGENCY)
Facility: HOSPITAL | Age: 73
LOS: 1 days | Discharge: HOME | End: 2018-11-06
Attending: EMERGENCY MEDICINE
Payer: MEDICARE

## 2018-11-06 VITALS
RESPIRATION RATE: 20 BRPM | TEMPERATURE: 98 F | WEIGHT: 224.87 LBS | OXYGEN SATURATION: 96 % | DIASTOLIC BLOOD PRESSURE: 63 MMHG | HEART RATE: 82 BPM | HEIGHT: 73 IN | SYSTOLIC BLOOD PRESSURE: 153 MMHG

## 2018-11-06 DIAGNOSIS — I77.0 ARTERIOVENOUS FISTULA, ACQUIRED: Chronic | ICD-10-CM

## 2018-11-06 DIAGNOSIS — Z98.89 OTHER SPECIFIED POSTPROCEDURAL STATES: Chronic | ICD-10-CM

## 2018-11-06 DIAGNOSIS — Z98.890 OTHER SPECIFIED POSTPROCEDURAL STATES: Chronic | ICD-10-CM

## 2018-11-06 LAB
ALBUMIN SERPL ELPH-MCNC: 3.1 G/DL — LOW (ref 3.5–5)
ALP SERPL-CCNC: 102 U/L — SIGNIFICANT CHANGE UP (ref 40–120)
ALT FLD-CCNC: 13 U/L DA — SIGNIFICANT CHANGE UP (ref 10–60)
ANION GAP SERPL CALC-SCNC: 7 MMOL/L — SIGNIFICANT CHANGE UP (ref 5–17)
APTT BLD: 23.4 SEC — LOW (ref 27.5–36.3)
AST SERPL-CCNC: 17 U/L — SIGNIFICANT CHANGE UP (ref 10–40)
BASOPHILS # BLD AUTO: 0.1 K/UL — SIGNIFICANT CHANGE UP (ref 0–0.2)
BASOPHILS NFR BLD AUTO: 1.1 % — SIGNIFICANT CHANGE UP (ref 0–2)
BILIRUB SERPL-MCNC: 0.4 MG/DL — SIGNIFICANT CHANGE UP (ref 0.2–1.2)
BUN SERPL-MCNC: 20 MG/DL — HIGH (ref 7–18)
CALCIUM SERPL-MCNC: 8.9 MG/DL — SIGNIFICANT CHANGE UP (ref 8.4–10.5)
CHLORIDE SERPL-SCNC: 96 MMOL/L — SIGNIFICANT CHANGE UP (ref 96–108)
CO2 SERPL-SCNC: 32 MMOL/L — HIGH (ref 22–31)
CREAT SERPL-MCNC: 6.07 MG/DL — HIGH (ref 0.5–1.3)
EOSINOPHIL # BLD AUTO: 0.3 K/UL — SIGNIFICANT CHANGE UP (ref 0–0.5)
EOSINOPHIL NFR BLD AUTO: 6.2 % — HIGH (ref 0–6)
GLUCOSE SERPL-MCNC: 103 MG/DL — HIGH (ref 70–99)
HCT VFR BLD CALC: 33.9 % — LOW (ref 39–50)
HGB BLD-MCNC: 10.7 G/DL — LOW (ref 13–17)
INR BLD: 1.14 RATIO — SIGNIFICANT CHANGE UP (ref 0.88–1.16)
LACTATE SERPL-SCNC: 1.8 MMOL/L — SIGNIFICANT CHANGE UP (ref 0.7–2)
LYMPHOCYTES # BLD AUTO: 0.7 K/UL — LOW (ref 1–3.3)
LYMPHOCYTES # BLD AUTO: 13.8 % — SIGNIFICANT CHANGE UP (ref 13–44)
MCHC RBC-ENTMCNC: 27.1 PG — SIGNIFICANT CHANGE UP (ref 27–34)
MCHC RBC-ENTMCNC: 31.5 GM/DL — LOW (ref 32–36)
MCV RBC AUTO: 86 FL — SIGNIFICANT CHANGE UP (ref 80–100)
MONOCYTES # BLD AUTO: 0.7 K/UL — SIGNIFICANT CHANGE UP (ref 0–0.9)
MONOCYTES NFR BLD AUTO: 14.1 % — HIGH (ref 2–14)
NEUTROPHILS # BLD AUTO: 3.1 K/UL — SIGNIFICANT CHANGE UP (ref 1.8–7.4)
NEUTROPHILS NFR BLD AUTO: 64.9 % — SIGNIFICANT CHANGE UP (ref 43–77)
PLATELET # BLD AUTO: 133 K/UL — LOW (ref 150–400)
POTASSIUM SERPL-MCNC: 4.1 MMOL/L — SIGNIFICANT CHANGE UP (ref 3.5–5.3)
POTASSIUM SERPL-SCNC: 4.1 MMOL/L — SIGNIFICANT CHANGE UP (ref 3.5–5.3)
PROT SERPL-MCNC: 8.3 G/DL — SIGNIFICANT CHANGE UP (ref 6–8.3)
PROTHROM AB SERPL-ACNC: 12.7 SEC — SIGNIFICANT CHANGE UP (ref 10–12.9)
RBC # BLD: 3.94 M/UL — LOW (ref 4.2–5.8)
RBC # FLD: 15.7 % — HIGH (ref 10.3–14.5)
SODIUM SERPL-SCNC: 135 MMOL/L — SIGNIFICANT CHANGE UP (ref 135–145)
WBC # BLD: 4.8 K/UL — SIGNIFICANT CHANGE UP (ref 3.8–10.5)
WBC # FLD AUTO: 4.8 K/UL — SIGNIFICANT CHANGE UP (ref 3.8–10.5)

## 2018-11-06 PROCEDURE — 71046 X-RAY EXAM CHEST 2 VIEWS: CPT

## 2018-11-06 PROCEDURE — 82962 GLUCOSE BLOOD TEST: CPT

## 2018-11-06 PROCEDURE — 85610 PROTHROMBIN TIME: CPT

## 2018-11-06 PROCEDURE — 83605 ASSAY OF LACTIC ACID: CPT

## 2018-11-06 PROCEDURE — 85730 THROMBOPLASTIN TIME PARTIAL: CPT

## 2018-11-06 PROCEDURE — 99284 EMERGENCY DEPT VISIT MOD MDM: CPT

## 2018-11-06 PROCEDURE — 71046 X-RAY EXAM CHEST 2 VIEWS: CPT | Mod: 26

## 2018-11-06 PROCEDURE — 74176 CT ABD & PELVIS W/O CONTRAST: CPT

## 2018-11-06 PROCEDURE — 74019 RADEX ABDOMEN 2 VIEWS: CPT | Mod: 26

## 2018-11-06 PROCEDURE — 99284 EMERGENCY DEPT VISIT MOD MDM: CPT | Mod: 25

## 2018-11-06 PROCEDURE — 74019 RADEX ABDOMEN 2 VIEWS: CPT

## 2018-11-06 PROCEDURE — 80053 COMPREHEN METABOLIC PANEL: CPT

## 2018-11-06 PROCEDURE — 74176 CT ABD & PELVIS W/O CONTRAST: CPT | Mod: 26

## 2018-11-06 PROCEDURE — 93005 ELECTROCARDIOGRAM TRACING: CPT

## 2018-11-06 PROCEDURE — 85027 COMPLETE CBC AUTOMATED: CPT

## 2018-11-06 NOTE — ED PROVIDER NOTE - PMH
CAD (coronary artery disease)  LAD stent  ESRD (end stage renal disease) on dialysis    Essential hypertension    History of DVT of lower extremity    HTN (hypertension)    Obesity    Pre-diabetes    Prostate cancer  prostate cancer  PVD (peripheral vascular disease)

## 2018-11-06 NOTE — ED PROVIDER NOTE - OBJECTIVE STATEMENT
Afib, CAD, ESRD (on dialysis Tu, Th, Sat) s/p rt AVF, HLD, HTN, obesity, pre-diabetes, hx prostate cancer, bladder Ca s/p cystectomy and creation of [place] Pouch, s/p ventral hernia repair w/ mesh w/ Dr Adamson 10/2017 c/w abdominal wall abscess, s/p bedside drainage 10/2017, recent incarcerated incisional hernia repair with mesh 11/02 here for frequent hiccups, generalized weakness since d/c from [facility] 2 days ago. Pt states symptoms including hiccups worse after dialysis today. Relates no BM or flatus today. No f/c/cough/cp/sob.

## 2018-11-06 NOTE — ED ADULT NURSE REASSESSMENT NOTE - NS ED NURSE REASSESS COMMENT FT1
Pt. is a hard stick, unable to collect blood cultures. Dr. Nava is aware. Pt. is stable at this time. No complaints voiced. Pt. is stable for discharge.

## 2018-11-06 NOTE — ED ADULT TRIAGE NOTE - MODE OF ARRIVAL
I spoke with Lucrecia at the [facility] center at 17:15 advise that baby is in [address] [facility] and mom in room 386. EMS

## 2018-11-06 NOTE — ED PROVIDER NOTE - PROGRESS NOTE DETAILS
Tavera: s/o from Dr Nava to f/u ct and re-assess. pt tolerating po.  had hiccups couple days now.  minimal hiccups noted in ed here.  ct shows no acute path.    dx hiccups.  f/u with pcp. left ambulatory.  return precautions given.

## 2018-11-07 LAB
CULTURE RESULTS: SIGNIFICANT CHANGE UP
ORGANISM # SPEC MICROSCOPIC CNT: SIGNIFICANT CHANGE UP
SPECIMEN SOURCE: SIGNIFICANT CHANGE UP
SURGICAL PATHOLOGY STUDY: SIGNIFICANT CHANGE UP

## 2018-11-08 NOTE — ED POST DISCHARGE NOTE - RESULT SUMMARY
positive wound culture.  multi bacterial.  Pt denies fever.  Pt advised to return to ED for any worsening or concerning symptoms.  Abx sent.

## 2018-11-09 ENCOUNTER — EMERGENCY (EMERGENCY)
Facility: HOSPITAL | Age: 73
LOS: 1 days | Discharge: HOME | End: 2018-11-09
Attending: EMERGENCY MEDICINE
Payer: MEDICARE

## 2018-11-09 VITALS
OXYGEN SATURATION: 96 % | RESPIRATION RATE: 20 BRPM | SYSTOLIC BLOOD PRESSURE: 159 MMHG | TEMPERATURE: 98 F | HEART RATE: 101 BPM | DIASTOLIC BLOOD PRESSURE: 79 MMHG

## 2018-11-09 DIAGNOSIS — I77.0 ARTERIOVENOUS FISTULA, ACQUIRED: Chronic | ICD-10-CM

## 2018-11-09 DIAGNOSIS — Z98.89 OTHER SPECIFIED POSTPROCEDURAL STATES: Chronic | ICD-10-CM

## 2018-11-09 DIAGNOSIS — Z98.890 OTHER SPECIFIED POSTPROCEDURAL STATES: Chronic | ICD-10-CM

## 2018-11-09 PROCEDURE — 99283 EMERGENCY DEPT VISIT LOW MDM: CPT

## 2018-11-09 PROCEDURE — 99282 EMERGENCY DEPT VISIT SF MDM: CPT

## 2018-11-09 NOTE — ED PROVIDER NOTE - OBJECTIVE STATEMENT
74 y/o M pt w/ hx of ESRD, prediabetes, CAD, PVD, obesity, prostate cancer, HTN presents and PSHx of abd surgery, cardiac cath, AV fistula, prostatectomy c/o drainage from surgical site. Had surgery last Friday done by Dr. Wyatt. Dr. Wyatt is on vacation this week so pt presented here. Reports mild abd pain surrounding surgical incision. Denies fever and any other complaints. Allergic to penicillin.

## 2018-11-09 NOTE — ED ADULT NURSE NOTE - NSIMPLEMENTINTERV_GEN_ALL_ED
Implemented All Fall Risk Interventions:  [place] to call system. Call bell, personal items and telephone within reach. Instruct patient to call for assistance. Room bathroom lighting operational. Non-slip footwear when patient is off stretcher. Physically safe environment: no spills, clutter or unnecessary equipment. Stretcher in lowest position, wheels locked, appropriate side rails in place. Provide visual cue, wrist band, yellow gown, etc. Monitor gait and stability. Monitor for mental status changes and reorient to person, place, and time. Review medications for side effects contributing to fall risk. Reinforce activity limits and safety measures with patient and family.

## 2018-11-09 NOTE — ED PROVIDER NOTE - PHYSICAL EXAMINATION
GI: GI: serosanguinous discharge from wound site, wound is indurated but no erythema, tenderness to touch or warmth. Also draining catheter in place which pt states was present prior to most recent surgery. No tenderness to abd. Otherwise GI within normal limits.

## 2018-11-09 NOTE — ED PROVIDER NOTE - CARE PLAN
Principal Discharge DX:	Wound Principal Discharge DX:	Wound  Goal:	draining serousanguinous fluid, status post hernia repair abdominal

## 2018-11-09 NOTE — ED PROVIDER NOTE - PROGRESS NOTE DETAILS
wounds dressing change completed by RN, called Case Mgmt, will try to arrange home care, Pt is well appearing walking with steady gait, stable for discharge and follow up without fail with medical doctor. I had a detailed discussion with the patient and/or guardian regarding the historical points, exam findings, and any diagnostic results supporting the discharge diagnosis. Pt educated on care and need for follow up. Strict return instructions and red flag signs and symptoms discussed with patient. Questions answered. Pt shows understanding of discharge information and agrees to follow. email sent to case management as well by me - pt would benefit for home care nurse to change dressings. -Daniel Cook MD-

## 2018-11-09 NOTE — ED PROVIDER NOTE - MEDICAL DECISION MAKING DETAILS
Pt w/ drainage from abd surgical site. Does not appear infected. benign abd exam. no wound dehiscence. Pt needs need home care nurse to change dressing. Nurse here will change dressing here. Will discuss w/ social work.

## 2018-11-12 PROBLEM — Z87.448 HISTORY OF KIDNEY DISEASE: Status: RESOLVED | Noted: 2017-10-19 | Resolved: 2018-11-12

## 2018-11-12 PROBLEM — Z86.79 HISTORY OF CORONARY ARTERY DISEASE: Status: RESOLVED | Noted: 2018-11-12 | Resolved: 2018-11-12

## 2018-11-19 ENCOUNTER — APPOINTMENT (OUTPATIENT)
Dept: SURGERY | Facility: CLINIC | Age: 73
End: 2018-11-19
Payer: MEDICARE

## 2018-11-19 DIAGNOSIS — Z86.79 PERSONAL HISTORY OF OTHER DISEASES OF THE CIRCULATORY SYSTEM: ICD-10-CM

## 2018-11-19 DIAGNOSIS — K21.9 GASTRO-ESOPHAGEAL REFLUX DISEASE W/OUT ESOPHAGITIS: ICD-10-CM

## 2018-11-19 DIAGNOSIS — Z87.448 PERSONAL HISTORY OF OTHER DISEASES OF URINARY SYSTEM: ICD-10-CM

## 2018-11-19 PROCEDURE — 99024 POSTOP FOLLOW-UP VISIT: CPT

## 2018-11-19 RX ORDER — PANTOPRAZOLE 40 MG/1
40 TABLET, DELAYED RELEASE ORAL DAILY
Qty: 30 | Refills: 0 | Status: ACTIVE | COMMUNITY
Start: 2018-11-19 | End: 1900-01-01

## 2018-11-27 PROCEDURE — 86850 RBC ANTIBODY SCREEN: CPT

## 2018-11-27 PROCEDURE — 86923 COMPATIBILITY TEST ELECTRIC: CPT

## 2018-11-27 PROCEDURE — 84484 ASSAY OF TROPONIN QUANT: CPT

## 2018-11-27 PROCEDURE — 84100 ASSAY OF PHOSPHORUS: CPT

## 2018-11-27 PROCEDURE — 82310 ASSAY OF CALCIUM: CPT

## 2018-11-27 PROCEDURE — 36415 COLL VENOUS BLD VENIPUNCTURE: CPT

## 2018-11-27 PROCEDURE — C1781: CPT

## 2018-11-27 PROCEDURE — 83970 ASSAY OF PARATHORMONE: CPT

## 2018-11-27 PROCEDURE — 85027 COMPLETE CBC AUTOMATED: CPT

## 2018-11-27 PROCEDURE — 85730 THROMBOPLASTIN TIME PARTIAL: CPT

## 2018-11-27 PROCEDURE — 71045 X-RAY EXAM CHEST 1 VIEW: CPT

## 2018-11-27 PROCEDURE — 86901 BLOOD TYPING SEROLOGIC RH(D): CPT

## 2018-11-27 PROCEDURE — 83735 ASSAY OF MAGNESIUM: CPT

## 2018-11-27 PROCEDURE — 99261: CPT

## 2018-11-27 PROCEDURE — 82607 VITAMIN B-12: CPT

## 2018-11-27 PROCEDURE — 87070 CULTURE OTHR SPECIMN AEROBIC: CPT

## 2018-11-27 PROCEDURE — C1889: CPT

## 2018-11-27 PROCEDURE — 82306 VITAMIN D 25 HYDROXY: CPT

## 2018-11-27 PROCEDURE — 80074 ACUTE HEPATITIS PANEL: CPT

## 2018-11-27 PROCEDURE — 85610 PROTHROMBIN TIME: CPT

## 2018-11-27 PROCEDURE — 86900 BLOOD TYPING SEROLOGIC ABO: CPT

## 2018-11-27 PROCEDURE — 99285 EMERGENCY DEPT VISIT HI MDM: CPT | Mod: 25

## 2018-11-27 PROCEDURE — 83036 HEMOGLOBIN GLYCOSYLATED A1C: CPT

## 2018-11-27 PROCEDURE — 80048 BASIC METABOLIC PNL TOTAL CA: CPT

## 2018-11-27 PROCEDURE — 84443 ASSAY THYROID STIM HORMONE: CPT

## 2018-11-27 PROCEDURE — 93005 ELECTROCARDIOGRAM TRACING: CPT

## 2018-11-27 PROCEDURE — 87186 SC STD MICRODIL/AGAR DIL: CPT

## 2018-11-27 PROCEDURE — 80053 COMPREHEN METABOLIC PANEL: CPT

## 2018-11-27 PROCEDURE — 74176 CT ABD & PELVIS W/O CONTRAST: CPT

## 2018-11-27 PROCEDURE — 83690 ASSAY OF LIPASE: CPT

## 2018-11-27 PROCEDURE — 80061 LIPID PANEL: CPT

## 2018-12-03 ENCOUNTER — APPOINTMENT (OUTPATIENT)
Dept: SURGERY | Facility: CLINIC | Age: 73
End: 2018-12-03
Payer: MEDICARE

## 2018-12-03 PROCEDURE — 99024 POSTOP FOLLOW-UP VISIT: CPT

## 2018-12-28 ENCOUNTER — APPOINTMENT (OUTPATIENT)
Dept: SURGERY | Facility: CLINIC | Age: 73
End: 2018-12-28
Payer: MEDICARE

## 2018-12-28 PROCEDURE — 99024 POSTOP FOLLOW-UP VISIT: CPT

## 2019-01-14 NOTE — PATIENT PROFILE ADULT. - FALL HARM RISK
REFILL INFORMATION  In an effort to continue to provide you with superior service and quality, the decision has been made to inactivate the refill line at our clinic effective February 2018. ? For Refill requests INCLUDING controlled substances  Please contact your pharmacy at least three (3) business days before your medication runs out. The pharmacy will then send us a request for your refill. Please allow 24-48 hours for the refill to be processed. ? For Controlled substance Refill requests   Please call the clinic Monday through Friday, from 8:00am - 5:00pm to speak with a Patient . LAB AND X-RAY HOURS FOR [address] [place] [address]  Monday - Thursday 7 am - 5:30 pm  Friday 7 am - 4:30 pm      TEST RESULTS  If your physician has ordered additional laboratory or radiology testing as part of your ongoing plan of care, notification of the test results will be communicated in a timely manner once reviewed by your provider. -  If your results are normal, you will receive a letter in the mail. -  If there are any irregularities, you will receive a phone call from our office within 5 - 7 business days. -  If your results are critical and require more immediate intervention, you will be contacted promptly. YouR OPINION MATTERS  You may be receiving a patient satisfaction survey in the mail. We would appreciate it if you could please take the time to complete, as your feedback is very important to us. We strive to make your experience exceptional and your comments help us with that goal. We look forward to hearing from you. Feedback is anonymous unless you choose otherwise. coagulation(Bleeding disorder R/T clinical cond/anti-coags)/age, ESRD/other ESRD/coagulation(Bleeding disorder R/T clinical cond/anti-coags)/other

## 2019-01-16 ENCOUNTER — EMERGENCY (EMERGENCY)
Facility: HOSPITAL | Age: 74
LOS: 1 days | Discharge: HOME | End: 2019-01-16
Attending: EMERGENCY MEDICINE
Payer: MEDICARE

## 2019-01-16 VITALS
HEIGHT: 73 IN | DIASTOLIC BLOOD PRESSURE: 88 MMHG | RESPIRATION RATE: 19 BRPM | HEART RATE: 92 BPM | SYSTOLIC BLOOD PRESSURE: 178 MMHG | TEMPERATURE: 98 F | OXYGEN SATURATION: 100 % | WEIGHT: 231.49 LBS

## 2019-01-16 DIAGNOSIS — Z98.89 OTHER SPECIFIED POSTPROCEDURAL STATES: Chronic | ICD-10-CM

## 2019-01-16 DIAGNOSIS — I77.0 ARTERIOVENOUS FISTULA, ACQUIRED: Chronic | ICD-10-CM

## 2019-01-16 DIAGNOSIS — Z98.890 OTHER SPECIFIED POSTPROCEDURAL STATES: Chronic | ICD-10-CM

## 2019-01-16 PROCEDURE — 99285 EMERGENCY DEPT VISIT HI MDM: CPT

## 2019-01-16 RX ORDER — MORPHINE SULFATE 50 MG/1
4 CAPSULE, EXTENDED RELEASE ORAL ONCE
Qty: 0 | Refills: 0 | Status: COMPLETED | OUTPATIENT
Start: 2019-01-16 | End: 2019-01-16

## 2019-01-16 NOTE — ED PROVIDER NOTE - NS ED ROS FT
CONSTITUTIONAL: no fever, no chills   EYES: no visual changes, no eye pain   ENMT: no nasal congestion, no throat pain  CARDIOVASCULAR: no chest pain, no edema, no palpitations   RESPIRATORY: no shortness of breath, no cough   GASTROINTESTINAL: +abdominal pain, no nausea, no vomiting, no diarrhea, no constipation   GENITOURINARY: no dysuria, no frequency  MUSCULOSKELETAL: no joint pains, no myalgias, no back pain   SKIN: no rashes  NEUROLOGICAL: no weakness, no headache, no dizziness, no slurred speech, no syncope   PSYCHIATRIC: no known mental health illness   HEME/LYMPH: no lymphadenopathy      All other ROS negative except as per HPI

## 2019-01-16 NOTE — ED ADULT NURSE NOTE - OBJECTIVE STATEMENT
The patient presents with left abd pain radiating to his left flank since this morning.  He has a right kidney catheter in place with urine drainage.  He denies pain at the site.  He denies n/v/d, dizziness.  Left abdomen and flank tenderness noted.

## 2019-01-16 NOTE — ED PROVIDER NOTE - PROGRESS NOTE DETAILS
labs - ESRD, K 6.2   EKG - nsr, rate 87, , , QTc 500, bifascicular block (unchanged from previous)  Attempted CTA however IV infiltrated.   On reassessment, pt now pain free. Initially stated that pain was severe at onset, but now states that it was mild the night before and then more severe upon waking today. VS wnl. Pt very well appearing. Asking to be discharged directly to his out patient HD today. Will d/c. Discussed indications for patient return to ED. Patient understood.

## 2019-01-16 NOTE — ED PEDIATRIC NURSE REASSESSMENT NOTE - NS ED NURSE REASSESS COMMENT FT2
The patient remains in stable condition.  Mulitple attempts were made to obtain blood and IV access to no avail.  Dr. Fraire made aware.

## 2019-01-16 NOTE — ED ADULT NURSE NOTE - NSIMPLEMENTINTERV_GEN_ALL_ED
Implemented All Universal Safety Interventions:  [place] to call system. Call bell, personal items and telephone within reach. Instruct patient to call for assistance. Room bathroom lighting operational. Non-slip footwear when patient is off stretcher. Physically safe environment: no spills, clutter or unnecessary equipment. Stretcher in lowest position, wheels locked, appropriate side rails in place.

## 2019-01-16 NOTE — ED ADULT NURSE NOTE - CHPI ED NUR SYMPTOMS NEG
no dysuria/no diarrhea/no burning urination/no abdominal distension/no fever/no blood in stool/no chills

## 2019-01-16 NOTE — ED PROVIDER NOTE - OBJECTIVE STATEMENT
74 y/o M with PMHx of CAD, HTN, DVT, remote history of prostate cancer, suprapubic catheterization, ESRD on hemodialysis (T, Th, S) last dialyzed Tuesday, IVC filter presents to the ED with complaints of L sided abdominal pain since this morning. Pain was sudden in onset, initially severe and then mild throughout the day. Patient states he saw urologist this morning who changed suprapubic catheter. Patient had prior abdominal surgery however is unsure of which. Denies fever, shortness of breath, syncope, weakness or any other acute complaints. Allergies: PCN. 74 y/o M with PMHx of CAD, HTN, DVT, remote history of prostate cancer, suprapubic catheterization, ESRD on hemodialysis (T, Th, S) last dialyzed Tuesday, IVC filter presents to the ED with complaints of L sided abdominal pain since last night, mild before he went to bed, then more severe upon waking this morning. Patient states he saw urologist this morning who changed suprapubic catheter. Patient had prior abdominal surgery however is unsure of which. Denies fever, shortness of breath, syncope, weakness or any other acute complaints.

## 2019-01-16 NOTE — ED PROVIDER NOTE - PHYSICAL EXAMINATION
GENERAL: wells appearing, no acute distress   HEAD: atraumatic   EYES: EOMI, pink conjunctiva   ENT: moist oral mucosa   CARDIAC: RRR, no edema, distal pulses present   RESPIRATORY: lungs CTAB, no increased work of breathing   GASTROINTESTINAL: no abdominal tenderness, no rebound or guarding, bowel sounds presents  GENITOURINARY: no CVA tenderness   MUSCULOSKELETAL: no deformity   NEUROLOGICAL: AAOx3, CN's II-XII intact, strength 5/5 bilateral UE and LE, sensation intact to light touch, finger to nose intact, steady gait  SKIN: intact   PSYCHIATRIC: cooperative  HEME LYMPH: no lymphadenopathy

## 2019-01-17 VITALS
TEMPERATURE: 98 F | HEART RATE: 77 BPM | SYSTOLIC BLOOD PRESSURE: 155 MMHG | RESPIRATION RATE: 18 BRPM | OXYGEN SATURATION: 98 % | DIASTOLIC BLOOD PRESSURE: 78 MMHG

## 2019-01-17 LAB
ALBUMIN SERPL ELPH-MCNC: 3.4 G/DL — LOW (ref 3.5–5)
ALP SERPL-CCNC: 87 U/L — SIGNIFICANT CHANGE UP (ref 40–120)
ALT FLD-CCNC: 15 U/L DA — SIGNIFICANT CHANGE UP (ref 10–60)
ANION GAP SERPL CALC-SCNC: 8 MMOL/L — SIGNIFICANT CHANGE UP (ref 5–17)
APTT BLD: 31.5 SEC — SIGNIFICANT CHANGE UP (ref 27.5–36.3)
AST SERPL-CCNC: 32 U/L — SIGNIFICANT CHANGE UP (ref 10–40)
BASOPHILS # BLD AUTO: 0.1 K/UL — SIGNIFICANT CHANGE UP (ref 0–0.2)
BASOPHILS NFR BLD AUTO: 1.1 % — SIGNIFICANT CHANGE UP (ref 0–2)
BILIRUB DIRECT SERPL-MCNC: <0.1 MG/DL — SIGNIFICANT CHANGE UP (ref 0–0.2)
BILIRUB INDIRECT FLD-MCNC: >0.5 MG/DL — SIGNIFICANT CHANGE UP (ref 0.2–1)
BILIRUB SERPL-MCNC: 0.6 MG/DL — SIGNIFICANT CHANGE UP (ref 0.2–1.2)
BILIRUB SERPL-MCNC: 0.6 MG/DL — SIGNIFICANT CHANGE UP (ref 0.2–1.2)
BUN SERPL-MCNC: 51 MG/DL — HIGH (ref 7–18)
CALCIUM SERPL-MCNC: 8.4 MG/DL — SIGNIFICANT CHANGE UP (ref 8.4–10.5)
CHLORIDE SERPL-SCNC: 100 MMOL/L — SIGNIFICANT CHANGE UP (ref 96–108)
CO2 SERPL-SCNC: 29 MMOL/L — SIGNIFICANT CHANGE UP (ref 22–31)
CREAT SERPL-MCNC: 9.77 MG/DL — HIGH (ref 0.5–1.3)
EOSINOPHIL # BLD AUTO: 0.1 K/UL — SIGNIFICANT CHANGE UP (ref 0–0.5)
EOSINOPHIL NFR BLD AUTO: 1.8 % — SIGNIFICANT CHANGE UP (ref 0–6)
GLUCOSE SERPL-MCNC: 86 MG/DL — SIGNIFICANT CHANGE UP (ref 70–99)
HCT VFR BLD CALC: 38.1 % — LOW (ref 39–50)
HGB BLD-MCNC: 11.2 G/DL — LOW (ref 13–17)
INR BLD: 1.1 RATIO — SIGNIFICANT CHANGE UP (ref 0.88–1.16)
LACTATE SERPL-SCNC: 1.2 MMOL/L — SIGNIFICANT CHANGE UP (ref 0.7–2)
LIDOCAIN IGE QN: 278 U/L — SIGNIFICANT CHANGE UP (ref 73–393)
LYMPHOCYTES # BLD AUTO: 1.4 K/UL — SIGNIFICANT CHANGE UP (ref 1–3.3)
LYMPHOCYTES # BLD AUTO: 25.6 % — SIGNIFICANT CHANGE UP (ref 13–44)
MCHC RBC-ENTMCNC: 27.8 PG — SIGNIFICANT CHANGE UP (ref 27–34)
MCHC RBC-ENTMCNC: 29.3 GM/DL — LOW (ref 32–36)
MCV RBC AUTO: 94.7 FL — SIGNIFICANT CHANGE UP (ref 80–100)
MONOCYTES # BLD AUTO: 0.6 K/UL — SIGNIFICANT CHANGE UP (ref 0–0.9)
MONOCYTES NFR BLD AUTO: 11.5 % — SIGNIFICANT CHANGE UP (ref 2–14)
NEUTROPHILS # BLD AUTO: 3.2 K/UL — SIGNIFICANT CHANGE UP (ref 1.8–7.4)
NEUTROPHILS NFR BLD AUTO: 59.9 % — SIGNIFICANT CHANGE UP (ref 43–77)
PLATELET # BLD AUTO: 146 K/UL — LOW (ref 150–400)
POTASSIUM SERPL-MCNC: 6.2 MMOL/L — CRITICAL HIGH (ref 3.5–5.3)
POTASSIUM SERPL-SCNC: 6.2 MMOL/L — CRITICAL HIGH (ref 3.5–5.3)
PROT SERPL-MCNC: 7.8 G/DL — SIGNIFICANT CHANGE UP (ref 6–8.3)
PROTHROM AB SERPL-ACNC: 12.3 SEC — SIGNIFICANT CHANGE UP (ref 10–12.9)
RBC # BLD: 4.02 M/UL — LOW (ref 4.2–5.8)
RBC # FLD: 18.6 % — HIGH (ref 10.3–14.5)
SODIUM SERPL-SCNC: 137 MMOL/L — SIGNIFICANT CHANGE UP (ref 135–145)
TROPONIN I SERPL-MCNC: 0.04 NG/ML — SIGNIFICANT CHANGE UP (ref 0–0.04)
WBC # BLD: 5.3 K/UL — SIGNIFICANT CHANGE UP (ref 3.8–10.5)
WBC # FLD AUTO: 5.3 K/UL — SIGNIFICANT CHANGE UP (ref 3.8–10.5)

## 2019-01-17 PROCEDURE — 93005 ELECTROCARDIOGRAM TRACING: CPT

## 2019-01-17 PROCEDURE — 71275 CT ANGIOGRAPHY CHEST: CPT

## 2019-01-17 PROCEDURE — 99284 EMERGENCY DEPT VISIT MOD MDM: CPT | Mod: 25

## 2019-01-17 PROCEDURE — 80053 COMPREHEN METABOLIC PANEL: CPT

## 2019-01-17 PROCEDURE — 83690 ASSAY OF LIPASE: CPT

## 2019-01-17 PROCEDURE — 74174 CTA ABD&PLVS W/CONTRAST: CPT | Mod: 26

## 2019-01-17 PROCEDURE — 71275 CT ANGIOGRAPHY CHEST: CPT | Mod: 26

## 2019-01-17 PROCEDURE — 83605 ASSAY OF LACTIC ACID: CPT

## 2019-01-17 PROCEDURE — 86850 RBC ANTIBODY SCREEN: CPT

## 2019-01-17 PROCEDURE — 85610 PROTHROMBIN TIME: CPT

## 2019-01-17 PROCEDURE — 82248 BILIRUBIN DIRECT: CPT

## 2019-01-17 PROCEDURE — 85027 COMPLETE CBC AUTOMATED: CPT

## 2019-01-17 PROCEDURE — 86901 BLOOD TYPING SEROLOGIC RH(D): CPT

## 2019-01-17 PROCEDURE — 86900 BLOOD TYPING SEROLOGIC ABO: CPT

## 2019-01-17 PROCEDURE — 84484 ASSAY OF TROPONIN QUANT: CPT

## 2019-01-17 PROCEDURE — 74174 CTA ABD&PLVS W/CONTRAST: CPT

## 2019-01-17 PROCEDURE — 85730 THROMBOPLASTIN TIME PARTIAL: CPT

## 2019-01-17 PROCEDURE — 36415 COLL VENOUS BLD VENIPUNCTURE: CPT

## 2019-01-25 ENCOUNTER — EMERGENCY (EMERGENCY)
Facility: HOSPITAL | Age: 74
LOS: 1 days | Discharge: HOME | End: 2019-01-25
Attending: EMERGENCY MEDICINE
Payer: MEDICARE

## 2019-01-25 VITALS
TEMPERATURE: 98 F | OXYGEN SATURATION: 99 % | SYSTOLIC BLOOD PRESSURE: 194 MMHG | HEART RATE: 85 BPM | DIASTOLIC BLOOD PRESSURE: 95 MMHG | RESPIRATION RATE: 18 BRPM | WEIGHT: 231.49 LBS

## 2019-01-25 DIAGNOSIS — Z98.89 OTHER SPECIFIED POSTPROCEDURAL STATES: Chronic | ICD-10-CM

## 2019-01-25 DIAGNOSIS — Z98.890 OTHER SPECIFIED POSTPROCEDURAL STATES: Chronic | ICD-10-CM

## 2019-01-25 DIAGNOSIS — I77.0 ARTERIOVENOUS FISTULA, ACQUIRED: Chronic | ICD-10-CM

## 2019-01-25 PROCEDURE — 99284 EMERGENCY DEPT VISIT MOD MDM: CPT | Mod: 25

## 2019-01-26 VITALS
DIASTOLIC BLOOD PRESSURE: 77 MMHG | OXYGEN SATURATION: 98 % | RESPIRATION RATE: 17 BRPM | TEMPERATURE: 98 F | SYSTOLIC BLOOD PRESSURE: 177 MMHG | HEART RATE: 68 BPM

## 2019-01-26 LAB
ALBUMIN SERPL ELPH-MCNC: 3.8 G/DL — SIGNIFICANT CHANGE UP (ref 3.5–5)
ALP SERPL-CCNC: 94 U/L — SIGNIFICANT CHANGE UP (ref 40–120)
ALT FLD-CCNC: 17 U/L DA — SIGNIFICANT CHANGE UP (ref 10–60)
ANION GAP SERPL CALC-SCNC: 8 MMOL/L — SIGNIFICANT CHANGE UP (ref 5–17)
APPEARANCE UR: CLEAR — SIGNIFICANT CHANGE UP
APTT BLD: 30.4 SEC — SIGNIFICANT CHANGE UP (ref 27.5–36.3)
AST SERPL-CCNC: 18 U/L — SIGNIFICANT CHANGE UP (ref 10–40)
BASOPHILS # BLD AUTO: 0.1 K/UL — SIGNIFICANT CHANGE UP (ref 0–0.2)
BASOPHILS NFR BLD AUTO: 1.7 % — SIGNIFICANT CHANGE UP (ref 0–2)
BILIRUB SERPL-MCNC: 0.5 MG/DL — SIGNIFICANT CHANGE UP (ref 0.2–1.2)
BILIRUB UR-MCNC: NEGATIVE — SIGNIFICANT CHANGE UP
BUN SERPL-MCNC: 37 MG/DL — HIGH (ref 7–18)
CALCIUM SERPL-MCNC: 9.6 MG/DL — SIGNIFICANT CHANGE UP (ref 8.4–10.5)
CHLORIDE SERPL-SCNC: 100 MMOL/L — SIGNIFICANT CHANGE UP (ref 96–108)
CO2 SERPL-SCNC: 30 MMOL/L — SIGNIFICANT CHANGE UP (ref 22–31)
COLOR SPEC: YELLOW — SIGNIFICANT CHANGE UP
CREAT SERPL-MCNC: 9.28 MG/DL — HIGH (ref 0.5–1.3)
DIFF PNL FLD: ABNORMAL
EOSINOPHIL # BLD AUTO: 0.1 K/UL — SIGNIFICANT CHANGE UP (ref 0–0.5)
EOSINOPHIL NFR BLD AUTO: 2.5 % — SIGNIFICANT CHANGE UP (ref 0–6)
GLUCOSE SERPL-MCNC: 82 MG/DL — SIGNIFICANT CHANGE UP (ref 70–99)
GLUCOSE UR QL: NEGATIVE — SIGNIFICANT CHANGE UP
HCT VFR BLD CALC: 39.5 % — SIGNIFICANT CHANGE UP (ref 39–50)
HGB BLD-MCNC: 11.7 G/DL — LOW (ref 13–17)
INR BLD: 1.08 RATIO — SIGNIFICANT CHANGE UP (ref 0.88–1.16)
KETONES UR-MCNC: NEGATIVE — SIGNIFICANT CHANGE UP
LEUKOCYTE ESTERASE UR-ACNC: ABNORMAL
LYMPHOCYTES # BLD AUTO: 1.6 K/UL — SIGNIFICANT CHANGE UP (ref 1–3.3)
LYMPHOCYTES # BLD AUTO: 29.1 % — SIGNIFICANT CHANGE UP (ref 13–44)
MCHC RBC-ENTMCNC: 27.9 PG — SIGNIFICANT CHANGE UP (ref 27–34)
MCHC RBC-ENTMCNC: 29.6 GM/DL — LOW (ref 32–36)
MCV RBC AUTO: 94.4 FL — SIGNIFICANT CHANGE UP (ref 80–100)
MONOCYTES # BLD AUTO: 0.6 K/UL — SIGNIFICANT CHANGE UP (ref 0–0.9)
MONOCYTES NFR BLD AUTO: 10.3 % — SIGNIFICANT CHANGE UP (ref 2–14)
NEUTROPHILS # BLD AUTO: 3.1 K/UL — SIGNIFICANT CHANGE UP (ref 1.8–7.4)
NEUTROPHILS NFR BLD AUTO: 56.4 % — SIGNIFICANT CHANGE UP (ref 43–77)
NITRITE UR-MCNC: NEGATIVE — SIGNIFICANT CHANGE UP
PH UR: 9 — HIGH (ref 5–8)
PLATELET # BLD AUTO: 160 K/UL — SIGNIFICANT CHANGE UP (ref 150–400)
POTASSIUM SERPL-MCNC: 5 MMOL/L — SIGNIFICANT CHANGE UP (ref 3.5–5.3)
POTASSIUM SERPL-SCNC: 5 MMOL/L — SIGNIFICANT CHANGE UP (ref 3.5–5.3)
PROT SERPL-MCNC: 8.6 G/DL — HIGH (ref 6–8.3)
PROT UR-MCNC: 100
PROTHROM AB SERPL-ACNC: 12 SEC — SIGNIFICANT CHANGE UP (ref 10–12.9)
RBC # BLD: 4.19 M/UL — LOW (ref 4.2–5.8)
RBC # FLD: 18.8 % — HIGH (ref 10.3–14.5)
SODIUM SERPL-SCNC: 138 MMOL/L — SIGNIFICANT CHANGE UP (ref 135–145)
SP GR SPEC: 1.01 — SIGNIFICANT CHANGE UP (ref 1.01–1.02)
UROBILINOGEN FLD QL: NEGATIVE — SIGNIFICANT CHANGE UP
WBC # BLD: 5.5 K/UL — SIGNIFICANT CHANGE UP (ref 3.8–10.5)
WBC # FLD AUTO: 5.5 K/UL — SIGNIFICANT CHANGE UP (ref 3.8–10.5)

## 2019-01-26 PROCEDURE — 85610 PROTHROMBIN TIME: CPT

## 2019-01-26 PROCEDURE — 80053 COMPREHEN METABOLIC PANEL: CPT

## 2019-01-26 PROCEDURE — 74176 CT ABD & PELVIS W/O CONTRAST: CPT

## 2019-01-26 PROCEDURE — 74176 CT ABD & PELVIS W/O CONTRAST: CPT | Mod: 26

## 2019-01-26 PROCEDURE — 85730 THROMBOPLASTIN TIME PARTIAL: CPT

## 2019-01-26 PROCEDURE — 85027 COMPLETE CBC AUTOMATED: CPT

## 2019-01-26 PROCEDURE — 36415 COLL VENOUS BLD VENIPUNCTURE: CPT

## 2019-01-26 PROCEDURE — 81001 URINALYSIS AUTO W/SCOPE: CPT

## 2019-01-26 PROCEDURE — 99284 EMERGENCY DEPT VISIT MOD MDM: CPT | Mod: 25

## 2019-01-26 PROCEDURE — 87086 URINE CULTURE/COLONY COUNT: CPT

## 2019-01-26 RX ORDER — IOHEXOL 300 MG/ML
30 INJECTION, SOLUTION INTRAVENOUS ONCE
Qty: 0 | Refills: 0 | Status: COMPLETED | OUTPATIENT
Start: 2019-01-26 | End: 2019-01-26

## 2019-01-26 RX ADMIN — IOHEXOL 30 MILLILITER(S): 300 INJECTION, SOLUTION INTRAVENOUS at 04:04

## 2019-01-26 NOTE — ED PROVIDER NOTE - OBJECTIVE STATEMENT
72 y/o M with a PMHx of CAD, ESRD, HTN, DVT, Pre-DM, prostate cancer, PVD and a PSHx of AV fistula, abd surgery, cardiac catheretization, prostatectomy, suprapubic catheter (1 month ago) presents to ED due to concern for persistent RLQ and suprapubic pain. Pt reports being seen here 1 week ago for similar abd pain. Pt was sent home, reports normal BM. Pt denies fever, vomiting, back pain, chest pain, nausea. Drug Allergy: Penicillin.

## 2019-01-26 NOTE — ED PROVIDER NOTE - ABDOMINAL EXAM
minimal suprapubic tenderness. catheter in place, clear-no skin changes around it/soft/tender.../nondistended

## 2019-01-26 NOTE — ED PROVIDER NOTE - MEDICAL DECISION MAKING DETAILS
Labs, UA, CT-scan Labs, UA, CT-scan  labs are unremarkable. ua without uti. ct abdomen with PO contrast unremarkable. feels well. tolerating PO. will dc. pt is going to go to dialysis.

## 2019-01-27 LAB
CULTURE RESULTS: SIGNIFICANT CHANGE UP
SPECIMEN SOURCE: SIGNIFICANT CHANGE UP

## 2019-04-12 NOTE — ED ADULT NURSE NOTE - NS ED NURSE TRANSPORT WITH
PT order received via admission protocol, chart reviewed. Per MDs, pt with prognosis of days left to live. Will dc from acute PT as not consistent with end of life care.   Please re-consult if pt becomes appropriate for skilled intervention with potential cardiac monitor

## 2019-04-20 NOTE — DISCHARGE NOTE ADULT - DISCHARGE DATE
Unable to video assess pt as camera malfunctioning. However, bedside team reports pt on bipap, requesting ABG as follow up. Pt with acute resp failure on bipap 30-Oct-2017

## 2019-10-05 NOTE — PATIENT PROFILE ADULT. - TEACHING/LEARNING LEARNING PREFERENCES
[facility] Progress Note    Patient: Elsie Padilla Date of Service: 10/5/2019   YOB: 1950 Admission Date: 10/4/2019   MRN: [id number] Attending: Jayjay Kevin MD     ICU admit date:  10/5/19  Intubation date:  10/4/19 (11pm) - extubated post-op    Active problems:  Perforated gangrenous appendicitis with spillage of feculent material s/p ex lap, ileocecectomy and creation of ileocolic anastomosis and washout  Ischemic cardiomyopathy  CVA in 2014  Severe CAD s/p CABG, multiple stents with restenosis  Essential hypertension  Dementia    Summary: Samia Padilla is a 5-year-old female past medical history of dementia, coronary artery disease status post bypass and multiple stents with restenosis, hypertension, ischemic cardiomyopathy, prior CVA with residual right-sided weakness who presented to the emergency department with altered mental status. She was found to have an acute abdomen and on CT scan was noted to have a likely perforated appendicitis. She was taken to the OR and found to have perforated gangrenous appendicitis and underwent an exploratory laparoscopy, washout and ileo-cecectomy and creation of ileocolic anastomosis. She was transferred to the ICU for close monitoring postop.    24 hour events: Extubated postop. Transferred to ICU.     ASSESSMENT/PLAN:    GI:   # Perforated gangrenous appendicitis with spillage of feculent material status post ileocecectomy and creation of ileocolic anastomosis and washout  - NPO, on zosyn q8hrs  - dilaudid for pain control    CV:   # Severe CAD s/p CABG, multiple stents with restenosis  - Patient instructed to have repeat CABG but was lost to follow-up in 2016  - Continued PTA atorvastatin, aspirin  - We will hold PTA metoprolol and Imdur overnight - if patient does not have significant oozing, will continue Plavix    # RBBB, deep T wave inversions on EKG  - d/w anesthesia and Gen Surg, will closely monitor telemetry as patient is high 
risk for post-op MI    Neuro:   # Hx of dementia  - Will continue PTA Namenda in AM    # Hx CVA in 2014  - will continue PTA aspirin in AM, plavix per Dr Bustamante in AM    Pulmonary: Extubated and on nonrebreather     Renal:  No acute issues recheck BMP in a.m.    ID: On Zosyn postop, will continue    Heme:   # On aspirin, Plavix prior to surgery  - Per Dr. Hartmann blood loss was minimal during surgery  - Will monitor for oozing  - We'll continue PTA aspirin in a.m. and will consider Plavix    Endocrine:   # Type 2 diabetes mellitus  - We will hold PTA glipizide and metformin  - Monitor blood glucose. Patient is n.p.o. with only sips of water and ice chips currently, will reassess when diet is advanced by surgery    Goals/Disposition: Patient is DO NOT RESUSCITATE, close monitoring in ICU.          Past Medical History:   Diagnosis Date   • Anemia     iron deficiency anemia   • Arthritis     elbows, left leg   • Blood clot associated with vein wall inflammation     DVT   • Cataract extraction status    • Cerebral infarction (CMS/[facility]) 4/2006   • Chest pain    • Coronary artery disease    • Depression    • Dyslipidemia    • GERD    • Hemorrhoids    • History of cardiac catheterization 7/8/2015 11/30/2012 Cardiac catheterization  Severe 70% long area of stenosis in distal right coronary artery just distal to the previously placed stents, status post successful percutaneous transluminal coronary angioplasty and stenting with a 2.5x30 mm Resolute Integrity drug-eluting stent.    • MYOCARDIAL INFARCT 3/97,   2006   • Other chronic pain     elbows, left leg   • S/P cardiac catheterization 12/14/2016 11/2/2016 Cardiac catheterization at [facility] [place]  Angiographic findings Native coronary lesions: ·Proximal LAD: Lesion 1: tubular, 50 % stenosis, site of prior stent. ·Mid LAD: Lesion 1: discrete, 95 % stenosis, site of prior stent. ·D2: Lesion 1: discrete, 60 % stenosis. ·OM1: Lesion 1: diffuse, 100 % 
stenosis. ·OM2: Lesion 1: tubular, 99 % stenosis. ·RCA: Lesion 1: discrete, 50 % stenosis, site of prior stent. ·Mid RCA: Lesion 1: diffuse, 100 % stenosis. ·Distal RCA: Lesion 1: diffuse, 100 % stenosis. Coronary graft lesions: ·Graft to LAD: sequential LIMA from D2  · Proximal /3 lesion 1: 100 % stenosis in proximal graft. ·Graft to OM2: SVG from ascending aorta  · 100 % stenosis at graft ostium. ·Graft to RPDA: SVG from ascending aorta  · 100 % stenosis at graft ostium. Intervention results Native coronary lesions: · Successful drug-eluting stent of mid LAD. Stent: 3.00 x 12 Synergy drug-eluting.   • Sleep apnea     doesn't use C-Pap   • Type II or unspecified type diabetes mellitus without mention of complication, not stated as uncontrolled    • Unspecified essential hypertension    • Urinary incontinence    • Wears dentures    • Wears glasses     for reading      Past Surgical History:   Procedure Laterality Date   • Carpal tunnel release      Right   • Carpal tunnel release  04    Left Carpal Tunnel Release   •  delivery+postpartum care      x 2   • Coronary angiogram - cv  3/97    moderate disease   • Esophagogastroduodenoscopy transoral flex w/bx single or mult  2005   • Eye surgery      bilateral cataract with lens implant   • Gastric bypass,obese<150cm lizz-en-y     • Hysterectomy     • Laparoscopy,tubal cautery      Tubal Ligation   • Ptca  2005    stent RCA x 2   • Ptca  2007    stent RCA and mid LAD   • Ptca  2008    drug eluting stent RCA and mid LAD   • Removal gallbladder      Cholecystectomy and hernia repair   • Service to gastroenterology  2003    colonoscopy    • Service to gastroenterology  2011    Colonoscopy - hemorrhoids   • Suct quang lipectomy,trunk       Medications Prior to Admission   Medication Sig Dispense Refill   • ferrous sulfate 325 (65 FE) MG tablet Take 325 mg by mouth daily (with breakfast).     • pantoprazole (PROTONIX) 40 MG tablet 
Take 40 mg by mouth daily.     • calcium carbonate (TUMS) 500 MG chewable tablet Chew 1 tablet by mouth 2 times daily.     • acetaminophen (TYLENOL) 500 MG tablet Take 1,000 mg by mouth every 6 hours as needed for Pain.     • glipiZIDE (GLUCOTROL) 5 MG tablet Take 2.5 mg by mouth daily (before breakfast).      • magnesium oxide (MAG-OX) 400 MG tablet Take 400 mg by mouth. Take 2 tab po in AM and 1 tab PM daily     • metoPROLOL (TOPROL-XL) 50 MG 24 hr tablet Take 1 tablet by mouth daily. 90 tablet 3   • fluticasone (FLONASE) 50 MCG/ACT nasal spray Spray in each nostril daily.     • amLODIPine (NORVASC) 5 MG tablet Take by mouth daily.      • atorvastatin (LIPITOR) 40 MG tablet Take 40 mg by mouth daily.     • isosorbide mononitrate (IMDUR) 60 MG 24 hr tablet Take 60 mg by mouth daily.     • metFORMIN (GLUCOPHAGE) 850 MG tablet Take by mouth 2 times daily (with meals).      • nitroGLYcerin (NITROSTAT) 0.4 MG SL tablet Place 1 tablet under the tongue every 5 minutes as needed for Chest pain. 90 tablet 12   • memantine (NAMENDA) 10 MG tablet Take 10 mg by mouth daily.     • hydrALAZINE (APRESOLINE) 50 MG tablet Take 50 mg by mouth 3 times daily.      • dimethicone (REMEDY NUTRASHIELD) 1 % cream Apply topically 2 times daily as needed.     • traMADOL (ULTRAM) 50 MG tablet Take 50 mg by mouth 2 times daily as needed for Pain.      • ergocalciferol (DRISDOL) 44960 UNITS capsule Take 50,000 Units by mouth every 30 days.      • traZODone (DESYREL) 50 MG tablet Take 50 mg by mouth nightly.      • polyvinyl alcohol (ARTIFICIAL TEARS) 1.4 % ophthalmic solution 1 drop 4 times daily as needed.      • sertraline (ZOLOFT) 100 MG tablet Take by mouth daily.      • docusate sodium/sennosides (SENOKOT S) 8.6-50 MG per tablet Take 2 tablets by mouth 2 times daily as needed. Take 2 tabs in AM and 2 in PM PRN for constipation     • polyethylene glycol (GLYCOLAX, MIRALAX) packet Take 17 g by mouth daily.     • trolamine salicylate 
(ASPERCREME) 10 % cream Apply 1 application topically as needed. bedtime   Indications: arthritic pain     • clopidogrel (PLAVIX) 75 MG tablet Take 1 tablet by mouth daily.     • BUTALBITAL-APAP-CAFFEINE -40 MG PO TABS 1-2 every 4 hr  max. total six tablets in 24 hr. 50 0   • LANTUS 100 UNIT/ML SC SOLN 15 units subcut daily 1 month 0   • ASPIR-LOW 81 MG PO TBEC Take 1 tab daily 0 0     ALLERGIES:   Allergen Reactions   • Lisinopril      angioedema   • Baclofen Nausea & Vomiting   • Enablex Nausea & Vomiting   • Flexeril [Cyclobenzaprine Hcl] VOMITING and NAUSEA   • Lisinopril-Hydrochlorothiazide Other (See Comments)     intolerance   • Sulfa Antibiotics HIVES and SWELLING      Social History     Tobacco Use   • Smoking status: Former Smoker     Packs/day: 1.50     Years: 22.00     Pack years: 33.00     Types: Cigarettes     Last attempt to quit: 2005     Years since quittin.7   • Smokeless tobacco: Never Used   Substance Use Topics   • Alcohol use: No     Alcohol/week: 0.0 standard drinks     Comment: Prior h/o ETOH abuse - none for 20 years       Family History   Problem Relation Age of Onset   • Diabetes Mother    • Heart disease Mother    • Heart disease Sister    • Hypertension Sister    • Hypertension Sister    • Diabetes Daughter    • Cancer Maternal Grandmother         Colon cancer   • Stroke Maternal Grandmother    • Heart disease Maternal Grandmother       ROS: Unobtainable due to altered mentation post op and dementia       ================================================================                ================================================================      I/O last 3 completed shifts:  In: 500 [I.V.:500]  Out: -   I/O this shift:  In: 750 [I.V.:750]  Out: 300 [Urine:200; Blood:100]    Vital Last Value 24 Hour Range   Temperature 99.8 °F (37.7 °C) (10/04/19 1806) Temp  Min: 99.8 °F (37.7 °C)  Max: 99.8 °F (37.7 °C)   Pulse 112 (10/04/19 2113) Pulse  Min: 104  Max: 117 
  Respiratory 23 (10/04/19 1817) Resp  Min: 20  Max: 23   Non-Invasive  Blood Pressure 162/79 (10/04/19 2115) BP  Min: 132/71  Max: 168/92   Pulse Oximetry 95 % (10/04/19 2113) SpO2  Min: 94 %  Max: 96 %   Arterial   Blood Pressure   No data recorded     VENT SETTINGS  FiO2 (%):  [21 %-95.1 %] 93.9 %  PEEP:  [0 cm H20-9 cm H20] 4 cm H20    PHYSICAL EXAM  General:  NAD, comfortable appearing female with 4L nasal cannula oxygen in place  HEENT:  PERRL, no conjunctival pallor or scleral icterus, mucous membranes dry  CV:  RRR, no m/r/g, S1/S2 present  Resp: Breath sounds diminished. No added sounds  Abd: Abdomen distended but soft. Midline surgical incision dressed without any drainage or surrounding erythema, BS absent   Ext:  No B/L LE edema   Skin:  Nml temp and moisture to touch  Neuro: Lethargic, awakens and answers questions, cranial nerves intact, moves all extremities, detailed motor and sensory exam limited due to lethargy     LAB RESULTS  Recent Labs   Lab 10/04/19  1821   WBC 6.4   HCT 37.3   HGB 11.2*        Recent Labs   Lab 10/04/19  1821   SODIUM 136   POTASSIUM 4.3   CHLORIDE 98   CO2 21   GLUCOSE 245*   BUN 25*   CREATININE 0.79       Best Practices  Sedation:   N/A  Analgesia:   PRN dilaudid  SBT:    N/A  Head of Bed:   30 degrees  DVT Proph.:   SCDs (sequential compression devices) and  LMWH (low moelcular weight heparin)  Nutrition:   none  Glycemic Control:  insulin subcutaneous  Line Necessity:  PIVs  Ulcer proph:   Not indicated    Pertinent Reviewed:  Allergies, Medications, Labs, Imaging and Physician and Nursing Notes     Patient discussed with anesthesia, Dr. Bustamante, eICU. Patient will be seen and examined with Dr. Kevin in a.Yury Ferrell DO  PGY-III   10/5/2019 1:32 AM  Pager:  [phone]      I have discussed the History/ progress note/ physical examination  with the Resident ,  I saw and examined  the patient face to face and reviewed imaging and 
laboratory data. We together formulated the management/ treatment plan     69 YO F, Dementia, CAD s/p CABG, multiple stents with restenosis, HTN, ICMP chronic systolic heart failure, EF 45%m CVA, DM2, protein energy malnutrition  ( all uncontrolled)   Presented with acute abdomen  Perforated gangrenous appendicitis with spillage of feculent material status post ileocecectomy and creation of ileocolic anastomosis and washout   Peritonitis  Post op pain  Post op ileus  Hypokalemia, hypomagnesemia   Low UOP  Acute kidney injury by the RIFLE criteria     History and exam limited by dementia and post op lethargy/ stress     Follow up UOP  Continue LR, increase rate  LR bolus  Plasmanate   DC zosyn  Ceftriaxone, flagyl, fluconazole   NPO  Aspirin, lovenox  Plavix tomorrow if no bleed  PRN IV antihypertensives  Pain control  K, Mag correction   Discussed with the surgeon   DC A line  Low threshold to repeat echo       
Patient
verbal instruction

## 2019-12-25 ENCOUNTER — EMERGENCY (EMERGENCY)
Facility: HOSPITAL | Age: 74
LOS: 1 days | Discharge: HOME | End: 2019-12-25
Attending: STUDENT IN AN ORGANIZED HEALTH CARE EDUCATION/TRAINING PROGRAM
Payer: MEDICARE

## 2019-12-25 VITALS
WEIGHT: 201.06 LBS | HEIGHT: 73 IN | RESPIRATION RATE: 17 BRPM | OXYGEN SATURATION: 100 % | TEMPERATURE: 98 F | SYSTOLIC BLOOD PRESSURE: 185 MMHG | DIASTOLIC BLOOD PRESSURE: 91 MMHG | HEART RATE: 96 BPM

## 2019-12-25 DIAGNOSIS — Z98.890 OTHER SPECIFIED POSTPROCEDURAL STATES: Chronic | ICD-10-CM

## 2019-12-25 DIAGNOSIS — Z98.89 OTHER SPECIFIED POSTPROCEDURAL STATES: Chronic | ICD-10-CM

## 2019-12-25 DIAGNOSIS — I77.0 ARTERIOVENOUS FISTULA, ACQUIRED: Chronic | ICD-10-CM

## 2019-12-25 PROCEDURE — 85027 COMPLETE CBC AUTOMATED: CPT

## 2019-12-25 PROCEDURE — 80053 COMPREHEN METABOLIC PANEL: CPT

## 2019-12-25 PROCEDURE — 83880 ASSAY OF NATRIURETIC PEPTIDE: CPT

## 2019-12-25 PROCEDURE — 85730 THROMBOPLASTIN TIME PARTIAL: CPT

## 2019-12-25 PROCEDURE — 71046 X-RAY EXAM CHEST 2 VIEWS: CPT

## 2019-12-25 PROCEDURE — 99284 EMERGENCY DEPT VISIT MOD MDM: CPT

## 2019-12-25 PROCEDURE — 84484 ASSAY OF TROPONIN QUANT: CPT

## 2019-12-25 PROCEDURE — 85610 PROTHROMBIN TIME: CPT

## 2019-12-25 PROCEDURE — 93005 ELECTROCARDIOGRAM TRACING: CPT

## 2019-12-25 PROCEDURE — 99283 EMERGENCY DEPT VISIT LOW MDM: CPT | Mod: 25

## 2019-12-26 VITALS
HEART RATE: 77 BPM | DIASTOLIC BLOOD PRESSURE: 77 MMHG | RESPIRATION RATE: 18 BRPM | SYSTOLIC BLOOD PRESSURE: 184 MMHG | OXYGEN SATURATION: 98 % | TEMPERATURE: 98 F

## 2019-12-26 LAB
ALBUMIN SERPL ELPH-MCNC: 3.5 G/DL — SIGNIFICANT CHANGE UP (ref 3.5–5)
ALP SERPL-CCNC: 119 U/L — SIGNIFICANT CHANGE UP (ref 40–120)
ALT FLD-CCNC: 18 U/L DA — SIGNIFICANT CHANGE UP (ref 10–60)
ANION GAP SERPL CALC-SCNC: 4 MMOL/L — LOW (ref 5–17)
APTT BLD: 29.6 SEC — SIGNIFICANT CHANGE UP (ref 27.5–36.3)
AST SERPL-CCNC: 16 U/L — SIGNIFICANT CHANGE UP (ref 10–40)
BASOPHILS # BLD AUTO: 0.02 K/UL — SIGNIFICANT CHANGE UP (ref 0–0.2)
BASOPHILS NFR BLD AUTO: 0.3 % — SIGNIFICANT CHANGE UP (ref 0–2)
BILIRUB SERPL-MCNC: 0.5 MG/DL — SIGNIFICANT CHANGE UP (ref 0.2–1.2)
BUN SERPL-MCNC: 41 MG/DL — HIGH (ref 7–18)
CALCIUM SERPL-MCNC: 8.6 MG/DL — SIGNIFICANT CHANGE UP (ref 8.4–10.5)
CHLORIDE SERPL-SCNC: 94 MMOL/L — LOW (ref 96–108)
CO2 SERPL-SCNC: 37 MMOL/L — HIGH (ref 22–31)
CREAT SERPL-MCNC: 9.8 MG/DL — HIGH (ref 0.5–1.3)
EOSINOPHIL # BLD AUTO: 0.11 K/UL — SIGNIFICANT CHANGE UP (ref 0–0.5)
EOSINOPHIL NFR BLD AUTO: 1.7 % — SIGNIFICANT CHANGE UP (ref 0–6)
GLUCOSE SERPL-MCNC: 89 MG/DL — SIGNIFICANT CHANGE UP (ref 70–99)
HCT VFR BLD CALC: 34.2 % — LOW (ref 39–50)
HGB BLD-MCNC: 10.4 G/DL — LOW (ref 13–17)
IMM GRANULOCYTES NFR BLD AUTO: 0.2 % — SIGNIFICANT CHANGE UP (ref 0–1.5)
INR BLD: 1.09 RATIO — SIGNIFICANT CHANGE UP (ref 0.88–1.16)
LYMPHOCYTES # BLD AUTO: 1.43 K/UL — SIGNIFICANT CHANGE UP (ref 1–3.3)
LYMPHOCYTES # BLD AUTO: 22.4 % — SIGNIFICANT CHANGE UP (ref 13–44)
MCHC RBC-ENTMCNC: 28.2 PG — SIGNIFICANT CHANGE UP (ref 27–34)
MCHC RBC-ENTMCNC: 30.4 GM/DL — LOW (ref 32–36)
MCV RBC AUTO: 92.7 FL — SIGNIFICANT CHANGE UP (ref 80–100)
MONOCYTES # BLD AUTO: 0.73 K/UL — SIGNIFICANT CHANGE UP (ref 0–0.9)
MONOCYTES NFR BLD AUTO: 11.4 % — SIGNIFICANT CHANGE UP (ref 2–14)
NEUTROPHILS # BLD AUTO: 4.08 K/UL — SIGNIFICANT CHANGE UP (ref 1.8–7.4)
NEUTROPHILS NFR BLD AUTO: 64 % — SIGNIFICANT CHANGE UP (ref 43–77)
NRBC # BLD: 0 /100 WBCS — SIGNIFICANT CHANGE UP (ref 0–0)
NT-PROBNP SERPL-SCNC: 2132 PG/ML — HIGH (ref 0–450)
PLATELET # BLD AUTO: 182 K/UL — SIGNIFICANT CHANGE UP (ref 150–400)
POTASSIUM SERPL-MCNC: 4.2 MMOL/L — SIGNIFICANT CHANGE UP (ref 3.5–5.3)
POTASSIUM SERPL-SCNC: 4.2 MMOL/L — SIGNIFICANT CHANGE UP (ref 3.5–5.3)
PROT SERPL-MCNC: 8 G/DL — SIGNIFICANT CHANGE UP (ref 6–8.3)
PROTHROM AB SERPL-ACNC: 12.1 SEC — SIGNIFICANT CHANGE UP (ref 10–12.9)
RBC # BLD: 3.69 M/UL — LOW (ref 4.2–5.8)
RBC # FLD: 16.1 % — HIGH (ref 10.3–14.5)
SODIUM SERPL-SCNC: 135 MMOL/L — SIGNIFICANT CHANGE UP (ref 135–145)
TROPONIN I SERPL-MCNC: 0.02 NG/ML — SIGNIFICANT CHANGE UP (ref 0–0.04)
WBC # BLD: 6.38 K/UL — SIGNIFICANT CHANGE UP (ref 3.8–10.5)
WBC # FLD AUTO: 6.38 K/UL — SIGNIFICANT CHANGE UP (ref 3.8–10.5)

## 2019-12-26 PROCEDURE — 71046 X-RAY EXAM CHEST 2 VIEWS: CPT | Mod: 26

## 2019-12-26 RX ORDER — FAMOTIDINE 10 MG/ML
1 INJECTION INTRAVENOUS
Qty: 10 | Refills: 0
Start: 2019-12-26 | End: 2020-01-04

## 2019-12-26 NOTE — ED PROVIDER NOTE - PATIENT PORTAL LINK FT
You can access the FollowMyHealth Patient Portal offered by [facility] by registering at the following website: [email or web address]. By joining [employer]’s FollowMyHealth portal, you will also be able to view your health information using other applications (apps) compatible with our system.

## 2019-12-26 NOTE — ED PROVIDER NOTE - CLINICAL SUMMARY MEDICAL DECISION MAKING FREE TEXT BOX
Patient presenting with cp. vital stable. cp x 1 day. will obtain lab, assess acs, lyte abnormality, cxr. ed obs and reassess

## 2019-12-26 NOTE — ED PROVIDER NOTE - OBJECTIVE STATEMENT
74 y.o esrd presenting with 1 day of worsening sternal cp worse with walking. denies n, v, sob. was dialyzed on monday. normal schedule tuesday, thursday, saturday

## 2020-01-21 ENCOUNTER — EMERGENCY (EMERGENCY)
Facility: HOSPITAL | Age: 75
LOS: 1 days | Discharge: HOME | End: 2020-01-21
Attending: EMERGENCY MEDICINE
Payer: MEDICARE

## 2020-01-21 VITALS
RESPIRATION RATE: 16 BRPM | OXYGEN SATURATION: 98 % | TEMPERATURE: 98 F | HEART RATE: 80 BPM | DIASTOLIC BLOOD PRESSURE: 70 MMHG | SYSTOLIC BLOOD PRESSURE: 140 MMHG

## 2020-01-21 VITALS — HEIGHT: 73 IN | WEIGHT: 237 LBS

## 2020-01-21 DIAGNOSIS — Z98.89 OTHER SPECIFIED POSTPROCEDURAL STATES: Chronic | ICD-10-CM

## 2020-01-21 DIAGNOSIS — I77.0 ARTERIOVENOUS FISTULA, ACQUIRED: Chronic | ICD-10-CM

## 2020-01-21 DIAGNOSIS — Z98.890 OTHER SPECIFIED POSTPROCEDURAL STATES: Chronic | ICD-10-CM

## 2020-01-21 LAB
ALBUMIN SERPL ELPH-MCNC: 3.3 G/DL — LOW (ref 3.5–5)
ALP SERPL-CCNC: 130 U/L — HIGH (ref 40–120)
ALT FLD-CCNC: 24 U/L DA — SIGNIFICANT CHANGE UP (ref 10–60)
ANION GAP SERPL CALC-SCNC: 4 MMOL/L — LOW (ref 5–17)
AST SERPL-CCNC: 39 U/L — SIGNIFICANT CHANGE UP (ref 10–40)
BASOPHILS # BLD AUTO: 0.03 K/UL — SIGNIFICANT CHANGE UP (ref 0–0.2)
BASOPHILS NFR BLD AUTO: 0.5 % — SIGNIFICANT CHANGE UP (ref 0–2)
BILIRUB SERPL-MCNC: 0.4 MG/DL — SIGNIFICANT CHANGE UP (ref 0.2–1.2)
BUN SERPL-MCNC: 16 MG/DL — SIGNIFICANT CHANGE UP (ref 7–18)
CALCIUM SERPL-MCNC: 8.6 MG/DL — SIGNIFICANT CHANGE UP (ref 8.4–10.5)
CHLORIDE SERPL-SCNC: 98 MMOL/L — SIGNIFICANT CHANGE UP (ref 96–108)
CO2 SERPL-SCNC: 29 MMOL/L — SIGNIFICANT CHANGE UP (ref 22–31)
CREAT SERPL-MCNC: 5.55 MG/DL — HIGH (ref 0.5–1.3)
EOSINOPHIL # BLD AUTO: 0.05 K/UL — SIGNIFICANT CHANGE UP (ref 0–0.5)
EOSINOPHIL NFR BLD AUTO: 0.8 % — SIGNIFICANT CHANGE UP (ref 0–6)
GLUCOSE SERPL-MCNC: 88 MG/DL — SIGNIFICANT CHANGE UP (ref 70–99)
HCT VFR BLD CALC: 37.7 % — LOW (ref 39–50)
HGB BLD-MCNC: 11.3 G/DL — LOW (ref 13–17)
IMM GRANULOCYTES NFR BLD AUTO: 0.2 % — SIGNIFICANT CHANGE UP (ref 0–1.5)
LYMPHOCYTES # BLD AUTO: 1.02 K/UL — SIGNIFICANT CHANGE UP (ref 1–3.3)
LYMPHOCYTES # BLD AUTO: 17 % — SIGNIFICANT CHANGE UP (ref 13–44)
MCHC RBC-ENTMCNC: 27.8 PG — SIGNIFICANT CHANGE UP (ref 27–34)
MCHC RBC-ENTMCNC: 30 GM/DL — LOW (ref 32–36)
MCV RBC AUTO: 92.9 FL — SIGNIFICANT CHANGE UP (ref 80–100)
MONOCYTES # BLD AUTO: 0.66 K/UL — SIGNIFICANT CHANGE UP (ref 0–0.9)
MONOCYTES NFR BLD AUTO: 11 % — SIGNIFICANT CHANGE UP (ref 2–14)
NEUTROPHILS # BLD AUTO: 4.24 K/UL — SIGNIFICANT CHANGE UP (ref 1.8–7.4)
NEUTROPHILS NFR BLD AUTO: 70.5 % — SIGNIFICANT CHANGE UP (ref 43–77)
NRBC # BLD: 0 /100 WBCS — SIGNIFICANT CHANGE UP (ref 0–0)
PLATELET # BLD AUTO: 172 K/UL — SIGNIFICANT CHANGE UP (ref 150–400)
POTASSIUM SERPL-MCNC: 5.4 MMOL/L — HIGH (ref 3.5–5.3)
POTASSIUM SERPL-SCNC: 5.4 MMOL/L — HIGH (ref 3.5–5.3)
PROT SERPL-MCNC: 8.7 G/DL — HIGH (ref 6–8.3)
RBC # BLD: 4.06 M/UL — LOW (ref 4.2–5.8)
RBC # FLD: 15.6 % — HIGH (ref 10.3–14.5)
SODIUM SERPL-SCNC: 131 MMOL/L — LOW (ref 135–145)
TROPONIN I SERPL-MCNC: <0.015 NG/ML — SIGNIFICANT CHANGE UP (ref 0–0.04)
WBC # BLD: 6.01 K/UL — SIGNIFICANT CHANGE UP (ref 3.8–10.5)
WBC # FLD AUTO: 6.01 K/UL — SIGNIFICANT CHANGE UP (ref 3.8–10.5)

## 2020-01-21 PROCEDURE — 85027 COMPLETE CBC AUTOMATED: CPT

## 2020-01-21 PROCEDURE — 80053 COMPREHEN METABOLIC PANEL: CPT

## 2020-01-21 PROCEDURE — 99284 EMERGENCY DEPT VISIT MOD MDM: CPT | Mod: 25

## 2020-01-21 PROCEDURE — 84484 ASSAY OF TROPONIN QUANT: CPT

## 2020-01-21 PROCEDURE — 74176 CT ABD & PELVIS W/O CONTRAST: CPT

## 2020-01-21 PROCEDURE — 93005 ELECTROCARDIOGRAM TRACING: CPT

## 2020-01-21 PROCEDURE — 36415 COLL VENOUS BLD VENIPUNCTURE: CPT

## 2020-01-21 PROCEDURE — 74176 CT ABD & PELVIS W/O CONTRAST: CPT | Mod: 26

## 2020-01-21 PROCEDURE — 99284 EMERGENCY DEPT VISIT MOD MDM: CPT

## 2020-01-21 RX ORDER — IOHEXOL 300 MG/ML
30 INJECTION, SOLUTION INTRAVENOUS ONCE
Refills: 0 | Status: COMPLETED | OUTPATIENT
Start: 2020-01-21 | End: 2020-01-21

## 2020-01-21 RX ADMIN — IOHEXOL 30 MILLILITER(S): 300 INJECTION, SOLUTION INTRAVENOUS at 17:14

## 2020-01-21 NOTE — ED PROVIDER NOTE - PROGRESS NOTE DETAILS
CT with no acute pathology.  Potasssium slightly hemolyzed.  pt reassessed and feels ok.  Will d/c with PMD follow up.

## 2020-01-21 NOTE — ED PROVIDER NOTE - CLINICAL SUMMARY MEDICAL DECISION MAKING FREE TEXT BOX
Will obtain labs and CT scan. Patient declined analgesia. Patient with abdominal pain. Will obtain labs and CT scan to r/o ?incarcerated hernia. Patient declined analgesia.

## 2020-01-21 NOTE — ED PROVIDER NOTE - PATIENT PORTAL LINK FT
You can access the FollowMyHealth Patient Portal offered by Health system by registering at the following website: [email or web address]. By joining [employer]’s FollowMyHealth portal, you will also be able to view your health information using other applications (apps) compatible with our system.

## 2020-01-21 NOTE — ED ADULT NURSE NOTE - CAS ELECT INFOMATION PROVIDED
Dr. Citlali Diehl has reviewed discharge instructions with the patient. The patient verbalized understanding. Pt. A&Ox4, respirations even and unlabored. VS stable as noted in flowsheet. Declined wheelchair assist from department; paperwork in hand. Dr Vincent

## 2020-01-21 NOTE — ED ADULT TRIAGE NOTE - INTERNATIONAL TRAVEL
[facility] Emergency Department Call Back     Hello,    This is Solitario Gonsalves RN calling from [employer] regarding you recent visit. If you have any questions or concerns, please call the Emergency Department back at [phone]. If not, please disregard this message and have a great day. No

## 2020-01-21 NOTE — ED ADULT NURSE NOTE - NS ED PATIENT SAFETY CONCERN
[facility]: [phone]     [facility] Medication Refill Request Information:  * Please contact your pharmacy regarding ANY request for medication refills.  ** [facility] Prescription Fax = [phone]  * Please allow 3 business days for routine medication refills.  * Please allow 5 business days for controlled substance medication refills.     [facility] Test Result notification information:  *You will be notified with in 7-10 days of your appointment day regarding the results of your test.  If you are on MyChart you will be notified as soon as the provider has reviewed the results and signed off on them.  
No

## 2020-01-21 NOTE — ED PROVIDER NOTE - OBJECTIVE STATEMENT
73 y/o M patient with a significant PMHx of CAD, ESRD on dialysis (tues, thurs. sat.) HTN, DVT, pre-diabetes presents to the ED for abdominal pain and abdominal swelling x2 weeks. Patient states he had full dialysis today and notes periumbilical discomfort. Patient denies any other complaints.

## 2020-02-06 ENCOUNTER — APPOINTMENT (OUTPATIENT)
Dept: SURGERY | Facility: CLINIC | Age: 75
End: 2020-02-06
Payer: MEDICARE

## 2020-02-06 VITALS
WEIGHT: 227.07 LBS | DIASTOLIC BLOOD PRESSURE: 65 MMHG | HEART RATE: 97 BPM | HEIGHT: 73 IN | OXYGEN SATURATION: 97 % | TEMPERATURE: 98.4 F | SYSTOLIC BLOOD PRESSURE: 136 MMHG | BODY MASS INDEX: 30.09 KG/M2

## 2020-02-06 PROCEDURE — 99213 OFFICE O/P EST LOW 20 MIN: CPT

## 2020-02-07 NOTE — PHYSICAL EXAM
[Alert] : alert [Oriented to Person] : oriented to person [Oriented to Place] : oriented to place [Calm] : calm [Oriented to Time] : oriented to time [de-identified] : The patient is alert, well-groomed, and cheerful. [de-identified] :   anicteric.  Nasal mucosa pink, septum midline. Oral mucosa pink.  Tongue midline, Pharynx without exudates.\par   [de-identified] : right abdomen ileo conduit catheter  reducible  incisional hernia, non- tender.  The defect appears to be relatively large and the skin overlying the hernia is normal [de-identified] :  Neck supple. Trachea midline. Thyroid isthmus barely palpable, lobes not felt.\par

## 2020-02-07 NOTE — PLAN
67 male w/ PMHX of CAD s/p CABG x 3 and PCI, HFrEF (LVEF 24%, LVEDD 5.4) mod-severe MR, severe diastolic dysfunction, RV systolic failure, no AICD (has refused it in past) DM II, PAD s/p L KEI stent, s/p LLE CFA to below-knee bypass with PTFE for long-segment SFA occlusion, L 2nd toe amputation /R toe amputation, c/b groin soft tissue infection requiring washout, sartorius flap, and vac placement. His last admission from 12/8-12/27 required CCU admission and placement on dobutamine. He has had 3 admissions in 2018, for various complications from DM.  He comes in this time due to worsening SOB. He admits to 2 pillow orthopnea, frequent PND and ORTA after 1/2 block and walking up 5 steps. No CP, palpitations, dizziness. Patient's son by bedside. On tele, patient not diuresing well and condition guarded. Admitted to CCU for inotropic therapy, initiation of primacor infusion and IV diuresis with lasix gtt, now on Bumex drip.       PLAN:    Neuro:  AO x 3    Pulm:   Clear to ascultation.   On NC for nasal congestion and symptomatic relief.     Cardiovascular:   NYHA class IV due to ischemic cardiomyopathy. On physical exam, patient is volume overloaded although lungs are relatively clear  -Increase milrinone to .25 today and recheck mixed venous in the afternoon   -Repeat Echo with increased EF and diffuse LV hypokinesis   -d/c bumex drip and transition to bumex 3mg IV BID tonight - pt was net negative 1800 on drip overnight and CVP 8-10, pt appears closer to euvolemic today  -Strict I/O and daily standing weights. Fluid restriction to 1500mL  -holding all BP meds for now in the setting of rising creatinine to try to increase preload  -Once hemodynamically optimized, consider ICD placement, given low EF and ischemic cardiomyopathy  -to discuss with heart failure team regarding long term goals - pt has previously refused invasive procedures     GI/:  Distended abdomen with dullness to percussion, suspect abdominal fluid overload with possible ascites  -pt with baseline elevated Tbili and Alk phos  -abdominal xray showing diffuse bowel air, ultrasound pending  -Currently no N/V/D. 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 Elevated today. Ultrasound shows renal parenchymal disease.   -Likely secondary to renal venous congestion from cardiorenal syndrome. Diuresis and continue to monitor.     Heme:   Anemia to low 9s with low MCV  iron studies inconclusive  reticulocyte index indicating hypoproliferation  Aspirin and Hep Subq    ID:   No issues    Endo:  DM, on 25 Lantus and ISS.   Fingerstick stable, continue to monitor.     DVT: Heparin  Diet: Regular, DASH/TLC, Carb restricted, fluid 1000mL restrict     Code status: Full code. [FreeTextEntry1] : Mr. THAO  was told significance of findings, options, risks and benefits were explained.   All surgical options were discussed including non-surgical treatments.  patient has a recurrent incisional hernia . patient states although he gets HD he still has urine coming out through the catheter. patient was advised  against the surgery as the pain  does not bother him much. patient has an ileo conduit catheter that cannot be removed and will interfere with the repair.  patient agrees with the plan. \par Patient advised to seek immediate medical attention with any acute change in symptoms or with the development of any new or worsening symptoms.  Patient's questions and concerns addressed to patient's satisfaction, and patient verbalized an understanding of the information discussed.\par \par

## 2020-02-07 NOTE — REVIEW OF SYSTEMS
[Arthralgias] : arthralgias [Joint Pain] : joint pain [Negative] : Heme/Lymph [FreeTextEntry8] : HD

## 2020-02-07 NOTE — HISTORY OF PRESENT ILLNESS
[de-identified] : Patient is s/p Incisional hernia repair with mesh on 11/02/2018.  patient presenting today with cc of having pain in the right upper quadrant . patient has a known history of incisional hernia  and ileo conduit catheter.  He denies any trauma to the area, fever, nausea, vomiting, distension, night sweats and  loss of appetite.  Symptoms aggravated by cough and straining.  - He reports normal bowel movements. Patient is on HD.   \par \par \par \par

## 2020-08-31 NOTE — PROGRESS NOTE ADULT - PROBLEM SELECTOR PROBLEM 3
[FreeTextEntry1] : HYpoT \par Take daily in AM on an empty stomach at least 30 minutes before eating or taking other medication.\par continue lt4 75 mcg qd \par check TFTs today \par check TPO ab and Tg ab to evaluate for autoimmune disease. \par no family h/o thyroid cancer, no neck XRT\par no dysphagia, no dysphonia, no neck pain, no voice change\par check thyroid uS to evaluate architecture \par will call pt with results. \par \par Obesity: \par discussed diet and exercise\par encouraged more exercise walking 30 min 3 x week\par Needs to try to have more protein for meals\par \par  Paroxysmal atrial fibrillation ESRD (end stage renal disease) on dialysis

## 2020-09-15 ENCOUNTER — EMERGENCY (EMERGENCY)
Facility: HOSPITAL | Age: 75
LOS: 1 days | Discharge: HOME | End: 2020-09-15
Attending: EMERGENCY MEDICINE
Payer: MEDICARE

## 2020-09-15 VITALS
HEART RATE: 71 BPM | DIASTOLIC BLOOD PRESSURE: 79 MMHG | OXYGEN SATURATION: 98 % | WEIGHT: 231.93 LBS | TEMPERATURE: 98 F | HEIGHT: 73 IN | SYSTOLIC BLOOD PRESSURE: 165 MMHG | RESPIRATION RATE: 19 BRPM

## 2020-09-15 VITALS
OXYGEN SATURATION: 95 % | HEART RATE: 67 BPM | TEMPERATURE: 97 F | SYSTOLIC BLOOD PRESSURE: 179 MMHG | DIASTOLIC BLOOD PRESSURE: 90 MMHG | RESPIRATION RATE: 19 BRPM

## 2020-09-15 DIAGNOSIS — I77.0 ARTERIOVENOUS FISTULA, ACQUIRED: Chronic | ICD-10-CM

## 2020-09-15 DIAGNOSIS — Z98.89 OTHER SPECIFIED POSTPROCEDURAL STATES: Chronic | ICD-10-CM

## 2020-09-15 DIAGNOSIS — Z98.890 OTHER SPECIFIED POSTPROCEDURAL STATES: Chronic | ICD-10-CM

## 2020-09-15 LAB
ALBUMIN SERPL ELPH-MCNC: 3.2 G/DL — LOW (ref 3.5–5)
ALP SERPL-CCNC: 77 U/L — SIGNIFICANT CHANGE UP (ref 40–120)
ALT FLD-CCNC: 16 U/L DA — SIGNIFICANT CHANGE UP (ref 10–60)
ANION GAP SERPL CALC-SCNC: 6 MMOL/L — SIGNIFICANT CHANGE UP (ref 5–17)
APTT BLD: 35.1 SEC — SIGNIFICANT CHANGE UP (ref 27.5–35.5)
AST SERPL-CCNC: 9 U/L — LOW (ref 10–40)
BASOPHILS # BLD AUTO: 0.03 K/UL — SIGNIFICANT CHANGE UP (ref 0–0.2)
BASOPHILS NFR BLD AUTO: 0.5 % — SIGNIFICANT CHANGE UP (ref 0–2)
BILIRUB SERPL-MCNC: 0.3 MG/DL — SIGNIFICANT CHANGE UP (ref 0.2–1.2)
BUN SERPL-MCNC: 51 MG/DL — HIGH (ref 7–18)
CALCIUM SERPL-MCNC: 8.7 MG/DL — SIGNIFICANT CHANGE UP (ref 8.4–10.5)
CHLORIDE SERPL-SCNC: 96 MMOL/L — SIGNIFICANT CHANGE UP (ref 96–108)
CO2 SERPL-SCNC: 32 MMOL/L — HIGH (ref 22–31)
CREAT SERPL-MCNC: 11.2 MG/DL — HIGH (ref 0.5–1.3)
EOSINOPHIL # BLD AUTO: 0.14 K/UL — SIGNIFICANT CHANGE UP (ref 0–0.5)
EOSINOPHIL NFR BLD AUTO: 2.5 % — SIGNIFICANT CHANGE UP (ref 0–6)
GLUCOSE SERPL-MCNC: 89 MG/DL — SIGNIFICANT CHANGE UP (ref 70–99)
HCT VFR BLD CALC: 33.3 % — LOW (ref 39–50)
HGB BLD-MCNC: 10.6 G/DL — LOW (ref 13–17)
IMM GRANULOCYTES NFR BLD AUTO: 0.4 % — SIGNIFICANT CHANGE UP (ref 0–1.5)
INR BLD: 1.06 RATIO — SIGNIFICANT CHANGE UP (ref 0.88–1.16)
LACTATE SERPL-SCNC: 2.2 MMOL/L — HIGH (ref 0.7–2)
LIDOCAIN IGE QN: 269 U/L — SIGNIFICANT CHANGE UP (ref 73–393)
LYMPHOCYTES # BLD AUTO: 1.58 K/UL — SIGNIFICANT CHANGE UP (ref 1–3.3)
LYMPHOCYTES # BLD AUTO: 28.3 % — SIGNIFICANT CHANGE UP (ref 13–44)
MCHC RBC-ENTMCNC: 29 PG — SIGNIFICANT CHANGE UP (ref 27–34)
MCHC RBC-ENTMCNC: 31.8 GM/DL — LOW (ref 32–36)
MCV RBC AUTO: 91 FL — SIGNIFICANT CHANGE UP (ref 80–100)
MONOCYTES # BLD AUTO: 0.61 K/UL — SIGNIFICANT CHANGE UP (ref 0–0.9)
MONOCYTES NFR BLD AUTO: 10.9 % — SIGNIFICANT CHANGE UP (ref 2–14)
NEUTROPHILS # BLD AUTO: 3.2 K/UL — SIGNIFICANT CHANGE UP (ref 1.8–7.4)
NEUTROPHILS NFR BLD AUTO: 57.4 % — SIGNIFICANT CHANGE UP (ref 43–77)
NRBC # BLD: 0 /100 WBCS — SIGNIFICANT CHANGE UP (ref 0–0)
PLATELET # BLD AUTO: 169 K/UL — SIGNIFICANT CHANGE UP (ref 150–400)
POTASSIUM SERPL-MCNC: 4.9 MMOL/L — SIGNIFICANT CHANGE UP (ref 3.5–5.3)
POTASSIUM SERPL-SCNC: 4.9 MMOL/L — SIGNIFICANT CHANGE UP (ref 3.5–5.3)
PROT SERPL-MCNC: 8.2 G/DL — SIGNIFICANT CHANGE UP (ref 6–8.3)
PROTHROM AB SERPL-ACNC: 12.4 SEC — SIGNIFICANT CHANGE UP (ref 10.6–13.6)
RBC # BLD: 3.66 M/UL — LOW (ref 4.2–5.8)
RBC # FLD: 16.1 % — HIGH (ref 10.3–14.5)
SODIUM SERPL-SCNC: 134 MMOL/L — LOW (ref 135–145)
TROPONIN I SERPL-MCNC: <0.015 NG/ML — SIGNIFICANT CHANGE UP (ref 0–0.04)
WBC # BLD: 5.58 K/UL — SIGNIFICANT CHANGE UP (ref 3.8–10.5)
WBC # FLD AUTO: 5.58 K/UL — SIGNIFICANT CHANGE UP (ref 3.8–10.5)

## 2020-09-15 PROCEDURE — 85730 THROMBOPLASTIN TIME PARTIAL: CPT

## 2020-09-15 PROCEDURE — 80053 COMPREHEN METABOLIC PANEL: CPT

## 2020-09-15 PROCEDURE — 85025 COMPLETE CBC W/AUTO DIFF WBC: CPT

## 2020-09-15 PROCEDURE — 96374 THER/PROPH/DIAG INJ IV PUSH: CPT

## 2020-09-15 PROCEDURE — 93005 ELECTROCARDIOGRAM TRACING: CPT

## 2020-09-15 PROCEDURE — 36415 COLL VENOUS BLD VENIPUNCTURE: CPT

## 2020-09-15 PROCEDURE — 83690 ASSAY OF LIPASE: CPT

## 2020-09-15 PROCEDURE — 83605 ASSAY OF LACTIC ACID: CPT

## 2020-09-15 PROCEDURE — 85610 PROTHROMBIN TIME: CPT

## 2020-09-15 PROCEDURE — 84484 ASSAY OF TROPONIN QUANT: CPT

## 2020-09-15 PROCEDURE — 99284 EMERGENCY DEPT VISIT MOD MDM: CPT | Mod: 25

## 2020-09-15 PROCEDURE — 74176 CT ABD & PELVIS W/O CONTRAST: CPT | Mod: 26

## 2020-09-15 PROCEDURE — 99285 EMERGENCY DEPT VISIT HI MDM: CPT

## 2020-09-15 PROCEDURE — 74176 CT ABD & PELVIS W/O CONTRAST: CPT

## 2020-09-15 RX ORDER — MORPHINE SULFATE 50 MG/1
2 CAPSULE, EXTENDED RELEASE ORAL ONCE
Refills: 0 | Status: DISCONTINUED | OUTPATIENT
Start: 2020-09-15 | End: 2020-09-15

## 2020-09-15 RX ORDER — SODIUM CHLORIDE 9 MG/ML
1000 INJECTION INTRAMUSCULAR; INTRAVENOUS; SUBCUTANEOUS ONCE
Refills: 0 | Status: DISCONTINUED | OUTPATIENT
Start: 2020-09-15 | End: 2020-09-15

## 2020-09-15 RX ORDER — IOHEXOL 300 MG/ML
30 INJECTION, SOLUTION INTRAVENOUS ONCE
Refills: 0 | Status: COMPLETED | OUTPATIENT
Start: 2020-09-15 | End: 2020-09-15

## 2020-09-15 RX ADMIN — IOHEXOL 30 MILLILITER(S): 300 INJECTION, SOLUTION INTRAVENOUS at 03:23

## 2020-09-15 RX ADMIN — MORPHINE SULFATE 2 MILLIGRAM(S): 50 CAPSULE, EXTENDED RELEASE ORAL at 03:23

## 2020-09-15 NOTE — ED ADULT NURSE NOTE - CAS EDN DISCHARGE ASSESSMENT
Problem: Patient Care Overview  Goal: Plan of Care/Patient Progress Review  Outcome: No Change  D: Up himself in room, sat in chair with legs elevated for awhile. No change in cool, purplish toes. Patient stated that M.D. Has seen legs/ feet. Tolerated meals well, insulin given as needed with glucose slightly elevated.Drain patent along with dressings.  Dilaudid every 4-5 hours breakthrough pain with methadone. P: Monitor blood culture results for ability to have new PICC placed.       Alert and oriented to person, place and time

## 2020-09-15 NOTE — ED PROVIDER NOTE - OBJECTIVE STATEMENT
73 y/o male h/o CAD s/p LAD stent, ESRD on hemodialysis T/Th/Sat, HTN, DVT, obesity, DM, PVD, prostate cancer s/p prostatectomy, presents with mid abdominal pain. Reports pain started yesterday morning and has persisted all day today. Reports feeling a ball next to his belly button. Denies fever or V/D. States his last normal bowel movement was yesterday. Patient does have an indwelling suprapubic catheter which he reports gets changed every month by his urologist. Patient had a hernia repair in the past which reoccurred, and was reportedly told he cannot have surgery because it was too close to his kidneys. Reports that now the ball in the abdomen has gotten larger.

## 2020-09-15 NOTE — ED PROVIDER NOTE - NSFOLLOWUPINSTRUCTIONS_ED_ALL_ED_FT
For hernia, followup with surgeon above for reevaluation of hernia. Call office for an appointment.   Return to ED if you develop fever>100.8F, vomiting, difficulty having bowel movements or worsening abdominal pain.    Make sure to attend you hemodialysis session tonight at 6pm at  [facility] [address]  You can call them at [phone] if you have any questions regarding your session.        Ventral Hernia    WHAT YOU NEED TO KNOW:    A ventral hernia is a bulging of organs or abdominal tissue through a weak spot or opening in the abdominal wall. The abdominal wall is made up of fat and muscle. It holds the organs in place. The types of ventral hernias are epigastric, umbilical, spigelian, and incisional.     DISCHARGE INSTRUCTIONS:    Return to the emergency department if:   •Your symptoms, such as pain or vomiting, get worse.       •Your abdomen is larger than usual.       •Your hernia increases in size or is purple or blue.       Contact your healthcare provider if:   •You have a fever.      •You have questions or concerns about your condition or care.      Medicines:   •Pain medicine may be given. Ask your healthcare provider how to take this safely.       •Take your medicine as directed. Contact your healthcare provider if you think your medicine is not helping or if you have side effects. Tell him or her if you are allergic to any medicine. Keep a list of the medicines, vitamins, and herbs you take. Include the amounts, and when and why you take them. Bring the list or the pill bottles to follow-up visits. Carry your medicine list with you in case of an emergency.      Self-care:   •Do not lift anything heavy. Heavy lifting can make your hernia worse or cause another hernia. Ask your healthcare provider how much is safe for you to lift.       •Drink liquids as directed. Liquids may prevent constipation and straining during a bowel movement. Ask how much liquid to drink each day and which liquids are best for you.       •Eat foods high in fiber. Fiber may prevent constipation and straining during a bowel movement. Foods that contain fiber include fruits, vegetables, legumes, and whole grains.       •Maintain a healthy weight. Weight loss may prevent your hernia from getting worse. It may also prevent another hernia. Talk to your healthcare provider about exercise and how to lose weight.       •Wear an abdominal binder as directed. An abdominal binder well help prevent the hernia from happening again. It will hold it in the correct place after your healthcare provider reduces the hernia. Ask your healthcare provider if you can take the binder off for sleep. Apply the binder first thing in the morning, before you get out of bed. Do not wear your binder over your clothes. Apply it to your bare skin. Gently wash your skin under the binder daily. Pat your skin dry. Ask your healthcare provider what you should use to keep the area dry, such as cornstarch.      Follow up with your healthcare provider as directed: You may need to see a surgeon to plan for surgery to fix your hernia. Write down your questions so you remember to ask them during your visits.

## 2020-09-15 NOTE — ED ADULT NURSE NOTE - CAS TRG GENERAL NORM CIRC DET
Strong peripheral pulses
no abdominal pain, no bloating, no constipation, no diarrhea, no nausea and no vomiting.

## 2020-09-15 NOTE — ED ADULT NURSE NOTE - OBJECTIVE STATEMENT
The patient presents with mid abs pain with tenderness.  He denies n/v/d.  He has right arm av fistula and suprapubic cath.

## 2020-09-15 NOTE — ED PROVIDER NOTE - PATIENT PORTAL LINK FT
You can access the FollowMyHealth Patient Portal offered by [facility] by registering at the following website: [email or web address]. By joining [facility]’s FollowMyHealth portal, you will also be able to view your health information using other applications (apps) compatible with our system.

## 2020-09-15 NOTE — ED PROVIDER NOTE - CLINICAL SUMMARY MEDICAL DECISION MAKING FREE TEXT BOX
73 y/o male with ESRD on hemodialysis, presents with abdominal pain and palpable mass, likely hernia, non-reducible on palpation. Will obtain labs and CT abdomen. Given morphine for pain. Will reassess. 75 y/o male with ESRD on hemodialysis, presents with abdominal pain and palpable mass, likely hernia, non-reducible on palpation. Will obtain labs and CT abdomen. Given morphine for pain. Will reassess.    labs show wbc wnl, Cr 11, K 4.9  CT A/P shows Ventral hernia containing nonobstructed bowel loops. No acute intra-abdominal abnormality. Known spondyloarthropathy.  Discussed above with patient. On reeval patient well-appearing, stable for discharge.   Patient reports he missed his hemodialysis at 5am today while in ED  I called [facility] [phone] and spoke to staff member who reports patient can be rescheduled for HD tonight at 6pm. patient agrees with plan and HD center staff will reserve his slot for HD tonight.

## 2020-09-15 NOTE — ED PROVIDER NOTE - CARE PROVIDER_API CALL
Ollie Adamson  SURGERY  [address], [place] [address]  Phone: [phone]  Fax: [phone]  Follow Up Time:

## 2021-02-10 NOTE — INPATIENT CERTIFICATION FOR MEDICARE PATIENTS - RISKS OF ADVERSE EVENTS
Concern for cardiopulmonary deterioration Detail Level: Zone Continue Regimen: Ketoconozole shampoo Continue Regimen: Doxycycline BID, BPO wash

## 2021-07-06 NOTE — ED ADULT NURSE NOTE - GENITOURINARY ASSESSMENT
[facility] DIVISION of INFECTIOUS DISEASE  Layton Dave MD PhD, Juana Maier MD, Tatiana Rogers MD, Acacia Mathis MD  and providing coverage with Lavern Peters MD and Ney Hoyos MD  Providing Infectious Disease Consultations at [facility], [facility], [facility]'s    Office# [phone] to schedule follow up appointments  Answering Service for urgent calls or New Consults [phone]  Cell# to text for urgent issues Layton Dave [phone]     infectious diseases progress note:    ANN BARRIENTOS is a 74y y. o. Male patient    No concerning overnight events    Allergies    Cherries: Cough (Other)  dust (Sneezing)  No Known Drug Allergies  shellfish (Other)  strawberry (Other)    Intolerances        ANTIBIOTICS/RELEVANT:  antimicrobials  piperacillin/tazobactam IVPB.. 3.375 Gram(s) IV Intermittent every 8 hours    immunologic:    OTHER:  atorvastatin 10 milliGRAM(s) Oral at bedtime  budesonide 160 MICROgram(s)/formoterol 4.5 MICROgram(s) Inhaler 2 Puff(s) Inhalation two times a day  diltiazem    milliGRAM(s) Oral at bedtime  enoxaparin Injectable 40 milliGRAM(s) SubCutaneous daily  lactated ringers. 1000 milliLiter(s) IV Continuous <Continuous>  montelukast 10 milliGRAM(s) Oral at bedtime  multivitamin 1 Tablet(s) Oral daily      Objective:  Vital Signs Last 24 Hrs  T(C): 36.6 (06 Jul 2021 09:35), Max: 36.8 (05 Jul 2021 13:32)  T(F): 97.9 (06 Jul 2021 09:35), Max: 98.2 (05 Jul 2021 13:32)  HR: 74 (06 Jul 2021 09:35) (68 - 77)  BP: 114/74 (06 Jul 2021 09:35) (112/70 - 146/92)  BP(mean): --  RR: 18 (06 Jul 2021 09:35) (17 - 18)  SpO2: 95% (06 Jul 2021 09:35) (94% - 96%)    T(C): 36.6 (07-06-21 @ 09:35), Max: 36.9 (07-04-21 @ 10:35)  T(C): 36.6 (07-06-21 @ 09:35), Max: 36.9 (07-04-21 @ 10:35)  T(C): 36.6 (07-06-21 @ 09:35), Max: 40.2 (07-02-21 @ 19:47)    PHYSICAL EXAM:  HEENT: NC atraumatic  Neck: supple  Respiratory: no accessory muscle use, breathing comfortably  Cardiovascular: distant  Gastrointestinal: normal appearing, nondistended  Extremities: no clubbing, no cyanosis,        LABS:                          13.9   8.42  )-----------( 170      ( 05 Jul 2021 07:26 )             43.2       8.42 07-05 @ 07:26  9.43 07-04 @ 11:15  11.49 07-03 @ 07:17  16.39 07-02 @ 20:23      07-05    141  |  106  |  15  ----------------------------<  122<H>  3.9   |  26  |  0.91    Ca    8.7      05 Jul 2021 07:26    TPro  7.9  /  Alb  2.9<L>  /  TBili  0.4  /  DBili  x   /  AST  16  /  ALT  33  /  AlkPhos  58  07-05      Creatinine, Serum: 0.91 mg/dL (07-05-21 @ 07:26)  Creatinine, Serum: 0.88 mg/dL (07-04-21 @ 11:15)  Creatinine, Serum: 0.70 mg/dL (07-03-21 @ 07:17)  Creatinine, Serum: 0.97 mg/dL (07-02-21 @ 20:23)                INFLAMMATORY MARKERS  Auto Lymphocyte #: 1.78 K/uL (07-03-21 @ 07:17)  Auto Neutrophil #: 8.48 K/uL (07-03-21 @ 07:17)  Auto Lymphocyte #: 1.85 K/uL (07-02-21 @ 20:23)  Auto Neutrophil #: 13.20 K/uL (07-02-21 @ 20:23)    Lactate, Blood: 1.3 mmol/L (07-02-21 @ 21:49)  Lactate, Blood: 2.6 mmol/L (07-02-21 @ 20:49)    Auto Eosinophil #: 0.08 K/uL (07-03-21 @ 07:17)  Auto Eosinophil #: 0.03 K/uL (07-02-21 @ 20:23)        Procalcitonin, Serum: 0.28 ng/mL (07-03-21 @ 13:33)                  Activated Partial Thromboplastin Time: 28.5 sec (07-02-21 @ 20:23)  INR: 1.09 ratio (07-02-21 @ 20:23)          MICROBIOLOGY:              RADIOLOGY & ADDITIONAL STUDIES:   WDL

## 2021-08-09 ENCOUNTER — EMERGENCY (EMERGENCY)
Facility: HOSPITAL | Age: 76
LOS: 1 days | Discharge: HOME | End: 2021-08-09
Attending: EMERGENCY MEDICINE
Payer: MEDICARE

## 2021-08-09 VITALS
HEIGHT: 73 IN | DIASTOLIC BLOOD PRESSURE: 85 MMHG | WEIGHT: 190.04 LBS | HEART RATE: 75 BPM | SYSTOLIC BLOOD PRESSURE: 166 MMHG | OXYGEN SATURATION: 99 % | TEMPERATURE: 98 F | RESPIRATION RATE: 16 BRPM

## 2021-08-09 DIAGNOSIS — Z98.890 OTHER SPECIFIED POSTPROCEDURAL STATES: Chronic | ICD-10-CM

## 2021-08-09 DIAGNOSIS — Z98.89 OTHER SPECIFIED POSTPROCEDURAL STATES: Chronic | ICD-10-CM

## 2021-08-09 DIAGNOSIS — I77.0 ARTERIOVENOUS FISTULA, ACQUIRED: Chronic | ICD-10-CM

## 2021-08-09 LAB
ALBUMIN SERPL ELPH-MCNC: 3.4 G/DL — LOW (ref 3.5–5)
ALP SERPL-CCNC: 193 U/L — HIGH (ref 40–120)
ALT FLD-CCNC: 23 U/L DA — SIGNIFICANT CHANGE UP (ref 10–60)
ANION GAP SERPL CALC-SCNC: 5 MMOL/L — SIGNIFICANT CHANGE UP (ref 5–17)
AST SERPL-CCNC: 17 U/L — SIGNIFICANT CHANGE UP (ref 10–40)
BASOPHILS # BLD AUTO: 0.04 K/UL — SIGNIFICANT CHANGE UP (ref 0–0.2)
BASOPHILS NFR BLD AUTO: 0.7 % — SIGNIFICANT CHANGE UP (ref 0–2)
BILIRUB SERPL-MCNC: 0.4 MG/DL — SIGNIFICANT CHANGE UP (ref 0.2–1.2)
BUN SERPL-MCNC: 28 MG/DL — HIGH (ref 7–18)
CALCIUM SERPL-MCNC: 8.9 MG/DL — SIGNIFICANT CHANGE UP (ref 8.4–10.5)
CHLORIDE SERPL-SCNC: 100 MMOL/L — SIGNIFICANT CHANGE UP (ref 96–108)
CO2 SERPL-SCNC: 31 MMOL/L — SIGNIFICANT CHANGE UP (ref 22–31)
CREAT SERPL-MCNC: 8.65 MG/DL — HIGH (ref 0.5–1.3)
EOSINOPHIL # BLD AUTO: 0.16 K/UL — SIGNIFICANT CHANGE UP (ref 0–0.5)
EOSINOPHIL NFR BLD AUTO: 2.9 % — SIGNIFICANT CHANGE UP (ref 0–6)
GLUCOSE SERPL-MCNC: 92 MG/DL — SIGNIFICANT CHANGE UP (ref 70–99)
HCT VFR BLD CALC: 37.4 % — LOW (ref 39–50)
HGB BLD-MCNC: 11.5 G/DL — LOW (ref 13–17)
IMM GRANULOCYTES NFR BLD AUTO: 0.2 % — SIGNIFICANT CHANGE UP (ref 0–1.5)
LIDOCAIN IGE QN: 203 U/L — SIGNIFICANT CHANGE UP (ref 73–393)
LYMPHOCYTES # BLD AUTO: 1.35 K/UL — SIGNIFICANT CHANGE UP (ref 1–3.3)
LYMPHOCYTES # BLD AUTO: 24.1 % — SIGNIFICANT CHANGE UP (ref 13–44)
MCHC RBC-ENTMCNC: 28.8 PG — SIGNIFICANT CHANGE UP (ref 27–34)
MCHC RBC-ENTMCNC: 30.7 GM/DL — LOW (ref 32–36)
MCV RBC AUTO: 93.5 FL — SIGNIFICANT CHANGE UP (ref 80–100)
MONOCYTES # BLD AUTO: 0.66 K/UL — SIGNIFICANT CHANGE UP (ref 0–0.9)
MONOCYTES NFR BLD AUTO: 11.8 % — SIGNIFICANT CHANGE UP (ref 2–14)
NEUTROPHILS # BLD AUTO: 3.39 K/UL — SIGNIFICANT CHANGE UP (ref 1.8–7.4)
NEUTROPHILS NFR BLD AUTO: 60.3 % — SIGNIFICANT CHANGE UP (ref 43–77)
NRBC # BLD: 0 /100 WBCS — SIGNIFICANT CHANGE UP (ref 0–0)
PLATELET # BLD AUTO: 168 K/UL — SIGNIFICANT CHANGE UP (ref 150–400)
POTASSIUM SERPL-MCNC: 4.7 MMOL/L — SIGNIFICANT CHANGE UP (ref 3.5–5.3)
POTASSIUM SERPL-SCNC: 4.7 MMOL/L — SIGNIFICANT CHANGE UP (ref 3.5–5.3)
PROT SERPL-MCNC: 8.2 G/DL — SIGNIFICANT CHANGE UP (ref 6–8.3)
RBC # BLD: 4 M/UL — LOW (ref 4.2–5.8)
RBC # FLD: 14.9 % — HIGH (ref 10.3–14.5)
SODIUM SERPL-SCNC: 136 MMOL/L — SIGNIFICANT CHANGE UP (ref 135–145)
WBC # BLD: 5.61 K/UL — SIGNIFICANT CHANGE UP (ref 3.8–10.5)
WBC # FLD AUTO: 5.61 K/UL — SIGNIFICANT CHANGE UP (ref 3.8–10.5)

## 2021-08-09 PROCEDURE — 85025 COMPLETE CBC W/AUTO DIFF WBC: CPT

## 2021-08-09 PROCEDURE — 99285 EMERGENCY DEPT VISIT HI MDM: CPT

## 2021-08-09 PROCEDURE — 99284 EMERGENCY DEPT VISIT MOD MDM: CPT | Mod: 25

## 2021-08-09 PROCEDURE — 93005 ELECTROCARDIOGRAM TRACING: CPT

## 2021-08-09 PROCEDURE — 74176 CT ABD & PELVIS W/O CONTRAST: CPT | Mod: 26,MA

## 2021-08-09 PROCEDURE — 83690 ASSAY OF LIPASE: CPT

## 2021-08-09 PROCEDURE — 80053 COMPREHEN METABOLIC PANEL: CPT

## 2021-08-09 PROCEDURE — 74176 CT ABD & PELVIS W/O CONTRAST: CPT | Mod: MA

## 2021-08-09 PROCEDURE — 36415 COLL VENOUS BLD VENIPUNCTURE: CPT

## 2021-08-09 RX ORDER — MORPHINE SULFATE 50 MG/1
2 CAPSULE, EXTENDED RELEASE ORAL ONCE
Refills: 0 | Status: DISCONTINUED | OUTPATIENT
Start: 2021-08-09 | End: 2021-08-09

## 2021-08-09 NOTE — ED PROVIDER NOTE - OBJECTIVE STATEMENT
74yo M with hx ESRD on HD Tues, Thurs, Sat presents with abdominal pain. Reports pain onset 1 day ago over area of hernia in mid abdomen. Denies fever, vomiting, diarrhea. Reports he has had the hernia for a while.

## 2021-08-09 NOTE — ED ADULT NURSE NOTE - OBJECTIVE STATEMENT
Patient presented to the ED with c/o abdominal & groin pain. Bulging area noted right side his abdomen near to the umbilicus. Dialysis days Tues,Thurs,Sat, Rt arm AVF.

## 2021-08-09 NOTE — ED PROVIDER NOTE - PATIENT PORTAL LINK FT
You can access the FollowMyHealth Patient Portal offered by [facility] by registering at the following website: [email or web address]. By joining [facility] FollowMyHealth portal, you will also be able to view your health information using other applications (apps) compatible with our system.

## 2021-08-09 NOTE — ED PROVIDER NOTE - NSFOLLOWUPCLINICS_GEN_ALL_ED_FT
[facility]  [address] [place] [address]  Phone: [phone]  Fax: [phone]    [facility]  [address] [place] [address]  Phone: [phone]  Fax: [phone]

## 2021-08-09 NOTE — ED PROVIDER NOTE - NSFOLLOWUPINSTRUCTIONS_ED_ALL_ED_FT
Followup with surgery specialist above for reevaluation of hernia.  Retunr to ED if you develop fever>100.8F, vomiting or worsening abdominal pain.      Ventral Hernia       A ventral hernia is a bulge of tissue from inside the abdomen that pushes through a weak area of the muscles that form the front wall of the abdomen. The tissues inside the abdomen are inside a sac (peritoneum). These tissues include the small intestine, large intestine, and the fatty tissue that covers the intestines (omentum). Sometimes, the bulge that forms a hernia contains intestines. Other hernias contain only fat. Ventral hernias do not go away without surgical treatment.  There are several types of ventral hernias. You may have:  •A hernia at an incision site from previous abdominal surgery (incisional hernia).      •A hernia just above the belly button (epigastric hernia), or at the belly button (umbilical hernia). These types of hernias can develop from heavy lifting or straining.      •A hernia that comes and goes (reducible hernia). It may be visible only when you lift or strain. This type of hernia can be pushed back into the abdomen (reduced).      •A hernia that traps abdominal tissue inside the hernia (incarcerated hernia). This type of hernia does not reduce.      •A hernia that cuts off blood flow to the tissues inside the hernia (strangulated hernia). The tissues can start to die if this happens. This is a very painful bulge that cannot be reduced. A strangulated hernia is a medical emergency.        What are the causes?    This condition is caused by abdominal tissue putting pressure on an area of weakness in the abdominal muscles.      What increases the risk?  The following factors may make you more likely to develop this condition:  •Being male.      •Being 60 or older.      •Being overweight or obese.      •Having had previous abdominal surgery, especially if there was an infection after surgery.      •Having had an injury to the abdominal wall.      •Having had several pregnancies.      •Having a buildup of fluid inside the abdomen (ascites).        What are the signs or symptoms?  The only symptom of a ventral hernia may be a painless bulge in the abdomen. A reducible hernia may be visible only when you strain, cough, or lift. Other symptoms may include:  •Dull pain.      •A feeling of pressure.    Signs and symptoms of a strangulated hernia may include:  •Increasing pain.      •Nausea and vomiting.      •Pain when pressing on the hernia.      •The skin over the hernia turning red or purple.      •Constipation.      •Blood in the stool (feces).        How is this diagnosed?  This condition may be diagnosed based on:  •Your symptoms.      •Your medical history.      •A physical exam. You may be asked to cough or strain while standing. These actions increase the pressure inside your abdomen and force the hernia through the opening in your muscles. Your health care provider may try to reduce the hernia by pressing on it.      •Imaging studies, such as an ultrasound or CT scan.        How is this treated?    This condition is treated with surgery. If you have a strangulated hernia, surgery is done as soon as possible. If your hernia is small and not incarcerated, you may be asked to lose some weight before surgery.      Follow these instructions at home:    •Follow instructions from your health care provider about eating or drinking restrictions.      •If you are overweight, your health care provider may recommend that you increase your activity level and eat a healthier diet.      • Do not lift anything that is heavier than 10 lb (4.5 kg).      •Return to your normal activities as told by your health care provider. Ask your health care provider what activities are safe for you. You may need to avoid activities that increase pressure on your hernia.      •Take over-the-counter and prescription medicines only as told by your health care provider.      •Keep all follow-up visits as told by your health care provider. This is important.        Contact a health care provider if:    •Your hernia gets larger.      •Your hernia becomes painful.        Get help right away if:    •Your hernia becomes increasingly painful.    •You have pain along with any of the following:  •Changes in skin color in the area of the hernia.      •Nausea.      •Vomiting.      •Fever.          Summary    •A ventral hernia is a bulge of tissue from inside the abdomen that pushes through a weak area of the muscles that form the front wall of the abdomen.      •This condition is treated with surgery, which may be urgent depending on your hernia.      • Do not lift anything that is heavier than 10 lb (4.5 kg), and follow activity instructions from your health care provider.

## 2021-08-09 NOTE — ED PROVIDER NOTE - CLINICAL SUMMARY MEDICAL DECISION MAKING FREE TEXT BOX
74yo M with hx ESRD on HD Tues, Thurs, Sat presents with abdominal pain. Labs unremarkable, CT shows Status post cystoprostatectomy with stable appearance of Ileal conduit containing Martínez catheter. Reidentified large ventral hernia containing nonobstructed bowel loops. No significant bowel wall thickening or fatty stranding identified.Cholelithiasis.  Discussed above with patient, on reexam no abd ttp over hernia site at this time. patient stable for discharge to followup with surgery as outpatient.

## 2021-08-19 ENCOUNTER — APPOINTMENT (OUTPATIENT)
Dept: SURGERY | Facility: CLINIC | Age: 76
End: 2021-08-19
Payer: MEDICARE

## 2021-08-19 VITALS
BODY MASS INDEX: 26.64 KG/M2 | WEIGHT: 201 LBS | SYSTOLIC BLOOD PRESSURE: 134 MMHG | OXYGEN SATURATION: 98 % | DIASTOLIC BLOOD PRESSURE: 72 MMHG | HEIGHT: 73 IN | HEART RATE: 81 BPM

## 2021-08-19 DIAGNOSIS — K43.2 INCISIONAL HERNIA W/OUT OBSTRUCTION OR GANGRENE: ICD-10-CM

## 2021-08-19 PROCEDURE — 99213 OFFICE O/P EST LOW 20 MIN: CPT

## 2021-08-19 NOTE — PLAN
[FreeTextEntry1] : Mr. THAO was told significance of findings, options, risks and benefits were explained. All surgical options were discussed including non-surgical treatments. patient has a recurrent incisional hernia. patient states although he gets HD he still has urine coming out through the catheter. patient was advised against the surgery  as it is very risky. patient has an ileo conduit catheter that cannot be removed and will interfere with the repair. patient agrees with the plan. Patient instructed to maintain a fat-free diet, and to seek immediate medical attention with any acute change or worsening of symptoms, including but not limited to abdominal pain, fever, chills, nausea, vomiting, or yellowing of the skin. Patient’s questions and concerns addressed to patient’s satisfaction.  NSAIDS for pain PRN \par Patient advised to seek immediate medical attention with any acute change in symptoms or with the development of any new or worsening symptoms. Patient's questions and concerns addressed to patient's satisfaction, and patient verbalized an understanding of the information discussed.\par  \par \par

## 2021-08-19 NOTE — PHYSICAL EXAM
[Alert] : alert [Oriented to Person] : oriented to person [Oriented to Place] : oriented to place [Oriented to Time] : oriented to time [Calm] : calm [de-identified] : He  is alert, well-groomed, and in NAD\par   [de-identified] : anicteric.  Nasal mucosa pink, septum midline. Oral mucosa pink.  Tongue midline, Pharynx without exudates.\par   [de-identified] : Neck supple. Trachea midline. Thyroid isthmus barely palpable, lobes not felt.\par   [de-identified] : right abdomen ileo conduit catheter reducible incisional hernia, non- tender. The defect appears to be relatively large and the skin overlying the hernia is normal. \par

## 2021-08-19 NOTE — DATA REVIEWED
[FreeTextEntry1] : discharge papers \par \par Report date: 8/9/2021 \par  \par  View Order\par \par \par (Report matches study selected on Patient History pane) \par EXAM: CT ABDOMEN AND PELVIS\par \par \par PROCEDURE DATE: 08/09/2021\par \par \par \par INTERPRETATION: CLINICAL INFORMATION: Abdominal pain. Evaluate for strangulated hernia.\par \par COMPARISON: CT Abdomen pelvis 9/15/2020.\par \par PROCEDURE:\par CT of the Abdomen and Pelvis was performed without intravenous contrast.\par Intravenous contrast: None.\par Oral contrast: None.\par Sagittal and coronal reformats were performed.\par \par FINDINGS: Absence of intravenous contrast limits evaluation for focal lesions, neoplasm, and vascular pathology.\par \par LOWER CHEST: Redemonstrated bilateral pleural parenchymal calcification with adjacent pleural thickening. No consolidation or pleural effusion. Mild cardiomegaly. Coronary artery calcifications. Small hiatal hernia.\par \par LIVER: Within normal limits.\par BILE DUCTS: Normal caliber.\par GALLBLADDER: Cholelithiasis.\par SPLEEN: Within normal limits.\par PANCREAS: Within normal limits.\par ADRENALS: Within normal limits.\par KIDNEYS/URETERS: No hydronephrosis. Atrophied kidneys. Scattered innumerable bilateral renal cysts. Additional too small to characterize hypo and hyperdensities identified.\par \par BLADDER: Status post cystoprostatectomy with stable appearance of Ileal conduit containing Martínez catheter.\par REPRODUCTIVE ORGANS: Streak artifact limits evaluation the pelvis. Left pelvic penile reservoir with partial visualization of penile implant reidentified.\par \par BOWEL: Bowel postoperative changes with stable appearance of ileal conduit. No bowel obstruction. Nonvisualized appendix.\par PERITONEUM: No ascites.\par VESSELS: Atherosclerotic disease of the aorta and its branches. Stable appearance of infrarenal IVC filter.\par RETROPERITONEUM: No lymphadenopathy.\par ABDOMINAL WALL: Reidentified large ventral hernia containing nonobstructed bowel loops. No significant bowel wall thickening or fatty stranding identified.\par BONES: Multiple spondylotic changes of the spine. Continued syndesmophytes with hyperostosis and obliterated bilateral sacroiliac joint spaces\par \par IMPRESSION:\par \par Status post cystoprostatectomy with stable appearance of Ileal conduit containing Martínez catheter.\par \par Reidentified large ventral hernia containing nonobstructed bowel loops. No significant bowel wall thickening or fatty stranding identified.\par \par Cholelithiasis.\par \par Additional findings as mentioned above.\par  \par MARIA FERNANDA DOBBS MD; Attending Radiologist\par This document has been electronically signed. Aug 9 2021 4:16AM

## 2021-08-19 NOTE — HISTORY OF PRESENT ILLNESS
[de-identified] :  Patient is s/p Incisional hernia repair with mesh on 11/02/2018. patient presenting today again with cc of having pain in the right upper quadrant. patient has a known history of incisional hernia and ileo conduit catheter. He denies any trauma to the area, fever, nausea, vomiting, distension, night sweats and loss of appetite. Symptoms aggravated by cough and straining. - He reports normal bowel movements. Patient is on HD and reports a very small amount of urine.  he was in the emergency room on 08/09/2021 and had a CT scan that showed large ventral hernia containing non-obstructed bowel loops. No significant bowel wall thickening or fatty stranding identified.  Cholelithiasis.\par \par

## 2021-09-15 NOTE — PROGRESS NOTE ADULT - SUBJECTIVE AND OBJECTIVE BOX
D. Dimer came back elevated. Ordered STAT CTA chest.    I spoke with patient's Nephrologist, Dr. Nii Francisco, regarding need for STAT CTA chest. Creatinine was pending at time of call. Dr. Reza Stewart had asked that if the patient's creatinine is greater than her baseline of 1.1 to give a small fluid bolus after the CTA chest.     Her creatinine came back 1.2. I called radiology to ensure that they were aware that patient will need a 500 mL bolus which they state they are unable to do. They state that the patient will have to go to the emergency department. I called the emergency department and spoke with Pedro Luis Ceja. I notified her that this patient would be coming to the ER for a CTA chest to rule out a blood clot with an elevated D. Dimer. Her creatinine is 1.2 which is elevated from her baseline and according to Dr. Reza Stewart, if her creatinine was greater than her baseline of 1.1, she would need a fluid bolus.      Electronically signed by DAVID Fernández CNP on 9/15/2021 at 4:34 PM stable  no melena, hematemesis or hematochezia  no abdominal pain    Vital Signs Last 24 Hrs  T(C): 36.8, Max: 37.2 (06-20 @ 00:35)  T(F): 98.2, Max: 98.9 (06-20 @ 00:35)  HR: 56 (16 - 113)  BP: 102/66 (80/56 - 109/54)  BP(mean): --  RR: 16 (16 - 18)  SpO2: 96% (93% - 100%)    NAD  s1s2  bs b/l  abd: soft nt nd  ext: 2 dps    Esophageal stent   PEG  ESRD on Dialysis  ANEMIA  UTI    -no episodes of melena  -f/u regarding UTI,   -obtain records from outside hosp  -call as needed  -follow up with GI outpatient

## 2021-10-28 NOTE — ED PROVIDER NOTE - MEDICAL DECISION MAKING DETAILS
71yoM with pain at the site of what appears to be a new L abdominal hernia. No fever, leukocytosis, or vomiting. Obtaining CT abd to r/o obstruction. D/w Dr. Shaffer, one of the patient's practice providers, who requested admission regardless of CT findings due to his complicated history and presentation with pain today. Signed off to Dr. Espinoza who will f/u on CT read and admit.
no fever and no chills.

## 2021-11-03 NOTE — ED PROVIDER NOTE - NS ED MD EM SELECTION
Chief complaint: skin lesion  Pt was sent to me by Elli Celestin PA-C for my opinion and advice regarding skin lesion.  History of Present Illness:  Edgar Aguilar is a 78 year old year old male who presents with complaints lesion on the right shoulder area.  He recently moved to the area from [place], and has a significant history of sun exposure.*  There is not associated ulceration or bleeding. This was biosied via shave technique and had the following pathology:   C.  RIGHT UPPER ARM -        BASAL CELL CARCINOMA, NODULAR        NOTE:  The lesion extends to the lateral margins and the    base of the specimen    Patient has been referred for further evaluation and treatment.  Review of Systems:  Constitutional: No fevers, chills, weight loss  Respiratory: no cough or wheezing  Skin: no rashes/itching or jaundice  Endocrine: Diabetes --type 2  Hematologic: has held eliquis for 2 days    Past Medical History  Social History     Tobacco Use   • Smoking status: Never Smoker   • Smokeless tobacco: Never Used   Substance Use Topics   • Alcohol use: Not Currently   • Drug use: Never      Past Medical History:   Diagnosis Date   • Cardiac pacemaker in situ    • Chronic atrial fibrillation (CMS/HCC)     on eliquis   • Diabetes mellitus (CMS/HCC)    • Hyperlipidemia    • Hypertension    • Skin cancer     not melanoma     Past Surgical History:   Procedure Laterality Date   • Colonoscopy     • Pacemaker     • Remove tonsils/adenoids,<11 y/o       Family History   Problem Relation Age of Onset   • Myocardial Infarction Mother 50   • Cancer, Pancreatic Father      Current Outpatient Medications   Medication Sig   • pioglitazone-metformin (ACTOPLUS MET)  MG per tablet Take 1 tablet by mouth 2 times daily (with meals).   • atorvastatin (LIPITOR) 20 MG tablet Take 1 tablet by mouth nightly.   • quinapril (ACCUPRIL) 40 MG tablet Take 1 tablet by mouth daily.   • Eliquis 5 MG Tab Take 5 mg by mouth 2 times daily.      Allergies: ALLERGIES:  Patient has no known allergies.    Physical Exam:  Visit Vitals  Temp 98.2 °F (36.8 °C)   Ht 5' 10\" (1.778 m)   Wt 71.2 kg (157 lb)   BMI 22.53 kg/m²       Psychiatric: awake, alert, oriented in no acute distress, affect appropriate  Skin: Area on right lateral shoulder with sequela of shave biopsy.    The risks, benefits, and alternatives to the procedure were discussed with the patient.  All questions have been answered.    Excision: The area around the lesion was anesthitized with 1% lidocaine with epinephrine.  An elliptical incision was created and the lesion was excised with sharp dissection.  Excellent hemostasis was obtained and the skin was closed with 3-0 vicryl in the deep subcuticular layer and 3-0 nylon interupted sutures.  Pt tolerated procedure well, no complications during the procedure, and there was excellent hemostasis at the close of the procedure.  Greatest diameter: 3.5 cm  Superior margin:0.5cm   Lateral margins:0.5cm    Assessment: Basal cell carcinoma    Plan:   The lesion was excised with appropriate margins and sent to pathology for further evaluation.  The patient will return to the clinic in 10-14 days for stitch removal.   Edgar Aguilar was instructed to call with any questions or problems.  Will call with results of pathology when available.  Jesus Raymond MD         [id number] [facility]

## 2021-11-22 NOTE — ED PROVIDER NOTE - EYES NEGATIVE STATEMENT, MLM
Oriented - self; Oriented - place; Oriented - time no discharge, no irritation, no pain, no redness, and no visual changes.

## 2021-12-03 NOTE — ED ADULT NURSE NOTE - NS ED NURSE RECORD ANOTHER HT AND WT
Clare Nguyen was seen and treated in our emergency department on 12/3/2021. She may return to school on 12/06/2021. If you have any questions or concerns, please don't hesitate to call.       Emmett Yang, DO Yes

## 2022-01-03 NOTE — PROGRESS NOTE ADULT - SUBJECTIVE AND OBJECTIVE BOX
LM requesting pt to call back and make f/u appt with  [facility] around January 14th. Explained that labs need to be monitored every 6 weeks while on new medication, should not wait until March especially since just starting.   Labs need to be monitored with t CHIEF COMPLAINT:Patient is a 71y old  Male who presents with a chief complaint of LLQ pain. Pt appears comfortable.    	  REVIEW OF SYSTEMS:  CONSTITUTIONAL: No fever, weight loss, or fatigue  EYES: No eye pain, visual disturbances, or discharge  ENT:  No difficulty hearing, tinnitus, vertigo; No sinus or throat pain  NECK: No pain or stiffness  RESPIRATORY: No cough, wheezing, chills or hemoptysis; No Shortness of Breath  CARDIOVASCULAR: No chest pain, palpitations, passing out, dizziness, or leg swelling  GASTROINTESTINAL: No abdominal or epigastric pain. No nausea, vomiting, or hematemesis; No diarrhea or constipation. No melena or hematochezia.  GENITOURINARY: No dysuria, frequency, hematuria, or incontinence  NEUROLOGICAL: No headaches, memory loss, loss of strength, numbness, or tremors  SKIN: No itching, burning, rashes, or lesions   LYMPH Nodes: No enlarged glands  ENDOCRINE: No heat or cold intolerance; No hair loss  MUSCULOSKELETAL: No joint pain or swelling; No muscle, back, or extremity pain  PSYCHIATRIC: No depression, anxiety, mood swings, or difficulty sleeping  HEME/LYMPH: No easy bruising, or bleeding gums  ALLERGY AND IMMUNOLOGIC: No hives or eczema	      PHYSICAL EXAM:  T(C): 36.9 (10-11-17 @ 06:01), Max: 36.9 (10-11-17 @ 06:01)  HR: 97 (10-11-17 @ 06:01) (56 - 97)  BP: 125/65 (10-11-17 @ 06:01) (113/72 - 161/84)  RR: 16 (10-11-17 @ 06:01) (16 - 19)  SpO2: 96% (10-11-17 @ 06:01) (96% - 100%)    I&O's Summary    10 Oct 2017 07:01  -  11 Oct 2017 07:00  --------------------------------------------------------  IN: 1000 mL / OUT: 150 mL / NET: 850 mL        Appearance: Normal	  HEENT:   Normal oral mucosa, PERRL, EOMI	  Lymphatic: No lymphadenopathy  Cardiovascular: Normal S1 S2, No JVD, No murmurs, No edema  Respiratory: Lungs clear to auscultation	  Psychiatry: A & O x 3, Mood & affect appropriate  Gastrointestinal:  Soft, Non-tender, + BS	  Skin: No rashes, No ecchymoses, No cyanosis	  Neurologic: Non-focal  Extremities: Normal range of motion, No clubbing, cyanosis or edema  Vascular: Peripheral pulses palpable 2+ bilaterally    MEDICATIONS  (STANDING):  aspirin  chewable 81 milliGRAM(s) Oral daily  cilostazol 50 milliGRAM(s) Oral daily  cinacalcet 60 milliGRAM(s) Oral daily  dextrose 5% + sodium chloride 0.9%. 1000 milliLiter(s) (30 mL/Hr) IV Continuous <Continuous>  epoetin shell Injectable 13355 Unit(s) IV Push <User Schedule>  heparin  Injectable 5000 Unit(s) SubCutaneous every 8 hours  influenza   Vaccine 0.5 milliLiter(s) IntraMuscular once  insulin lispro (HumaLOG) corrective regimen sliding scale   SubCutaneous three times a day before meals  metoprolol Injectable 2.5 milliGRAM(s) IV Push every 6 hours  sevelamer hydrochloride 1600 milliGRAM(s) Oral three times a day with meals      	  LABS:	 	                         11.2   4.0   )-----------( 141      ( 10 Oct 2017 11:40 )             37.0     10-10    135  |  99  |  74<H>  ----------------------------<  77  4.5   |  23  |  10.50<H>    Ca    9.0      10 Oct 2017 11:40  Phos  6.0     10-10  Mg     2.2     10-10    TPro  8.4<H>  /  Alb  3.3<L>  /  TBili  0.4  /  DBili  x   /  AST  13  /  ALT  13  /  Alk Phos  300<H>  10-10      Lipid Profile: Cholesterol 172  LDL 85  HDL 71  TG 82    HgA1c: Hemoglobin A1C, Whole Blood: 5.8 % (10-09 @ 13:18)    TSH: Thyroid Stimulating Hormone, Serum: 1.18 uU/mL (10-09 @ 10:57)

## 2022-01-03 NOTE — ED ADULT NURSE NOTE - NS ED NURSE DC INFO COMPLEXITY
New Patient Intake Form   Patient Details:    Chaitanya Ambrose  1957  [id number]    Appointment Information   Who is calling to schedule? [facility]    If not self, what is the caller's name? Please put name of RBC nurse as well  Alonzo Farrar   Referring provider Dr Lisa   What is the diagnosis? Malignant Melanoma    Is there a confirmed tissue diagnosis? Yes   Is patient aware of diagnosis? Yes   Have you had any imaging or labs done? If yes, where? (If imaging done outside of [facility], please remind patient to bring a disk ) Dr Lisa    Have you been seen by another Oncologist/Hematologist?  If so, who and where? No   Are the records in [facility] or Care Everywhere? No   Are records needed from an outside facility? Yes   If yes, Name of facility, city and state where facility is located  [facility]   Is the patient willing to be seen by another provider?   (This is for breast patients only) Yes   Miscellaneous Information: Contact # for referring provider is [phone]
Simple: Patient demonstrates quick and easy understanding

## 2022-01-04 NOTE — PROGRESS NOTE ADULT - SUBJECTIVE AND OBJECTIVE BOX
Possible allergic rhinitis started your symptoms.    However, you may also have contracted Covid-19 in the last week.  PCR test can confirm if you do have covid-19 or not.  You will be called by a  in the next 24 hours to schedule your covid test.    Stay at home until your test has been released and further advice has been given.  Follow the recommendations and instructions in the visit summary .    Patient Education     Controlling Allergens: In the Home   Even a clean home can be full of allergens. So take a moment to see what you can do to cut down on allergens in each room of your home.    Buy an air purifier with a HEPA filter. Look online or in consumer magazines for recommendations. Don't overuse vaporizers and humidifiers. They encourage mold and dust-mite growth.    Use shades or vertical blinds instead of horizontal blinds, which collect dust. Replace drapes with curtains that can be washed regularly.    Enclose mattresses, box springs, and pillows in allergy-proof casings. Bedding (sheets, mattress covers, pillowcases) should be washed weekly in hot water. Use washable blankets and quilts. Don't use feather pillows, down comforters, and wool blankets.    Prevent dust-catching clutter. Have enclosed places to keep books, toys, and clothes. Keep closet doors closed.    Use washable throw rugs wherever possible. Or have bare floors.    Put filters over forced-air heating vents. Change the filters regularly.    Keep your car clean. Vacuum the seats and carpets regularly.    If you have air conditioning, use it instead of opening the windows in your car and home. Air conditioning filters and dehumidifies air. It reduces pollens getting into your car and home from open windows.    Keep rain gutters clean. Remove leaves and debris that can grow mold.    Check stored food for spoilage and mold growth. Clean up spills right away.    Don't let wet clothing sit and grow mold. And don't hang clothes outside  Attempted to place left IJ CVL using US but because of Pt's discomfort and sclerosed vessels, abandoned further attempts. Spoke to Dr Rice  regarding PO abx. As per ID, started IV Vanco during HD and PO Cefetin and Flagyl. Pt will need PICC line on Monday by IR. to dry where they can collect airborne pollen. Dry clothing right away in a clothes dryer that's vented to the outside.    Install a fan to keep the bathroom well-ventilated.    Keep pets out of your bedroom. Keep them off upholstered furniture.    Control pests such as cockroaches and mice. Close up areas of the home where pests can enter. Don't leave open food out in the kitchen. Use bait or traps to kill pests. Get professional pest control help if the problem doesn't go away.    Try to stay away from cigarette smoke and perfume. They are not allergens. But they can make your allergy symptoms worse. They irritate your eyes, nose, throat, and lungs.     Don't do yard work or pull weeds. These and other outdoor activities increase your exposure to pollen. If that s not possible, wear a filter mask. When you re done, bathe, wash your hair, and change your clothes.  [employer] last reviewed this educational content on 4/1/2019 2000-2021 [employer]. All rights reserved. This information is not intended as a substitute for professional medical care. Always follow your healthcare professional's instructions.           Patient Education     For Patients Who Have Been Tested for COVID-19 (Coronavirus)  You have been tested for COVID-19 (coronavirus). Results are typically available in 1 to 3 days. Our testing sites do not have access to your test results.  If you have not received your results in 5 days, please do the following:    If you are being tested before surgery: Call your surgeon's office or call [phone] ([phone]) and ask to speak with our COVID-19 results team.    If you are being treated at an infusion center: Call your infusion center directly.    All others: Call [phone] ([phone]) and ask to speak with our COVID-19 results team.  What you should do if you have COVID-19 symptoms  Please stay home and away from others (self-isolate) until:    You've had no fever--and  no medicine that reduces fever--for 3 full days (72 hours), AND    Your other symptoms have gotten better. For example, your cough or breathing has improved, AND    At least 7 days have passed since your symptoms first started.  During this time:    Don't go to work, school or anywhere else.    Stay away from others in your home. No hugging, kissing or shaking hands.    Don't let anyone visit.    Cover your mouth and nose with a mask, tissue or washcloth to avoid spreading germs.    Wash your hands and face often. Use soap and water.  When to call for 911 for medical help  If you have any of these emergency warning signs for COVID-19, call for medical help right away:    Trouble breathing    Pain or pressure in the chest all the time    Blue color to your lips or face  These are not all the symptoms you can have with COVID-19. Call your health provider for any other symptoms that are severe or concerning to you.  When you call 911, tell the  that you have -- or think you might have--COVID-19.   Where can I get more information?  To learn more about COVID-19 and how to care for yourself at home, please visit the CDC website at https://www.cdc.gov/coronavirus/2019-ncov/about/steps-when-sick.html  For more about your care at [facility], please visit [email or web address]  For informational purposes only. Not to replace the advice of your health care provider.   Clinically reviewed by Infection Prevention and the [facility] COVID-19 Clinical Team.  Copyright   2020 [facility]. All rights reserved. [employer] 322863 - 05/20.

## 2022-01-19 NOTE — ED ADULT TRIAGE NOTE - DIRECT TO ROOM CARE INITIATED:
Advance Care Planning   Ambulatory ACP Specialist Patient Outreach    Date:  1/19/2022  ACP Specialist:  [id number] Roopa Rodríguez call to patient in follow-up to ACP Specialist referral from: Branden Marshall MD    [x] PCP  [] Provider   [] Ambulatory Care Management [] Other for Reason:    [x] Advance Directive Assistance  [] Code Status Discussion  [] Complete Portable DNR Order  [] Discuss Goals of Care  [] Complete POST/MOST  [] Early ACP Decision-Making  [] Other    Date Referral Received:1/4/22    Today's Outreach:  [] First   [x] Second  [] Third                               Third outreach made by [x]  phone  [] email []   [employer]     Intervention:  [x] Spoke with Patient  [] Left VM requesting return call      Outcome: The patient called into the [facility] office and said she had not received the ACP documents in the mail. The patient stayed on the telephone while writer emailed her the documents. After she has looked at them she will call the [facility] office in regards to them and discuss them. The patient is battling lung cancer and does not want to come into the office. Next Step:   [x] ACP scheduled conversation  [] Outreach again in one week               [] Email / Mail ACP Info Sheets  [x] Email / Mail Advance Directive            [] Close Referral. Routing closure to referring provider/staff and to ACP Specialist .      Thank you for this referral.
25-Jan-2019 23:24

## 2022-02-14 NOTE — ED PROVIDER NOTE - HISTORY ATTESTATION, MLM
I have reviewed and confirmed nurses' notes... Spiral Flap Text: The defect edges were debeveled with a #15 scalpel blade.  Given the location of the defect, shape of the defect and the proximity to free margins a spiral flap was deemed most appropriate.  Using a sterile surgical marker, an appropriate rotation flap was drawn incorporating the defect and placing the expected incisions within the relaxed skin tension lines where possible. The area thus outlined was incised deep to adipose tissue with a #15 scalpel blade.  The skin margins were undermined to an appropriate distance in all directions utilizing iris scissors.

## 2022-11-04 NOTE — H&P ADULT. - VASCULAR
The following labs were labeled with appropriate pt sticker and tubed to lab:     [] Blue     [] Lavender   [] on ice  [] Green/yellow  [] Green/black [] on ice  [] Larence Gosselin  [] on ice  [] Yellow  [] Red  [] Type/ Screen  [] ABG  [] VBG    [x] COVID-19 swab    [] Rapid  [] PCR  [x] Flu swab  [] Peds Viral Panel     [] Urine Sample  [] Pelvic Cultures  [] Blood Cultures  [] X 2  [] STREP Cultures         Lou French RN  11/03/22 0376 detailed exam

## 2023-01-01 ENCOUNTER — INPATIENT (INPATIENT)
Facility: HOSPITAL | Age: 78
LOS: 28 days | End: 2023-07-02
Attending: INTERNAL MEDICINE | Admitting: INTERNAL MEDICINE
Payer: MEDICARE

## 2023-01-01 VITALS
OXYGEN SATURATION: 99 % | HEART RATE: 91 BPM | SYSTOLIC BLOOD PRESSURE: 111 MMHG | TEMPERATURE: 98 F | RESPIRATION RATE: 16 BRPM | DIASTOLIC BLOOD PRESSURE: 46 MMHG

## 2023-01-01 DIAGNOSIS — R77.8 OTHER SPECIFIED ABNORMALITIES OF PLASMA PROTEINS: ICD-10-CM

## 2023-01-01 DIAGNOSIS — D63.8 ANEMIA IN OTHER CHRONIC DISEASES CLASSIFIED ELSEWHERE: ICD-10-CM

## 2023-01-01 DIAGNOSIS — I15.1 HYPERTENSION SECONDARY TO OTHER RENAL DISORDERS: ICD-10-CM

## 2023-01-01 DIAGNOSIS — I77.0 ARTERIOVENOUS FISTULA, ACQUIRED: Chronic | ICD-10-CM

## 2023-01-01 DIAGNOSIS — N25.81 SECONDARY HYPERPARATHYROIDISM OF RENAL ORIGIN: ICD-10-CM

## 2023-01-01 DIAGNOSIS — Z29.9 ENCOUNTER FOR PROPHYLACTIC MEASURES, UNSPECIFIED: ICD-10-CM

## 2023-01-01 DIAGNOSIS — K92.2 GASTROINTESTINAL HEMORRHAGE, UNSPECIFIED: ICD-10-CM

## 2023-01-01 DIAGNOSIS — D64.9 ANEMIA, UNSPECIFIED: ICD-10-CM

## 2023-01-01 DIAGNOSIS — I50.20 UNSPECIFIED SYSTOLIC (CONGESTIVE) HEART FAILURE: ICD-10-CM

## 2023-01-01 DIAGNOSIS — Z98.890 OTHER SPECIFIED POSTPROCEDURAL STATES: Chronic | ICD-10-CM

## 2023-01-01 DIAGNOSIS — R53.81 OTHER MALAISE: ICD-10-CM

## 2023-01-01 DIAGNOSIS — I48.92 UNSPECIFIED ATRIAL FLUTTER: ICD-10-CM

## 2023-01-01 DIAGNOSIS — F03.90 UNSPECIFIED DEMENTIA, UNSPECIFIED SEVERITY, WITHOUT BEHAVIORAL DISTURBANCE, PSYCHOTIC DISTURBANCE, MOOD DISTURBANCE, AND ANXIETY: ICD-10-CM

## 2023-01-01 DIAGNOSIS — Z98.89 OTHER SPECIFIED POSTPROCEDURAL STATES: Chronic | ICD-10-CM

## 2023-01-01 DIAGNOSIS — R65.10 SYSTEMIC INFLAMMATORY RESPONSE SYNDROME (SIRS) OF NON-INFECTIOUS ORIGIN WITHOUT ACUTE ORGAN DYSFUNCTION: ICD-10-CM

## 2023-01-01 DIAGNOSIS — M54.9 DORSALGIA, UNSPECIFIED: ICD-10-CM

## 2023-01-01 DIAGNOSIS — E03.9 HYPOTHYROIDISM, UNSPECIFIED: ICD-10-CM

## 2023-01-01 DIAGNOSIS — Z71.89 OTHER SPECIFIED COUNSELING: ICD-10-CM

## 2023-01-01 DIAGNOSIS — N18.6 END STAGE RENAL DISEASE: ICD-10-CM

## 2023-01-01 DIAGNOSIS — R78.81 BACTEREMIA: ICD-10-CM

## 2023-01-01 DIAGNOSIS — E78.5 HYPERLIPIDEMIA, UNSPECIFIED: ICD-10-CM

## 2023-01-01 DIAGNOSIS — Z51.5 ENCOUNTER FOR PALLIATIVE CARE: ICD-10-CM

## 2023-01-01 DIAGNOSIS — C61 MALIGNANT NEOPLASM OF PROSTATE: ICD-10-CM

## 2023-01-01 DIAGNOSIS — R53.1 WEAKNESS: ICD-10-CM

## 2023-01-01 LAB
-  CEFTRIAXONE: SIGNIFICANT CHANGE UP
-  CLINDAMYCIN: SIGNIFICANT CHANGE UP
-  LEVOFLOXACIN: SIGNIFICANT CHANGE UP
-  PENICILLIN: SIGNIFICANT CHANGE UP
-  VANCOMYCIN: SIGNIFICANT CHANGE UP
A1C WITH ESTIMATED AVERAGE GLUCOSE RESULT: 4.8 % — SIGNIFICANT CHANGE UP (ref 4–5.6)
ALBUMIN SERPL ELPH-MCNC: 1.9 G/DL — LOW (ref 3.3–5)
ALBUMIN SERPL ELPH-MCNC: 2.1 G/DL — LOW (ref 3.3–5)
ALBUMIN SERPL ELPH-MCNC: 2.2 G/DL — LOW (ref 3.3–5)
ALBUMIN SERPL ELPH-MCNC: 2.3 G/DL — LOW (ref 3.3–5)
ALBUMIN SERPL ELPH-MCNC: 2.3 G/DL — LOW (ref 3.3–5)
ALBUMIN SERPL ELPH-MCNC: 2.5 G/DL — LOW (ref 3.3–5)
ALBUMIN SERPL ELPH-MCNC: 2.7 G/DL — LOW (ref 3.3–5)
ALBUMIN SERPL ELPH-MCNC: 2.7 G/DL — LOW (ref 3.3–5)
ALP SERPL-CCNC: 101 U/L — SIGNIFICANT CHANGE UP (ref 40–120)
ALP SERPL-CCNC: 101 U/L — SIGNIFICANT CHANGE UP (ref 40–120)
ALP SERPL-CCNC: 110 U/L — SIGNIFICANT CHANGE UP (ref 40–120)
ALP SERPL-CCNC: 115 U/L — SIGNIFICANT CHANGE UP (ref 40–120)
ALP SERPL-CCNC: 116 U/L — SIGNIFICANT CHANGE UP (ref 40–120)
ALP SERPL-CCNC: 117 U/L — SIGNIFICANT CHANGE UP (ref 40–120)
ALP SERPL-CCNC: 119 U/L — SIGNIFICANT CHANGE UP (ref 40–120)
ALP SERPL-CCNC: 193 U/L — HIGH (ref 40–120)
ALT FLD-CCNC: 20 U/L — SIGNIFICANT CHANGE UP (ref 4–41)
ALT FLD-CCNC: 21 U/L — SIGNIFICANT CHANGE UP (ref 4–41)
ALT FLD-CCNC: <5 U/L — LOW (ref 4–41)
ALT FLD-CCNC: <5 U/L — LOW (ref 4–41)
ALT FLD-CCNC: <5 U/L — SIGNIFICANT CHANGE UP (ref 4–41)
ANION GAP SERPL CALC-SCNC: 11 MMOL/L — SIGNIFICANT CHANGE UP (ref 7–14)
ANION GAP SERPL CALC-SCNC: 11 MMOL/L — SIGNIFICANT CHANGE UP (ref 7–14)
ANION GAP SERPL CALC-SCNC: 12 MMOL/L — SIGNIFICANT CHANGE UP (ref 7–14)
ANION GAP SERPL CALC-SCNC: 13 MMOL/L — SIGNIFICANT CHANGE UP (ref 7–14)
ANION GAP SERPL CALC-SCNC: 14 MMOL/L — SIGNIFICANT CHANGE UP (ref 7–14)
ANION GAP SERPL CALC-SCNC: 15 MMOL/L — HIGH (ref 7–14)
ANION GAP SERPL CALC-SCNC: 16 MMOL/L — HIGH (ref 7–14)
ANION GAP SERPL CALC-SCNC: 17 MMOL/L — HIGH (ref 7–14)
ANION GAP SERPL CALC-SCNC: 18 MMOL/L — HIGH (ref 7–14)
ANION GAP SERPL CALC-SCNC: 18 MMOL/L — HIGH (ref 7–14)
ANION GAP SERPL CALC-SCNC: 20 MMOL/L — HIGH (ref 7–14)
ANION GAP SERPL CALC-SCNC: 9 MMOL/L — SIGNIFICANT CHANGE UP (ref 7–14)
ANISOCYTOSIS BLD QL: SLIGHT — SIGNIFICANT CHANGE UP
APTT BLD: 26.5 SEC — LOW (ref 27–36.3)
APTT BLD: 27.5 SEC — SIGNIFICANT CHANGE UP (ref 27–36.3)
APTT BLD: 28.1 SEC — SIGNIFICANT CHANGE UP (ref 27–36.3)
APTT BLD: 28.5 SEC — SIGNIFICANT CHANGE UP (ref 27–36.3)
APTT BLD: 28.6 SEC — SIGNIFICANT CHANGE UP (ref 27–36.3)
APTT BLD: 29.1 SEC — SIGNIFICANT CHANGE UP (ref 27–36.3)
AST SERPL-CCNC: 18 U/L — SIGNIFICANT CHANGE UP (ref 4–40)
AST SERPL-CCNC: 20 U/L — SIGNIFICANT CHANGE UP (ref 4–40)
AST SERPL-CCNC: 29 U/L — SIGNIFICANT CHANGE UP (ref 4–40)
AST SERPL-CCNC: 29 U/L — SIGNIFICANT CHANGE UP (ref 4–40)
AST SERPL-CCNC: 30 U/L — SIGNIFICANT CHANGE UP (ref 4–40)
AST SERPL-CCNC: 32 U/L — SIGNIFICANT CHANGE UP (ref 4–40)
AST SERPL-CCNC: 35 U/L — SIGNIFICANT CHANGE UP (ref 4–40)
AST SERPL-CCNC: 50 U/L — HIGH (ref 4–40)
BASE EXCESS BLDV CALC-SCNC: 4.3 MMOL/L — HIGH (ref -2–3)
BASOPHILS # BLD AUTO: 0 K/UL — SIGNIFICANT CHANGE UP (ref 0–0.2)
BASOPHILS # BLD AUTO: 0.03 K/UL — SIGNIFICANT CHANGE UP (ref 0–0.2)
BASOPHILS # BLD AUTO: 0.04 K/UL — SIGNIFICANT CHANGE UP (ref 0–0.2)
BASOPHILS # BLD AUTO: 0.04 K/UL — SIGNIFICANT CHANGE UP (ref 0–0.2)
BASOPHILS # BLD AUTO: 0.05 K/UL — SIGNIFICANT CHANGE UP (ref 0–0.2)
BASOPHILS # BLD AUTO: 0.07 K/UL — SIGNIFICANT CHANGE UP (ref 0–0.2)
BASOPHILS # BLD AUTO: 0.08 K/UL — SIGNIFICANT CHANGE UP (ref 0–0.2)
BASOPHILS # BLD AUTO: 0.09 K/UL — SIGNIFICANT CHANGE UP (ref 0–0.2)
BASOPHILS # BLD AUTO: 0.1 K/UL — SIGNIFICANT CHANGE UP (ref 0–0.2)
BASOPHILS NFR BLD AUTO: 0 % — SIGNIFICANT CHANGE UP (ref 0–2)
BASOPHILS NFR BLD AUTO: 0.2 % — SIGNIFICANT CHANGE UP (ref 0–2)
BASOPHILS NFR BLD AUTO: 0.2 % — SIGNIFICANT CHANGE UP (ref 0–2)
BASOPHILS NFR BLD AUTO: 0.3 % — SIGNIFICANT CHANGE UP (ref 0–2)
BASOPHILS NFR BLD AUTO: 0.4 % — SIGNIFICANT CHANGE UP (ref 0–2)
BILIRUB SERPL-MCNC: 0.2 MG/DL — SIGNIFICANT CHANGE UP (ref 0.2–1.2)
BILIRUB SERPL-MCNC: 0.6 MG/DL — SIGNIFICANT CHANGE UP (ref 0.2–1.2)
BILIRUB SERPL-MCNC: 0.7 MG/DL — SIGNIFICANT CHANGE UP (ref 0.2–1.2)
BILIRUB SERPL-MCNC: 0.8 MG/DL — SIGNIFICANT CHANGE UP (ref 0.2–1.2)
BILIRUB SERPL-MCNC: 0.9 MG/DL — SIGNIFICANT CHANGE UP (ref 0.2–1.2)
BILIRUB SERPL-MCNC: 1 MG/DL — SIGNIFICANT CHANGE UP (ref 0.2–1.2)
BLD GP AB SCN SERPL QL: NEGATIVE — SIGNIFICANT CHANGE UP
BLOOD GAS ARTERIAL - LYTES,HGB,ICA,LACT RESULT: SIGNIFICANT CHANGE UP
BLOOD GAS ARTERIAL COMPREHENSIVE RESULT: SIGNIFICANT CHANGE UP
BLOOD GAS VENOUS COMPREHENSIVE RESULT: SIGNIFICANT CHANGE UP
BUN SERPL-MCNC: 14 MG/DL — SIGNIFICANT CHANGE UP (ref 7–23)
BUN SERPL-MCNC: 20 MG/DL — SIGNIFICANT CHANGE UP (ref 7–23)
BUN SERPL-MCNC: 21 MG/DL — SIGNIFICANT CHANGE UP (ref 7–23)
BUN SERPL-MCNC: 22 MG/DL — SIGNIFICANT CHANGE UP (ref 7–23)
BUN SERPL-MCNC: 28 MG/DL — HIGH (ref 7–23)
BUN SERPL-MCNC: 30 MG/DL — HIGH (ref 7–23)
BUN SERPL-MCNC: 31 MG/DL — HIGH (ref 7–23)
BUN SERPL-MCNC: 33 MG/DL — HIGH (ref 7–23)
BUN SERPL-MCNC: 34 MG/DL — HIGH (ref 7–23)
BUN SERPL-MCNC: 34 MG/DL — HIGH (ref 7–23)
BUN SERPL-MCNC: 36 MG/DL — HIGH (ref 7–23)
BUN SERPL-MCNC: 38 MG/DL — HIGH (ref 7–23)
BUN SERPL-MCNC: 39 MG/DL — HIGH (ref 7–23)
BUN SERPL-MCNC: 39 MG/DL — HIGH (ref 7–23)
BUN SERPL-MCNC: 43 MG/DL — HIGH (ref 7–23)
BUN SERPL-MCNC: 44 MG/DL — HIGH (ref 7–23)
BUN SERPL-MCNC: 45 MG/DL — HIGH (ref 7–23)
BUN SERPL-MCNC: 47 MG/DL — HIGH (ref 7–23)
BUN SERPL-MCNC: 47 MG/DL — HIGH (ref 7–23)
BUN SERPL-MCNC: 48 MG/DL — HIGH (ref 7–23)
BUN SERPL-MCNC: 49 MG/DL — HIGH (ref 7–23)
BUN SERPL-MCNC: 52 MG/DL — HIGH (ref 7–23)
BUN SERPL-MCNC: 54 MG/DL — HIGH (ref 7–23)
BUN SERPL-MCNC: 55 MG/DL — HIGH (ref 7–23)
BUN SERPL-MCNC: 58 MG/DL — HIGH (ref 7–23)
BUN SERPL-MCNC: 60 MG/DL — HIGH (ref 7–23)
BUN SERPL-MCNC: 66 MG/DL — HIGH (ref 7–23)
CA-I BLD-SCNC: 1.26 MMOL/L — SIGNIFICANT CHANGE UP (ref 1.15–1.29)
CA-I BLD-SCNC: 1.27 MMOL/L — SIGNIFICANT CHANGE UP (ref 1.15–1.29)
CALCIUM SERPL-MCNC: 10 MG/DL — SIGNIFICANT CHANGE UP (ref 8.4–10.5)
CALCIUM SERPL-MCNC: 10.1 MG/DL — SIGNIFICANT CHANGE UP (ref 8.4–10.5)
CALCIUM SERPL-MCNC: 10.1 MG/DL — SIGNIFICANT CHANGE UP (ref 8.4–10.5)
CALCIUM SERPL-MCNC: 10.2 MG/DL — SIGNIFICANT CHANGE UP (ref 8.4–10.5)
CALCIUM SERPL-MCNC: 10.3 MG/DL — SIGNIFICANT CHANGE UP (ref 8.4–10.5)
CALCIUM SERPL-MCNC: 10.4 MG/DL — SIGNIFICANT CHANGE UP (ref 8.4–10.5)
CALCIUM SERPL-MCNC: 8.3 MG/DL — LOW (ref 8.4–10.5)
CALCIUM SERPL-MCNC: 8.4 MG/DL — SIGNIFICANT CHANGE UP (ref 8.4–10.5)
CALCIUM SERPL-MCNC: 9 MG/DL — SIGNIFICANT CHANGE UP (ref 8.4–10.5)
CALCIUM SERPL-MCNC: 9.2 MG/DL — SIGNIFICANT CHANGE UP (ref 8.4–10.5)
CALCIUM SERPL-MCNC: 9.3 MG/DL — SIGNIFICANT CHANGE UP (ref 8.4–10.5)
CALCIUM SERPL-MCNC: 9.3 MG/DL — SIGNIFICANT CHANGE UP (ref 8.4–10.5)
CALCIUM SERPL-MCNC: 9.4 MG/DL — SIGNIFICANT CHANGE UP (ref 8.4–10.5)
CALCIUM SERPL-MCNC: 9.5 MG/DL — SIGNIFICANT CHANGE UP (ref 8.4–10.5)
CALCIUM SERPL-MCNC: 9.6 MG/DL — SIGNIFICANT CHANGE UP (ref 8.4–10.5)
CALCIUM SERPL-MCNC: 9.7 MG/DL — SIGNIFICANT CHANGE UP (ref 8.4–10.5)
CALCIUM SERPL-MCNC: 9.8 MG/DL — SIGNIFICANT CHANGE UP (ref 8.4–10.5)
CALCIUM SERPL-MCNC: 9.9 MG/DL — SIGNIFICANT CHANGE UP (ref 8.4–10.5)
CALCIUM SERPL-MCNC: 9.9 MG/DL — SIGNIFICANT CHANGE UP (ref 8.4–10.5)
CHLORIDE BLDV-SCNC: 100 MMOL/L — SIGNIFICANT CHANGE UP (ref 96–108)
CHLORIDE SERPL-SCNC: 100 MMOL/L — SIGNIFICANT CHANGE UP (ref 98–107)
CHLORIDE SERPL-SCNC: 101 MMOL/L — SIGNIFICANT CHANGE UP (ref 98–107)
CHLORIDE SERPL-SCNC: 102 MMOL/L — SIGNIFICANT CHANGE UP (ref 98–107)
CHLORIDE SERPL-SCNC: 103 MMOL/L — SIGNIFICANT CHANGE UP (ref 98–107)
CHLORIDE SERPL-SCNC: 93 MMOL/L — LOW (ref 98–107)
CHLORIDE SERPL-SCNC: 95 MMOL/L — LOW (ref 98–107)
CHLORIDE SERPL-SCNC: 96 MMOL/L — LOW (ref 98–107)
CHLORIDE SERPL-SCNC: 96 MMOL/L — LOW (ref 98–107)
CHLORIDE SERPL-SCNC: 97 MMOL/L — LOW (ref 98–107)
CHLORIDE SERPL-SCNC: 98 MMOL/L — SIGNIFICANT CHANGE UP (ref 98–107)
CHLORIDE SERPL-SCNC: 99 MMOL/L — SIGNIFICANT CHANGE UP (ref 98–107)
CHLORIDE SERPL-SCNC: 99 MMOL/L — SIGNIFICANT CHANGE UP (ref 98–107)
CHOLEST SERPL-MCNC: 126 MG/DL — SIGNIFICANT CHANGE UP
CK MB BLD-MCNC: 2.2 % — SIGNIFICANT CHANGE UP (ref 0–2.5)
CK MB BLD-MCNC: 3.1 % — HIGH (ref 0–2.5)
CK MB BLD-MCNC: 3.4 % — HIGH (ref 0–2.5)
CK MB CFR SERPL CALC: 1.1 NG/ML — SIGNIFICANT CHANGE UP
CK MB CFR SERPL CALC: 1.5 NG/ML — SIGNIFICANT CHANGE UP
CK MB CFR SERPL CALC: 1.8 NG/ML — SIGNIFICANT CHANGE UP
CK SERPL-CCNC: 44 U/L — SIGNIFICANT CHANGE UP (ref 30–200)
CK SERPL-CCNC: 50 U/L — SIGNIFICANT CHANGE UP (ref 30–200)
CK SERPL-CCNC: 58 U/L — SIGNIFICANT CHANGE UP (ref 30–200)
CK SERPL-CCNC: 74 U/L — SIGNIFICANT CHANGE UP (ref 30–200)
CO2 BLDV-SCNC: 31.7 MMOL/L — HIGH (ref 22–26)
CO2 SERPL-SCNC: 20 MMOL/L — LOW (ref 22–31)
CO2 SERPL-SCNC: 20 MMOL/L — LOW (ref 22–31)
CO2 SERPL-SCNC: 21 MMOL/L — LOW (ref 22–31)
CO2 SERPL-SCNC: 21 MMOL/L — LOW (ref 22–31)
CO2 SERPL-SCNC: 22 MMOL/L — SIGNIFICANT CHANGE UP (ref 22–31)
CO2 SERPL-SCNC: 24 MMOL/L — SIGNIFICANT CHANGE UP (ref 22–31)
CO2 SERPL-SCNC: 25 MMOL/L — SIGNIFICANT CHANGE UP (ref 22–31)
CO2 SERPL-SCNC: 26 MMOL/L — SIGNIFICANT CHANGE UP (ref 22–31)
CO2 SERPL-SCNC: 27 MMOL/L — SIGNIFICANT CHANGE UP (ref 22–31)
CO2 SERPL-SCNC: 29 MMOL/L — SIGNIFICANT CHANGE UP (ref 22–31)
CREAT SERPL-MCNC: 2.59 MG/DL — HIGH (ref 0.5–1.3)
CREAT SERPL-MCNC: 3.55 MG/DL — HIGH (ref 0.5–1.3)
CREAT SERPL-MCNC: 3.57 MG/DL — HIGH (ref 0.5–1.3)
CREAT SERPL-MCNC: 3.72 MG/DL — HIGH (ref 0.5–1.3)
CREAT SERPL-MCNC: 3.75 MG/DL — HIGH (ref 0.5–1.3)
CREAT SERPL-MCNC: 3.81 MG/DL — HIGH (ref 0.5–1.3)
CREAT SERPL-MCNC: 3.96 MG/DL — HIGH (ref 0.5–1.3)
CREAT SERPL-MCNC: 4.19 MG/DL — HIGH (ref 0.5–1.3)
CREAT SERPL-MCNC: 4.45 MG/DL — HIGH (ref 0.5–1.3)
CREAT SERPL-MCNC: 4.69 MG/DL — HIGH (ref 0.5–1.3)
CREAT SERPL-MCNC: 4.79 MG/DL — HIGH (ref 0.5–1.3)
CREAT SERPL-MCNC: 4.86 MG/DL — HIGH (ref 0.5–1.3)
CREAT SERPL-MCNC: 4.98 MG/DL — HIGH (ref 0.5–1.3)
CREAT SERPL-MCNC: 5.02 MG/DL — HIGH (ref 0.5–1.3)
CREAT SERPL-MCNC: 5.19 MG/DL — HIGH (ref 0.5–1.3)
CREAT SERPL-MCNC: 5.37 MG/DL — HIGH (ref 0.5–1.3)
CREAT SERPL-MCNC: 5.55 MG/DL — HIGH (ref 0.5–1.3)
CREAT SERPL-MCNC: 5.55 MG/DL — HIGH (ref 0.5–1.3)
CREAT SERPL-MCNC: 5.79 MG/DL — HIGH (ref 0.5–1.3)
CREAT SERPL-MCNC: 5.8 MG/DL — HIGH (ref 0.5–1.3)
CREAT SERPL-MCNC: 5.87 MG/DL — HIGH (ref 0.5–1.3)
CREAT SERPL-MCNC: 6.28 MG/DL — HIGH (ref 0.5–1.3)
CREAT SERPL-MCNC: 6.52 MG/DL — HIGH (ref 0.5–1.3)
CREAT SERPL-MCNC: 6.53 MG/DL — HIGH (ref 0.5–1.3)
CREAT SERPL-MCNC: 6.66 MG/DL — HIGH (ref 0.5–1.3)
CREAT SERPL-MCNC: 6.97 MG/DL — HIGH (ref 0.5–1.3)
CREAT SERPL-MCNC: 7.23 MG/DL — HIGH (ref 0.5–1.3)
CREAT SERPL-MCNC: 8.57 MG/DL — HIGH (ref 0.5–1.3)
CREAT SERPL-MCNC: 8.64 MG/DL — HIGH (ref 0.5–1.3)
CREAT SERPL-MCNC: 9.02 MG/DL — HIGH (ref 0.5–1.3)
CREAT SERPL-MCNC: 9.19 MG/DL — HIGH (ref 0.5–1.3)
CULTURE RESULTS: SIGNIFICANT CHANGE UP
D DIMER BLD IA.RAPID-MCNC: 5801 NG/ML DDU — SIGNIFICANT CHANGE UP
DIALYSIS INSTRUMENT RESULT - HEPATITIS B SURFACE ANTIGEN: NEGATIVE — SIGNIFICANT CHANGE UP
EGFR: 10 ML/MIN/1.73M2 — LOW
EGFR: 11 ML/MIN/1.73M2 — LOW
EGFR: 12 ML/MIN/1.73M2 — LOW
EGFR: 12 ML/MIN/1.73M2 — LOW
EGFR: 13 ML/MIN/1.73M2 — LOW
EGFR: 14 ML/MIN/1.73M2 — LOW
EGFR: 15 ML/MIN/1.73M2 — LOW
EGFR: 15 ML/MIN/1.73M2 — LOW
EGFR: 16 ML/MIN/1.73M2 — LOW
EGFR: 16 ML/MIN/1.73M2 — LOW
EGFR: 17 ML/MIN/1.73M2 — LOW
EGFR: 17 ML/MIN/1.73M2 — LOW
EGFR: 24 ML/MIN/1.73M2 — LOW
EGFR: 5 ML/MIN/1.73M2 — LOW
EGFR: 5 ML/MIN/1.73M2 — LOW
EGFR: 6 ML/MIN/1.73M2 — LOW
EGFR: 6 ML/MIN/1.73M2 — LOW
EGFR: 7 ML/MIN/1.73M2 — LOW
EGFR: 7 ML/MIN/1.73M2 — LOW
EGFR: 8 ML/MIN/1.73M2 — LOW
EGFR: 9 ML/MIN/1.73M2 — LOW
EOSINOPHIL # BLD AUTO: 0 K/UL — SIGNIFICANT CHANGE UP (ref 0–0.5)
EOSINOPHIL # BLD AUTO: 0 K/UL — SIGNIFICANT CHANGE UP (ref 0–0.5)
EOSINOPHIL # BLD AUTO: 0.01 K/UL — SIGNIFICANT CHANGE UP (ref 0–0.5)
EOSINOPHIL # BLD AUTO: 0.02 K/UL — SIGNIFICANT CHANGE UP (ref 0–0.5)
EOSINOPHIL # BLD AUTO: 0.03 K/UL — SIGNIFICANT CHANGE UP (ref 0–0.5)
EOSINOPHIL # BLD AUTO: 0.04 K/UL — SIGNIFICANT CHANGE UP (ref 0–0.5)
EOSINOPHIL # BLD AUTO: 0.05 K/UL — SIGNIFICANT CHANGE UP (ref 0–0.5)
EOSINOPHIL # BLD AUTO: 0.49 K/UL — SIGNIFICANT CHANGE UP (ref 0–0.5)
EOSINOPHIL NFR BLD AUTO: 0 % — SIGNIFICANT CHANGE UP (ref 0–6)
EOSINOPHIL NFR BLD AUTO: 0.1 % — SIGNIFICANT CHANGE UP (ref 0–6)
EOSINOPHIL NFR BLD AUTO: 0.2 % — SIGNIFICANT CHANGE UP (ref 0–6)
EOSINOPHIL NFR BLD AUTO: 0.3 % — SIGNIFICANT CHANGE UP (ref 0–6)
EOSINOPHIL NFR BLD AUTO: 1.8 % — SIGNIFICANT CHANGE UP (ref 0–6)
ESTIMATED AVERAGE GLUCOSE: 91 — SIGNIFICANT CHANGE UP
FERRITIN SERPL-MCNC: 3040 NG/ML — HIGH (ref 30–400)
FIBRINOGEN PPP-MCNC: 502 MG/DL — HIGH (ref 200–465)
GAS PNL BLDV: 138 MMOL/L — SIGNIFICANT CHANGE UP (ref 136–145)
GLUCOSE BLDC GLUCOMTR-MCNC: 100 MG/DL — HIGH (ref 70–99)
GLUCOSE BLDC GLUCOMTR-MCNC: 104 MG/DL — HIGH (ref 70–99)
GLUCOSE BLDC GLUCOMTR-MCNC: 105 MG/DL — HIGH (ref 70–99)
GLUCOSE BLDC GLUCOMTR-MCNC: 105 MG/DL — HIGH (ref 70–99)
GLUCOSE BLDC GLUCOMTR-MCNC: 107 MG/DL — HIGH (ref 70–99)
GLUCOSE BLDC GLUCOMTR-MCNC: 107 MG/DL — HIGH (ref 70–99)
GLUCOSE BLDC GLUCOMTR-MCNC: 108 MG/DL — HIGH (ref 70–99)
GLUCOSE BLDC GLUCOMTR-MCNC: 108 MG/DL — HIGH (ref 70–99)
GLUCOSE BLDC GLUCOMTR-MCNC: 113 MG/DL — HIGH (ref 70–99)
GLUCOSE BLDC GLUCOMTR-MCNC: 117 MG/DL — HIGH (ref 70–99)
GLUCOSE BLDC GLUCOMTR-MCNC: 118 MG/DL — HIGH (ref 70–99)
GLUCOSE BLDC GLUCOMTR-MCNC: 119 MG/DL — HIGH (ref 70–99)
GLUCOSE BLDC GLUCOMTR-MCNC: 119 MG/DL — HIGH (ref 70–99)
GLUCOSE BLDC GLUCOMTR-MCNC: 120 MG/DL — HIGH (ref 70–99)
GLUCOSE BLDC GLUCOMTR-MCNC: 120 MG/DL — HIGH (ref 70–99)
GLUCOSE BLDC GLUCOMTR-MCNC: 122 MG/DL — HIGH (ref 70–99)
GLUCOSE BLDC GLUCOMTR-MCNC: 123 MG/DL — HIGH (ref 70–99)
GLUCOSE BLDC GLUCOMTR-MCNC: 132 MG/DL — HIGH (ref 70–99)
GLUCOSE BLDC GLUCOMTR-MCNC: 136 MG/DL — HIGH (ref 70–99)
GLUCOSE BLDC GLUCOMTR-MCNC: 150 MG/DL — HIGH (ref 70–99)
GLUCOSE BLDC GLUCOMTR-MCNC: 151 MG/DL — HIGH (ref 70–99)
GLUCOSE BLDC GLUCOMTR-MCNC: 160 MG/DL — HIGH (ref 70–99)
GLUCOSE BLDC GLUCOMTR-MCNC: 165 MG/DL — HIGH (ref 70–99)
GLUCOSE BLDC GLUCOMTR-MCNC: 168 MG/DL — HIGH (ref 70–99)
GLUCOSE BLDC GLUCOMTR-MCNC: 182 MG/DL — HIGH (ref 70–99)
GLUCOSE BLDC GLUCOMTR-MCNC: 185 MG/DL — HIGH (ref 70–99)
GLUCOSE BLDC GLUCOMTR-MCNC: 206 MG/DL — HIGH (ref 70–99)
GLUCOSE BLDC GLUCOMTR-MCNC: 36 MG/DL — CRITICAL LOW (ref 70–99)
GLUCOSE BLDC GLUCOMTR-MCNC: 50 MG/DL — CRITICAL LOW (ref 70–99)
GLUCOSE BLDC GLUCOMTR-MCNC: 58 MG/DL — LOW (ref 70–99)
GLUCOSE BLDC GLUCOMTR-MCNC: 61 MG/DL — LOW (ref 70–99)
GLUCOSE BLDC GLUCOMTR-MCNC: 64 MG/DL — LOW (ref 70–99)
GLUCOSE BLDC GLUCOMTR-MCNC: 66 MG/DL — LOW (ref 70–99)
GLUCOSE BLDC GLUCOMTR-MCNC: 67 MG/DL — LOW (ref 70–99)
GLUCOSE BLDC GLUCOMTR-MCNC: 67 MG/DL — LOW (ref 70–99)
GLUCOSE BLDC GLUCOMTR-MCNC: 68 MG/DL — LOW (ref 70–99)
GLUCOSE BLDC GLUCOMTR-MCNC: 69 MG/DL — LOW (ref 70–99)
GLUCOSE BLDC GLUCOMTR-MCNC: 70 MG/DL — SIGNIFICANT CHANGE UP (ref 70–99)
GLUCOSE BLDC GLUCOMTR-MCNC: 70 MG/DL — SIGNIFICANT CHANGE UP (ref 70–99)
GLUCOSE BLDC GLUCOMTR-MCNC: 74 MG/DL — SIGNIFICANT CHANGE UP (ref 70–99)
GLUCOSE BLDC GLUCOMTR-MCNC: 74 MG/DL — SIGNIFICANT CHANGE UP (ref 70–99)
GLUCOSE BLDC GLUCOMTR-MCNC: 75 MG/DL — SIGNIFICANT CHANGE UP (ref 70–99)
GLUCOSE BLDC GLUCOMTR-MCNC: 76 MG/DL — SIGNIFICANT CHANGE UP (ref 70–99)
GLUCOSE BLDC GLUCOMTR-MCNC: 76 MG/DL — SIGNIFICANT CHANGE UP (ref 70–99)
GLUCOSE BLDC GLUCOMTR-MCNC: 78 MG/DL — SIGNIFICANT CHANGE UP (ref 70–99)
GLUCOSE BLDC GLUCOMTR-MCNC: 79 MG/DL — SIGNIFICANT CHANGE UP (ref 70–99)
GLUCOSE BLDC GLUCOMTR-MCNC: 79 MG/DL — SIGNIFICANT CHANGE UP (ref 70–99)
GLUCOSE BLDC GLUCOMTR-MCNC: 80 MG/DL — SIGNIFICANT CHANGE UP (ref 70–99)
GLUCOSE BLDC GLUCOMTR-MCNC: 81 MG/DL — SIGNIFICANT CHANGE UP (ref 70–99)
GLUCOSE BLDC GLUCOMTR-MCNC: 83 MG/DL — SIGNIFICANT CHANGE UP (ref 70–99)
GLUCOSE BLDC GLUCOMTR-MCNC: 84 MG/DL — SIGNIFICANT CHANGE UP (ref 70–99)
GLUCOSE BLDC GLUCOMTR-MCNC: 84 MG/DL — SIGNIFICANT CHANGE UP (ref 70–99)
GLUCOSE BLDC GLUCOMTR-MCNC: 85 MG/DL — SIGNIFICANT CHANGE UP (ref 70–99)
GLUCOSE BLDC GLUCOMTR-MCNC: 87 MG/DL — SIGNIFICANT CHANGE UP (ref 70–99)
GLUCOSE BLDC GLUCOMTR-MCNC: 88 MG/DL — SIGNIFICANT CHANGE UP (ref 70–99)
GLUCOSE BLDC GLUCOMTR-MCNC: 89 MG/DL — SIGNIFICANT CHANGE UP (ref 70–99)
GLUCOSE BLDC GLUCOMTR-MCNC: 93 MG/DL — SIGNIFICANT CHANGE UP (ref 70–99)
GLUCOSE BLDC GLUCOMTR-MCNC: 93 MG/DL — SIGNIFICANT CHANGE UP (ref 70–99)
GLUCOSE BLDC GLUCOMTR-MCNC: 94 MG/DL — SIGNIFICANT CHANGE UP (ref 70–99)
GLUCOSE BLDC GLUCOMTR-MCNC: 94 MG/DL — SIGNIFICANT CHANGE UP (ref 70–99)
GLUCOSE BLDC GLUCOMTR-MCNC: 95 MG/DL — SIGNIFICANT CHANGE UP (ref 70–99)
GLUCOSE BLDC GLUCOMTR-MCNC: 96 MG/DL — SIGNIFICANT CHANGE UP (ref 70–99)
GLUCOSE BLDC GLUCOMTR-MCNC: 97 MG/DL — SIGNIFICANT CHANGE UP (ref 70–99)
GLUCOSE BLDC GLUCOMTR-MCNC: 97 MG/DL — SIGNIFICANT CHANGE UP (ref 70–99)
GLUCOSE BLDC GLUCOMTR-MCNC: 98 MG/DL — SIGNIFICANT CHANGE UP (ref 70–99)
GLUCOSE BLDC GLUCOMTR-MCNC: 99 MG/DL — SIGNIFICANT CHANGE UP (ref 70–99)
GLUCOSE BLDC GLUCOMTR-MCNC: <25 MG/DL — CRITICAL LOW (ref 70–99)
GLUCOSE BLDC GLUCOMTR-MCNC: <25 MG/DL — CRITICAL LOW (ref 70–99)
GLUCOSE BLDV-MCNC: 225 MG/DL — HIGH (ref 70–99)
GLUCOSE SERPL-MCNC: 100 MG/DL — HIGH (ref 70–99)
GLUCOSE SERPL-MCNC: 102 MG/DL — HIGH (ref 70–99)
GLUCOSE SERPL-MCNC: 104 MG/DL — HIGH (ref 70–99)
GLUCOSE SERPL-MCNC: 105 MG/DL — HIGH (ref 70–99)
GLUCOSE SERPL-MCNC: 110 MG/DL — HIGH (ref 70–99)
GLUCOSE SERPL-MCNC: 122 MG/DL — HIGH (ref 70–99)
GLUCOSE SERPL-MCNC: 153 MG/DL — HIGH (ref 70–99)
GLUCOSE SERPL-MCNC: 161 MG/DL — HIGH (ref 70–99)
GLUCOSE SERPL-MCNC: 174 MG/DL — HIGH (ref 70–99)
GLUCOSE SERPL-MCNC: 218 MG/DL — HIGH (ref 70–99)
GLUCOSE SERPL-MCNC: 225 MG/DL — HIGH (ref 70–99)
GLUCOSE SERPL-MCNC: 251 MG/DL — HIGH (ref 70–99)
GLUCOSE SERPL-MCNC: 65 MG/DL — LOW (ref 70–99)
GLUCOSE SERPL-MCNC: 72 MG/DL — SIGNIFICANT CHANGE UP (ref 70–99)
GLUCOSE SERPL-MCNC: 74 MG/DL — SIGNIFICANT CHANGE UP (ref 70–99)
GLUCOSE SERPL-MCNC: 74 MG/DL — SIGNIFICANT CHANGE UP (ref 70–99)
GLUCOSE SERPL-MCNC: 76 MG/DL — SIGNIFICANT CHANGE UP (ref 70–99)
GLUCOSE SERPL-MCNC: 76 MG/DL — SIGNIFICANT CHANGE UP (ref 70–99)
GLUCOSE SERPL-MCNC: 77 MG/DL — SIGNIFICANT CHANGE UP (ref 70–99)
GLUCOSE SERPL-MCNC: 82 MG/DL — SIGNIFICANT CHANGE UP (ref 70–99)
GLUCOSE SERPL-MCNC: 83 MG/DL — SIGNIFICANT CHANGE UP (ref 70–99)
GLUCOSE SERPL-MCNC: 83 MG/DL — SIGNIFICANT CHANGE UP (ref 70–99)
GLUCOSE SERPL-MCNC: 90 MG/DL — SIGNIFICANT CHANGE UP (ref 70–99)
GLUCOSE SERPL-MCNC: 90 MG/DL — SIGNIFICANT CHANGE UP (ref 70–99)
GLUCOSE SERPL-MCNC: 91 MG/DL — SIGNIFICANT CHANGE UP (ref 70–99)
GLUCOSE SERPL-MCNC: 91 MG/DL — SIGNIFICANT CHANGE UP (ref 70–99)
GLUCOSE SERPL-MCNC: 92 MG/DL — SIGNIFICANT CHANGE UP (ref 70–99)
GLUCOSE SERPL-MCNC: 93 MG/DL — SIGNIFICANT CHANGE UP (ref 70–99)
GLUCOSE SERPL-MCNC: 93 MG/DL — SIGNIFICANT CHANGE UP (ref 70–99)
GLUCOSE SERPL-MCNC: 95 MG/DL — SIGNIFICANT CHANGE UP (ref 70–99)
GLUCOSE SERPL-MCNC: 98 MG/DL — SIGNIFICANT CHANGE UP (ref 70–99)
GP B STREP DNA BLD POS QL NAA+NON-PROBE: SIGNIFICANT CHANGE UP
GRAM STN FLD: SIGNIFICANT CHANGE UP
HBV CORE AB SER-ACNC: REACTIVE
HBV SURFACE AB SER-ACNC: 32.8 MIU/ML — SIGNIFICANT CHANGE UP
HBV SURFACE AG SER-ACNC: SIGNIFICANT CHANGE UP
HCO3 BLDV-SCNC: 30 MMOL/L — HIGH (ref 22–29)
HCT VFR BLD CALC: 22.6 % — LOW (ref 39–50)
HCT VFR BLD CALC: 22.6 % — LOW (ref 39–50)
HCT VFR BLD CALC: 22.7 % — LOW (ref 39–50)
HCT VFR BLD CALC: 22.9 % — LOW (ref 39–50)
HCT VFR BLD CALC: 24.2 % — LOW (ref 39–50)
HCT VFR BLD CALC: 24.2 % — LOW (ref 39–50)
HCT VFR BLD CALC: 24.3 % — LOW (ref 39–50)
HCT VFR BLD CALC: 24.5 % — LOW (ref 39–50)
HCT VFR BLD CALC: 24.6 % — LOW (ref 39–50)
HCT VFR BLD CALC: 24.7 % — LOW (ref 39–50)
HCT VFR BLD CALC: 24.7 % — LOW (ref 39–50)
HCT VFR BLD CALC: 25.3 % — LOW (ref 39–50)
HCT VFR BLD CALC: 25.4 % — LOW (ref 39–50)
HCT VFR BLD CALC: 25.5 % — LOW (ref 39–50)
HCT VFR BLD CALC: 25.8 % — LOW (ref 39–50)
HCT VFR BLD CALC: 26 % — LOW (ref 39–50)
HCT VFR BLD CALC: 26.1 % — LOW (ref 39–50)
HCT VFR BLD CALC: 26.1 % — LOW (ref 39–50)
HCT VFR BLD CALC: 26.8 % — LOW (ref 39–50)
HCT VFR BLD CALC: 26.8 % — LOW (ref 39–50)
HCT VFR BLD CALC: 27 % — LOW (ref 39–50)
HCT VFR BLD CALC: 27.1 % — LOW (ref 39–50)
HCT VFR BLD CALC: 27.6 % — LOW (ref 39–50)
HCT VFR BLD CALC: 27.7 % — LOW (ref 39–50)
HCT VFR BLD CALC: 28.1 % — LOW (ref 39–50)
HCT VFR BLD CALC: 28.2 % — LOW (ref 39–50)
HCT VFR BLD CALC: 28.3 % — LOW (ref 39–50)
HCT VFR BLD CALC: 28.4 % — LOW (ref 39–50)
HCT VFR BLD CALC: 28.6 % — LOW (ref 39–50)
HCT VFR BLD CALC: 29.2 % — LOW (ref 39–50)
HCT VFR BLD CALC: 29.5 % — LOW (ref 39–50)
HCT VFR BLD CALC: 29.7 % — LOW (ref 39–50)
HCT VFR BLD CALC: 29.8 % — LOW (ref 39–50)
HCT VFR BLD CALC: 30.3 % — LOW (ref 39–50)
HCT VFR BLD CALC: 31.2 % — LOW (ref 39–50)
HCT VFR BLD CALC: 31.9 % — LOW (ref 39–50)
HCT VFR BLD CALC: 33.8 % — LOW (ref 39–50)
HCT VFR BLDA CALC: 22 % — LOW (ref 39–51)
HCV AB S/CO SERPL IA: 0.17 S/CO — SIGNIFICANT CHANGE UP (ref 0–0.99)
HCV AB SERPL-IMP: SIGNIFICANT CHANGE UP
HDLC SERPL-MCNC: 30 MG/DL — LOW
HGB BLD CALC-MCNC: 7.3 G/DL — LOW (ref 12.6–17.4)
HGB BLD-MCNC: 10.2 G/DL — LOW (ref 13–17)
HGB BLD-MCNC: 10.5 G/DL — LOW (ref 13–17)
HGB BLD-MCNC: 7.1 G/DL — LOW (ref 13–17)
HGB BLD-MCNC: 7.1 G/DL — LOW (ref 13–17)
HGB BLD-MCNC: 7.2 G/DL — LOW (ref 13–17)
HGB BLD-MCNC: 7.4 G/DL — LOW (ref 13–17)
HGB BLD-MCNC: 7.6 G/DL — LOW (ref 13–17)
HGB BLD-MCNC: 7.6 G/DL — LOW (ref 13–17)
HGB BLD-MCNC: 7.7 G/DL — LOW (ref 13–17)
HGB BLD-MCNC: 7.8 G/DL — LOW (ref 13–17)
HGB BLD-MCNC: 7.8 G/DL — LOW (ref 13–17)
HGB BLD-MCNC: 7.9 G/DL — LOW (ref 13–17)
HGB BLD-MCNC: 8 G/DL — LOW (ref 13–17)
HGB BLD-MCNC: 8.1 G/DL — LOW (ref 13–17)
HGB BLD-MCNC: 8.2 G/DL — LOW (ref 13–17)
HGB BLD-MCNC: 8.4 G/DL — LOW (ref 13–17)
HGB BLD-MCNC: 8.6 G/DL — LOW (ref 13–17)
HGB BLD-MCNC: 8.7 G/DL — LOW (ref 13–17)
HGB BLD-MCNC: 8.8 G/DL — LOW (ref 13–17)
HGB BLD-MCNC: 8.9 G/DL — LOW (ref 13–17)
HGB BLD-MCNC: 9 G/DL — LOW (ref 13–17)
HGB BLD-MCNC: 9 G/DL — LOW (ref 13–17)
HGB BLD-MCNC: 9.1 G/DL — LOW (ref 13–17)
HGB BLD-MCNC: 9.3 G/DL — LOW (ref 13–17)
HGB BLD-MCNC: 9.3 G/DL — LOW (ref 13–17)
HGB BLD-MCNC: 9.4 G/DL — LOW (ref 13–17)
HGB BLD-MCNC: 9.5 G/DL — LOW (ref 13–17)
HGB BLD-MCNC: 9.9 G/DL — LOW (ref 13–17)
HYPOCHROMIA BLD QL: SLIGHT — SIGNIFICANT CHANGE UP
IANC: 10.21 K/UL — HIGH (ref 1.8–7.4)
IANC: 11.89 K/UL — HIGH (ref 1.8–7.4)
IANC: 13.21 K/UL — HIGH (ref 1.8–7.4)
IANC: 14.77 K/UL — HIGH (ref 1.8–7.4)
IANC: 15.56 K/UL — HIGH (ref 1.8–7.4)
IANC: 16.44 K/UL — HIGH (ref 1.8–7.4)
IANC: 17.49 K/UL — HIGH (ref 1.8–7.4)
IANC: 17.64 K/UL — HIGH (ref 1.8–7.4)
IANC: 18.56 K/UL — HIGH (ref 1.8–7.4)
IANC: 18.91 K/UL — HIGH (ref 1.8–7.4)
IANC: 22.46 K/UL — HIGH (ref 1.8–7.4)
IMM GRANULOCYTES NFR BLD AUTO: 0.6 % — SIGNIFICANT CHANGE UP (ref 0–0.9)
IMM GRANULOCYTES NFR BLD AUTO: 0.8 % — SIGNIFICANT CHANGE UP (ref 0–0.9)
IMM GRANULOCYTES NFR BLD AUTO: 0.9 % — SIGNIFICANT CHANGE UP (ref 0–0.9)
IMM GRANULOCYTES NFR BLD AUTO: 1 % — HIGH (ref 0–0.9)
IMM GRANULOCYTES NFR BLD AUTO: 1 % — HIGH (ref 0–0.9)
IMM GRANULOCYTES NFR BLD AUTO: 1.1 % — HIGH (ref 0–0.9)
IMM GRANULOCYTES NFR BLD AUTO: 1.2 % — HIGH (ref 0–0.9)
IMM GRANULOCYTES NFR BLD AUTO: 1.3 % — HIGH (ref 0–0.9)
IMM GRANULOCYTES NFR BLD AUTO: 1.8 % — HIGH (ref 0–0.9)
IMM GRANULOCYTES NFR BLD AUTO: 2.6 % — HIGH (ref 0–0.9)
INR BLD: 1.34 RATIO — HIGH (ref 0.88–1.16)
INR BLD: 1.37 RATIO — HIGH (ref 0.88–1.16)
INR BLD: 1.38 RATIO — HIGH (ref 0.88–1.16)
INR BLD: 1.43 RATIO — HIGH (ref 0.88–1.16)
INR BLD: 1.5 RATIO — HIGH (ref 0.88–1.16)
INR BLD: 1.54 RATIO — HIGH (ref 0.88–1.16)
IRON SATN MFR SERPL: 15 % — SIGNIFICANT CHANGE UP (ref 14–50)
IRON SATN MFR SERPL: 17 UG/DL — LOW (ref 45–165)
LACTATE BLDV-MCNC: 4.8 MMOL/L — CRITICAL HIGH (ref 0.5–2)
LIPID PNL WITH DIRECT LDL SERPL: 71 MG/DL — SIGNIFICANT CHANGE UP
LYMPHOCYTES # BLD AUTO: 0.77 K/UL — LOW (ref 1–3.3)
LYMPHOCYTES # BLD AUTO: 0.78 K/UL — LOW (ref 1–3.3)
LYMPHOCYTES # BLD AUTO: 0.79 K/UL — LOW (ref 1–3.3)
LYMPHOCYTES # BLD AUTO: 0.87 K/UL — LOW (ref 1–3.3)
LYMPHOCYTES # BLD AUTO: 1.02 K/UL — SIGNIFICANT CHANGE UP (ref 1–3.3)
LYMPHOCYTES # BLD AUTO: 1.09 K/UL — SIGNIFICANT CHANGE UP (ref 1–3.3)
LYMPHOCYTES # BLD AUTO: 1.36 K/UL — SIGNIFICANT CHANGE UP (ref 1–3.3)
LYMPHOCYTES # BLD AUTO: 1.48 K/UL — SIGNIFICANT CHANGE UP (ref 1–3.3)
LYMPHOCYTES # BLD AUTO: 1.56 K/UL — SIGNIFICANT CHANGE UP (ref 1–3.3)
LYMPHOCYTES # BLD AUTO: 1.72 K/UL — SIGNIFICANT CHANGE UP (ref 1–3.3)
LYMPHOCYTES # BLD AUTO: 10 % — LOW (ref 13–44)
LYMPHOCYTES # BLD AUTO: 2.11 K/UL — SIGNIFICANT CHANGE UP (ref 1–3.3)
LYMPHOCYTES # BLD AUTO: 4.4 % — LOW (ref 13–44)
LYMPHOCYTES # BLD AUTO: 4.9 % — LOW (ref 13–44)
LYMPHOCYTES # BLD AUTO: 5.2 % — LOW (ref 13–44)
LYMPHOCYTES # BLD AUTO: 5.4 % — LOW (ref 13–44)
LYMPHOCYTES # BLD AUTO: 5.5 % — LOW (ref 13–44)
LYMPHOCYTES # BLD AUTO: 6.3 % — LOW (ref 13–44)
LYMPHOCYTES # BLD AUTO: 6.3 % — LOW (ref 13–44)
LYMPHOCYTES # BLD AUTO: 6.5 % — LOW (ref 13–44)
LYMPHOCYTES # BLD AUTO: 6.8 % — LOW (ref 13–44)
LYMPHOCYTES # BLD AUTO: 7.3 % — LOW (ref 13–44)
MACROCYTES BLD QL: SLIGHT — SIGNIFICANT CHANGE UP
MAGNESIUM SERPL-MCNC: 1.9 MG/DL — SIGNIFICANT CHANGE UP (ref 1.6–2.6)
MAGNESIUM SERPL-MCNC: 2.1 MG/DL — SIGNIFICANT CHANGE UP (ref 1.6–2.6)
MAGNESIUM SERPL-MCNC: 2.2 MG/DL — SIGNIFICANT CHANGE UP (ref 1.6–2.6)
MAGNESIUM SERPL-MCNC: 2.3 MG/DL — SIGNIFICANT CHANGE UP (ref 1.6–2.6)
MAGNESIUM SERPL-MCNC: 2.4 MG/DL — SIGNIFICANT CHANGE UP (ref 1.6–2.6)
MAGNESIUM SERPL-MCNC: 2.4 MG/DL — SIGNIFICANT CHANGE UP (ref 1.6–2.6)
MAGNESIUM SERPL-MCNC: 2.5 MG/DL — SIGNIFICANT CHANGE UP (ref 1.6–2.6)
MAGNESIUM SERPL-MCNC: 2.6 MG/DL — SIGNIFICANT CHANGE UP (ref 1.6–2.6)
MANUAL SMEAR VERIFICATION: SIGNIFICANT CHANGE UP
MCHC RBC-ENTMCNC: 25.2 PG — LOW (ref 27–34)
MCHC RBC-ENTMCNC: 25.3 PG — LOW (ref 27–34)
MCHC RBC-ENTMCNC: 25.5 PG — LOW (ref 27–34)
MCHC RBC-ENTMCNC: 25.8 PG — LOW (ref 27–34)
MCHC RBC-ENTMCNC: 25.9 PG — LOW (ref 27–34)
MCHC RBC-ENTMCNC: 26.1 PG — LOW (ref 27–34)
MCHC RBC-ENTMCNC: 26.1 PG — LOW (ref 27–34)
MCHC RBC-ENTMCNC: 26.2 PG — LOW (ref 27–34)
MCHC RBC-ENTMCNC: 26.2 PG — LOW (ref 27–34)
MCHC RBC-ENTMCNC: 26.3 PG — LOW (ref 27–34)
MCHC RBC-ENTMCNC: 26.3 PG — LOW (ref 27–34)
MCHC RBC-ENTMCNC: 26.4 PG — LOW (ref 27–34)
MCHC RBC-ENTMCNC: 26.7 PG — LOW (ref 27–34)
MCHC RBC-ENTMCNC: 26.7 PG — LOW (ref 27–34)
MCHC RBC-ENTMCNC: 26.8 PG — LOW (ref 27–34)
MCHC RBC-ENTMCNC: 26.9 PG — LOW (ref 27–34)
MCHC RBC-ENTMCNC: 26.9 PG — LOW (ref 27–34)
MCHC RBC-ENTMCNC: 27 PG — SIGNIFICANT CHANGE UP (ref 27–34)
MCHC RBC-ENTMCNC: 27 PG — SIGNIFICANT CHANGE UP (ref 27–34)
MCHC RBC-ENTMCNC: 27.1 PG — SIGNIFICANT CHANGE UP (ref 27–34)
MCHC RBC-ENTMCNC: 27.2 PG — SIGNIFICANT CHANGE UP (ref 27–34)
MCHC RBC-ENTMCNC: 27.3 PG — SIGNIFICANT CHANGE UP (ref 27–34)
MCHC RBC-ENTMCNC: 27.6 PG — SIGNIFICANT CHANGE UP (ref 27–34)
MCHC RBC-ENTMCNC: 28.9 GM/DL — LOW (ref 32–36)
MCHC RBC-ENTMCNC: 29.2 GM/DL — LOW (ref 32–36)
MCHC RBC-ENTMCNC: 29.2 GM/DL — LOW (ref 32–36)
MCHC RBC-ENTMCNC: 29.3 GM/DL — LOW (ref 32–36)
MCHC RBC-ENTMCNC: 29.9 GM/DL — LOW (ref 32–36)
MCHC RBC-ENTMCNC: 30.4 GM/DL — LOW (ref 32–36)
MCHC RBC-ENTMCNC: 30.6 GM/DL — LOW (ref 32–36)
MCHC RBC-ENTMCNC: 30.8 GM/DL — LOW (ref 32–36)
MCHC RBC-ENTMCNC: 31 GM/DL — LOW (ref 32–36)
MCHC RBC-ENTMCNC: 31.1 GM/DL — LOW (ref 32–36)
MCHC RBC-ENTMCNC: 31.2 GM/DL — LOW (ref 32–36)
MCHC RBC-ENTMCNC: 31.2 GM/DL — LOW (ref 32–36)
MCHC RBC-ENTMCNC: 31.3 GM/DL — LOW (ref 32–36)
MCHC RBC-ENTMCNC: 31.3 GM/DL — LOW (ref 32–36)
MCHC RBC-ENTMCNC: 31.4 GM/DL — LOW (ref 32–36)
MCHC RBC-ENTMCNC: 31.5 GM/DL — LOW (ref 32–36)
MCHC RBC-ENTMCNC: 31.5 GM/DL — LOW (ref 32–36)
MCHC RBC-ENTMCNC: 31.7 GM/DL — LOW (ref 32–36)
MCHC RBC-ENTMCNC: 31.7 GM/DL — LOW (ref 32–36)
MCHC RBC-ENTMCNC: 31.8 GM/DL — LOW (ref 32–36)
MCHC RBC-ENTMCNC: 31.9 GM/DL — LOW (ref 32–36)
MCHC RBC-ENTMCNC: 32 GM/DL — SIGNIFICANT CHANGE UP (ref 32–36)
MCHC RBC-ENTMCNC: 32 GM/DL — SIGNIFICANT CHANGE UP (ref 32–36)
MCHC RBC-ENTMCNC: 32.2 GM/DL — SIGNIFICANT CHANGE UP (ref 32–36)
MCHC RBC-ENTMCNC: 32.3 GM/DL — SIGNIFICANT CHANGE UP (ref 32–36)
MCHC RBC-ENTMCNC: 32.4 GM/DL — SIGNIFICANT CHANGE UP (ref 32–36)
MCHC RBC-ENTMCNC: 32.5 GM/DL — SIGNIFICANT CHANGE UP (ref 32–36)
MCV RBC AUTO: 80.1 FL — SIGNIFICANT CHANGE UP (ref 80–100)
MCV RBC AUTO: 81.1 FL — SIGNIFICANT CHANGE UP (ref 80–100)
MCV RBC AUTO: 81.2 FL — SIGNIFICANT CHANGE UP (ref 80–100)
MCV RBC AUTO: 81.2 FL — SIGNIFICANT CHANGE UP (ref 80–100)
MCV RBC AUTO: 81.9 FL — SIGNIFICANT CHANGE UP (ref 80–100)
MCV RBC AUTO: 82.3 FL — SIGNIFICANT CHANGE UP (ref 80–100)
MCV RBC AUTO: 82.5 FL — SIGNIFICANT CHANGE UP (ref 80–100)
MCV RBC AUTO: 82.8 FL — SIGNIFICANT CHANGE UP (ref 80–100)
MCV RBC AUTO: 83.5 FL — SIGNIFICANT CHANGE UP (ref 80–100)
MCV RBC AUTO: 83.5 FL — SIGNIFICANT CHANGE UP (ref 80–100)
MCV RBC AUTO: 83.7 FL — SIGNIFICANT CHANGE UP (ref 80–100)
MCV RBC AUTO: 83.7 FL — SIGNIFICANT CHANGE UP (ref 80–100)
MCV RBC AUTO: 83.9 FL — SIGNIFICANT CHANGE UP (ref 80–100)
MCV RBC AUTO: 84.1 FL — SIGNIFICANT CHANGE UP (ref 80–100)
MCV RBC AUTO: 84.5 FL — SIGNIFICANT CHANGE UP (ref 80–100)
MCV RBC AUTO: 85.2 FL — SIGNIFICANT CHANGE UP (ref 80–100)
MCV RBC AUTO: 85.2 FL — SIGNIFICANT CHANGE UP (ref 80–100)
MCV RBC AUTO: 85.4 FL — SIGNIFICANT CHANGE UP (ref 80–100)
MCV RBC AUTO: 85.6 FL — SIGNIFICANT CHANGE UP (ref 80–100)
MCV RBC AUTO: 85.8 FL — SIGNIFICANT CHANGE UP (ref 80–100)
MCV RBC AUTO: 85.9 FL — SIGNIFICANT CHANGE UP (ref 80–100)
MCV RBC AUTO: 86.1 FL — SIGNIFICANT CHANGE UP (ref 80–100)
MCV RBC AUTO: 86.2 FL — SIGNIFICANT CHANGE UP (ref 80–100)
MCV RBC AUTO: 86.3 FL — SIGNIFICANT CHANGE UP (ref 80–100)
MCV RBC AUTO: 86.6 FL — SIGNIFICANT CHANGE UP (ref 80–100)
MCV RBC AUTO: 86.6 FL — SIGNIFICANT CHANGE UP (ref 80–100)
MCV RBC AUTO: 86.8 FL — SIGNIFICANT CHANGE UP (ref 80–100)
MCV RBC AUTO: 87 FL — SIGNIFICANT CHANGE UP (ref 80–100)
MCV RBC AUTO: 87.3 FL — SIGNIFICANT CHANGE UP (ref 80–100)
MCV RBC AUTO: 87.6 FL — SIGNIFICANT CHANGE UP (ref 80–100)
MCV RBC AUTO: 87.7 FL — SIGNIFICANT CHANGE UP (ref 80–100)
MCV RBC AUTO: 88.2 FL — SIGNIFICANT CHANGE UP (ref 80–100)
MCV RBC AUTO: 88.3 FL — SIGNIFICANT CHANGE UP (ref 80–100)
MCV RBC AUTO: 89.1 FL — SIGNIFICANT CHANGE UP (ref 80–100)
MCV RBC AUTO: 89.3 FL — SIGNIFICANT CHANGE UP (ref 80–100)
MCV RBC AUTO: 90 FL — SIGNIFICANT CHANGE UP (ref 80–100)
MCV RBC AUTO: 91.2 FL — SIGNIFICANT CHANGE UP (ref 80–100)
METHOD TYPE: SIGNIFICANT CHANGE UP
METHOD TYPE: SIGNIFICANT CHANGE UP
MONOCYTES # BLD AUTO: 0.62 K/UL — SIGNIFICANT CHANGE UP (ref 0–0.9)
MONOCYTES # BLD AUTO: 1.44 K/UL — HIGH (ref 0–0.9)
MONOCYTES # BLD AUTO: 1.63 K/UL — HIGH (ref 0–0.9)
MONOCYTES # BLD AUTO: 1.74 K/UL — HIGH (ref 0–0.9)
MONOCYTES # BLD AUTO: 1.9 K/UL — HIGH (ref 0–0.9)
MONOCYTES # BLD AUTO: 1.98 K/UL — HIGH (ref 0–0.9)
MONOCYTES # BLD AUTO: 2.04 K/UL — HIGH (ref 0–0.9)
MONOCYTES # BLD AUTO: 2.19 K/UL — HIGH (ref 0–0.9)
MONOCYTES # BLD AUTO: 2.2 K/UL — HIGH (ref 0–0.9)
MONOCYTES # BLD AUTO: 2.21 K/UL — HIGH (ref 0–0.9)
MONOCYTES # BLD AUTO: 2.25 K/UL — HIGH (ref 0–0.9)
MONOCYTES NFR BLD AUTO: 10 % — SIGNIFICANT CHANGE UP (ref 2–14)
MONOCYTES NFR BLD AUTO: 10.1 % — SIGNIFICANT CHANGE UP (ref 2–14)
MONOCYTES NFR BLD AUTO: 10.1 % — SIGNIFICANT CHANGE UP (ref 2–14)
MONOCYTES NFR BLD AUTO: 10.3 % — SIGNIFICANT CHANGE UP (ref 2–14)
MONOCYTES NFR BLD AUTO: 10.5 % — SIGNIFICANT CHANGE UP (ref 2–14)
MONOCYTES NFR BLD AUTO: 5.2 % — SIGNIFICANT CHANGE UP (ref 2–14)
MONOCYTES NFR BLD AUTO: 8.1 % — SIGNIFICANT CHANGE UP (ref 2–14)
MONOCYTES NFR BLD AUTO: 9.4 % — SIGNIFICANT CHANGE UP (ref 2–14)
MONOCYTES NFR BLD AUTO: 9.5 % — SIGNIFICANT CHANGE UP (ref 2–14)
MONOCYTES NFR BLD AUTO: 9.7 % — SIGNIFICANT CHANGE UP (ref 2–14)
MONOCYTES NFR BLD AUTO: 9.7 % — SIGNIFICANT CHANGE UP (ref 2–14)
MRSA PCR RESULT.: SIGNIFICANT CHANGE UP
MYELOCYTES NFR BLD: 0.9 % — HIGH (ref 0–0)
NEUTROPHILS # BLD AUTO: 10.21 K/UL — HIGH (ref 1.8–7.4)
NEUTROPHILS # BLD AUTO: 11.89 K/UL — HIGH (ref 1.8–7.4)
NEUTROPHILS # BLD AUTO: 13.21 K/UL — HIGH (ref 1.8–7.4)
NEUTROPHILS # BLD AUTO: 14.77 K/UL — HIGH (ref 1.8–7.4)
NEUTROPHILS # BLD AUTO: 15.56 K/UL — HIGH (ref 1.8–7.4)
NEUTROPHILS # BLD AUTO: 16.44 K/UL — HIGH (ref 1.8–7.4)
NEUTROPHILS # BLD AUTO: 17.49 K/UL — HIGH (ref 1.8–7.4)
NEUTROPHILS # BLD AUTO: 17.64 K/UL — HIGH (ref 1.8–7.4)
NEUTROPHILS # BLD AUTO: 18.56 K/UL — HIGH (ref 1.8–7.4)
NEUTROPHILS # BLD AUTO: 18.91 K/UL — HIGH (ref 1.8–7.4)
NEUTROPHILS # BLD AUTO: 22.6 K/UL — HIGH (ref 1.8–7.4)
NEUTROPHILS NFR BLD AUTO: 77.6 % — HIGH (ref 43–77)
NEUTROPHILS NFR BLD AUTO: 81.4 % — HIGH (ref 43–77)
NEUTROPHILS NFR BLD AUTO: 81.7 % — HIGH (ref 43–77)
NEUTROPHILS NFR BLD AUTO: 82 % — HIGH (ref 43–77)
NEUTROPHILS NFR BLD AUTO: 82 % — HIGH (ref 43–77)
NEUTROPHILS NFR BLD AUTO: 82.7 % — HIGH (ref 43–77)
NEUTROPHILS NFR BLD AUTO: 83.3 % — HIGH (ref 43–77)
NEUTROPHILS NFR BLD AUTO: 83.4 % — HIGH (ref 43–77)
NEUTROPHILS NFR BLD AUTO: 83.8 % — HIGH (ref 43–77)
NEUTROPHILS NFR BLD AUTO: 84.8 % — HIGH (ref 43–77)
NEUTROPHILS NFR BLD AUTO: 86 % — HIGH (ref 43–77)
NEUTS BAND # BLD: 0.9 % — SIGNIFICANT CHANGE UP (ref 0–6)
NON HDL CHOLESTEROL: 96 MG/DL — SIGNIFICANT CHANGE UP
NRBC # BLD: 0 /100 WBCS — SIGNIFICANT CHANGE UP (ref 0–0)
NRBC # BLD: 0.2 /100 WBCS — SIGNIFICANT CHANGE UP (ref 0–0)
NRBC # BLD: 2 /100 WBCS — HIGH (ref 0–0)
NRBC # FLD: 0 K/UL — SIGNIFICANT CHANGE UP (ref 0–0)
NRBC # FLD: 0.02 K/UL — HIGH (ref 0–0)
NRBC # FLD: 0.03 K/UL — HIGH (ref 0–0)
NRBC # FLD: 0.03 K/UL — HIGH (ref 0–0)
NRBC # FLD: 0.13 K/UL — HIGH (ref 0–0)
NRBC # FLD: 0.18 K/UL — HIGH (ref 0–0)
NRBC # FLD: 0.23 K/UL — HIGH (ref 0–0)
NRBC # FLD: 0.29 K/UL — HIGH (ref 0–0)
NRBC # FLD: 0.37 K/UL — HIGH (ref 0–0)
NRBC # FLD: 0.39 K/UL — HIGH (ref 0–0)
NRBC # FLD: 0.42 K/UL — HIGH (ref 0–0)
NRBC # FLD: 0.45 K/UL — HIGH (ref 0–0)
NRBC # FLD: 0.45 K/UL — HIGH (ref 0–0)
NRBC # FLD: 0.46 K/UL — HIGH (ref 0–0)
OB PNL STL: POSITIVE
ORGANISM # SPEC MICROSCOPIC CNT: SIGNIFICANT CHANGE UP
PCO2 BLDV: 52 MMHG — SIGNIFICANT CHANGE UP (ref 42–55)
PH BLDV: 7.37 — SIGNIFICANT CHANGE UP (ref 7.32–7.43)
PHOSPHATE SERPL-MCNC: 2 MG/DL — LOW (ref 2.5–4.5)
PHOSPHATE SERPL-MCNC: 2.6 MG/DL — SIGNIFICANT CHANGE UP (ref 2.5–4.5)
PHOSPHATE SERPL-MCNC: 2.8 MG/DL — SIGNIFICANT CHANGE UP (ref 2.5–4.5)
PHOSPHATE SERPL-MCNC: 3.1 MG/DL — SIGNIFICANT CHANGE UP (ref 2.5–4.5)
PHOSPHATE SERPL-MCNC: 3.2 MG/DL — SIGNIFICANT CHANGE UP (ref 2.5–4.5)
PHOSPHATE SERPL-MCNC: 3.2 MG/DL — SIGNIFICANT CHANGE UP (ref 2.5–4.5)
PHOSPHATE SERPL-MCNC: 3.3 MG/DL — SIGNIFICANT CHANGE UP (ref 2.5–4.5)
PHOSPHATE SERPL-MCNC: 3.4 MG/DL — SIGNIFICANT CHANGE UP (ref 2.5–4.5)
PHOSPHATE SERPL-MCNC: 3.4 MG/DL — SIGNIFICANT CHANGE UP (ref 2.5–4.5)
PHOSPHATE SERPL-MCNC: 3.5 MG/DL — SIGNIFICANT CHANGE UP (ref 2.5–4.5)
PHOSPHATE SERPL-MCNC: 3.6 MG/DL — SIGNIFICANT CHANGE UP (ref 2.5–4.5)
PHOSPHATE SERPL-MCNC: 3.9 MG/DL — SIGNIFICANT CHANGE UP (ref 2.5–4.5)
PHOSPHATE SERPL-MCNC: 3.9 MG/DL — SIGNIFICANT CHANGE UP (ref 2.5–4.5)
PHOSPHATE SERPL-MCNC: 4 MG/DL — SIGNIFICANT CHANGE UP (ref 2.5–4.5)
PHOSPHATE SERPL-MCNC: 4.1 MG/DL — SIGNIFICANT CHANGE UP (ref 2.5–4.5)
PHOSPHATE SERPL-MCNC: 4.3 MG/DL — SIGNIFICANT CHANGE UP (ref 2.5–4.5)
PHOSPHATE SERPL-MCNC: 4.4 MG/DL — SIGNIFICANT CHANGE UP (ref 2.5–4.5)
PHOSPHATE SERPL-MCNC: 4.6 MG/DL — HIGH (ref 2.5–4.5)
PHOSPHATE SERPL-MCNC: 4.7 MG/DL — HIGH (ref 2.5–4.5)
PHOSPHATE SERPL-MCNC: 4.7 MG/DL — HIGH (ref 2.5–4.5)
PHOSPHATE SERPL-MCNC: 4.8 MG/DL — HIGH (ref 2.5–4.5)
PHOSPHATE SERPL-MCNC: 4.8 MG/DL — HIGH (ref 2.5–4.5)
PHOSPHATE SERPL-MCNC: 5.1 MG/DL — HIGH (ref 2.5–4.5)
PHOSPHATE SERPL-MCNC: 5.5 MG/DL — HIGH (ref 2.5–4.5)
PHOSPHATE SERPL-MCNC: 5.6 MG/DL — HIGH (ref 2.5–4.5)
PHOSPHATE SERPL-MCNC: 6.4 MG/DL — HIGH (ref 2.5–4.5)
PHOSPHATE SERPL-MCNC: 6.6 MG/DL — HIGH (ref 2.5–4.5)
PHOSPHATE SERPL-MCNC: 7.1 MG/DL — HIGH (ref 2.5–4.5)
PHOSPHATE SERPL-MCNC: 7.4 MG/DL — HIGH (ref 2.5–4.5)
PLAT MORPH BLD: NORMAL — SIGNIFICANT CHANGE UP
PLATELET # BLD AUTO: 145 K/UL — LOW (ref 150–400)
PLATELET # BLD AUTO: 145 K/UL — LOW (ref 150–400)
PLATELET # BLD AUTO: 165 K/UL — SIGNIFICANT CHANGE UP (ref 150–400)
PLATELET # BLD AUTO: 168 K/UL — SIGNIFICANT CHANGE UP (ref 150–400)
PLATELET # BLD AUTO: 176 K/UL — SIGNIFICANT CHANGE UP (ref 150–400)
PLATELET # BLD AUTO: 199 K/UL — SIGNIFICANT CHANGE UP (ref 150–400)
PLATELET # BLD AUTO: 203 K/UL — SIGNIFICANT CHANGE UP (ref 150–400)
PLATELET # BLD AUTO: 219 K/UL — SIGNIFICANT CHANGE UP (ref 150–400)
PLATELET # BLD AUTO: 226 K/UL — SIGNIFICANT CHANGE UP (ref 150–400)
PLATELET # BLD AUTO: 227 K/UL — SIGNIFICANT CHANGE UP (ref 150–400)
PLATELET # BLD AUTO: 227 K/UL — SIGNIFICANT CHANGE UP (ref 150–400)
PLATELET # BLD AUTO: 230 K/UL — SIGNIFICANT CHANGE UP (ref 150–400)
PLATELET # BLD AUTO: 230 K/UL — SIGNIFICANT CHANGE UP (ref 150–400)
PLATELET # BLD AUTO: 233 K/UL — SIGNIFICANT CHANGE UP (ref 150–400)
PLATELET # BLD AUTO: 235 K/UL — SIGNIFICANT CHANGE UP (ref 150–400)
PLATELET # BLD AUTO: 246 K/UL — SIGNIFICANT CHANGE UP (ref 150–400)
PLATELET # BLD AUTO: 250 K/UL — SIGNIFICANT CHANGE UP (ref 150–400)
PLATELET # BLD AUTO: 253 K/UL — SIGNIFICANT CHANGE UP (ref 150–400)
PLATELET # BLD AUTO: 255 K/UL — SIGNIFICANT CHANGE UP (ref 150–400)
PLATELET # BLD AUTO: 255 K/UL — SIGNIFICANT CHANGE UP (ref 150–400)
PLATELET # BLD AUTO: 263 K/UL — SIGNIFICANT CHANGE UP (ref 150–400)
PLATELET # BLD AUTO: 268 K/UL — SIGNIFICANT CHANGE UP (ref 150–400)
PLATELET # BLD AUTO: 270 K/UL — SIGNIFICANT CHANGE UP (ref 150–400)
PLATELET # BLD AUTO: 275 K/UL — SIGNIFICANT CHANGE UP (ref 150–400)
PLATELET # BLD AUTO: 285 K/UL — SIGNIFICANT CHANGE UP (ref 150–400)
PLATELET # BLD AUTO: 293 K/UL — SIGNIFICANT CHANGE UP (ref 150–400)
PLATELET # BLD AUTO: 296 K/UL — SIGNIFICANT CHANGE UP (ref 150–400)
PLATELET # BLD AUTO: 297 K/UL — SIGNIFICANT CHANGE UP (ref 150–400)
PLATELET # BLD AUTO: 301 K/UL — SIGNIFICANT CHANGE UP (ref 150–400)
PLATELET # BLD AUTO: 301 K/UL — SIGNIFICANT CHANGE UP (ref 150–400)
PLATELET # BLD AUTO: 319 K/UL — SIGNIFICANT CHANGE UP (ref 150–400)
PLATELET # BLD AUTO: 325 K/UL — SIGNIFICANT CHANGE UP (ref 150–400)
PLATELET # BLD AUTO: 334 K/UL — SIGNIFICANT CHANGE UP (ref 150–400)
PLATELET # BLD AUTO: 337 K/UL — SIGNIFICANT CHANGE UP (ref 150–400)
PLATELET # BLD AUTO: 353 K/UL — SIGNIFICANT CHANGE UP (ref 150–400)
PLATELET COUNT - ESTIMATE: NORMAL — SIGNIFICANT CHANGE UP
PO2 BLDV: 26 MMHG — SIGNIFICANT CHANGE UP (ref 25–45)
POIKILOCYTOSIS BLD QL AUTO: SLIGHT — SIGNIFICANT CHANGE UP
POLYCHROMASIA BLD QL SMEAR: SLIGHT — SIGNIFICANT CHANGE UP
POTASSIUM BLDV-SCNC: 3.6 MMOL/L — SIGNIFICANT CHANGE UP (ref 3.5–5.1)
POTASSIUM SERPL-MCNC: 2.8 MMOL/L — CRITICAL LOW (ref 3.5–5.3)
POTASSIUM SERPL-MCNC: 3.7 MMOL/L — SIGNIFICANT CHANGE UP (ref 3.5–5.3)
POTASSIUM SERPL-MCNC: 3.9 MMOL/L — SIGNIFICANT CHANGE UP (ref 3.5–5.3)
POTASSIUM SERPL-MCNC: 4.1 MMOL/L — SIGNIFICANT CHANGE UP (ref 3.5–5.3)
POTASSIUM SERPL-MCNC: 4.2 MMOL/L — SIGNIFICANT CHANGE UP (ref 3.5–5.3)
POTASSIUM SERPL-MCNC: 4.3 MMOL/L — SIGNIFICANT CHANGE UP (ref 3.5–5.3)
POTASSIUM SERPL-MCNC: 4.4 MMOL/L — SIGNIFICANT CHANGE UP (ref 3.5–5.3)
POTASSIUM SERPL-MCNC: 4.5 MMOL/L — SIGNIFICANT CHANGE UP (ref 3.5–5.3)
POTASSIUM SERPL-MCNC: 4.6 MMOL/L — SIGNIFICANT CHANGE UP (ref 3.5–5.3)
POTASSIUM SERPL-MCNC: 4.7 MMOL/L — SIGNIFICANT CHANGE UP (ref 3.5–5.3)
POTASSIUM SERPL-MCNC: 4.8 MMOL/L — SIGNIFICANT CHANGE UP (ref 3.5–5.3)
POTASSIUM SERPL-MCNC: 4.9 MMOL/L — SIGNIFICANT CHANGE UP (ref 3.5–5.3)
POTASSIUM SERPL-MCNC: 5 MMOL/L — SIGNIFICANT CHANGE UP (ref 3.5–5.3)
POTASSIUM SERPL-MCNC: 5 MMOL/L — SIGNIFICANT CHANGE UP (ref 3.5–5.3)
POTASSIUM SERPL-MCNC: 5.1 MMOL/L — SIGNIFICANT CHANGE UP (ref 3.5–5.3)
POTASSIUM SERPL-MCNC: 5.3 MMOL/L — SIGNIFICANT CHANGE UP (ref 3.5–5.3)
POTASSIUM SERPL-MCNC: 5.4 MMOL/L — HIGH (ref 3.5–5.3)
POTASSIUM SERPL-MCNC: 5.5 MMOL/L — HIGH (ref 3.5–5.3)
POTASSIUM SERPL-MCNC: 6.1 MMOL/L — HIGH (ref 3.5–5.3)
POTASSIUM SERPL-SCNC: 2.8 MMOL/L — CRITICAL LOW (ref 3.5–5.3)
POTASSIUM SERPL-SCNC: 3.7 MMOL/L — SIGNIFICANT CHANGE UP (ref 3.5–5.3)
POTASSIUM SERPL-SCNC: 3.9 MMOL/L — SIGNIFICANT CHANGE UP (ref 3.5–5.3)
POTASSIUM SERPL-SCNC: 4.1 MMOL/L — SIGNIFICANT CHANGE UP (ref 3.5–5.3)
POTASSIUM SERPL-SCNC: 4.2 MMOL/L — SIGNIFICANT CHANGE UP (ref 3.5–5.3)
POTASSIUM SERPL-SCNC: 4.3 MMOL/L — SIGNIFICANT CHANGE UP (ref 3.5–5.3)
POTASSIUM SERPL-SCNC: 4.4 MMOL/L — SIGNIFICANT CHANGE UP (ref 3.5–5.3)
POTASSIUM SERPL-SCNC: 4.5 MMOL/L — SIGNIFICANT CHANGE UP (ref 3.5–5.3)
POTASSIUM SERPL-SCNC: 4.6 MMOL/L — SIGNIFICANT CHANGE UP (ref 3.5–5.3)
POTASSIUM SERPL-SCNC: 4.7 MMOL/L — SIGNIFICANT CHANGE UP (ref 3.5–5.3)
POTASSIUM SERPL-SCNC: 4.8 MMOL/L — SIGNIFICANT CHANGE UP (ref 3.5–5.3)
POTASSIUM SERPL-SCNC: 4.9 MMOL/L — SIGNIFICANT CHANGE UP (ref 3.5–5.3)
POTASSIUM SERPL-SCNC: 5 MMOL/L — SIGNIFICANT CHANGE UP (ref 3.5–5.3)
POTASSIUM SERPL-SCNC: 5 MMOL/L — SIGNIFICANT CHANGE UP (ref 3.5–5.3)
POTASSIUM SERPL-SCNC: 5.1 MMOL/L — SIGNIFICANT CHANGE UP (ref 3.5–5.3)
POTASSIUM SERPL-SCNC: 5.3 MMOL/L — SIGNIFICANT CHANGE UP (ref 3.5–5.3)
POTASSIUM SERPL-SCNC: 5.4 MMOL/L — HIGH (ref 3.5–5.3)
POTASSIUM SERPL-SCNC: 5.5 MMOL/L — HIGH (ref 3.5–5.3)
POTASSIUM SERPL-SCNC: 6.1 MMOL/L — HIGH (ref 3.5–5.3)
PROT SERPL-MCNC: 6.4 G/DL — SIGNIFICANT CHANGE UP (ref 6–8.3)
PROT SERPL-MCNC: 6.4 G/DL — SIGNIFICANT CHANGE UP (ref 6–8.3)
PROT SERPL-MCNC: 6.5 G/DL — SIGNIFICANT CHANGE UP (ref 6–8.3)
PROT SERPL-MCNC: 6.9 G/DL — SIGNIFICANT CHANGE UP (ref 6–8.3)
PROT SERPL-MCNC: 6.9 G/DL — SIGNIFICANT CHANGE UP (ref 6–8.3)
PROT SERPL-MCNC: 7 G/DL — SIGNIFICANT CHANGE UP (ref 6–8.3)
PROT SERPL-MCNC: 7.1 G/DL — SIGNIFICANT CHANGE UP (ref 6–8.3)
PROT SERPL-MCNC: 7.3 G/DL — SIGNIFICANT CHANGE UP (ref 6–8.3)
PROTHROM AB SERPL-ACNC: 15.6 SEC — HIGH (ref 10.5–13.4)
PROTHROM AB SERPL-ACNC: 15.9 SEC — HIGH (ref 10.5–13.4)
PROTHROM AB SERPL-ACNC: 16.1 SEC — HIGH (ref 10.5–13.4)
PROTHROM AB SERPL-ACNC: 16.6 SEC — HIGH (ref 10.5–13.4)
PROTHROM AB SERPL-ACNC: 17.5 SEC — HIGH (ref 10.5–13.4)
PROTHROM AB SERPL-ACNC: 17.9 SEC — HIGH (ref 10.5–13.4)
PTH-INTACT FLD-MCNC: 195 PG/ML — HIGH (ref 15–65)
RBC # BLD: 2.69 M/UL — LOW (ref 4.2–5.8)
RBC # BLD: 2.7 M/UL — LOW (ref 4.2–5.8)
RBC # BLD: 2.71 M/UL — LOW (ref 4.2–5.8)
RBC # BLD: 2.74 M/UL — LOW (ref 4.2–5.8)
RBC # BLD: 2.79 M/UL — LOW (ref 4.2–5.8)
RBC # BLD: 2.83 M/UL — LOW (ref 4.2–5.8)
RBC # BLD: 2.83 M/UL — LOW (ref 4.2–5.8)
RBC # BLD: 2.84 M/UL — LOW (ref 4.2–5.8)
RBC # BLD: 2.95 M/UL — LOW (ref 4.2–5.8)
RBC # BLD: 2.96 M/UL — LOW (ref 4.2–5.8)
RBC # BLD: 2.98 M/UL — LOW (ref 4.2–5.8)
RBC # BLD: 3.04 M/UL — LOW (ref 4.2–5.8)
RBC # BLD: 3.09 M/UL — LOW (ref 4.2–5.8)
RBC # BLD: 3.09 M/UL — LOW (ref 4.2–5.8)
RBC # BLD: 3.1 M/UL — LOW (ref 4.2–5.8)
RBC # BLD: 3.13 M/UL — LOW (ref 4.2–5.8)
RBC # BLD: 3.14 M/UL — LOW (ref 4.2–5.8)
RBC # BLD: 3.16 M/UL — LOW (ref 4.2–5.8)
RBC # BLD: 3.17 M/UL — LOW (ref 4.2–5.8)
RBC # BLD: 3.2 M/UL — LOW (ref 4.2–5.8)
RBC # BLD: 3.22 M/UL — LOW (ref 4.2–5.8)
RBC # BLD: 3.22 M/UL — LOW (ref 4.2–5.8)
RBC # BLD: 3.24 M/UL — LOW (ref 4.2–5.8)
RBC # BLD: 3.3 M/UL — LOW (ref 4.2–5.8)
RBC # BLD: 3.31 M/UL — LOW (ref 4.2–5.8)
RBC # BLD: 3.34 M/UL — LOW (ref 4.2–5.8)
RBC # BLD: 3.37 M/UL — LOW (ref 4.2–5.8)
RBC # BLD: 3.39 M/UL — LOW (ref 4.2–5.8)
RBC # BLD: 3.42 M/UL — LOW (ref 4.2–5.8)
RBC # BLD: 3.44 M/UL — LOW (ref 4.2–5.8)
RBC # BLD: 3.51 M/UL — LOW (ref 4.2–5.8)
RBC # BLD: 3.55 M/UL — LOW (ref 4.2–5.8)
RBC # BLD: 3.66 M/UL — LOW (ref 4.2–5.8)
RBC # BLD: 3.82 M/UL — LOW (ref 4.2–5.8)
RBC # BLD: 3.92 M/UL — LOW (ref 4.2–5.8)
RBC # FLD: 16.5 % — HIGH (ref 10.3–14.5)
RBC # FLD: 16.6 % — HIGH (ref 10.3–14.5)
RBC # FLD: 16.7 % — HIGH (ref 10.3–14.5)
RBC # FLD: 16.7 % — HIGH (ref 10.3–14.5)
RBC # FLD: 16.8 % — HIGH (ref 10.3–14.5)
RBC # FLD: 16.9 % — HIGH (ref 10.3–14.5)
RBC # FLD: 17 % — HIGH (ref 10.3–14.5)
RBC # FLD: 17.1 % — HIGH (ref 10.3–14.5)
RBC # FLD: 17.1 % — HIGH (ref 10.3–14.5)
RBC # FLD: 17.2 % — HIGH (ref 10.3–14.5)
RBC # FLD: 17.2 % — HIGH (ref 10.3–14.5)
RBC # FLD: 17.6 % — HIGH (ref 10.3–14.5)
RBC # FLD: 17.9 % — HIGH (ref 10.3–14.5)
RBC # FLD: 17.9 % — HIGH (ref 10.3–14.5)
RBC # FLD: 18 % — HIGH (ref 10.3–14.5)
RBC # FLD: 18.1 % — HIGH (ref 10.3–14.5)
RBC # FLD: 18.1 % — HIGH (ref 10.3–14.5)
RBC # FLD: 18.2 % — HIGH (ref 10.3–14.5)
RBC # FLD: 18.2 % — HIGH (ref 10.3–14.5)
RBC # FLD: 18.3 % — HIGH (ref 10.3–14.5)
RBC # FLD: 18.4 % — HIGH (ref 10.3–14.5)
RBC # FLD: 18.4 % — HIGH (ref 10.3–14.5)
RBC # FLD: 18.5 % — HIGH (ref 10.3–14.5)
RBC # FLD: 18.6 % — HIGH (ref 10.3–14.5)
RBC # FLD: 18.7 % — HIGH (ref 10.3–14.5)
RBC # FLD: 18.7 % — HIGH (ref 10.3–14.5)
RBC # FLD: 18.8 % — HIGH (ref 10.3–14.5)
RBC # FLD: 19 % — HIGH (ref 10.3–14.5)
RBC BLD AUTO: ABNORMAL
RH IG SCN BLD-IMP: POSITIVE — SIGNIFICANT CHANGE UP
S AUREUS DNA NOSE QL NAA+PROBE: SIGNIFICANT CHANGE UP
SAO2 % BLDV: 36.6 % — LOW (ref 67–88)
SODIUM SERPL-SCNC: 135 MMOL/L — SIGNIFICANT CHANGE UP (ref 135–145)
SODIUM SERPL-SCNC: 136 MMOL/L — SIGNIFICANT CHANGE UP (ref 135–145)
SODIUM SERPL-SCNC: 137 MMOL/L — SIGNIFICANT CHANGE UP (ref 135–145)
SODIUM SERPL-SCNC: 138 MMOL/L — SIGNIFICANT CHANGE UP (ref 135–145)
SODIUM SERPL-SCNC: 139 MMOL/L — SIGNIFICANT CHANGE UP (ref 135–145)
SODIUM SERPL-SCNC: 140 MMOL/L — SIGNIFICANT CHANGE UP (ref 135–145)
SODIUM SERPL-SCNC: 141 MMOL/L — SIGNIFICANT CHANGE UP (ref 135–145)
SODIUM SERPL-SCNC: 142 MMOL/L — SIGNIFICANT CHANGE UP (ref 135–145)
SODIUM SERPL-SCNC: 143 MMOL/L — SIGNIFICANT CHANGE UP (ref 135–145)
SODIUM SERPL-SCNC: 144 MMOL/L — SIGNIFICANT CHANGE UP (ref 135–145)
SPECIMEN SOURCE: SIGNIFICANT CHANGE UP
SURGICAL PATHOLOGY STUDY: SIGNIFICANT CHANGE UP
TIBC SERPL-MCNC: 114 UG/DL — LOW (ref 220–430)
TRIGL SERPL-MCNC: 123 MG/DL — SIGNIFICANT CHANGE UP
TROPONIN T, HIGH SENSITIVITY RESULT: 660 NG/L — CRITICAL HIGH
TROPONIN T, HIGH SENSITIVITY RESULT: 668 NG/L — CRITICAL HIGH
TROPONIN T, HIGH SENSITIVITY RESULT: 8895 NG/L — CRITICAL HIGH
TROPONIN T, HIGH SENSITIVITY RESULT: 9022 NG/L — CRITICAL HIGH
TROPONIN T, HIGH SENSITIVITY RESULT: CRITICAL HIGH NG/L
TSH SERPL-MCNC: 3.15 UIU/ML — SIGNIFICANT CHANGE UP (ref 0.27–4.2)
UIBC SERPL-MCNC: 97 UG/DL — LOW (ref 110–370)
VANCOMYCIN TROUGH SERPL-MCNC: 10 UG/ML — SIGNIFICANT CHANGE UP (ref 10–20)
WBC # BLD: 10.02 K/UL — SIGNIFICANT CHANGE UP (ref 3.8–10.5)
WBC # BLD: 10.36 K/UL — SIGNIFICANT CHANGE UP (ref 3.8–10.5)
WBC # BLD: 11.05 K/UL — HIGH (ref 3.8–10.5)
WBC # BLD: 11.58 K/UL — HIGH (ref 3.8–10.5)
WBC # BLD: 11.71 K/UL — HIGH (ref 3.8–10.5)
WBC # BLD: 11.83 K/UL — HIGH (ref 3.8–10.5)
WBC # BLD: 11.85 K/UL — HIGH (ref 3.8–10.5)
WBC # BLD: 11.88 K/UL — HIGH (ref 3.8–10.5)
WBC # BLD: 11.96 K/UL — HIGH (ref 3.8–10.5)
WBC # BLD: 12.22 K/UL — HIGH (ref 3.8–10.5)
WBC # BLD: 13.11 K/UL — HIGH (ref 3.8–10.5)
WBC # BLD: 13.18 K/UL — HIGH (ref 3.8–10.5)
WBC # BLD: 13.19 K/UL — HIGH (ref 3.8–10.5)
WBC # BLD: 13.43 K/UL — HIGH (ref 3.8–10.5)
WBC # BLD: 13.63 K/UL — HIGH (ref 3.8–10.5)
WBC # BLD: 14.26 K/UL — HIGH (ref 3.8–10.5)
WBC # BLD: 14.43 K/UL — HIGH (ref 3.8–10.5)
WBC # BLD: 15.05 K/UL — HIGH (ref 3.8–10.5)
WBC # BLD: 15.09 K/UL — HIGH (ref 3.8–10.5)
WBC # BLD: 15.32 K/UL — HIGH (ref 3.8–10.5)
WBC # BLD: 15.84 K/UL — HIGH (ref 3.8–10.5)
WBC # BLD: 16.01 K/UL — HIGH (ref 3.8–10.5)
WBC # BLD: 16.49 K/UL — HIGH (ref 3.8–10.5)
WBC # BLD: 16.87 K/UL — HIGH (ref 3.8–10.5)
WBC # BLD: 17.43 K/UL — HIGH (ref 3.8–10.5)
WBC # BLD: 18.81 K/UL — HIGH (ref 3.8–10.5)
WBC # BLD: 20.92 K/UL — HIGH (ref 3.8–10.5)
WBC # BLD: 21.06 K/UL — HIGH (ref 3.8–10.5)
WBC # BLD: 21.17 K/UL — HIGH (ref 3.8–10.5)
WBC # BLD: 21.38 K/UL — HIGH (ref 3.8–10.5)
WBC # BLD: 21.44 K/UL — HIGH (ref 3.8–10.5)
WBC # BLD: 22.77 K/UL — HIGH (ref 3.8–10.5)
WBC # BLD: 23.06 K/UL — HIGH (ref 3.8–10.5)
WBC # BLD: 27.26 K/UL — HIGH (ref 3.8–10.5)
WBC # BLD: 9.05 K/UL — SIGNIFICANT CHANGE UP (ref 3.8–10.5)
WBC # BLD: 9.18 K/UL — SIGNIFICANT CHANGE UP (ref 3.8–10.5)
WBC # BLD: 9.32 K/UL — SIGNIFICANT CHANGE UP (ref 3.8–10.5)
WBC # FLD AUTO: 10.02 K/UL — SIGNIFICANT CHANGE UP (ref 3.8–10.5)
WBC # FLD AUTO: 10.36 K/UL — SIGNIFICANT CHANGE UP (ref 3.8–10.5)
WBC # FLD AUTO: 11.05 K/UL — HIGH (ref 3.8–10.5)
WBC # FLD AUTO: 11.58 K/UL — HIGH (ref 3.8–10.5)
WBC # FLD AUTO: 11.71 K/UL — HIGH (ref 3.8–10.5)
WBC # FLD AUTO: 11.83 K/UL — HIGH (ref 3.8–10.5)
WBC # FLD AUTO: 11.85 K/UL — HIGH (ref 3.8–10.5)
WBC # FLD AUTO: 11.88 K/UL — HIGH (ref 3.8–10.5)
WBC # FLD AUTO: 11.96 K/UL — HIGH (ref 3.8–10.5)
WBC # FLD AUTO: 12.22 K/UL — HIGH (ref 3.8–10.5)
WBC # FLD AUTO: 13.11 K/UL — HIGH (ref 3.8–10.5)
WBC # FLD AUTO: 13.18 K/UL — HIGH (ref 3.8–10.5)
WBC # FLD AUTO: 13.19 K/UL — HIGH (ref 3.8–10.5)
WBC # FLD AUTO: 13.43 K/UL — HIGH (ref 3.8–10.5)
WBC # FLD AUTO: 13.63 K/UL — HIGH (ref 3.8–10.5)
WBC # FLD AUTO: 14.26 K/UL — HIGH (ref 3.8–10.5)
WBC # FLD AUTO: 14.43 K/UL — HIGH (ref 3.8–10.5)
WBC # FLD AUTO: 15.05 K/UL — HIGH (ref 3.8–10.5)
WBC # FLD AUTO: 15.09 K/UL — HIGH (ref 3.8–10.5)
WBC # FLD AUTO: 15.32 K/UL — HIGH (ref 3.8–10.5)
WBC # FLD AUTO: 15.84 K/UL — HIGH (ref 3.8–10.5)
WBC # FLD AUTO: 16.01 K/UL — HIGH (ref 3.8–10.5)
WBC # FLD AUTO: 16.49 K/UL — HIGH (ref 3.8–10.5)
WBC # FLD AUTO: 16.87 K/UL — HIGH (ref 3.8–10.5)
WBC # FLD AUTO: 17.43 K/UL — HIGH (ref 3.8–10.5)
WBC # FLD AUTO: 18.81 K/UL — HIGH (ref 3.8–10.5)
WBC # FLD AUTO: 20.92 K/UL — HIGH (ref 3.8–10.5)
WBC # FLD AUTO: 21.06 K/UL — HIGH (ref 3.8–10.5)
WBC # FLD AUTO: 21.17 K/UL — HIGH (ref 3.8–10.5)
WBC # FLD AUTO: 21.38 K/UL — HIGH (ref 3.8–10.5)
WBC # FLD AUTO: 21.44 K/UL — HIGH (ref 3.8–10.5)
WBC # FLD AUTO: 22.77 K/UL — HIGH (ref 3.8–10.5)
WBC # FLD AUTO: 23.06 K/UL — HIGH (ref 3.8–10.5)
WBC # FLD AUTO: 27.26 K/UL — HIGH (ref 3.8–10.5)
WBC # FLD AUTO: 9.05 K/UL — SIGNIFICANT CHANGE UP (ref 3.8–10.5)
WBC # FLD AUTO: 9.18 K/UL — SIGNIFICANT CHANGE UP (ref 3.8–10.5)
WBC # FLD AUTO: 9.32 K/UL — SIGNIFICANT CHANGE UP (ref 3.8–10.5)

## 2023-01-01 PROCEDURE — 99497 ADVNCD CARE PLAN 30 MIN: CPT | Mod: 25

## 2023-01-01 PROCEDURE — 36556 INSERT NON-TUNNEL CV CATH: CPT

## 2023-01-01 PROCEDURE — 93308 TTE F-UP OR LMTD: CPT | Mod: 26

## 2023-01-01 PROCEDURE — 99232 SBSQ HOSP IP/OBS MODERATE 35: CPT | Mod: GC

## 2023-01-01 PROCEDURE — 99231 SBSQ HOSP IP/OBS SF/LOW 25: CPT | Mod: GC

## 2023-01-01 PROCEDURE — 99223 1ST HOSP IP/OBS HIGH 75: CPT

## 2023-01-01 PROCEDURE — 71045 X-RAY EXAM CHEST 1 VIEW: CPT | Mod: 26

## 2023-01-01 PROCEDURE — 76770 US EXAM ABDO BACK WALL COMP: CPT | Mod: 26

## 2023-01-01 PROCEDURE — 99233 SBSQ HOSP IP/OBS HIGH 50: CPT | Mod: GC,25

## 2023-01-01 PROCEDURE — 99233 SBSQ HOSP IP/OBS HIGH 50: CPT

## 2023-01-01 PROCEDURE — 99291 CRITICAL CARE FIRST HOUR: CPT | Mod: 25

## 2023-01-01 PROCEDURE — 36620 INSERTION CATHETER ARTERY: CPT

## 2023-01-01 PROCEDURE — 93010 ELECTROCARDIOGRAM REPORT: CPT

## 2023-01-01 PROCEDURE — 99497 ADVNCD CARE PLAN 30 MIN: CPT

## 2023-01-01 PROCEDURE — 76705 ECHO EXAM OF ABDOMEN: CPT | Mod: 26

## 2023-01-01 PROCEDURE — 99232 SBSQ HOSP IP/OBS MODERATE 35: CPT

## 2023-01-01 PROCEDURE — 36000 PLACE NEEDLE IN VEIN: CPT

## 2023-01-01 PROCEDURE — 76604 US EXAM CHEST: CPT | Mod: 26

## 2023-01-01 PROCEDURE — 43255 EGD CONTROL BLEEDING ANY: CPT | Mod: GC

## 2023-01-01 PROCEDURE — 99291 CRITICAL CARE FIRST HOUR: CPT

## 2023-01-01 PROCEDURE — 71045 X-RAY EXAM CHEST 1 VIEW: CPT | Mod: 26,77

## 2023-01-01 PROCEDURE — 74176 CT ABD & PELVIS W/O CONTRAST: CPT | Mod: 26

## 2023-01-01 PROCEDURE — 99292 CRITICAL CARE ADDL 30 MIN: CPT | Mod: 25

## 2023-01-01 PROCEDURE — 76937 US GUIDE VASCULAR ACCESS: CPT | Mod: 26

## 2023-01-01 PROCEDURE — 71045 X-RAY EXAM CHEST 1 VIEW: CPT | Mod: 26,76

## 2023-01-01 PROCEDURE — 31645 BRNCHSC W/THER ASPIR 1ST: CPT

## 2023-01-01 PROCEDURE — 43752 NASAL/OROGASTRIC W/TUBE PLMT: CPT | Mod: 59

## 2023-01-01 PROCEDURE — 73030 X-RAY EXAM OF SHOULDER: CPT | Mod: 26,LT

## 2023-01-01 PROCEDURE — 43255 EGD CONTROL BLEEDING ANY: CPT | Mod: 59,GC

## 2023-01-01 PROCEDURE — 93971 EXTREMITY STUDY: CPT | Mod: 26

## 2023-01-01 PROCEDURE — 99221 1ST HOSP IP/OBS SF/LOW 40: CPT | Mod: GC

## 2023-01-01 PROCEDURE — 99223 1ST HOSP IP/OBS HIGH 75: CPT | Mod: GC

## 2023-01-01 PROCEDURE — 88305 TISSUE EXAM BY PATHOLOGIST: CPT | Mod: 26

## 2023-01-01 PROCEDURE — 93306 TTE W/DOPPLER COMPLETE: CPT | Mod: 26

## 2023-01-01 PROCEDURE — 43239 EGD BIOPSY SINGLE/MULTIPLE: CPT | Mod: GC

## 2023-01-01 PROCEDURE — 99285 EMERGENCY DEPT VISIT HI MDM: CPT | Mod: 25,GC

## 2023-01-01 DEVICE — GEL PURASTAT 3ML: Type: IMPLANTABLE DEVICE | Status: FUNCTIONAL

## 2023-01-01 RX ORDER — SODIUM CHLORIDE 9 MG/ML
1000 INJECTION, SOLUTION INTRAVENOUS
Refills: 0 | Status: DISCONTINUED | OUTPATIENT
Start: 2023-01-01 | End: 2023-01-01

## 2023-01-01 RX ORDER — ASPIRIN/CALCIUM CARB/MAGNESIUM 324 MG
81 TABLET ORAL DAILY
Refills: 0 | Status: DISCONTINUED | OUTPATIENT
Start: 2023-01-01 | End: 2023-01-01

## 2023-01-01 RX ORDER — ACETAMINOPHEN 500 MG
650 TABLET ORAL EVERY 6 HOURS
Refills: 0 | Status: DISCONTINUED | OUTPATIENT
Start: 2023-01-01 | End: 2023-01-01

## 2023-01-01 RX ORDER — CEFTRIAXONE 500 MG/1
2000 INJECTION, POWDER, FOR SOLUTION INTRAMUSCULAR; INTRAVENOUS EVERY 24 HOURS
Refills: 0 | Status: DISCONTINUED | OUTPATIENT
Start: 2023-01-01 | End: 2023-01-01

## 2023-01-01 RX ORDER — NITROGLYCERIN 6.5 MG
1 CAPSULE, EXTENDED RELEASE ORAL ONCE
Refills: 0 | Status: DISCONTINUED | OUTPATIENT
Start: 2023-01-01 | End: 2023-01-01

## 2023-01-01 RX ORDER — CEFAZOLIN SODIUM 1 G
500 VIAL (EA) INJECTION EVERY 8 HOURS
Refills: 0 | Status: DISCONTINUED | OUTPATIENT
Start: 2023-01-01 | End: 2023-01-01

## 2023-01-01 RX ORDER — PANTOPRAZOLE SODIUM 20 MG/1
1 TABLET, DELAYED RELEASE ORAL
Qty: 0 | Refills: 0 | DISCHARGE

## 2023-01-01 RX ORDER — CEFAZOLIN SODIUM 1 G
1000 VIAL (EA) INJECTION ONCE
Refills: 0 | Status: COMPLETED | OUTPATIENT
Start: 2023-01-01 | End: 2023-01-01

## 2023-01-01 RX ORDER — SOD SULF/SODIUM/NAHCO3/KCL/PEG
4000 SOLUTION, RECONSTITUTED, ORAL ORAL ONCE
Refills: 0 | Status: COMPLETED | OUTPATIENT
Start: 2023-01-01 | End: 2023-01-01

## 2023-01-01 RX ORDER — ERYTHROPOIETIN 10000 [IU]/ML
14000 INJECTION, SOLUTION INTRAVENOUS; SUBCUTANEOUS
Refills: 0 | Status: DISCONTINUED | OUTPATIENT
Start: 2023-01-01 | End: 2023-01-01

## 2023-01-01 RX ORDER — VANCOMYCIN HCL 1 G
1000 VIAL (EA) INTRAVENOUS ONCE
Refills: 0 | Status: COMPLETED | OUTPATIENT
Start: 2023-01-01 | End: 2023-01-01

## 2023-01-01 RX ORDER — ERYTHROPOIETIN 10000 [IU]/ML
10000 INJECTION, SOLUTION INTRAVENOUS; SUBCUTANEOUS
Refills: 0 | Status: DISCONTINUED | OUTPATIENT
Start: 2023-01-01 | End: 2023-01-01

## 2023-01-01 RX ORDER — METOPROLOL TARTRATE 50 MG
2.5 TABLET ORAL EVERY 6 HOURS
Refills: 0 | Status: DISCONTINUED | OUTPATIENT
Start: 2023-01-01 | End: 2023-01-01

## 2023-01-01 RX ORDER — CEFEPIME 1 G/1
2000 INJECTION, POWDER, FOR SOLUTION INTRAMUSCULAR; INTRAVENOUS
Refills: 0 | Status: DISCONTINUED | OUTPATIENT
Start: 2023-01-01 | End: 2023-01-01

## 2023-01-01 RX ORDER — POTASSIUM CHLORIDE 20 MEQ
40 PACKET (EA) ORAL ONCE
Refills: 0 | Status: COMPLETED | OUTPATIENT
Start: 2023-01-01 | End: 2023-01-01

## 2023-01-01 RX ORDER — MIDODRINE HYDROCHLORIDE 2.5 MG/1
15 TABLET ORAL ONCE
Refills: 0 | Status: DISCONTINUED | OUTPATIENT
Start: 2023-01-01 | End: 2023-01-01

## 2023-01-01 RX ORDER — NIFEDIPINE 30 MG
2 TABLET, EXTENDED RELEASE 24 HR ORAL
Refills: 0 | DISCHARGE

## 2023-01-01 RX ORDER — CEFEPIME 1 G/1
1000 INJECTION, POWDER, FOR SOLUTION INTRAMUSCULAR; INTRAVENOUS EVERY 24 HOURS
Refills: 0 | Status: DISCONTINUED | OUTPATIENT
Start: 2023-01-01 | End: 2023-01-01

## 2023-01-01 RX ORDER — CEFAZOLIN SODIUM 1 G
2000 VIAL (EA) INJECTION ONCE
Refills: 0 | Status: DISCONTINUED | OUTPATIENT
Start: 2023-01-01 | End: 2023-01-01

## 2023-01-01 RX ORDER — SODIUM CHLORIDE 9 MG/ML
100 INJECTION INTRAMUSCULAR; INTRAVENOUS; SUBCUTANEOUS
Refills: 0 | Status: DISCONTINUED | OUTPATIENT
Start: 2023-01-01 | End: 2023-01-01

## 2023-01-01 RX ORDER — DEXTROSE 50 % IN WATER 50 %
12.5 SYRINGE (ML) INTRAVENOUS ONCE
Refills: 0 | Status: COMPLETED | OUTPATIENT
Start: 2023-01-01 | End: 2023-01-01

## 2023-01-01 RX ORDER — CEFAZOLIN SODIUM 1 G
3000 VIAL (EA) INJECTION
Refills: 0 | Status: DISCONTINUED | OUTPATIENT
Start: 2023-01-01 | End: 2023-01-01

## 2023-01-01 RX ORDER — ERGOCALCIFEROL 1.25 MG/1
1 CAPSULE ORAL
Refills: 0 | DISCHARGE

## 2023-01-01 RX ORDER — PARICALCITOL 2 UG/1
3.5 CAPSULE, GELATIN COATED ORAL
Qty: 0 | Refills: 0 | DISCHARGE

## 2023-01-01 RX ORDER — PANTOPRAZOLE SODIUM 20 MG/1
40 TABLET, DELAYED RELEASE ORAL
Refills: 0 | Status: DISCONTINUED | OUTPATIENT
Start: 2023-01-01 | End: 2023-01-01

## 2023-01-01 RX ORDER — PANTOPRAZOLE SODIUM 20 MG/1
80 TABLET, DELAYED RELEASE ORAL ONCE
Refills: 0 | Status: COMPLETED | OUTPATIENT
Start: 2023-01-01 | End: 2023-01-01

## 2023-01-01 RX ORDER — FUROSEMIDE 40 MG
1 TABLET ORAL
Qty: 0 | Refills: 0 | DISCHARGE

## 2023-01-01 RX ORDER — SEVELAMER CARBONATE 2400 MG/1
800 POWDER, FOR SUSPENSION ORAL
Refills: 0 | Status: DISCONTINUED | OUTPATIENT
Start: 2023-01-01 | End: 2023-01-01

## 2023-01-01 RX ORDER — ERYTHROPOIETIN 10000 [IU]/ML
12000 INJECTION, SOLUTION INTRAVENOUS; SUBCUTANEOUS
Refills: 0 | Status: DISCONTINUED | OUTPATIENT
Start: 2023-01-01 | End: 2023-01-01

## 2023-01-01 RX ORDER — NOREPINEPHRINE BITARTRATE/D5W 8 MG/250ML
0.59 PLASTIC BAG, INJECTION (ML) INTRAVENOUS
Qty: 16 | Refills: 0 | Status: DISCONTINUED | OUTPATIENT
Start: 2023-01-01 | End: 2023-01-01

## 2023-01-01 RX ORDER — PANTOPRAZOLE SODIUM 20 MG/1
40 TABLET, DELAYED RELEASE ORAL DAILY
Refills: 0 | Status: DISCONTINUED | OUTPATIENT
Start: 2023-01-01 | End: 2023-01-01

## 2023-01-01 RX ORDER — CILOSTAZOL 100 MG/1
1 TABLET ORAL
Qty: 0 | Refills: 0 | DISCHARGE

## 2023-01-01 RX ORDER — SODIUM BICARBONATE 1 MEQ/ML
50 SYRINGE (ML) INTRAVENOUS ONCE
Refills: 0 | Status: COMPLETED | OUTPATIENT
Start: 2023-01-01 | End: 2023-01-01

## 2023-01-01 RX ORDER — METOPROLOL TARTRATE 50 MG
12.5 TABLET ORAL DAILY
Refills: 0 | Status: DISCONTINUED | OUTPATIENT
Start: 2023-01-01 | End: 2023-01-01

## 2023-01-01 RX ORDER — CINACALCET 30 MG/1
1 TABLET, FILM COATED ORAL
Refills: 0 | DISCHARGE

## 2023-01-01 RX ORDER — NOREPINEPHRINE BITARTRATE/D5W 8 MG/250ML
1 PLASTIC BAG, INJECTION (ML) INTRAVENOUS
Qty: 16 | Refills: 0 | Status: DISCONTINUED | OUTPATIENT
Start: 2023-01-01 | End: 2023-01-01

## 2023-01-01 RX ORDER — DEXTROSE 50 % IN WATER 50 %
25 SYRINGE (ML) INTRAVENOUS ONCE
Refills: 0 | Status: DISCONTINUED | OUTPATIENT
Start: 2023-01-01 | End: 2023-01-01

## 2023-01-01 RX ORDER — CEFAZOLIN SODIUM 1 G
2000 VIAL (EA) INJECTION
Refills: 0 | Status: DISCONTINUED | OUTPATIENT
Start: 2023-01-01 | End: 2023-01-01

## 2023-01-01 RX ORDER — OXYCODONE HYDROCHLORIDE 5 MG/1
5 TABLET ORAL EVERY 6 HOURS
Refills: 0 | Status: DISCONTINUED | OUTPATIENT
Start: 2023-01-01 | End: 2023-01-01

## 2023-01-01 RX ORDER — PANTOPRAZOLE SODIUM 20 MG/1
8 TABLET, DELAYED RELEASE ORAL
Qty: 80 | Refills: 0 | Status: DISCONTINUED | OUTPATIENT
Start: 2023-01-01 | End: 2023-01-01

## 2023-01-01 RX ORDER — POTASSIUM CHLORIDE 20 MEQ
40 PACKET (EA) ORAL ONCE
Refills: 0 | Status: DISCONTINUED | OUTPATIENT
Start: 2023-01-01 | End: 2023-01-01

## 2023-01-01 RX ORDER — LIDOCAINE 4 G/100G
1 CREAM TOPICAL DAILY
Refills: 0 | Status: DISCONTINUED | OUTPATIENT
Start: 2023-01-01 | End: 2023-01-01

## 2023-01-01 RX ORDER — DEXTROSE 50 % IN WATER 50 %
25 SYRINGE (ML) INTRAVENOUS ONCE
Refills: 0 | Status: COMPLETED | OUTPATIENT
Start: 2023-01-01 | End: 2023-01-01

## 2023-01-01 RX ORDER — SODIUM CHLORIDE 9 MG/ML
500 INJECTION INTRAMUSCULAR; INTRAVENOUS; SUBCUTANEOUS ONCE
Refills: 0 | Status: COMPLETED | OUTPATIENT
Start: 2023-01-01 | End: 2023-01-01

## 2023-01-01 RX ORDER — PANTOPRAZOLE SODIUM 20 MG/1
40 TABLET, DELAYED RELEASE ORAL EVERY 12 HOURS
Refills: 0 | Status: DISCONTINUED | OUTPATIENT
Start: 2023-01-01 | End: 2023-01-01

## 2023-01-01 RX ORDER — HYDROMORPHONE HYDROCHLORIDE 2 MG/ML
2 INJECTION INTRAMUSCULAR; INTRAVENOUS; SUBCUTANEOUS ONCE
Refills: 0 | Status: DISCONTINUED | OUTPATIENT
Start: 2023-01-01 | End: 2023-01-01

## 2023-01-01 RX ORDER — NOREPINEPHRINE BITARTRATE/D5W 8 MG/250ML
1 PLASTIC BAG, INJECTION (ML) INTRAVENOUS
Qty: 8 | Refills: 0 | Status: DISCONTINUED | OUTPATIENT
Start: 2023-01-01 | End: 2023-01-01

## 2023-01-01 RX ORDER — ALLOPURINOL 300 MG
1 TABLET ORAL
Qty: 0 | Refills: 0 | DISCHARGE

## 2023-01-01 RX ORDER — ACETAMINOPHEN 500 MG
1000 TABLET ORAL ONCE
Refills: 0 | Status: COMPLETED | OUTPATIENT
Start: 2023-01-01 | End: 2023-01-01

## 2023-01-01 RX ORDER — CEFTRIAXONE 500 MG/1
1000 INJECTION, POWDER, FOR SOLUTION INTRAMUSCULAR; INTRAVENOUS ONCE
Refills: 0 | Status: COMPLETED | OUTPATIENT
Start: 2023-01-01 | End: 2023-01-01

## 2023-01-01 RX ORDER — CIPROFLOXACIN LACTATE 400MG/40ML
200 VIAL (ML) INTRAVENOUS DAILY
Refills: 0 | Status: DISCONTINUED | OUTPATIENT
Start: 2023-01-01 | End: 2023-01-01

## 2023-01-01 RX ORDER — VASOPRESSIN 20 [USP'U]/ML
0.04 INJECTION INTRAVENOUS
Qty: 40 | Refills: 0 | Status: DISCONTINUED | OUTPATIENT
Start: 2023-01-01 | End: 2023-01-01

## 2023-01-01 RX ORDER — DARBEPOETIN ALFA IN POLYSORBAT 200MCG/0.4
1 PEN INJECTOR (ML) SUBCUTANEOUS
Qty: 0 | Refills: 0 | DISCHARGE

## 2023-01-01 RX ORDER — SEVELAMER CARBONATE 2400 MG/1
1 POWDER, FOR SUSPENSION ORAL
Refills: 0 | DISCHARGE

## 2023-01-01 RX ORDER — CEFEPIME 1 G/1
3000 INJECTION, POWDER, FOR SOLUTION INTRAMUSCULAR; INTRAVENOUS
Refills: 0 | Status: DISCONTINUED | OUTPATIENT
Start: 2023-01-01 | End: 2023-01-01

## 2023-01-01 RX ORDER — SEVELAMER CARBONATE 2400 MG/1
1 POWDER, FOR SUSPENSION ORAL
Qty: 0 | Refills: 0 | DISCHARGE

## 2023-01-01 RX ORDER — PANTOPRAZOLE SODIUM 20 MG/1
80 TABLET, DELAYED RELEASE ORAL ONCE
Refills: 0 | Status: DISCONTINUED | OUTPATIENT
Start: 2023-01-01 | End: 2023-01-01

## 2023-01-01 RX ORDER — CHLORHEXIDINE GLUCONATE 213 G/1000ML
1 SOLUTION TOPICAL
Refills: 0 | Status: DISCONTINUED | OUTPATIENT
Start: 2023-01-01 | End: 2023-01-01

## 2023-01-01 RX ORDER — DONEPEZIL HYDROCHLORIDE 10 MG/1
1 TABLET, FILM COATED ORAL
Refills: 0 | DISCHARGE

## 2023-01-01 RX ORDER — MELOXICAM 15 MG/1
1 TABLET ORAL
Refills: 0 | DISCHARGE

## 2023-01-01 RX ORDER — PANTOPRAZOLE SODIUM 20 MG/1
40 TABLET, DELAYED RELEASE ORAL ONCE
Refills: 0 | Status: DISCONTINUED | OUTPATIENT
Start: 2023-01-01 | End: 2023-01-01

## 2023-01-01 RX ORDER — HEPARIN SODIUM 5000 [USP'U]/ML
5000 INJECTION INTRAVENOUS; SUBCUTANEOUS EVERY 8 HOURS
Refills: 0 | Status: DISCONTINUED | OUTPATIENT
Start: 2023-01-01 | End: 2023-01-01

## 2023-01-01 RX ORDER — CEFEPIME 1 G/1
1000 INJECTION, POWDER, FOR SOLUTION INTRAMUSCULAR; INTRAVENOUS ONCE
Refills: 0 | Status: COMPLETED | OUTPATIENT
Start: 2023-01-01 | End: 2023-01-01

## 2023-01-01 RX ORDER — ATENOLOL 25 MG/1
1 TABLET ORAL
Qty: 0 | Refills: 0 | DISCHARGE

## 2023-01-01 RX ORDER — MIDAZOLAM HYDROCHLORIDE 1 MG/ML
8 INJECTION, SOLUTION INTRAMUSCULAR; INTRAVENOUS ONCE
Refills: 0 | Status: DISCONTINUED | OUTPATIENT
Start: 2023-01-01 | End: 2023-01-01

## 2023-01-01 RX ORDER — DONEPEZIL HYDROCHLORIDE 10 MG/1
5 TABLET, FILM COATED ORAL AT BEDTIME
Refills: 0 | Status: DISCONTINUED | OUTPATIENT
Start: 2023-01-01 | End: 2023-01-01

## 2023-01-01 RX ORDER — DEXTROSE 50 % IN WATER 50 %
15 SYRINGE (ML) INTRAVENOUS ONCE
Refills: 0 | Status: DISCONTINUED | OUTPATIENT
Start: 2023-01-01 | End: 2023-01-01

## 2023-01-01 RX ORDER — MIDODRINE HYDROCHLORIDE 2.5 MG/1
10 TABLET ORAL
Refills: 0 | Status: DISCONTINUED | OUTPATIENT
Start: 2023-01-01 | End: 2023-01-01

## 2023-01-01 RX ORDER — CEFAZOLIN SODIUM 1 G
1000 VIAL (EA) INJECTION EVERY 12 HOURS
Refills: 0 | Status: DISCONTINUED | OUTPATIENT
Start: 2023-01-01 | End: 2023-01-01

## 2023-01-01 RX ORDER — MIDAZOLAM HYDROCHLORIDE 1 MG/ML
2 INJECTION, SOLUTION INTRAMUSCULAR; INTRAVENOUS ONCE
Refills: 0 | Status: DISCONTINUED | OUTPATIENT
Start: 2023-01-01 | End: 2023-01-01

## 2023-01-01 RX ORDER — ETELCALCETIDE 5 MG/ML
2 INJECTION, SOLUTION INTRAVENOUS
Qty: 0 | Refills: 0 | DISCHARGE

## 2023-01-01 RX ORDER — PROPOFOL 10 MG/ML
30 INJECTION, EMULSION INTRAVENOUS
Qty: 1000 | Refills: 0 | Status: DISCONTINUED | OUTPATIENT
Start: 2023-01-01 | End: 2023-01-01

## 2023-01-01 RX ORDER — GLUCAGON INJECTION, SOLUTION 0.5 MG/.1ML
1 INJECTION, SOLUTION SUBCUTANEOUS ONCE
Refills: 0 | Status: COMPLETED | OUTPATIENT
Start: 2023-01-01 | End: 2024-05-16

## 2023-01-01 RX ORDER — ISOSORBIDE MONONITRATE 60 MG/1
1 TABLET, EXTENDED RELEASE ORAL
Qty: 0 | Refills: 0 | DISCHARGE

## 2023-01-01 RX ORDER — GLUCAGON INJECTION, SOLUTION 0.5 MG/.1ML
1 INJECTION, SOLUTION SUBCUTANEOUS ONCE
Refills: 0 | Status: COMPLETED | OUTPATIENT
Start: 2023-01-01 | End: 2023-01-01

## 2023-01-01 RX ORDER — DEXTROSE 50 % IN WATER 50 %
12.5 SYRINGE (ML) INTRAVENOUS ONCE
Refills: 0 | Status: DISCONTINUED | OUTPATIENT
Start: 2023-01-01 | End: 2023-01-01

## 2023-01-01 RX ORDER — CINACALCET 30 MG/1
60 TABLET, FILM COATED ORAL DAILY
Refills: 0 | Status: DISCONTINUED | OUTPATIENT
Start: 2023-01-01 | End: 2023-01-01

## 2023-01-01 RX ORDER — MIDODRINE HYDROCHLORIDE 2.5 MG/1
5 TABLET ORAL
Refills: 0 | Status: DISCONTINUED | OUTPATIENT
Start: 2023-01-01 | End: 2023-01-01

## 2023-01-01 RX ORDER — CINACALCET 30 MG/1
30 TABLET, FILM COATED ORAL DAILY
Refills: 0 | Status: DISCONTINUED | OUTPATIENT
Start: 2023-01-01 | End: 2023-01-01

## 2023-01-01 RX ORDER — CEFAZOLIN SODIUM 1 G
1000 VIAL (EA) INJECTION EVERY 24 HOURS
Refills: 0 | Status: DISCONTINUED | OUTPATIENT
Start: 2023-01-01 | End: 2023-01-01

## 2023-01-01 RX ORDER — CHLORHEXIDINE GLUCONATE 213 G/1000ML
1 SOLUTION TOPICAL DAILY
Refills: 0 | Status: DISCONTINUED | OUTPATIENT
Start: 2023-01-01 | End: 2023-01-01

## 2023-01-01 RX ORDER — HYDROMORPHONE HYDROCHLORIDE 2 MG/ML
2 INJECTION INTRAMUSCULAR; INTRAVENOUS; SUBCUTANEOUS
Refills: 0 | Status: DISCONTINUED | OUTPATIENT
Start: 2023-01-01 | End: 2023-01-01

## 2023-01-01 RX ORDER — CEFEPIME 1 G/1
INJECTION, POWDER, FOR SOLUTION INTRAMUSCULAR; INTRAVENOUS
Refills: 0 | Status: DISCONTINUED | OUTPATIENT
Start: 2023-01-01 | End: 2023-01-01

## 2023-01-01 RX ORDER — METOPROLOL TARTRATE 50 MG
25 TABLET ORAL
Refills: 0 | Status: DISCONTINUED | OUTPATIENT
Start: 2023-01-01 | End: 2023-01-01

## 2023-01-01 RX ADMIN — DONEPEZIL HYDROCHLORIDE 5 MILLIGRAM(S): 10 TABLET, FILM COATED ORAL at 21:42

## 2023-01-01 RX ADMIN — ERYTHROPOIETIN 12000 UNIT(S): 10000 INJECTION, SOLUTION INTRAVENOUS; SUBCUTANEOUS at 16:06

## 2023-01-01 RX ADMIN — SODIUM CHLORIDE 50 MILLILITER(S): 9 INJECTION, SOLUTION INTRAVENOUS at 17:18

## 2023-01-01 RX ADMIN — PANTOPRAZOLE SODIUM 40 MILLIGRAM(S): 20 TABLET, DELAYED RELEASE ORAL at 18:09

## 2023-01-01 RX ADMIN — CHLORHEXIDINE GLUCONATE 1 APPLICATION(S): 213 SOLUTION TOPICAL at 12:20

## 2023-01-01 RX ADMIN — CHLORHEXIDINE GLUCONATE 1 APPLICATION(S): 213 SOLUTION TOPICAL at 11:54

## 2023-01-01 RX ADMIN — CHLORHEXIDINE GLUCONATE 1 APPLICATION(S): 213 SOLUTION TOPICAL at 13:16

## 2023-01-01 RX ADMIN — HEPARIN SODIUM 5000 UNIT(S): 5000 INJECTION INTRAVENOUS; SUBCUTANEOUS at 21:24

## 2023-01-01 RX ADMIN — Medication 2 MILLIGRAM(S): at 14:10

## 2023-01-01 RX ADMIN — DONEPEZIL HYDROCHLORIDE 5 MILLIGRAM(S): 10 TABLET, FILM COATED ORAL at 21:58

## 2023-01-01 RX ADMIN — Medication 400 MILLIGRAM(S): at 10:07

## 2023-01-01 RX ADMIN — Medication 25 GRAM(S): at 08:09

## 2023-01-01 RX ADMIN — ERYTHROPOIETIN 14000 UNIT(S): 10000 INJECTION, SOLUTION INTRAVENOUS; SUBCUTANEOUS at 23:14

## 2023-01-01 RX ADMIN — PANTOPRAZOLE SODIUM 40 MILLIGRAM(S): 20 TABLET, DELAYED RELEASE ORAL at 15:59

## 2023-01-01 RX ADMIN — Medication 12.5 MILLIGRAM(S): at 05:17

## 2023-01-01 RX ADMIN — PROPOFOL 14.4 MICROGRAM(S)/KG/MIN: 10 INJECTION, EMULSION INTRAVENOUS at 07:11

## 2023-01-01 RX ADMIN — PANTOPRAZOLE SODIUM 40 MILLIGRAM(S): 20 TABLET, DELAYED RELEASE ORAL at 17:58

## 2023-01-01 RX ADMIN — CHLORHEXIDINE GLUCONATE 1 APPLICATION(S): 213 SOLUTION TOPICAL at 12:45

## 2023-01-01 RX ADMIN — MIDAZOLAM HYDROCHLORIDE 2 MILLIGRAM(S): 1 INJECTION, SOLUTION INTRAMUSCULAR; INTRAVENOUS at 18:33

## 2023-01-01 RX ADMIN — Medication 12.5 MILLIGRAM(S): at 05:10

## 2023-01-01 RX ADMIN — DONEPEZIL HYDROCHLORIDE 5 MILLIGRAM(S): 10 TABLET, FILM COATED ORAL at 21:41

## 2023-01-01 RX ADMIN — CHLORHEXIDINE GLUCONATE 1 APPLICATION(S): 213 SOLUTION TOPICAL at 12:00

## 2023-01-01 RX ADMIN — Medication 12.5 MILLIGRAM(S): at 06:17

## 2023-01-01 RX ADMIN — PANTOPRAZOLE SODIUM 40 MILLIGRAM(S): 20 TABLET, DELAYED RELEASE ORAL at 17:57

## 2023-01-01 RX ADMIN — CINACALCET 30 MILLIGRAM(S): 30 TABLET, FILM COATED ORAL at 11:01

## 2023-01-01 RX ADMIN — MIDODRINE HYDROCHLORIDE 10 MILLIGRAM(S): 2.5 TABLET ORAL at 19:39

## 2023-01-01 RX ADMIN — CHLORHEXIDINE GLUCONATE 1 APPLICATION(S): 213 SOLUTION TOPICAL at 13:07

## 2023-01-01 RX ADMIN — Medication 100 MILLIGRAM(S): at 11:21

## 2023-01-01 RX ADMIN — Medication 100 MILLIGRAM(S): at 17:58

## 2023-01-01 RX ADMIN — PROPOFOL 14.4 MICROGRAM(S)/KG/MIN: 10 INJECTION, EMULSION INTRAVENOUS at 14:50

## 2023-01-01 RX ADMIN — VASOPRESSIN 6 UNIT(S)/MIN: 20 INJECTION INTRAVENOUS at 20:03

## 2023-01-01 RX ADMIN — PANTOPRAZOLE SODIUM 10 MG/HR: 20 TABLET, DELAYED RELEASE ORAL at 11:38

## 2023-01-01 RX ADMIN — Medication 12.5 MILLIGRAM(S): at 05:03

## 2023-01-01 RX ADMIN — DONEPEZIL HYDROCHLORIDE 5 MILLIGRAM(S): 10 TABLET, FILM COATED ORAL at 21:57

## 2023-01-01 RX ADMIN — Medication 25 MILLIGRAM(S): at 20:42

## 2023-01-01 RX ADMIN — CEFTRIAXONE 1000 MILLIGRAM(S): 500 INJECTION, POWDER, FOR SOLUTION INTRAMUSCULAR; INTRAVENOUS at 13:56

## 2023-01-01 RX ADMIN — Medication 1000 MILLIGRAM(S): at 05:00

## 2023-01-01 RX ADMIN — DONEPEZIL HYDROCHLORIDE 5 MILLIGRAM(S): 10 TABLET, FILM COATED ORAL at 22:16

## 2023-01-01 RX ADMIN — Medication 25 MILLIGRAM(S): at 17:20

## 2023-01-01 RX ADMIN — PANTOPRAZOLE SODIUM 10 MG/HR: 20 TABLET, DELAYED RELEASE ORAL at 20:03

## 2023-01-01 RX ADMIN — HEPARIN SODIUM 5000 UNIT(S): 5000 INJECTION INTRAVENOUS; SUBCUTANEOUS at 22:01

## 2023-01-01 RX ADMIN — SEVELAMER CARBONATE 800 MILLIGRAM(S): 2400 POWDER, FOR SUSPENSION ORAL at 12:42

## 2023-01-01 RX ADMIN — CEFTRIAXONE 100 MILLIGRAM(S): 500 INJECTION, POWDER, FOR SOLUTION INTRAMUSCULAR; INTRAVENOUS at 17:57

## 2023-01-01 RX ADMIN — CINACALCET 30 MILLIGRAM(S): 30 TABLET, FILM COATED ORAL at 13:21

## 2023-01-01 RX ADMIN — Medication 25 MILLIGRAM(S): at 05:20

## 2023-01-01 RX ADMIN — CHLORHEXIDINE GLUCONATE 1 APPLICATION(S): 213 SOLUTION TOPICAL at 18:33

## 2023-01-01 RX ADMIN — PANTOPRAZOLE SODIUM 40 MILLIGRAM(S): 20 TABLET, DELAYED RELEASE ORAL at 18:51

## 2023-01-01 RX ADMIN — VASOPRESSIN 6 UNIT(S)/MIN: 20 INJECTION INTRAVENOUS at 20:05

## 2023-01-01 RX ADMIN — CHLORHEXIDINE GLUCONATE 1 APPLICATION(S): 213 SOLUTION TOPICAL at 15:06

## 2023-01-01 RX ADMIN — PANTOPRAZOLE SODIUM 40 MILLIGRAM(S): 20 TABLET, DELAYED RELEASE ORAL at 06:18

## 2023-01-01 RX ADMIN — Medication 44.2 MICROGRAM(S)/KG/MIN: at 20:06

## 2023-01-01 RX ADMIN — CEFTRIAXONE 100 MILLIGRAM(S): 500 INJECTION, POWDER, FOR SOLUTION INTRAMUSCULAR; INTRAVENOUS at 21:51

## 2023-01-01 RX ADMIN — ERYTHROPOIETIN 14000 UNIT(S): 10000 INJECTION, SOLUTION INTRAVENOUS; SUBCUTANEOUS at 19:22

## 2023-01-01 RX ADMIN — CINACALCET 30 MILLIGRAM(S): 30 TABLET, FILM COATED ORAL at 14:26

## 2023-01-01 RX ADMIN — MIDODRINE HYDROCHLORIDE 10 MILLIGRAM(S): 2.5 TABLET ORAL at 13:45

## 2023-01-01 RX ADMIN — CINACALCET 30 MILLIGRAM(S): 30 TABLET, FILM COATED ORAL at 13:08

## 2023-01-01 RX ADMIN — DONEPEZIL HYDROCHLORIDE 5 MILLIGRAM(S): 10 TABLET, FILM COATED ORAL at 22:00

## 2023-01-01 RX ADMIN — CINACALCET 30 MILLIGRAM(S): 30 TABLET, FILM COATED ORAL at 17:56

## 2023-01-01 RX ADMIN — PANTOPRAZOLE SODIUM 10 MG/HR: 20 TABLET, DELAYED RELEASE ORAL at 16:01

## 2023-01-01 RX ADMIN — Medication 44.2 MICROGRAM(S)/KG/MIN: at 10:10

## 2023-01-01 RX ADMIN — CINACALCET 30 MILLIGRAM(S): 30 TABLET, FILM COATED ORAL at 13:24

## 2023-01-01 RX ADMIN — Medication 100 MILLIGRAM(S): at 01:16

## 2023-01-01 RX ADMIN — HEPARIN SODIUM 5000 UNIT(S): 5000 INJECTION INTRAVENOUS; SUBCUTANEOUS at 13:25

## 2023-01-01 RX ADMIN — CHLORHEXIDINE GLUCONATE 1 APPLICATION(S): 213 SOLUTION TOPICAL at 13:43

## 2023-01-01 RX ADMIN — PANTOPRAZOLE SODIUM 40 MILLIGRAM(S): 20 TABLET, DELAYED RELEASE ORAL at 05:16

## 2023-01-01 RX ADMIN — HEPARIN SODIUM 5000 UNIT(S): 5000 INJECTION INTRAVENOUS; SUBCUTANEOUS at 22:00

## 2023-01-01 RX ADMIN — CHLORHEXIDINE GLUCONATE 1 APPLICATION(S): 213 SOLUTION TOPICAL at 11:45

## 2023-01-01 RX ADMIN — Medication 2.5 MILLIGRAM(S): at 18:09

## 2023-01-01 RX ADMIN — Medication 650 MILLIGRAM(S): at 13:17

## 2023-01-01 RX ADMIN — SODIUM CHLORIDE 750 MILLILITER(S): 9 INJECTION INTRAMUSCULAR; INTRAVENOUS; SUBCUTANEOUS at 00:01

## 2023-01-01 RX ADMIN — Medication 5 MILLIGRAM(S): at 14:40

## 2023-01-01 RX ADMIN — DONEPEZIL HYDROCHLORIDE 5 MILLIGRAM(S): 10 TABLET, FILM COATED ORAL at 21:24

## 2023-01-01 RX ADMIN — PANTOPRAZOLE SODIUM 40 MILLIGRAM(S): 20 TABLET, DELAYED RELEASE ORAL at 12:19

## 2023-01-01 RX ADMIN — Medication 12.5 MILLIGRAM(S): at 11:24

## 2023-01-01 RX ADMIN — VASOPRESSIN 6 UNIT(S)/MIN: 20 INJECTION INTRAVENOUS at 14:50

## 2023-01-01 RX ADMIN — CHLORHEXIDINE GLUCONATE 1 APPLICATION(S): 213 SOLUTION TOPICAL at 17:00

## 2023-01-01 RX ADMIN — Medication 100 MILLIGRAM(S): at 05:06

## 2023-01-01 RX ADMIN — Medication 4000 MILLILITER(S): at 21:34

## 2023-01-01 RX ADMIN — DONEPEZIL HYDROCHLORIDE 5 MILLIGRAM(S): 10 TABLET, FILM COATED ORAL at 22:22

## 2023-01-01 RX ADMIN — PANTOPRAZOLE SODIUM 40 MILLIGRAM(S): 20 TABLET, DELAYED RELEASE ORAL at 17:26

## 2023-01-01 RX ADMIN — PANTOPRAZOLE SODIUM 10 MG/HR: 20 TABLET, DELAYED RELEASE ORAL at 13:06

## 2023-01-01 RX ADMIN — Medication 81 MILLIGRAM(S): at 11:02

## 2023-01-01 RX ADMIN — SEVELAMER CARBONATE 800 MILLIGRAM(S): 2400 POWDER, FOR SUSPENSION ORAL at 12:09

## 2023-01-01 RX ADMIN — DONEPEZIL HYDROCHLORIDE 5 MILLIGRAM(S): 10 TABLET, FILM COATED ORAL at 21:04

## 2023-01-01 RX ADMIN — DONEPEZIL HYDROCHLORIDE 5 MILLIGRAM(S): 10 TABLET, FILM COATED ORAL at 21:39

## 2023-01-01 RX ADMIN — PANTOPRAZOLE SODIUM 40 MILLIGRAM(S): 20 TABLET, DELAYED RELEASE ORAL at 12:41

## 2023-01-01 RX ADMIN — PROPOFOL 14.4 MICROGRAM(S)/KG/MIN: 10 INJECTION, EMULSION INTRAVENOUS at 20:02

## 2023-01-01 RX ADMIN — DONEPEZIL HYDROCHLORIDE 5 MILLIGRAM(S): 10 TABLET, FILM COATED ORAL at 21:25

## 2023-01-01 RX ADMIN — CHLORHEXIDINE GLUCONATE 1 APPLICATION(S): 213 SOLUTION TOPICAL at 11:13

## 2023-01-01 RX ADMIN — MIDODRINE HYDROCHLORIDE 5 MILLIGRAM(S): 2.5 TABLET ORAL at 17:56

## 2023-01-01 RX ADMIN — HEPARIN SODIUM 5000 UNIT(S): 5000 INJECTION INTRAVENOUS; SUBCUTANEOUS at 05:43

## 2023-01-01 RX ADMIN — CINACALCET 30 MILLIGRAM(S): 30 TABLET, FILM COATED ORAL at 09:26

## 2023-01-01 RX ADMIN — MIDODRINE HYDROCHLORIDE 10 MILLIGRAM(S): 2.5 TABLET ORAL at 10:40

## 2023-01-01 RX ADMIN — SEVELAMER CARBONATE 800 MILLIGRAM(S): 2400 POWDER, FOR SUSPENSION ORAL at 18:23

## 2023-01-01 RX ADMIN — Medication 74.8 MICROGRAM(S)/KG/MIN: at 14:50

## 2023-01-01 RX ADMIN — Medication 81 MILLIGRAM(S): at 13:16

## 2023-01-01 RX ADMIN — CHLORHEXIDINE GLUCONATE 1 APPLICATION(S): 213 SOLUTION TOPICAL at 19:21

## 2023-01-01 RX ADMIN — HEPARIN SODIUM 5000 UNIT(S): 5000 INJECTION INTRAVENOUS; SUBCUTANEOUS at 03:08

## 2023-01-01 RX ADMIN — DONEPEZIL HYDROCHLORIDE 5 MILLIGRAM(S): 10 TABLET, FILM COATED ORAL at 21:23

## 2023-01-01 RX ADMIN — CHLORHEXIDINE GLUCONATE 1 APPLICATION(S): 213 SOLUTION TOPICAL at 17:56

## 2023-01-01 RX ADMIN — Medication 100 MILLIGRAM(S): at 13:17

## 2023-01-01 RX ADMIN — DONEPEZIL HYDROCHLORIDE 5 MILLIGRAM(S): 10 TABLET, FILM COATED ORAL at 22:05

## 2023-01-01 RX ADMIN — PANTOPRAZOLE SODIUM 40 MILLIGRAM(S): 20 TABLET, DELAYED RELEASE ORAL at 19:36

## 2023-01-01 RX ADMIN — HEPARIN SODIUM 5000 UNIT(S): 5000 INJECTION INTRAVENOUS; SUBCUTANEOUS at 05:29

## 2023-01-01 RX ADMIN — CEFTRIAXONE 100 MILLIGRAM(S): 500 INJECTION, POWDER, FOR SOLUTION INTRAMUSCULAR; INTRAVENOUS at 21:42

## 2023-01-01 RX ADMIN — Medication 250 MILLIGRAM(S): at 16:00

## 2023-01-01 RX ADMIN — CHLORHEXIDINE GLUCONATE 1 APPLICATION(S): 213 SOLUTION TOPICAL at 09:24

## 2023-01-01 RX ADMIN — PANTOPRAZOLE SODIUM 40 MILLIGRAM(S): 20 TABLET, DELAYED RELEASE ORAL at 05:15

## 2023-01-01 RX ADMIN — PANTOPRAZOLE SODIUM 40 MILLIGRAM(S): 20 TABLET, DELAYED RELEASE ORAL at 05:40

## 2023-01-01 RX ADMIN — PANTOPRAZOLE SODIUM 40 MILLIGRAM(S): 20 TABLET, DELAYED RELEASE ORAL at 12:09

## 2023-01-01 RX ADMIN — PANTOPRAZOLE SODIUM 40 MILLIGRAM(S): 20 TABLET, DELAYED RELEASE ORAL at 05:18

## 2023-01-01 RX ADMIN — Medication 25 MILLIGRAM(S): at 05:28

## 2023-01-01 RX ADMIN — Medication 81 MILLIGRAM(S): at 18:51

## 2023-01-01 RX ADMIN — HEPARIN SODIUM 5000 UNIT(S): 5000 INJECTION INTRAVENOUS; SUBCUTANEOUS at 13:05

## 2023-01-01 RX ADMIN — HEPARIN SODIUM 5000 UNIT(S): 5000 INJECTION INTRAVENOUS; SUBCUTANEOUS at 13:16

## 2023-01-01 RX ADMIN — Medication 100 MILLIGRAM(S): at 21:55

## 2023-01-01 RX ADMIN — Medication 74.8 MICROGRAM(S)/KG/MIN: at 20:02

## 2023-01-01 RX ADMIN — MIDODRINE HYDROCHLORIDE 5 MILLIGRAM(S): 2.5 TABLET ORAL at 09:24

## 2023-01-01 RX ADMIN — CHLORHEXIDINE GLUCONATE 1 APPLICATION(S): 213 SOLUTION TOPICAL at 16:05

## 2023-01-01 RX ADMIN — PANTOPRAZOLE SODIUM 40 MILLIGRAM(S): 20 TABLET, DELAYED RELEASE ORAL at 17:55

## 2023-01-01 RX ADMIN — PANTOPRAZOLE SODIUM 40 MILLIGRAM(S): 20 TABLET, DELAYED RELEASE ORAL at 05:09

## 2023-01-01 RX ADMIN — OXYCODONE HYDROCHLORIDE 5 MILLIGRAM(S): 5 TABLET ORAL at 17:58

## 2023-01-01 RX ADMIN — HEPARIN SODIUM 5000 UNIT(S): 5000 INJECTION INTRAVENOUS; SUBCUTANEOUS at 22:16

## 2023-01-01 RX ADMIN — CINACALCET 30 MILLIGRAM(S): 30 TABLET, FILM COATED ORAL at 13:37

## 2023-01-01 RX ADMIN — PANTOPRAZOLE SODIUM 40 MILLIGRAM(S): 20 TABLET, DELAYED RELEASE ORAL at 05:10

## 2023-01-01 RX ADMIN — Medication 12.5 MILLIGRAM(S): at 05:18

## 2023-01-01 RX ADMIN — PANTOPRAZOLE SODIUM 40 MILLIGRAM(S): 20 TABLET, DELAYED RELEASE ORAL at 05:58

## 2023-01-01 RX ADMIN — CHLORHEXIDINE GLUCONATE 1 APPLICATION(S): 213 SOLUTION TOPICAL at 19:36

## 2023-01-01 RX ADMIN — SEVELAMER CARBONATE 800 MILLIGRAM(S): 2400 POWDER, FOR SUSPENSION ORAL at 09:21

## 2023-01-01 RX ADMIN — CEFEPIME 100 MILLIGRAM(S): 1 INJECTION, POWDER, FOR SOLUTION INTRAMUSCULAR; INTRAVENOUS at 16:00

## 2023-01-01 RX ADMIN — CHLORHEXIDINE GLUCONATE 1 APPLICATION(S): 213 SOLUTION TOPICAL at 11:03

## 2023-01-01 RX ADMIN — Medication 81 MILLIGRAM(S): at 12:41

## 2023-01-01 RX ADMIN — DONEPEZIL HYDROCHLORIDE 5 MILLIGRAM(S): 10 TABLET, FILM COATED ORAL at 21:26

## 2023-01-01 RX ADMIN — CHLORHEXIDINE GLUCONATE 1 APPLICATION(S): 213 SOLUTION TOPICAL at 14:46

## 2023-01-01 RX ADMIN — Medication 81 MILLIGRAM(S): at 19:37

## 2023-01-01 RX ADMIN — PANTOPRAZOLE SODIUM 40 MILLIGRAM(S): 20 TABLET, DELAYED RELEASE ORAL at 18:30

## 2023-01-01 RX ADMIN — Medication 81 MILLIGRAM(S): at 17:24

## 2023-01-01 RX ADMIN — PANTOPRAZOLE SODIUM 40 MILLIGRAM(S): 20 TABLET, DELAYED RELEASE ORAL at 06:46

## 2023-01-01 RX ADMIN — PANTOPRAZOLE SODIUM 40 MILLIGRAM(S): 20 TABLET, DELAYED RELEASE ORAL at 00:41

## 2023-01-01 RX ADMIN — VASOPRESSIN 6 UNIT(S)/MIN: 20 INJECTION INTRAVENOUS at 07:12

## 2023-01-01 RX ADMIN — ERYTHROPOIETIN 10000 UNIT(S): 10000 INJECTION, SOLUTION INTRAVENOUS; SUBCUTANEOUS at 09:27

## 2023-01-01 RX ADMIN — DONEPEZIL HYDROCHLORIDE 5 MILLIGRAM(S): 10 TABLET, FILM COATED ORAL at 00:49

## 2023-01-01 RX ADMIN — Medication 12.5 MILLIGRAM(S): at 08:09

## 2023-01-01 RX ADMIN — CHLORHEXIDINE GLUCONATE 1 APPLICATION(S): 213 SOLUTION TOPICAL at 18:59

## 2023-01-01 RX ADMIN — MIDODRINE HYDROCHLORIDE 5 MILLIGRAM(S): 2.5 TABLET ORAL at 07:20

## 2023-01-01 RX ADMIN — Medication 74.8 MICROGRAM(S)/KG/MIN: at 07:11

## 2023-01-01 RX ADMIN — Medication 400 MILLIGRAM(S): at 03:24

## 2023-01-01 RX ADMIN — Medication 50 MILLIEQUIVALENT(S): at 00:00

## 2023-01-01 RX ADMIN — SODIUM CHLORIDE 50 MILLILITER(S): 9 INJECTION, SOLUTION INTRAVENOUS at 08:15

## 2023-01-01 RX ADMIN — Medication 81 MILLIGRAM(S): at 12:09

## 2023-01-01 RX ADMIN — HEPARIN SODIUM 5000 UNIT(S): 5000 INJECTION INTRAVENOUS; SUBCUTANEOUS at 13:24

## 2023-01-01 RX ADMIN — MIDODRINE HYDROCHLORIDE 10 MILLIGRAM(S): 2.5 TABLET ORAL at 15:12

## 2023-01-01 RX ADMIN — OXYCODONE HYDROCHLORIDE 5 MILLIGRAM(S): 5 TABLET ORAL at 17:21

## 2023-01-01 RX ADMIN — Medication 81 MILLIGRAM(S): at 13:07

## 2023-01-01 RX ADMIN — ERYTHROPOIETIN 10000 UNIT(S): 10000 INJECTION, SOLUTION INTRAVENOUS; SUBCUTANEOUS at 07:20

## 2023-01-01 RX ADMIN — DONEPEZIL HYDROCHLORIDE 5 MILLIGRAM(S): 10 TABLET, FILM COATED ORAL at 21:40

## 2023-01-01 RX ADMIN — HEPARIN SODIUM 5000 UNIT(S): 5000 INJECTION INTRAVENOUS; SUBCUTANEOUS at 15:25

## 2023-01-01 RX ADMIN — Medication 40 MILLIEQUIVALENT(S): at 08:15

## 2023-01-01 RX ADMIN — HEPARIN SODIUM 5000 UNIT(S): 5000 INJECTION INTRAVENOUS; SUBCUTANEOUS at 05:41

## 2023-01-01 RX ADMIN — Medication 25 MILLIGRAM(S): at 18:50

## 2023-01-01 RX ADMIN — DONEPEZIL HYDROCHLORIDE 5 MILLIGRAM(S): 10 TABLET, FILM COATED ORAL at 22:26

## 2023-01-01 RX ADMIN — Medication 81 MILLIGRAM(S): at 17:56

## 2023-01-01 RX ADMIN — HEPARIN SODIUM 5000 UNIT(S): 5000 INJECTION INTRAVENOUS; SUBCUTANEOUS at 21:57

## 2023-01-01 RX ADMIN — Medication 81 MILLIGRAM(S): at 13:21

## 2023-01-01 RX ADMIN — HEPARIN SODIUM 5000 UNIT(S): 5000 INJECTION INTRAVENOUS; SUBCUTANEOUS at 05:28

## 2023-01-01 RX ADMIN — Medication 81 MILLIGRAM(S): at 13:37

## 2023-01-01 RX ADMIN — CEFTRIAXONE 100 MILLIGRAM(S): 500 INJECTION, POWDER, FOR SOLUTION INTRAMUSCULAR; INTRAVENOUS at 09:27

## 2023-01-01 RX ADMIN — CINACALCET 30 MILLIGRAM(S): 30 TABLET, FILM COATED ORAL at 19:18

## 2023-01-01 RX ADMIN — ERYTHROPOIETIN 10000 UNIT(S): 10000 INJECTION, SOLUTION INTRAVENOUS; SUBCUTANEOUS at 18:48

## 2023-01-01 RX ADMIN — DONEPEZIL HYDROCHLORIDE 5 MILLIGRAM(S): 10 TABLET, FILM COATED ORAL at 21:51

## 2023-01-01 RX ADMIN — Medication 200 MILLIGRAM(S): at 00:49

## 2023-01-01 RX ADMIN — PANTOPRAZOLE SODIUM 40 MILLIGRAM(S): 20 TABLET, DELAYED RELEASE ORAL at 13:16

## 2023-01-01 RX ADMIN — Medication 81 MILLIGRAM(S): at 18:26

## 2023-01-01 RX ADMIN — DONEPEZIL HYDROCHLORIDE 5 MILLIGRAM(S): 10 TABLET, FILM COATED ORAL at 21:30

## 2023-01-01 RX ADMIN — CHLORHEXIDINE GLUCONATE 1 APPLICATION(S): 213 SOLUTION TOPICAL at 11:02

## 2023-01-01 RX ADMIN — PANTOPRAZOLE SODIUM 10 MG/HR: 20 TABLET, DELAYED RELEASE ORAL at 20:06

## 2023-01-01 RX ADMIN — PANTOPRAZOLE SODIUM 40 MILLIGRAM(S): 20 TABLET, DELAYED RELEASE ORAL at 18:33

## 2023-01-01 RX ADMIN — CINACALCET 30 MILLIGRAM(S): 30 TABLET, FILM COATED ORAL at 11:24

## 2023-01-01 RX ADMIN — HEPARIN SODIUM 5000 UNIT(S): 5000 INJECTION INTRAVENOUS; SUBCUTANEOUS at 06:19

## 2023-01-01 RX ADMIN — PANTOPRAZOLE SODIUM 40 MILLIGRAM(S): 20 TABLET, DELAYED RELEASE ORAL at 02:12

## 2023-01-01 RX ADMIN — PROPOFOL 14.4 MICROGRAM(S)/KG/MIN: 10 INJECTION, EMULSION INTRAVENOUS at 13:06

## 2023-01-01 RX ADMIN — SODIUM CHLORIDE 50 MILLILITER(S): 9 INJECTION, SOLUTION INTRAVENOUS at 18:58

## 2023-01-01 RX ADMIN — SEVELAMER CARBONATE 800 MILLIGRAM(S): 2400 POWDER, FOR SUSPENSION ORAL at 09:45

## 2023-01-01 RX ADMIN — HEPARIN SODIUM 5000 UNIT(S): 5000 INJECTION INTRAVENOUS; SUBCUTANEOUS at 15:28

## 2023-01-01 RX ADMIN — PANTOPRAZOLE SODIUM 40 MILLIGRAM(S): 20 TABLET, DELAYED RELEASE ORAL at 11:25

## 2023-01-01 RX ADMIN — HEPARIN SODIUM 5000 UNIT(S): 5000 INJECTION INTRAVENOUS; SUBCUTANEOUS at 00:48

## 2023-01-01 RX ADMIN — SODIUM CHLORIDE 30 MILLILITER(S): 9 INJECTION, SOLUTION INTRAVENOUS at 13:36

## 2023-01-01 RX ADMIN — Medication 81 MILLIGRAM(S): at 13:24

## 2023-01-01 RX ADMIN — Medication 4000 MILLILITER(S): at 17:38

## 2023-01-01 RX ADMIN — HEPARIN SODIUM 5000 UNIT(S): 5000 INJECTION INTRAVENOUS; SUBCUTANEOUS at 21:23

## 2023-01-01 RX ADMIN — Medication 81 MILLIGRAM(S): at 11:24

## 2023-01-01 RX ADMIN — ERYTHROPOIETIN 12000 UNIT(S): 10000 INJECTION, SOLUTION INTRAVENOUS; SUBCUTANEOUS at 23:28

## 2023-01-01 RX ADMIN — VASOPRESSIN 6 UNIT(S)/MIN: 20 INJECTION INTRAVENOUS at 13:06

## 2023-01-01 RX ADMIN — Medication 81 MILLIGRAM(S): at 09:27

## 2023-01-01 RX ADMIN — SEVELAMER CARBONATE 800 MILLIGRAM(S): 2400 POWDER, FOR SUSPENSION ORAL at 13:15

## 2023-01-01 RX ADMIN — ERYTHROPOIETIN 10000 UNIT(S): 10000 INJECTION, SOLUTION INTRAVENOUS; SUBCUTANEOUS at 19:08

## 2023-01-01 RX ADMIN — SEVELAMER CARBONATE 800 MILLIGRAM(S): 2400 POWDER, FOR SUSPENSION ORAL at 17:50

## 2023-01-01 RX ADMIN — Medication 400 MILLIGRAM(S): at 22:23

## 2023-01-01 RX ADMIN — Medication 12.5 GRAM(S): at 20:52

## 2023-01-01 RX ADMIN — PANTOPRAZOLE SODIUM 10 MG/HR: 20 TABLET, DELAYED RELEASE ORAL at 10:04

## 2023-01-01 RX ADMIN — PROPOFOL 14.4 MICROGRAM(S)/KG/MIN: 10 INJECTION, EMULSION INTRAVENOUS at 20:05

## 2023-01-01 RX ADMIN — PANTOPRAZOLE SODIUM 40 MILLIGRAM(S): 20 TABLET, DELAYED RELEASE ORAL at 05:03

## 2023-01-01 RX ADMIN — Medication 25 MILLIGRAM(S): at 06:31

## 2023-01-01 RX ADMIN — Medication 12.5 MILLIGRAM(S): at 08:57

## 2023-01-01 RX ADMIN — Medication 100 MILLIGRAM(S): at 20:00

## 2023-01-01 RX ADMIN — Medication 25 MILLIGRAM(S): at 05:43

## 2023-01-01 RX ADMIN — ERYTHROPOIETIN 10000 UNIT(S): 10000 INJECTION, SOLUTION INTRAVENOUS; SUBCUTANEOUS at 21:30

## 2023-01-01 RX ADMIN — PANTOPRAZOLE SODIUM 10 MG/HR: 20 TABLET, DELAYED RELEASE ORAL at 01:09

## 2023-01-01 RX ADMIN — Medication 650 MILLIGRAM(S): at 05:19

## 2023-01-01 RX ADMIN — Medication 25 MILLIGRAM(S): at 20:31

## 2023-01-01 RX ADMIN — Medication 12.5 MILLIGRAM(S): at 05:58

## 2023-01-01 RX ADMIN — MIDODRINE HYDROCHLORIDE 10 MILLIGRAM(S): 2.5 TABLET ORAL at 21:23

## 2023-01-01 RX ADMIN — HYDROMORPHONE HYDROCHLORIDE 2 MILLIGRAM(S): 2 INJECTION INTRAMUSCULAR; INTRAVENOUS; SUBCUTANEOUS at 14:11

## 2023-01-01 RX ADMIN — CEFTRIAXONE 100 MILLIGRAM(S): 500 INJECTION, POWDER, FOR SOLUTION INTRAMUSCULAR; INTRAVENOUS at 11:01

## 2023-01-01 RX ADMIN — Medication 10 MILLIGRAM(S): at 17:38

## 2023-01-01 RX ADMIN — Medication 12.5 GRAM(S): at 18:02

## 2023-01-01 RX ADMIN — Medication 81 MILLIGRAM(S): at 14:26

## 2023-01-01 RX ADMIN — CINACALCET 30 MILLIGRAM(S): 30 TABLET, FILM COATED ORAL at 12:19

## 2023-01-01 RX ADMIN — Medication 650 MILLIGRAM(S): at 06:13

## 2023-01-01 RX ADMIN — PANTOPRAZOLE SODIUM 40 MILLIGRAM(S): 20 TABLET, DELAYED RELEASE ORAL at 19:08

## 2023-01-01 RX ADMIN — SEVELAMER CARBONATE 800 MILLIGRAM(S): 2400 POWDER, FOR SUSPENSION ORAL at 08:48

## 2023-01-01 RX ADMIN — PROPOFOL 14.4 MICROGRAM(S)/KG/MIN: 10 INJECTION, EMULSION INTRAVENOUS at 10:09

## 2023-01-01 RX ADMIN — SEVELAMER CARBONATE 800 MILLIGRAM(S): 2400 POWDER, FOR SUSPENSION ORAL at 17:38

## 2023-01-01 RX ADMIN — SEVELAMER CARBONATE 800 MILLIGRAM(S): 2400 POWDER, FOR SUSPENSION ORAL at 12:19

## 2023-01-01 RX ADMIN — HEPARIN SODIUM 5000 UNIT(S): 5000 INJECTION INTRAVENOUS; SUBCUTANEOUS at 05:20

## 2023-01-01 RX ADMIN — SEVELAMER CARBONATE 800 MILLIGRAM(S): 2400 POWDER, FOR SUSPENSION ORAL at 13:21

## 2023-01-01 RX ADMIN — CEFTRIAXONE 100 MILLIGRAM(S): 500 INJECTION, POWDER, FOR SOLUTION INTRAMUSCULAR; INTRAVENOUS at 21:29

## 2023-01-01 RX ADMIN — Medication 81 MILLIGRAM(S): at 11:25

## 2023-01-01 RX ADMIN — Medication 40 MILLIEQUIVALENT(S): at 17:23

## 2023-01-01 RX ADMIN — Medication 81 MILLIGRAM(S): at 18:31

## 2023-01-01 RX ADMIN — CHLORHEXIDINE GLUCONATE 1 APPLICATION(S): 213 SOLUTION TOPICAL at 11:25

## 2023-01-01 RX ADMIN — Medication 81 MILLIGRAM(S): at 19:08

## 2023-01-01 RX ADMIN — HEPARIN SODIUM 5000 UNIT(S): 5000 INJECTION INTRAVENOUS; SUBCUTANEOUS at 06:31

## 2023-01-01 RX ADMIN — CEFEPIME 100 MILLIGRAM(S): 1 INJECTION, POWDER, FOR SOLUTION INTRAMUSCULAR; INTRAVENOUS at 14:46

## 2023-01-01 RX ADMIN — ERYTHROPOIETIN 10000 UNIT(S): 10000 INJECTION, SOLUTION INTRAVENOUS; SUBCUTANEOUS at 23:50

## 2023-01-01 RX ADMIN — HEPARIN SODIUM 5000 UNIT(S): 5000 INJECTION INTRAVENOUS; SUBCUTANEOUS at 14:29

## 2023-01-01 RX ADMIN — Medication 650 MILLIGRAM(S): at 14:17

## 2023-01-01 RX ADMIN — Medication 81 MILLIGRAM(S): at 12:19

## 2023-01-01 RX ADMIN — Medication 1000 MILLIGRAM(S): at 23:04

## 2023-01-01 RX ADMIN — HEPARIN SODIUM 5000 UNIT(S): 5000 INJECTION INTRAVENOUS; SUBCUTANEOUS at 22:04

## 2023-01-01 RX ADMIN — PANTOPRAZOLE SODIUM 80 MILLIGRAM(S): 20 TABLET, DELAYED RELEASE ORAL at 01:21

## 2023-01-01 RX ADMIN — PANTOPRAZOLE SODIUM 40 MILLIGRAM(S): 20 TABLET, DELAYED RELEASE ORAL at 17:31

## 2023-01-01 RX ADMIN — MIDODRINE HYDROCHLORIDE 10 MILLIGRAM(S): 2.5 TABLET ORAL at 23:49

## 2023-01-01 RX ADMIN — PANTOPRAZOLE SODIUM 40 MILLIGRAM(S): 20 TABLET, DELAYED RELEASE ORAL at 08:51

## 2023-01-01 RX ADMIN — CINACALCET 60 MILLIGRAM(S): 30 TABLET, FILM COATED ORAL at 12:42

## 2023-01-01 RX ADMIN — PANTOPRAZOLE SODIUM 10 MG/HR: 20 TABLET, DELAYED RELEASE ORAL at 07:12

## 2023-01-01 RX ADMIN — VASOPRESSIN 6 UNIT(S)/MIN: 20 INJECTION INTRAVENOUS at 10:10

## 2023-01-01 RX ADMIN — MIDAZOLAM HYDROCHLORIDE 8 MILLIGRAM(S): 1 INJECTION, SOLUTION INTRAMUSCULAR; INTRAVENOUS at 13:00

## 2023-01-01 RX ADMIN — CHLORHEXIDINE GLUCONATE 1 APPLICATION(S): 213 SOLUTION TOPICAL at 12:42

## 2023-03-16 NOTE — PROGRESS NOTE ADULT - PROBLEM SELECTOR PROBLEM 2
Anemia due to blood loss Solaraze Counseling:  I discussed with the patient the risks of Solaraze including but not limited to erythema, scaling, itching, weeping, crusting, and pain.

## 2023-04-12 NOTE — ED PROVIDER NOTE - NS ED MD TWO NIGHTS YN
----- Message from Shanti Serrano MD sent at 4/12/2023  9:31 AM CDT -----  Sugars, kidneys, lipid panel, blood count, and hepatitis C test are fine.  Please let her know   Yes

## 2023-04-19 NOTE — ED PROVIDER NOTE - EYES, MLM
Composite Graft Text: In order to decrease risk for distortion of the alar rim, a full-thickness skin graft with cartilage graft was planned. After prep and local anesthesia, a template was made of the defect and the graft was harvested from the conchal bowl. After the graft was harvested, a strip of exposed cartilage was also elevated from the conchal bowl.  The cartilage was either inset into pockets on either side of the defect, or minced and placed at the base of the wound.  The skin graft was then defatted and trimmed to fit the defect. It was sutured into place circumferentially and a tie-over Bolster dressing was applied.  Hemostasis was obtained at the donor site which was then bandaged to heal by second intention. Clear bilaterally, pupils equal, round and reactive to light.

## 2023-06-02 NOTE — ED PROVIDER NOTE - PSYCHIATRIC, MLM
Encounter addended by: Sam Jaimes M.D. on: 6/2/2023 10:27 AM   Actions taken: Clinical Note Signed Alert and oriented to person, place, time/situation. normal mood and affect. no apparent risk to self or others.

## 2023-06-03 NOTE — ED PROVIDER NOTE - OBJECTIVE STATEMENT
77-year-old male with past medical history of end-stage renal disease (dialysis Tuesday Thursday Saturday, followed by nephrology karen vasquez), history of CAD, previous LAD stent, diabetes, hypertension, obesity, prostate cancer, PVD, history of DVT presents emergency department due to need for dialysis.  Patient's current dialysis center does not have a Yusra lift, patient cannot receive dialysis at center.  Patient will need to be admitted for dialysis, dialysis social worker needed for further help to get patient to new dialysis site.  Collateral was provided at bedside by nephrology.  As per [facility] he has nursing and rehabilitation center, collateral patient is at mental baseline.  Patient has no recent trauma or fall.

## 2023-06-03 NOTE — ED PROVIDER NOTE - CLINICAL SUMMARY MEDICAL DECISION MAKING FREE TEXT BOX
77-year-old male with past medical history of end-stage renal disease (dialysis Tuesday Thursday Saturday, followed by nephrology karen vasquez), history of CAD, previous LAD stent, diabetes, hypertension, obesity, prostate cancer, PVD, history of DVT presents emergency department due to need for dialysis. Will get screening labs and admit for dialysis

## 2023-06-03 NOTE — H&P ADULT - PROBLEM SELECTOR PLAN 2
with noted (+)FOBT  Epo per renal recs  GI consult  hold antihypertensives for now pending GI w/u with noted (+)FOBT  Epo per renal recs  GI consult emailed  hold antihypertensives for now pending GI w/u  pantoprazole 80mg IV x1 and 40mg IV Q12 afterwards  VS Q4H  trend H/H Q12 hours  per wife, no known prior history of GIB with noted (+)FOBT  Epo per renal recs  GI consult emailed  hold antihypertensives for now pending GI w/u  pantoprazole 80mg IV x1 and 40mg IV Q12 afterwards  VS Q4H  trend H/H Q12 hours  maintain active T&S  consent for transfusion (for Hb<7) obtained from wife via phone, in chart  per wife, no known prior history of GIB ekg w/ST elevations, ? repol  trops added onto labs  no chest pain    #addendum - trop>10K, no chest pain  add on ck/ckmb; repeat CE  spoke with Dr. Clements, will start on asa 81   monitor on tele  will check TTE

## 2023-06-03 NOTE — CONSULT NOTE ADULT - SUBJECTIVE AND OBJECTIVE BOX
[facility] NEPHROLOGY- CONSULTATION NOTE    77y Male with history of below presents for need for dialysis. Nephrology consulted for ESRD status.    Patient well known to our practice as follows with me at [facility] with last HD on 6/1 as an outpatient via RUE AVG. Patient unable to stand on scale and no Yusra lift available at current dialysis center. Therefore, patient advised to come to ER for continuation of HD and arrangement to transfer units to [facility] where Yusra lift available.    REVIEW OF SYSTEMS:  Gen: no fevers  HEENT: no rhinorrhea  Neck: no sore throat  Cards: no chest pain  Resp: no dyspnea  GI: no nausea or vomiting or diarrhea  : no dysuria or hematuria  Vascular: no LE edema  Derm: no rashes  Neuro: no numbness/tingling    penicillin (Rash)      Home Medications Reviewed  Hospital Medications:   MEDICATIONS  (STANDING):      PAST MEDICAL & SURGICAL HISTORY:  HTN (hypertension)      Prostate cancer  prostate cancer      Obesity      PVD (peripheral vascular disease)      CAD (coronary artery disease)  LAD stent      Pre-diabetes      ESRD (end stage renal disease) on dialysis      Essential hypertension      History of DVT of lower extremity      History of prostatectomy      AV fistula  h/o creation in 2005      AV fistula  creation in right arm on 12/1/2015      H/O cardiac catheterization  4/25/2014 non obstructive disease      H/O abdominal surgery  10/10/17          FAMILY HISTORY:  Family history of hypertension in mother (Mother)  HTN        SOCIAL HISTORY:  Denies toxic substance use     VITALS:  T(F): 99.7 (06-03-23 @ 11:24), Max: 99.7 (06-03-23 @ 11:24)  HR: 91 (06-03-23 @ 09:25)  BP: 111/46 (06-03-23 @ 09:25)  RR: 16 (06-03-23 @ 09:25)  SpO2: 99% (06-03-23 @ 09:25)  Wt(kg): --        PHYSICAL EXAM:  Gen: NAD, lehargic but arousable, confused (at baseline)  HEENT: MMM  Neck: no JVD  Cards: RRR, +S1/S2, no M/G/R  Resp: CTA B/L  GI: soft, NT/ND, NABS, no hernia appreciated  : no CVA tenderness, + SPT  Vascular: no LE edema B/L, RUE AVG + bruit/thrill  Derm: no rashes  Neuro: non-focal    LABS:        Creatinine Trend:     Urine Studies:        RADIOLOGY & ADDITIONAL STUDIES:

## 2023-06-03 NOTE — H&P ADULT - NSHPLABSRESULTS_GEN_ALL_CORE
7.2    17.43 )-----------( 230      ( 03 Jun 2023 13:32 )             22.7     Occult Blood, Feces: Positive (06.03.23 @ 14:18)    06-03    135  |  93<L>  |  48<H>  ----------------------------<  93  4.7   |  25  |  8.64<H>    Ca    9.2      03 Jun 2023 13:32    TPro  6.4  /  Alb  2.7<L>  /  TBili  0.2  /  DBili  x   /  AST  18  /  ALT  20  /  AlkPhos  110  06-03    12:53 - VBG - pH: 7.39  | pCO2: 49    | pO2: 30    | Lactate: 1.8    < from: Xray Chest 1 View AP/PA (06.03.23 @ 12:05) >  AP view of the chest demonstrates the lungs to be clear. There is no pleural effusion. The pulmonary vasculature is normal. There is no   pneumothorax. The heart is mildly enlarged. There is no mediastinal or hilar mass. Mild thoracic degenerative changes are present. Right axillary vascular stent is now present.  IMPRESSION: No acute infiltrate. Right axillary vascular stent.  < end of copied text >    EKG personally reviewed and interpreted - Aflutter 69bpm, RBBB/LAFB, LAD, LVH w/repol; TWI I, aVL, V1-V2; QTc 439ms 7.2    17.43 )-----------( 230      ( 03 Jun 2023 13:32 )             22.7     Occult Blood, Feces: Positive (06.03.23 @ 14:18)    06-03    135  |  93<L>  |  48<H>  ----------------------------<  93  4.7   |  25  |  8.64<H>    Ca    9.2      03 Jun 2023 13:32    TPro  6.4  /  Alb  2.7<L>  /  TBili  0.2  /  DBili  x   /  AST  18  /  ALT  20  /  AlkPhos  110  06-03    12:53 - VBG - pH: 7.39  | pCO2: 49    | pO2: 30    | Lactate: 1.8    < from: Xray Chest 1 View AP/PA (06.03.23 @ 12:05) >  AP view of the chest demonstrates the lungs to be clear. There is no pleural effusion. The pulmonary vasculature is normal. There is no   pneumothorax. The heart is mildly enlarged. There is no mediastinal or hilar mass. Mild thoracic degenerative changes are present. Right axillary vascular stent is now present.  IMPRESSION: No acute infiltrate. Right axillary vascular stent.  < end of copied text >    EKG personally reviewed and interpreted - Aflutter 69bpm, RBBB/LAFB, LAD, LVH w/?repol; ST abnormality V3-V5; TWI I, aVL, V1-V2; QTc 439ms

## 2023-06-03 NOTE — H&P ADULT - PROBLEM SELECTOR PLAN 8
verify home meds, restart  given bitemporal wasting on exam and facility report of protein-calorie malnutrition hold antihypertensives for now in setting of worsening anemia with (+)FOBT

## 2023-06-03 NOTE — H&P ADULT - PROBLEM SELECTOR PLAN 3
HR>90, WBC>12  check UA  CXR clear  lactate wnl  afebrile however elevated rectal temp to 99.7 earlier on presentation   no signs/symptoms of infection  unable to eval sacrum due to location in HD hallway appreciate renal recs  interviewed/examined at HD  monitor I/Os, daily weights  renal diet pending dysphagia screen

## 2023-06-03 NOTE — H&P ADULT - HISTORY OF PRESENT ILLNESS
77-year-old male with history of DVT (previously on coumadin, now stopped), CAD, ESRD (on HD via RUE AVF Tu/Th/Sat), AOCD, HLD, HTN, obesity, pre-diabetes, and prostate cancer s/p suprapubic catheter presenting from [facility] w/need for HD. Patient's current HD does not have Yusra lift. Last HD 6/01. Patient is currently without complaint, A&Ox1.     In the ED VS:  99.7  67-91  /43-49  16-18  %RA

## 2023-06-03 NOTE — H&P ADULT - CONVERSATION DETAILS
Wife reports she is HCP, discussed MOLST that is present in patient's chart. Discussed advanced directives as MOLST indicated patient was DNR/DNI. I explained these terms and what they entail, wife accepting resuscitative measures should they be required. Patient will remain full code.

## 2023-06-03 NOTE — H&P ADULT - PROBLEM SELECTOR PLAN 12
history of DVT, however with (+)FOBT will hold off on mechanical and pharmacologic DVT ppx for now pending further w/u

## 2023-06-03 NOTE — ED PROVIDER NOTE - NSICDXPASTSURGICALHX_GEN_ALL_CORE_FT
PAST SURGICAL HISTORY:  AV fistula h/o creation in 2005    AV fistula creation in right arm on 12/1/2015    H/O abdominal surgery 10/10/17    H/O cardiac catheterization 4/25/2014 non obstructive disease    History of prostatectomy

## 2023-06-03 NOTE — H&P ADULT - PROBLEM SELECTOR PLAN 11
history of DVT, however with (+)FOBT will hold off on mechanical and pharmacologic DVT ppx for now pending further w/u Resume sensipar 60 mg daily and home phosphorus binders.   Monitor serum calcium and phosphorus.

## 2023-06-03 NOTE — H&P ADULT - NSHPPHYSICALEXAM_GEN_ALL_CORE
Vital Signs Last 24 Hrs  T(C): 36.4 (03 Jun 2023 16:22), Max: 37.6 (03 Jun 2023 11:24)  T(F): 97.5 (03 Jun 2023 16:22), Max: 99.7 (03 Jun 2023 11:24)  HR: 67 (03 Jun 2023 16:22) (67 - 91)  BP: 95/43 (03 Jun 2023 16:22) (95/43 - 111/46)  RR: 16 (03 Jun 2023 16:22) (16 - 18)  SpO2: 98% (03 Jun 2023 16:22) (98% - 100%)    Parameters below as of 03 Jun 2023 16:22  Patient On (Oxygen Delivery Method): room air    PHYSICAL EXAM:  GENERAL: NAD, well-developed, thin  HEAD:  Atraumatic, bitemporal wasting  EYES: EOMI, PERRL, conjunctiva and sclera clear  NECK: Supple, No JVD  CHEST/LUNG: Clear to auscultation bilaterally; No wheezes, rales or rhonchi; normal work of breathing, speaking in full sentences  HEART: Regular rate and rhythm; No murmurs, rubs, or gallops, (+)S1, S2; (+)thrill/bruit RUE AVF  ABDOMEN: Soft, Nontender, Nondistended; Normal Bowel sounds; suprapubic cath with surrounding skin c/d/i, clear yellow urine in leg bag  EXTREMITIES:  2+ Peripheral Pulses, No clubbing, cyanosis; trace b/l LE edema  PSYCH: normal mood and affect, A&Oxperson only  NEUROLOGY: CN II-XII intact, reporting R hand cramp unwilling to perform FTN testing on R; L FTN wnl, strength 4+/5 RUE (unclear if secondary to report of hand cramping), 5/5 LUE and b/l LE, sensation grossly intact  SKIN: No rashes or lesions on limited exam in HD hallway

## 2023-06-03 NOTE — ED ADULT NURSE REASSESSMENT NOTE - NS ED NURSE REASSESS COMMENT FT1
Multiple attempts made by multiple RN's to obtain labs, unable to obtain labs. Per dialysis RN, Hep B surface antigen required prior to dialysis. ACP provider Guille at [phone]. Provider to contact dialysis.

## 2023-06-03 NOTE — ED PROVIDER NOTE - PHYSICAL EXAMINATION
PHYSICAL EXAM:  CONSTITUTIONAL: appearing, awake, alert, oriented to person, not place, time/situation and in no apparent distress.  HEAD: Atraumatic  EYES: Clear bilaterally, pupils equal, round and reactive to light.  ENMT: Airway patent, Nasal mucosa clear. Mouth with normal mucosa. Uvula is midline.   CARDIAC: Normal rate, regular rhythm. +S1/S2. No murmurs, rubs or gallops.  RESPIRATORY: Breathing unlabored. Breath sounds clear and equal bilaterally.  ABDOMEN:  Soft, nontender, nondistended. No rebound tenderness or guarding.  NEUROLOGICAL: Alert and oriented, no focal deficits, no motor or sensory deficits. CN2-12 intact. Sensation intact x4 extremities.  SKIN: Skin warm and dry. No evidence of rashes or lesions.

## 2023-06-03 NOTE — ED ADULT NURSE NOTE - OBJECTIVE STATEMENT
received patient lethargic, able to answer some questions, speech difficult to understand. confusion. history of dementia. patient states hes in "hannah aleks". verbal responses to pain. ekg completed, placed on cardiac monitor. patient has wright attached to leg bag upon arrival. dialysis tues/thurs/sat. last dialysis was this thursday. fistula to right arm.

## 2023-06-03 NOTE — CONSULT NOTE ADULT - ASSESSMENT
77  year old male with hx of cad , ? PCI, HTN, esrd, HTN, DM, afib presents for HD.    #cad ?PCI  -unclear if hx of pci- no previous cath noted  -cv stable no cp   -fu ecg   -no chf on exam   -resume outpt cv meds     #hx Afib   -per chart review hx of afib- not on ac   -fu ecg  -asa     # ESRD on HD   -renal fu     #htn   -resume outpt meds

## 2023-06-03 NOTE — CONSULT NOTE ADULT - TIME BILLING
agree with above  7  year old male with hx of cad , ? PCI, HTN, esrd, HTN, DM, afib presents for HD.    #cad ?PCI  -unclear if hx of pci- no previous cath noted  -cv stable no cp   -fu ecg   -no chf on exam   -resume outpt cv meds     #hx Afib   -per chart review hx of afib- not on ac   -fu ecg  -asa     # ESRD on HD   -renal fu     #htn   -resume outpt meds

## 2023-06-03 NOTE — ED ADULT NURSE REASSESSMENT NOTE - NS ED NURSE REASSESS COMMENT FT1
Per ACP provider [facility] RN will draw Hep B surface antigen. Will provide lab slips on chart. Primary ED RN Julianna garcia.

## 2023-06-03 NOTE — ED PROVIDER NOTE - NSICDXPASTMEDICALHX_GEN_ALL_CORE_FT
PAST MEDICAL HISTORY:  CAD (coronary artery disease) LAD stent    ESRD (end stage renal disease) on dialysis     Essential hypertension     History of DVT of lower extremity     HTN (hypertension)     Obesity     Pre-diabetes     Prostate cancer prostate cancer    PVD (peripheral vascular disease)

## 2023-06-03 NOTE — H&P ADULT - PROBLEM SELECTOR PLAN 4
appreciate cards recs  ecg w/aflutter, repol abnormalities appreciate cards recs  ecg w/aflutter, ?repol abnormalities, ST abnormality V3-V5, check trop  no chest pain appreciate cards recs  ecg w/aflutter, ?repol abnormalities, ST abnormality V3-V5, check trop  no chest pain    #addendum - trop>10K, no chest pain  add on ck/ckmb; repeat CE  spoke with Dr. Clements, will start on asa 81   monitor on tele  will check TTE HR>90, WBC>12  check UA  CXR clear  lactate wnl  afebrile however elevated rectal temp to 99.7 earlier on presentation   no signs/symptoms of infection  unable to eval sacrum due to location in HD hallway

## 2023-06-03 NOTE — H&P ADULT - NSHPSOCIALHISTORY_GEN_ALL_CORE
From [facility]  Denies tobacco, alcohol, or illicit drug use From [facility] (patient was hospitalized at OSH a few weeks ago, discharged to rehab approximately 2 weeks ago)  Denies tobacco, alcohol, or illicit drug use

## 2023-06-03 NOTE — ED PROCEDURE NOTE - ATTENDING CONTRIBUTION TO CARE
I performed a face-to-face evaluation of the patient and performed a history and physical examination. I agree with the history and physical examination. If this was a PA visit, I personally saw the patient with the PA and performed a substantive portion of the visit including all aspects of the medical decision making.    Fernanda: I was present during the critical part of the procedure.

## 2023-06-03 NOTE — H&P ADULT - ASSESSMENT
77-year-old male with history of DVT (previously on coumadin, now stopped), CAD, ESRD (on HD via RUE AVF Tu/Th/Sat), AOCD, HLD, HTN, obesity, pre-diabetes, and prostate cancer s/p suprapubic catheter presenting from [facility] w/need for HD.

## 2023-06-03 NOTE — ED PROVIDER NOTE - ATTENDING CONTRIBUTION TO CARE
I performed a face-to-face evaluation of the patient and performed a history and physical examination. I agree with the history and physical examination. If this was a PA visit, I personally saw the patient with the PA and performed a substantive portion of the visit including all aspects of the medical decision making.    Pt's been feeling more weak than usual. His dialysis center doesn't have a Yusra lift, so they sent him here to arrange dialysis at a center w/ a Yusra lift. Check labs and EKG. Admit.

## 2023-06-03 NOTE — H&P ADULT - PROBLEM SELECTOR PLAN 1
appreciate renal recs  interviewed/examined at HD  monitor I/Os, daily weights  renal diet pending dysphagia screen with noted (+)FOBT  Epo per renal recs  GI consult emailed  hold antihypertensives for now pending GI w/u  pantoprazole 80mg IV x1 and 40mg IV Q12 afterwards  VS Q4H  trend H/H Q12 hours  maintain active T&S  consent for transfusion (for Hb<7) obtained from wife via phone, in chart  per wife, no known prior history of GIB with noted (+)FOBT, possible GIB component   Epo per renal recs  GI consult emailed  hold antihypertensives for now pending GI w/u  pantoprazole 80mg IV x1 and 40mg IV Q12 afterwards  VS Q4H  trend H/H Q12 hours  maintain active T&S  consent for transfusion (for Hb<7) obtained from wife via phone, in chart  per wife, no known prior history of GIB    add on labs c/w AOCD

## 2023-06-03 NOTE — H&P ADULT - PROBLEM SELECTOR PLAN 10
Resume sensipar 60 mg daily and home phosphorus binders.   Monitor serum calcium and phosphorus. A&Ox1 on my exam  dysphagia screen  reorient as needed  fall and aspiration precautions  PT consult    #ACP  MOLST in chart indicating wife, Aicha Gautam, consented for DNR/DNI 5/29/23, witnessed x2 but no provider signature/info (Section E blank); discussed with wife who would like patient to be full code.

## 2023-06-03 NOTE — ED ADULT TRIAGE NOTE - CHIEF COMPLAINT QUOTE
pt sent for dialysis from [facility]  pt denies any complaints but has dementia/ pt s family wants pt transferred to another facility so nursing home failed to make arrangements for transportation to dialysis   pt is dialyzed tue/thur/ sat   pt sitting up in stretcher alert to place person  fs by ems [phone]

## 2023-06-03 NOTE — H&P ADULT - NSHPREVIEWOFSYSTEMS_GEN_ALL_CORE
REVIEW OF SYSTEMS:    CONSTITUTIONAL: No weakness, fevers or chills; Reports normal appetite; Denies weight loss or night sweats  EYES/ENT: No visual changes; No dysphagia; No sore throat; No rhinorrhea; No sinus pain/pressure  NECK: No pain or stiffness  RESPIRATORY: No cough, wheezing, hemoptysis; No shortness of breath  CARDIOVASCULAR: No chest pain or palpitations; No lower extremity edema  GASTROINTESTINAL: No abdominal or epigastric pain. No nausea, vomiting, or hematemesis; No diarrhea or constipation. No melena or hematochezia.  GENITOURINARY: No dysuria, frequency or hematuria  NEUROLOGICAL: No numbness, paresthesias, or weakness; No HA; No LH/dizziness  MSK: ambulates without aid, no falls  SKIN: No itching, burning, rashes, or lesions   All other review of systems is negative unless indicated above.

## 2023-06-03 NOTE — ED PROCEDURE NOTE - PROCEDURE ADDITIONAL DETAILS
Emergency Department Focused Ultrasound performed at patient's bedside for placement of ultrasound guided IV. The study was confirmed with blood return and ease of flushing saline.    IV Gauge: 20

## 2023-06-03 NOTE — H&P ADULT - TIME BILLING
Preparing to see the patient including review of tests and other providers' notes, confirming history with patient/wife member, performing medical examination and evaluation, counseling and educating the patient/wife, ordering medications, tests and procedures, communicating with other health care professionals, documenting clinical information in the EMR, independently interpreting results and communicating results to the patient/wife, care coordination

## 2023-06-03 NOTE — H&P ADULT - PROBLEM SELECTOR PLAN 7
hold antihypertensives for now in setting of worsening anemia with (+)FOBT no med list from facility (called nursing at [facility], who will fax over information), not previously on thyroid replacement hormones, will check TSH

## 2023-06-03 NOTE — H&P ADULT - PROBLEM SELECTOR PLAN 6
no med list from facility, not previously on thyroid replacement hormones, will check TSH no med list from facility (called nursing at [facility], who will fax over information), not previously on thyroid replacement hormones, will check TSH s/p suprapubic cath  check UA given WBC/SIRS

## 2023-06-03 NOTE — H&P ADULT - PROBLEM SELECTOR PLAN 5
s/p suprapubic cath  check UA given WBC/SIRS appreciate cards recs  ecg w/aflutter, ?repol abnormalities, ST abnormality V3-V5, check trop  no chest pain    #addendum - trop>10K, no chest pain  add on ck/ckmb; repeat CE  spoke with Dr. Clements, will start on asa 81   monitor on tele  will check TTE

## 2023-06-03 NOTE — ED ADULT NURSE NOTE - CHIEF COMPLAINT QUOTE
pt sent for dialysis from [facility]  pt denies any complaints but has dementia/ pt s family wants pt transferred to another facility so nursing home failed to make arrangements for transportation to dialysis   pt is dialyzed tue/thur/ sat   pt sitting up in stretcher alert to place person  fs by ems [phone]

## 2023-06-03 NOTE — H&P ADULT - PROBLEM SELECTOR PLAN 9
A&Ox1 on my exam  dysphagia screen  reorient as needed  fall and aspiration precautions  PT consult    #ACP  MOLST in chart indicating wife, Yaz Gautam, consented for DNR/DNI 5/29/23, witnessed x2 but no provider signature/info (Section E blank) A&Ox1 on my exam  dysphagia screen  reorient as needed  fall and aspiration precautions  PT consult    #ACP  MOLST in chart indicating wife, Aicha Gautam, consented for DNR/DNI 5/29/23, witnessed x2 but no provider signature/info (Section E blank); discussed with wife who would like patient to be full code. verify home meds, restart  given bitemporal wasting on exam and facility report of protein-calorie malnutrition

## 2023-06-03 NOTE — CONSULT NOTE ADULT - SUBJECTIVE AND OBJECTIVE BOX
CARDIOLOGY CONSULT - Dr. Clements         HPI:  77  year old male with hx of cad stent HTN, esrd, HTN, DM, afib presents for HD. pt alciraox1- lisette unable to obtain hx. per discussion with renal-pt sent in due to HD issuess. no cp or sob . ROS otherwise negatve       PAST MEDICAL & SURGICAL HISTORY:  HTN (hypertension)      Prostate cancer  prostate cancer      Obesity      PVD (peripheral vascular disease)      CAD (coronary artery disease)  LAD stent      Pre-diabetes      ESRD (end stage renal disease) on dialysis      Essential hypertension      History of DVT of lower extremity      History of prostatectomy      AV fistula  h/o creation in 2005      AV fistula  creation in right arm on 12/1/2015      H/O cardiac catheterization  4/25/2014 non obstructive disease      H/O abdominal surgery  10/10/17              PREVIOUS DIAGNOSTIC TESTING:    [ ] Echocardiogram:  [ ]  Catheterization:  [ ] Stress Test:  	    MEDICATIONS:  Home Medications:  allopurinol 100 mg oral tablet: 1 tab(s) orally once a day (01 Nov 2018 11:04)  Aranesp 25 mcg/mL injectable solution: 1 milliliter(s) injectable once a week with Saturday dialysis session (01 Nov 2018 11:04)  atenolol 25 mg oral tablet: 1 tab(s) orally once a day (01 Nov 2018 11:04)  cilostazol 50 mg oral tablet: 1 tab(s) orally once a day (01 Nov 2018 11:04)  furosemide 80 mg oral tablet: 1 tab(s) orally once a day (01 Nov 2018 11:04)  isosorbide mononitrate 20 mg oral tablet: 1 tab(s) orally once a day (01 Nov 2018 11:04)  Parsabiv 5 mg/mL intravenous solution: 2 milliliter(s) intravenous 3 times a day with dialysis (01 Nov 2018 11:04)  Protonix 40 mg oral delayed release tablet: 1 tab(s) orally once a day (01 Nov 2018 11:04)  Renvela 800 mg oral tablet: 1 tab(s) orally 3 times a day (with meals) (01 Nov 2018 11:04)  Zemplar 5 mcg/mL intravenous solution: 3.5 milliliter(s) intravenous 3 times a week with dialysis (01 Nov 2018 11:04)      MEDICATIONS  (STANDING):      FAMILY HISTORY:  Family history of hypertension in mother (Mother)  HTN        SOCIAL HISTORY:    [ ] Non-smoker  [ ] Smoker  [ ] Alcohol    Allergies    penicillin (Rash)    Intolerances    	    REVIEW OF SYSTEMS:  CONSTITUTIONAL: No fever, weight loss, or fatigue  EYES: No eye pain, visual disturbances, or discharge  ENMT:  No difficulty hearing, tinnitus, vertigo; No sinus or throat pain  NECK: No pain or stiffness  RESPIRATORY: No cough, wheezing, chills or hemoptysis; No Shortness of Breath  CARDIOVASCULAR: No chest pain, palpitations, passing out, dizziness, or leg swelling  GASTROINTESTINAL: No abdominal or epigastric pain. No nausea, vomiting, or hematemesis; No diarrhea or constipation. No melena or hematochezia.  GENITOURINARY: No dysuria, frequency, hematuria, or incontinence  NEUROLOGICAL: No headaches, memory loss, loss of strength, numbness, or tremors  SKIN: No itching, burning, rashes, or lesions   	    [ ] All others negative	  [x ] Unable to obtain    PHYSICAL EXAM:  T(C): 36.7 (06-03-23 @ 09:25), Max: 36.7 (06-03-23 @ 09:25)  HR: 91 (06-03-23 @ 09:25) (91 - 91)  BP: 111/46 (06-03-23 @ 09:25) (111/46 - 111/46)  RR: 16 (06-03-23 @ 09:25) (16 - 16)  SpO2: 99% (06-03-23 @ 09:25) (99% - 99%)  Wt(kg): --  I&O's Summary      Appearance: Normal	  Psychiatry: A & O x 1  HEENT:   Normal oral mucosa, PERRL, EOMI	  Lymphatic: No lymphadenopathy  Cardiovascular: Normal S1 S2,RRR, No JVD, No murmurs  Respiratory: Lungs clear to auscultation	  Gastrointestinal:  Soft, Non-tender, + BS	  Skin: No rashes, No ecchymoses, No cyanosis	  Neurologic: Non-focal  Extremities: Normal range of motion, No clubbing, cyanosis or edema  Vascular: Peripheral pulses palpable 2+ bilaterally    TELEMETRY: 	    ECG:  	PENDING   RADIOLOGY:  OTHER: 	  	  LABS:	 	    CARDIAC MARKERS:                      proBNP:   Lipid Profile:   HgA1c:   TSH:

## 2023-06-03 NOTE — H&P ADULT - NSICDXPASTSURGICALHX_GEN_ALL_CORE_FT
PAST SURGICAL HISTORY:  AV fistula h/o creation in 2005    H/O abdominal surgery 10/10/17    H/O cardiac catheterization 4/25/2014 non obstructive disease    History of prostatectomy

## 2023-06-03 NOTE — CONSULT NOTE ADULT - ASSESSMENT
77y Male with history of ESRD on HD presents for need for dialysis. Nephrology consulted for ESRD status.    1) ESRD: Last HD on 6/1 as an outpatient. Will arrange for HD today after patient admitted. Will contact dialysis social worker on 6/5 to arrange patient to be transferred to [facility] where Yusra lift is available. Monitor electrolytes.    2) HTN with ESRD: BP controlled. Resume home medications. Monitor BP.    3) Anemia of renal disease: Will consider resuming Micera pending Hb results (currently pending). Monitor Hb.    4) Secondary HPT of renal origin: Resume sensipar 60 mg daily and home phosphorus binders. Renal diet once diet resumed. Monitor serum calcium and phosphorus.      [facility] NEPHROLOGY  Armando Shaffer M.D.  Kendall Morales D.O.  Lilliam Cody M.D.  Oxana Hernandez, OWEN, ANP-C    Telephone: [phone]  Facsimile: [phone]    [id number] [place] [id number]

## 2023-06-04 NOTE — PHYSICAL THERAPY INITIAL EVALUATION ADULT - PERTINENT HX OF CURRENT PROBLEM, REHAB EVAL
77-year-old male with history stated below, presenting from [facility] with need for HD.

## 2023-06-04 NOTE — CONSULT NOTE ADULT - SUBJECTIVE AND OBJECTIVE BOX
Chief Complaint:  Patient is a 77y old  Male who presents with a chief complaint of needs HD (03 Jun 2023 19:20)      HPI:MAGGIE THAO is a 77y Male    PMHX/PSHX:  HTN (hypertension)    HLD (hyperlipidemia)    ESRD (end stage renal disease) on dialysis    Prostate cancer    Obesity    Atrial fibrillation    PVD (peripheral vascular disease)    CAD (coronary artery disease)    Pre-diabetes    ESRD (end stage renal disease) on dialysis    Paroxysmal atrial fibrillation    Essential hypertension    Type 2 diabetes mellitus without complication, without long-term current use of insulin    History of DVT of lower extremity    History of prostatectomy    AV fistula    AV fistula    H/O cardiac catheterization    No significant past surgical history    H/O abdominal surgery      Allergies:  penicillin (Rash)      Home Medications: reviewed  Hospital Medications:  aspirin enteric coated 81 milliGRAM(s) Oral daily  chlorhexidine 2% Cloths 1 Application(s) Topical daily  cinacalcet 60 milliGRAM(s) Oral daily  donepezil 5 milliGRAM(s) Oral at bedtime  epoetin shell-epbx (RETACRIT) Injectable 65337 Unit(s) IV Push <User Schedule>  pantoprazole  Injectable 40 milliGRAM(s) IV Push every 12 hours  sevelamer carbonate 800 milliGRAM(s) Oral three times a day with meals  sodium chloride 0.9% Bolus. 100 milliLiter(s) IV Bolus every 5 minutes PRN      Social History:   Tob: Denies  EtOH: Denies  Illicit Drugs: Denies    Family history:  Family history of hypertension in mother (Mother)      Denies family history of colon cancer/polyps, stomach cancer/polyps, pancreatic cancer/masses, liver cancer/disease, ovarian cancer and endometrial cancer.    ROS:   General:  No  fevers, chills, night sweats, fatigue  Eyes:  Good vision, no reported pain  ENT:  No sore throat, pain, runny nose  CV:  No pain, palpitations  Pulm:  No dyspnea, cough  GI:  See HPI, otherwise negative  :  No  incontinence, nocturia  Muscle:  No pain, weakness  Neuro:  No memory problems  Psych:  No insomnia, mood problems, depression  Endocrine:  No polyuria, polydipsia, cold/heat intolerance  Heme:  No petechiae, ecchymosis, easy bruisability  Skin:  No rash    PHYSICAL EXAM:   Vital Signs:  Vital Signs Last 24 Hrs  T(C): 36.6 (04 Jun 2023 06:51), Max: 37.6 (03 Jun 2023 11:24)  T(F): 97.9 (04 Jun 2023 06:51), Max: 99.7 (03 Jun 2023 11:24)  HR: 92 (04 Jun 2023 06:51) (67 - 92)  BP: 132/78 (04 Jun 2023 06:51) (95/43 - 132/78)  BP(mean): --  RR: 17 (04 Jun 2023 06:51) (16 - 18)  SpO2: 99% (04 Jun 2023 06:51) (95% - 100%)    Parameters below as of 04 Jun 2023 06:51  Patient On (Oxygen Delivery Method): room air      Daily     Daily     GENERAL: no acute distress  NEURO: alert  HEENT: anicteric sclera, no conjunctival pallor appreciated  CHEST: no respiratory distress, no accessory muscle use  CARDIAC: regular rate, rhythm  ABDOMEN: soft, non-tender, non-distended, no rebound or guarding  EXTREMITIES: warm, well perfused, no edema  SKIN: no lesions noted    LABS: reviewed                        7.4    13.19 )-----------( 145      ( 04 Jun 2023 01:10 )             22.9     06-03    135  |  93<L>  |  48<H>  ----------------------------<  93  4.7   |  25  |  8.64<H>    Ca    9.2      03 Jun 2023 13:32    TPro  6.4  /  Alb  2.7<L>  /  TBili  0.2  /  DBili  x   /  AST  18  /  ALT  20  /  AlkPhos  110  06-03    LIVER FUNCTIONS - ( 03 Jun 2023 13:32 )  Alb: 2.7 g/dL / Pro: 6.4 g/dL / ALK PHOS: 110 U/L / ALT: 20 U/L / AST: 18 U/L / GGT: x               Diagnostic Studies: see sunrise for full report         Chief Complaint:  Patient is a 77y old  Male who presents with a chief complaint of needs HD (03 Jun 2023 19:20)      HPI:MAGGIE THAO is a 77y Male with dementia, DVT (off AC), CAD-?stent, afib ESRD-HD TTS, AOCD, HLD, HTN, preDM, prostate CA s/p suprapubic cath, p-SBO 2017 who initially presented to the ED for HD (his HD center does not have a Yusra lift that he requires). Noted to have Hgb 7.2 for which GI was consulted.     Patient denying n/v/abdominal pain. No heartburn, painful or difficulty swallowing. No bloody red/black stools. Reports prior colonoscopy many years ago that he believes was normal. No prior EGD.     He has been afebrile and HDS. Hgb stable at 7.2, no blood given. White count of 15 today from 17 on admission,  from 230. Trops on admission > 10,000 now 8895, w/o chest pain or SOB. Cards following. Fe, TIBC were low. % iron sat wnl and ferritin elevated. Lactate wnl. HBcAb reactive, BsAg, Ab, HCV Ab nonreactive.     PMHX/PSHX:  HTN (hypertension)    HLD (hyperlipidemia)    ESRD (end stage renal disease) on dialysis    Prostate cancer    Obesity    Atrial fibrillation    PVD (peripheral vascular disease)    CAD (coronary artery disease)    Pre-diabetes    ESRD (end stage renal disease) on dialysis    Paroxysmal atrial fibrillation    Essential hypertension    Type 2 diabetes mellitus without complication, without long-term current use of insulin    History of DVT of lower extremity    History of prostatectomy    AV fistula    AV fistula    H/O cardiac catheterization    No significant past surgical history    H/O abdominal surgery      Allergies:  penicillin (Rash)      Home Medications: reviewed  Hospital Medications:  aspirin enteric coated 81 milliGRAM(s) Oral daily  chlorhexidine 2% Cloths 1 Application(s) Topical daily  cinacalcet 60 milliGRAM(s) Oral daily  donepezil 5 milliGRAM(s) Oral at bedtime  epoetin shell-epbx (RETACRIT) Injectable 88604 Unit(s) IV Push <User Schedule>  pantoprazole  Injectable 40 milliGRAM(s) IV Push every 12 hours  sevelamer carbonate 800 milliGRAM(s) Oral three times a day with meals  sodium chloride 0.9% Bolus. 100 milliLiter(s) IV Bolus every 5 minutes PRN      Social History:   Tob: Denies  EtOH: Denies  Illicit Drugs: Denies    Family history:  Family history of hypertension in mother (Mother)      Denies family history of colon cancer/polyps, stomach cancer/polyps, pancreatic cancer/masses, liver cancer/disease, ovarian cancer and endometrial cancer.    ROS:   General:  No  fevers, chills, night sweats, fatigue  Eyes:  Good vision, no reported pain  ENT:  No sore throat, pain, runny nose  CV:  No pain, palpitations  Pulm:  No dyspnea, cough  GI:  See HPI, otherwise negative  :  No  incontinence, nocturia  Muscle:  No pain, weakness  Neuro:  No memory problems  Psych:  No insomnia, mood problems, depression  Endocrine:  No polyuria, polydipsia, cold/heat intolerance  Heme:  No petechiae, ecchymosis, easy bruisability  Skin:  No rash    PHYSICAL EXAM:   Vital Signs:  Vital Signs Last 24 Hrs  T(C): 36.6 (04 Jun 2023 06:51), Max: 37.6 (03 Jun 2023 11:24)  T(F): 97.9 (04 Jun 2023 06:51), Max: 99.7 (03 Jun 2023 11:24)  HR: 92 (04 Jun 2023 06:51) (67 - 92)  BP: 132/78 (04 Jun 2023 06:51) (95/43 - 132/78)  BP(mean): --  RR: 17 (04 Jun 2023 06:51) (16 - 18)  SpO2: 99% (04 Jun 2023 06:51) (95% - 100%)    Parameters below as of 04 Jun 2023 06:51  Patient On (Oxygen Delivery Method): room air      Daily     Daily     GENERAL: no acute distress  NEURO: alert  HEENT: anicteric sclera, no conjunctival pallor appreciated  CHEST: no respiratory distress, no accessory muscle use  CARDIAC: regular rate, rhythm  ABDOMEN: soft, non-tender, non-distended, no rebound or guarding  EXTREMITIES: warm, well perfused, no edema  SKIN: no lesions noted    LABS: reviewed                        7.4    13.19 )-----------( 145      ( 04 Jun 2023 01:10 )             22.9     06-03    135  |  93<L>  |  48<H>  ----------------------------<  93  4.7   |  25  |  8.64<H>    Ca    9.2      03 Jun 2023 13:32    TPro  6.4  /  Alb  2.7<L>  /  TBili  0.2  /  DBili  x   /  AST  18  /  ALT  20  /  AlkPhos  110  06-03    LIVER FUNCTIONS - ( 03 Jun 2023 13:32 )  Alb: 2.7 g/dL / Pro: 6.4 g/dL / ALK PHOS: 110 U/L / ALT: 20 U/L / AST: 18 U/L / GGT: x               Diagnostic Studies: see sunrise for full report

## 2023-06-04 NOTE — PROGRESS NOTE ADULT - SUBJECTIVE AND OBJECTIVE BOX
[facility] NEPHROLOGY- PROGRESS NOTE    77y Male with history of ESRD on HD presents for need for dialysis. Nephrology consulted for ESRD status.    REVIEW OF SYSTEMS:  Gen: no fevers  Cards: no chest pain  Resp: no dyspnea  GI: no nausea or vomiting or diarrhea  Vascular: no LE edema    penicillin (Rash)      Hospital Medications: Medications reviewed    VITALS:  T(F): 97.4 (06-04-23 @ 11:40), Max: 98.4 (06-03-23 @ 23:56)  HR: 72 (06-04-23 @ 11:40)  BP: 126/57 (06-04-23 @ 11:40)  RR: 24 (06-04-23 @ 11:40)  SpO2: 100% (06-04-23 @ 11:40)  Wt(kg): --      06-03 @ 07:01  -  06-04 @ 07:00  --------------------------------------------------------  IN: 400 mL / OUT: 351 mL / NET: 49 mL        PHYSICAL EXAM:    Gen: NAD, calm  Cards: RRR, +S1/S2, no M/G/R  Resp: CTA B/L  GI: soft, NT/ND, NABS  : + SPT  Vascular: no LE edema B/L, RUE AVG + bruit/thrill    LABS:  06-04    137  |  98  |  30<H>  ----------------------------<  72  4.5   |  26  |  5.55<H>    Ca    8.4      04 Jun 2023 07:40  Phos  3.4     06-04    TPro  6.4  /  Alb  2.7<L>  /  TBili  0.2  /  DBili      /  AST  18  /  ALT  20  /  AlkPhos  110  06-03    Creatinine Trend: 5.55 <--, 8.64 <--                        7.2    15.09 )-----------( 145      ( 04 Jun 2023 07:40 )             22.6     Urine Studies:        RADIOLOGY & ADDITIONAL STUDIES:

## 2023-06-04 NOTE — PHYSICAL THERAPY INITIAL EVALUATION ADULT - GENERAL OBSERVATIONS, REHAB EVAL
Pt received semi-supine on stretcher, +telemetry, all lines intact, in NAD. Pt agreeable to participate in PT evaluation.

## 2023-06-04 NOTE — PROGRESS NOTE ADULT - PROBLEM SELECTOR PLAN 1
with noted (+)FOBT, possible GIB component   Epo per renal recs  GI consult appreciated, discussion regarding EGD, colon   hold antihypertensives for now pending GI w/u  pantoprazole 80mg IV x1 and 40mg IV Q12 afterwards  VS Q4H  trend H/H   maintain active T&S  consent for transfusion (for Hb<7) obtained from wife via phone, in chart  per wife, no known prior history of GIB

## 2023-06-04 NOTE — PHYSICAL THERAPY INITIAL EVALUATION ADULT - LEVEL OF INDEPENDENCE: SIT/STAND, REHAB EVAL
secondary to poor static sitting balance requiring maximal assistance to maintain position/unable to perform

## 2023-06-04 NOTE — PROGRESS NOTE ADULT - ASSESSMENT
77  year old male with hx of cad , ? PCI, HTN, esrd, HTN, DM, afib presents for HD.    #cad ?PCI  -unclear if hx of pci- no previous cath noted  -cv stable no cp   -no chf on exam   -cont current meds     #hx Afib   -per chart review hx of afib- not on ac   -cont asa    # ESRD on HD   -renal fu     #htn   -off nifedipine   -resume per renal     dvt ppx          35 minutes spent on total encounter; more than 50% of the visit was spent counseling and/or coordinating care by the attending physician.

## 2023-06-04 NOTE — PROGRESS NOTE ADULT - ASSESSMENT
77y Male with history of ESRD on HD presents for need for dialysis. Nephrology consulted for ESRD status.    1) ESRD: Last HD on 6/3 tolerated well with no fluid removal given concerns for acute GIB. Plan for next maintenance HD on 6/6. Will contact dialysis social worker on 6/5 to arrange patient to be transferred to [facility] where Yusra lift is available. Monitor electrolytes.    2) HTN with ESRD: BP controlled. Holding home medications. Monitor BP.    3) Anemia of renal disease: Hb low and patient occult positive. No IV iron given elevated ferritin. Continue with Epo 10K with HD. Would transfuse PRBC with HD. GI following. Monitor Hb.    4) Secondary HPT of renal origin: Phosphorus acceptable. Continue with sensipar 60 mg daily and renvela 1 tab with meals. Check iPTH. Monitor serum calcium and phosphorus.        [facility] NEPHROLOGY  Armando Shaffer M.D.  Kendall Morales D.O.  Lilliam Cody M.D.  Oxana Hernandez, OWEN, ANP-C    Telephone: [phone]  Facsimile: [phone]    [address] [place] [address]

## 2023-06-04 NOTE — PATIENT PROFILE ADULT - FALL HARM RISK - RISK INTERVENTIONS

## 2023-06-04 NOTE — PHYSICAL THERAPY INITIAL EVALUATION ADULT - ADDITIONAL COMMENTS
Pt is an unreliable historian, stating he was living in [place] prior to coming to the hospital and was independent with ambulation without use of an assistive device.     Pt left semi-supine in bed, all lines intact, all needs in reach, in NAD. Heart Rate 88 beats per minute.

## 2023-06-04 NOTE — PROGRESS NOTE ADULT - SUBJECTIVE AND OBJECTIVE BOX
Name of Patient : MAGGIE THAO  MRN: [id number]  Date of visit: 06-04-23       Subjective: Patient seen and examined. No new events except as noted.   Doing okay     REVIEW OF SYSTEMS:    CONSTITUTIONAL: No weakness, fevers or chills  EYES/ENT: No visual changes;  No vertigo or throat pain   NECK: No pain or stiffness  RESPIRATORY: No cough, wheezing, hemoptysis; No shortness of breath  CARDIOVASCULAR: No chest pain or palpitations  GASTROINTESTINAL: No abdominal or epigastric pain. No nausea, vomiting, or hematemesis; No diarrhea or constipation. No melena or hematochezia.  GENITOURINARY: No dysuria, frequency or hematuria  NEUROLOGICAL: No numbness or weakness  SKIN: No itching, burning, rashes, or lesions   All other review of systems is negative unless indicated above.    MEDICATIONS:  MEDICATIONS  (STANDING):  aspirin enteric coated 81 milliGRAM(s) Oral daily  chlorhexidine 2% Cloths 1 Application(s) Topical daily  cinacalcet 60 milliGRAM(s) Oral daily  donepezil 5 milliGRAM(s) Oral at bedtime  epoetin shell-epbx (RETACRIT) Injectable 30756 Unit(s) IV Push <User Schedule>  pantoprazole  Injectable 40 milliGRAM(s) IV Push every 12 hours  sevelamer carbonate 800 milliGRAM(s) Oral three times a day with meals      PHYSICAL EXAM:  T(C): 36.3 (06-04-23 @ 11:40), Max: 36.9 (06-03-23 @ 23:56)  HR: 72 (06-04-23 @ 11:40) (72 - 92)  BP: 126/57 (06-04-23 @ 11:40) (120/65 - 132/78)  RR: 24 (06-04-23 @ 11:40) (17 - 24)  SpO2: 100% (06-04-23 @ 11:40) (95% - 100%)  Wt(kg): --  I&O's Summary    03 Jun 2023 07:01  -  04 Jun 2023 07:00  --------------------------------------------------------  IN: 400 mL / OUT: 351 mL / NET: 49 mL          Appearance: Normal	  HEENT:  PERRLA   Lymphatic: No lymphadenopathy   Cardiovascular: Normal S1 S2, no JVD  Respiratory: normal effort , clear  Gastrointestinal:  Soft, Non-tender  Skin: No rashes,  warm to touch  Psychiatry:  Mood & affect appropriate  Musculuskeletal: No edema    recent labs, Imaging and EKGs personally reviewed     06-03-23 @ 07:01  -  06-04-23 @ 07:00  --------------------------------------------------------  IN: 400 mL / OUT: 351 mL / NET: 49 mL                          7.2    15.09 )-----------( 145      ( 04 Jun 2023 07:40 )             22.6               06-04    137  |  98  |  30<H>  ----------------------------<  72  4.5   |  26  |  5.55<H>    Ca    8.4      04 Jun 2023 07:40  Phos  3.4     06-04    TPro  6.4  /  Alb  2.7<L>  /  TBili  0.2  /  DBili  x   /  AST  18  /  ALT  20  /  AlkPhos  110  06-03    PT/INR - ( 03 Jun 2023 19:45 )   PT: 15.6 sec;   INR: 1.34 ratio         PTT - ( 03 Jun 2023 19:45 )  PTT:28.6 sec       CARDIAC MARKERS ( 04 Jun 2023 07:40 )  x     / x     / 44 U/L / x     / 1.5 ng/mL  CARDIAC MARKERS ( 04 Jun 2023 01:10 )  x     / x     / 58 U/L / x     / 1.8 ng/mL

## 2023-06-04 NOTE — PROGRESS NOTE ADULT - SUBJECTIVE AND OBJECTIVE BOX
CARDIOLOGY FOLLOW UP NOTE - DR. RAMOS    Patient Name: MAGGIE THAO  Date of Service: 06-04-23 @ 08:43    no new events      Subjective:    uto      PHYSICAL EXAM:  T(C): 36.6 (06-04-23 @ 06:51), Max: 37.6 (06-03-23 @ 11:24)  HR: 92 (06-04-23 @ 06:51) (67 - 92)  BP: 132/78 (06-04-23 @ 06:51) (95/43 - 132/78)  RR: 17 (06-04-23 @ 06:51) (16 - 18)  SpO2: 99% (06-04-23 @ 06:51) (95% - 100%)  Wt(kg): --  I&O's Summary    03 Jun 2023 07:01  -  04 Jun 2023 07:00  --------------------------------------------------------  IN: 400 mL / OUT: 351 mL / NET: 49 mL      Daily     Daily     Appearance: Normal	  Cardiovascular: Normal S1 S2,RRR, No JVD, No murmurs  Respiratory: Lungs clear to auscultation	  Gastrointestinal:  Soft, Non-tender, + BS	  Extremities: Normal range of motion, No clubbing, cyanosis or edema      Home Medications:  bisacodyl 5 mg oral tablet: 2 tab(s) orally once a day as needed for  constipation (04 Jun 2023 00:48)  cinacalcet 60 mg oral tablet: 1 tab(s) orally once a day (04 Jun 2023 00:48)  donepezil 5 mg oral tablet: 1 tab(s) orally once a day (at bedtime) (04 Jun 2023 00:48)  ergocalciferol 1.25 mg (50,000 intl units) oral capsule: 1 cap(s) orally once a month (04 Jun 2023 00:48)  meloxicam 15 mg oral tablet: 1 tab(s) orally once a day as needed for  pain (04 Jun 2023 00:48)  NIFEdipine (Eqv-Adalat CC) 30 mg oral tablet, extended release: 2 tab(s) orally once a day (04 Jun 2023 00:48)  sevelamer hydrochloride 800 mg oral tablet: 1 tab(s) orally every 6 hours (04 Jun 2023 00:49)      MEDICATIONS  (STANDING):  aspirin enteric coated 81 milliGRAM(s) Oral daily  chlorhexidine 2% Cloths 1 Application(s) Topical daily  cinacalcet 60 milliGRAM(s) Oral daily  donepezil 5 milliGRAM(s) Oral at bedtime  epoetin shell-epbx (RETACRIT) Injectable 08597 Unit(s) IV Push <User Schedule>  pantoprazole  Injectable 40 milliGRAM(s) IV Push every 12 hours  sevelamer carbonate 800 milliGRAM(s) Oral three times a day with meals      TELEMETRY: 	    ECG:  	  RADIOLOGY:   DIAGNOSTIC TESTING:  [ ] Echocardiogram:  [ ] Catheterization:  [ ] Stress Test:    OTHER: 	    LABS:	 	    CARDIAC MARKERS:        Troponin T, High Sensitivity Result: 9022 ng/L (06-04 @ 01:10)  Troponin T, High Sensitivity Result: >20606 ng/L (06-03 @ 13:32)                                7.2    15.09 )-----------( 145      ( 04 Jun 2023 07:40 )             22.6     06-03    135  |  93<L>  |  48<H>  ----------------------------<  93  4.7   |  25  |  8.64<H>    Ca    9.2      03 Jun 2023 13:32    TPro  6.4  /  Alb  2.7<L>  /  TBili  0.2  /  DBili  x   /  AST  18  /  ALT  20  /  AlkPhos  110  06-03    proBNP:   PT/INR - ( 03 Jun 2023 19:45 )   PT: 15.6 sec;   INR: 1.34 ratio         PTT - ( 03 Jun 2023 19:45 )  PTT:28.6 sec  Lipid Profile:   HgA1c:     Creatinine, Serum: 8.64 mg/dL (06-03-23 @ 13:32)

## 2023-06-04 NOTE — CONSULT NOTE ADULT - ASSESSMENT
77y Male with dementia, DVT (off AC), CAD-?stent, afib ESRD-HD TTS, AOCD, HLD, HTN, preDM, prostate CA s/p suprapubic cath, p-SBO 2017 who initially presented to the ED for HD (his HD center does not have a Yusra lift that he requires). Noted to have Hgb 7.2 for which GI was consulted.     # Normocytic anemia, likely multifactorial and AOC, no reported bleeding, FRITZ deferred due to patient privacy.   # Elevated trops > 10,000 with unclear cardiac history  # HBcAb - patient denies prior knowledge    Recommendations:  - Urgent endoscopic evaluation is not indicated at this time   - Reasonable to consider endoscopic evaluation if medically optimized and within family's GOC  - Recommend GOC discussion with family   - US liver   - HBV PCR   - Daily CBC, CMP, coags  - Transfuse if Hgb < 8, considering cardiac history  - Diet as tolerated; CLD on Monday in case plan for EGD/colon Tuesday  - Ensure adequate hydration  - Rest of care per primary    Preliminary note until signed by Attending.    Thank you for involving us in this patient's care.    Jojo Cross MD  Gastroenterology/Hepatology Fellow, PGY-VI    NON-URGENT CONSULTS:  Please email [email or web address]  AT NIGHT AND ON WEEKENDS:  Contact on-call GI fellow via answering service ([phone]) from 5pm-8am and on weekends/holidays  MONDAY-FRIDAY 8AM-5PM:  Pager# [phone] ([facility])  GI Phone# [phone] ([facility])

## 2023-06-04 NOTE — PATIENT PROFILE ADULT - VISION (WITH CORRECTIVE LENSES IF THE PATIENT USUALLY WEARS THEM):
[FreeTextEntry1] : Venipuncture for labs\par hem aic decrease to 5.8 with glasses/Normal vision: sees adequately in most situations; can see medication labels, newsprint

## 2023-06-05 NOTE — PROGRESS NOTE ADULT - SUBJECTIVE AND OBJECTIVE BOX
[facility] NEPHROLOGY- PROGRESS NOTE    77y Male with history of ESRD on HD presents for need for dialysis. Nephrology consulted for ESRD status.    REVIEW OF SYSTEMS:  Gen: no fevers  Cards: no chest pain  Resp: no dyspnea  GI: no nausea or vomiting or diarrhea  Vascular: no LE edema    penicillin (Rash)      Hospital Medications: Medications reviewed    VITALS:  T(F): 98.8 (06-05-23 @ 08:02), Max: 98.8 (06-05-23 @ 08:02)  HR: 88 (06-05-23 @ 08:02)  BP: 114/91 (06-05-23 @ 08:02)  RR: 16 (06-05-23 @ 08:02)  SpO2: 96% (06-05-23 @ 08:02)  Wt(kg): --      Weight (kg): 79.832 (06-05 @ 11:17)      PHYSICAL EXAM:    Gen: NAD, calm but confused  Cards: RRR, +S1/S2, no M/G/R  Resp: CTA B/L  GI: soft, NT/ND, NABS  : + SPT  Vascular: no LE edema B/L, RUE AVG + bruit/thrill      LABS:  06-05    138  |  98  |  43<H>  ----------------------------<  83  4.5   |  26  |  7.23<H>    Ca    9.0      05 Jun 2023 07:13  Phos  3.5     06-05  Mg     2.30     06-05    TPro  6.4  /  Alb  2.7<L>  /  TBili  0.2  /  DBili      /  AST  18  /  ALT  20  /  AlkPhos  110  06-03    Creatinine Trend: 7.23 <--, 5.55 <--, 8.64 <--                        7.7    13.43 )-----------( 253      ( 05 Jun 2023 07:13 )             24.5     Urine Studies:

## 2023-06-05 NOTE — PROGRESS NOTE ADULT - SUBJECTIVE AND OBJECTIVE BOX
Name of Patient : MAGGIE THAO  MRN: [id number]  Date of visit: 06-05-23 @ 14:08      Subjective: Patient seen and examined. No new events except as noted.   hgb stable no gross bleeding     MEDICATIONS:  MEDICATIONS  (STANDING):  aspirin enteric coated 81 milliGRAM(s) Oral daily  bisacodyl Suppository 10 milliGRAM(s) Rectal once  chlorhexidine 2% Cloths 1 Application(s) Topical daily  donepezil 5 milliGRAM(s) Oral at bedtime  epoetin shell-epbx (RETACRIT) Injectable 75054 Unit(s) IV Push <User Schedule>  pantoprazole  Injectable 40 milliGRAM(s) IV Push every 12 hours  polyethylene glycol/electrolyte Solution. 4000 milliLiter(s) Oral once  sevelamer carbonate 800 milliGRAM(s) Oral three times a day with meals      PHYSICAL EXAM:  T(C): 36.9 (06-05-23 @ 12:00), Max: 37.1 (06-05-23 @ 08:02)  HR: 86 (06-05-23 @ 12:00) (85 - 88)  BP: 115/64 (06-05-23 @ 12:00) (114/91 - 144/58)  RR: 16 (06-05-23 @ 12:00) (16 - 16)  SpO2: 99% (06-05-23 @ 12:00) (96% - 99%)  Wt(kg): --  I&O's Summary      Weight (kg): 79.832 (06-05 @ 11:17)    Appearance: Normal	  HEENT:  PERRLA   Lymphatic: No lymphadenopathy   Cardiovascular: Normal S1 S2, no JVD  Respiratory: normal effort , clear  Gastrointestinal:  Soft, Non-tender  Skin: No rashes,  warm to touch  Psychiatry:  Mood & affect appropriate  Musculuskeletal: No edema    recent labs, Imaging and EKGs personally reviewed                           7.7    13.43 )-----------( 253      ( 05 Jun 2023 07:13 )             24.5               06-05    138  |  98  |  43<H>  ----------------------------<  83  4.5   |  26  |  7.23<H>    Ca    9.0      05 Jun 2023 07:13  Phos  3.5     06-05  Mg     2.30     06-05      PT/INR - ( 03 Jun 2023 19:45 )   PT: 15.6 sec;   INR: 1.34 ratio         PTT - ( 03 Jun 2023 19:45 )  PTT:28.6 sec       CARDIAC MARKERS ( 04 Jun 2023 07:40 )  x     / x     / 44 U/L / x     / 1.5 ng/mL  CARDIAC MARKERS ( 04 Jun 2023 01:10 )  x     / x     / 58 U/L / x     / 1.8 ng/mL

## 2023-06-05 NOTE — PROGRESS NOTE ADULT - SUBJECTIVE AND OBJECTIVE BOX
Chief Complaint:  Patient is a 77y old  Male who presents with a chief complaint of needs HD (04 Jun 2023 15:43)      Interval Events:   No acute events overnight  No n/v/abdominal pain  Per nurse, no reported BMs  AM labs pending    Hospital Medications:  aspirin enteric coated 81 milliGRAM(s) Oral daily  chlorhexidine 2% Cloths 1 Application(s) Topical daily  cinacalcet 60 milliGRAM(s) Oral daily  donepezil 5 milliGRAM(s) Oral at bedtime  epoetin shell-epbx (RETACRIT) Injectable 72832 Unit(s) IV Push <User Schedule>  pantoprazole  Injectable 40 milliGRAM(s) IV Push every 12 hours  sevelamer carbonate 800 milliGRAM(s) Oral three times a day with meals  sodium chloride 0.9% Bolus. 100 milliLiter(s) IV Bolus every 5 minutes PRN      ROS:   Complete and normal except as mentioned above.    PHYSICAL EXAM:   Vital Signs:  Vital Signs Last 24 Hrs  T(C): 36.6 (04 Jun 2023 23:40), Max: 36.7 (04 Jun 2023 22:45)  T(F): 97.9 (04 Jun 2023 23:40), Max: 98 (04 Jun 2023 22:45)  HR: 85 (04 Jun 2023 23:40) (72 - 86)  BP: 140/60 (04 Jun 2023 23:40) (126/57 - 144/58)  BP(mean): --  RR: 16 (04 Jun 2023 23:40) (16 - 24)  SpO2: 99% (04 Jun 2023 23:40) (98% - 100%)    Parameters below as of 04 Jun 2023 23:40  Patient On (Oxygen Delivery Method): room air      Daily     Daily     GENERAL: no acute distress  NEURO: alert, not oriented  HEENT: anicteric sclera, no conjunctival pallor appreciated  CHEST: no respiratory distress, no accessory muscle use  CARDIAC: regular rate   ABDOMEN: soft, non-tender, non-distended, no rebound or guarding  EXTREMITIES: warm, well perfused, no edema  SKIN: no lesions noted    LABS: reviewed                        7.2    15.09 )-----------( 145      ( 04 Jun 2023 07:40 )             22.6     06-04    137  |  98  |  30<H>  ----------------------------<  72  4.5   |  26  |  5.55<H>    Ca    8.4      04 Jun 2023 07:40  Phos  3.4     06-04    TPro  6.4  /  Alb  2.7<L>  /  TBili  0.2  /  DBili  x   /  AST  18  /  ALT  20  /  AlkPhos  110  06-03    LIVER FUNCTIONS - ( 03 Jun 2023 13:32 )  Alb: 2.7 g/dL / Pro: 6.4 g/dL / ALK PHOS: 110 U/L / ALT: 20 U/L / AST: 18 U/L / GGT: x             Interval Diagnostic Studies: see sunrise for full report   Interval Events:   No acute events overnight  No n/v/abdominal pain  Per nurse, no reported BMs  AM labs pending    Hospital Medications:  aspirin enteric coated 81 milliGRAM(s) Oral daily  chlorhexidine 2% Cloths 1 Application(s) Topical daily  cinacalcet 60 milliGRAM(s) Oral daily  donepezil 5 milliGRAM(s) Oral at bedtime  epoetin shell-epbx (RETACRIT) Injectable 33915 Unit(s) IV Push <User Schedule>  pantoprazole  Injectable 40 milliGRAM(s) IV Push every 12 hours  sevelamer carbonate 800 milliGRAM(s) Oral three times a day with meals  sodium chloride 0.9% Bolus. 100 milliLiter(s) IV Bolus every 5 minutes PRN      ROS:   Complete and normal except as mentioned above.    PHYSICAL EXAM:   Vital Signs:  Vital Signs Last 24 Hrs  T(C): 36.6 (04 Jun 2023 23:40), Max: 36.7 (04 Jun 2023 22:45)  T(F): 97.9 (04 Jun 2023 23:40), Max: 98 (04 Jun 2023 22:45)  HR: 85 (04 Jun 2023 23:40) (72 - 86)  BP: 140/60 (04 Jun 2023 23:40) (126/57 - 144/58)  BP(mean): --  RR: 16 (04 Jun 2023 23:40) (16 - 24)  SpO2: 99% (04 Jun 2023 23:40) (98% - 100%)    Parameters below as of 04 Jun 2023 23:40  Patient On (Oxygen Delivery Method): room air      Daily     Daily     GENERAL: no acute distress  NEURO: alert, not oriented  HEENT: anicteric sclera, no conjunctival pallor appreciated  CHEST: no respiratory distress, no accessory muscle use  CARDIAC: regular rate   ABDOMEN: soft, non-tender, non-distended, no rebound or guarding  EXTREMITIES: warm, well perfused, no edema  SKIN: no lesions noted    LABS: reviewed                        7.2    15.09 )-----------( 145      ( 04 Jun 2023 07:40 )             22.6     06-04    137  |  98  |  30<H>  ----------------------------<  72  4.5   |  26  |  5.55<H>    Ca    8.4      04 Jun 2023 07:40  Phos  3.4     06-04    TPro  6.4  /  Alb  2.7<L>  /  TBili  0.2  /  DBili  x   /  AST  18  /  ALT  20  /  AlkPhos  110  06-03    LIVER FUNCTIONS - ( 03 Jun 2023 13:32 )  Alb: 2.7 g/dL / Pro: 6.4 g/dL / ALK PHOS: 110 U/L / ALT: 20 U/L / AST: 18 U/L / GGT: x

## 2023-06-05 NOTE — PROGRESS NOTE ADULT - ASSESSMENT
77y Male with dementia, DVT (off AC), CAD-?stent, afib ESRD-HD TTS, AOCD, HLD, HTN, preDM, prostate CA s/p suprapubic cath, p-SBO 2017 who initially presented to the ED for HD (his HD center does not have a Yusra lift that he requires). Noted to have Hgb 7.2 for which GI was consulted.     # Normocytic anemia, likely multifactorial and AOC, no reported bleeding, FRITZ deferred due to patient privacy.   # Elevated trops > 10,000 with unclear cardiac history  # HBcAb - patient denies prior knowledge, no liver lesions on US    Recommendations:  - Urgent endoscopic evaluation is not indicated at this time   - Reasonable to consider endoscopic evaluation if medically optimized and within family's GOC  - Recommend GOC discussion with family   - Diet as tolerated; CLD Today in case plan for EGD/colon Tuesday  - Please inform GI today when family makes a decision  - Transfuse if Hgb < 8, considering cardiac history  - Ensure adequate hydration  - Rest of care per primary  - HBV PCR   - Radha provide patient with Hepatology Clinic outpatient follow up number to confirm appointment; [phone] ([facility] at [address] [place] [address])- Daily CBC, CMP, coags    Preliminary note until signed by Attending.    Thank you for involving us in this patient's care.    Jojo Cross MD  Gastroenterology/Hepatology Fellow, PGY-VI    NON-URGENT CONSULTS:  Please email [email or web address]  AT NIGHT AND ON WEEKENDS:  Contact on-call GI fellow via answering service ([phone]) from 5pm-8am and on weekends/holidays  MONDAY-FRIDAY 8AM-5PM:  Pager# [phone] ([facility])  GI Phone# [phone] ([facility]) 77y Male with dementia, DVT (off AC), CAD-?stent, afib ESRD-HD TTS, AOCD, HLD, HTN, preDM, prostate CA s/p suprapubic cath, p-SBO 2017 who initially presented to the ED for HD (his HD center does not have a Yusra lift that he requires). Noted to have Hgb 7.2 for which GI was consulted.     # Normocytic anemia, likely multifactorial and AOC, no reported bleeding, FRITZ deferred due to patient privacy.   # Elevated trops > 10,000 with unclear cardiac history  # HBcAb - patient denies prior knowledge, no liver lesions on US    Recommendations:  - Urgent endoscopic evaluation is not indicated at this time   - Reasonable to consider endoscopic evaluation if medically optimized and within family's GOC  - Recommend GOC discussion with family   - CLD Today in case plan for EGD/colon Tuesday  - Please inform GI today when family makes a decision  - Transfuse if Hgb < 8, considering cardiac history  - Ensure adequate hydration  - Rest of care per primary  - HBV PCR   - Radha provide patient with Hepatology Clinic outpatient follow up number to confirm appointment; [phone] ([facility] at [address] [place] [address])- Daily CBC, CMP, coags    Preliminary note until signed by Attending.    Thank you for involving us in this patient's care.    Jojo Cross MD  Gastroenterology/Hepatology Fellow, PGY-VI    NON-URGENT CONSULTS:  Please email [email or web address]  AT NIGHT AND ON WEEKENDS:  Contact on-call GI fellow via answering service ([phone]) from 5pm-8am and on weekends/holidays  MONDAY-FRIDAY 8AM-5PM:  Pager# [phone] ([facility])  GI Phone# [phone] ([facility])

## 2023-06-05 NOTE — PROGRESS NOTE ADULT - PROBLEM SELECTOR PLAN 1
with noted (+)FOBT, possible GIB component   Epo per renal recs  GI consult appreciated, discussion regarding EGD, colon   hold antihypertensives for now pending GI w/u  pantoprazole 80mg IV x1 and 40mg IV Q12 afterwards  trend H/H   maintain active T&S  consent for transfusion (for Hb<7) obtained from wife via phone, in chart  per wife, no known prior history of GIB

## 2023-06-05 NOTE — PROGRESS NOTE ADULT - SUBJECTIVE AND OBJECTIVE BOX
CARDIOLOGY FOLLOW UP - Dr. Clements  DATE OF SERVICE: 6/5/23    CC no acute events       REVIEW OF SYSTEMS:  obed  PHYSICAL EXAM:  T(C): 37.1 (06-05-23 @ 08:02), Max: 37.1 (06-05-23 @ 08:02)  HR: 88 (06-05-23 @ 08:02) (72 - 88)  BP: 114/91 (06-05-23 @ 08:02) (114/91 - 144/58)  RR: 16 (06-05-23 @ 08:02) (16 - 24)  SpO2: 96% (06-05-23 @ 08:02) (96% - 100%)  Wt(kg): --  I&O's Summary      Appearance: Normal	  Cardiovascular: Normal S1 S2,RRR, No JVD, No murmurs  Respiratory: Lungs clear to auscultation	  Gastrointestinal:  Soft, Non-tender, + BS	  Extremities: Normal range of motion, No clubbing, cyanosis or edema      Home Medications:  bisacodyl 5 mg oral tablet: 2 tab(s) orally once a day as needed for  constipation (04 Jun 2023 00:48)  cinacalcet 60 mg oral tablet: 1 tab(s) orally once a day (04 Jun 2023 00:48)  donepezil 5 mg oral tablet: 1 tab(s) orally once a day (at bedtime) (04 Jun 2023 00:48)  ergocalciferol 1.25 mg (50,000 intl units) oral capsule: 1 cap(s) orally once a month (04 Jun 2023 00:48)  meloxicam 15 mg oral tablet: 1 tab(s) orally once a day as needed for  pain (04 Jun 2023 00:48)  NIFEdipine (Eqv-Adalat CC) 30 mg oral tablet, extended release: 2 tab(s) orally once a day (04 Jun 2023 00:48)  sevelamer hydrochloride 800 mg oral tablet: 1 tab(s) orally every 6 hours (04 Jun 2023 00:49)      MEDICATIONS  (STANDING):  aspirin enteric coated 81 milliGRAM(s) Oral daily  chlorhexidine 2% Cloths 1 Application(s) Topical daily  cinacalcet 60 milliGRAM(s) Oral daily  donepezil 5 milliGRAM(s) Oral at bedtime  epoetin shell-epbx (RETACRIT) Injectable 94216 Unit(s) IV Push <User Schedule>  pantoprazole  Injectable 40 milliGRAM(s) IV Push every 12 hours  sevelamer carbonate 800 milliGRAM(s) Oral three times a day with meals      TELEMETRY: 	    ECG:  	  RADIOLOGY:   DIAGNOSTIC TESTING:  [ ] Echocardiogram:  [ ]  Catheterization:  [ ] Stress Test:    OTHER: 	    LABS:	 	    Creatine Kinase, Serum: 44 U/L [30 - 200] (06-04 @ 07:40)  CKMB Units: 1.5 ng/mL (06-04 @ 07:40)  Troponin T, High Sensitivity Result: 8895 ng/L (06-04 @ 07:40)  Troponin T, High Sensitivity Result: 9022 ng/L (06-04 @ 01:10)  Creatine Kinase, Serum: 58 U/L [30 - 200] (06-04 @ 01:10)  CKMB Units: 1.8 ng/mL (06-04 @ 01:10)  Troponin T, High Sensitivity Result: >66815 ng/L (06-03 @ 13:32)                          7.7    13.43 )-----------( 253      ( 05 Jun 2023 07:13 )             24.5     06-05    138  |  98  |  43<H>  ----------------------------<  83  4.5   |  26  |  7.23<H>    Ca    9.0      05 Jun 2023 07:13  Phos  3.5     06-05  Mg     2.30     06-05    TPro  6.4  /  Alb  2.7<L>  /  TBili  0.2  /  DBili  x   /  AST  18  /  ALT  20  /  AlkPhos  110  06-03    PT/INR - ( 03 Jun 2023 19:45 )   PT: 15.6 sec;   INR: 1.34 ratio         PTT - ( 03 Jun 2023 19:45 )  PTT:28.6 sec

## 2023-06-05 NOTE — PROGRESS NOTE ADULT - ASSESSMENT
77y Male with history of ESRD on HD presents for need for dialysis. Nephrology consulted for ESRD status.    1) ESRD: Last HD on 6/3 tolerated well with no fluid removal given concerns for acute GIB. Plan for next maintenance HD on 6/6. I have contacted Dialysis social worker this morning to arrange patient to be transferred to [facility] on discharge where Yusra lift is available. Monitor electrolytes.    2) HTN with ESRD: BP controlled. Holding home medications. Monitor BP.    3) Anemia of renal disease: Hb low and patient occult positive. No IV iron given elevated ferritin. Continue with Epo 10K with HD. GI following. Monitor Hb.    4) Secondary HPT of renal origin: Phosphorus acceptable with low normal iPTH. Decrease sensipar to 30 mg daily and continue with renvela 1 tab with meals. Monitor serum calcium and phosphorus.        [facility]  Armando Shaffer M.D.  Kendall Morales D.O.  Lilliam Cody M.D.  Oxana Hernandez, MSN, ANP-C    Telephone: [phone]  Facsimile: [phone]    [address] [place] [address]

## 2023-06-05 NOTE — PROGRESS NOTE ADULT - ASSESSMENT
77  year old male with hx of cad , ? PCI, HTN, esrd, HTN, DM, afib presents for HD.    #cad ?PCI  -unclear if hx of pci- no previous cath noted  -cv stable no cp   -no chf on exam   -elevated HS trop - with nl Ck CKMB, likely secondary to esrd   -cont current meds, on asa     #anemia  -+ occult. work up per GI/med      #hx Afib   -per chart review hx of afib- not on ac   -cont asa    # ESRD on HD   -renal fu     #htn   -off nifedipine   -resume per renal     dvt ppx

## 2023-06-05 NOTE — CHART NOTE - NSCHARTNOTEFT_GEN_A_CORE
Brief GI Note:     Discussed with Tamiko Chapin (patient's daughter and HCP) about EGD/colonoscopy for assessment of anemia. Daughter believes that it may be difficult for patient to complete preparation, but would like to still trial completing preparation for assessment of anemia.     Instructions for colonoscopy  - clear liquid diet today, NPO at midnight  - please ensure golytely is completed (to be ordered by GI fellow)  - please ensure patient drinks entire prep  - please ensure morning labs are drawn at 2am, electrolytes repleted as necessary, and Hg>7  - rest of care per primary team    Acacia Henriquez MD, PGY4  GI Fellow Brief GI Note:     Discussed with Tamiko Chapin (patient's daughter and HCP) about EGD/colonoscopy for assessment of anemia. Daughter believes that it may be difficult for patient to complete preparation, but would like to still trial completing preparation for assessment of anemia.     Instructions for colonoscopy  - will need cardiac clearance prior to procedures   - clear liquid diet today, NPO at midnight  - please ensure golytely is completed (to be ordered by GI fellow)  - please ensure patient drinks entire prep  - please ensure morning labs are drawn at 2am, electrolytes repleted as necessary, and Hg>7  - rest of care per primary team    Acacia Henriquez MD, PGY4  GI Fellow

## 2023-06-05 NOTE — PROGRESS NOTE ADULT - ATTENDING COMMENTS
# Acute on chronic anemia without overt bleeding, but +occult: Suspect anemia likely multifactorial, including from underlying AoCD/renal disease, but may have concomitant GI blood losses  # CAD, Afib  # History of prostate cancer  # ESRD on HD  # Elevated trop > 84858  # HBcAb +    --See chart note. As per discussion with patient's daughter/HCP, endoscopic evaluation would be within GOC. If optimized from medical/cardiac perspective and patient able to complete bowel prep, can pursue EGD/colonoscopy tomorrow  --Appreciate cardiology input; elevated cardiac enzymes thought to be secondary ESRD    Additional recommendations as above.

## 2023-06-06 NOTE — PROGRESS NOTE ADULT - PROBLEM SELECTOR PLAN 1
with noted (+)FOBT, possible GIB component   Epo per renal recs  GI consult appreciated, discussion regarding EGD, colon   hold antihypertensives for now pending GI w/u  PPI  trend H/H   maintain active T&S

## 2023-06-06 NOTE — PROGRESS NOTE ADULT - SUBJECTIVE AND OBJECTIVE BOX
[facility] NEPHROLOGY- PROGRESS NOTE    77y Male with history of ESRD on HD presents for need for dialysis. Nephrology consulted for ESRD status.    REVIEW OF SYSTEMS:  Gen: no fevers  Cards: no chest pain  Resp: no dyspnea  GI: no nausea or vomiting or diarrhea  Vascular: no LE edema    penicillin (Rash)      Hospital Medications: Medications reviewed      VITALS:  T(F): 97.8 (06-06-23 @ 07:45), Max: 98.4 (06-05-23 @ 12:00)  HR: 82 (06-06-23 @ 07:45)  BP: 111/56 (06-06-23 @ 07:45)  RR: 19 (06-06-23 @ 07:45)  SpO2: 96% (06-06-23 @ 07:45)  Wt(kg): --      Weight (kg): 79.832 (06-05 @ 11:17)      PHYSICAL EXAM:    Gen: NAD, calm but confused  Cards: RRR, +S1/S2, no M/G/R  Resp: CTA B/L  GI: soft, NT/ND, NABS  : + SPT  Vascular: no LE edema B/L, RUE AVG + bruit/thrill      LABS:  06-06    142  |  101  |  55<H>  ----------------------------<  83  5.5<H>   |  21<L>  |  9.19<H>    Ca    9.6      06 Jun 2023 07:47  Phos  4.4     06-06  Mg     2.60     06-06      Creatinine Trend: 9.19 <--, 7.23 <--, 5.55 <--, 8.64 <--                        7.6    15.32 )-----------( 203      ( 06 Jun 2023 07:47 )             24.3     Urine Studies:

## 2023-06-06 NOTE — PROGRESS NOTE ADULT - SUBJECTIVE AND OBJECTIVE BOX
Name of Patient : MAGGIE THAO  MRN: [id number]  Date of visit: 06-06-23      Subjective: Patient seen and examined. No new events except as noted.     REVIEW OF SYSTEMS:    CONSTITUTIONAL: No weakness, fevers or chills  EYES/ENT: No visual changes;  No vertigo or throat pain   NECK: No pain or stiffness  RESPIRATORY: No cough, wheezing, hemoptysis; No shortness of breath  CARDIOVASCULAR: No chest pain or palpitations  GASTROINTESTINAL: No abdominal or epigastric pain. No nausea, vomiting, or hematemesis; No diarrhea or constipation. No melena or hematochezia.  GENITOURINARY: No dysuria, frequency or hematuria  NEUROLOGICAL: No numbness or weakness  SKIN: No itching, burning, rashes, or lesions   All other review of systems is negative unless indicated above.    MEDICATIONS:  MEDICATIONS  (STANDING):  aspirin enteric coated 81 milliGRAM(s) Oral daily  chlorhexidine 2% Cloths 1 Application(s) Topical daily  cinacalcet 30 milliGRAM(s) Oral daily  donepezil 5 milliGRAM(s) Oral at bedtime  epoetin shell-epbx (RETACRIT) Injectable 04193 Unit(s) IV Push <User Schedule>  pantoprazole  Injectable 40 milliGRAM(s) IV Push every 12 hours  polyethylene glycol/electrolyte Solution. 4000 milliLiter(s) Oral once  sevelamer carbonate 800 milliGRAM(s) Oral three times a day with meals      PHYSICAL EXAM:  T(C): 37.2 (06-06-23 @ 12:30), Max: 37.2 (06-06-23 @ 12:30)  HR: 69 (06-06-23 @ 12:30) (69 - 85)  BP: 122/55 (06-06-23 @ 12:30) (111/56 - 125/60)  RR: 18 (06-06-23 @ 12:30) (16 - 19)  SpO2: 100% (06-06-23 @ 12:30) (96% - 100%)  Wt(kg): --  I&O's Summary        Appearance: Normal	  HEENT:  PERRLA   Lymphatic: No lymphadenopathy   Cardiovascular: Normal S1 S2, no JVD  Respiratory: normal effort , clear  Gastrointestinal:  Soft, Non-tender  Skin: No rashes,  warm to touch  Psychiatry:  Mood & affect appropriate  Musculuskeletal: No edema    recent labs, Imaging and EKGs personally reviewed                             7.6    15.32 )-----------( 203      ( 06 Jun 2023 07:47 )             24.3               06-06    142  |  101  |  55<H>  ----------------------------<  83  5.5<H>   |  21<L>  |  9.19<H>    Ca    9.6      06 Jun 2023 07:47  Phos  4.4     06-06  Mg     2.60     06-06

## 2023-06-06 NOTE — PROGRESS NOTE ADULT - ASSESSMENT
77  year old male with hx of cad , ? PCI, HTN, esrd, HTN, DM, afib presents for HD.    #cad ?PCI  -unclear if hx of pci- no previous cath noted  -cv stable no cp   -no chf on exam   -elevated HS trop - with nl Ck CKMB, likely secondary to esrd   -no obvious angina, decomp HF  -unlikely acute acs event   -in light of age, fxnl status, anemia req w/u, and mental status not a candidate for further ischemic eval/cath  -would continue medical tx of cmp  -would add toprol xl 25 qd  -cont current meds, on asa     #anemia  -+ occult. work up per GI/med  - remains cv stable And optimized to proceed for EGD/colon with elevated but acceptable cardiac risk.    #Afib   - not on ac ; likely fall risk / now + occult   -cont asa    # ESRD on HD   -renal fu     #htn   -start BB as mentioned above    dvt ppx

## 2023-06-06 NOTE — CHART NOTE - NSCHARTNOTEFT_GEN_A_CORE
GI Chart Note-  Pt initially planned for egd/colon today to further evaluate anemia. However, given hgb <8, K 5.5 (hemolyzed), need for additional prep (as stools not clear) and planned HD session today, procedures rescheduled for tomorrow.    -cards input appreciated  -please give 1u prbc today given CAD hx   -keep NPO p MN   -GI team to order additional prep to be given this evening  -ensure morning labs are drawn early at 2am (cbc, bmp, coags, T&S), electrolytes are repleted if warranted (please only give IV formulations) and Hgb >/=8       dw primary acp  will dw pt/pts dtr GI Chart Note-  Pt initially planned for egd/colon today to further evaluate anemia. However, given hgb <8, K 5.5 (although hemolyzed), need for additional prep (as stools not clear) and planned HD session today, procedures rescheduled for tomorrow.    -cards input appreciated  -please give 1u prbc today given CAD hx   -keep NPO p MN   -GI team to order additional prep to be given this evening  -ensure morning labs are drawn early at 2am (cbc, bmp, coags, T&S), electrolytes are repleted if warranted (please only give IV formulations) and Hgb >/=8     dw primary acp  dw pt and pts wife via phone

## 2023-06-06 NOTE — PROGRESS NOTE ADULT - SUBJECTIVE AND OBJECTIVE BOX
CARDIOLOGY FOLLOW UP - Dr. Clements  DATE OF SERVICE: 6/6/23    CC no cp or sob      REVIEW OF SYSTEMS:  limited given mental status  PHYSICAL EXAM:  T(C): 36.6 (06-06-23 @ 07:45), Max: 36.9 (06-05-23 @ 12:00)  HR: 82 (06-06-23 @ 07:45) (80 - 86)  BP: 111/56 (06-06-23 @ 07:45) (111/56 - 125/60)  RR: 19 (06-06-23 @ 07:45) (16 - 19)  SpO2: 96% (06-06-23 @ 07:45) (96% - 99%)  Wt(kg): --  I&O's Summary      Appearance: Normal	  Cardiovascular: Normal S1 S2,RRR, No JVD, No murmurs  Respiratory: Lungs clear to auscultation	  Gastrointestinal:  Soft, Non-tender, + BS	  Extremities: Normal range of motion, No clubbing, cyanosis or edema      Home Medications:  bisacodyl 5 mg oral tablet: 2 tab(s) orally once a day as needed for  constipation (04 Jun 2023 00:48)  cinacalcet 60 mg oral tablet: 1 tab(s) orally once a day (04 Jun 2023 00:48)  donepezil 5 mg oral tablet: 1 tab(s) orally once a day (at bedtime) (04 Jun 2023 00:48)  ergocalciferol 1.25 mg (50,000 intl units) oral capsule: 1 cap(s) orally once a month (04 Jun 2023 00:48)  meloxicam 15 mg oral tablet: 1 tab(s) orally once a day as needed for  pain (04 Jun 2023 00:48)  NIFEdipine (Eqv-Adalat CC) 30 mg oral tablet, extended release: 2 tab(s) orally once a day (04 Jun 2023 00:48)  sevelamer hydrochloride 800 mg oral tablet: 1 tab(s) orally every 6 hours (04 Jun 2023 00:49)      MEDICATIONS  (STANDING):  aspirin enteric coated 81 milliGRAM(s) Oral daily  chlorhexidine 2% Cloths 1 Application(s) Topical daily  cinacalcet 30 milliGRAM(s) Oral daily  donepezil 5 milliGRAM(s) Oral at bedtime  epoetin shell-epbx (RETACRIT) Injectable 73836 Unit(s) IV Push <User Schedule>  pantoprazole  Injectable 40 milliGRAM(s) IV Push every 12 hours  sevelamer carbonate 800 milliGRAM(s) Oral three times a day with meals      TELEMETRY: afib hr 90	    ECG:  	  RADIOLOGY:   DIAGNOSTIC TESTING:  [ ] Echocardiogram:  [ ]  Catheterization:  [ ] Stress Test:    OTHER: 	    LABS:	 	    Creatine Kinase, Serum: 44 U/L [30 - 200] (06-04 @ 07:40)  CKMB Units: 1.5 ng/mL (06-04 @ 07:40)  Troponin T, High Sensitivity Result: 8895 ng/L (06-04 @ 07:40)  Troponin T, High Sensitivity Result: 9022 ng/L (06-04 @ 01:10)  Creatine Kinase, Serum: 58 U/L [30 - 200] (06-04 @ 01:10)  CKMB Units: 1.8 ng/mL (06-04 @ 01:10)  Troponin T, High Sensitivity Result: >88148 ng/L (06-03 @ 13:32)                          7.6    15.32 )-----------( 203      ( 06 Jun 2023 07:47 )             24.3     06-06    142  |  101  |  55<H>  ----------------------------<  83  5.5<H>   |  21<L>  |  9.19<H>    Ca    9.6      06 Jun 2023 07:47  Phos  4.4     06-06  Mg     2.60     06-06

## 2023-06-06 NOTE — PROGRESS NOTE ADULT - TIME BILLING
Agree with above NP note.  patient remains without obvious angina, decomp HF  in light of age, fxnl status, anemia req w/u, and mental status not a candidate for further ischemic eval/cath  would continue medical tx of cmp  would add toprol xl 25 qd  remains cv stable And optimized to proceed for EGD/colon with elevated but acceptable cardiac risk

## 2023-06-06 NOTE — PROGRESS NOTE ADULT - ASSESSMENT
77y Male with history of ESRD on HD presents for need for dialysis. Nephrology consulted for ESRD status.    1) ESRD: Last HD on 6/3 tolerated well with no fluid removal given concerns for acute GIB. Plan for next maintenance HD today post-colonoscopy. Dialysis social worker to arrange patient to be transferred to [facility] on discharge where Yusra lift is available. Monitor electrolytes.    2) HTN with ESRD: BP controlled. Holding home medications. Monitor BP.    3) Anemia of renal disease: Hb low and patient occult positive. No IV iron given elevated ferritin. Continue with Epo 10K with HD. GI following for colonoscopy today. Monitor Hb.    4) Secondary HPT of renal origin: Phosphorus acceptable with low normal iPTH for which sensipar decreased to 30 mg daily. Continue with renvela 1 tab with meals. Monitor serum calcium and phosphorus.      [facility]  Armando Shaffer M.D.  Kendall Morales D.O.  Lilliam Cody M.D.  Oxana Hernandez, MSN, ANP-C    Telephone: [phone]  Facsimile: [phone]    [address] [place] [address]

## 2023-06-07 NOTE — DIETITIAN INITIAL EVALUATION ADULT - PROBLEM SELECTOR PLAN 4
HR>90, WBC>12  check UA  CXR clear  lactate wnl  afebrile however elevated rectal temp to 99.7 earlier on presentation   no signs/symptoms of infection  unable to eval sacrum due to location in HD hallway

## 2023-06-07 NOTE — PROGRESS NOTE ADULT - ASSESSMENT
77y Male with history of ESRD on HD presents for need for dialysis. Nephrology consulted for ESRD status.    1) ESRD: Last HD on 6/6 tolerated well with 1L removed. Plan for next maintenance HD on 6/8. Dialysis social worker to arrange patient to be transferred to [facility] on discharge where Yusra lift is available. Monitor electrolytes.    2) HTN with ESRD: BP controlled. Holding home medications. Monitor BP.    3) Anemia of renal disease: Hb low and patient occult positive. No IV iron given elevated ferritin. Continue with Epo 10K with HD. GI following for EGD/colonoscopy today. Monitor Hb.    4) Secondary HPT of renal origin: Phosphorus acceptable with low normal iPTH for which sensipar decreased to 30 mg daily. Continue with renvela 1 tab with meals. Monitor serum calcium and phosphorus.      [facility] NEPHR[facility]  Armando Shaffer M.D.  Kendall Morales D.O.  Lilliam Cody M.D.  Oxana Hernandez, MSN, ANP-C    Telephone: [phone]  Facsimile: [phone]    [address] [place] [address]

## 2023-06-07 NOTE — DIETITIAN INITIAL EVALUATION ADULT - PROBLEM SELECTOR PLAN 3
appreciate renal recs  interviewed/examined at HD  monitor I/Os, daily weights  renal diet pending dysphagia screen

## 2023-06-07 NOTE — PROGRESS NOTE ADULT - SUBJECTIVE AND OBJECTIVE BOX
CARDIOLOGY FOLLOW UP - Dr. Clements  Date of Service: 6/7/2023  CC: no events    Review of Systems:  Constitutional: No fever, weight loss, or fatigue  Respiratory: No cough, wheezing, or hemoptysis, no shortness of breath  Cardiovascular: No chest pain, palpitations, passing out, dizziness, or leg swelling  Gastrointestinal: No abd or epigastric pain. No nausea, vomiting, or hematemesis; no diarrhea or consiptaiton, no melena or hematochezia  Vascular: No edema     TELEMETRY:    PHYSICAL EXAM:  T(C): 36.3 (06-07-23 @ 12:37), Max: 37.1 (06-07-23 @ 05:25)  HR: 96 (06-07-23 @ 12:37) (77 - 97)  BP: 107/56 (06-07-23 @ 12:37) (107/56 - 135/69)  RR: 18 (06-07-23 @ 12:37) (18 - 20)  SpO2: 95% (06-07-23 @ 12:37) (95% - 99%)  Wt(kg): --  I&O's Summary    06 Jun 2023 07:01  -  07 Jun 2023 07:00  --------------------------------------------------------  IN: 700 mL / OUT: 1400 mL / NET: -700 mL        Appearance: Normal	  Cardiovascular: Normal S1 S2,RRR, No JVD, No murmurs  Respiratory: Lungs clear to auscultation	  Gastrointestinal:  Soft, Non-tender, + BS	  Extremities: Normal range of motion, No clubbing, cyanosis or edema  Vascular: Peripheral pulses palpable 2+ bilaterally       Home Medications:  bisacodyl 5 mg oral tablet: 2 tab(s) orally once a day as needed for  constipation (04 Jun 2023 00:48)  cinacalcet 60 mg oral tablet: 1 tab(s) orally once a day (04 Jun 2023 00:48)  donepezil 5 mg oral tablet: 1 tab(s) orally once a day (at bedtime) (04 Jun 2023 00:48)  ergocalciferol 1.25 mg (50,000 intl units) oral capsule: 1 cap(s) orally once a month (04 Jun 2023 00:48)  meloxicam 15 mg oral tablet: 1 tab(s) orally once a day as needed for  pain (04 Jun 2023 00:48)  NIFEdipine (Eqv-Adalat CC) 30 mg oral tablet, extended release: 2 tab(s) orally once a day (04 Jun 2023 00:48)  sevelamer hydrochloride 800 mg oral tablet: 1 tab(s) orally every 6 hours (04 Jun 2023 00:49)        MEDICATIONS  (STANDING):  aspirin enteric coated 81 milliGRAM(s) Oral daily  chlorhexidine 2% Cloths 1 Application(s) Topical daily  cinacalcet 30 milliGRAM(s) Oral daily  donepezil 5 milliGRAM(s) Oral at bedtime  epoetin shell-epbx (RETACRIT) Injectable 91691 Unit(s) IV Push <User Schedule>  pantoprazole  Injectable 40 milliGRAM(s) IV Push every 12 hours  sevelamer carbonate 800 milliGRAM(s) Oral three times a day with meals        EKG:  RADIOLOGY:  DIAGNOSTIC TESTING:  [ ] Echocardiogram:  [ ] Catherterization:  [ ] Stress Test:  OTHER:     LABS:	 	                          9.0    15.05 )-----------( 255      ( 07 Jun 2023 03:43 )             28.2     06-07    143  |  98  |  20  ----------------------------<  98  4.1   |  27  |  4.19<H>    Ca    9.4      07 Jun 2023 03:43  Phos  2.6     06-07  Mg     2.20     06-07        PT/INR - ( 07 Jun 2023 03:43 )   PT: 16.1 sec;   INR: 1.38 ratio         PTT - ( 07 Jun 2023 03:43 )  PTT:28.1 sec    CARDIAC MARKERS:

## 2023-06-07 NOTE — DIETITIAN INITIAL EVALUATION ADULT - PROBLEM SELECTOR PLAN 2
ekg w/ST elevations, ? repol  trops added onto labs  no chest pain    #addendum - trop>10K, no chest pain  add on ck/ckmb; repeat CE  spoke with Dr. Clements, will start on asa 81   monitor on tele  will check TTE

## 2023-06-07 NOTE — DIETITIAN INITIAL EVALUATION ADULT - PROBLEM SELECTOR PLAN 5
appreciate cards recs  ecg w/aflutter, ?repol abnormalities, ST abnormality V3-V5, check trop  no chest pain    #addendum - trop>10K, no chest pain  add on ck/ckmb; repeat CE  spoke with Dr. Clements, will start on asa 81   monitor on tele  will check TTE

## 2023-06-07 NOTE — DIETITIAN INITIAL EVALUATION ADULT - OTHER INFO
Medical course: Per chart 77-year-old male with history of DVT (previously on coumadin, now stopped), CAD, ESRD (on HD via RUE AVF Tu/Th/Sat), AOCD, HLD, HTN, obesity, pre-diabetes, and prostate cancer s/p suprapubic catheter presenting from [facility] w/need for HD.     Nutrition interview: Pt lives at home with ___ ____. They cook together at home. No difficulty obtaining groceries. _____ specific diet followed PTA. ____ supplements/Vitamins taken PTA. No recent episodes of nausea, vomiting, diarrhea or constipation, last BM noted on ___ per RN flowsheets. Denies any chewing/swallowing difficulties. No food allergies. Stated UBW: ___. Food preferences explored and noted. Intake is __-__% per RN flowsheets and per pt. Feeding skills:  Medical course: Per chart 77-year-old male with history of DVT (previously on coumadin, now stopped), CAD, ESRD (on HD via RUE AVF Tu/Th/Sat), AOCD, HLD, HTN, obesity, pre-diabetes, and prostate cancer s/p suprapubic catheter presenting from [facility] w/need for HD.     Nutrition interview: Pt A&Ox1 with confusion admitted from NH. Pt in bed unable provide information. Pt with inadequate diet x3 days. Was planned for EGD/colonoscopy on 6/6 however colon not cleared, postponed until today 6/7. CLD initiated. No reported episodes of nausea, vomiting, diarrhea or constipation, last BM noted on 6/6 per RN flowsheets. No known food allergies. Unsure of UBW, no recent weights per HIE. Would recommend swallow evaluation to determine appropriate diet texture, Pt was receiving soft and bite sized texture. One intake recorded or 50-75% per RN flowsheets. Feeding skills: assistance   Pt with ESRD on HD, last HD 6/6. Renal electrolytes within normal limits. Noted to be on sevelamer carbonate with meals. A1c 4.8%, noted to have received blood transfusion. Pt with increased nutrient needs 2/2 ESRD on HD and prostate cancer.

## 2023-06-07 NOTE — DIETITIAN INITIAL EVALUATION ADULT - PROBLEM SELECTOR PLAN 7
no med list from facility (called nursing at [facility], who will fax over information), not previously on thyroid replacement hormones, will check TSH

## 2023-06-07 NOTE — DIETITIAN INITIAL EVALUATION ADULT - PERTINENT MEDS FT
MEDICATIONS  (STANDING):  aspirin enteric coated 81 milliGRAM(s) Oral daily  chlorhexidine 2% Cloths 1 Application(s) Topical daily  cinacalcet 30 milliGRAM(s) Oral daily  donepezil 5 milliGRAM(s) Oral at bedtime  epoetin shell-epbx (RETACRIT) Injectable 11085 Unit(s) IV Push <User Schedule>  pantoprazole  Injectable 40 milliGRAM(s) IV Push every 12 hours  sevelamer carbonate 800 milliGRAM(s) Oral three times a day with meals    MEDICATIONS  (PRN):  sodium chloride 0.9% Bolus. 100 milliLiter(s) IV Bolus every 5 minutes PRN SBP LESS THAN or EQUAL to 90 mmHg

## 2023-06-07 NOTE — DIETITIAN INITIAL EVALUATION ADULT - PROBLEM SELECTOR PLAN 10
A&Ox1 on my exam  dysphagia screen  reorient as needed  fall and aspiration precautions  PT consult    #ACP  MOLST in chart indicating wife, Aicha Gautam, consented for DNR/DNI 5/29/23, witnessed x2 but no provider signature/info (Section E blank); discussed with wife who would like patient to be full code.

## 2023-06-07 NOTE — PROGRESS NOTE ADULT - SUBJECTIVE AND OBJECTIVE BOX
[facility] NEPHROLOGY- PROGRESS NOTE    77y Male with history of ESRD on HD presents for need for dialysis. Nephrology consulted for ESRD status.    REVIEW OF SYSTEMS:  Gen: no fevers  Cards: no chest pain  Resp: no dyspnea  GI: no nausea or vomiting or diarrhea  Vascular: no LE edema    penicillin (Rash)      Hospital Medications: Medications reviewed      VITALS:  T(F): 97.3 (06-07-23 @ 12:37), Max: 98.8 (06-07-23 @ 05:25)  HR: 96 (06-07-23 @ 12:37)  BP: 107/56 (06-07-23 @ 12:37)  RR: 18 (06-07-23 @ 12:37)  SpO2: 95% (06-07-23 @ 12:37)  Wt(kg): --    06-06 @ 07:01  -  06-07 @ 07:00  --------------------------------------------------------  IN: 700 mL / OUT: 1400 mL / NET: -700 mL      Height (cm): 185.4 (06-07 @ 12:17)      PHYSICAL EXAM:    Gen: NAD, calm but confused  Cards: RRR, +S1/S2, no M/G/R  Resp: CTA B/L  GI: soft, NT/ND, NABS  : + SPT  Vascular: no LE edema B/L, RUE AVG + bruit/thrill      LABS:  06-07    143  |  98  |  20  ----------------------------<  98  4.1   |  27  |  4.19<H>    Ca    9.4      07 Jun 2023 03:43  Phos  2.6     06-07  Mg     2.20     06-07      Creatinine Trend: 4.19 <--, 9.19 <--, 7.23 <--, 5.55 <--, 8.64 <--                        9.0    15.05 )-----------( 255      ( 07 Jun 2023 03:43 )             28.2     Urine Studies:

## 2023-06-07 NOTE — DIETITIAN INITIAL EVALUATION ADULT - PROBLEM SELECTOR PLAN 9
verify home meds, restart  given bitemporal wasting on exam and facility report of protein-calorie malnutrition

## 2023-06-07 NOTE — DIETITIAN INITIAL EVALUATION ADULT - PROBLEM SELECTOR PLAN 1
with noted (+)FOBT, possible GIB component   Epo per renal recs  GI consult emailed  hold antihypertensives for now pending GI w/u  pantoprazole 80mg IV x1 and 40mg IV Q12 afterwards  VS Q4H  trend H/H Q12 hours  maintain active T&S  consent for transfusion (for Hb<7) obtained from wife via phone, in chart  per wife, no known prior history of GIB    add on labs c/w AOCD

## 2023-06-07 NOTE — PROGRESS NOTE ADULT - ASSESSMENT
77  year old male with hx of cad , ? PCI, HTN, esrd, HTN, DM, afib presents for HD.    #cad ?PCI  -unclear if hx of pci- no previous cath noted  -cv stable no cp   -no chf on exam   -elevated HS trop - with nl Ck CKMB, likely secondary to esrd   -no obvious angina, decomp HF  -unlikely acute acs event   -in light of age, fxnl status, anemia req w/u, and mental status not a candidate for further ischemic eval/cath  -would continue medical tx of cmp  -would add toprol xl 25 qd  -cont current meds, on asa     #anemia  -+ occult. work up per GI/med  - remains cv stable And optimized to proceed for EGD/colon with elevated but acceptable cardiac risk.    #Afib   - not on ac ; likely fall risk / now + occult   -cont asa    # ESRD on HD   -renal fu     #htn   -start BB as mentioned above    dvt ppx      35 minutes spent on total encounter; more than 50% of the visit was spent counseling and/or coordinating care by the attending physician.

## 2023-06-07 NOTE — DIETITIAN INITIAL EVALUATION ADULT - NS FNS DIET ORDER
Diet, NPO after Midnight:      NPO Start Date: 06-Jun-2023,   NPO Start Time: 23:59  Except Medications (06-06-23 @ 12:27) [Active]  Diet, NPO after Midnight:      NPO Start Date: 05-Jun-2023,   NPO Start Time: 23:59  Except Medications (06-06-23 @ 05:06) [Active]  Diet, Clear Liquid (06-05-23 @ 05:40) [Active]

## 2023-06-07 NOTE — DIETITIAN INITIAL EVALUATION ADULT - PERTINENT LABORATORY DATA
06-07    143  |  98  |  20  ----------------------------<  98  4.1   |  27  |  4.19<H>    Ca    9.4      07 Jun 2023 03:43  Phos  2.6     06-07  Mg     2.20     06-07    POCT Blood Glucose.: 80 mg/dL (06-06-23 @ 22:19)  A1C with Estimated Average Glucose Result: 4.8 % (06-04-23 @ 07:40)   06-07 Na 143 mmol/L Glu 98 mg/dL K+ 4.1 mmol/L Cr 4.19 mg/dL<H> BUN 20 mg/dL Phos 2.6 mg/dL  06-06 @ 22:19 POCT 80 mg/dL  A1C with Estimated Average Glucose Result: 4.8 % (06-04-23 @ 07:40)

## 2023-06-07 NOTE — PROGRESS NOTE ADULT - SUBJECTIVE AND OBJECTIVE BOX
Name of Patient : MAGGIE THAO  MRN: [id number]  Date of visit: 06-07-23     Subjective: Patient seen and examined. No new events except as noted.   Doing okay  scope today       REVIEW OF SYSTEMS:    CONSTITUTIONAL: No weakness, fevers or chills  EYES/ENT: No visual changes;  No vertigo or throat pain   NECK: No pain or stiffness  RESPIRATORY: No cough, wheezing, hemoptysis; No shortness of breath  CARDIOVASCULAR: No chest pain or palpitations  GASTROINTESTINAL: No abdominal or epigastric pain. No nausea, vomiting, or hematemesis; No diarrhea or constipation. No melena or hematochezia.  GENITOURINARY: No dysuria, frequency or hematuria  NEUROLOGICAL: No numbness or weakness  SKIN: No itching, burning, rashes, or lesions   All other review of systems is negative unless indicated above.    MEDICATIONS:  MEDICATIONS  (STANDING):  aspirin enteric coated 81 milliGRAM(s) Oral daily  chlorhexidine 2% Cloths 1 Application(s) Topical daily  cinacalcet 30 milliGRAM(s) Oral daily  donepezil 5 milliGRAM(s) Oral at bedtime  epoetin shell-epbx (RETACRIT) Injectable 43435 Unit(s) IV Push <User Schedule>  pantoprazole  Injectable 40 milliGRAM(s) IV Push every 12 hours  sevelamer carbonate 800 milliGRAM(s) Oral three times a day with meals      PHYSICAL EXAM:  T(C): 36.2 (06-07-23 @ 14:15), Max: 37.1 (06-07-23 @ 05:25)  HR: 96 (06-07-23 @ 15:00) (77 - 97)  BP: 112/58 (06-07-23 @ 15:00) (107/56 - 135/69)  RR: 18 (06-07-23 @ 15:00) (18 - 22)  SpO2: 98% (06-07-23 @ 15:00) (95% - 100%)  Wt(kg): --  I&O's Summary    06 Jun 2023 07:01  -  07 Jun 2023 07:00  --------------------------------------------------------  IN: 700 mL / OUT: 1400 mL / NET: -700 mL      Height (cm): 185.4 (06-07 @ 12:17)    Appearance: Normal	  HEENT:  PERRLA   Lymphatic: No lymphadenopathy   Cardiovascular: Normal S1 S2, no JVD  Respiratory: normal effort , clear  Gastrointestinal:  Soft, Non-tender  Skin: No rashes,  warm to touch  Psychiatry:  Mood & affect appropriate  Musculuskeletal: No edema    recent labs, Imaging and EKGs personally reviewed     06-06-23 @ 07:01  -  06-07-23 @ 07:00  --------------------------------------------------------  IN: 700 mL / OUT: 1400 mL / NET: -700 mL                          9.0    15.05 )-----------( 255      ( 07 Jun 2023 03:43 )             28.2               06-07    143  |  98  |  20  ----------------------------<  98  4.1   |  27  |  4.19<H>    Ca    9.4      07 Jun 2023 03:43  Phos  2.6     06-07  Mg     2.20     06-07      PT/INR - ( 07 Jun 2023 03:43 )   PT: 16.1 sec;   INR: 1.38 ratio         PTT - ( 07 Jun 2023 03:43 )  PTT:28.1 sec

## 2023-06-08 NOTE — PROGRESS NOTE ADULT - ASSESSMENT
77y Male with history of ESRD on HD presents for need for dialysis. Nephrology consulted for ESRD status.    1) ESRD: Last HD on 6/6 tolerated well with 1L removed. Plan for next maintenance HD today; 6/8. Dialysis social worker to arrange patient to be transferred to [facility] on discharge where Yusra lift is available. Monitor electrolytes.    2) HTN with ESRD: BP controlled. Holding home medications. Monitor BP.    3) Anemia of renal disease: Hb low and patient occult positive. No IV iron given elevated ferritin. Continue with Epo 10K with HD. GI following s/p EGD 6/7: 7 cm hiatal hernia. Naif ulcers. Grade D eosphagitis. Gastropathy. 1 non bleeding duodenal ulcers with a visible vessel; tx with cautery. c/w PPI.  Monitor Hb.    4) Secondary HPT of renal origin: Phosphorus acceptable with low normal iPTH for which sensipar was decreased to 30 mg daily. Continue with renvela 1 tab with meals. Monitor serum calcium and phosphorus.      Patient seen and examined. Agree with plan above.  Note edited as necessary.     [facility] NEPHROLOGY  Armando Shaffer M.D.  Kendall Morales D.O.  Lilliam Cody M.D.  Oxana Hernandez, MSN, ANP-C  [phone]  Fax: [phone] [place] [address], [address]  [place] [address]

## 2023-06-08 NOTE — PROGRESS NOTE ADULT - PROBLEM SELECTOR PLAN 1
with noted (+)FOBT, possible GIB component   Epo per renal recs  hold antihypertensives for now pending GI w/u  PPI  trend H/H   maintain active T&S  GI eval appreciated, S/P EGD, ? bleeding ulcer  PPI drip, d/c on oral PPI after 72 hrs

## 2023-06-08 NOTE — PROGRESS NOTE ADULT - SUBJECTIVE AND OBJECTIVE BOX
CARDIOLOGY FOLLOW UP NOTE - DR. RAMOS    Patient Name: MAGGIE THAO  Date of Service: 06-08-23 @ 12:10    Patient seen and examined  arousable       Subjective:    cv: denies chest pain, dyspnea, palpitations, dizziness  pulmonary: denies cough  GI: denies abdominal pain, nausea, vomiting  vascular/legs: no edema   skin: no rash  ROS: otherwise negative   overnight events:      PHYSICAL EXAM:  T(C): 37.2 (06-08-23 @ 08:07), Max: 37.2 (06-08-23 @ 08:07)  HR: 94 (06-08-23 @ 08:07) (94 - 97)  BP: 108/55 (06-08-23 @ 08:07) (103/65 - 122/78)  RR: 16 (06-08-23 @ 08:07) (16 - 22)  SpO2: 98% (06-08-23 @ 08:07) (95% - 100%)  Wt(kg): --  I&O's Summary    Daily Height in cm: 185.42 (07 Jun 2023 12:17)    Daily     Appearance:lethargic 	  Cardiovascular: Normal S1 S2,RRR, No JVD, No murmurs  Respiratory: Lungs clear to auscultation	  Gastrointestinal:  Soft, Non-tender, + BS	  Extremities: Normal range of motion, No clubbing, cyanosis or edema      Home Medications:  bisacodyl 5 mg oral tablet: 2 tab(s) orally once a day as needed for  constipation (04 Jun 2023 00:48)  cinacalcet 60 mg oral tablet: 1 tab(s) orally once a day (04 Jun 2023 00:48)  donepezil 5 mg oral tablet: 1 tab(s) orally once a day (at bedtime) (04 Jun 2023 00:48)  ergocalciferol 1.25 mg (50,000 intl units) oral capsule: 1 cap(s) orally once a month (04 Jun 2023 00:48)  meloxicam 15 mg oral tablet: 1 tab(s) orally once a day as needed for  pain (04 Jun 2023 00:48)  NIFEdipine (Eqv-Adalat CC) 30 mg oral tablet, extended release: 2 tab(s) orally once a day (04 Jun 2023 00:48)  sevelamer hydrochloride 800 mg oral tablet: 1 tab(s) orally every 6 hours (04 Jun 2023 00:49)      MEDICATIONS  (STANDING):  aspirin enteric coated 81 milliGRAM(s) Oral daily  chlorhexidine 2% Cloths 1 Application(s) Topical daily  cinacalcet 30 milliGRAM(s) Oral daily  donepezil 5 milliGRAM(s) Oral at bedtime  epoetin shell-epbx (RETACRIT) Injectable 91747 Unit(s) IV Push <User Schedule>  pantoprazole Infusion 8 mG/Hr (10 mL/Hr) IV Continuous <Continuous>  sevelamer carbonate 800 milliGRAM(s) Oral three times a day with meals      TELEMETRY: 	    ECG:  	  RADIOLOGY:   DIAGNOSTIC TESTING:  [ ] Echocardiogram:  [ ] Catheterization:  [ ] Stress Test:    OTHER: 	    LABS:	 	    CARDIAC MARKERS:        Troponin T, High Sensitivity Result: 8895 ng/L (06-04 @ 07:40)  Troponin T, High Sensitivity Result: 9022 ng/L (06-04 @ 01:10)  Troponin T, High Sensitivity Result: >91938 ng/L (06-03 @ 13:32)                                8.1    14.43 )-----------( 255      ( 08 Jun 2023 06:30 )             25.4     06-08    143  |  100  |  34<H>  ----------------------------<  91  4.9   |  27  |  6.52<H>    Ca    9.7      08 Jun 2023 06:30  Phos  3.6     06-08  Mg     2.20     06-08      proBNP:   PT/INR - ( 07 Jun 2023 03:43 )   PT: 16.1 sec;   INR: 1.38 ratio         PTT - ( 07 Jun 2023 03:43 )  PTT:28.1 sec  Lipid Profile:   HgA1c:     Creatinine, Serum: 6.52 mg/dL (06-08-23 @ 06:30)  Creatinine, Serum: 4.19 mg/dL (06-07-23 @ 03:43)  Creatinine, Serum: 9.19 mg/dL (06-06-23 @ 07:47)

## 2023-06-08 NOTE — PROGRESS NOTE ADULT - SUBJECTIVE AND OBJECTIVE BOX
Name of Patient : MAGGIE THAO  MRN: [id number]  Date of visit: 06-08-23 @ 15:23      Subjective: Patient seen and examined. No new events except as noted.   S/P EGD   ? bleeding ulcer,       REVIEW OF SYSTEMS:  limited     MEDICATIONS:  MEDICATIONS  (STANDING):  aspirin enteric coated 81 milliGRAM(s) Oral daily  chlorhexidine 2% Cloths 1 Application(s) Topical daily  cinacalcet 30 milliGRAM(s) Oral daily  donepezil 5 milliGRAM(s) Oral at bedtime  epoetin shell-epbx (RETACRIT) Injectable 55821 Unit(s) IV Push <User Schedule>  pantoprazole Infusion 8 mG/Hr (10 mL/Hr) IV Continuous <Continuous>  sevelamer carbonate 800 milliGRAM(s) Oral three times a day with meals      PHYSICAL EXAM:  T(C): 37.2 (06-08-23 @ 08:07), Max: 37.2 (06-08-23 @ 08:07)  HR: 94 (06-08-23 @ 08:07) (94 - 95)  BP: 108/55 (06-08-23 @ 08:07) (103/65 - 122/78)  RR: 16 (06-08-23 @ 08:07) (16 - 18)  SpO2: 98% (06-08-23 @ 08:07) (98% - 100%)  Wt(kg): --  I&O's Summary        Appearance: Normal	  HEENT:  PERRLA   Lymphatic: No lymphadenopathy   Cardiovascular: Normal S1 S2, no JVD  Respiratory: normal effort , clear  Gastrointestinal:  Soft, Non-tender  Skin: No rashes,  warm to touch  Psychiatry:  Mood & affect appropriate  Musculuskeletal: No edema    recent labs, Imaging and EKGs personally reviewed                             8.1    14.43 )-----------( 255      ( 08 Jun 2023 06:30 )             25.4               06-08    143  |  100  |  34<H>  ----------------------------<  91  4.9   |  27  |  6.52<H>    Ca    9.7      08 Jun 2023 06:30  Phos  3.6     06-08  Mg     2.20     06-08      PT/INR - ( 07 Jun 2023 03:43 )   PT: 16.1 sec;   INR: 1.38 ratio         PTT - ( 07 Jun 2023 03:43 )  PTT:28.1 sec

## 2023-06-08 NOTE — PROGRESS NOTE ADULT - ATTENDING COMMENTS
S/p EGD yesterday for evaluation of anemia, found to have large HH with severe LA Grade D esophagitis, gastropathy, and giant cratered duodenal ulcer with VV s/p epi injection and bipolar cautery. No further overt bleeding reported. Slight downtrend in Hgb today (suspect the 9 was likely falsely elevated based on prior Hgb values). Continue high dose PPI. Follow up path from recent EGD. OK to advance diet as tolerated. Additional recommendations as above. S/p EGD yesterday for evaluation of anemia, found to have large HH with severe LA Grade D esophagitis, gastropathy, and giant cratered duodenal ulcer with VV s/p epi injection and bipolar cautery (likely source of recent blood loss). Though prepped for colonoscopy, colonoscopy was deferred given identification/treatment of suspected anemia source and overall clinical/respiratory status during procedure.   No further overt bleeding reported. Slight downtrend in Hgb today (suspect the 9 was likely falsely elevated based on prior Hgb values). Continue high dose PPI. Follow up path from recent EGD. OK to advance diet as tolerated. Additional recommendations as above.

## 2023-06-08 NOTE — PROGRESS NOTE ADULT - ASSESSMENT
77  year old male with hx of cad , ? PCI, HTN, esrd, HTN, DM, afib presents for HD.    #cad ?PCI  -unclear if hx of pci- no previous cath noted  -cv stable no cp   -no chf on exam   -elevated HS trop - with nl Ck CKMB, likely secondary to esrd   -no obvious angina, decomp HF  -unlikely acute acs event   -in light of age, fxnl status, anemia req w/u, and mental status not a candidate for further ischemic eval/cath  -would continue medical tx of cmp  -toprol xl 12.5 qd if bp can tolerate  -cont current meds, on asa     #anemia  -+ occult. work up per GI/med  -s/p egd  -gi f/u, ppi    #Afib   -not on ac ; likely fall risk / now + occult   -cont asa      # ESRD on HD   -renal fu     #htn   -bp normal   dvt ppx      35 minutes spent on total encounter; more than 50% of the visit was spent counseling and/or coordinating care by the attending physician.

## 2023-06-08 NOTE — PROGRESS NOTE ADULT - SUBJECTIVE AND OBJECTIVE BOX
Interval Events:   - s/p EGD (See report)  - No bleeding overnight per team  - H/H overall stable  - No abdominal pain    Allergies:  penicillin (Rash)        Hospital Medications:  aspirin enteric coated 81 milliGRAM(s) Oral daily  chlorhexidine 2% Cloths 1 Application(s) Topical daily  cinacalcet 30 milliGRAM(s) Oral daily  donepezil 5 milliGRAM(s) Oral at bedtime  epoetin shell-epbx (RETACRIT) Injectable 39148 Unit(s) IV Push <User Schedule>  pantoprazole Infusion 8 mG/Hr IV Continuous <Continuous>  sevelamer carbonate 800 milliGRAM(s) Oral three times a day with meals  sodium chloride 0.9% Bolus. 100 milliLiter(s) IV Bolus every 5 minutes PRN      PMHX/PSHX:  HTN (hypertension)    HLD (hyperlipidemia)    ESRD (end stage renal disease) on dialysis    Prostate cancer    Obesity    Atrial fibrillation    PVD (peripheral vascular disease)    CAD (coronary artery disease)    Pre-diabetes    ESRD (end stage renal disease) on dialysis    Paroxysmal atrial fibrillation    Essential hypertension    Type 2 diabetes mellitus without complication, without long-term current use of insulin    History of DVT of lower extremity    History of prostatectomy    AV fistula    AV fistula    H/O cardiac catheterization    No significant past surgical history    H/O abdominal surgery        Family history:  Family history of hypertension in mother (Mother)        ROS: As per HPI, otherwise 14-point ROS reviewed and negative.      PHYSICAL EXAM:   Vital Signs:  Vital Signs Last 24 Hrs  T(C): 37.2 (08 Jun 2023 08:07), Max: 37.2 (08 Jun 2023 08:07)  T(F): 98.9 (08 Jun 2023 08:07), Max: 98.9 (08 Jun 2023 08:07)  HR: 94 (08 Jun 2023 08:07) (94 - 96)  BP: 108/55 (08 Jun 2023 08:07) (103/65 - 122/78)  BP(mean): 71 (08 Jun 2023 08:07) (71 - 71)  RR: 16 (08 Jun 2023 08:07) (16 - 22)  SpO2: 98% (08 Jun 2023 08:07) (98% - 100%)    Parameters below as of 08 Jun 2023 08:07  Patient On (Oxygen Delivery Method): room air      Daily     Daily     GENERAL: no acute distress  NEURO: alert, not oriented  HEENT: anicteric sclera, no conjunctival pallor appreciated  CHEST: no respiratory distress, no accessory muscle use  CARDIAC: regular rate   ABDOMEN: soft, non-tender, non-distended, no rebound or guarding  EXTREMITIES: warm, well perfused, no edema  SKIN: no lesions noted    LABS:                        8.1    14.43 )-----------( 255      ( 08 Jun 2023 06:30 )             25.4     Mean Cell Volume: 81.2 fL (06-08-23 @ 06:30)    06-08    143  |  100  |  34<H>  ----------------------------<  91  4.9   |  27  |  6.52<H>    Ca    9.7      08 Jun 2023 06:30  Phos  3.6     06-08  Mg     2.20     06-08        PT/INR - ( 07 Jun 2023 03:43 )   PT: 16.1 sec;   INR: 1.38 ratio         PTT - ( 07 Jun 2023 03:43 )  PTT:28.1 sec                            8.1    14.43 )-----------( 255      ( 08 Jun 2023 06:30 )             25.4                         9.0    15.05 )-----------( 255      ( 07 Jun 2023 03:43 )             28.2                         7.6    15.32 )-----------( 203      ( 06 Jun 2023 07:47 )             24.3       Imaging:           Interval Events:   - s/p EGD (See report)  - No bleeding overnight per team  - H/H overall stable  - No abdominal pain    Allergies:  penicillin (Rash)        Hospital Medications:  aspirin enteric coated 81 milliGRAM(s) Oral daily  chlorhexidine 2% Cloths 1 Application(s) Topical daily  cinacalcet 30 milliGRAM(s) Oral daily  donepezil 5 milliGRAM(s) Oral at bedtime  epoetin shell-epbx (RETACRIT) Injectable 34662 Unit(s) IV Push <User Schedule>  pantoprazole Infusion 8 mG/Hr IV Continuous <Continuous>  sevelamer carbonate 800 milliGRAM(s) Oral three times a day with meals  sodium chloride 0.9% Bolus. 100 milliLiter(s) IV Bolus every 5 minutes PRN      PMHX/PSHX:  HTN (hypertension)    HLD (hyperlipidemia)    ESRD (end stage renal disease) on dialysis    Prostate cancer    Obesity    Atrial fibrillation    PVD (peripheral vascular disease)    CAD (coronary artery disease)    Pre-diabetes    ESRD (end stage renal disease) on dialysis    Paroxysmal atrial fibrillation    Essential hypertension    Type 2 diabetes mellitus without complication, without long-term current use of insulin    History of DVT of lower extremity    History of prostatectomy    AV fistula    AV fistula    H/O cardiac catheterization    No significant past surgical history    H/O abdominal surgery        Family history:  Family history of hypertension in mother (Mother)        ROS: As per HPI, otherwise 14-point ROS reviewed and negative.      PHYSICAL EXAM:   Vital Signs:  Vital Signs Last 24 Hrs  T(C): 37.2 (08 Jun 2023 08:07), Max: 37.2 (08 Jun 2023 08:07)  T(F): 98.9 (08 Jun 2023 08:07), Max: 98.9 (08 Jun 2023 08:07)  HR: 94 (08 Jun 2023 08:07) (94 - 96)  BP: 108/55 (08 Jun 2023 08:07) (103/65 - 122/78)  BP(mean): 71 (08 Jun 2023 08:07) (71 - 71)  RR: 16 (08 Jun 2023 08:07) (16 - 22)  SpO2: 98% (08 Jun 2023 08:07) (98% - 100%)    Parameters below as of 08 Jun 2023 08:07  Patient On (Oxygen Delivery Method): room air      Daily     Daily     GENERAL: no acute distress  NEURO: alert, not oriented  HEENT: anicteric sclera, no conjunctival pallor appreciated  CHEST: no respiratory distress, no accessory muscle use  CARDIAC: regular rate   ABDOMEN: soft, non-tender, non-distended, no rebound or guarding  EXTREMITIES: warm, well perfused, no edema  SKIN: no lesions noted    LABS:                        8.1    14.43 )-----------( 255      ( 08 Jun 2023 06:30 )             25.4     Mean Cell Volume: 81.2 fL (06-08-23 @ 06:30)    06-08    143  |  100  |  34<H>  ----------------------------<  91  4.9   |  27  |  6.52<H>    Ca    9.7      08 Jun 2023 06:30  Phos  3.6     06-08  Mg     2.20     06-08        PT/INR - ( 07 Jun 2023 03:43 )   PT: 16.1 sec;   INR: 1.38 ratio         PTT - ( 07 Jun 2023 03:43 )  PTT:28.1 sec                            8.1    14.43 )-----------( 255      ( 08 Jun 2023 06:30 )             25.4                         9.0    15.05 )-----------( 255      ( 07 Jun 2023 03:43 )             28.2                         7.6    15.32 )-----------( 203      ( 06 Jun 2023 07:47 )             24.3       < from: Upper Endoscopy (06.07.23 @ 13:22) >  Impression:          - 7 cm hiatal hernia. Naif ulcers.                       - LA Grade D esophagitis.                       - Gastropathy. Biopsied.                       - A single submucosal papule (nodule) found in the                        stomach.                       - One non-bleeding duodenal ulcer with a visible vessel.                        Injected. Treated with bipolar cautery.                       - Duodenopathy.    < end of copied text >

## 2023-06-08 NOTE — PROGRESS NOTE ADULT - SUBJECTIVE AND OBJECTIVE BOX
[facility] NEPHROLOGY- PROGRESS NOTE    77y Male with history of ESRD on HD presents for need for dialysis. Nephrology consulted for ESRD status.    REVIEW OF SYSTEMS:  Gen: no fevers  Cards: no chest pain  Resp: no dyspnea  GI: no nausea or vomiting or diarrhea  Vascular: no LE edema    penicillin (Rash)      Hospital Medications: Medications reviewed      VITALS:  T(F): 98.9 (06-08-23 @ 08:07), Max: 98.9 (06-08-23 @ 08:07)  HR: 94 (06-08-23 @ 08:07)  BP: 108/55 (06-08-23 @ 08:07)  RR: 16 (06-08-23 @ 08:07)  SpO2: 98% (06-08-23 @ 08:07)  Wt(kg): --      PHYSICAL EXAM:    Gen: NAD, calm b/l temporal wasting  Cards: RRR, +S1/S2, no M/G/R  Resp: CTA B/L  GI: soft, NT/ND, NABS  : + SPT  Vascular: no LE edema B/L, RUE AVG + bruit/thrill      LABS:  06-08    143  |  100  |  34<H>  ----------------------------<  91  4.9   |  27  |  6.52<H>    Ca    9.7      08 Jun 2023 06:30  Phos  3.6     06-08  Mg     2.20     06-08      Creatinine Trend: 6.52 <--, 4.19 <--, 9.19 <--, 7.23 <--, 5.55 <--, 8.64 <--                        8.1    14.43 )-----------( 255      ( 08 Jun 2023 06:30 )             25.4     Urine Studies:               ambulatory

## 2023-06-08 NOTE — PROGRESS NOTE ADULT - ASSESSMENT
77y Male with dementia, DVT (off AC), CAD-?stent, afib ESRD-HD TTS, AOCD, HLD, HTN, preDM, prostate CA s/p suprapubic cath, p-SBO 2017 who initially presented to the ED for HD (his HD center does not have a Yusra lift that he requires). Noted to have Hgb 7.2 for which GI was consulted.     # Duodenal ulcer - s/p cauterization of visible vessel. No bleeding since EGD.  # Normocytic anemia -  some component of GI blood loss given giant duodenal ulcer   # Elevated trops > 10,000 with unclear cardiac history  # HBcAb - patient denies prior knowledge, no liver lesions on US    Recommendations:  - PPI drip for 72 h, then PPI 40mg BID x 8 weeks followed by daily indefinitely  - Diet as tolerated  - Transfuse if Hgb < 8, considering cardiac history  - Ensure adequate hydration  - Rest of care per primary  - HBV DNA    Please note that the recommendations are not final until attested by an attending.    Thank you for involving us in the care of this patient. Please reach out if any further questions.    Jake Joyce, PGY-6  Gastroenterology/Hepatology Fellow    Available on [employer] Teams  After 5PM/Weekends, please contact the on-call GI fellow: [phone]

## 2023-06-09 NOTE — PROGRESS NOTE ADULT - PROBLEM SELECTOR PLAN 1
with noted (+)FOBT, possible GIB component   Epo per renal recs  hold antihypertensives for now pending GI w/u  PPI  trend H/H   maintain active T&S  GI eval appreciated, S/P EGD, ? bleeding ulcer  PPI drip, d/c on oral PPI after 72 hrs  1 unit PRBC

## 2023-06-09 NOTE — PROGRESS NOTE ADULT - ASSESSMENT
77  year old male with hx of cad , ? PCI, HTN, esrd, HTN, DM, afib presents for HD.    #cad ?PCI  -unclear if hx of pci- no previous cath noted  -cv stable no cp   -no chf on exam   -elevated HS trop - with nl Ck CKMB, likely secondary to esrd   -no obvious angina, decomp HF  -unlikely acute acs event   -in light of age, fxnl status, anemia req w/u, and mental status not a candidate for further ischemic eval/cath  -would continue medical tx of cmp  -toprol xl 12.5 qd if bp can tolerate  -cont current meds, on asa     #anemia  -+ occult. work up per GI/med  -s/p egd with treated ulcer  -gi f/u, ppi    #Afib   -not on ac  -cont to hold in light of egd findings, anemia req prbcs, fall risk   -cont asa    # ESRD on HD   -renal fu     #htn   -bp normal   dvt ppx      35 minutes spent on total encounter; more than 50% of the visit was spent counseling and/or coordinating care by the attending physician.

## 2023-06-09 NOTE — CHART NOTE - NSCHARTNOTEFT_GEN_A_CORE
[facility] [employer] ACP COVERAGE    Notified by RN that pt has not yet received 1U PRBC 2/2 no working PIV access. Pt seen at bedside, current LUE PIV very positional, not feasible for use for gtts or transfusion. Multiple attempts made by writer, additional ACPs and MICU RN/ARLENE to place new PIV however unsuccessful given RUE arm precautions. Pt is scheduled for HD tomorrow 6/10. Will s/w HD in AM and change pt to 1st shift to allow for PRBC to be given intradialysis. Protonix gtt changed to 40mg IVP BID. RN aware. Med Attg Dr. Ma aware.     KANIKA Kidd PAMichaelC  [id number]

## 2023-06-09 NOTE — CHART NOTE - NSCHARTNOTEFT_GEN_A_CORE
Hg 7.9. No evidence of bleeding. Per GI, transfused 1 u PRBC to keep Hg > 8. Discussed this w/ wife who is in agreement. Discussed with Dr. Ma.

## 2023-06-09 NOTE — PROGRESS NOTE ADULT - ASSESSMENT
77y Male with dementia, DVT (off AC), CAD-?stent, afib ESRD-HD TTS, AOCD, HLD, HTN, preDM, prostate CA s/p suprapubic cath, p-SBO 2017 who initially presented to the ED for HD (his HD center does not have a Yusra lift that he requires). Noted to have Hgb 7.2 for which GI was consulted.     # Duodenal ulcer - s/p cauterization of visible vessel. No bleeding since EGD.  # Normocytic anemia -  some component of GI blood loss given giant duodenal ulcer   # Elevated trops > 10,000 with unclear cardiac history  # HBcAb - patient denies prior knowledge, no liver lesions on US    Recommendations:  - PPI drip for 72 h (until Saturday), then PPI 40mg BID x 8 weeks followed by daily indefinitely  - Diet as tolerated  - Please give 1u pRBCs for Hb<8  - Transfuse if Hgb < 8, considering cardiac history  - Ensure adequate hydration  - Rest of care per primary  - HBV DNA    Please note that the recommendations are not final until attested by an attending.    Thank you for involving us in the care of this patient. Please reach out if any further questions.    Jake Joyce, PGY-6  Gastroenterology/Hepatology Fellow    Available on [employer] Teams  After 5PM/Weekends, please contact the on-call GI fellow: [phone]

## 2023-06-09 NOTE — PROGRESS NOTE ADULT - ASSESSMENT
77y Male with history of ESRD on HD presents for need for dialysis. Nephrology consulted for ESRD status.    1) ESRD: Last HD 6/8/23 0.8L removal previously dialyzed on 6/1 as an outpatient. Will arrange main HD Sat.  Follow up with dialysis social worker  to arrange patient to be transferred to [facility] where Yusra lift is available. Monitor electrolytes.    2) HTN with ESRD: BP controlled. Resume home medications. Monitor BP.    3) Anemia of renal disease: Will consider resuming Micera pending Hb results (currently pending). Monitor Hb.    4) Secondary HPT of renal origin: Resume sensipar 60 mg daily and home phosphorus binders. Renal diet once diet resumed. Monitor serum calcium and phosphorus.      [facility] NEPHROLOGY  Armando Shaffer M.D.  Kendall Morales D.O.  Lilliam Cody M.D.  Oxana Hernandez, OWEN, ANP-C    Telephone: [phone]  Facsimile: [phone]    [address] [place] [address]   77y Male with history of ESRD on HD presents for need for dialysis. Nephrology consulted for ESRD status.    1) ESRD: Last HD 6/8/23 0.8L removal previously dialyzed on 6/1 as an outpatient. Will arrange main HD Sat.  Follow up with dialysis social worker  to arrange patient to be transferred to [facility] where Yusra lift is available. Monitor electrolytes.    2) HTN with ESRD: BP controlled. Resume home medications. Monitor BP.    3) Anemia of renal disease: Will consider resuming Micera pending Hb results (currently pending). Monitor Hb.    4) Secondary HPT of renal origin: Currently on Cinacalcet/Sensipar 30mg once daily plan to resume sensipar 60 mg daily and home phosphorus binders. Renal diet once diet resumed. Monitor serum calcium and phosphorus.      [facility] NEPHROLOGY  Armando Shaffer M.D.  Kendall Morales D.O.  Lilliam Cody M.D.  Oxana Hernandez, OWEN, ANP-C    Telephone: [phone]  Facsimile: [phone]    [address] [place] [id number]   77y Male with history of ESRD on HD presents for need for dialysis. Nephrology consulted for ESRD status.    1) ESRD: Last HD 6/8/23, with mild intradialytic hypotension with net 0.8L removed. Plan for next maintenance HD 6/10. Follow up with dialysis social worker  to arrange patient to be transferred to [facility] where Yusra lift is available. Monitor electrolytes.    2) HTN with ESRD: BP low normal off antihypertensive medications. Will start Midodrine 5mg PO tid pre HD to aid in fluid removal. Monitor BP.    3) Anemia of renal disease: Hb low and patient occult positive. Pt for 1u prbc transfusion today. No IV iron given elevated ferritin. Continue with Epo 10K with HD. GI following. Monitor Hb.    4) Secondary HPT of renal origin: Serum phos low normal with acceptable serum Ca. Will d/c phos binder. c/w Sensipar 30mg once daily. Monitor serum calcium and phosphorus.        [facility] NEPHROLOGY  Armando Shaffer M.D.  Kendall Morales D.O.  Lilliam Cody M.D.  Oxana Hernandez, MSN, ANP-C  [phone]  Fax: [phone]    [facility] [address] [place] [address], [address]  [place] [address]

## 2023-06-09 NOTE — PROGRESS NOTE ADULT - SUBJECTIVE AND OBJECTIVE BOX
Name of Patient : MAGGIE THAO  MRN: [id number]  Date of visit: 06-09-23       Subjective: Patient seen and examined. No new events except as noted.   Doing okay   mild drop in hgb level  PRBC     REVIEW OF SYSTEMS:    CONSTITUTIONAL: No weakness, fevers or chills  EYES/ENT: No visual changes;  No vertigo or throat pain   NECK: No pain or stiffness  RESPIRATORY: No cough, wheezing, hemoptysis; No shortness of breath  CARDIOVASCULAR: No chest pain or palpitations  GASTROINTESTINAL: No abdominal or epigastric pain. No nausea, vomiting, or hematemesis; No diarrhea or constipation. No melena or hematochezia.  GENITOURINARY: No dysuria, frequency or hematuria  NEUROLOGICAL: No numbness or weakness  SKIN: No itching, burning, rashes, or lesions   All other review of systems is negative unless indicated above.    MEDICATIONS:  MEDICATIONS  (STANDING):  aspirin enteric coated 81 milliGRAM(s) Oral daily  chlorhexidine 2% Cloths 1 Application(s) Topical daily  cinacalcet 30 milliGRAM(s) Oral daily  donepezil 5 milliGRAM(s) Oral at bedtime  epoetin shell-epbx (RETACRIT) Injectable [id number] Unit(s) IV Push <User Schedule>  metoprolol succinate ER 12.5 milliGRAM(s) Oral daily  midodrine. 5 milliGRAM(s) Oral <User Schedule>  pantoprazole Infusion 8 mG/Hr (10 mL/Hr) IV Continuous <Continuous>      PHYSICAL EXAM:  T(C): 36.4 (06-09-23 @ 17:04), Max: 37.1 (06-08-23 @ 22:43)  HR: 88 (06-09-23 @ 17:04) (88 - 94)  BP: 130/84 (06-09-23 @ 17:04) (101/55 - 130/84)  RR: 18 (06-09-23 @ 17:04) (18 - 20)  SpO2: 98% (06-09-23 @ 17:04) (97% - 100%)  Wt(kg): --  I&O's Summary    08 Jun 2023 07:01  -  09 Jun 2023 07:00  --------------------------------------------------------  IN: 650 mL / OUT: 1400 mL / NET: -750 mL          Appearance: Normal	  HEENT:  PERRLA   Lymphatic: No lymphadenopathy   Cardiovascular: Normal S1 S2, no JVD  Respiratory: normal effort , clear  Gastrointestinal:  Soft, Non-tender  Skin: No rashes,  warm to touch  Psychiatry:  Mood & affect appropriate  Musculuskeletal: No edema    recent labs, Imaging and EKGs personally reviewed     06-08-23 @ 07:01  -  06-09-23 @ 07:00  --------------------------------------------------------  IN: 650 mL / OUT: 1400 mL / NET: -750 mL                          7.9    13.11 )-----------( 230      ( 09 Jun 2023 07:02 )             24.7               06-09    144  |  101  |  22  ----------------------------<  76  4.5   |  27  |  4.69<H>    Ca    9.6      09 Jun 2023 07:02  Phos  3.2     06-09  Mg     2.10     06-09      PTT - ( 09 Jun 2023 07:02 )  PTT:27.5 sec

## 2023-06-09 NOTE — PROGRESS NOTE ADULT - SUBJECTIVE AND OBJECTIVE BOX
[facility] NEPHROLOGY- CONSULTATION NOTE    77y Male with history of below presents for need for dialysis. Nephrology consulted for ESRD status.    Patient well known to our practice as follows with me at [facility] with last HD on 6/1 as an outpatient via [facility]. Patient unable to stand on scale and no Yusra lift available at current dialysis center. Therefore, patient advised to come to ER for continuation of HD and arrangement to transfer units to [facility] where Yusra lift available.    REVIEW OF SYSTEMS:  Gen: + generalized weakness no fevers  HEENT: no rhinorrhea  Neck: no sore throat  Cards: no chest pain  Resp: no dyspnea  GI: no nausea or vomiting or diarrhea  : no dysuria or hematuria  Vascular: no LE edema  Derm: no rashes  Neuro: no numbness/tingling    penicillin (Rash)      Home Medications Reviewed  Hospital Medications:   MEDICATIONS  (STANDING):      PAST MEDICAL & SURGICAL HISTORY:  HTN (hypertension)      Prostate cancer  prostate cancer      Obesity      PVD (peripheral vascular disease)      CAD (coronary artery disease)  LAD stent      Pre-diabetes      ESRD (end stage renal disease) on dialysis      Essential hypertension      History of DVT of lower extremity      History of prostatectomy      AV fistula  h/o creation in 2005      AV fistula  creation in right arm on 12/1/2015      H/O cardiac catheterization  4/25/2014 non obstructive disease      H/O abdominal surgery  10/10/17          FAMILY HISTORY:  Family history of hypertension in mother (Mother)  HTN        SOCIAL HISTORY:  Denies toxic substance use     VITALS:  T(F): 98.4 (06-09-23 @ 11:01), Max: 98.8 (06-08-23 @ 22:43)  HR: 94 (06-09-23 @ 11:01)  BP: 101/55 (06-09-23 @ 11:01)  RR: 18 (06-09-23 @ 11:01)  SpO2: 97% (06-09-23 @ 11:01)  Wt(kg): --    06-08 @ 07:01  -  06-09 @ 07:00  --------------------------------------------------------  IN: 650 mL / OUT: 1400 mL / NET: -750 mL            PHYSICAL EXAM:  Gen: NAD, lehargic but arousable, confused (at baseline)  HEENT: MMM  Neck: no JVD  Cards: RRR, +S1/S2, no M/G/R  Resp: CTA B/L  GI: soft, NT/ND, NABS, no hernia appreciated  : no CVA tenderness, + SPT  Vascular: no LE edema B/L, RUE AVG + bruit/thrill  Derm: no rashes  Neuro: non-focal    LABS:  06-09    144  |  101  |  22  ----------------------------<  76  4.5   |  27  |  4.69<H>    Ca    9.6      09 Jun 2023 07:02  Phos  3.2     06-09  Mg     2.10     06-09      Creatinine Trend: 4.69 <--, 6.52 <--, 4.19 <--, 9.19 <--, 7.23 <--, 5.55 <--, 8.64 <--                        7.9    13.11 )-----------( 230      ( 09 Jun 2023 07:02 )             24.7     Urine Studies:            RADIOLOGY & ADDITIONAL STUDIES: [facility] NEPHROLOGY- CONSULTATION NOTE    77y Male with history of ESRD on HD presents for need for dialysis. Nephrology consulted for ESRD status.    Patient well known to our practice as follows with me at [facility] with last HD on 6/1 as an outpatient via RUE AVG. Patient unable to stand on scale and no Yusra lift available at current dialysis center. Therefore, patient advised to come to ER for continuation of HD and arrangement to transfer units to [facility] where Yusra lift available.    REVIEW OF SYSTEMS:  Gen: + generalized weakness no fevers  HEENT: no rhinorrhea  Neck: no sore throat  Cards: no chest pain  Resp: no dyspnea  GI: no nausea or vomiting or diarrhea  : no dysuria or hematuria  Vascular: no LE edema  Derm: no rashes  Neuro: no numbness/tingling    penicillin (Rash)    VITALS:  T(F): 98.4 (06-09-23 @ 11:01), Max: 98.8 (06-08-23 @ 22:43)  HR: 94 (06-09-23 @ 11:01)  BP: 101/55 (06-09-23 @ 11:01)  RR: 18 (06-09-23 @ 11:01)  SpO2: 97% (06-09-23 @ 11:01)  Wt(kg): --    06-08 @ 07:01  -  06-09 @ 07:00  --------------------------------------------------------  IN: 650 mL / OUT: 1400 mL / NET: -750 mL      PHYSICAL EXAM:  Gen: NAD, cachectic,  confused (at baseline)  HEENT: b/l temporal wasting  Cards: RRR, +S1/S2, no M/G/R  Resp: CTA B/L  GI: soft, NT/ND, NABS,   : no CVA tenderness, + SPT  Vascular: no LE edema B/L, RUE AVG + bruit/thrill      LABS:  06-09    144  |  101  |  22  ----------------------------<  76  4.5   |  27  |  4.69<H>    Ca    9.6      09 Jun 2023 07:02  Phos  3.2     06-09  Mg     2.10     06-09      Creatinine Trend: 4.69 <--, 6.52 <--, 4.19 <--, 9.19 <--, 7.23 <--, 5.55 <--, 8.64 <--                        7.9    13.11 )-----------( 230      ( 09 Jun 2023 07:02 )             24.7     Urine Studies:            RADIOLOGY & ADDITIONAL STUDIES:

## 2023-06-09 NOTE — PROVIDER CONTACT NOTE (OTHER) - ASSESSMENT
Patient is Alert but confused, Patient denies pain, chest pain, shortness of breath, nausea, vomiting or dizziness.

## 2023-06-09 NOTE — PROGRESS NOTE ADULT - ATTENDING COMMENTS
No overt bleeding reported. Hgb with slight downtrend from 8.1 to 7.9 along with slight drop in other cell lines. Continue high dose PPI as outlined above. Follow up path from recent EGD. As Hgb < 8, would consider transfusion given history of suspected CAD/stents. No further inpatient GI interventions planned. Additional recommendations as above.

## 2023-06-09 NOTE — PROGRESS NOTE ADULT - SUBJECTIVE AND OBJECTIVE BOX
CARDIOLOGY FOLLOW UP NOTE - DR. RAMOS    Patient Name: MAGGIE THAO  Date of Service: 23 @ 11:21    Patient seen and examined    Subjective:  uto      PHYSICAL EXAM:  T(C): 36.9 (23 @ 11:01), Max: 37.1 (23 @ 22:43)  HR: 94 (23 @ 11:01) (92 - 96)  BP: 101/55 (23 @ 11:01) (101/55 - 122/67)  RR: 18 (23 @ 11:01) (18 - 20)  SpO2: 97% (23 @ 11:01) (97% - 100%)  Wt(kg): --  I&O's Summary    2023 07:01  -  2023 07:00  --------------------------------------------------------  IN: 650 mL / OUT: 1400 mL / NET: -750 mL      Daily     Daily Weight in k.1 (2023 04:00)    Appearance: Normal	  Cardiovascular: Normal S1 S2,RRR, No JVD, No murmurs  Respiratory: Lungs clear to auscultation	  Gastrointestinal:  Soft, Non-tender, + BS	  Extremities: Normal range of motion, No clubbing, cyanosis or edema      Home Medications:  bisacodyl 5 mg oral tablet: 2 tab(s) orally once a day as needed for  constipation (2023 00:48)  cinacalcet 60 mg oral tablet: 1 tab(s) orally once a day (2023 00:48)  donepezil 5 mg oral tablet: 1 tab(s) orally once a day (at bedtime) (2023 00:48)  ergocalciferol 1.25 mg (50,000 intl units) oral capsule: 1 cap(s) orally once a month (2023 00:48)  meloxicam 15 mg oral tablet: 1 tab(s) orally once a day as needed for  pain (2023 00:48)  NIFEdipine (Eqv-Adalat CC) 30 mg oral tablet, extended release: 2 tab(s) orally once a day (2023 00:48)  sevelamer hydrochloride 800 mg oral tablet: 1 tab(s) orally every 6 hours (2023 00:49)      MEDICATIONS  (STANDING):  aspirin enteric coated 81 milliGRAM(s) Oral daily  chlorhexidine 2% Cloths 1 Application(s) Topical daily  cinacalcet 30 milliGRAM(s) Oral daily  donepezil 5 milliGRAM(s) Oral at bedtime  epoetin shell-epbx (RETACRIT) Injectable 72303 Unit(s) IV Push <User Schedule>  pantoprazole Infusion 8 mG/Hr (10 mL/Hr) IV Continuous <Continuous>  sevelamer carbonate 800 milliGRAM(s) Oral three times a day with meals      TELEMETRY: 	    ECG:  	  RADIOLOGY:   DIAGNOSTIC TESTING:  [ ] Echocardiogram:  [ ] Catheterization:  [ ] Stress Test:    OTHER: 	    LABS:	 	    CARDIAC MARKERS:                                      7.9    13.11 )-----------( 230      ( 2023 07:02 )             24.7         144  |  101  |  22  ----------------------------<  76  4.5   |  27  |  4.69<H>    Ca    9.6      2023 07:02  Phos  3.2       Mg     2.10     -09      proBNP:   PTT - ( 2023 07:02 )  PTT:27.5 sec  Lipid Profile:   HgA1c:     Creatinine, Serum: 4.69 mg/dL (23 @ 07:02)  Creatinine, Serum: 6.52 mg/dL (23 @ 06:30)  Creatinine, Serum: 4.19 mg/dL (23 @ 03:43)

## 2023-06-09 NOTE — PROGRESS NOTE ADULT - SUBJECTIVE AND OBJECTIVE BOX
Interval Events:   - No BMs since the procedure per team  - H/H 8.1-->7.9    Allergies:  penicillin (Rash)        Hospital Medications:  aspirin enteric coated 81 milliGRAM(s) Oral daily  chlorhexidine 2% Cloths 1 Application(s) Topical daily  cinacalcet 30 milliGRAM(s) Oral daily  donepezil 5 milliGRAM(s) Oral at bedtime  epoetin shell-epbx (RETACRIT) Injectable 31419 Unit(s) IV Push <User Schedule>  pantoprazole Infusion 8 mG/Hr IV Continuous <Continuous>  sevelamer carbonate 800 milliGRAM(s) Oral three times a day with meals  sodium chloride 0.9% Bolus. 100 milliLiter(s) IV Bolus every 5 minutes PRN      PMHX/PSHX:  HTN (hypertension)    HLD (hyperlipidemia)    ESRD (end stage renal disease) on dialysis    Prostate cancer    Obesity    Atrial fibrillation    PVD (peripheral vascular disease)    CAD (coronary artery disease)    Pre-diabetes    ESRD (end stage renal disease) on dialysis    Paroxysmal atrial fibrillation    Essential hypertension    Type 2 diabetes mellitus without complication, without long-term current use of insulin    History of DVT of lower extremity    History of prostatectomy    AV fistula    AV fistula    H/O cardiac catheterization    No significant past surgical history    H/O abdominal surgery        Family history:  Family history of hypertension in mother (Mother)        ROS: As per HPI, otherwise 14-point ROS reviewed and negative.      PHYSICAL EXAM:   Vital Signs:  Vital Signs Last 24 Hrs  T(C): 36.7 (2023 04:00), Max: 37.1 (2023 22:43)  T(F): 98 (2023 04:00), Max: 98.8 (2023 22:43)  HR: 94 (2023 04:00) (92 - 96)  BP: 113/52 (2023 04:00) (113/52 - 122/67)  BP(mean): --  RR: 18 (2023 04:00) (18 - 20)  SpO2: 100% (2023 04:00) (98% - 100%)    Parameters below as of 2023 04:00  Patient On (Oxygen Delivery Method): room air      Daily     Daily Weight in k.1 (2023 04:00)      23 @ 07:01  -  23 @ 07:00  --------------------------------------------------------  IN: 650 mL / OUT: 1400 mL / NET: -750 mL      GENERAL: no acute distress  NEURO: alert, not oriented  HEENT: anicteric sclera, no conjunctival pallor appreciated  CHEST: no respiratory distress, no accessory muscle use  CARDIAC: regular rate   ABDOMEN: soft, non-tender, non-distended, no rebound or guarding  EXTREMITIES: warm, well perfused, no edema  SKIN: no lesions noted      LABS:                        7.9    13.11 )-----------( 230      ( 2023 07:02 )             24.7     Mean Cell Volume: 83.7 fL (23 @ 07:02)    06-    144  |  101  |  22  ----------------------------<  76  4.5   |  27  |  4.69<H>    Ca    9.6      2023 07:02  Phos  3.2     06-09  Mg     2.10     06-09        PTT - ( 2023 07:02 )  PTT:27.5 sec                            7.9    13.11 )-----------( 230      ( 2023 07:02 )             24.7                         8.1    14.43 )-----------( 255      ( 2023 06:30 )             25.4                         9.0    15.05 )-----------( 255      ( 2023 03:43 )             28.2       Imaging:           Interval Events:   - No BMs since the procedure per team  - H/H 8.1-->7.9    Allergies:  penicillin (Rash)        Hospital Medications:  aspirin enteric coated 81 milliGRAM(s) Oral daily  chlorhexidine 2% Cloths 1 Application(s) Topical daily  cinacalcet 30 milliGRAM(s) Oral daily  donepezil 5 milliGRAM(s) Oral at bedtime  epoetin shell-epbx (RETACRIT) Injectable 21480 Unit(s) IV Push <User Schedule>  pantoprazole Infusion 8 mG/Hr IV Continuous <Continuous>  sevelamer carbonate 800 milliGRAM(s) Oral three times a day with meals  sodium chloride 0.9% Bolus. 100 milliLiter(s) IV Bolus every 5 minutes PRN      PMHX/PSHX:  HTN (hypertension)    HLD (hyperlipidemia)    ESRD (end stage renal disease) on dialysis    Prostate cancer    Obesity    Atrial fibrillation    PVD (peripheral vascular disease)    CAD (coronary artery disease)    Pre-diabetes    ESRD (end stage renal disease) on dialysis    Paroxysmal atrial fibrillation    Essential hypertension    Type 2 diabetes mellitus without complication, without long-term current use of insulin    History of DVT of lower extremity    History of prostatectomy    AV fistula    AV fistula    H/O cardiac catheterization    No significant past surgical history    H/O abdominal surgery        Family history:  Family history of hypertension in mother (Mother)        ROS: As per HPI, otherwise 14-point ROS reviewed and negative.      PHYSICAL EXAM:   Vital Signs:  Vital Signs Last 24 Hrs  T(C): 36.7 (2023 04:00), Max: 37.1 (2023 22:43)  T(F): 98 (2023 04:00), Max: 98.8 (2023 22:43)  HR: 94 (2023 04:00) (92 - 96)  BP: 113/52 (2023 04:00) (113/52 - 122/67)  BP(mean): --  RR: 18 (2023 04:00) (18 - 20)  SpO2: 100% (2023 04:00) (98% - 100%)    Parameters below as of 2023 04:00  Patient On (Oxygen Delivery Method): room air      Daily     Daily Weight in k.1 (2023 04:00)      23 @ 07:01  -  23 @ 07:00  --------------------------------------------------------  IN: 650 mL / OUT: 1400 mL / NET: -750 mL      GENERAL: no acute distress  NEURO: alert, not oriented  HEENT: anicteric sclera, no conjunctival pallor appreciated  CHEST: no respiratory distress, no accessory muscle use  CARDIAC: regular rate   ABDOMEN: soft, non-tender, non-distended, no rebound or guarding  EXTREMITIES: warm, well perfused, no edema  SKIN: no lesions noted      LABS:                        7.9    13.11 )-----------( 230      ( 2023 07:02 )             24.7     Mean Cell Volume: 83.7 fL (23 @ 07:02)    -    144  |  101  |  22  ----------------------------<  76  4.5   |  27  |  4.69<H>    Ca    9.6      2023 07:02  Phos  3.2     06-  Mg     2.10     -        PTT - ( 2023 07:02 )  PTT:27.5 sec                            7.9    13.11 )-----------( 230      ( 2023 07:02 )             24.7                         8.1    14.43 )-----------( 255      ( 2023 06:30 )             25.4                         9.0    15.05 )-----------( 255      ( 2023 03:43 )             28.2       < from: Upper Endoscopy (23 @ 13:22) >                                                                                   Impression:          - 7 cm hiatal hernia. Naif ulcers.                       - LA Grade D esophagitis.                       - Gastropathy. Biopsied.                       - A single submucosal papule (nodule) found in the                        stomach.                       - One non-bleeding duodenal ulcer with a visible vessel.                        Injected. Treated with bipolar cautery.                       - Duodenopathy.  Recommendation:      - [facility].                       - Clear liquid diet today.                       - PPI drip for 72 h, then PPI 40mg BID x 8 weeks                      followed by daily indefinitely                       - Colonoscopy was deferred given patient's respiratory                        status, light sedation and likely etiology for anemia                        identified.            - Monitor for signs of perforation: worsening abdominal                        pain, fevers, hypotension                       - Trend CBC, CMP                       - Avoid NSAIDs                       - Transfuse for Hb > 7         - Given comorbidities, risks of repeat endoscopic                        evaluation to assess for healing of esophagitis and                        ulcer (as well as further evaluation of fundic lesion)                        may outweigh benefits. Can be readdressed on outpatient                        basis pending overall GOC.    < end of copied text >

## 2023-06-10 NOTE — CHART NOTE - NSCHARTNOTEFT_GEN_A_CORE
Urology called for suprapubic wright exchange - per wife, pt gets monthly exchanges w/ Efrain Olea MD Urologist  [phone]. Pt missed follow-up appointment on 06/09/23 for routine exchange.

## 2023-06-10 NOTE — PROGRESS NOTE ADULT - SUBJECTIVE AND OBJECTIVE BOX
CARDIOLOGY FOLLOW UP - Dr. Clements  Date of Service: 6/10/2023  CC: no events    Review of Systems:  Constitutional: No fever, weight loss, or fatigue  Respiratory: No cough, wheezing, or hemoptysis, no shortness of breath  Cardiovascular: No chest pain, palpitations, passing out, dizziness, or leg swelling  Gastrointestinal: No abd or epigastric pain. No nausea, vomiting, or hematemesis; no diarrhea or consiptaiton, no melena or hematochezia  Vascular: No edema     TELEMETRY:    PHYSICAL EXAM:  T(C): 36.8 (06-10-23 @ 09:57), Max: 37.1 (06-10-23 @ 01:54)  HR: 80 (06-10-23 @ 09:57) (80 - 92)  BP: 124/53 (06-10-23 @ 09:57) (108/60 - 130/84)  RR: 18 (06-10-23 @ 09:57) (17 - 18)  SpO2: 96% (06-10-23 @ 09:57) (96% - 100%)  Wt(kg): --  I&O's Summary      Appearance: Normal	  Cardiovascular: Normal S1 S2,RRR, No JVD, No murmurs  Respiratory: Lungs clear to auscultation	  Gastrointestinal:  Soft, Non-tender, + BS	  Extremities: Normal range of motion, No clubbing, cyanosis or edema  Vascular: Peripheral pulses palpable 2+ bilaterally       Home Medications:  bisacodyl 5 mg oral tablet: 2 tab(s) orally once a day as needed for  constipation (04 Jun 2023 00:48)  cinacalcet 60 mg oral tablet: 1 tab(s) orally once a day (04 Jun 2023 00:48)  donepezil 5 mg oral tablet: 1 tab(s) orally once a day (at bedtime) (04 Jun 2023 00:48)  ergocalciferol 1.25 mg (50,000 intl units) oral capsule: 1 cap(s) orally once a month (04 Jun 2023 00:48)  meloxicam 15 mg oral tablet: 1 tab(s) orally once a day as needed for  pain (04 Jun 2023 00:48)  NIFEdipine (Eqv-Adalat CC) 30 mg oral tablet, extended release: 2 tab(s) orally once a day (04 Jun 2023 00:48)  sevelamer hydrochloride 800 mg oral tablet: 1 tab(s) orally every 6 hours (04 Jun 2023 00:49)        MEDICATIONS  (STANDING):  aspirin enteric coated 81 milliGRAM(s) Oral daily  chlorhexidine 2% Cloths 1 Application(s) Topical daily  cinacalcet 30 milliGRAM(s) Oral daily  donepezil 5 milliGRAM(s) Oral at bedtime  epoetin shell-epbx (RETACRIT) Injectable 96939 Unit(s) IV Push <User Schedule>  metoprolol succinate ER 12.5 milliGRAM(s) Oral daily  midodrine. 5 milliGRAM(s) Oral <User Schedule>  pantoprazole  Injectable 40 milliGRAM(s) IV Push two times a day        EKG:  RADIOLOGY:  DIAGNOSTIC TESTING:  [ ] Echocardiogram:  [ ] Catherterization:  [ ] Stress Test:  OTHER:     LABS:	 	                          7.9    13.11 )-----------( 230      ( 09 Jun 2023 07:02 )             24.7     06-09    144  |  101  |  22  ----------------------------<  76  4.5   |  27  |  4.69<H>    Ca    9.6      09 Jun 2023 07:02  Phos  3.2     06-09  Mg     2.10     06-09        PTT - ( 09 Jun 2023 07:02 )  PTT:27.5 sec    CARDIAC MARKERS:

## 2023-06-10 NOTE — PROGRESS NOTE ADULT - ASSESSMENT
----- Message from Vinh Edwards [phone]//thank you   77y Male with history of ESRD on HD presents for need for dialysis. Nephrology consulted for ESRD status.    1) ESRD: Last HD 6/8/23, with mild intradialytic hypotension with net 0.8L removed. Plan for next maintenance HD today 6/10 with 1u prb. Follow up with dialysis social worker  to arrange patient to be transferred to [facility] where Yusra lift is available. Monitor electrolytes.    2) HTN with ESRD: BP low normal; recc to hold Metoprolol if hypotensive. c/w Midodrine 5mg PO tid pre HD to aid in fluid removal. Monitor BP.    3) Anemia of renal disease: Hb low and patient occult positive. Pt for 1u prbc transfusion today. No IV iron given elevated ferritin. Continue with Epo 10K with HD. GI following. Monitor Hb.    4) Secondary HPT of renal origin: Serum phos low normal with acceptable serum Ca. Continue to hold phos binders. c/w Sensipar 30mg once daily. Monitor serum calcium and phosphorus.        [facility] NEPHROLOGY  Armando Shaffer M.D.  Kendall Morales D.O.  Lilliam Cody M.D.  Oxana Hernandez, MSN, ANP-C  [phone]  Fax: [phone] [place] [address], [address]  [place] [address]

## 2023-06-10 NOTE — PROGRESS NOTE ADULT - SUBJECTIVE AND OBJECTIVE BOX
Name of Patient : MAGGIE THAO  MRN: [id number]  Date of visit: 06-10-23       Subjective: Patient seen and examined. No new events except as noted.       REVIEW OF SYSTEMS:    CONSTITUTIONAL: No weakness, fevers or chills  EYES/ENT: No visual changes;  No vertigo or throat pain   NECK: No pain or stiffness  RESPIRATORY: No cough, wheezing, hemoptysis; No shortness of breath  CARDIOVASCULAR: No chest pain or palpitations  GASTROINTESTINAL: No abdominal or epigastric pain. No nausea, vomiting, or hematemesis; No diarrhea or constipation. No melena or hematochezia.  GENITOURINARY: No dysuria, frequency or hematuria  NEUROLOGICAL: No numbness or weakness  SKIN: No itching, burning, rashes, or lesions   All other review of systems is negative unless indicated above.    MEDICATIONS:  MEDICATIONS  (STANDING):  aspirin enteric coated 81 milliGRAM(s) Oral daily  chlorhexidine 2% Cloths 1 Application(s) Topical daily  cinacalcet 30 milliGRAM(s) Oral daily  donepezil 5 milliGRAM(s) Oral at bedtime  epoetin shell-epbx (RETACRIT) Injectable 54510 Unit(s) IV Push <User Schedule>  metoprolol succinate ER 12.5 milliGRAM(s) Oral daily  midodrine. 5 milliGRAM(s) Oral <User Schedule>  pantoprazole   Suspension 40 milliGRAM(s) Oral two times a day      PHYSICAL EXAM:  T(C): 36.7 (06-10-23 @ 19:00), Max: 37.1 (06-10-23 @ 01:54)  HR: 88 (06-10-23 @ 19:00) (80 - 93)  BP: 120/62 (06-10-23 @ 19:00) (100/56 - 124/53)  RR: 18 (06-10-23 @ 19:00) (17 - 18)  SpO2: 100% (06-10-23 @ 19:00) (96% - 100%)  Wt(kg): --  I&O's Summary    10 Sixto 2023 07:01  -  10 Sixto 2023 21:38  --------------------------------------------------------  IN: 800 mL / OUT: 1497 mL / NET: -697 mL          Appearance: Normal	  HEENT:  PERRLA   Lymphatic: No lymphadenopathy   Cardiovascular: Normal S1 S2, no JVD  Respiratory: normal effort , clear  Gastrointestinal:  Soft, Non-tender  Skin: No rashes,  warm to touch  Psychiatry:  Mood & affect appropriate  Musculuskeletal: No edema    recent labs, Imaging and EKGs personally reviewed     06-10-23 @ 07:01  -  06-10-23 @ 21:38  --------------------------------------------------------  IN: 800 mL / OUT: 1497 mL / NET: -697 mL                          7.2    13.18 )-----------( 219      ( 10 Sixto 2023 11:00 )             22.6               06-10    141  |  99  |  36<H>  ----------------------------<  74  4.3   |  26  |  6.66<H>    Ca    9.7      10 Sixto 2023 11:00  Phos  3.9     06-10  Mg     2.20     06-10      PTT - ( 09 Jun 2023 07:02 )  PTT:27.5 sec

## 2023-06-10 NOTE — PROCEDURE NOTE - ADDITIONAL PROCEDURE DETAILS
Patient has a history of cystoprostatectomy with [place] pouch managed with chronic wright catheter within the pouch which is changed monthly. Urology was contacted to exchange patients catheter as it was due for exchange yesterday in the office. [place] tip catheter was first flushed with resistance questioning the location of the catheter. In speaking with the primary team and nurse the catheter has had 0 urine output in two days and patient is ESRD on HD so makes very minimal to no urine. Catheter was exchanged over a wire without issues and then flushed. There was no resistance in flushing the catheter in but there was resistance withdrawing fluid. Given these findings would recommend RBUS to confirm placement of urinary catheter within [place] pouch.    Also, of note, when removing the patients catheter pus/smelly urine was noted to be coming around the stoma site. Patient has a history of cystoprostatectomy with [place] pouch managed with chronic wright catheter within the pouch which is changed monthly. Urology was contacted to exchange patients catheter as it was due for exchange yesterday in the office. [place] tip catheter was first flushed with resistance questioning the location of the catheter. In speaking with the primary team and nurse the catheter has had 0 urine output in two days and patient is ESRD on HD so makes very minimal to no urine. Catheter was exchanged over a wire without issues and then flushed. There was no resistance in flushing the catheter in but there was resistance withdrawing fluid. Given these findings would recommend RBUS to confirm placement of urinary catheter within [place] pouch.    Also, of note, when removing the patients catheter pus/smelly urine was noted to be coming around the stoma site so patient may benefit from antibiotic treatment.    Recommendations  - Can get a abdominal ultrasound to determine if catheter is within the [place] pouch and if patient has significant urine within the pouch which would warrant drainage inpatient  - If catheter is not within the pouch but there is not significant urine within the pouch as patient is ESRD and oliguric/anuric then he can follow-up outpatient with his primary urologist to advance the catheter into the pouch as his urologist is more familiar with his anatomy  - If catheter is not within the pouch but there is a significant amount of urine in the pouch please recontact urology and we will discuss other methods of ensuring adequate drainage of the pouch (i.e inserting catheter under direct visualization with a cystoscope)    [facility]  [address] [place] [address] [id number] [phone]

## 2023-06-10 NOTE — PROGRESS NOTE ADULT - SUBJECTIVE AND OBJECTIVE BOX
[facility] NEPHROLOGY- CONSULTATION NOTE    77y Male with history of ESRD on HD presents for need for dialysis. Nephrology consulted for ESRD status.    REVIEW OF SYSTEMS:  Gen: + generalized weakness no fevers  HEENT: no rhinorrhea  Neck: no sore throat  Cards: no chest pain  Resp: no dyspnea  GI: no nausea or vomiting or diarrhea  : no dysuria or hematuria  Vascular: no LE edema  Derm: no rashes  Neuro: no numbness/tingling    penicillin (Rash)    VITALS:  T(F): 98.2 (06-10-23 @ 09:57), Max: 98.7 (06-10-23 @ 01:54)  HR: 80 (06-10-23 @ 09:57)  BP: 124/53 (06-10-23 @ 09:57)  RR: 18 (06-10-23 @ 09:57)  SpO2: 96% (06-10-23 @ 09:57)  Wt(kg): --      PHYSICAL EXAM:  Gen: NAD, cachectic,  confused (at baseline)  HEENT: b/l temporal wasting  Cards: RRR, +S1/S2, no M/G/R  Resp: CTA B/L  GI: soft, NT/ND, NABS,   : + SPT  Vascular: no LE edema B/L, RUE AVG + bruit/thrill    LABS:  06-09    144  |  101  |  22  ----------------------------<  76  4.5   |  27  |  4.69<H>    Ca    9.6      09 Jun 2023 07:02  Phos  3.2     06-09  Mg     2.10     06-09      Creatinine Trend: 4.69 <--, 6.52 <--, 4.19 <--, 9.19 <--, 7.23 <--, 5.55 <--, 8.64 <--                        7.9    13.11 )-----------( 230      ( 09 Jun 2023 07:02 )             24.7     Urine Studies:

## 2023-06-11 NOTE — PROGRESS NOTE ADULT - SUBJECTIVE AND OBJECTIVE BOX
Name of Patient : MAGGIE THAO  MRN: [id number]  Date of visit: 06-11-23 @ 15:21      Subjective: Patient seen and examined. No new events except as noted.     REVIEW OF SYSTEMS:    CONSTITUTIONAL: No weakness, fevers or chills  EYES/ENT: No visual changes;  No vertigo or throat pain   NECK: No pain or stiffness  RESPIRATORY: No cough, wheezing, hemoptysis; No shortness of breath  CARDIOVASCULAR: No chest pain or palpitations  GASTROINTESTINAL: No abdominal or epigastric pain. No nausea, vomiting, or hematemesis; No diarrhea or constipation. No melena or hematochezia.  GENITOURINARY: No dysuria, frequency or hematuria  NEUROLOGICAL: No numbness or weakness  SKIN: No itching, burning, rashes, or lesions   All other review of systems is negative unless indicated above.    MEDICATIONS:  MEDICATIONS  (STANDING):  aspirin enteric coated 81 milliGRAM(s) Oral daily  chlorhexidine 2% Cloths 1 Application(s) Topical daily  cinacalcet 30 milliGRAM(s) Oral daily  ciprofloxacin   IVPB 200 milliGRAM(s) IV Intermittent daily  donepezil 5 milliGRAM(s) Oral at bedtime  epoetin shell-epbx (RETACRIT) Injectable 03446 Unit(s) IV Push <User Schedule>  metoprolol succinate ER 12.5 milliGRAM(s) Oral daily  midodrine. 5 milliGRAM(s) Oral <User Schedule>  pantoprazole   Suspension 40 milliGRAM(s) Oral two times a day      PHYSICAL EXAM:  T(C): 37.7 (06-11-23 @ 10:05), Max: 37.7 (06-10-23 @ 21:47)  HR: 90 (06-11-23 @ 10:05) (88 - 102)  BP: 123/54 (06-11-23 @ 10:05) (97/53 - 133/97)  RR: 18 (06-11-23 @ 10:05) (18 - 18)  SpO2: 98% (06-11-23 @ 10:05) (98% - 100%)  Wt(kg): --  I&O's Summary    10 Sixto 2023 07:01  -  11 Jun 2023 07:00  --------------------------------------------------------  IN: 800 mL / OUT: 1497 mL / NET: -697 mL    Appearance: Normal	  HEENT:  PERRLA   Lymphatic: No lymphadenopathy   Cardiovascular: Normal S1 S2, no JVD  Respiratory: normal effort , clear  Gastrointestinal:  Soft, Non-tender  Skin: No rashes,  warm to touch  Psychiatry:  Mood & affect appropriate  Musculuskeletal: No edema    recent labs, Imaging and EKGs personally reviewed     06-10-23 @ 07:01  -  06-11-23 @ 07:00  --------------------------------------------------------  IN: 800 mL / OUT: 1497 mL / NET: -697 mL                          8.9    16.87 )-----------( 227      ( 11 Jun 2023 13:46 )             27.6               06-11    135  |  95<L>  |  28<H>  ----------------------------<  122<H>  4.1   |  26  |  5.37<H>    Ca    9.9      11 Jun 2023 13:46  Phos  3.1     06-11  Mg     2.10     06-11

## 2023-06-11 NOTE — PROGRESS NOTE ADULT - ASSESSMENT
77y Male with history of ESRD on HD presents for need for dialysis. Nephrology consulted for ESRD status.    1) ESRD: Last HD 6/11 with 1u prbc tx, with mild intradialytic hypotension with net 0.7L removed. Plan for next maintenance HD 6/13. Follow up with dialysis social worker  to arrange patient to be transferred to [facility] where Yusra lift is available. Monitor electrolytes.    2) HTN with ESRD: BP low normal; recc to hold Metoprolol if hypotensive. c/w Midodrine 5mg PO tid pre HD to aid in fluid removal. Monitor BP.    3) Anemia of renal disease: Hb low and patient occult positive. s/p 1u prbc 6/10. Check CBC today. No IV iron given elevated ferritin. Continue with Epo 10K with HD. GI following. Monitor Hb.    4) Secondary HPT of renal origin: Serum phos & serum Ca acceptable . Continue to hold phos binders. c/w Sensipar 30mg once daily. Monitor serum calcium and phosphorus.        [facility] NEPHROLOGY  Armando Shaffer M.D.  Kendall Morales D.O.  Lilliam Cody M.D.  Oxana Hernandez, MSN, ANP-C  [phone]  Fax: [phone] [place] [address], [address]  [place] [id number]

## 2023-06-11 NOTE — PROGRESS NOTE ADULT - SUBJECTIVE AND OBJECTIVE BOX
[facility] NEPHROLOGY- CONSULTATION NOTE    77y Male with history of ESRD on HD presents for need for dialysis. Nephrology consulted for ESRD status.    REVIEW OF SYSTEMS:  Gen: + generalized weakness no fevers  HEENT: no rhinorrhea  Neck: no sore throat  Cards: no chest pain  Resp: no dyspnea  GI: no nausea or vomiting or diarrhea  : no dysuria or hematuria  Vascular: no LE edema  Derm: no rashes  Neuro: no numbness/tingling    penicillin (Rash)    VITALS:  T(F): 99.9 (06-11-23 @ 10:05), Max: 99.9 (06-10-23 @ 21:47)  HR: 90 (06-11-23 @ 10:05)  BP: 123/54 (06-11-23 @ 10:05)  RR: 18 (06-11-23 @ 10:05)  SpO2: 98% (06-11-23 @ 10:05)  Wt(kg): --    06-10 @ 07:01  -  06-11 @ 07:00  --------------------------------------------------------  IN: 800 mL / OUT: 1497 mL / NET: -697 mL      PHYSICAL EXAM:  Gen: NAD, cachectic,  confused (at baseline)  HEENT: b/l temporal wasting  Cards: RRR, +S1/S2, no M/G/R  Resp: CTA B/L  GI: soft, NT/ND, NABS,   : + SPT  Vascular: no LE edema B/L, RUE AVG + bruit/thrill    LABS: no new labs  06-10    141  |  99  |  36<H>  ----------------------------<  74  4.3   |  26  |  6.66<H>    Ca    9.7      10 Sixto 2023 11:00  Phos  3.9     06-10  Mg     2.20     06-10      Creatinine Trend: 6.66 <--, 4.69 <--, 6.52 <--, 4.19 <--, 9.19 <--, 7.23 <--                        7.2    13.18 )-----------( 219       10 Sixto 2023 11:00 )             22.6     Urine Studies:

## 2023-06-11 NOTE — PROGRESS NOTE ADULT - SUBJECTIVE AND OBJECTIVE BOX
Gastroenterology Progress Note    Interval Events:   Pt with down trending hgb although passing brown stool. Denies any ongoing n/v/abd pain.     Allergies:  penicillin (Rash)      Hospital Medications:  aspirin enteric coated 81 milliGRAM(s) Oral daily  chlorhexidine 2% Cloths 1 Application(s) Topical daily  cinacalcet 30 milliGRAM(s) Oral daily  donepezil 5 milliGRAM(s) Oral at bedtime  epoetin shell-epbx (RETACRIT) Injectable 33495 Unit(s) IV Push <User Schedule>  guaiFENesin Oral Liquid (Sugar-Free) 100 milliGRAM(s) Oral every 6 hours PRN  metoprolol succinate ER 12.5 milliGRAM(s) Oral daily  midodrine. 5 milliGRAM(s) Oral <User Schedule>  pantoprazole   Suspension 40 milliGRAM(s) Oral two times a day  sodium chloride 0.9% Bolus. 100 milliLiter(s) IV Bolus every 5 minutes PRN      ROS: 14 point ROS negative unless otherwise state in subjective    PHYSICAL EXAM:   Vital Signs:  Vital Signs Last 24 Hrs  T(C): 36.4 (2023 06:01), Max: 37.7 (10 Sixto 2023 21:47)  T(F): 97.5 (2023 06:01), Max: 99.9 (10 Sixto 2023 21:47)  HR: 90 (2023 06:01) (80 - 102)  BP: 104/58 (2023 06:01) (97/53 - 133/97)  BP(mean): --  RR: 18 (2023 06:01) (18 - 18)  SpO2: 98% (2023 06:01) (96% - 100%)    Parameters below as of 2023 06:01  Patient On (Oxygen Delivery Method): room air      Daily     Daily Weight in k.5 (10 Sixto 2023 14:03)    GENERAL:  No acute distress  HEENT:  NCAT  CHEST: no resp distress  HEART:  RRR  ABDOMEN:  Soft, non-tender, non-distended, normoactive bowel sounds  EXTREMITIES:  1+ edema b/l   NEURO:  Alert and oriented x 3, no asterixis, no tremor    LABS:                        7.2    13.18 )-----------( 219      ( 10 Sixto 2023 11:00 )             22.6     Mean Cell Volume: 82.5 fL (06-10- @ 11:00)    06-10    141  |  99  |  36<H>  ----------------------------<  74  4.3   |  26  |  6.66<H>    Ca    9.7      10 Sixto 2023 11:00  Phos  3.9     06-10  Mg     2.20     06-10      Imaging:  < from: Upper Endoscopy (23 @ 13:22) >                       - Duodenopathy.  Recommendation:      - [facility].                       - Clear liquid diet today.                       - PPI drip for 72 h, then PPI 40mg BID x 8 weeks                      followed by daily indefinitely                       - Colonoscopy was deferred given patient's respiratory                        status, light sedation and likely etiology for anemia                        identified.            - Monitor for signs of perforation: worsening abdominal                        pain, fevers, hypotension                       - Trend CBC, CMP                       - Avoid NSAIDs                       - Transfuse for Hb > 7         - Given comorbidities, risks of repeat endoscopic                        evaluation to assess for healing of esophagitis and                        ulcer (as well as further evaluation of fundic lesion)                        may outweigh benefits. Can be readdressed on outpatient                        basis pending overall GOC.    < end of copied text >

## 2023-06-11 NOTE — PROGRESS NOTE ADULT - PROBLEM SELECTOR PLAN 1
with noted (+)FOBT, possible GIB component   Epo per renal recs  hold antihypertensives for now pending GI w/u  PPI  trend H/H   maintain active T&S  GI eval appreciated, S/P EGD, ? bleeding ulcer  PPI drip, d/c on oral PPI after 72 hrs  1 unit PRBC done 06/10, repeat CBC

## 2023-06-11 NOTE — PROGRESS NOTE ADULT - SUBJECTIVE AND OBJECTIVE BOX
CARDIOLOGY FOLLOW UP - Dr. Clements  Date of Service: 6/11/2023  CC: no events    Review of Systems:  Constitutional: No fever, weight loss, or fatigue  Respiratory: No cough, wheezing, or hemoptysis, no shortness of breath  Cardiovascular: No chest pain, palpitations, passing out, dizziness, or leg swelling  Gastrointestinal: No abd or epigastric pain. No nausea, vomiting, or hematemesis; no diarrhea or consiptaiton, no melena or hematochezia  Vascular: No edema     TELEMETRY:    PHYSICAL EXAM:  T(C): 37.7 (06-11-23 @ 10:05), Max: 37.7 (06-10-23 @ 21:47)  HR: 90 (06-11-23 @ 10:05) (88 - 102)  BP: 123/54 (06-11-23 @ 10:05) (97/53 - 133/97)  RR: 18 (06-11-23 @ 10:05) (18 - 18)  SpO2: 98% (06-11-23 @ 10:05) (98% - 100%)  Wt(kg): --  I&O's Summary    10 Sixto 2023 07:01  -  11 Jun 2023 07:00  --------------------------------------------------------  IN: 800 mL / OUT: 1497 mL / NET: -697 mL        Appearance: Normal	  Cardiovascular: Normal S1 S2,RRR, No JVD, No murmurs  Respiratory: Lungs clear to auscultation	  Gastrointestinal:  Soft, Non-tender, + BS	  Extremities: Normal range of motion, No clubbing, cyanosis or edema  Vascular: Peripheral pulses palpable 2+ bilaterally       Home Medications:  bisacodyl 5 mg oral tablet: 2 tab(s) orally once a day as needed for  constipation (04 Jun 2023 00:48)  cinacalcet 60 mg oral tablet: 1 tab(s) orally once a day (04 Jun 2023 00:48)  donepezil 5 mg oral tablet: 1 tab(s) orally once a day (at bedtime) (04 Jun 2023 00:48)  ergocalciferol 1.25 mg (50,000 intl units) oral capsule: 1 cap(s) orally once a month (04 Jun 2023 00:48)  meloxicam 15 mg oral tablet: 1 tab(s) orally once a day as needed for  pain (04 Jun 2023 00:48)  NIFEdipine (Eqv-Adalat CC) 30 mg oral tablet, extended release: 2 tab(s) orally once a day (04 Jun 2023 00:48)  sevelamer hydrochloride 800 mg oral tablet: 1 tab(s) orally every 6 hours (04 Jun 2023 00:49)        MEDICATIONS  (STANDING):  aspirin enteric coated 81 milliGRAM(s) Oral daily  chlorhexidine 2% Cloths 1 Application(s) Topical daily  cinacalcet 30 milliGRAM(s) Oral daily  donepezil 5 milliGRAM(s) Oral at bedtime  epoetin shell-epbx (RETACRIT) Injectable 86878 Unit(s) IV Push <User Schedule>  metoprolol succinate ER 12.5 milliGRAM(s) Oral daily  midodrine. 5 milliGRAM(s) Oral <User Schedule>  pantoprazole   Suspension 40 milliGRAM(s) Oral two times a day        EKG:  RADIOLOGY:  DIAGNOSTIC TESTING:  [ ] Echocardiogram:  [ ] Catherterization:  [ ] Stress Test:  OTHER:     LABS:	 	                          7.2    13.18 )-----------( 219      ( 10 Sixto 2023 11:00 )             22.6     06-10    141  |  99  |  36<H>  ----------------------------<  74  4.3   |  26  |  6.66<H>    Ca    9.7      10 Sixto 2023 11:00  Phos  3.9     06-10  Mg     2.20     06-10            CARDIAC MARKERS:

## 2023-06-11 NOTE — PROGRESS NOTE ADULT - PROBLEM SELECTOR PLAN 11
Resume sensipar 60 mg daily and home phosphorus binders.   Monitor serum calcium and phosphorus.

## 2023-06-11 NOTE — CHART NOTE - NSCHARTNOTEFT_GEN_A_CORE
ACP MEDICINE COVERAGE - Medicine Subsequent Hospital Care Note    CC: anemia  HPI/Subjective: Pt noted w/ anemia/leukocytosis. Unable to obtain ROS due to mental status    Vital Signs Last 24 Hrs  T(C): 37.7 (06-11-23 @ 10:05), Max: 37.7 (06-10-23 @ 21:47)  T(F): 99.9 (06-11-23 @ 10:05), Max: 99.9 (06-10-23 @ 21:47)  HR: 90 (06-11-23 @ 10:05) (88 - 102)  BP: 123/54 (06-11-23 @ 10:05) (97/53 - 133/97)  RR: 18 (06-11-23 @ 10:05) (18 - 18)  SpO2: 98% (06-11-23 @ 10:05) (98% - 100%) on (O2)    PHYSICAL EXAM:  Constitutional: NAD, lying in bed, cachetic  Ears, nose, Mouth, and Throat: normal external ears and nose, normal hearing, moist oral mucosa  Eyes: normal conjunctiva, EOMI, PEERL  Neck: supple, no JVD  Respiratory: Clear to auscultation bilaterally. No wheezes, rales or rhonchi. Normal respiratory effort  Cardiovascular: regular rate and rhythm, S1 and S2, no murmurs, rubs or gallops, no edema, 2+ peripheral pulses  Gastrointestinal: soft, nontender, nondistended, +bowel sounds, no hernia  Skin: warm, dry, chronic dry rash.  RUE AVG + bruit/thrill  Neurologic: AAOx1, at baseline mental status  Musculoskeletal: no clubbing, no cyanosis, no joint swelling    LABS:                        8.9    16.87 )-----------( 227      ( 11 Jun 2023 13:46 )             27.6     06-11    135  |  95<L>  |  28<H>  ----------------------------<  122<H>  4.1   |  26  |  5.37<H>    Ca    9.9      11 Jun 2023 13:46  Phos  3.1     06-11  Mg     2.10     06-11      PT/INR: PT: x | INR: x (06-09-23 @ 07:02)  PTT: 27.5 sec (06-09-23 @ 07:02)  PT/INR: PT: 16.1 sec | INR: 1.38 ratio (06-07-23 @ 03:43)  PTT: 28.1 sec (06-07-23 @ 03:43)  PT/INR: PT: 15.6 sec | INR: 1.34 ratio (06-03-23 @ 19:45)  PTT: 28.6 sec (06-03-23 @ 19:45)    CARDIAC ENZYMES    Creatine Kinase, Serum: 44 U/L (06-04-23 @ 07:40)  Creatine Kinase, Serum: 58 U/L (06-04-23 @ 01:10)          Serum Pro-Brain Natriuretic Peptide:   D-Dimer Assay:     Urinanalysis Basic (06-11-23 @ 15:15):      Blood Gas Venous (06-11-23 @ 15:15):  pH: 7.47 | HCO3: 31 | pCO2: 43 | pO2: 42 | Lactate: 1.4  pH: 7.39 | HCO3: 30 | pCO2: 49 | pO2: 30 | Lactate: 1.8      Blood Gas Arterial (06-11-23 @ 15:15):      CAPILLARY BLOOD GLUCOSE:  POCT Blood Glucose: 80 mg/dL (06-06-23 @ 22:19)  POCT Blood Glucose: 88 mg/dL (06-06-23 @ 15:08)      COVID PCR:      RADIOLOGY:    MEDICATIONS  (STANDING):  aspirin enteric coated 81 milliGRAM(s) Oral daily  chlorhexidine 2% Cloths 1 Application(s) Topical daily  cinacalcet 30 milliGRAM(s) Oral daily  ciprofloxacin   IVPB 200 milliGRAM(s) IV Intermittent daily  donepezil 5 milliGRAM(s) Oral at bedtime  epoetin shell-epbx (RETACRIT) Injectable 41283 Unit(s) IV Push <User Schedule>  metoprolol succinate ER 12.5 milliGRAM(s) Oral daily  midodrine. 5 milliGRAM(s) Oral <User Schedule>  pantoprazole   Suspension 40 milliGRAM(s) Oral two times a day    MEDICATIONS  (PRN):  guaiFENesin Oral Liquid (Sugar-Free) 100 milliGRAM(s) Oral every 6 hours PRN Cough  sodium chloride 0.9% Bolus. 100 milliLiter(s) IV Bolus every 5 minutes PRN SBP LESS THAN or EQUAL to 90 mmHg    I&O's Summary    10 Sixto 2023 07:01  -  11 Jun 2023 07:00  --------------------------------------------------------  IN: 800 mL / OUT: 1497 mL / NET: -697 mL      I reviewed the above labs, radiology, medications, tests, telemetry, and EKG interpretation.    ASSESSMENT/PLAN:  77M PMHx Dementia (AAOx1), DVT (previously on coumadin, now stopped), CAD, ESRD (on HD via RUE AVF Tu/Th/Sat), Hyperparathyroidism, AOCD, HLD, HTN, obesity, pre-diabetes, and prostate cancer s/p cystoprostatectomy with [place] pouch and chronic wright catheter in the pouch exchanged monthtly (last exchanged 6/10 while inpt) presenting from [facility] w/ need for HD as his current HD does not have Yusra lift. Found to have anemia s/p EGD found to have Duodenal ulcer - s/p cauterization of visible vessel. Pt received 1 unit of PRBC w/ dialysis yesterday and Hg improved today to 8.9 therefore no need for transfusion today. However pt noted w/ worsening leukocytosis possibly 2/2 UTI vs R/O PNA as pt has been coughing w/ meds. Bcx x2 sent. UA unable to be sent as patient does not make urine. Unclear how long pt has been anuric for therefore kidney US ordered per Urology recommendations. Based on Urology note from yesterday, there was resistance w/ placing the catheter and in combination with pt being anuric there is concern for catheter malposition. Pus/foul smelling urine was also noted around the stoma site therefore pt was started on IV Cipro (allergy to penicillin). Pt made NPO except meds until S&S evaluation. CXR done showed clear lungs. VSS, pt at baseline mental status. Will f/u bcx and if clinical status changes or WBC worsens consider ID consult.  Of note, pt noted to have LUE swelling. Faint pulses and upon evaluation pt has scars however unclear what kind of surgery pt has had previously. Doppler ordered r/o DVT. No signs of infection. New IV placed in LLE. Will continue to monitor. Discussed with Dr. Ma.    high complexity ( >60 min) [facility] MEDICINE COVERAGE - Medicine Subsequent Hospital Care Note    CC: anemia  HPI/Subjective: Pt noted w/ anemia/leukocytosis. Unable to obtain ROS due to mental status    Vital Signs Last 24 Hrs  T(C): 37.7 (06-11-23 @ 10:05), Max: 37.7 (06-10-23 @ 21:47)  T(F): 99.9 (06-11-23 @ 10:05), Max: 99.9 (06-10-23 @ 21:47)  HR: 90 (06-11-23 @ 10:05) (88 - 102)  BP: 123/54 (06-11-23 @ 10:05) (97/53 - 133/97)  RR: 18 (06-11-23 @ 10:05) (18 - 18)  SpO2: 98% (06-11-23 @ 10:05) (98% - 100%) on (O2)    PHYSICAL EXAM:  Constitutional: NAD, lying in bed, cachetic  Ears, nose, Mouth, and Throat: normal external ears and nose, normal hearing, moist oral mucosa  Eyes: normal conjunctiva, EOMI, PEERL  Neck: supple, no JVD  Respiratory: Clear to auscultation bilaterally. No wheezes, rales or rhonchi. Normal respiratory effort  Cardiovascular: regular rate and rhythm, S1 and S2, no murmurs, rubs or gallops, no edema, 2+ peripheral pulses  Gastrointestinal: soft, nontender, nondistended, +bowel sounds, no hernia  Skin: warm, dry, chronic dry rash.  RUE AVG + bruit/thrill  Neurologic: AAOx1, at baseline mental status  Musculoskeletal: no clubbing, no cyanosis, no joint swelling    LABS:                        8.9    16.87 )-----------( 227      ( 11 Jun 2023 13:46 )             27.6     06-11    135  |  95<L>  |  28<H>  ----------------------------<  122<H>  4.1   |  26  |  5.37<H>    Ca    9.9      11 Jun 2023 13:46  Phos  3.1     06-11  Mg     2.10     06-11      PT/INR: PT: x | INR: x (06-09-23 @ 07:02)  PTT: 27.5 sec (06-09-23 @ 07:02)  PT/INR: PT: 16.1 sec | INR: 1.38 ratio (06-07-23 @ 03:43)  PTT: 28.1 sec (06-07-23 @ 03:43)  PT/INR: PT: 15.6 sec | INR: 1.34 ratio (06-03-23 @ 19:45)  PTT: 28.6 sec (06-03-23 @ 19:45)    CARDIAC ENZYMES    Creatine Kinase, Serum: 44 U/L (06-04-23 @ 07:40)  Creatine Kinase, Serum: 58 U/L (06-04-23 @ 01:10)          Serum Pro-Brain Natriuretic Peptide:   D-Dimer Assay:     Urinanalysis Basic (06-11-23 @ 15:15):      Blood Gas Venous (06-11-23 @ 15:15):  pH: 7.47 | HCO3: 31 | pCO2: 43 | pO2: 42 | Lactate: 1.4  pH: 7.39 | HCO3: 30 | pCO2: 49 | pO2: 30 | Lactate: 1.8      Blood Gas Arterial (06-11-23 @ 15:15):      CAPILLARY BLOOD GLUCOSE:  POCT Blood Glucose: 80 mg/dL (06-06-23 @ 22:19)  POCT Blood Glucose: 88 mg/dL (06-06-23 @ 15:08)      COVID PCR:      RADIOLOGY:    MEDICATIONS  (STANDING):  aspirin enteric coated 81 milliGRAM(s) Oral daily  chlorhexidine 2% Cloths 1 Application(s) Topical daily  cinacalcet 30 milliGRAM(s) Oral daily  ciprofloxacin   IVPB 200 milliGRAM(s) IV Intermittent daily  donepezil 5 milliGRAM(s) Oral at bedtime  epoetin shell-epbx (RETACRIT) Injectable 63760 Unit(s) IV Push <User Schedule>  metoprolol succinate ER 12.5 milliGRAM(s) Oral daily  midodrine. 5 milliGRAM(s) Oral <User Schedule>  pantoprazole   Suspension 40 milliGRAM(s) Oral two times a day    MEDICATIONS  (PRN):  guaiFENesin Oral Liquid (Sugar-Free) 100 milliGRAM(s) Oral every 6 hours PRN Cough  sodium chloride 0.9% Bolus. 100 milliLiter(s) IV Bolus every 5 minutes PRN SBP LESS THAN or EQUAL to 90 mmHg    I&O's Summary    10 Sixto 2023 07:01  -  11 Jun 2023 07:00  --------------------------------------------------------  IN: 800 mL / OUT: 1497 mL / NET: -697 mL      I reviewed the above labs, radiology, medications, tests, telemetry, and EKG interpretation.    ASSESSMENT/PLAN:  77M PMHx Dementia (AAOx1), DVT (previously on coumadin, now stopped), CAD, ESRD (on HD via RUE AVF Tu/Th/Sat), Hyperparathyroidism, AOCD, HLD, HTN, obesity, pre-diabetes, and prostate cancer s/p cystoprostatectomy with [place] pouch and chronic wright catheter in the pouch exchanged monthtly (last exchanged 6/10 while inpt) presenting from [facility] w/ need for HD as his current HD does not have Yusra lift. Found to have anemia s/p EGD found to have Duodenal ulcer - s/p cauterization of visible vessel. Pt received 1 unit of PRBC w/ dialysis yesterday and Hg improved today to 8.9 therefore no need for transfusion today. However pt noted w/ worsening leukocytosis possibly 2/2 UTI vs R/O PNA as pt has been coughing w/ meds. Bcx x2 sent. UA unable to be sent as patient does not make urine. Unclear how long pt has been anuric for therefore kidney US ordered per Urology recommendations. Based on Urology note from yesterday, there was resistance w/ placing the catheter and in combination with pt being anuric there is concern for catheter malposition. Pus/foul smelling urine was also noted around the stoma site therefore pt was started on IV Cipro (allergy to penicillin). Pt made NPO except meds until S&S evaluation. CXR done showed clear lungs. VSS, pt at baseline mental status. Will f/u bcx and if clinical status changes or WBC worsens consider ID consult.  Of note, pt noted to have LUE swelling. Faint pulses and upon evaluation pt has scars however unclear what kind of surgery pt has had previously. Doppler ordered r/o DVT. No signs of infection. New IV placed in LLE. Will continue to monitor. Discussed with Dr. Ma.    Addendum: updated family regarding plan of care. Per family, pt has made very little to urine for >1 year. Each month that he gets his wright exchanged is the only time that urine will come out. FAmily reports it always as appearing as pus therefore unsure if pt truly has active urinary tract infection. Will continue to f/u kidney US and obtain UA if able to get a sample. Will still c/w empiric abx given leukocytosis. FAmily aware and in agreement.    high complexity ( >60 min)

## 2023-06-11 NOTE — PROGRESS NOTE ADULT - ASSESSMENT
77y Male with dementia, DVT (off AC), CAD-?stent, afib ESRD-HD TTS, AOCD, HLD, HTN, preDM, prostate CA s/p suprapubic cath, p-SBO 2017 who initially presented to the ED for HD (his HD center does not have a Yusra lift that he requires). Noted to have Hgb 7.2 for which GI was consulted.   # Elevated trops > 10,000 with unclear cardiac history  # HBcAb - patient denies prior knowledge, no liver lesions on US  # Normocytic anemia -  some component of GI blood loss given giant duodenal ulcer   # Duodenal ulcer - s/p cauterization of visible vessel. No bleeding since EGD.  Hgb noted to be down trending although pt passing brown stool. Low suspcion for any active bleeding at this time. Trend CBC and continue to monitor for any signs of overt bleeding,     Recommendations:  -Diet as tolerated   -Trend CBC BID and transfuse to keep hgb>8 given CV disease   -PPI 40mg BID x 8 weeks followed by daily indefinitely  -HBV DNA  -Maintain two large bore IV, active T&S    All recommendations are tentative until note is attested by attending.     Jv Kovacs, PGY-4  Gastroenterology/Hepatology Fellow  Available on [employer] Teams   [phone] (Long Range Pager)  [phone] (Short Range Pager [facility])    After 5pm, please contact the on-call GI fellow. [phone]

## 2023-06-12 NOTE — CONSULT NOTE ADULT - SUBJECTIVE AND OBJECTIVE BOX
Patient is a 77y old  Male who presents with a chief complaint of needs HD (11 Jun 2023 15:21)    HPI:  77-year-old male with history of DVT (previously on coumadin, now stopped), CAD, ESRD (on HD via RUE AVF Tu/Th/Sat), AOCD, HLD, HTN, obesity, pre-diabetes, and prostate cancer s/p suprapubic catheter presenting from [facility] w/need for HD. Patient's current HD does not have Yusra lift. Last HD 6/01. Patient is currently without complaint, A&Ox1.     ID consulted for GBS bacteremia.        REVIEW OF SYSTEMS  [  ] ROS unobtainable because:    [  ] All other systems negative except as noted below    Constitutional:  [ ] fever [ ] chills  [ ] weight loss  [ ]night sweat  [ ]poor appetite/PO intake [ ]fatigue   Skin:  [ ] rash [ ] phlebitis	  Eyes: [ ] icterus [ ] pain  [ ] discharge	  ENMT: [ ] sore throat  [ ] thrush [ ] ulcers [ ] exudates [ ]anosmia  Respiratory: [ ] dyspnea [ ] hemoptysis [ ] cough [ ] sputum	  Cardiovascular:  [ ] chest pain [ ] palpitations [ ] edema	  Gastrointestinal:  [ ] nausea [ ] vomiting [ ] diarrhea [ ] constipation [ ] pain	  Genitourinary:  [ ] dysuria [ ] frequency [ ] hematuria [ ] discharge [ ] flank pain  [ ] incontinence  Musculoskeletal:  [ ] myalgias [ ] arthralgias [ ] arthritis  [ ] back pain  Neurological:  [ ] headache [ ] weakness [ ] seizures  [ ] confusion/altered mental status    prior hospital charts reviewed [V]  primary team notes reviewed [V]  other consultant notes reviewed [V]    PAST MEDICAL & SURGICAL HISTORY:  HTN (hypertension)    Prostate cancer    Obesity    PVD (peripheral vascular disease)    CAD (coronary artery disease)  LAD stent    Pre-diabetes    ESRD (end stage renal disease) on dialysis    Essential hypertension    History of DVT of lower extremity    History of prostatectomy    AV fistula  h/o creation in 2005    H/O cardiac catheterization  4/25/2014 non obstructive disease    H/O abdominal surgery  10/10/17        FAMILY HISTORY:  Family history of hypertension in mother (Mother)    SOCIAL HISTORY    Allergies  penicillin (Rash)    ANTIMICROBIALS:  cefTRIAXone   IVPB 2000 every 24 hours    ANTIMICROBIALS (past 90 days):   MEDICATIONS  (STANDING):    ciprofloxacin   IVPB   100 mL/Hr IV Intermittent (06-11-23 @ 21:55)    MEDICATIONS  (STANDING):  aspirin enteric coated 81 daily  cinacalcet 30 daily  donepezil 5 at bedtime  epoetin shell-epbx (RETACRIT) Injectable 90578 <User Schedule>  guaiFENesin Oral Liquid (Sugar-Free) 100 every 6 hours PRN  metoprolol succinate ER 12.5 daily  midodrine. 5 <User Schedule>  pantoprazole   Suspension 40 two times a day    VITALS:  Vital Signs Last 24 Hrs  T(F): 99.2 (06-12-23 @ 05:18), Max: 100.9 (06-12-23 @ 02:30)  Vital Signs Last 24 Hrs  HR: 91 (06-12-23 @ 05:18) (90 - 103)  BP: 111/81 (06-12-23 @ 05:18) (99/52 - 113/67)  RR: 18 (06-12-23 @ 05:18)  SpO2: 98% (06-12-23 @ 05:18) (98% - 100%)  Wt(kg): --    PHYSICAL EXAM:  Constitutional: non-toxic, no distress  HEAD/EYES: anicteric, no conjunctival injection  ENT:  supple, no thrush  Cardiovascular:   normal S1/S2  Respiratory:  clear BS bilaterally  GI:  soft, non-tender, normal bowel sounds  :  no wright, no CVA tenderness  Musculoskeletal:  no synovitis, normal ROM  Neurologic: awake and alert,  no focal findings  Skin:  no rash, no erythema, no phlebitis  Heme/Onc: no lymphadenopathy   Psychiatric:  awake, alert, appropriate mood    Labs:                        8.7    16.01 )-----------( 168      ( 12 Jun 2023 06:21 )             26.8     06-12    135  |  96<L>  |  39<H>  ----------------------------<  110<H>  4.4   |  22  |  6.53<H>    Ca    10.3      12 Jun 2023 06:21  Phos  3.3     06-12  Mg     2.10     06-12    WBC Trend:  WBC Count: 16.01 (06-12-23 @ 06:21)  WBC Count: 16.87 (06-11-23 @ 13:46)  WBC Count: 13.18 (06-10-23 @ 11:00)  WBC Count: 13.11 (06-09-23 @ 07:02)    Auto Neutrophil #: 14.77 K/uL (06-03-23 @ 13:32)    MICROBIOLOGY:  MRSA PCR Result.: NotDetec (06-04-23 @ 12:39)    Culture - Blood (collected 11 Jun 2023 13:46)  Source: .Blood Blood  Preliminary Report:    Growth in anaerobic bottle: Gram positive cocci in pairs    Growth in aerobic bottle: Gram positive cocci in pairs    Culture - Blood (collected 11 Jun 2023 13:40)  Source: .Blood Blood  Preliminary Report:    Growth in anaerobic bottle: Gram positive cocci in pairs    Growth in aerobic bottle: Gram positive cocci in pairs    ***Blood Panel PCR results on this specimen are available    approximately 3 hours after the Gram stain result.***    Gram stain, PCR, and/or culture results may not always    correspond due to difference in methodologies.    ************************************************************    This PCR assay was performed by multiplex PCR. This    Assay tests for 66 bacterial and resistance gene targets.    Please refer to the [facility] test directory    at [email or web address] for details.  Organism: Blood Culture PCR  Organism: Blood Culture PCR    Sensitivities:      Method Type: PCR      -  Streptococcus agalactiae (Group B): Detec    RADIOLOGY:  imaging below personally reviewed    < from: Xray Chest 1 View- PORTABLE-Urgent (Xray Chest 1 View- PORTABLE-Urgent .) (06.11.23 @ 13:36) >  IMPRESSION: No focal opacity to suggest pneumonia.  < end of copied text >   Patient is a 77y old  Male who presents with a chief complaint of needs HD (11 Jun 2023 15:21)    HPI:  77-year-old male with history of DVT (previously on coumadin, now stopped), CAD, ESRD (on HD via RUE AVF Tu/Th/Sat), AOCD, HLD, HTN, obesity, pre-diabetes, and prostate cancer s/p suprapubic catheter presenting from [facility] w/need for HD. Patient's current HD does not have Yusra lift. Last HD 6/01. Patient is currently without complaint, A&Ox1.     ID consulted for GBS bacteremia. Complaining of R shoulder pain       REVIEW OF SYSTEMS  [ x ] ROS unobtainable because:  AMS  [  ] All other systems negative except as noted below    Constitutional:  [ ] fever [ ] chills  [ ] weight loss  [ ]night sweat  [ ]poor appetite/PO intake [ ]fatigue   Skin:  [ ] rash [ ] phlebitis	  Eyes: [ ] icterus [ ] pain  [ ] discharge	  ENMT: [ ] sore throat  [ ] thrush [ ] ulcers [ ] exudates [ ]anosmia  Respiratory: [ ] dyspnea [ ] hemoptysis [ ] cough [ ] sputum	  Cardiovascular:  [ ] chest pain [ ] palpitations [ ] edema	  Gastrointestinal:  [ ] nausea [ ] vomiting [ ] diarrhea [ ] constipation [ ] pain	  Genitourinary:  [ ] dysuria [ ] frequency [ ] hematuria [ ] discharge [ ] flank pain  [ ] incontinence  Musculoskeletal:  [ ] myalgias [ ] arthralgias [ ] arthritis  [ ] back pain  Neurological:  [ ] headache [ ] weakness [ ] seizures  [ ] confusion/altered mental status    prior hospital charts reviewed [V]  primary team notes reviewed [V]  other consultant notes reviewed [V]    PAST MEDICAL & SURGICAL HISTORY:  HTN (hypertension)    Prostate cancer    Obesity    PVD (peripheral vascular disease)    CAD (coronary artery disease)  LAD stent    Pre-diabetes    ESRD (end stage renal disease) on dialysis    Essential hypertension    History of DVT of lower extremity    History of prostatectomy    AV fistula  h/o creation in 2005    H/O cardiac catheterization  4/25/2014 non obstructive disease    H/O abdominal surgery  10/10/17        FAMILY HISTORY:  Family history of hypertension in mother (Mother)    SOCIAL HISTORY:  Lives in NH    Allergies  penicillin (Rash)    ANTIMICROBIALS:  cefTRIAXone   IVPB 2000 every 24 hours    ANTIMICROBIALS (past 90 days):   MEDICATIONS  (STANDING):    ciprofloxacin   IVPB   100 mL/Hr IV Intermittent (06-11-23 @ 21:55)    MEDICATIONS  (STANDING):  aspirin enteric coated 81 daily  cinacalcet 30 daily  donepezil 5 at bedtime  epoetin shell-epbx (RETACRIT) Injectable 09010 <User Schedule>  guaiFENesin Oral Liquid (Sugar-Free) 100 every 6 hours PRN  metoprolol succinate ER 12.5 daily  midodrine. 5 <User Schedule>  pantoprazole   Suspension 40 two times a day    VITALS:  Vital Signs Last 24 Hrs  T(F): 99.2 (06-12-23 @ 05:18), Max: 100.9 (06-12-23 @ 02:30)  Vital Signs Last 24 Hrs  HR: 91 (06-12-23 @ 05:18) (90 - 103)  BP: 111/81 (06-12-23 @ 05:18) (99/52 - 113/67)  RR: 18 (06-12-23 @ 05:18)  SpO2: 98% (06-12-23 @ 05:18) (98% - 100%)  Wt(kg): --    PHYSICAL EXAM:  Constitutional: No acute distress  HEAD/EYES: anicteric, no conjunctival injection  ENT:  no thrush  Cardiovascular:   +S1/S2  Respiratory:  +BS bilaterally  GI:  soft,  +bowel sounds  :  +wright  Musculoskeletal:  R shoulder tenderness, pain with passive ROM  Neurologic: awake, not oriented  Skin:  no ulcers  Psychiatric:  confused    Labs:                        8.7    16.01 )-----------( 168      ( 12 Jun 2023 06:21 )             26.8     06-12    135  |  96<L>  |  39<H>  ----------------------------<  110<H>  4.4   |  22  |  6.53<H>    Ca    10.3      12 Jun 2023 06:21  Phos  3.3     06-12  Mg     2.10     06-12    WBC Trend:  WBC Count: 16.01 (06-12-23 @ 06:21)  WBC Count: 16.87 (06-11-23 @ 13:46)  WBC Count: 13.18 (06-10-23 @ 11:00)  WBC Count: 13.11 (06-09-23 @ 07:02)    Auto Neutrophil #: 14.77 K/uL (06-03-23 @ 13:32)    MICROBIOLOGY:  MRSA PCR Result.: NotDetec (06-04-23 @ 12:39)    Culture - Blood (collected 11 Jun 2023 13:46)  Source: .Blood Blood  Preliminary Report:    Growth in anaerobic bottle: Gram positive cocci in pairs    Growth in aerobic bottle: Gram positive cocci in pairs    Culture - Blood (collected 11 Jun 2023 13:40)  Source: .Blood Blood  Preliminary Report:    Growth in anaerobic bottle: Gram positive cocci in pairs    Growth in aerobic bottle: Gram positive cocci in pairs    ***Blood Panel PCR results on this specimen are available    approximately 3 hours after the Gram stain result.***    Gram stain, PCR, and/or culture results may not always    correspond due to difference in methodologies.    ************************************************************    This PCR assay was performed by multiplex PCR. This    Assay tests for 66 bacterial and resistance gene targets.    Please refer to the [facility] Labs test directory    at [email or web address] for details.  Organism: Blood Culture PCR  Organism: Blood Culture PCR    Sensitivities:      Method Type: PCR      -  Streptococcus agalactiae (Group B): Detec    RADIOLOGY:  imaging below personally reviewed    < from: Xray Chest 1 View- PORTABLE-Urgent (Xray Chest 1 View- PORTABLE-Urgent .) (06.11.23 @ 13:36) >  IMPRESSION: No focal opacity to suggest pneumonia.  < end of copied text >

## 2023-06-12 NOTE — PROGRESS NOTE ADULT - SUBJECTIVE AND OBJECTIVE BOX
CARDIOLOGY FOLLOW UP - Dr. Clements  Date of Service: 6/12/2023  CC: no events    Review of Systems:  Constitutional: No fever, weight loss, or fatigue  Respiratory: No cough, wheezing, or hemoptysis, no shortness of breath  Cardiovascular: No chest pain, palpitations, passing out, dizziness, or leg swelling  Gastrointestinal: No abd or epigastric pain. No nausea, vomiting, or hematemesis; no diarrhea or consiptaiton, no melena or hematochezia  Vascular: No edema     TELEMETRY:    PHYSICAL EXAM:  T(C): 37.1 (06-12-23 @ 11:00), Max: 38.3 (06-12-23 @ 02:30)  HR: 82 (06-12-23 @ 11:00) (82 - 103)  BP: 141/44 (06-12-23 @ 11:00) (99/52 - 141/44)  RR: 15 (06-12-23 @ 11:00) (15 - 18)  SpO2: 99% (06-12-23 @ 11:00) (98% - 100%)  Wt(kg): --  I&O's Summary      Appearance: Normal	  Cardiovascular: Normal S1 S2,RRR, No JVD, No murmurs  Respiratory: Lungs clear to auscultation	  Gastrointestinal:  Soft, Non-tender, + BS	  Extremities: Normal range of motion, No clubbing, cyanosis or edema  Vascular: Peripheral pulses palpable 2+ bilaterally       Home Medications:  bisacodyl 5 mg oral tablet: 2 tab(s) orally once a day as needed for  constipation (04 Jun 2023 00:48)  cinacalcet 60 mg oral tablet: 1 tab(s) orally once a day (04 Jun 2023 00:48)  donepezil 5 mg oral tablet: 1 tab(s) orally once a day (at bedtime) (04 Jun 2023 00:48)  ergocalciferol 1.25 mg (50,000 intl units) oral capsule: 1 cap(s) orally once a month (04 Jun 2023 00:48)  meloxicam 15 mg oral tablet: 1 tab(s) orally once a day as needed for  pain (04 Jun 2023 00:48)  NIFEdipine (Eqv-Adalat CC) 30 mg oral tablet, extended release: 2 tab(s) orally once a day (04 Jun 2023 00:48)  sevelamer hydrochloride 800 mg oral tablet: 1 tab(s) orally every 6 hours (04 Jun 2023 00:49)        MEDICATIONS  (STANDING):  aspirin enteric coated 81 milliGRAM(s) Oral daily  cefTRIAXone   IVPB 2000 milliGRAM(s) IV Intermittent every 24 hours  chlorhexidine 2% Cloths 1 Application(s) Topical daily  cinacalcet 30 milliGRAM(s) Oral daily  donepezil 5 milliGRAM(s) Oral at bedtime  epoetin shell-epbx (RETACRIT) Injectable 98063 Unit(s) IV Push <User Schedule>  metoprolol succinate ER 12.5 milliGRAM(s) Oral daily  midodrine. 5 milliGRAM(s) Oral <User Schedule>  pantoprazole   Suspension 40 milliGRAM(s) Oral two times a day        EKG:  RADIOLOGY:  DIAGNOSTIC TESTING:  [ ] Echocardiogram:  [ ] Catherterization:  [ ] Stress Test:  OTHER:     LABS:	 	                          8.7    16.01 )-----------( 168      ( 12 Jun 2023 06:21 )             26.8     06-12    135  |  96<L>  |  39<H>  ----------------------------<  110<H>  4.4   |  22  |  6.53<H>    Ca    10.3      12 Jun 2023 06:21  Phos  3.3     06-12  Mg     2.10     06-12            CARDIAC MARKERS:

## 2023-06-12 NOTE — CHART NOTE - NSCHARTNOTEFT_GEN_A_CORE
Brief GI update  ==========  Chart reviewed. No overt bleeding noted. No significant Hb changes (over-responded to 1u pRBC 6/10),   GI to sign off. Please refer to progress note for recommendations.    Thank you for involving us in the care of this patient. Please reach out if any further questions.    Jake Joyce, PGY-6  Gastroenterology/Hepatology Fellow    Available on [employer] Teams  After 5PM/Weekends, please contact the on-call GI fellow: [phone] Brief GI update  ==========  Chart reviewed. No overt bleeding noted. No significant Hb changes (over-responded to 1u pRBC 6/10).  Biopsy negative for H.Pylori.    GI to sign off. Please refer to progress note for recommendations.    Thank you for involving us in the care of this patient. Please reach out if any further questions.    Jake Joyce, PGY-6  Gastroenterology/Hepatology Fellow    Available on [employer] Teams  After 5PM/Weekends, please contact the on-call GI fellow: [phone]

## 2023-06-12 NOTE — PROGRESS NOTE ADULT - SUBJECTIVE AND OBJECTIVE BOX
Name of Patient : MAGGIE THAO  MRN: [id number]  Date of visit: 06-12-23       Subjective: Patient seen and examined. No new events except as noted.   febrile   bacteremia     REVIEW OF SYSTEMS:    CONSTITUTIONAL: + febrile   EYES/ENT: No visual changes;  No vertigo or throat pain   NECK: No pain or stiffness  RESPIRATORY: No cough, wheezing, hemoptysis; No shortness of breath  CARDIOVASCULAR: No chest pain or palpitations  GASTROINTESTINAL: No abdominal or epigastric pain.  GENITOURINARY: No dysuria, frequency or hematuria  NEUROLOGICAL: No numbness or weakness  SKIN: No itching, burning, rashes, or lesions   All other review of systems is negative unless indicated above.    MEDICATIONS:  MEDICATIONS  (STANDING):  aspirin enteric coated 81 milliGRAM(s) Oral daily  cefTRIAXone   IVPB 2000 milliGRAM(s) IV Intermittent every 24 hours  chlorhexidine 2% Cloths 1 Application(s) Topical daily  cinacalcet 30 milliGRAM(s) Oral daily  dextrose 5% + sodium chloride 0.9%. 1000 milliLiter(s) (30 mL/Hr) IV Continuous <Continuous>  donepezil 5 milliGRAM(s) Oral at bedtime  epoetin shell-epbx (RETACRIT) Injectable 90345 Unit(s) IV Push <User Schedule>  metoprolol succinate ER 12.5 milliGRAM(s) Oral daily  midodrine. 5 milliGRAM(s) Oral <User Schedule>  pantoprazole   Suspension 40 milliGRAM(s) Oral two times a day      PHYSICAL EXAM:  T(C): 37.2 (06-12-23 @ 15:01), Max: 38.3 (06-12-23 @ 02:30)  HR: 84 (06-12-23 @ 15:01) (82 - 103)  BP: 150/54 (06-12-23 @ 15:01) (99/52 - 150/54)  RR: 20 (06-12-23 @ 15:01) (15 - 20)  SpO2: 99% (06-12-23 @ 11:00) (98% - 100%)  Wt(kg): --  I&O's Summary        Appearance: Normal	  HEENT:  PERRLA   Lymphatic: No lymphadenopathy   Cardiovascular: Normal S1 S2, no JVD  Respiratory: normal effort , clear  Gastrointestinal:  Soft, Non-tender  Skin: No rashes,  warm to touch  Psychiatry:  Mood & affect appropriate  Musculuskeletal: No edema    recent labs, Imaging and EKGs personally reviewed                           8.7    16.01 )-----------( 168      ( 12 Jun 2023 06:21 )             26.8               06-12    135  |  96<L>  |  39<H>  ----------------------------<  110<H>  4.4   |  22  |  6.53<H>    Ca    10.3      12 Jun 2023 06:21  Phos  3.3     06-12  Mg     2.10     06-12

## 2023-06-12 NOTE — CONSULT NOTE ADULT - ATTENDING COMMENTS
Patient seen/examined. Requested to evaluate to normocytic anemia. No indication overt GI bleeding. Significant medical comorbidity including dementia, DVT, CAD and renal failure on HD noted. Current laboratory assessment noted markedly elevated troponins. No plan for urgent endoscopic evaluation. Await cardiac evaluation as well as GOC discussion with family. Monitor serial hemoglobin/hematocrit. Pantoprazole 40 mg daily.
77 year old male with DVT, CAD, ESRD on HD, RUE AVF, AOCD, HLD, HTN, pre-diabetes, prostate ca with suprapubic catheter from NH for missed HD. Developed fevers and found to have GBS bacteremia. C/o R shoulder pain. Remains with leukocytosis.     Recommend:  #Sepsis (fever, leukocytosis) secondary to high grade GBS bacteremia  -Check ct a/p with IV contrast to look for source of bacteremia - will need to arrange with nephro for HD post study  -Repeat blood cultures q 48 hrs until documented clearance  -Trend WBC  -monitor fever curve  -TTE  -MRI R shoulder with contrast - will need to arrange with nephro regarding HD post study.    Isaiah Hall MD  Available through MS Teams  If no response, or after 5pm/weekends, call [phone]

## 2023-06-12 NOTE — PROGRESS NOTE ADULT - ASSESSMENT
77y Male with history of ESRD on HD presents for need for dialysis. Nephrology consulted for ESRD status.    1) ESRD: Last HD on 6/10 with mild intradialytic hypotension and net 0.7L removed. Plan for next maintenance HD on 6/13. Follow up with dialysis social worker to arrange patient to be transferred to [facility] where Yusra lift is available upon disharge. Monitor electrolytes.    2) HTN with ESRD: BP low normal. Continue with midodrine 5 mg PO pre-HD to avoid intradialytic hypotension. Monitor BP.    3) Anemia of renal disease: Hb low and patient occult positive. No IV iron given elevated ferritin. Continue with Epo 10K with HD. Monitor Hb.    4) Secondary HPT of renal origin: Serum phos acceptable with high normal serum calcium. Continue with sensipar 30mg daily and will use low calcium bath with HD. Monitor serum calcium and phosphorus.      [facility] NEPHROLOGY  Armando Shaffer M.D.  Kendall Morales D.O.  Lilliam Cody M.D.  Oxana Hernandez, MSN, ANP-C  [phone]  Fax: [phone]    [facility] [place] [address], [address]  [place] [address]

## 2023-06-12 NOTE — CONSULT NOTE ADULT - ASSESSMENT
76 y/o M PMHx DVT, CAD, ESRD (on HD via RUE AVF Tu/Th/Sat), HLD, HTN, and prostate cancer, presenting from NH following missed HD. Found to have fever and leukocytosis, blood cultures growing group B strep.     Impression:  #Group B strep Bacteremia  #R shoulder pain  #Fever, Leukocytosis    Suspect skin source in setting of HD access  Would r/o R shoulder joint involvement given pain  Hx of vascular stent RUE, would r/o seeding    Recs:  - continue with ceftriaxone 2g IV q24H  - check MRI R Shoulder (w/ IV contrast if ok with nephro)  - check CT A/P w/ IV contrast to r/o abdominal source  - repeat blood cultures   - monitor temp, WBCs    Plan discussed with primary team    Roni Brian MD  Infectious Disease Fellow  Available on [employer] Teams  Before 9AM or after 5PM: [phone]        78 y/o M PMHx DVT, CAD, ESRD (on HD via RUE AVF Tu/Th/Sat), HLD, HTN, and prostate cancer, presenting from NH following missed HD. Found to have fever and leukocytosis, blood cultures growing group B strep.     Impression:  #Group B strep Bacteremia  #R shoulder pain  #Fever, Leukocytosis    Suspect skin source in setting of HD access  Would r/o R shoulder joint involvement given pain  Hx of vascular stent RUE, would r/o seeding    Recs:  - continue with ceftriaxone 2g IV q24H  - check MRI R Shoulder (w/ IV contrast if ok with nephro)  - check CT A/P w/ IV contrast to r/o abdominal source  - check TTE  - repeat blood cultures   - monitor temp, WBCs    Plan discussed with primary team    Roni Brian MD  Infectious Disease Fellow  Available on [employer] Teams  Before 9AM or after 5PM: [phone]

## 2023-06-12 NOTE — PROGRESS NOTE ADULT - SUBJECTIVE AND OBJECTIVE BOX
[facility] NEPHROLOGY- PROGRESS NOTE    77y Male with history of ESRD on HD presents for need for dialysis. Nephrology consulted for ESRD status.    REVIEW OF SYSTEMS: limited due to mental status.    penicillin (Rash)      Hospital Medications: Medications reviewed    VITALS:  T(F): 98.7 (06-12-23 @ 11:00), Max: 100.9 (06-12-23 @ 02:30)  HR: 82 (06-12-23 @ 11:00)  BP: 141/44 (06-12-23 @ 11:00)  RR: 15 (06-12-23 @ 11:00)  SpO2: 99% (06-12-23 @ 11:00)  Wt(kg): --  Height (cm): 185.4 (06-07 @ 12:17)  Weight (kg): 79.832 (06-05 @ 11:17)  BMI (kg/m2): 23.2 (06-07 @ 12:17)  BSA (m2): 2.04 (06-07 @ 12:17)      PHYSICAL EXAM:    Gen: NAD, calm  Cards: RRR, +S1/S2, no M/G/R  Resp: CTA B/L  GI: soft, NT/ND, NABS  : + SPT  Vascular: no LE edema B/L, RUE AVG + bruit/thrill    LABS:  06-12    135  |  96<L>  |  39<H>  ----------------------------<  110<H>  4.4   |  22  |  6.53<H>    Ca    10.3      12 Jun 2023 06:21  Phos  3.3     06-12  Mg     2.10     06-12      Creatinine Trend: 6.53 <--, 5.37 <--, 6.66 <--, 4.69 <--, 6.52 <--, 4.19 <--, 9.19 <--                        8.7    16.01 )-----------( 168      ( 12 Jun 2023 06:21 )             26.8     Urine Studies:        RADIOLOGY & ADDITIONAL STUDIES:

## 2023-06-12 NOTE — PROGRESS NOTE ADULT - PROBLEM SELECTOR PLAN 1
new onset  positibe gram positive bacteremia  antibtioics switched to rocephin  ID eval   repeat Cx  MRI shoulder

## 2023-06-12 NOTE — PHARMACOTHERAPY INTERVENTION NOTE - COMMENTS
77 M ESRD on HD via AVF, now with GBS bacteremia on cipro.  PCN allergy documented as rash.  Pt tolerated ceftriaxone, cefuroxime, and Zosyn in the past.    Recommendation(s):  1) Consider discontinuing IV cipro.  2) Recommend to start ceftriaxone 2 gram IV q24.  Pt tolerated ceftriaxone previously.  3) Will need repeat blood cultures to document clearance.  4) Agree with ID consultation to address source of infection and duration of therapy.    Darren Lowery, PharmD, BCIDP  Clinical Pharmacy Specialist, Infectious Diseases  Spectra extension [phone]  .

## 2023-06-13 NOTE — PROGRESS NOTE ADULT - SUBJECTIVE AND OBJECTIVE BOX
Name of Patient : MAGGIE THAO  MRN: [id number]  Date of visit: 06-13-23 @ 14:57      Subjective: Patient seen and examined. No new events except as noted.   calm   afebrile  on IV antibiotics     REVIEW OF SYSTEMS:    CONSTITUTIONAL: No weakness, fevers or chills  EYES/ENT: No visual changes;  No vertigo or throat pain   NECK: No pain or stiffness  RESPIRATORY: No cough, wheezing, hemoptysis; No shortness of breath  CARDIOVASCULAR: No chest pain or palpitations  GASTROINTESTINAL: No abdominal or epigastric pain. No nausea, vomiting, or hematemesis; No diarrhea or constipation. No melena or hematochezia.  GENITOURINARY: No dysuria, frequency or hematuria  NEUROLOGICAL: No numbness or weakness  SKIN: No itching, burning, rashes, or lesions   All other review of systems is negative unless indicated above.    MEDICATIONS:  MEDICATIONS  (STANDING):  aspirin enteric coated 81 milliGRAM(s) Oral daily  chlorhexidine 2% Cloths 1 Application(s) Topical daily  cinacalcet 30 milliGRAM(s) Oral daily  dextrose 5% + sodium chloride 0.9%. 1000 milliLiter(s) (30 mL/Hr) IV Continuous <Continuous>  donepezil 5 milliGRAM(s) Oral at bedtime  epoetin shell-epbx (RETACRIT) Injectable 66848 Unit(s) IV Push <User Schedule>  metoprolol succinate ER 12.5 milliGRAM(s) Oral daily  midodrine. 5 milliGRAM(s) Oral <User Schedule>  pantoprazole   Suspension 40 milliGRAM(s) Oral two times a day      PHYSICAL EXAM:  T(C): 36.6 (06-13-23 @ 09:56), Max: 37.2 (06-12-23 @ 15:01)  HR: 85 (06-13-23 @ 09:56) (84 - 89)  BP: 146/78 (06-13-23 @ 09:56) (131/66 - 150/54)  RR: 18 (06-13-23 @ 09:56) (18 - 20)  SpO2: 100% (06-13-23 @ 09:56) (98% - 100%)  Wt(kg): --  I&O's Summary        Appearance: Normal	  HEENT:  PERRLA   Lymphatic: No lymphadenopathy   Cardiovascular: Normal S1 S2, no JVD  Respiratory: normal effort , clear  Gastrointestinal:  Soft, Non-tender  Skin: No rashes,  warm to touch  Psychiatry:  Mood & affect appropriate  Musculuskeletal: No edema    recent labs, Imaging and EKGs personally reviewed                           8.7    16.01 )-----------( 168      ( 12 Jun 2023 06:21 )             26.8               06-12    135  |  96<L>  |  39<H>  ----------------------------<  110<H>  4.4   |  22  |  6.53<H>    Ca    10.3      12 Jun 2023 06:21  Phos  3.3     06-12  Mg     2.10     06-12                          8.7    16.01 )-----------( 168      ( 12 Jun 2023 06:21 )             26.8               06-12    135  |  96<L>  |  39<H>  ----------------------------<  110<H>  4.4   |  22  |  6.53<H>    Ca    10.3      12 Jun 2023 06:21  Phos  3.3     06-12  Mg     2.10     06-12

## 2023-06-13 NOTE — PROGRESS NOTE ADULT - ASSESSMENT
77y Male with history of ESRD on HD presents for need for dialysis. Nephrology consulted for ESRD status.    1) ESRD: Last HD on 6/10 with mild intradialytic hypotension and net 0.7L removed. Plan for next maintenance HD today. Please inform me once MRI with janie scheduled so that I can arrange for HD after imaging (ideally would dialyze within 1 hour of imaging to decrease risk of NSF). Follow up with dialysis social worker to arrange patient to be transferred to [facility] where Yusra lift is available upon disharge. Monitor electrolytes.    2) HTN with ESRD: BP controlled. Continue with midodrine 5 mg PO pre-HD to avoid intradialytic hypotension. Monitor BP.    3) Anemia of renal disease: Hb low and patient occult positive. No IV iron given elevated ferritin. Continue with Epo 10K with HD. Monitor Hb.    4) Secondary HPT of renal origin: Serum phos acceptable with high normal serum calcium. Continue with sensipar 30 mg daily and will use low calcium bath with HD. Monitor serum calcium and phosphorus.      [facility] NEPHROLOGY  Armando Shaffer M.D.  Kendall Morales D.O.  Lilliam Cody M.D.  Oxana Hernandez, MSN, ANP-C  [phone]  Fax: [phone] [place] [address], [address]  [place] [address]

## 2023-06-13 NOTE — PROGRESS NOTE ADULT - PROBLEM SELECTOR PLAN 1
new onset  positive gram positive bacteremia  antibiotics switched to Rocephin  ID eval appreciated   repeat Cx  MRI shoulder

## 2023-06-13 NOTE — CHART NOTE - NSCHARTNOTEFT_GEN_A_CORE
Per CT, CT A/P w/ IV Cont will be performed tonight at 21:00. Consent obtained by patient's spouse Kim and step daughter Tamiko over the phone [phone]. Per CT patient can be transferred to HD afterward. Dialysis made aware. Per CT, CT A/P w/ IV Cont will be performed tonight at 21:00. Consent obtained by patient's spouse Kim and step daughter Tamiko over the phone [phone]. Per CT patient can be transported to HD afterward. Dialysis made aware.

## 2023-06-13 NOTE — PROGRESS NOTE ADULT - SUBJECTIVE AND OBJECTIVE BOX
CC: Patient is a 77y old  Male who presents with a chief complaint of needs HD (13 Jun 2023 12:38)    ID following for GBS bacteremia    Interval History/ROS: Patient remains lethargic, afebrile. Leukocytosis.    Rest of ROS negative.    Allergies  penicillin (Rash)    ANTIMICROBIALS:  cefTRIAXone   IVPB 2000 every 24 hours    OTHER MEDS:  aspirin enteric coated 81 milliGRAM(s) Oral daily  chlorhexidine 2% Cloths 1 Application(s) Topical daily  cinacalcet 30 milliGRAM(s) Oral daily  dextrose 5% + sodium chloride 0.9%. 1000 milliLiter(s) IV Continuous <Continuous>  donepezil 5 milliGRAM(s) Oral at bedtime  epoetin shell-epbx (RETACRIT) Injectable 34100 Unit(s) IV Push <User Schedule>  guaiFENesin Oral Liquid (Sugar-Free) 100 milliGRAM(s) Oral every 6 hours PRN  metoprolol succinate ER 12.5 milliGRAM(s) Oral daily  midodrine. 5 milliGRAM(s) Oral <User Schedule>  pantoprazole   Suspension 40 milliGRAM(s) Oral two times a day  sodium chloride 0.9% Bolus. 100 milliLiter(s) IV Bolus every 5 minutes PRN    PE:    Vital Signs Last 24 Hrs  T(C): 36.6 (13 Jun 2023 09:56), Max: 37.2 (12 Jun 2023 15:01)  T(F): 97.8 (13 Jun 2023 09:56), Max: 98.9 (12 Jun 2023 15:01)  HR: 85 (13 Jun 2023 09:56) (84 - 89)  BP: 146/78 (13 Jun 2023 09:56) (131/66 - 150/54)  BP(mean): --  RR: 18 (13 Jun 2023 09:56) (18 - 20)  SpO2: 100% (13 Jun 2023 09:56) (98% - 100%)    Parameters below as of 13 Jun 2023 09:56  Patient On (Oxygen Delivery Method): room air    Gen: Lethargic, NAD  CV: S1+S2 normal, no murmurs  Resp: Clear bilat, no resp distress  Abd: Soft, nontender, +BS  Ext: RUE AVG intact  : +suprapubic catheter  IV/Skin: No thrombophlebitis    LABS:                          8.7    16.01 )-----------( 168      ( 12 Jun 2023 06:21 )             26.8       06-12    135  |  96<L>  |  39<H>  ----------------------------<  110<H>  4.4   |  22  |  6.53<H>    Ca    10.3      12 Jun 2023 06:21  Phos  3.3     06-12  Mg     2.10     06-12    MICROBIOLOGY:  v  .Blood Blood  06-11-23   Growth in anaerobic bottle: Streptococcus agalactiae (Group B)  See previous culture [id number]  Growth in aerobic bottle: Gram positive cocci in pairs  --    Growth in anaerobic bottle: Gram positive cocci in pairs  Growth in aerobic bottle: Gram positive cocci in pairs      .Blood Blood  06-11-23   Growth in aerobic and anaerobic bottles: Streptococcus agalactiae (Group  B)  ***Blood Panel PCR results on this specimen are available  approximately 3 hours after the Gram stain result.***  Gram stain, PCR, and/or culture results may not always  correspond due to difference in methodologies.  ************************************************************  This PCR assay was performed by multiplex PCR. This  Assay tests for 66 bacterial and resistance gene targets.  Please refer to the [facility] test directory  at [email or web address] for details.  --  Blood Culture PCR    RADIOLOGY:    < from: US Kidney and Bladder (06.12.23 @ 00:00) >  IMPRESSION:  *  Bilateral renal cysts and parenchymal disease.  *  No hydronephrosis.  *  Martínez catheter balloon in the right abdomen. Location of the balloon   within the [place] pouch cannot be determined sonographically.    < end of copied text >

## 2023-06-13 NOTE — CHART NOTE - NSCHARTNOTEFT_GEN_A_CORE
Urinary bladder: The Martínez catheter balloon is present in the right abdomen. Location of the balloon within the [place] pouch lumen cannot be ascertained.    IMPRESSION:  *  Bilateral renal cysts and parenchymal disease.  *  No hydronephrosis.  *  Martínez catheter balloon in the right abdomen. Location of the balloon   within the [place] pouch cannot be determined sonographically.    Urology reconsulted regarding findings. Will follow up reccs. Urinary bladder: The Martínez catheter balloon is present in the right abdomen. Location of the balloon within the [place] pouch lumen cannot be ascertained.    IMPRESSION:  *  Bilateral renal cysts and parenchymal disease.  *  No hydronephrosis.  *  Martínez catheter balloon in the right abdomen. Location of the balloon   within the [place] pouch cannot be determined sonographically.    Urology reconsulted regarding findings. Will follow up reccs.    -----     Patient without significant urine within the pouch. Per urology he can follow up outpatient with his urologist. No further inpatient urology intervention needed.

## 2023-06-13 NOTE — ADVANCED PRACTICE NURSE CONSULT - RECOMMEDATIONS
Recommend use of repositioning system to assist with patient movement and transfer.     Topical recommendations:     Left trochanter- Apply liquid barrier film, monitor for tissue type changes.     Sacral/gluteal fold- Cleanse with skin cleanser, pat dry. Apply TRIAD paste twice a day and PRN with incontinent episodes. With episodes of incontinence only remove soiled layer of Triad, then reinforce with thin layer.     Continue low air loss bed therapy,  heel elevation with offloading boots, turn & reposition q2h with  fluidized pillow, continue moisture management with barrier creams as specified above & single breathable pad, continue measures to decrease friction/shear.   Plan discussed with primary RN    Please contact Wound/Ostomy Care Service Line if we can be of further assistance (ext [phone]).      Recommend use of repositioning system to assist with patient movement and transfer.     Topical recommendations:     Left trochanter- Apply liquid barrier film, monitor for tissue type changes    Sacral/gluteal fold- Cleanse with skin cleanser, pat dry. Apply TRIAD paste twice a day and PRN with incontinent episodes. With episodes of incontinence only remove soiled layer of Triad, then reinforce with thin layer.     Continue low air loss bed therapy,  heel elevation with offloading boots, turn & reposition q2h with  fluidized pillow, continue moisture management with barrier creams as specified above & single breathable pad, continue measures to decrease friction/shear.   Plan discussed with primary RN    Please contact Wound/Ostomy Care Service Line if we can be of further assistance (ext [phone]).      Recommend use of repositioning system to assist with patient movement and transfer.     Topical recommendations:     Left trochanter- Apply liquid barrier film, monitor for tissue type changes    Sacral/gluteal fold- Cleanse with skin cleanser, pat dry. Apply TRIAD paste twice a day and PRN with incontinent episodes. With episodes of incontinence only remove soiled layer of Triad, then reinforce with thin layer.     Continue low air loss bed therapy,  heel elevation with offloading boots, turn & reposition q2h with  fluidized pillow, continue moisture management with barrier creams as specified above & single breathable pad, continue measures to decrease friction/shear.   Plan discussed with primary RN and acp provider Aida Ramsey.     Please contact Wound/Ostomy Care Service Line if we can be of further assistance (ext [phone]).

## 2023-06-13 NOTE — PROGRESS NOTE ADULT - SUBJECTIVE AND OBJECTIVE BOX
CARDIOLOGY FOLLOW UP - Dr. Clements  Date of Service: 6/13/2023  CC: no events    Review of Systems:  Constitutional: No fever, weight loss, or fatigue  Respiratory: No cough, wheezing, or hemoptysis, no shortness of breath  Cardiovascular: No chest pain, palpitations, passing out, dizziness, or leg swelling  Gastrointestinal: No abd or epigastric pain. No nausea, vomiting, or hematemesis; no diarrhea or consiptaiton, no melena or hematochezia  Vascular: No edema     TELEMETRY:    PHYSICAL EXAM:  T(C): 36.6 (06-13-23 @ 09:56), Max: 37.2 (06-12-23 @ 15:01)  HR: 85 (06-13-23 @ 09:56) (84 - 89)  BP: 146/78 (06-13-23 @ 09:56) (131/66 - 150/54)  RR: 18 (06-13-23 @ 09:56) (18 - 20)  SpO2: 100% (06-13-23 @ 09:56) (98% - 100%)  Wt(kg): --  I&O's Summary      Appearance: Normal	  Cardiovascular: Normal S1 S2,RRR, No JVD, No murmurs  Respiratory: Lungs clear to auscultation	  Gastrointestinal:  Soft, Non-tender, + BS	  Extremities: Normal range of motion, No clubbing, cyanosis or edema  Vascular: Peripheral pulses palpable 2+ bilaterally       Home Medications:  bisacodyl 5 mg oral tablet: 2 tab(s) orally once a day as needed for  constipation (04 Jun 2023 00:48)  cinacalcet 60 mg oral tablet: 1 tab(s) orally once a day (04 Jun 2023 00:48)  donepezil 5 mg oral tablet: 1 tab(s) orally once a day (at bedtime) (04 Jun 2023 00:48)  ergocalciferol 1.25 mg (50,000 intl units) oral capsule: 1 cap(s) orally once a month (04 Jun 2023 00:48)  meloxicam 15 mg oral tablet: 1 tab(s) orally once a day as needed for  pain (04 Jun 2023 00:48)  NIFEdipine (Eqv-Adalat CC) 30 mg oral tablet, extended release: 2 tab(s) orally once a day (04 Jun 2023 00:48)  sevelamer hydrochloride 800 mg oral tablet: 1 tab(s) orally every 6 hours (04 Jun 2023 00:49)        MEDICATIONS  (STANDING):  aspirin enteric coated 81 milliGRAM(s) Oral daily  cefTRIAXone   IVPB 2000 milliGRAM(s) IV Intermittent every 24 hours  chlorhexidine 2% Cloths 1 Application(s) Topical daily  cinacalcet 30 milliGRAM(s) Oral daily  dextrose 5% + sodium chloride 0.9%. 1000 milliLiter(s) (30 mL/Hr) IV Continuous <Continuous>  donepezil 5 milliGRAM(s) Oral at bedtime  epoetin shell-epbx (RETACRIT) Injectable 53268 Unit(s) IV Push <User Schedule>  metoprolol succinate ER 12.5 milliGRAM(s) Oral daily  midodrine. 5 milliGRAM(s) Oral <User Schedule>  pantoprazole   Suspension 40 milliGRAM(s) Oral two times a day        EKG:  RADIOLOGY:  DIAGNOSTIC TESTING:  [ ] Echocardiogram:  [ ] Catherterization:  [ ] Stress Test:  OTHER:     LABS:	 	                          8.7    16.01 )-----------( 168      ( 12 Jun 2023 06:21 )             26.8     06-12    135  |  96<L>  |  39<H>  ----------------------------<  110<H>  4.4   |  22  |  6.53<H>    Ca    10.3      12 Jun 2023 06:21  Phos  3.3     06-12  Mg     2.10     06-12            CARDIAC MARKERS:

## 2023-06-13 NOTE — ADVANCED PRACTICE NURSE CONSULT - ASSESSMENT
General: A&Ox1- oriented to self and , disoriented to situation, place and time, reorientation provided. Patient bedbound, stiff, two person turn, incontinent of stool- perineal care provided for fecal smear. Patient thin with temporal and muscle wasting noted. Suprapubic cathter in [place] pouch with minimal cloudy urine. Skin warm, dry with increased moisture in intertriginous folds, poor skin turgor, IV to Left shin, dry flaky wrinkly skin to bilateral lower legs. Scattered areas of hyperpigmentation and hypopigmentation,. Patient with naturally darkened pigmentation. Pain to RLE upon movement.     Left trochanter Stage 1 pressure injury measuring [id number] exposing 100% nonblanching erythema. Periwound skin intact. Goals of care: Monitor for tissue type changes, continue to offload.     Sacral gluteal fold- difficult differential diagnosis healing chronic full thickness pressure injury vs previously healed skin impairment that reopened complicated by moisture and incontinence associated dermatitis- measuring [id number]- presenting with area of hyperpigmentation with moveable epidermal layer exposing 100% pink moist agranular. Minimal drainage but increased moisture noted within folds. Unable to be seen in natural anatomical position. Irregular borders. No erythema, no induration, no increased warmth. Goals of care: moisture management, continue to offload.   Primary RN, Niyah at bedside for wound assessment.     Patient continues to be  at high risk for skin breakdown. On assessment patient on a low air loss surface, heel offloading boots in place, positioning pillow utilized, moisture management in place with use of one incontinence pad. Turning and positioning Q2hrs.        General: A&Ox1- oriented to self and , disoriented to situation, place and time, reorientation provided. Patient bedbound, stiff, two person turn, incontinent of stool- perineal care provided for fecal smear. Patient thin with temporal and muscle wasting noted. Suprapubic cathter in [place] pouch with minimal cloudy urine. Skin warm, dry with increased moisture in intertriginous folds, poor skin turgor, IV to Left shin, dry flaky wrinkly skin to bilateral lower legs. Scattered areas of hyperpigmentation and hypopigmentation,. Patient with naturally darkened pigmentation. Pain to RLE upon movement.     Left trochanter Stage 1 pressure injury measuring [id number] exposing 100% nonblanching erythema. Periwound skin intact. Goals of care: Monitor for tissue type changes, continue to offload.     Sacral gluteal fold- previously healed skin impairment  complicated by moisture and incontinence associated dermatitis- measuring [id number]- presenting with area of hyperpigmentation with denudation 2/2 moisture/incontinence. Minimal drainage but increased moisture noted within folds. Unable to be seen in natural anatomical position. Irregular borders.  Hyperpigmentation noted to bilateral buttocks as evidence of IAD/MAD, no suspicion of candidiasis at this time. No erythema, no induration, no increased warmth. Goals of care: moisture management, continue to offload.   Primary RNNiyah at bedside for wound assessment.     Patient continues to be  at high risk for skin breakdown. On assessment patient on a low air loss surface, heel offloading boots in place, positioning pillow utilized, moisture management in place with use of one incontinence pad. Turning and positioning Q2hrs.

## 2023-06-13 NOTE — PROGRESS NOTE ADULT - ASSESSMENT
77 year old male with DVT, CAD, ESRD on HD, RUE AVF, AOCD, HLD, HTN, pre-diabetes, prostate ca with suprapubic catheter from NH for missed HD. Developed fevers and found to have GBS bacteremia. C/o R shoulder pain. Remains with leukocytosis.     Recommend:  #Sepsis (fever, leukocytosis) secondary to high grade GBS bacteremia  -Check ct a/p with IV contrast to look for source of bacteremia - will need to arrange with nephro for HD post study  -Repeat blood cultures q 48 hrs until documented clearance - check with today  -Trend WBC  -monitor fever curve  -TTE  -MRI R shoulder with contrast - will need to arrange with nephro regarding HD post study- also with R vascular stent see if compatible with MRI  -Continue ceftriaxone 2 grams IV q 24 hours - please restart for tomorrow evening    Isaiah Hall MD  Available through MS Teams  If no response, or after 5pm/weekends, call [phone]

## 2023-06-13 NOTE — PROGRESS NOTE ADULT - SUBJECTIVE AND OBJECTIVE BOX
[facility] NEPHROLOGY- PROGRESS NOTE    77y Male with history of ESRD on HD presents for need for dialysis. Nephrology consulted for ESRD status.    REVIEW OF SYSTEMS: limited due to mental status.    penicillin (Rash)      Hospital Medications: Medications reviewed      VITALS:  T(F): 97.8 (06-13-23 @ 09:56), Max: 98.2 (06-12-23 @ 21:45)  HR: 85 (06-13-23 @ 09:56)  BP: 146/78 (06-13-23 @ 09:56)  RR: 18 (06-13-23 @ 09:56)  SpO2: 100% (06-13-23 @ 09:56)  Wt(kg): --      PHYSICAL EXAM:    Gen: NAD, calm  Cards: RRR, +S1/S2, no M/G/R  Resp: CTA B/L  GI: soft, NT/ND, NABS  : + SPT  Vascular: no LE edema B/L, RUE AVG + bruit/thrill      LABS:  06-12    135  |  96<L>  |  39<H>  ----------------------------<  110<H>  4.4   |  22  |  6.53<H>    Ca    10.3      12 Jun 2023 06:21  Phos  3.3     06-12  Mg     2.10     06-12      Creatinine Trend: 6.53 <--, 5.37 <--, 6.66 <--, 4.69 <--, 6.52 <--, 4.19 <--                        8.7    16.01 )-----------( 168      ( 12 Jun 2023 06:21 )             26.8     Urine Studies:

## 2023-06-13 NOTE — SWALLOW BEDSIDE ASSESSMENT ADULT - COMMENTS
Patient benefits from max verbal cues to initiate oral transit due to reduced cognition As per Internal Medicine note dated 6/12/23 "77-year-old male with history of DVT (previously on coumadin, now stopped), CAD, ESRD (on HD via RUE AVF Tu/Th/Sat), AOCD, HLD, HTN, obesity, pre-diabetes, and prostate cancer s/p suprapubic catheter presenting from [facility] w/need for HD."    CXR 6/11/23 "IMPRESSION: No focal opacity to suggest pneumonia."    Patient visited at bedside for clinical swallow evaluation. Patient presents as awake, alert and pleasantly confused, however able to follow 1-step directions. Patient benefits from frequent verbal cues and redirection to task.

## 2023-06-13 NOTE — SWALLOW BEDSIDE ASSESSMENT ADULT - SWALLOW EVAL: RECOMMENDED DIET
1. Puree with Mildly-Thick Liquids 1. Puree with Mildly-Thick Liquids. 1:1 assistance due to reduced cognition

## 2023-06-13 NOTE — CONSULT NOTE ADULT - SUBJECTIVE AND OBJECTIVE BOX
VASCULAR SURGERY CONSULT NOTE    HPI:  77-year-old male with history of DVT (previously on coumadin, now stopped), CAD, ESRD (on HD via RUE AVF Tu/Th/Sat), AOCD, HLD, HTN, obesity, pre-diabetes, and prostate cancer s/p suprapubic catheter presenting from [facility] w/need for HD. Patient's current HD does not have Yusra lift. Last HD 6/01. Patient is currently without complaint, A&Ox1.     In the ED VS:  99.7  67-91  /43-49  16-18  %RA (03 Jun 2023 19:20)    Vascular Surgery consulted for r/o compartment syndrome, ifiltrated IV on upper arm. At time of encounter upper arm compartments soft. Palpable unlar, radial and brachial pulses. Motor and sensation intact. Notable scar on skin on medial side of arm.     PAST MEDICAL & SURGICAL HISTORY:  HTN (hypertension)      Prostate cancer  prostate cancer      Obesity      PVD (peripheral vascular disease)      CAD (coronary artery disease)  LAD stent      Pre-diabetes      ESRD (end stage renal disease) on dialysis      Essential hypertension      History of DVT of lower extremity      History of prostatectomy      AV fistula  h/o creation in 2005      H/O cardiac catheterization  4/25/2014 non obstructive disease      H/O abdominal surgery  10/10/17          MEDICATIONS  (STANDING):  aspirin enteric coated 81 milliGRAM(s) Oral daily  chlorhexidine 2% Cloths 1 Application(s) Topical daily  cinacalcet 30 milliGRAM(s) Oral daily  donepezil 5 milliGRAM(s) Oral at bedtime  epoetin shell-epbx (RETACRIT) Injectable 43236 Unit(s) IV Push <User Schedule>  metoprolol succinate ER 12.5 milliGRAM(s) Oral daily  midodrine. 5 milliGRAM(s) Oral <User Schedule>  pantoprazole   Suspension 40 milliGRAM(s) Oral two times a day    MEDICATIONS  (PRN):  guaiFENesin Oral Liquid (Sugar-Free) 100 milliGRAM(s) Oral every 6 hours PRN Cough  sodium chloride 0.9% Bolus. 100 milliLiter(s) IV Bolus every 5 minutes PRN SBP LESS THAN or EQUAL to 90 mmHg      Allergies    penicillin (Rash)    Intolerances        SOCIAL HISTORY:    FAMILY HISTORY:  Family history of hypertension in mother (Mother)  HTN        Physical Exam:  General: NAD, resting comfortably, ANO 1-2, following commands  HEENT: NC/AT, EOMI, normal hearing, no oral lesions, no LAD, neck supple  Pulmonary: normal resp effort, CTA-B  Cardiovascular: NSR, no murmurs  Abdominal: soft, ND/NT, no organomegaly  Extremities: L upper arm compartments soft. Palpable unlar, radial and brachial pulses. Motor and sensation intact. Notable scar on skin on medial side of arm.   RUE being used for dialysis    s    Vital Signs Last 24 Hrs  T(C): 36.9 (13 Jun 2023 21:43), Max: 36.9 (13 Jun 2023 21:43)  T(F): 98.5 (13 Jun 2023 21:43), Max: 98.5 (13 Jun 2023 21:43)  HR: 89 (13 Jun 2023 21:43) (77 - 89)  BP: 113/61 (13 Jun 2023 21:43) (113/61 - 146/78)  BP(mean): --  RR: 18 (13 Jun 2023 21:43) (18 - 18)  SpO2: 98% (13 Jun 2023 21:09) (96% - 100%)    Parameters below as of 13 Jun 2023 21:43  Patient On (Oxygen Delivery Method): room air        I&O's Summary          LABS:                        8.7    16.01 )-----------( 168      ( 12 Jun 2023 06:21 )             26.8     06-13    138  |  97<L>  |  60<H>  ----------------------------<  90  4.8   |  26  |  9.02<H>    Ca    9.8      13 Jun 2023 22:05  Phos  4.7     06-13  Mg     2.30     06-13    TPro  7.1  /  Alb  2.7<L>  /  TBili  0.9  /  DBili  x   /  AST  20  /  ALT  21  /  AlkPhos  193<H>  06-13        CAPILLARY BLOOD GLUCOSE        LIVER FUNCTIONS - ( 13 Jun 2023 22:05 )  Alb: 2.7 g/dL / Pro: 7.1 g/dL / ALK PHOS: 193 U/L / ALT: 21 U/L / AST: 20 U/L / GGT: x             Cultures:      RADIOLOGY & ADDITIONAL STUDIES:      Plan:  77-year-old male with history of DVT (previously on coumadin, now stopped), CAD, ESRD (on HD via RUE AVF Tu/Th/Sat), AOCD, HLD, HTN, obesity, pre-diabetes, and prostate cancer s/p suprapubic catheter presenting from [facility] w/need for HD. Vascular Surgery consulted for r/o compartment syndrome, infiltrated IV on upper arm. At time of encounter upper arm compartments soft. Palpable unlar, radial and brachial pulses. Motor and sensation intact. Notable scar on skin on medial side of arm.     - palpable ulnar, radial and brachial pulses at time of encounter, seen and examined w medicine ACP  - upper arm compartment soft  - if clinically concerned for compartment syndrome of lower arm/forearm recommend hand consult    d/w Dr. Mckeon, vascular fellow    C Team [id number]

## 2023-06-14 NOTE — PROGRESS NOTE ADULT - SUBJECTIVE AND OBJECTIVE BOX
[facility] NEPHROLOGY- PROGRESS NOTE    77y Male with history of ESRD on HD presents for need for dialysis. Nephrology consulted for ESRD status.    REVIEW OF SYSTEMS: limited due to mental status.    penicillin (Rash)      Hospital Medications: Medications reviewed      VITALS:  T(F): 97.7 (06-14-23 @ 13:05), Max: 98.5 (06-13-23 @ 21:43)  HR: 96 (06-14-23 @ 13:05)  BP: 119/70 (06-14-23 @ 13:05)  RR: 16 (06-14-23 @ 13:05)  SpO2: 99% (06-14-23 @ 13:05)  Wt(kg): --    06-13 @ 07:01  -  06-14 @ 07:00  --------------------------------------------------------  IN: 400 mL / OUT: 900 mL / NET: -500 mL        PHYSICAL EXAM:    Gen: NAD, calm  Cards: RRR, +S1/S2, no M/G/R  Resp: CTA B/L  GI: soft, NT/ND, NABS  : + SPT  Vascular: no LE edema B/L, RUE AVG + bruit/thrill      LABS:  06-14    139  |  96<L>  |  33<H>  ----------------------------<  77  4.5   |  26  |  5.55<H>    Ca    9.5      14 Jun 2023 09:50  Phos  3.6     06-14  Mg     2.10     06-14    TPro  7.1  /  Alb  2.7<L>  /  TBili  0.9  /  DBili      /  AST  20  /  ALT  21  /  AlkPhos  193<H>  06-13    Creatinine Trend: 5.55 <--, 9.02 <--, 6.53 <--, 5.37 <--, 6.66 <--, 4.69 <--, 6.52 <--                        8.1    13.63 )-----------( 250      ( 14 Jun 2023 09:50 )             26.1     Urine Studies:

## 2023-06-14 NOTE — PROGRESS NOTE ADULT - SUBJECTIVE AND OBJECTIVE BOX
Vascular Surgery Progress Note    Subjective/24hour Events:   Patient seen and examined. No new complaints.        Vital Signs:  Vital Signs Last 24 Hrs  T(C): 36.7 (14 Jun 2023 09:05), Max: 36.9 (13 Jun 2023 21:43)  T(F): 98.1 (14 Jun 2023 09:05), Max: 98.5 (13 Jun 2023 21:43)  HR: 92 (14 Jun 2023 09:05) (77 - 92)  BP: 124/58 (14 Jun 2023 09:05) (113/61 - 146/78)  BP(mean): --  RR: 16 (14 Jun 2023 09:05) (16 - 18)  SpO2: 99% (14 Jun 2023 09:05) (96% - 100%)    Parameters below as of 14 Jun 2023 09:05  Patient On (Oxygen Delivery Method): room air        CAPILLARY BLOOD GLUCOSE          I&O's Detail    13 Jun 2023 07:01  -  14 Jun 2023 07:00  --------------------------------------------------------  IN:    Other (mL): 400 mL  Total IN: 400 mL    OUT:    Other (mL): 900 mL  Total OUT: 900 mL    Total NET: -500 mL          Physical Exam:  Gen: NAD  CV: Regular rate  Resp: Nonlabored breathing on room air  Ext: LUE with soft compartments, motor and sensory grossly intact  Vasc: Palpable left radial and brachial artery pulses        Labs:    06-13    138  |  97<L>  |  60<H>  ----------------------------<  90  4.8   |  26  |  9.02<H>    Ca    9.8      13 Jun 2023 22:05  Phos  4.7     06-13  Mg     2.30     06-13    TPro  7.1  /  Alb  2.7<L>  /  TBili  0.9  /  DBili  x   /  AST  20  /  ALT  21  /  AlkPhos  193<H>  06-13    LIVER FUNCTIONS - ( 13 Jun 2023 22:05 )  Alb: 2.7 g/dL / Pro: 7.1 g/dL / ALK PHOS: 193 U/L / ALT: 21 U/L / AST: 20 U/L / GGT: x                                 8.2    11.58 )-----------( 235      ( 13 Jun 2023 23:33 )             25.3

## 2023-06-14 NOTE — PROGRESS NOTE ADULT - ASSESSMENT
77 year old male with DVT, CAD, ESRD on HD, RUE AVF, AOCD, HLD, HTN, pre-diabetes, prostate ca with suprapubic catheter from NH for missed HD. Developed fevers and found to have GBS bacteremia. +R shoulder pain. Remains with leukocytosis.     CT A/P with no evidence of acute infectious process in the abdomen and pelvis.    Recommend:  #Sepsis (fever, leukocytosis) secondary to high grade GBS bacteremia  -F/U blood cultures in lab  -Trend WBC  -monitor fever curve  -TTE no evidence of valvular vegetation  -MRI R shoulder with contrast - will need to arrange with nephro regarding HD post study  -Continue ceftriaxone 2 grams IV q 24 hours (dose daily, but after HD on days of HD)    Isaiah Hall MD  Available through MS Teams  If no response, or after 5pm/weekends, call [phone]

## 2023-06-14 NOTE — PROGRESS NOTE ADULT - SUBJECTIVE AND OBJECTIVE BOX
CC: Patient is a 77y old  Male who presents with a chief complaint of needs HD (14 Jun 2023 16:17)    ID following for GBS bacteremia    Interval History/ROS: Patient has no new complaints. No fevers, no chills.    Rest of ROS negative.    Allergies  penicillin (Rash)    ANTIMICROBIALS:  cefTRIAXone   IVPB 2000 every 24 hours    OTHER MEDS:  aspirin enteric coated 81 milliGRAM(s) Oral daily  chlorhexidine 2% Cloths 1 Application(s) Topical daily  cinacalcet 30 milliGRAM(s) Oral daily  donepezil 5 milliGRAM(s) Oral at bedtime  epoetin shell-epbx (RETACRIT) Injectable 69703 Unit(s) IV Push <User Schedule>  guaiFENesin Oral Liquid (Sugar-Free) 100 milliGRAM(s) Oral every 6 hours PRN  metoprolol succinate ER 12.5 milliGRAM(s) Oral daily  midodrine. 5 milliGRAM(s) Oral <User Schedule>  pantoprazole   Suspension 40 milliGRAM(s) Oral two times a day  sodium chloride 0.9% Bolus. 100 milliLiter(s) IV Bolus every 5 minutes PRN    PE:    Vital Signs Last 24 Hrs  T(C): 36.5 (14 Jun 2023 13:05), Max: 36.9 (13 Jun 2023 21:43)  T(F): 97.7 (14 Jun 2023 13:05), Max: 98.5 (13 Jun 2023 21:43)  HR: 96 (14 Jun 2023 13:05) (77 - 96)  BP: 119/70 (14 Jun 2023 13:05) (113/61 - 144/64)  BP(mean): --  RR: 16 (14 Jun 2023 13:05) (16 - 18)  SpO2: 99% (14 Jun 2023 13:05) (96% - 99%)    Parameters below as of 14 Jun 2023 13:05  Patient On (Oxygen Delivery Method): room air    Gen: Awake, alert, NAD  CV: S1+S2 normal, no murmurs  Resp: Clear bilat, no resp distress  Abd: Soft, nontender, +BS  Ext: RUE AVG intact  : suprapubic catheter in place  IV/Skin: No thrombophlebitis  Neuro: no focal deficits    LABS:                          8.1    13.63 )-----------( 250      ( 14 Jun 2023 09:50 )             26.1       06-14    139  |  96<L>  |  33<H>  ----------------------------<  77  4.5   |  26  |  5.55<H>    Ca    9.5      14 Jun 2023 09:50  Phos  3.6     06-14  Mg     2.10     06-14    TPro  7.1  /  Alb  2.7<L>  /  TBili  0.9  /  DBili  x   /  AST  20  /  ALT  21  /  AlkPhos  193<H>  06-13    MICROBIOLOGY:  v  .Blood Blood  06-11-23   Growth in aerobic and anaerobic bottles: Streptococcus agalactiae (Group  B)  See previous culture [id number]  --    Growth in anaerobic bottle: Gram positive cocci in pairs  Growth in aerobic bottle: Gram positive cocci in pairs      .Blood Blood  06-11-23   Growth in aerobic and anaerobic bottles: Streptococcus agalactiae (Group  B)  ***Blood Panel PCR results on this specimen are available  approximately 3 hours after the Gram stain result.***  Gram stain, PCR, and/or culture results may not always  correspond due to difference in methodologies.  ************************************************************  This PCR assay was performed by multiplex PCR. This  Assay tests for 66 bacterial and resistance gene targets.  Please refer to the [facility] test directory  at [email or web address] for details.  --  Blood Culture PCR  Streptococcus agalactiae (Group B)    RADIOLOGY:  < from: CT Abdomen and Pelvis No Cont (06.13.23 @ 21:27) >  IMPRESSION:  Partial nephrectomy with urinary diversion in the right lower quadrant.    Bowel containing ventral supraumbilical hernia. No bowel obstruction.    No evidence of acute infectious process in the abdomen and pelvis.    < end of copied text >

## 2023-06-14 NOTE — PROGRESS NOTE ADULT - ASSESSMENT
77y Male with history of ESRD on HD presents for need for dialysis. Nephrology consulted for ESRD status.    1) ESRD: Last HD on 6/13 with intradialytic hypotension and net 0.5L removed. Plan for next maintenance HD on 6/15. Please inform me once MRI with janie scheduled so that I can arrange for HD after imaging (ideally would dialyze within 1 hour of imaging to decrease risk of NSF). Follow up with dialysis social worker to arrange patient to be transferred to [facility] where Yusra lift is available upon disharge. Monitor electrolytes.    2) HTN with ESRD: BP low normal. Continue with midodrine 5 mg PO pre-HD to avoid intradialytic hypotension. Monitor BP.    3) Anemia of renal disease: Hb low and patient occult positive. No IV iron given elevated ferritin. Continue with Epo 10K with HD. Monitor Hb.    4) Secondary HPT of renal origin: Serum phos acceptable with high normal serum calcium. Continue with sensipar 30 mg daily and will use low calcium bath with HD. Monitor serum calcium and phosphorus.      [facility] NEPHROLOGY  Armando Shaffer M.D.  Kendall Morales D.O.  Lilliam Cody M.D.  Oxana Hernandez, MSN, ANP-C  [phone]  Fax: [phone] [address] [place] [address], [address]  [place] [address]

## 2023-06-14 NOTE — CHART NOTE - NSCHARTNOTEFT_GEN_A_CORE
[facility] NIGHT MEDICINE COVERAGE    Notified by radiology that pt had IV contrast extravasation in LUE during CT scan this evening - only CT A/P w/o contrast able to be obtained.  Pt assessed in HD.  Pt A&Ox1 at baseline, can open eyes, responds to name, and can nod head to yes/no questions.  Pt denies being in any pain.  Soft swelling noted in upper LUE, difficult to appreciate distal pulses in LUE on initial assessment.  D/w attending, Dr. Ma, recommended vascular consult.  Vascular called.    Upon reassessment by writer, pt has strong L ulnar and L brachial pulses, L radial pulse also present (1+).  No pallor noted, sensation intact, pt is able to move hands and fingers.  Given these findings, less likely pt has compartment syndrome, f/u vascular recs.  Pt stable at this time, will monitor closely.    Vital Signs Last 24 Hrs  T(C): 36.9 (13 Jun 2023 21:43), Max: 36.9 (13 Jun 2023 21:43)  T(F): 98.5 (13 Jun 2023 21:43), Max: 98.5 (13 Jun 2023 21:43)  HR: 89 (13 Jun 2023 21:43) (77 - 89)  BP: 113/61 (13 Jun 2023 21:43) (113/61 - 146/78)  RR: 18 (13 Jun 2023 21:43) (18 - 18)  SpO2: 98% (13 Jun 2023 21:09) (96% - 100%)  O2 Parameters below as of 13 Jun 2023 21:43  Patient On (Oxygen Delivery Method): room air    Zbigniew Stewart PA-C  Department of Medicine - [facility]  In-House Pager: #[phone]

## 2023-06-14 NOTE — PROGRESS NOTE ADULT - PROBLEM SELECTOR PLAN 1
new onset  positive gram positive bacteremia  antibiotics switched to Rocephin  ID eval appreciated   repeat Cx  MRI shoulder  contrast extravasation post MRI, monitor clinically   pulse noted  vascular eval called, appreciated recs

## 2023-06-14 NOTE — PROGRESS NOTE ADULT - ASSESSMENT
77-year-old male with history of DVT (previously on coumadin, now stopped), CAD, ESRD (on HD via RUE AVF Tu/Th/Sat), AOCD, HLD, HTN, obesity, pre-diabetes, and prostate cancer s/p suprapubic catheter presenting from [facility] w/need for HD. Vascular Surgery consulted for r/o compartment syndrome, infiltrated IV on upper arm. At time of encounter upper arm compartments soft. Palpable unlar, radial and brachial pulses. Motor and sensation intact. Notable scar on skin on medial side of arm.     Clinically, low concern for compartment syndrome.    Plan:  - No acute vascular surgery interventions at this time  - Care per primary team  - Please recall with any questions or concerns      Vascular (C Team) Surgery  [id number]

## 2023-06-14 NOTE — PROGRESS NOTE ADULT - SUBJECTIVE AND OBJECTIVE BOX
Name of Patient : MAGGIE THAO  MRN: [id number]  Date of visit: 06-14-23      Subjective: Patient seen and examined. No new events except as noted.   calm   doin gokay     REVIEW OF SYSTEMS:  limited       MEDICATIONS:  MEDICATIONS  (STANDING):  aspirin enteric coated 81 milliGRAM(s) Oral daily  cefTRIAXone   IVPB 2000 milliGRAM(s) IV Intermittent every 24 hours  chlorhexidine 2% Cloths 1 Application(s) Topical daily  cinacalcet 30 milliGRAM(s) Oral daily  donepezil 5 milliGRAM(s) Oral at bedtime  epoetin shell-epbx (RETACRIT) Injectable 46790 Unit(s) IV Push <User Schedule>  metoprolol succinate ER 12.5 milliGRAM(s) Oral daily  midodrine. 5 milliGRAM(s) Oral <User Schedule>  pantoprazole   Suspension 40 milliGRAM(s) Oral two times a day      PHYSICAL EXAM:  T(C): 36.5 (06-14-23 @ 13:05), Max: 36.9 (06-13-23 @ 21:43)  HR: 96 (06-14-23 @ 13:05) (77 - 96)  BP: 119/70 (06-14-23 @ 13:05) (113/61 - 144/64)  RR: 16 (06-14-23 @ 13:05) (16 - 18)  SpO2: 99% (06-14-23 @ 13:05) (96% - 99%)  Wt(kg): --  I&O's Summary    13 Jun 2023 07:01  -  14 Jun 2023 07:00  --------------------------------------------------------  IN: 400 mL / OUT: 900 mL / NET: -500 mL          Appearance: Normal	  HEENT:  PERRLA   Lymphatic: No lymphadenopathy   Cardiovascular: Normal S1 S2, no JVD  Respiratory: normal effort , clear  Gastrointestinal:  Soft, Non-tender  Skin: No rashes,  warm to touch  Psychiatry:  Mood & affect appropriate  Musculuskeletal: No edema    recent labs, Imaging and EKGs personally reviewed     06-13-23 @ 07:01  -  06-14-23 @ 07:00  --------------------------------------------------------  IN: 400 mL / OUT: 900 mL / NET: -500 mL                            8.1    13.63 )-----------( 250      ( 14 Jun 2023 09:50 )             26.1               06-14    139  |  96<L>  |  33<H>  ----------------------------<  77  4.5   |  26  |  5.55<H>    Ca    9.5      14 Jun 2023 09:50  Phos  3.6     06-14  Mg     2.10     06-14    TPro  7.1  /  Alb  2.7<L>  /  TBili  0.9  /  DBili  x   /  AST  20  /  ALT  21  /  AlkPhos  193<H>  06-13

## 2023-06-14 NOTE — CHART NOTE - NSCHARTNOTEFT_GEN_A_CORE
Discussed with Bob MRI Supervisor that patient's R axillary vascular stent is compatible with MRI. Per completion of safety sheet with wife Kim and step-daughter Tamiko, patient also with removable dentures that contain metal. Per MRI supervisor these can be removed prior to imaging.

## 2023-06-14 NOTE — PROGRESS NOTE ADULT - SUBJECTIVE AND OBJECTIVE BOX
CARDIOLOGY FOLLOW UP - Dr. Clements  Date of Service: 6/14/2023  CC: events noted    Review of Systems:  Constitutional: No fever, weight loss, or fatigue  Respiratory: No cough, wheezing, or hemoptysis, no shortness of breath  Cardiovascular: No chest pain, palpitations, passing out, dizziness, or leg swelling  Gastrointestinal: No abd or epigastric pain. No nausea, vomiting, or hematemesis; no diarrhea or consiptaiton, no melena or hematochezia  Vascular: No edema     TELEMETRY:    PHYSICAL EXAM:  T(C): 36.7 (06-14-23 @ 09:05), Max: 36.9 (06-13-23 @ 21:43)  HR: 92 (06-14-23 @ 09:05) (77 - 92)  BP: 124/58 (06-14-23 @ 09:05) (113/61 - 144/64)  RR: 16 (06-14-23 @ 09:05) (16 - 18)  SpO2: 99% (06-14-23 @ 09:05) (96% - 99%)  Wt(kg): --  I&O's Summary    13 Jun 2023 07:01  -  14 Jun 2023 07:00  --------------------------------------------------------  IN: 400 mL / OUT: 900 mL / NET: -500 mL        Appearance: Normal	  Cardiovascular: Normal S1 S2,RRR, No JVD, No murmurs  Respiratory: Lungs clear to auscultation	  Gastrointestinal:  Soft, Non-tender, + BS	  Extremities: Normal range of motion, No clubbing, cyanosis or edema  Vascular: Peripheral pulses palpable 2+ bilaterally       Home Medications:  bisacodyl 5 mg oral tablet: 2 tab(s) orally once a day as needed for  constipation (04 Jun 2023 00:48)  cinacalcet 60 mg oral tablet: 1 tab(s) orally once a day (04 Jun 2023 00:48)  donepezil 5 mg oral tablet: 1 tab(s) orally once a day (at bedtime) (04 Jun 2023 00:48)  ergocalciferol 1.25 mg (50,000 intl units) oral capsule: 1 cap(s) orally once a month (04 Jun 2023 00:48)  meloxicam 15 mg oral tablet: 1 tab(s) orally once a day as needed for  pain (04 Jun 2023 00:48)  NIFEdipine (Eqv-Adalat CC) 30 mg oral tablet, extended release: 2 tab(s) orally once a day (04 Jun 2023 00:48)  sevelamer hydrochloride 800 mg oral tablet: 1 tab(s) orally every 6 hours (04 Jun 2023 00:49)        MEDICATIONS  (STANDING):  aspirin enteric coated 81 milliGRAM(s) Oral daily  cefTRIAXone   IVPB 2000 milliGRAM(s) IV Intermittent every 24 hours  chlorhexidine 2% Cloths 1 Application(s) Topical daily  cinacalcet 30 milliGRAM(s) Oral daily  donepezil 5 milliGRAM(s) Oral at bedtime  epoetin shell-epbx (RETACRIT) Injectable 83706 Unit(s) IV Push <User Schedule>  metoprolol succinate ER 12.5 milliGRAM(s) Oral daily  midodrine. 5 milliGRAM(s) Oral <User Schedule>  pantoprazole   Suspension 40 milliGRAM(s) Oral two times a day        EKG:  RADIOLOGY:  DIAGNOSTIC TESTING:  [ ] Echocardiogram:  [ ] Catherterization:  [ ] Stress Test:  OTHER:     LABS:	 	                          8.1    13.63 )-----------( 250      ( 14 Jun 2023 09:50 )             26.1     06-14    139  |  96<L>  |  33<H>  ----------------------------<  77  4.5   |  26  |  5.55<H>    Ca    9.5      14 Jun 2023 09:50  Phos  3.6     06-14  Mg     2.10     06-14    TPro  7.1  /  Alb  2.7<L>  /  TBili  0.9  /  DBili  x   /  AST  20  /  ALT  21  /  AlkPhos  193<H>  06-13          CARDIAC MARKERS:

## 2023-06-15 NOTE — CONSULT NOTE ADULT - PROBLEM SELECTOR RECOMMENDATION 6
- poor functional status PPS 30%  - HFrEF, ESRD, bacteremia, anemia   - pressure injuries - severe LV dysfunction, hypokinesis, worse from prior

## 2023-06-15 NOTE — PROGRESS NOTE ADULT - ASSESSMENT
77y Male with history of ESRD on HD presents for need for dialysis. Nephrology consulted for ESRD status.    1) ESRD: Last HD on 6/13 with intradialytic hypotension and net 0.5L removed. Plan for next maintenance HD today. Please inform me once MRI with janie scheduled so that I can arrange for HD after imaging (ideally would dialyze within 1 hour of imaging to decrease risk of NSF). Follow up with dialysis social worker to arrange patient to be transferred to [facility] where Yusra lift is available upon disharge. Monitor electrolytes.    2) HTN with ESRD: BP low normal. Continue with midodrine 5 mg PO pre-HD to avoid intradialytic hypotension. Monitor BP.    3) Anemia of renal disease: Hb low and patient occult positive. No IV iron given elevated ferritin. Continue with Epo 10K with HD. Monitor Hb.    4) Secondary HPT of renal origin: Serum phos acceptable with high normal serum calcium. Continue with sensipar 30 mg daily and will use low calcium bath with HD. Monitor serum calcium and phosphorus.      [facility] NEPHROLOGY  Armando Shaffer M.D.  Kendall Morales D.O.  Lilliam Cody M.D.  Oxana Hernandez, MSN, ANP-C  [phone]  Fax: [phone] [address] [place] [address], [address]  [place] [address]

## 2023-06-15 NOTE — PROGRESS NOTE ADULT - ASSESSMENT
77 year old male with DVT, CAD, ESRD on HD, RUE AVF, AOCD, HLD, HTN, pre-diabetes, prostate ca with suprapubic catheter from NH for missed HD. Developed fevers and found to have GBS bacteremia. +R shoulder pain. Leukocytosis resolved.    CT A/P with no evidence of acute infectious process in the abdomen and pelvis.    Recommend:  #Sepsis (fever, leukocytosis) secondary to high grade GBS bacteremia  -F/U blood cultures in lab so far repeats no growth  -Trend WBC  -monitor fever curve  -TTE no evidence of valvular vegetation  -MRI R shoulder with contrast - will need to arrange with nephro regarding HD post study  -Continue ceftriaxone 2 grams IV q 24 hours (dose daily, but after HD on days of HD)    Isaiah Hall MD  Available through MS Teams  If no response, or after 5pm/weekends, call [phone]

## 2023-06-15 NOTE — PROGRESS NOTE ADULT - ASSESSMENT
77  year old male with hx of cad , ? PCI, HTN, esrd, HTN, DM, afib presents for HD.    #cad ?PCI  -unclear if hx of pci- no previous cath noted  -cv stable no cp   -no chf on exam   -elevated HS trop - with nl Ck CKMB, likely secondary to esrd   -no obvious angina, decomp HF  -unlikely acute acs event   -in light of age, fxnl status, anemia req w/u, and mental status not a candidate for further ischemic eval/cath  -would continue medical tx of cmp  -toprol xl 12.5 qd if bp can tolerate  -cont current meds, on asa     #anemia  -+ occult. work up per GI/med  -s/p egd with treated ulcer  -gi f/u, ppi    #Afib   -not on ac  -cont to hold in light of egd findings, anemia req prbcs, fall risk   -cont asa    # ESRD on HD   -renal fu     #htn   -bp normal on mido  dvt ppx      35 minutes spent on total encounter; more than 50% of the visit was spent counseling and/or coordinating care by the attending physician.

## 2023-06-15 NOTE — PROGRESS NOTE ADULT - SUBJECTIVE AND OBJECTIVE BOX
CC: Patient is a 77y old  Male who presents with a chief complaint of needs HD (15 Sixto 2023 12:30)    ID following for GBS bacteremia    Interval History/ROS: Patient now afebrile. Repeat blood cultures so far no growth.    Rest of ROS negative.    Allergies  penicillin (Rash)    ANTIMICROBIALS:  cefTRIAXone   IVPB 2000 every 24 hours    OTHER MEDS:  aspirin enteric coated 81 milliGRAM(s) Oral daily  chlorhexidine 2% Cloths 1 Application(s) Topical daily  cinacalcet 30 milliGRAM(s) Oral daily  donepezil 5 milliGRAM(s) Oral at bedtime  epoetin shell-epbx (RETACRIT) Injectable 43598 Unit(s) IV Push <User Schedule>  guaiFENesin Oral Liquid (Sugar-Free) 100 milliGRAM(s) Oral every 6 hours PRN  metoprolol succinate ER 12.5 milliGRAM(s) Oral daily  midodrine. 5 milliGRAM(s) Oral <User Schedule>  pantoprazole   Suspension 40 milliGRAM(s) Oral two times a day  sodium chloride 0.9% Bolus. 100 milliLiter(s) IV Bolus every 5 minutes PRN    PE:    Vital Signs Last 24 Hrs  T(C): 36.7 (15 Sixto 2023 13:17), Max: 36.7 (15 Sixto 2023 13:17)  T(F): 98 (15 Sixto 2023 13:17), Max: 98 (15 Sixto 2023 13:17)  HR: 87 (15 Sixto 2023 13:17) (87 - 90)  BP: 101/60 (15 Sixto 2023 13:17) (101/55 - 115/55)  BP(mean): --  RR: 18 (15 Sixto 2023 13:17) (16 - 18)  SpO2: 99% (15 Sixto 2023 13:17) (93% - 99%)    Parameters below as of 15 Sixto 2023 13:17  Patient On (Oxygen Delivery Method): room air    Gen: Awake, alert, NAD  CV: S1+S2 normal, no murmurs  Resp: Clear bilat, no resp distress  Abd: Soft, nontender, +BS  Ext: RUE AVG intact  : suprapubic catheter in place  IV/Skin: No thrombophlebitis  Neuro: no focal deficits    LABS:                          8.1    9.18  )-----------( 263      ( 15 Sixto 2023 06:15 )             25.5       06-15    138  |  98  |  44<H>  ----------------------------<  76  4.5   |  25  |  6.97<H>    Ca    9.5      15 Sixto 2023 06:15  Phos  4.1     06-15  Mg     2.30     06-15    TPro  7.1  /  Alb  2.7<L>  /  TBili  0.9  /  DBili  x   /  AST  20  /  ALT  21  /  AlkPhos  193<H>  06-13          MICROBIOLOGY:  v  .Blood Blood-Venous  06-13-23   No growth to date.  --  --      .Blood Blood  06-13-23   No growth to date.  --  --      .Blood Blood  06-11-23   Growth in aerobic and anaerobic bottles: Streptococcus agalactiae (Group  B)  See previous culture [id number]  --    Growth in anaerobic bottle: Gram positive cocci in pairs  Growth in aerobic bottle: Gram positive cocci in pairs      .Blood Blood  06-11-23   Growth in aerobic and anaerobic bottles: Streptococcus agalactiae (Group  B)  ***Blood Panel PCR results on this specimen are available  approximately 3 hours after the Gram stain result.***  Gram stain, PCR, and/or culture results may not always  correspond due to difference in methodologies.  ************************************************************  This PCR assay was performed by multiplex PCR. This  Assay tests for 66 bacterial and resistance gene targets.  Please refer to the [facility] test directory  at [email or web address] for details.  --  Blood Culture PCR  Streptococcus agalactiae (Group B)    RADIOLOGY:    < from: CT Abdomen and Pelvis No Cont (06.13.23 @ 21:27) >  IMPRESSION:  Partial nephrectomy with urinary diversion in the right lower quadrant.    Bowel containing ventral supraumbilical hernia. No bowel obstruction.    No evidence of acute infectious process in the abdomen and pelvis.    < end of copied text >

## 2023-06-15 NOTE — CONSULT NOTE ADULT - PROBLEM SELECTOR RECOMMENDATION 9
- presented for HD as his NH does not have [facility] to do HD, looking for new center  - low BP on midodrine Following for CMDM     Patient is seriously ill with   High risk of complications, further morbidity and mortality - patient showing signs of severe pain, unable to say location - grimacing, moaning   - can trial tylenol or oxycodone 5 mg q4h PRN for severe pain - patient showing signs of severe pain - grimacing, moaning   - suggesting the back possibly   - can trial tylenol or oxycodone 5 mg q4h PRN for severe pain - patient showing signs of severe pain - grimacing, moaning   - suggesting the back possibly   - can trial tylenol or oxycodone 5 mg q6h PRN for severe pain - patient showing signs of severe pain at times - grimacing, moaning   - suggesting the back possibly   - can trial tylenol or oxycodone 5 mg q6h PRN for severe pain

## 2023-06-15 NOTE — PROGRESS NOTE ADULT - SUBJECTIVE AND OBJECTIVE BOX
Name of Patient : MAGGIE THAO  MRN: [id number]  Date of visit: 06-15-23       Subjective: Patient seen and examined. No new events except as noted.   Doing okay     REVIEW OF SYSTEMS:  limited       MEDICATIONS:  MEDICATIONS  (STANDING):  aspirin enteric coated 81 milliGRAM(s) Oral daily  cefTRIAXone   IVPB 2000 milliGRAM(s) IV Intermittent every 24 hours  chlorhexidine 2% Cloths 1 Application(s) Topical daily  cinacalcet 30 milliGRAM(s) Oral daily  donepezil 5 milliGRAM(s) Oral at bedtime  epoetin shell-epbx (RETACRIT) Injectable 94668 Unit(s) IV Push <User Schedule>  metoprolol succinate ER 12.5 milliGRAM(s) Oral daily  midodrine. 5 milliGRAM(s) Oral <User Schedule>  pantoprazole   Suspension 40 milliGRAM(s) Oral two times a day      PHYSICAL EXAM:  T(C): 36.7 (06-15-23 @ 13:17), Max: 36.7 (06-15-23 @ 13:17)  HR: 87 (06-15-23 @ 13:17) (87 - 90)  BP: 101/60 (06-15-23 @ 13:17) (101/55 - 115/55)  RR: 18 (06-15-23 @ 13:17) (16 - 18)  SpO2: 99% (06-15-23 @ 13:17) (93% - 99%)  Wt(kg): --  I&O's Summary        Appearance: Normal	  HEENT:  PERRLA   Lymphatic: No lymphadenopathy   Cardiovascular: Normal S1 S2, no JVD  Respiratory: normal effort , clear  Gastrointestinal:  Soft, Non-tender  Skin: No rashes,  warm to touch  Psychiatry:  Mood & affect appropriate  Musculuskeletal: No edema    recent labs, Imaging and EKGs personally reviewed                           8.1    9.18  )-----------( 263      ( 15 Sixto 2023 06:15 )             25.5               06-15    138  |  98  |  44<H>  ----------------------------<  76  4.5   |  25  |  6.97<H>    Ca    9.5      15 Sixto 2023 06:15  Phos  4.1     06-15  Mg     2.30     06-15    TPro  7.1  /  Alb  2.7<L>  /  TBili  0.9  /  DBili  x   /  AST  20  /  ALT  21  /  AlkPhos  193<H>  06-13

## 2023-06-15 NOTE — CONSULT NOTE ADULT - PROBLEM SELECTOR RECOMMENDATION 2
- 2/2 duodenal ulcer, esophagitis   - s/p EGD - presented for HD as his [facility] does not have [facility] to do HD, looking for new center  - low BP on midodrine  - per wife, has been on HD >15 years

## 2023-06-15 NOTE — CONSULT NOTE ADULT - SUBJECTIVE AND OBJECTIVE BOX
HPI:  Patient is a 77-year-old male with history of DVT (previously on coumadin, now stopped), CAD, ESRD (on HD via RUE AVF Tu/Th/Sat), AOCD, HLD, HTN, obesity, pre-diabetes, and prostate cancer s/p suprapubic catheter presenting from [facility] w/need for HD. Patient's current HD does not have Yusra lift. Last HD 6/01. Patient is currently without complaint, A&Ox1. In the ED VS:  99.7  67-91  /43-49  16-18  %RA. Palliative consulted for complex medical decision making in the setting of serious illness.            PERTINENT PM/SXH:   HTN (hypertension)    HLD (hyperlipidemia)    ESRD (end stage renal disease) on dialysis    Prostate cancer    Obesity    Atrial fibrillation    PVD (peripheral vascular disease)    CAD (coronary artery disease)    Pre-diabetes    ESRD (end stage renal disease) on dialysis    Paroxysmal atrial fibrillation    Essential hypertension    Type 2 diabetes mellitus without complication, without long-term current use of insulin    History of DVT of lower extremity      History of prostatectomy    AV fistula    AV fistula    H/O cardiac catheterization    No significant past surgical history    H/O abdominal surgery      FAMILY HISTORY:  Family history of hypertension in mother (Mother)  HTN      ------------------------------------------------------------------------------------------------------------  ITEMS NOT CHECKED ARE NOT PRESENT    SOCIAL HISTORY:   Living Situation: [ ]Home  [X ]Long term care  [ ]Rehab [ ]Other  Support:     Substance hx:  [ ]   Tobacco hx:  [ ]   Alcohol hx: [ ]   Family Hx substance abuse [ ]yes [ ]no    Congregational/Spirituality:  PCSSQ[Palliative Care Spiritual Screening Question]   Severity (0-10): 0  Score of 4 or > indicate consideration of Chaplaincy referral.  Chaplaincy Referral: [ ] yes [X ] refused [ ] following    Anticipatory Grief present?:  [ ] yes [X ] no  [ ] Deferred                      Other Referrals:    [ ]Hospice   [ ]Caregiver [place] Support  [ ]Child Life    [ ]Patient & Family Centered Care Referral  [ ]Holistic Therapy     ------------------------------------------------------------------------------------------------------------    PRESENT SYMPTOMS:  [ ] No     [ ] Unable to self-report      [ ] CPOT (ICU)     [ ] PAINADs     [ ] RDOS    [ ] Yes     Source if other than patient:  [ ]Family   [ ]Team     PAIN:   If blank, patient unable to specify   [ ]yes [ ]no  QOL impact-   Location -                    Aggravating factors -  Quality -  Radiation -  Timing-  Pain at most severe level (0-10 scale):  Pain at minimal acceptable level/Pain Goal (0-10 scale):     SYMPTOMS:   Dyspnea:                           [ ]Mild [ ]Moderate [ ]Severe  Anxiety:                             [ ]Mild [ ]Moderate [ ]Severe  Fatigue:                             [ ]Mild [ ]Moderate [ ]Severe  Nausea/Vomiting:              [ ]Mild [ ]Moderate [ ]Severe  Loss of appetite:                [ ]Mild [ ]Moderate [ ]Severe  Constipation:                     [ ]Mild [ ]Moderate [ ]Severe    Other Symptoms:  [X ]All other review of systems negative     Home Medications for symptoms if any:  I-Stop Reference No:    ------------------------------------------------------------------------------------------------------------    FUNCTIONAL STATUS:     Baseline ADL (prior to admission):  [ ] Independent  [ ] Moderate Assistance [ ] Dependent  Palliative Performance Score:     [ ]PPSV2 < or = to 30%     NUTRITIONAL STATUS:     Protein Calorie Malnutrition Present:   [ ]mild   [ ]moderate   [ ]severe   [ ]poor nutritional intake   [ ]artificial nutrition      Weight:   [ ]underweight (BMI 18.5 or less)   [ ]morbid obesity (BMI 30 or higher)   [ ]anasarca  [ ]significant weight loss     Height (cm): 185.4 (06-07-23 @ 12:17)  Weight (kg): 79.832 (06-05-23 @ 11:17)  BMI (kg/m2): 23.2 (06-07-23 @ 12:17)    ------------------------------------------------------------------------------------------------------------    PRIOR ADVANCE DIRECTIVES:    [ ] DNR/MOLST    [ ] Living Will    [ ] Health Care Proxy(s)    DECISION MAKER(s):  [ ] Patient    [ ] Surrogate(s)  [ ] HCP   [ ] Guardian             ------------------------------------------------------------------------------------------------------------  PHYSICAL EXAM:  Vital Signs Last 24 Hrs  T(C): 36.6 (15 Sixto 2023 06:29), Max: 36.6 (14 Jun 2023 21:59)  T(F): 97.8 (15 Sixto 2023 06:29), Max: 97.8 (14 Jun 2023 21:59)  HR: 89 (15 Sixto 2023 06:29) (88 - 96)  BP: 101/55 (15 Sixto 2023 06:29) (101/55 - 119/70)  BP(mean): --  RR: 17 (15 Sixto 2023 06:29) (16 - 17)  SpO2: 96% (15 Sixto 2023 06:29) (93% - 99%)    Parameters below as of 15 Sixto 2023 06:29  Patient On (Oxygen Delivery Method): room air     I&O's Summary      GENERAL:  [ ]Cachexia  [ ] Frail  [ ]Awake  [ ]Oriented x   [ ]Lethargic  [ ]Unarousable  [ ]Verbal  [ ]Non-Verbal    BEHAVIORAL:   [ ] Anxiety  [ ] Delirium [ ] Agitation [ ] Other    HEENT:   [ ]Normal   [ ]Dry mouth   [ ]ET Tube/Trach  [ ]Oral lesions    PULMONARY:   [ ]Clear [ ]Tachypnea  [ ]Audible excessive secretions   [ ]Rhonchi        [ ]Right [ ]Left [ ]Bilateral  [ ]Crackles        [ ]Right [ ]Left [ ]Bilateral  [ ]Wheezing     [ ]Right [ ]Left [ ]Bilateral  [ ]Diminished breath sounds [ ]right [ ]left [ ]bilateral    CARDIOVASCULAR:    [ ]Regular [ ]Irregular [ ]Tachy  [ ]Aleks [ ]Murmur [ ]Other    GASTROINTESTINAL:  [ ]Soft  [ ]Distended   [ ]+BS  [ ]Non tender [ ]Tender  [ ]Other [ ]PEG [ ]OGT/ NGT      GENITOURINARY:  [ ]Normal [ ] Incontinent   [ ]Oliguria/Anuria   [ ]Martínez    MUSCULOSKELETAL:   [ ]Normal   [ ]Weakness  [ ]Bed/Wheelchair bound [ ]Edema    NEUROLOGIC:   [ ]No focal deficits  [ ]Cognitive impairment  [ ]Dysphagia [ ]Dysarthria [ ]Paresis [ ]Other     SKIN:   [ ]Normal  [ ]Rash  [ ]Other  [ ]Pressure ulcer(s)       Present on admission [ ]y [ ]n    ------------------------------------------------------------------------------------------------------------    LABS:                        8.1    9.18  )-----------( 263      ( 15 Sixto 2023 06:15 )             25.5   06-15    138  |  98  |  44<H>  ----------------------------<  76  4.5   |  25  |  6.97<H>    Ca    9.5      15 Sixto 2023 06:15  Phos  4.1     06-15  Mg     2.30     06-15    TPro  7.1  /  Alb  2.7<L>  /  TBili  0.9  /  DBili  x   /  AST  20  /  ALT  21  /  AlkPhos  193<H>  06-13    ------------------------------------------------------------------------------------------------------------    CRITICAL CARE:  [ ]Shock Present  [ ]Septic [ ]Cardiogenic [ ]Neurologic [ ]Hypovolemic [ ] Undifferentiated/Mixed   [ ]Vasopressors [ ]Inotropes     [ ]Respiratory failure present: [ ]Acute  [ ]Chronic [ ]Hypoxic  [ ]Hypercarbic   [ ]Mechanical ventilation   [ ]Trach collar   [ ]Non-invasive ventilatory support   [ ]High flow    [ ]Non-rebreather/Venti     [ ]Other organ failure     ------------------------------------------------------------------------------------------------------------    RADIOLOGY & ADDITIONAL STUDIES:      ------------------------------------------------------------------------------------------------------------    ALLERGIES:  Allergies    penicillin (Rash)    Intolerances        MEDICATIONS  (STANDING):  aspirin enteric coated 81 milliGRAM(s) Oral daily  cefTRIAXone   IVPB 2000 milliGRAM(s) IV Intermittent every 24 hours  chlorhexidine 2% Cloths 1 Application(s) Topical daily  cinacalcet 30 milliGRAM(s) Oral daily  donepezil 5 milliGRAM(s) Oral at bedtime  epoetin shell-epbx (RETACRIT) Injectable 90191 Unit(s) IV Push <User Schedule>  metoprolol succinate ER 12.5 milliGRAM(s) Oral daily  midodrine. 5 milliGRAM(s) Oral <User Schedule>  pantoprazole   Suspension 40 milliGRAM(s) Oral two times a day    MEDICATIONS  (PRN):  guaiFENesin Oral Liquid (Sugar-Free) 100 milliGRAM(s) Oral every 6 hours PRN Cough  sodium chloride 0.9% Bolus. 100 milliLiter(s) IV Bolus every 5 minutes PRN SBP LESS THAN or EQUAL to 90 mmHg           HPI:  Patient is a 77-year-old male with history of DVT (previously on coumadin, now stopped), CAD, ESRD (on HD via RUE AVF Tu/Th/Sat), AOCD, HLD, HTN, obesity, pre-diabetes, and prostate cancer s/p suprapubic catheter presenting from [facility] w/need for HD. Patient's current HD does not have Yusra lift. Last HD 6/01. Patient is currently without complaint, A&Ox1. In the ED VS:  99.7  67-91  /43-49  16-18  %RA. Palliative consulted for complex medical decision making in the setting of serious illness.            PERTINENT PM/SXH:   HTN (hypertension)    HLD (hyperlipidemia)    ESRD (end stage renal disease) on dialysis    Prostate cancer    Obesity    Atrial fibrillation    PVD (peripheral vascular disease)    CAD (coronary artery disease)    Pre-diabetes    ESRD (end stage renal disease) on dialysis    Paroxysmal atrial fibrillation    Essential hypertension    Type 2 diabetes mellitus without complication, without long-term current use of insulin    History of DVT of lower extremity      History of prostatectomy    AV fistula    AV fistula    H/O cardiac catheterization    No significant past surgical history    H/O abdominal surgery      FAMILY HISTORY:  Family history of hypertension in mother (Mother)  HTN      ------------------------------------------------------------------------------------------------------------  ITEMS NOT CHECKED ARE NOT PRESENT    SOCIAL HISTORY:   Living Situation: [ ]Home  [X ]Long term care  [ ]Rehab [ ]Other  Support:     Substance hx:  [ ]   Tobacco hx:  [ ]   Alcohol hx: [ ]   Family Hx substance abuse [ ]yes [ ]no    Zoroastrianism/Spirituality:  PCSSQ[Palliative Care Spiritual Screening Question]   Severity (0-10): 0  Score of 4 or > indicate consideration of Chaplaincy referral.  Chaplaincy Referral: [ ] yes [X ] refused [ ] following    Anticipatory Grief present?:  [ ] yes [X ] no  [ ] Deferred                      Other Referrals:    [ ]Hospice   [ ]Caregiver [place] Support  [ ]Child Life    [ ]Patient & Family Centered Care Referral  [ ]Holistic Therapy     ------------------------------------------------------------------------------------------------------------    PRESENT SYMPTOMS:  [ ] No     [ ] Unable to self-report      [ ] CPOT (ICU)     [ ] PAINADs     [ ] RDOS    [ ] Yes     Source if other than patient:  [ ]Family   [ ]Team     PAIN:   If blank, patient unable to specify   [ ]yes [ ]no  QOL impact-   Location -                    Aggravating factors -  Quality -  Radiation -  Timing-  Pain at most severe level (0-10 scale):  Pain at minimal acceptable level/Pain Goal (0-10 scale):     SYMPTOMS:   Dyspnea:                           [ ]Mild [ ]Moderate [ ]Severe  Anxiety:                             [ ]Mild [ ]Moderate [ ]Severe  Fatigue:                             [ ]Mild [ ]Moderate [ ]Severe  Nausea/Vomiting:              [ ]Mild [ ]Moderate [ ]Severe  Loss of appetite:                [ ]Mild [ ]Moderate [ ]Severe  Constipation:                     [ ]Mild [ ]Moderate [ ]Severe    Other Symptoms:  [X ]All other review of systems negative     Home Medications for symptoms if any:  I-Stop Reference No:    ------------------------------------------------------------------------------------------------------------    FUNCTIONAL STATUS:     Baseline ADL (prior to admission):  [ ] Independent  [ ] Moderate Assistance [ ] Dependent  Palliative Performance Score:     [ ]PPSV2 < or = to 30%     NUTRITIONAL STATUS:     Protein Calorie Malnutrition Present:   [ ]mild   [ ]moderate   [ ]severe   [ ]poor nutritional intake   [ ]artificial nutrition      Weight:   [ ]underweight (BMI 18.5 or less)   [ ]morbid obesity (BMI 30 or higher)   [ ]anasarca  [ ]significant weight loss     Height (cm): 185.4 (06-07-23 @ 12:17)  Weight (kg): 79.832 (06-05-23 @ 11:17)  BMI (kg/m2): 23.2 (06-07-23 @ 12:17)    ------------------------------------------------------------------------------------------------------------    PRIOR ADVANCE DIRECTIVES:    [ ] DNR/MOLST    [ ] Living Will    [ ] Health Care Proxy(s)    DECISION MAKER(s):  [ ] Patient    [ ] Surrogate(s)  [ ] HCP   [ ] Guardian             ------------------------------------------------------------------------------------------------------------  PHYSICAL EXAM:  Vital Signs Last 24 Hrs  T(C): 36.6 (15 Sixto 2023 06:29), Max: 36.6 (14 Jun 2023 21:59)  T(F): 97.8 (15 Sixto 2023 06:29), Max: 97.8 (14 Jun 2023 21:59)  HR: 89 (15 Sixto 2023 06:29) (88 - 96)  BP: 101/55 (15 Sixto 2023 06:29) (101/55 - 119/70)  BP(mean): --  RR: 17 (15 Sixto 2023 06:29) (16 - 17)  SpO2: 96% (15 Sixto 2023 06:29) (93% - 99%)    Parameters below as of 15 Sixto 2023 06:29  Patient On (Oxygen Delivery Method): room air     I&O's Summary      GENERAL:  [ ]Cachexia  [ ] Frail  [ ]Awake  [ ]Oriented x   [ ]Lethargic  [ ]Unarousable  [ ]Verbal  [ ]Non-Verbal    BEHAVIORAL:   [ ] Anxiety  [ ] Delirium [ ] Agitation [ ] Other    HEENT:   [ ]Normal   [ ]Dry mouth   [ ]ET Tube/Trach  [ ]Oral lesions    PULMONARY:   [ ]Clear [ ]Tachypnea  [ ]Audible excessive secretions   [ ]Rhonchi        [ ]Right [ ]Left [ ]Bilateral  [ ]Crackles        [ ]Right [ ]Left [ ]Bilateral  [ ]Wheezing     [ ]Right [ ]Left [ ]Bilateral  [ ]Diminished breath sounds [ ]right [ ]left [ ]bilateral    CARDIOVASCULAR:    [ ]Regular [ ]Irregular [ ]Tachy  [ ]Aleks [ ]Murmur [ ]Other    GASTROINTESTINAL:  [ ]Soft  [ ]Distended   [ ]+BS  [ ]Non tender [ ]Tender  [ ]Other [ ]PEG [ ]OGT/ NGT      GENITOURINARY:  [ ]Normal [ ] Incontinent   [ ]Oliguria/Anuria   [ ]Martínez    MUSCULOSKELETAL:   [ ]Normal   [ ]Weakness  [ ]Bed/Wheelchair bound [ ]Edema    NEUROLOGIC:   [ ]No focal deficits  [ ]Cognitive impairment  [ ]Dysphagia [ ]Dysarthria [ ]Paresis [ ]Other     SKIN:   [ ]Normal  [ ]Rash  [ ]Other  [ ]Pressure ulcer(s)       Present on admission [ ]y [ ]n    ------------------------------------------------------------------------------------------------------------    LABS:                        8.1    9.18  )-----------( 263      ( 15 Sixto 2023 06:15 )             25.5   06-15    138  |  98  |  44<H>  ----------------------------<  76  4.5   |  25  |  6.97<H>    Ca    9.5      15 Sixto 2023 06:15  Phos  4.1     06-15  Mg     2.30     06-15    TPro  7.1  /  Alb  2.7<L>  /  TBili  0.9  /  DBili  x   /  AST  20  /  ALT  21  /  AlkPhos  193<H>  06-13    ------------------------------------------------------------------------------------------------------------    CRITICAL CARE:  [ ]Shock Present  [ ]Septic [ ]Cardiogenic [ ]Neurologic [ ]Hypovolemic [ ] Undifferentiated/Mixed   [ ]Vasopressors [ ]Inotropes     [ ]Respiratory failure present: [ ]Acute  [ ]Chronic [ ]Hypoxic  [ ]Hypercarbic   [ ]Mechanical ventilation   [ ]Trach collar   [ ]Non-invasive ventilatory support   [ ]High flow    [ ]Non-rebreather/Venti     [ ]Other organ failure     ------------------------------------------------------------------------------------------------------------    RADIOLOGY & ADDITIONAL STUDIES:    < from: Upper Endoscopy (06.07.23 @ 13:22) >  Impression:          - 7 cm hiatal hernia. Naif ulcers.                       - LA Grade D esophagitis.                       - Gastropathy. Biopsied.                       - A single submucosal papule (nodule) found in the                        stomach.                       - One non-bleeding duodenal ulcer with a visible vessel.                        Injected. Treated with bipolar cautery.                       - Duodenopathy.  Recommendation:      - [facility]                       - Clear liquid diet today.                       - PPI drip for 72 h, then PPI 40mg BID x 8 weeks                      followed by daily indefinitely                       - Colonoscopy was deferred given patient's respiratory                        status, light sedation and likely etiology for anemia                        identified.            - Monitor for signs of perforation: worsening abdominal                        pain, fevers, hypotension                       - Trend CBC, CMP                       - Avoid NSAIDs                       - Transfuse for Hb > 7         - Given comorbidities, risks of repeat endoscopic                        evaluation to assess for healing of esophagitis and                        ulcer (as well as further evaluation of fundic lesion)                        may outweigh benefits. Can be readdressed on outpatient                        basis pending overall GOC.                                                                                     < end of copied text >    < from: Xray Chest 1 View AP/PA (06.03.23 @ 12:05) >  IMPRESSION:    No acute infiltrate. Right axillary vascular stent.    < end of copied text >    < from: US Abdomen Upper Quadrant Right (06.04.23 @ 15:24) >  IMPRESSION:  Cholelithiasis without evidence of cholecystitis.    The liver is normal in appearance.    Markedly echogenic right kidney with associated multiple small renal   cysts. Findings are suggestive medical renal disease.    < end of copied text >    < from: US Kidney and Bladder (06.12.23 @ 00:00) >  IMPRESSION:  *  Bilateral renal cysts and parenchymal disease.  *  No hydronephrosis.  *  Martínez catheter balloon in the right abdomen. Location of the balloon   within the [place] pouch cannot be determined sonographically.    < end of copied text >    < from: CT Abdomen and Pelvis No Cont (06.13.23 @ 21:27) >  IMPRESSION:  Partial nephrectomy with urinary diversion in the right lower quadrant.    Bowel containing ventral supraumbilical hernia. No bowel obstruction.    No evidence of acute infectious process in the abdomen and pelvis.    < end of copied text >    ------------------------------------------------------------------------------------------------------------    ALLERGIES:  Allergies    penicillin (Rash)    Intolerances        MEDICATIONS  (STANDING):  aspirin enteric coated 81 milliGRAM(s) Oral daily  cefTRIAXone   IVPB 2000 milliGRAM(s) IV Intermittent every 24 hours  chlorhexidine 2% Cloths 1 Application(s) Topical daily  cinacalcet 30 milliGRAM(s) Oral daily  donepezil 5 milliGRAM(s) Oral at bedtime  epoetin shell-epbx (RETACRIT) Injectable 48315 Unit(s) IV Push <User Schedule>  metoprolol succinate ER 12.5 milliGRAM(s) Oral daily  midodrine. 5 milliGRAM(s) Oral <User Schedule>  pantoprazole   Suspension 40 milliGRAM(s) Oral two times a day    MEDICATIONS  (PRN):  guaiFENesin Oral Liquid (Sugar-Free) 100 milliGRAM(s) Oral every 6 hours PRN Cough  sodium chloride 0.9% Bolus. 100 milliLiter(s) IV Bolus every 5 minutes PRN SBP LESS THAN or EQUAL to 90 mmHg           HPI:  Patient is a 77-year-old male with history of DVT (previously on coumadin, now stopped), CAD, ESRD (on HD via RUE AVF Tu/Th/Sat), AOCD, HLD, HTN, obesity, pre-diabetes, and prostate cancer s/p suprapubic catheter presenting from [facility] w/need for HD. Patient's current HD does not have Yusra lift. Last HD 6/01. Patient is currently without complaint, A&Ox1. In the ED VS:  99.7  67-91  /43-49  16-18  %RA. Palliative consulted for complex medical decision making in the setting of serious illness.      -----        PERTINENT PM/SXH:   HTN (hypertension)    HLD (hyperlipidemia)    ESRD (end stage renal disease) on dialysis    Prostate cancer    Obesity    Atrial fibrillation    PVD (peripheral vascular disease)    CAD (coronary artery disease)    Pre-diabetes    ESRD (end stage renal disease) on dialysis    Paroxysmal atrial fibrillation    Essential hypertension    Type 2 diabetes mellitus without complication, without long-term current use of insulin    History of DVT of lower extremity      History of prostatectomy    AV fistula    AV fistula    H/O cardiac catheterization    No significant past surgical history    H/O abdominal surgery      FAMILY HISTORY:  Family history of hypertension in mother (Mother)  HTN    ------------------------------------------------------------------------------------------------------------  ITEMS NOT CHECKED ARE NOT PRESENT    SOCIAL HISTORY:   Living Situation: [ ]Home  [X ]Long term care  [ ]Rehab [ ]Other  Support: Wife Kim. Per wife, his daughter/ her stepdaughter Francisco not very involved.   From [place]     Substance hx:  [ ]   Tobacco hx:  [ ]   Alcohol hx: [ ]   Family Hx substance abuse [ ]yes [ ]no    Restorationist/Spirituality:   PCSSQ[Palliative Care Spiritual Screening Question]   Severity (0-10): 0  Score of 4 or > indicate consideration of Chaplaincy referral.  Chaplaincy Referral: [ ] yes [X ] refused [ ] following    Anticipatory Grief present?:  [ ] yes [X ] no  [ ] Deferred                      Other Referrals:    [ ]Hospice   [ ]Caregiver [place] Support  [ ]Child Life    [ ]Patient & Family Centered Care Referral  [ ]Holistic Therapy     ------------------------------------------------------------------------------------------------------------    PRESENT SYMPTOMS:  [X ] No     [ ] Unable to self-report      [ ] CPOT (ICU)     [ ] PAINADs     [ ] RDOS    [ ] Yes     Source if other than patient:  [ ]Family   [ ]Team     PAIN:   If blank, patient unable to specify   [ ]yes [ ]no  QOL impact-   Location -                    Aggravating factors -  Quality -  Radiation -  Timing-  Pain at most severe level (0-10 scale):  Pain at minimal acceptable level/Pain Goal (0-10 scale):     SYMPTOMS:   Dyspnea:                           [ ]Mild [ ]Moderate [ ]Severe  Anxiety:                             [ ]Mild [ ]Moderate [ ]Severe  Fatigue:                             [ ]Mild [ ]Moderate [ ]Severe  Nausea/Vomiting:              [ ]Mild [ ]Moderate [ ]Severe  Loss of appetite:                [ ]Mild [ ]Moderate [ ]Severe  Constipation:                     [ ]Mild [ ]Moderate [ ]Severe    Other Symptoms:  [X ]All other review of systems negative     Home Medications for symptoms if any:  I-Stop Reference No:    ------------------------------------------------------------------------------------------------------------    FUNCTIONAL STATUS:     Baseline ADL (prior to admission):  [ ] Independent  [ ] Moderate Assistance [X ] Dependent  Palliative Performance Score: 30%   Poor appetite   Needs help with ADLs     [X ]PPSV2 < or = to 30%     NUTRITIONAL STATUS:     Protein Calorie Malnutrition Present:   [ ]mild   [ ]moderate   [ ]severe   [ ]poor nutritional intake   [ ]artificial nutrition      Weight:   [ ]underweight (BMI 18.5 or less)   [ ]morbid obesity (BMI 30 or higher)   [ ]anasarca  [ ]significant weight loss     Height (cm): 185.4 (06-07-23 @ 12:17)  Weight (kg): 79.832 (06-05-23 @ 11:17)  BMI (kg/m2): 23.2 (06-07-23 @ 12:17)    ------------------------------------------------------------------------------------------------------------    PRIOR ADVANCE DIRECTIVES:    [ ] DNR/MOLST    [ ] Living Will    [ ] Health Care Proxy(s)    DECISION MAKER(s):  [ ] Patient    [X ] Surrogate(s)- loree Alvarez   [ ] HCP   [ ] Guardian             ------------------------------------------------------------------------------------------------------------  PHYSICAL EXAM:  Vital Signs Last 24 Hrs  T(C): 36.6 (15 Sixto 2023 06:29), Max: 36.6 (14 Jun 2023 21:59)  T(F): 97.8 (15 Sixto 2023 06:29), Max: 97.8 (14 Jun 2023 21:59)  HR: 89 (15 Sixto 2023 06:29) (88 - 96)  BP: 101/55 (15 Sixto 2023 06:29) (101/55 - 119/70)  BP(mean): --  RR: 17 (15 Sixto 2023 06:29) (16 - 17)  SpO2: 96% (15 Sixto 2023 06:29) (93% - 99%)    Parameters below as of 15 Sixto 2023 06:29  Patient On (Oxygen Delivery Method): room air     I&O's Summary      GENERAL:  [X ]Cachexia  [X ] Frail  [X ]Awake  [X ]Oriented x 1   [ ]Lethargic  [ ]Unarousable  [ ]Verbal  [ ]Non-Verbal    BEHAVIORAL:   [ ] Anxiety  [ ] Delirium [ ] Agitation [ ] Other    HEENT:   [X ]Normal   [ ]Dry mouth   [ ]ET Tube/Trach  [ ]Oral lesions    PULMONARY:   [X ]Clear [ ]Tachypnea  [ ]Audible excessive secretions   [ ]Rhonchi        [ ]Right [ ]Left [ ]Bilateral  [ ]Crackles        [ ]Right [ ]Left [ ]Bilateral  [ ]Wheezing     [ ]Right [ ]Left [ ]Bilateral  [ ]Diminished breath sounds [ ]right [ ]left [ ]bilateral    CARDIOVASCULAR:    [X ]Regular [ ]Irregular [ ]Tachy  [ ]Aleks [ ]Murmur [ ]Other    GASTROINTESTINAL:  [X ]Soft  [ ]Distended   [ ]+BS  [X ]Non tender [ ]Tender  [ ]Other [ ]PEG [ ]OGT/ NGT      GENITOURINARY:  [ ]Normal [ ] Incontinent   [X ]Oliguria/Anuria   [ ]Martínez    MUSCULOSKELETAL:   [ ]Normal   [ ]Weakness  [X ]Bed/Wheelchair bound [ ]Edema    NEUROLOGIC:   [X ]No focal deficits  [ ]Cognitive impairment  [ ]Dysphagia [ ]Dysarthria [ ]Paresis [ ]Other     SKIN:   [ ]Normal  [ ]Rash  [ ]Other  [X ]Pressure ulcer(s)       Present on admission [X ]y [ ]n    ------------------------------------------------------------------------------------------------------------    LABS:                        8.1    9.18  )-----------( 263      ( 15 Sixto 2023 06:15 )             25.5   06-15    138  |  98  |  44<H>  ----------------------------<  76  4.5   |  25  |  6.97<H>    Ca    9.5      15 Sixto 2023 06:15  Phos  4.1     06-15  Mg     2.30     06-15    TPro  7.1  /  Alb  2.7<L>  /  TBili  0.9  /  DBili  x   /  AST  20  /  ALT  21  /  AlkPhos  193<H>  06-13    ------------------------------------------------------------------------------------------------------------    CRITICAL CARE:  [ ]Shock Present  [ ]Septic [ ]Cardiogenic [ ]Neurologic [ ]Hypovolemic [ ] Undifferentiated/Mixed   [ ]Vasopressors [ ]Inotropes     [ ]Respiratory failure present: [ ]Acute  [ ]Chronic [ ]Hypoxic  [ ]Hypercarbic   [ ]Mechanical ventilation   [ ]Trach collar   [ ]Non-invasive ventilatory support   [ ]High flow    [ ]Non-rebreather/Venti     [ ]Other organ failure     ------------------------------------------------------------------------------------------------------------    RADIOLOGY & ADDITIONAL STUDIES:    < from: Upper Endoscopy (06.07.23 @ 13:22) >  Impression:          - 7 cm hiatal hernia. Naif ulcers.                       - LA Grade D esophagitis.                       - Gastropathy. Biopsied.                       - A single submucosal papule (nodule) found in the                        stomach.                       - One non-bleeding duodenal ulcer with a visible vessel.                        Injected. Treated with bipolar cautery.                       - Duodenopathy.  Recommendation:      - [facility].                       - Clear liquid diet today.                       - PPI drip for 72 h, then PPI 40mg BID x 8 weeks                      followed by daily indefinitely                       - Colonoscopy was deferred given patient's respiratory                        status, light sedation and likely etiology for anemia                        identified.            - Monitor for signs of perforation: worsening abdominal                        pain, fevers, hypotension                       - Trend CBC, CMP                       - Avoid NSAIDs                       - Transfuse for Hb > 7         - Given comorbidities, risks of repeat endoscopic                        evaluation to assess for healing of esophagitis and                        ulcer (as well as further evaluation of fundic lesion)                        may outweigh benefits. Can be readdressed on outpatient                        basis pending overall GOC.                                                                                     < end of copied text >    < from: Xray Chest 1 View AP/PA (06.03.23 @ 12:05) >  IMPRESSION:    No acute infiltrate. Right axillary vascular stent.    < end of copied text >    < from: US Abdomen Upper Quadrant Right (06.04.23 @ 15:24) >  IMPRESSION:  Cholelithiasis without evidence of cholecystitis.    The liver is normal in appearance.    Markedly echogenic right kidney with associated multiple small renal   cysts. Findings are suggestive medical renal disease.    < end of copied text >    < from: US Kidney and Bladder (06.12.23 @ 00:00) >  IMPRESSION:  *  Bilateral renal cysts and parenchymal disease.  *  No hydronephrosis.  *  Martínez catheter balloon in the right abdomen. Location of the balloon   within the [place] pouch cannot be determined sonographically.    < end of copied text >    < from: CT Abdomen and Pelvis No Cont (06.13.23 @ 21:27) >  IMPRESSION:  Partial nephrectomy with urinary diversion in the right lower quadrant.    Bowel containing ventral supraumbilical hernia. No bowel obstruction.    No evidence of acute infectious process in the abdomen and pelvis.    < end of copied text >    < from: Transthoracic Echocardiogram (06.14.23 @ 09:47) >  CONCLUSIONS:  1. Mitral annular calcification, otherwise normal mitral  valve. Mild mitral regurgitation.  2. Normal left ventricular internal dimensions and wall  thicknesses.Moderate segmental left ventricular systolic  dysfunction.There ia akinesis of the entire apex,  anteroseptum, and the remaining walls are hypokinetic.  The  anterior wall is not well visualized but appears at least  hypokinetic. LVE is 32% using biplane Patterson's method.  3. Normal right atrium. Normal tricuspid valve. Mild  eccentric tricuspid regurgitation.Estimated pulmonary  artery systolic pressure equals 38 mm Hg, assuming right  atrial pressure equals 10  mm Hg, consistent with  borderline pulmonary hypertension.  4. No pericardial effusion seen.  No evidence of valvular vegetation.  *** Compared with echocardiogram of 6/5/2023, there is a  significant decrease in LVEF and the entire anterolateral  appears hypokinetic.    < end of copied text >    ------------------------------------------------------------------------------------------------------------    ALLERGIES:  Allergies    penicillin (Rash)    Intolerances        MEDICATIONS  (STANDING):  aspirin enteric coated 81 milliGRAM(s) Oral daily  cefTRIAXone   IVPB 2000 milliGRAM(s) IV Intermittent every 24 hours  chlorhexidine 2% Cloths 1 Application(s) Topical daily  cinacalcet 30 milliGRAM(s) Oral daily  donepezil 5 milliGRAM(s) Oral at bedtime  epoetin shell-epbx (RETACRIT) Injectable 36642 Unit(s) IV Push <User Schedule>  metoprolol succinate ER 12.5 milliGRAM(s) Oral daily  midodrine. 5 milliGRAM(s) Oral <User Schedule>  pantoprazole   Suspension 40 milliGRAM(s) Oral two times a day    MEDICATIONS  (PRN):  guaiFENesin Oral Liquid (Sugar-Free) 100 milliGRAM(s) Oral every 6 hours PRN Cough  sodium chloride 0.9% Bolus. 100 milliLiter(s) IV Bolus every 5 minutes PRN SBP LESS THAN or EQUAL to 90 mmHg           HPI:  Patient is a 77-year-old male with history of DVT (previously on coumadin, now stopped), CAD, ESRD (on HD via RUE AVF Tu/Th/Sat), AOCD, HLD, HTN, obesity, pre-diabetes, and prostate cancer s/p suprapubic catheter presenting from [facility] w/need for HD. Patient's current HD does not have Yusra lift. Last HD 6/01. Patient is currently without complaint, A&Ox1. In the ED VS:  99.7  67-91  /43-49  16-18  %RA. Palliative consulted for complex medical decision making in the setting of serious illness.      Patient seen this AM, fluctuating mental status, found awake initially. Patient says he does not know his medical issues. Attempted GOC, however patient became lethargic, and mumbling, unclear what he was saying. See below for GOC.     PERTINENT PM/SXH:   HTN (hypertension)    HLD (hyperlipidemia)    ESRD (end stage renal disease) on dialysis    Prostate cancer    Obesity    Atrial fibrillation    PVD (peripheral vascular disease)    CAD (coronary artery disease)    Pre-diabetes    ESRD (end stage renal disease) on dialysis    Paroxysmal atrial fibrillation    Essential hypertension    Type 2 diabetes mellitus without complication, without long-term current use of insulin    History of DVT of lower extremity      History of prostatectomy    AV fistula    AV fistula    H/O cardiac catheterization    No significant past surgical history    H/O abdominal surgery      FAMILY HISTORY:  Family history of hypertension in mother (Mother)  HTN    ------------------------------------------------------------------------------------------------------------  ITEMS NOT CHECKED ARE NOT PRESENT    SOCIAL HISTORY:   Living Situation: [ ]Home  [X ]Long term care  [ ]Rehab [ ]Other  Support: Wife Kim. Per wife, his daughter/ her stepdaughter Francisco not very involved.   From [place]     Substance hx:  [ ]   Tobacco hx:  [ ]   Alcohol hx: [ ]   Family Hx substance abuse [ ]yes [ ]no    Mormon/Spirituality:   PCSSQ[Palliative Care Spiritual Screening Question]   Severity (0-10): 0  Score of 4 or > indicate consideration of Chaplaincy referral.  Chaplaincy Referral: [ ] yes [X ] refused [ ] following    Anticipatory Grief present?:  [ ] yes [X ] no  [ ] Deferred                      Other Referrals:    [ ]Hospice   [ ]Caregiver [place] Support  [ ]Child Life    [ ]Patient & Family Centered Care Referral  [ ]Holistic Therapy     ------------------------------------------------------------------------------------------------------------    PRESENT SYMPTOMS:  [ ] No     [X ] Unable to self report      [ ] CPOT (ICU)     [ ] PAINADs     [ ] RDOS    [X ] Yes     Source if other than patient:  [X ]Family   [ ]Team     PAIN:   If blank, patient unable to specify   [ ]yes [ ]no  QOL impact-   Location -                    Aggravating factors -  Quality -  Radiation -  Timing-  Pain at most severe level (0-10 scale):  Pain at minimal acceptable level/Pain Goal (0-10 scale):     SYMPTOMS:   Dyspnea:                           [ ]Mild [ ]Moderate [ ]Severe  Anxiety:                             [ ]Mild [ ]Moderate [ ]Severe  Fatigue:                             [ ]Mild [ ]Moderate [ ]Severe  Nausea/Vomiting:              [ ]Mild [ ]Moderate [ ]Severe  Loss of appetite:                [ ]Mild [X ]Moderate [ ]Severe  Constipation:                     [ ]Mild [ ]Moderate [ ]Severe    Other Symptoms:  [X ]All other review of systems negative     Home Medications for symptoms if any:  I-Stop Reference No:    ------------------------------------------------------------------------------------------------------------    FUNCTIONAL STATUS:     Baseline ADL (prior to admission):  [ ] Independent  [ ] Moderate Assistance [X ] Dependent  Palliative Performance Score: 30%   Poor appetite   Needs help with ADLs     [X ]PPSV2 < or = to 30%     NUTRITIONAL STATUS:     Protein Calorie Malnutrition Present:   [ ]mild   [ ]moderate   [ ]severe   [ ]poor nutritional intake   [ ]artificial nutrition      Weight:   [ ]underweight (BMI 18.5 or less)   [ ]morbid obesity (BMI 30 or higher)   [ ]anasarca  [ ]significant weight loss     Height (cm): 185.4 (06-07-23 @ 12:17)  Weight (kg): 79.832 (06-05-23 @ 11:17)  BMI (kg/m2): 23.2 (06-07-23 @ 12:17)    ------------------------------------------------------------------------------------------------------------    PRIOR ADVANCE DIRECTIVES:    [ ] DNR/MOLST    [ ] Living Will    [ ] Health Care Proxy(s)    DECISION MAKER(s):  [ ] Patient    [X ] Surrogate(s)- loree Alvarez   [ ] HCP   [ ] Guardian             ------------------------------------------------------------------------------------------------------------  PHYSICAL EXAM:  Vital Signs Last 24 Hrs  T(C): 36.6 (15 Sixto 2023 06:29), Max: 36.6 (14 Jun 2023 21:59)  T(F): 97.8 (15 Sixto 2023 06:29), Max: 97.8 (14 Jun 2023 21:59)  HR: 89 (15 Sixto 2023 06:29) (88 - 96)  BP: 101/55 (15 Sixto 2023 06:29) (101/55 - 119/70)  BP(mean): --  RR: 17 (15 Sixto 2023 06:29) (16 - 17)  SpO2: 96% (15 Sixto 2023 06:29) (93% - 99%)    Parameters below as of 15 Sixto 2023 06:29  Patient On (Oxygen Delivery Method): room air     I&O's Summary      GENERAL:  [X ]Cachexia  [X ] Frail  [X ]Awake  [X ]Oriented x 1   [X ]Lethargic  [ ]Unarousable  [ ]Verbal  [ ]Non-Verbal    BEHAVIORAL:   [ ] Anxiety  [ ] Delirium [ ] Agitation [ ] Other    HEENT:   [X ]Normal   [ ]Dry mouth   [ ]ET Tube/Trach  [ ]Oral lesions    PULMONARY:   [X ]Clear [ ]Tachypnea  [ ]Audible excessive secretions   [ ]Rhonchi        [ ]Right [ ]Left [ ]Bilateral  [ ]Crackles        [ ]Right [ ]Left [ ]Bilateral  [ ]Wheezing     [ ]Right [ ]Left [ ]Bilateral  [ ]Diminished breath sounds [ ]right [ ]left [ ]bilateral    CARDIOVASCULAR:    [X ]Regular [ ]Irregular [ ]Tachy  [ ]Aleks [ ]Murmur [ ]Other    GASTROINTESTINAL:  [X ]Soft  [ ]Distended   [ ]+BS  [X ]Non tender [ ]Tender  [ ]Other [ ]PEG [ ]OGT/ NGT      GENITOURINARY:  [ ]Normal [ ] Incontinent   [X ]Oliguria/Anuria   [ ]Martínez    MUSCULOSKELETAL:   [ ]Normal   [ ]Weakness  [X ]Bed/Wheelchair bound [ ]Edema    NEUROLOGIC:   [X ]No focal deficits  [ ]Cognitive impairment  [ ]Dysphagia [ ]Dysarthria [ ]Paresis [ ]Other     SKIN:   [ ]Normal  [ ]Rash  [ ]Other  [X ]Pressure ulcer(s)       Present on admission [X ]y [ ]n    ------------------------------------------------------------------------------------------------------------    LABS:                        8.1    9.18  )-----------( 263      ( 15 Sixto 2023 06:15 )             25.5   06-15    138  |  98  |  44<H>  ----------------------------<  76  4.5   |  25  |  6.97<H>    Ca    9.5      15 Sixto 2023 06:15  Phos  4.1     06-15  Mg     2.30     06-15    TPro  7.1  /  Alb  2.7<L>  /  TBili  0.9  /  DBili  x   /  AST  20  /  ALT  21  /  AlkPhos  193<H>  06-13    ------------------------------------------------------------------------------------------------------------    CRITICAL CARE:  [ ]Shock Present  [ ]Septic [ ]Cardiogenic [ ]Neurologic [ ]Hypovolemic [ ] Undifferentiated/Mixed   [ ]Vasopressors [ ]Inotropes     [ ]Respiratory failure present: [ ]Acute  [ ]Chronic [ ]Hypoxic  [ ]Hypercarbic   [ ]Mechanical ventilation   [ ]Trach collar   [ ]Non-invasive ventilatory support   [ ]High flow    [ ]Non-rebreather/Venti     [ ]Other organ failure     ------------------------------------------------------------------------------------------------------------    RADIOLOGY & ADDITIONAL STUDIES:    < from: Upper Endoscopy (06.07.23 @ 13:22) >  Impression:          - 7 cm hiatal hernia. Naif ulcers.                       - LA Grade D esophagitis.                       - Gastropathy. Biopsied.                       - A single submucosal papule (nodule) found in the                        stomach.                       - One non-bleeding duodenal ulcer with a visible vessel.                        Injected. Treated with bipolar cautery.                       - Duodenopathy.  Recommendation:      - [facility].                       - Clear liquid diet today.                       - PPI drip for 72 h, then PPI 40mg BID x 8 weeks                      followed by daily indefinitely                       - Colonoscopy was deferred given patient's respiratory                        status, light sedation and likely etiology for anemia                        identified.            - Monitor for signs of perforation: worsening abdominal                        pain, fevers, hypotension                       - Trend CBC, CMP                       - Avoid NSAIDs                       - Transfuse for Hb > 7         - Given comorbidities, risks of repeat endoscopic                        evaluation to assess for healing of esophagitis and                        ulcer (as well as further evaluation of fundic lesion)                        may outweigh benefits. Can be readdressed on outpatient                        basis pending overall GOC.                                                                                     < end of copied text >    < from: Xray Chest 1 View AP/PA (06.03.23 @ 12:05) >  IMPRESSION:    No acute infiltrate. Right axillary vascular stent.    < end of copied text >    < from: US Abdomen Upper Quadrant Right (06.04.23 @ 15:24) >  IMPRESSION:  Cholelithiasis without evidence of cholecystitis.    The liver is normal in appearance.    Markedly echogenic right kidney with associated multiple small renal   cysts. Findings are suggestive medical renal disease.    < end of copied text >    < from: US Kidney and Bladder (06.12.23 @ 00:00) >  IMPRESSION:  *  Bilateral renal cysts and parenchymal disease.  *  No hydronephrosis.  *  Martínez catheter balloon in the right abdomen. Location of the balloon   within the [place] pouch cannot be determined sonographically.    < end of copied text >    < from: CT Abdomen and Pelvis No Cont (06.13.23 @ 21:27) >  IMPRESSION:  Partial nephrectomy with urinary diversion in the right lower quadrant.    Bowel containing ventral supraumbilical hernia. No bowel obstruction.    No evidence of acute infectious process in the abdomen and pelvis.    < end of copied text >    < from: Transthoracic Echocardiogram (06.14.23 @ 09:47) >  CONCLUSIONS:  1. Mitral annular calcification, otherwise normal mitral  valve. Mild mitral regurgitation.  2. Normal left ventricular internal dimensions and wall  thicknesses.Moderate segmental left ventricular systolic  dysfunction.There ia akinesis of the entire apex,  anteroseptum, and the remaining walls are hypokinetic.  The  anterior wall is not well visualized but appears at least  hypokinetic. LVE is 32% using biplane Patterson's method.  3. Normal right atrium. Normal tricuspid valve. Mild  eccentric tricuspid regurgitation.Estimated pulmonary  artery systolic pressure equals 38 mm Hg, assuming right  atrial pressure equals 10  mm Hg, consistent with  borderline pulmonary hypertension.  4. No pericardial effusion seen.  No evidence of valvular vegetation.  *** Compared with echocardiogram of 6/5/2023, there is a  significant decrease in LVEF and the entire anterolateral  appears hypokinetic.    < end of copied text >    ------------------------------------------------------------------------------------------------------------    ALLERGIES:  Allergies    penicillin (Rash)    Intolerances    MEDICATIONS  (STANDING):  aspirin enteric coated 81 milliGRAM(s) Oral daily  cefTRIAXone   IVPB 2000 milliGRAM(s) IV Intermittent every 24 hours  chlorhexidine 2% Cloths 1 Application(s) Topical daily  cinacalcet 30 milliGRAM(s) Oral daily  donepezil 5 milliGRAM(s) Oral at bedtime  epoetin shell-epbx (RETACRIT) Injectable 36037 Unit(s) IV Push <User Schedule>  metoprolol succinate ER 12.5 milliGRAM(s) Oral daily  midodrine. 5 milliGRAM(s) Oral <User Schedule>  pantoprazole   Suspension 40 milliGRAM(s) Oral two times a day    MEDICATIONS  (PRN):  guaiFENesin Oral Liquid (Sugar-Free) 100 milliGRAM(s) Oral every 6 hours PRN Cough  sodium chloride 0.9% Bolus. 100 milliLiter(s) IV Bolus every 5 minutes PRN SBP LESS THAN or EQUAL to 90 mmHg           HPI:  Patient is a 77-year-old male with history of DVT (previously on coumadin, now stopped), CAD, ESRD (on HD via RUE AVF Tu/Th/Sat), AOCD, HLD, HTN, obesity, pre-diabetes, and prostate cancer s/p suprapubic catheter presenting from [facility] w/need for HD. Patient's current HD does not have Yusra lift. Last HD 6/01. Patient is currently without complaint, A&Ox1. In the ED VS:  99.7  67-91  /43-49  16-18  %RA. Palliative consulted for complex medical decision making in the setting of serious illness.      Patient seen this AM, fluctuating mental status, found awake initially. Patient says he does not know his medical issues. Attempted GOC, however patient became lethargic, and was mumbling, unclear what he was saying. See below for GOC.     PERTINENT PM/SXH:   HTN (hypertension)    HLD (hyperlipidemia)    ESRD (end stage renal disease) on dialysis    Prostate cancer    Obesity    Atrial fibrillation    PVD (peripheral vascular disease)    CAD (coronary artery disease)    Pre-diabetes    ESRD (end stage renal disease) on dialysis    Paroxysmal atrial fibrillation    Essential hypertension    Type 2 diabetes mellitus without complication, without long-term current use of insulin    History of DVT of lower extremity      History of prostatectomy    AV fistula    AV fistula    H/O cardiac catheterization    No significant past surgical history    H/O abdominal surgery      FAMILY HISTORY:  Family history of hypertension in mother (Mother)  HTN    ------------------------------------------------------------------------------------------------------------  ITEMS NOT CHECKED ARE NOT PRESENT    SOCIAL HISTORY:   Living Situation: [ ]Home  [X ]Long term care  [ ]Rehab [ ]Other  Support: Wife Kim. Per wife, his daughter/ her stepdaughter Francisco not very involved.   From [place]     Substance hx:  [ ]   Tobacco hx:  [ ]   Alcohol hx: [ ]   Family Hx substance abuse [ ]yes [ ]no    Confucianism/Spirituality:   PCSSQ[Palliative Care Spiritual Screening Question]   Severity (0-10): 0  Score of 4 or > indicate consideration of Chaplaincy referral.  Chaplaincy Referral: [ ] yes [X ] refused [ ] following    Anticipatory Grief present?:  [ ] yes [X ] no  [ ] Deferred                      Other Referrals:    [ ]Hospice   [ ]Caregiver [place] Support  [ ]Child Life    [ ]Patient & Family Centered Care Referral  [ ]Holistic Therapy     ------------------------------------------------------------------------------------------------------------    PRESENT SYMPTOMS:  [ ] No     [X ] Unable to self report      [ ] CPOT (ICU)     [ ] PAINADs     [ ] RDOS    [X ] Yes     Source if other than patient:  [X ]Family   [ ]Team     PAIN:   If blank, patient unable to specify   [ ]yes [ ]no  QOL impact-   Location -                    Aggravating factors -  Quality -  Radiation -  Timing-  Pain at most severe level (0-10 scale):  Pain at minimal acceptable level/Pain Goal (0-10 scale):     SYMPTOMS:   Dyspnea:                           [ ]Mild [ ]Moderate [ ]Severe  Anxiety:                             [ ]Mild [ ]Moderate [ ]Severe  Fatigue:                             [ ]Mild [ ]Moderate [ ]Severe  Nausea/Vomiting:              [ ]Mild [ ]Moderate [ ]Severe  Loss of appetite:                [ ]Mild [X ]Moderate [ ]Severe  Constipation:                     [ ]Mild [ ]Moderate [ ]Severe    Other Symptoms:  [X ]All other review of systems negative     Home Medications for symptoms if any:  I-Stop Reference No:    ------------------------------------------------------------------------------------------------------------    FUNCTIONAL STATUS:     Baseline ADL (prior to admission):  [ ] Independent  [ ] Moderate Assistance [X ] Dependent  Palliative Performance Score: 30%   Poor appetite   Needs help with ADLs     [X ]PPSV2 < or = to 30%     NUTRITIONAL STATUS:     Protein Calorie Malnutrition Present:   [ ]mild   [ ]moderate   [ ]severe   [ ]poor nutritional intake   [ ]artificial nutrition      Weight:   [ ]underweight (BMI 18.5 or less)   [ ]morbid obesity (BMI 30 or higher)   [ ]anasarca  [ ]significant weight loss     Height (cm): 185.4 (06-07-23 @ 12:17)  Weight (kg): 79.832 (06-05-23 @ 11:17)  BMI (kg/m2): 23.2 (06-07-23 @ 12:17)    ------------------------------------------------------------------------------------------------------------    PRIOR ADVANCE DIRECTIVES:    [ ] DNR/MOLST    [ ] Living Will    [ ] Health Care Proxy(s)    DECISION MAKER(s):  [ ] Patient    [X ] Surrogate(s)- loree Alvarez   [ ] HCP   [ ] Guardian             ------------------------------------------------------------------------------------------------------------  PHYSICAL EXAM:  Vital Signs Last 24 Hrs  T(C): 36.6 (15 Sixto 2023 06:29), Max: 36.6 (14 Jun 2023 21:59)  T(F): 97.8 (15 Sixto 2023 06:29), Max: 97.8 (14 Jun 2023 21:59)  HR: 89 (15 Sixto 2023 06:29) (88 - 96)  BP: 101/55 (15 Sixto 2023 06:29) (101/55 - 119/70)  BP(mean): --  RR: 17 (15 Sixto 2023 06:29) (16 - 17)  SpO2: 96% (15 Sixto 2023 06:29) (93% - 99%)    Parameters below as of 15 Sixto 2023 06:29  Patient On (Oxygen Delivery Method): room air     I&O's Summary      GENERAL:  [X ]Cachexia  [X ] Frail  [X ]Awake  [X ]Oriented x 1   [X ]Lethargic  [ ]Unarousable  [ ]Verbal  [ ]Non-Verbal    BEHAVIORAL:   [ ] Anxiety  [ ] Delirium [ ] Agitation [ ] Other    HEENT:   [X ]Normal   [ ]Dry mouth   [ ]ET Tube/Trach  [ ]Oral lesions    PULMONARY:   [X ]Clear [ ]Tachypnea  [ ]Audible excessive secretions   [ ]Rhonchi        [ ]Right [ ]Left [ ]Bilateral  [ ]Crackles        [ ]Right [ ]Left [ ]Bilateral  [ ]Wheezing     [ ]Right [ ]Left [ ]Bilateral  [ ]Diminished breath sounds [ ]right [ ]left [ ]bilateral    CARDIOVASCULAR:    [X ]Regular [ ]Irregular [ ]Tachy  [ ]Aleks [ ]Murmur [ ]Other    GASTROINTESTINAL:  [X ]Soft  [ ]Distended   [ ]+BS  [X ]Non tender [ ]Tender  [ ]Other [ ]PEG [ ]OGT/ NGT      GENITOURINARY:  [ ]Normal [ ] Incontinent   [X ]Oliguria/Anuria   [ ]Martínez    MUSCULOSKELETAL:   [ ]Normal   [ ]Weakness  [X ]Bed/Wheelchair bound [ ]Edema    NEUROLOGIC:   [X ]No focal deficits  [ ]Cognitive impairment  [ ]Dysphagia [ ]Dysarthria [ ]Paresis [ ]Other     SKIN:   [ ]Normal  [ ]Rash  [ ]Other  [X ]Pressure ulcer(s)       Present on admission [X ]y [ ]n    ------------------------------------------------------------------------------------------------------------    LABS:                        8.1    9.18  )-----------( 263      ( 15 Sixto 2023 06:15 )             25.5   06-15    138  |  98  |  44<H>  ----------------------------<  76  4.5   |  25  |  6.97<H>    Ca    9.5      15 Sixto 2023 06:15  Phos  4.1     06-15  Mg     2.30     06-15    TPro  7.1  /  Alb  2.7<L>  /  TBili  0.9  /  DBili  x   /  AST  20  /  ALT  21  /  AlkPhos  193<H>  06-13    ------------------------------------------------------------------------------------------------------------    CRITICAL CARE:  [ ]Shock Present  [ ]Septic [ ]Cardiogenic [ ]Neurologic [ ]Hypovolemic [ ] Undifferentiated/Mixed   [ ]Vasopressors [ ]Inotropes     [ ]Respiratory failure present: [ ]Acute  [ ]Chronic [ ]Hypoxic  [ ]Hypercarbic   [ ]Mechanical ventilation   [ ]Trach collar   [ ]Non-invasive ventilatory support   [ ]High flow    [ ]Non-rebreather/Venti     [ ]Other organ failure     ------------------------------------------------------------------------------------------------------------    RADIOLOGY & ADDITIONAL STUDIES:    < from: Upper Endoscopy (06.07.23 @ 13:22) >  Impression:          - 7 cm hiatal hernia. Naif ulcers.                       - LA Grade D esophagitis.                       - Gastropathy. Biopsied.                       - A single submucosal papule (nodule) found in the                        stomach.                       - One non-bleeding duodenal ulcer with a visible vessel.                        Injected. Treated with bipolar cautery.                       - Duodenopathy.  Recommendation:      - [facility].                       - Clear liquid diet today.                       - PPI drip for 72 h, then PPI 40mg BID x 8 weeks                      followed by daily indefinitely                       - Colonoscopy was deferred given patient's respiratory                        status, light sedation and likely etiology for anemia                        identified.            - Monitor for signs of perforation: worsening abdominal                        pain, fevers, hypotension                       - Trend CBC, CMP                       - Avoid NSAIDs                       - Transfuse for Hb > 7         - Given comorbidities, risks of repeat endoscopic                        evaluation to assess for healing of esophagitis and                        ulcer (as well as further evaluation of fundic lesion)                        may outweigh benefits. Can be readdressed on outpatient                        basis pending overall GOC.                                                                                     < end of copied text >    < from: Xray Chest 1 View AP/PA (06.03.23 @ 12:05) >  IMPRESSION:    No acute infiltrate. Right axillary vascular stent.    < end of copied text >    < from: US Abdomen Upper Quadrant Right (06.04.23 @ 15:24) >  IMPRESSION:  Cholelithiasis without evidence of cholecystitis.    The liver is normal in appearance.    Markedly echogenic right kidney with associated multiple small renal   cysts. Findings are suggestive medical renal disease.    < end of copied text >    < from: US Kidney and Bladder (06.12.23 @ 00:00) >  IMPRESSION:  *  Bilateral renal cysts and parenchymal disease.  *  No hydronephrosis.  *  Martínez catheter balloon in the right abdomen. Location of the balloon   within the [place] pouch cannot be determined sonographically.    < end of copied text >    < from: CT Abdomen and Pelvis No Cont (06.13.23 @ 21:27) >  IMPRESSION:  Partial nephrectomy with urinary diversion in the right lower quadrant.    Bowel containing ventral supraumbilical hernia. No bowel obstruction.    No evidence of acute infectious process in the abdomen and pelvis.    < end of copied text >    < from: Transthoracic Echocardiogram (06.14.23 @ 09:47) >  CONCLUSIONS:  1. Mitral annular calcification, otherwise normal mitral  valve. Mild mitral regurgitation.  2. Normal left ventricular internal dimensions and wall  thicknesses.Moderate segmental left ventricular systolic  dysfunction.There ia akinesis of the entire apex,  anteroseptum, and the remaining walls are hypokinetic.  The  anterior wall is not well visualized but appears at least  hypokinetic. LVE is 32% using biplane Patterson's method.  3. Normal right atrium. Normal tricuspid valve. Mild  eccentric tricuspid regurgitation.Estimated pulmonary  artery systolic pressure equals 38 mm Hg, assuming right  atrial pressure equals 10  mm Hg, consistent with  borderline pulmonary hypertension.  4. No pericardial effusion seen.  No evidence of valvular vegetation.  *** Compared with echocardiogram of 6/5/2023, there is a  significant decrease in LVEF and the entire anterolateral  appears hypokinetic.    < end of copied text >    ------------------------------------------------------------------------------------------------------------    ALLERGIES:  Allergies    penicillin (Rash)    Intolerances    MEDICATIONS  (STANDING):  aspirin enteric coated 81 milliGRAM(s) Oral daily  cefTRIAXone   IVPB 2000 milliGRAM(s) IV Intermittent every 24 hours  chlorhexidine 2% Cloths 1 Application(s) Topical daily  cinacalcet 30 milliGRAM(s) Oral daily  donepezil 5 milliGRAM(s) Oral at bedtime  epoetin shell-epbx (RETACRIT) Injectable 39275 Unit(s) IV Push <User Schedule>  metoprolol succinate ER 12.5 milliGRAM(s) Oral daily  midodrine. 5 milliGRAM(s) Oral <User Schedule>  pantoprazole   Suspension 40 milliGRAM(s) Oral two times a day    MEDICATIONS  (PRN):  guaiFENesin Oral Liquid (Sugar-Free) 100 milliGRAM(s) Oral every 6 hours PRN Cough  sodium chloride 0.9% Bolus. 100 milliLiter(s) IV Bolus every 5 minutes PRN SBP LESS THAN or EQUAL to 90 mmHg

## 2023-06-15 NOTE — CHART NOTE - NSCHARTNOTEFT_GEN_A_CORE
NUTRITION FOLLOW-UP:    77-year-old male with history of DVT (previously on coumadin, now stopped), CAD, ESRD (on HD via RUE AVF Tu/Th/Sat), AOCD, HLD, HTN, obesity, pre-diabetes, and prostate cancer s/p suprapubic catheter presenting from [facility] w/need for HD. Patient's current HD does not have Yusra lift. Last HD 6/01. Patient is currently without complaint, A&Ox1. In the ED VS:  99.7  67-91  /43-49  16-18  %RA. Palliative consulted for complex medical decision making in the setting of serious illness.      Pt f/u per protocol, remains confused and with poor po intake of meals, observed tray at bedside and pt consumed <50% of his meals. Pt meeting malnutrition criteria at this time due to <50% intake of meals >5 days and 4.8% wt. loss in past 1 week. Rec supplement Nepro 2x daily (850 kcals, 38.2g protein) to promote po. GOC pending wife's response at this time    Weight: 167.5# (6/14/23), 175.5# (6/7/23)    Labs:  06-15 Na 138 mmol/L Glu 76 mg/dL K+ 4.5 mmol/L Cr 6.97 mg/dL<H> BUN 44 mg/dL<H> Phos 4.1 mg/dL      Current Diet: Diet, Pureed:   Mildly Thick Liquids (MILDTHICKLIQS)  For patients receiving Renal Replacement - No Protein Restr, No Conc K, No Conc Phos, Low Sodium (RENAL) (06-13-23 @ 17:11)    Skin- Lt trochanter wound, +3 edema Lt arm, +1 edema Lt & Rt leg as per RN flow sheet    MEDICATIONS  (STANDING):  aspirin enteric coated 81 milliGRAM(s) Oral daily  cefTRIAXone   IVPB 2000 milliGRAM(s) IV Intermittent every 24 hours  chlorhexidine 2% Cloths 1 Application(s) Topical daily  cinacalcet 30 milliGRAM(s) Oral daily  donepezil 5 milliGRAM(s) Oral at bedtime  epoetin shell-epbx (RETACRIT) Injectable 11276 Unit(s) IV Push <User Schedule>  metoprolol succinate ER 12.5 milliGRAM(s) Oral daily  midodrine. 5 milliGRAM(s) Oral <User Schedule>  pantoprazole   Suspension 40 milliGRAM(s) Oral two times a day    Estimated needs:  No changes since previous assessment    Nutrition Diagnosis:  New Dx: Severe Protein Calorie Malnutrition related to physiological causes as evidenced by <50% nutritional needs >5 days, 4.8% wt. loss in one week.    Goal/Expected Outcome- Pt will meet >75% nutritional needs.    RD to Remain Available:    Additional Recommendations:  Rec supplement Nepro 2x daily (850 kcals, 38.2g protein) to promote po.   Monitor PO intake, weight trends, nutrition related lab values, BMs/GI distress, hydration status, skin integrity.   GOC pending wife's response at this time    Mariposa Carpenter RDN #[id number]

## 2023-06-15 NOTE — PROGRESS NOTE ADULT - SUBJECTIVE AND OBJECTIVE BOX
[facility] NEPHROLOGY- PROGRESS NOTE    77y Male with history of ESRD on HD presents for need for dialysis. Nephrology consulted for ESRD status.    REVIEW OF SYSTEMS: limited due to mental status.    penicillin (Rash)      Hospital Medications: Medications reviewed      VITALS:  T(F): 97.8 (06-15-23 @ 06:29), Max: 97.8 (06-14-23 @ 21:59)  HR: 89 (06-15-23 @ 06:29)  BP: 101/55 (06-15-23 @ 06:29)  RR: 17 (06-15-23 @ 06:29)  SpO2: 96% (06-15-23 @ 06:29)  Wt(kg): --        PHYSICAL EXAM:    Gen: NAD, calm  Cards: RRR, +S1/S2, no M/G/R  Resp: CTA B/L  GI: soft, NT/ND, NABS  : + SPT  Vascular: no LE edema B/L, RUE AVG + bruit/thrill      LABS:  06-15    138  |  98  |  44<H>  ----------------------------<  76  4.5   |  25  |  6.97<H>    Ca    9.5      15 Sixto 2023 06:15  Phos  4.1     06-15  Mg     2.30     06-15    TPro  7.1  /  Alb  2.7<L>  /  TBili  0.9  /  DBili      /  AST  20  /  ALT  21  /  AlkPhos  193<H>  06-13    Creatinine Trend: 6.97 <--, 5.55 <--, 9.02 <--, 6.53 <--, 5.37 <--, 6.66 <--, 4.69 <--                        8.1    9.18  )-----------( 263      ( 15 Sixto 2023 06:15 )             25.5     Urine Studies:

## 2023-06-15 NOTE — CONSULT NOTE ADULT - PROBLEM SELECTOR RECOMMENDATION 4
- Group B Strep bacteremia   - possible source R shoulder   - CT   - Pending MRI R shoulder - Group B Strep bacteremia   - possible source R shoulder   - Pending MRI R shoulder - 2/2 anemia of chronic disease, GIB

## 2023-06-15 NOTE — CONSULT NOTE ADULT - PROBLEM SELECTOR RECOMMENDATION 5
- s/p pressure injuries - severe LV dysfunction, hypokinesis, worse from prior - Group B Strep bacteremia   - possible source R shoulder   - Pending MRI R shoulder

## 2023-06-15 NOTE — CONSULT NOTE ADULT - ASSESSMENT
Patient is a 77-year-old male with history of DVT (previously on coumadin, now stopped), CAD, ESRD (on HD via RUE AVF Tu/Th/Sat), AOCD, HLD, HTN, obesity, pre-diabetes, and prostate cancer s/p suprapubic catheter presenting from [facility] w/need for HD. Patient's current HD does not have Yursa lift. Last HD 6/01. Patient is currently without complaint, A&Ox1. In the ED VS:  99.7  67-91  /43-49  16-18  %RA. Palliative consulted for complex medical decision making in the setting of serious illness.

## 2023-06-15 NOTE — CONSULT NOTE ADULT - CONVERSATION DETAILS
Spoke to patient's wife Kim about patient's deteriorating condition. Wife states patient was recently in [facility], then has been at [facility] for a couple weeks. [facility]. She says patient came to the hospital because he was frail. She says he has been eating less, with poor appetite. Wife is "hoping and praying for the best."  Discussed that with various comorbidities and poor functional status, patient was high risk of further medical events. Discussed code status, recommended DNR/DNI as CPR/intubation have poor outcomes in a patient with such serious illness and add more burdens at end of life. Wife will think about it, agreeable to follow up tomorrow. Spoke to patient's wife Kim about patient's deteriorating condition. Wife states patient was recently in [facility], then had been at [facility] for a couple weeks. [facility] She says patient came to the hospital because he was frail. She says he has been eating less, with poor appetite. Wife is "hoping and praying for the best."  Discussed that with various comorbidities and poor functional status, patient was high risk of further medical complications. Discussed code status, recommended DNR/DNI as CPR/intubation have poor outcomes in a patient with such serious illness and add more burdens at end of life. Wife will think about it, agreeable to follow up tomorrow.

## 2023-06-15 NOTE — CONSULT NOTE ADULT - PROBLEM SELECTOR RECOMMENDATION 7
- poor functional status PPS 30%  - HFrEF, ESRD, bacteremia, anemia   - high risk of skin breakdown, f/u wound care recs

## 2023-06-15 NOTE — CONSULT NOTE ADULT - PROBLEM SELECTOR RECOMMENDATION 8
Following for CMDM     Patient is seriously ill with   High risk of complications, further morbidity and mortality - See GOC note. I spent 20 minutes addressing advanced care planning with patient and/or decision maker(s). Recommended DNR/DNI. Wife will think about it

## 2023-06-15 NOTE — DIETITIAN NUTRITION RISK NOTIFICATION - TREATMENT: THE FOLLOWING DIET HAS BEEN RECOMMENDED
Diet, Pureed:   Mildly Thick Liquids (MILDTHICKLIQS)  For patients receiving Renal Replacement - No Protein Restr, No Conc K, No Conc Phos, Low Sodium (RENAL) (06-13-23 @ 17:11) [Active]

## 2023-06-15 NOTE — PROGRESS NOTE ADULT - SUBJECTIVE AND OBJECTIVE BOX
CARDIOLOGY FOLLOW UP - Dr. Clements  Date of Service: 6/15/2023  CC: no events    Review of Systems:  Constitutional: No fever, weight loss, or fatigue  Respiratory: No cough, wheezing, or hemoptysis, no shortness of breath  Cardiovascular: No chest pain, palpitations, passing out, dizziness, or leg swelling  Gastrointestinal: No abd or epigastric pain. No nausea, vomiting, or hematemesis; no diarrhea or consiptaiton, no melena or hematochezia  Vascular: No edema     TELEMETRY:    PHYSICAL EXAM:  T(C): 36.6 (06-15-23 @ 10:45), Max: 36.6 (06-14-23 @ 21:59)  HR: 90 (06-15-23 @ 10:45) (88 - 96)  BP: 107/65 (06-15-23 @ 10:45) (101/55 - 119/70)  RR: 18 (06-15-23 @ 10:45) (16 - 18)  SpO2: 99% (06-15-23 @ 10:45) (93% - 99%)  Wt(kg): --  I&O's Summary      Appearance: Normal	  Cardiovascular: Normal S1 S2,RRR, No JVD, No murmurs  Respiratory: Lungs clear to auscultation	  Gastrointestinal:  Soft, Non-tender, + BS	  Extremities: Normal range of motion, No clubbing, cyanosis or edema  Vascular: Peripheral pulses palpable 2+ bilaterally       Home Medications:  bisacodyl 5 mg oral tablet: 2 tab(s) orally once a day as needed for  constipation (04 Jun 2023 00:48)  cinacalcet 60 mg oral tablet: 1 tab(s) orally once a day (04 Jun 2023 00:48)  donepezil 5 mg oral tablet: 1 tab(s) orally once a day (at bedtime) (04 Jun 2023 00:48)  ergocalciferol 1.25 mg (50,000 intl units) oral capsule: 1 cap(s) orally once a month (04 Jun 2023 00:48)  meloxicam 15 mg oral tablet: 1 tab(s) orally once a day as needed for  pain (04 Jun 2023 00:48)  NIFEdipine (Eqv-Adalat CC) 30 mg oral tablet, extended release: 2 tab(s) orally once a day (04 Jun 2023 00:48)  sevelamer hydrochloride 800 mg oral tablet: 1 tab(s) orally every 6 hours (04 Jun 2023 00:49)        MEDICATIONS  (STANDING):  aspirin enteric coated 81 milliGRAM(s) Oral daily  cefTRIAXone   IVPB 2000 milliGRAM(s) IV Intermittent every 24 hours  chlorhexidine 2% Cloths 1 Application(s) Topical daily  cinacalcet 30 milliGRAM(s) Oral daily  donepezil 5 milliGRAM(s) Oral at bedtime  epoetin shell-epbx (RETACRIT) Injectable 01662 Unit(s) IV Push <User Schedule>  metoprolol succinate ER 12.5 milliGRAM(s) Oral daily  midodrine. 5 milliGRAM(s) Oral <User Schedule>  pantoprazole   Suspension 40 milliGRAM(s) Oral two times a day        EKG:  RADIOLOGY:  DIAGNOSTIC TESTING:  [ ] Echocardiogram:  [ ] Catherterization:  [ ] Stress Test:  OTHER:     LABS:	 	                          8.1    9.18  )-----------( 263      ( 15 Sixto 2023 06:15 )             25.5     06-15    138  |  98  |  44<H>  ----------------------------<  76  4.5   |  25  |  6.97<H>    Ca    9.5      15 Sixto 2023 06:15  Phos  4.1     06-15  Mg     2.30     06-15    TPro  7.1  /  Alb  2.7<L>  /  TBili  0.9  /  DBili  x   /  AST  20  /  ALT  21  /  AlkPhos  193<H>  06-13          CARDIAC MARKERS:

## 2023-06-16 NOTE — PROGRESS NOTE ADULT - CONVERSATION DETAILS
Followed up with patient's wife Kim on code status decision. She says his other family thinks patient should be let go when it is his time. However she does not agree and would like to pursue CPR and intubation. She says "I would like to try" and "I'm not giving up." Shared with wife TTE results of severe HF, and that DNR/DNI would be recommended again given his severe medical issues and frailty. Also assured patient can still receive all other medical interventions even with DNR/DNI. This is new information for wife as she was not aware of HF. She seems open to rethinking code status, agreeable to follow up later.

## 2023-06-16 NOTE — PROGRESS NOTE ADULT - PROBLEM SELECTOR PLAN 8
- 6/15- See [facility] note. I spent 20 minutes addressing advanced care planning with patient and/or decision maker(s). Recommended DNR/DNI. Wife will think about it. - 6/15- See [facility] note. I spent 20 minutes addressing advanced care planning with patient and/or decision maker(s). Recommended DNR/DNI. Wife will think about it.  - 6/16- See [facility] note. I spent 20 minutes addressing advanced care planning with patient and/or decision maker(s). Wife decided for full code, but will rethink in light of HFrEF

## 2023-06-16 NOTE — PROGRESS NOTE ADULT - ASSESSMENT
77  year old male with hx of cad , ? PCI, HTN, esrd, HTN, DM, afib presents for HD.    #CAD  -cv stable no cp   -no chf on exam   -no obvious angina, decomp HF  -ECHO w moderate segm LV dysfunction along LAD distribution suggestive of prior infarct  -in light of age, fxnl status, anemia req w/u, and mental status not a candidate for further ischemic eval/cath  -would continue medical tx of cmp  -toprol xl 12.5 qd if bp can tolerate  -cont current meds, on asa     #anemia  -+ occult. work up per GI/med  -s/p egd with treated ulcer  -gi f/u, ppi    #Afib   -not on ac  -cont to hold in light of egd findings, anemia req prbcs, fall risk   -cont asa    # ESRD on HD   -renal fu     #htn   -bp normal on mido  dvt ppx      35 minutes spent on total encounter; more than 50% of the visit was spent counseling and/or coordinating care by the attending physician.

## 2023-06-16 NOTE — PROGRESS NOTE ADULT - SUBJECTIVE AND OBJECTIVE BOX
[facility] NEPHROLOGY- PROGRESS NOTE    77y Male with history of ESRD on HD presents for need for dialysis. Nephrology consulted for ESRD status.    REVIEW OF SYSTEMS: limited due to mental status.    penicillin (Rash)      Hospital Medications: Medications reviewed      VITALS:  T(F): 98.2 (06-16-23 @ 10:15), Max: 98.2 (06-16-23 @ 06:12)  HR: 82 (06-16-23 @ 10:15)  BP: 92/55 (06-16-23 @ 10:15)  RR: 16 (06-16-23 @ 10:15)  SpO2: 97% (06-16-23 @ 10:15)  Wt(kg): --    06-15 @ 07:01  -  06-16 @ 07:00  --------------------------------------------------------  IN: 900 mL / OUT: 300 mL / NET: 600 mL        PHYSICAL EXAM:    Gen: NAD, confused  Cards: RRR, +S1/S2, no M/G/R  Resp: CTA B/L  GI: soft, NT/ND, NABS  : + SPT  Vascular: no LE edema B/L, RUE AVG + bruit/thrill        LABS:  06-15    138  |  98  |  44<H>  ----------------------------<  76  4.5   |  25  |  6.97<H>    Ca    9.5      15 Sixto 2023 06:15  Phos  4.1     06-15  Mg     2.30     06-15      Creatinine Trend: 6.97 <--, 5.55 <--, 9.02 <--, 6.53 <--, 5.37 <--, 6.66 <--                        8.1    9.18  )-----------( 263      ( 15 Sixto 2023 06:15 )             25.5     Urine Studies:

## 2023-06-16 NOTE — PROGRESS NOTE ADULT - ASSESSMENT
77y Male with history of ESRD on HD presents for need for dialysis. Nephrology consulted for ESRD status.    1) ESRD: Last HD on 6/15 with intradialytic hypotension and patient net positive 0.6L. Plan for next maintenance HD on 6/17. Please inform me once MRI with janie scheduled so that I can arrange for HD after imaging (ideally would dialyze within 1 hour of imaging to decrease risk of NSF). Follow up with dialysis social worker to arrange patient to be transferred to [facility] where Yusra lift is available upon disharge. Monitor electrolytes.    2) HTN with ESRD: BP low normal. Increase midodrine to 10 mg PO pre-HD to avoid intradialytic hypotension. Monitor BP.    3) Anemia of renal disease: Hb low and patient occult positive. No IV iron given elevated ferritin. Continue with Epo 10K with HD. Monitor Hb.    4) Secondary HPT of renal origin: Serum phos acceptable with high normal serum calcium. Continue with sensipar 30 mg daily and will use low calcium bath with HD. Monitor serum calcium and phosphorus.    Poor prognosis.     [facility] NEPHROLOGY  Armando Shaffer M.D.  DANNY MarlowO.  Lilliam Cody M.D.  Oxana Hernandez, MSN, ANP-C  [phone]  Fax: [phone] [place] [address], [address]  [place] [address]

## 2023-06-16 NOTE — PROGRESS NOTE ADULT - ASSESSMENT
77 year old male with DVT, CAD, ESRD on HD, RUE AVF, AOCD, HLD, HTN, pre-diabetes, prostate ca with suprapubic catheter from NH for missed HD. Developed fevers and found to have GBS bacteremia. +R shoulder pain. Leukocytosis resolved.    CT A/P with no evidence of acute infectious process in the abdomen and pelvis.    Recommend:  #Sepsis (fever, leukocytosis) secondary to high grade GBS bacteremia  -F/U blood cultures - so far repeats no growth  -Trend WBC  -monitor fever curve  -TTE no evidence of valvular vegetation  -MRI R shoulder with contrast - will need to arrange with nephro regarding HD post study  -Continue ceftriaxone 2 grams IV q 24 hours (dose daily, but after HD on days of HD)    Isaiah Hall MD  Available through MS Teams  If no response, or after 5pm/weekends, call [phone]

## 2023-06-16 NOTE — PROGRESS NOTE ADULT - PROBLEM SELECTOR PLAN 1
- patient showing signs of severe pain at times - grimacing, moaning   - suggesting the back possibly   - can trial tylenol or oxycodone 5 mg q6h PRN for severe pain - patient showing signs of severe pain at times - grimacing, moaning   - suggesting the back possibly, today abdomen   - can trial tylenol or oxycodone 5 mg q6h PRN for severe pain

## 2023-06-16 NOTE — PROGRESS NOTE ADULT - SUBJECTIVE AND OBJECTIVE BOX
Indication of Geriatrics and Palliative Medicine Services:  [X  ] Complex Medical Decision Making   [  ] Symptom/Pain management     DNR on chart: No     INTERVAL EVENTS:     -------------------------------------------------------------------------------------------------------    PRESENT SYMPTOMS:     [ ] No     [X ] Unable to self report      [ ] CPOT (ICU)     [ ] PAINADs     [ ] RDOS    [X ] Yes     Source if other than patient:  [X ]Family   [ ]Team     PAIN:   If blank, patient unable to specify   [ ]yes [ ]no  QOL impact-   Location -                    Aggravating factors -  Quality -  Radiation -  Timing-  Pain at most severe level (0-10 scale):  Pain at minimal acceptable level/Pain Goal (0-10 scale):     SYMPTOMS:   Dyspnea:                           [ ]Mild [ ]Moderate [ ]Severe  Anxiety:                             [ ]Mild [ ]Moderate [ ]Severe  Fatigue:                             [ ]Mild [ ]Moderate [ ]Severe  Nausea/Vomiting:              [ ]Mild [ ]Moderate [ ]Severe  Loss of appetite:                [ ]Mild [X ]Moderate [ ]Severe  Constipation:                     [ ]Mild [ ]Moderate [ ]Severe    Other Symptoms:  [X ]All other review of systems negative     Home Medications for symptoms if any:  I-Stop Reference No:(from initial)     -------------------------------------------------------------------------------------------------------    ITEMS UNCHECKED ARE NOT PRESENT    PHYSICAL:  Vital Signs Last 24 Hrs  T(C): 36.8 (16 Jun 2023 06:12), Max: 36.8 (16 Jun 2023 06:12)  T(F): 98.2 (16 Jun 2023 06:12), Max: 98.2 (16 Jun 2023 06:12)  HR: 87 (16 Jun 2023 06:12) (85 - 93)  BP: 92/60 (16 Jun 2023 06:12) (92/60 - 109/71)  BP(mean): --  RR: 17 (16 Jun 2023 06:12) (17 - 18)  SpO2: 98% (16 Jun 2023 06:12) (98% - 99%)    Parameters below as of 16 Jun 2023 06:12  Patient On (Oxygen Delivery Method): room air     I&O's Summary    15 Sixto 2023 07:01  -  16 Jun 2023 07:00  --------------------------------------------------------  IN: 900 mL / OUT: 300 mL / NET: 600 mL    GENERAL:  [X ]Cachexia  [X ] Frail  [X ]Awake  [X ]Oriented x 1   [X ]Lethargic  [ ]Unarousable  [ ]Verbal  [ ]Non-Verbal    BEHAVIORAL:   [ ] Anxiety  [ ] Delirium [ ] Agitation [ ] Other    HEENT:   [X ]Normal   [ ]Dry mouth   [ ]ET Tube/Trach  [ ]Oral lesions    PULMONARY:   [X ]Clear [ ]Tachypnea  [ ]Audible excessive secretions   [ ]Rhonchi        [ ]Right [ ]Left [ ]Bilateral  [ ]Crackles        [ ]Right [ ]Left [ ]Bilateral  [ ]Wheezing     [ ]Right [ ]Left [ ]Bilateral  [ ]Diminished breath sounds [ ]right [ ]left [ ]bilateral    CARDIOVASCULAR:    [X ]Regular [ ]Irregular [ ]Tachy  [ ]Aleks [ ]Murmur [ ]Other    GASTROINTESTINAL:  [X ]Soft  [ ]Distended   [ ]+BS  [X ]Non tender [ ]Tender  [ ]Other [ ]PEG [ ]OGT/ NGT      GENITOURINARY:  [ ]Normal [ ] Incontinent   [X ]Oliguria/Anuria   [ ]Martínez    MUSCULOSKELETAL:   [ ]Normal   [ ]Weakness  [X ]Bed/Wheelchair bound [ ]Edema    NEUROLOGIC:   [X ]No focal deficits  [ ]Cognitive impairment  [ ]Dysphagia [ ]Dysarthria [ ]Paresis [ ]Other     SKIN:   [ ]Normal  [ ]Rash  [ ]Other  [X ]Pressure ulcer(s)       Present on admission [X ]y [ ]n    -------------------------------------------------------------------------------------------------------    LABS:                        8.1    9.18  )-----------( 263      ( 15 Sixto 2023 06:15 )             25.5   06-15    138  |  98  |  44<H>  ----------------------------<  76  4.5   |  25  |  6.97<H>    Ca    9.5      15 Sixto 2023 06:15  Phos  4.1     06-15  Mg     2.30     06-15    -------------------------------------------------------------------------------------------------------    CRITICAL CARE:  [ ]Shock Present  [ ]Septic [ ]Cardiogenic [ ]Neurologic [ ]Hypovolemic [ ]Undifferentiated    [ ]Vasopressors [ ]Inotropes    [ ]Respiratory failure present [ ]Acute  [ ]Chronic [ ]Hypoxic  [ ]Hypercarbic [ ]Mixed   [ ]Mechanical Ventilation  [ ]Trach collar   [ ]Non-invasive ventilatory support   [ ]High-Flow   [ ]Oxygen mask/venti     [ ]Other organ failure   -------------------------------------------------------------------------------------------------------    RADIOLOGY & ADDITIONAL STUDIES:     < from: Upper Endoscopy (06.07.23 @ 13:22) >  Impression:          - 7 cm hiatal hernia. Naif ulcers.                       - LA Grade D esophagitis.                       - Gastropathy. Biopsied.                       - A single submucosal papule (nodule) found in the                        stomach.                       - One non-bleeding duodenal ulcer with a visible vessel.                        Injected. Treated with bipolar cautery.                       - Duodenopathy.  Recommendation:      - [facility].                       - Clear liquid diet today.                       - PPI drip for 72 h, then PPI 40mg BID x 8 weeks                      followed by daily indefinitely                       - Colonoscopy was deferred given patient's respiratory                        status, light sedation and likely etiology for anemia                        identified.            - Monitor for signs of perforation: worsening abdominal                        pain, fevers, hypotension                       - Trend CBC, CMP                       - Avoid NSAIDs                       - Transfuse for Hb > 7         - Given comorbidities, risks of repeat endoscopic                        evaluation to assess for healing of esophagitis and                        ulcer (as well as further evaluation of fundic lesion)                        may outweigh benefits. Can be readdressed on outpatient                        basis pending overall GOC.                                                                                     < end of copied text >    < from: Xray Chest 1 View AP/PA (06.03.23 @ 12:05) >  IMPRESSION:    No acute infiltrate. Right axillary vascular stent.    < end of copied text >    < from: US Abdomen Upper Quadrant Right (06.04.23 @ 15:24) >  IMPRESSION:  Cholelithiasis without evidence of cholecystitis.    The liver is normal in appearance.    Markedly echogenic right kidney with associated multiple small renal   cysts. Findings are suggestive medical renal disease.    < end of copied text >    < from: US Kidney and Bladder (06.12.23 @ 00:00) >  IMPRESSION:  *  Bilateral renal cysts and parenchymal disease.  *  No hydronephrosis.  *  Martínez catheter balloon in the right abdomen. Location of the balloon   within the [place] pouch cannot be determined sonographically.    < end of copied text >    < from: CT Abdomen and Pelvis No Cont (06.13.23 @ 21:27) >  IMPRESSION:  Partial nephrectomy with urinary diversion in the right lower quadrant.    Bowel containing ventral supraumbilical hernia. No bowel obstruction.    No evidence of acute infectious process in the abdomen and pelvis.    < end of copied text >    < from: Transthoracic Echocardiogram (06.14.23 @ 09:47) >  CONCLUSIONS:  1. Mitral annular calcification, otherwise normal mitral  valve. Mild mitral regurgitation.  2. Normal left ventricular internal dimensions and wall  thicknesses.Moderate segmental left ventricular systolic  dysfunction.There ia akinesis of the entire apex,  anteroseptum, and the remaining walls are hypokinetic.  The  anterior wall is not well visualized but appears at least  hypokinetic. LVE is 32% using biplane Patterson's method.  3. Normal right atrium. Normal tricuspid valve. Mild  eccentric tricuspid regurgitation.Estimated pulmonary  artery systolic pressure equals 38 mm Hg, assuming right  atrial pressure equals 10  mm Hg, consistent with  borderline pulmonary hypertension.  4. No pericardial effusion seen.  No evidence of valvular vegetation.  *** Compared with echocardiogram of 6/5/2023, there is a  significant decrease in LVEF and the entire anterolateral  appears hypokinetic.    < end of copied text >    -------------------------------------------------------------------------------------------------------  MEDICATIONS:     MEDICATIONS  (STANDING):  aspirin enteric coated 81 milliGRAM(s) Oral daily  cefTRIAXone   IVPB 2000 milliGRAM(s) IV Intermittent every 24 hours  chlorhexidine 2% Cloths 1 Application(s) Topical daily  cinacalcet 30 milliGRAM(s) Oral daily  donepezil 5 milliGRAM(s) Oral at bedtime  epoetin shell-epbx (RETACRIT) Injectable 68699 Unit(s) IV Push <User Schedule>  metoprolol succinate ER 12.5 milliGRAM(s) Oral daily  midodrine. 5 milliGRAM(s) Oral <User Schedule>  pantoprazole   Suspension 40 milliGRAM(s) Oral two times a day    MEDICATIONS  (PRN):  guaiFENesin Oral Liquid (Sugar-Free) 100 milliGRAM(s) Oral every 6 hours PRN Cough  sodium chloride 0.9% Bolus. 100 milliLiter(s) IV Bolus every 5 minutes PRN SBP LESS THAN or EQUAL to 90 mmHg         Indication of Geriatrics and Palliative Medicine Services:  [X  ] Complex Medical Decision Making   [  ] Symptom/Pain management     DNR on chart: No     INTERVAL EVENTS: Patient seen this AM, awake and verbal. Patient at times showing signs of severe pain still, saying today pain is in the abdomen. Patient unable to explain his pain. Tried to discuss GOC with patient however became fatigued. See below for GOC.     -------------------------------------------------------------------------------------------------------    PRESENT SYMPTOMS:     [ ] No     [X ] Unable to self report      [ ] CPOT (ICU)     [ ] PAINADs     [ ] RDOS    [X ] Yes     Source if other than patient:  [X ]Family   [ ]Team     PAIN:   If blank, patient unable to specify   [ ]yes [ ]no  QOL impact-   Location -                    Aggravating factors -  Quality -  Radiation -  Timing-  Pain at most severe level (0-10 scale):  Pain at minimal acceptable level/Pain Goal (0-10 scale):     SYMPTOMS:   Dyspnea:                           [ ]Mild [ ]Moderate [ ]Severe  Anxiety:                             [ ]Mild [ ]Moderate [ ]Severe  Fatigue:                             [ ]Mild [ ]Moderate [ ]Severe  Nausea/Vomiting:              [ ]Mild [ ]Moderate [ ]Severe  Loss of appetite:                [ ]Mild [X ]Moderate [ ]Severe  Constipation:                     [ ]Mild [ ]Moderate [ ]Severe    Other Symptoms:  [X ]All other review of systems negative     Home Medications for symptoms if any:  I-Stop Reference No:(from initial)     -------------------------------------------------------------------------------------------------------    ITEMS UNCHECKED ARE NOT PRESENT    PHYSICAL:  Vital Signs Last 24 Hrs  T(C): 36.8 (16 Jun 2023 10:15), Max: 36.8 (16 Jun 2023 06:12)  T(F): 98.2 (16 Jun 2023 10:15), Max: 98.2 (16 Jun 2023 06:12)  HR: 82 (16 Jun 2023 10:15) (82 - 93)  BP: 92/55 (16 Jun 2023 10:15) (92/55 - 109/71)  BP(mean): --  RR: 16 (16 Jun 2023 10:15) (16 - 18)  SpO2: 97% (16 Jun 2023 10:15) (97% - 99%)    Parameters below as of 16 Jun 2023 10:15  Patient On (Oxygen Delivery Method): room air      GENERAL:  [X ]Cachexia  [X ] Frail  [X ]Awake  [X ]Oriented x 1   [X ]Lethargic  [ ]Unarousable  [ ]Verbal  [ ]Non-Verbal    BEHAVIORAL:   [ ] Anxiety  [ ] Delirium [ ] Agitation [ ] Other    HEENT:   [X ]Normal   [ ]Dry mouth   [ ]ET Tube/Trach  [ ]Oral lesions    PULMONARY:   [X ]Clear [ ]Tachypnea  [ ]Audible excessive secretions   [ ]Rhonchi        [ ]Right [ ]Left [ ]Bilateral  [ ]Crackles        [ ]Right [ ]Left [ ]Bilateral  [ ]Wheezing     [ ]Right [ ]Left [ ]Bilateral  [ ]Diminished breath sounds [ ]right [ ]left [ ]bilateral    CARDIOVASCULAR:    [X ]Regular [ ]Irregular [ ]Tachy  [ ]Aleks [ ]Murmur [ ]Other    GASTROINTESTINAL:  [X ]Soft  [ ]Distended   [ ]+BS  [X ]Non tender [ ]Tender  [ ]Other [ ]PEG [ ]OGT/ NGT      GENITOURINARY:  [ ]Normal [ ] Incontinent   [X ]Oliguria/Anuria   [ ]Martínez    MUSCULOSKELETAL:   [ ]Normal   [ ]Weakness  [X ]Bed/Wheelchair bound [ ]Edema    NEUROLOGIC:   [X ]No focal deficits  [ ]Cognitive impairment  [ ]Dysphagia [ ]Dysarthria [ ]Paresis [ ]Other     SKIN:   [ ]Normal  [ ]Rash  [ ]Other  [X ]Pressure ulcer(s)       Present on admission [X ]y [ ]n    -------------------------------------------------------------------------------------------------------    LABS:                                   8.1    9.18  )-----------( 263      ( 15 Sixto 2023 06:15 )             25.5     06-15    138  |  98  |  44<H>  ----------------------------<  76  4.5   |  25  |  6.97<H>    Ca    9.5      15 Sixto 2023 06:15  Phos  4.1     06-15  Mg     2.30     06-15      -------------------------------------------------------------------------------------------------------    CRITICAL CARE:  [ ]Shock Present  [ ]Septic [ ]Cardiogenic [ ]Neurologic [ ]Hypovolemic [ ]Undifferentiated    [ ]Vasopressors [ ]Inotropes    [ ]Respiratory failure present [ ]Acute  [ ]Chronic [ ]Hypoxic  [ ]Hypercarbic [ ]Mixed   [ ]Mechanical Ventilation  [ ]Trach collar   [ ]Non-invasive ventilatory support   [ ]High-Flow   [ ]Oxygen mask/venti     [ ]Other organ failure   -------------------------------------------------------------------------------------------------------    RADIOLOGY & ADDITIONAL STUDIES:     < from: Upper Endoscopy (06.07.23 @ 13:22) >  Impression:          - 7 cm hiatal hernia. Naif ulcers.                       - LA Grade D esophagitis.                       - Gastropathy. Biopsied.                       - A single submucosal papule (nodule) found in the                        stomach.                       - One non-bleeding duodenal ulcer with a visible vessel.                        Injected. Treated with bipolar cautery.                       - Duodenopathy.  Recommendation:      - [facility].                       - Clear liquid diet today.                       - PPI drip for 72 h, then PPI 40mg BID x 8 weeks                      followed by daily indefinitely                       - Colonoscopy was deferred given patient's respiratory                        status, light sedation and likely etiology for anemia                        identified.            - Monitor for signs of perforation: worsening abdominal                        pain, fevers, hypotension                       - Trend CBC, CMP                       - Avoid NSAIDs                       - Transfuse for Hb > 7         - Given comorbidities, risks of repeat endoscopic                        evaluation to assess for healing of esophagitis and                        ulcer (as well as further evaluation of fundic lesion)                        may outweigh benefits. Can be readdressed on outpatient                        basis pending overall GOC.                                                                                     < end of copied text >    < from: Xray Chest 1 View AP/PA (06.03.23 @ 12:05) >  IMPRESSION:    No acute infiltrate. Right axillary vascular stent.    < end of copied text >    < from: US Abdomen Upper Quadrant Right (06.04.23 @ 15:24) >  IMPRESSION:  Cholelithiasis without evidence of cholecystitis.    The liver is normal in appearance.    Markedly echogenic right kidney with associated multiple small renal   cysts. Findings are suggestive medical renal disease.    < end of copied text >    < from: US Kidney and Bladder (06.12.23 @ 00:00) >  IMPRESSION:  *  Bilateral renal cysts and parenchymal disease.  *  No hydronephrosis.  *  Martínez catheter balloon in the right abdomen. Location of the balloon   within the [place] pouch cannot be determined sonographically.    < end of copied text >    < from: CT Abdomen and Pelvis No Cont (06.13.23 @ 21:27) >  IMPRESSION:  Partial nephrectomy with urinary diversion in the right lower quadrant.    Bowel containing ventral supraumbilical hernia. No bowel obstruction.    No evidence of acute infectious process in the abdomen and pelvis.    < end of copied text >    < from: Transthoracic Echocardiogram (06.14.23 @ 09:47) >  CONCLUSIONS:  1. Mitral annular calcification, otherwise normal mitral  valve. Mild mitral regurgitation.  2. Normal left ventricular internal dimensions and wall  thicknesses.Moderate segmental left ventricular systolic  dysfunction.There ia akinesis of the entire apex,  anteroseptum, and the remaining walls are hypokinetic.  The  anterior wall is not well visualized but appears at least  hypokinetic. LVE is 32% using biplane Patterson's method.  3. Normal right atrium. Normal tricuspid valve. Mild  eccentric tricuspid regurgitation.Estimated pulmonary  artery systolic pressure equals 38 mm Hg, assuming right  atrial pressure equals 10  mm Hg, consistent with  borderline pulmonary hypertension.  4. No pericardial effusion seen.  No evidence of valvular vegetation.  *** Compared with echocardiogram of 6/5/2023, there is a  significant decrease in LVEF and the entire anterolateral  appears hypokinetic.    < end of copied text >    -------------------------------------------------------------------------------------------------------  MEDICATIONS:     MEDICATIONS  (STANDING):  aspirin enteric coated 81 milliGRAM(s) Oral daily  cefTRIAXone   IVPB 2000 milliGRAM(s) IV Intermittent every 24 hours  chlorhexidine 2% Cloths 1 Application(s) Topical daily  cinacalcet 30 milliGRAM(s) Oral daily  donepezil 5 milliGRAM(s) Oral at bedtime  epoetin shell-epbx (RETACRIT) Injectable 76394 Unit(s) IV Push <User Schedule>  metoprolol succinate ER 12.5 milliGRAM(s) Oral daily  midodrine. 5 milliGRAM(s) Oral <User Schedule>  pantoprazole   Suspension 40 milliGRAM(s) Oral two times a day    MEDICATIONS  (PRN):  guaiFENesin Oral Liquid (Sugar-Free) 100 milliGRAM(s) Oral every 6 hours PRN Cough  sodium chloride 0.9% Bolus. 100 milliLiter(s) IV Bolus every 5 minutes PRN SBP LESS THAN or EQUAL to 90 mmHg

## 2023-06-16 NOTE — PROGRESS NOTE ADULT - ASSESSMENT
Patient is a 77-year-old male with history of DVT (previously on coumadin, now stopped), CAD, ESRD (on HD via RUE AVF Tu/Th/Sat), AOCD, HLD, HTN, obesity, pre-diabetes, and prostate cancer s/p suprapubic catheter presenting from [facility] w/need for HD. Patient's current HD does not have Yusra lift. Last HD 6/01. Patient is currently without complaint, A&Ox1. In the ED VS:  99.7  67-91  /43-49  16-18  %RA. Palliative consulted for complex medical decision making in the setting of serious illness.
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
## 2023-06-16 NOTE — PROGRESS NOTE ADULT - ASSESSMENT
77-year-old male with history of DVT (previously on coumadin, now stopped), CAD, ESRD (on HD via RUE AVF Tu/Th/Sat), AOCD, HLD, HTN, obesity, pre-diabetes, and prostate cancer s/p suprapubic catheter presenting from [facility] w/need for HD.  stated

## 2023-06-16 NOTE — PROGRESS NOTE ADULT - SUBJECTIVE AND OBJECTIVE BOX
CARDIOLOGY FOLLOW UP - Dr. Clements  Date of Service: 6/16/2023  CC: no events    Review of Systems:  Constitutional: No fever, weight loss, or fatigue  Respiratory: No cough, wheezing, or hemoptysis, no shortness of breath  Cardiovascular: No chest pain, palpitations, passing out, dizziness, or leg swelling  Gastrointestinal: No abd or epigastric pain. No nausea, vomiting, or hematemesis; no diarrhea or consiptaiton, no melena or hematochezia  Vascular: No edema     TELEMETRY:    PHYSICAL EXAM:  T(C): 36.8 (06-16-23 @ 11:35), Max: 36.8 (06-16-23 @ 06:12)  HR: 82 (06-16-23 @ 11:35) (82 - 93)  BP: 92/55 (06-16-23 @ 11:35) (92/55 - 109/71)  RR: 16 (06-16-23 @ 11:35) (16 - 18)  SpO2: 97% (06-16-23 @ 11:35) (97% - 99%)  Wt(kg): --  I&O's Summary    15 Sixto 2023 07:01  -  16 Jun 2023 07:00  --------------------------------------------------------  IN: 900 mL / OUT: 300 mL / NET: 600 mL        Appearance: Normal	  Cardiovascular: Normal S1 S2,RRR, No JVD, No murmurs  Respiratory: Lungs clear to auscultation	  Gastrointestinal:  Soft, Non-tender, + BS	  Extremities: Normal range of motion, No clubbing, cyanosis or edema  Vascular: Peripheral pulses palpable 2+ bilaterally       Home Medications:  bisacodyl 5 mg oral tablet: 2 tab(s) orally once a day as needed for  constipation (04 Jun 2023 00:48)  cinacalcet 60 mg oral tablet: 1 tab(s) orally once a day (04 Jun 2023 00:48)  donepezil 5 mg oral tablet: 1 tab(s) orally once a day (at bedtime) (04 Jun 2023 00:48)  ergocalciferol 1.25 mg (50,000 intl units) oral capsule: 1 cap(s) orally once a month (04 Jun 2023 00:48)  meloxicam 15 mg oral tablet: 1 tab(s) orally once a day as needed for  pain (04 Jun 2023 00:48)  NIFEdipine (Eqv-Adalat CC) 30 mg oral tablet, extended release: 2 tab(s) orally once a day (04 Jun 2023 00:48)  sevelamer hydrochloride 800 mg oral tablet: 1 tab(s) orally every 6 hours (04 Jun 2023 00:49)        MEDICATIONS  (STANDING):  aspirin enteric coated 81 milliGRAM(s) Oral daily  cefTRIAXone   IVPB 2000 milliGRAM(s) IV Intermittent every 24 hours  chlorhexidine 2% Cloths 1 Application(s) Topical daily  cinacalcet 30 milliGRAM(s) Oral daily  donepezil 5 milliGRAM(s) Oral at bedtime  epoetin shell-epbx (RETACRIT) Injectable 71735 Unit(s) IV Push <User Schedule>  metoprolol succinate ER 12.5 milliGRAM(s) Oral daily  midodrine. 5 milliGRAM(s) Oral <User Schedule>  pantoprazole   Suspension 40 milliGRAM(s) Oral two times a day        EKG:  RADIOLOGY:  DIAGNOSTIC TESTING:  [ ] Echocardiogram:  [ ] Catherterization:  [ ] Stress Test:  OTHER:     LABS:	 	                          8.1    9.18  )-----------( 263      ( 15 Sixto 2023 06:15 )             25.5     06-15    138  |  98  |  44<H>  ----------------------------<  76  4.5   |  25  |  6.97<H>    Ca    9.5      15 Sixto 2023 06:15  Phos  4.1     06-15  Mg     2.30     06-15            CARDIAC MARKERS:

## 2023-06-16 NOTE — PROGRESS NOTE ADULT - SUBJECTIVE AND OBJECTIVE BOX
CC: Patient is a 77y old  Male who presents with a chief complaint of needs HD (16 Jun 2023 11:52)    ID following for GBS bacteremia    Interval History/ROS: Patient remains lethargic. No fevers.    Rest of ROS negative.    Allergies  penicillin (Rash)    ANTIMICROBIALS:  cefTRIAXone   IVPB 2000 every 24 hours    OTHER MEDS:  aspirin enteric coated 81 milliGRAM(s) Oral daily  chlorhexidine 2% Cloths 1 Application(s) Topical daily  cinacalcet 30 milliGRAM(s) Oral daily  donepezil 5 milliGRAM(s) Oral at bedtime  epoetin shell-epbx (RETACRIT) Injectable 10936 Unit(s) IV Push <User Schedule>  guaiFENesin Oral Liquid (Sugar-Free) 100 milliGRAM(s) Oral every 6 hours PRN  metoprolol succinate ER 12.5 milliGRAM(s) Oral daily  midodrine. 5 milliGRAM(s) Oral <User Schedule>  oxyCODONE    IR 5 milliGRAM(s) Oral every 6 hours PRN  pantoprazole   Suspension 40 milliGRAM(s) Oral two times a day  sodium chloride 0.9% Bolus. 100 milliLiter(s) IV Bolus every 5 minutes PRN    PE:    Vital Signs Last 24 Hrs  T(C): 36.8 (16 Jun 2023 10:15), Max: 36.8 (16 Jun 2023 06:12)  T(F): 98.2 (16 Jun 2023 10:15), Max: 98.2 (16 Jun 2023 06:12)  HR: 82 (16 Jun 2023 10:15) (82 - 93)  BP: 92/55 (16 Jun 2023 10:15) (92/55 - 109/71)  BP(mean): --  RR: 16 (16 Jun 2023 10:15) (16 - 18)  SpO2: 97% (16 Jun 2023 10:15) (97% - 99%)    Parameters below as of 16 Jun 2023 10:15  Patient On (Oxygen Delivery Method): room air    Gen: Ltehargic, NAD  CV: S1+S2 normal, no murmurs  Resp: Clear bilat, no resp distress  Abd: Soft, nontender, +BS  Ext: RUE AVG intact  : suprapubic catheter in place  IV/Skin: No thrombophlebitis  Neuro: no focal deficits    LABS:                          8.1    9.18  )-----------( 263      ( 15 Sixto 2023 06:15 )             25.5       06-15    138  |  98  |  44<H>  ----------------------------<  76  4.5   |  25  |  6.97<H>    Ca    9.5      15 Sixto 2023 06:15  Phos  4.1     06-15  Mg     2.30     06-15            MICROBIOLOGY:  v  .Blood Blood-Venous  06-13-23   No growth to date.  --  --      .Blood Blood  06-13-23   No growth to date.  --  --      .Blood Blood  06-11-23   Growth in aerobic and anaerobic bottles: Streptococcus agalactiae (Group  B)  See previous culture [id number]  --    Growth in anaerobic bottle: Gram positive cocci in pairs  Growth in aerobic bottle: Gram positive cocci in pairs      .Blood Blood  06-11-23   Growth in aerobic and anaerobic bottles: Streptococcus agalactiae (Group  B)  ***Blood Panel PCR results on this specimen are available  approximately 3 hours after the Gram stain result.***  Gram stain, PCR, and/or culture results may not always  correspond due to difference in methodologies.  ************************************************************  This PCR assay was performed by multiplex PCR. This  Assay tests for 66 bacterial and resistance gene targets.  Please refer to the [facility] test directory  at [email or web address] for details.  --  Blood Culture PCR  Streptococcus agalactiae (Group B)    RADIOLOGY:    < from: CT Abdomen and Pelvis No Cont (06.13.23 @ 21:27) >    IMPRESSION:  Partial nephrectomy with urinary diversion in the right lower quadrant.    Bowel containing ventral supraumbilical hernia. No bowel obstruction.    No evidence of acute infectious process in the abdomen and pelvis.      < end of copied text >

## 2023-06-17 NOTE — PROGRESS NOTE ADULT - SUBJECTIVE AND OBJECTIVE BOX
Full note to follow. [facility] NEPHROLOGY- PROGRESS NOTE    77y Male with history of ESRD on HD presents for need for dialysis. Nephrology consulted for ESRD status.    REVIEW OF SYSTEMS: limited due to mental status.    penicillin (Rash)      Hospital Medications: Medications reviewed      VITALS:  T(F): 98.7 (06-17-23 @ 16:41), Max: 98.7 (06-17-23 @ 12:51)  HR: 85 (06-17-23 @ 18:59)  BP: 93/56 (06-17-23 @ 18:59)  RR: 18 (06-17-23 @ 16:41)  SpO2: 98% (06-17-23 @ 16:41)    06-17 @ 07:01  -  06-17 @ 21:56  --------------------------------------------------------  IN: 300 mL / OUT: 0 mL / NET: 300 mL          PHYSICAL EXAM:  Gen: NAD, confused  Cards: RRR, +S1/S2, no M/G/R  Resp: CTA B/L  GI: soft, NT/ND, NABS  : + SPT  Vascular: no LE edema B/L, RUE AVG + bruit/thrill          LABS:  06-17    139  |  98  |  33<H>  ----------------------------<  65<L>  5.1   |  25  |  5.87<H>    Ca    10.2      17 Jun 2023 05:32  Phos  4.7     06-17  Mg     2.20     06-17      Creatinine Trend: 5.87 <--, 6.97 <--, 5.55 <--, 9.02 <--, 6.53 <--, 5.37 <--                        8.1    9.32  )-----------( 285      ( 17 Jun 2023 05:32 )             26.0

## 2023-06-17 NOTE — PROGRESS NOTE ADULT - ASSESSMENT
77  year old male with hx of cad , ? PCI, HTN, esrd, HTN, DM, afib presents for HD.    #CAD  -cv stable no cp   -no chf on exam   -no obvious angina, decomp HF  -ECHO w moderate segm LV dysfunction along LAD distribution suggestive of prior infarct  -in light of age, fxnl status, anemia req w/u, and mental status not a candidate for further ischemic eval/cath  -would continue medical tx of cmp  -toprol xl 12.5 qd if bp can tolerate, can d/c if bp remains borderline and mido required  -cont current meds, on asa     #anemia  -+ occult. work up per GI/med  -s/p egd with treated ulcer  -gi f/u, ppi    #Afib   -not on ac  -cont to hold in light of egd findings, anemia req prbcs, fall risk   -cont asa    # ESRD on HD   -renal fu     #htn   -cont mido as needed  dvt ppx      35 minutes spent on total encounter; more than 50% of the visit was spent counseling and/or coordinating care by the attending physician.

## 2023-06-17 NOTE — PROGRESS NOTE ADULT - SUBJECTIVE AND OBJECTIVE BOX
CARDIOLOGY FOLLOW UP NOTE - DR. RAMOS    Patient Name: MAGGIE THAO  Date of Service: 06-17-23 @ 13:27    Patient seen and examined    Subjective:    cv: denies chest pain, dyspnea, palpitations, dizziness  pulmonary: denies cough  GI: denies abdominal pain, nausea, vomiting  vascular/legs: no edema   skin: no rash  ROS: otherwise negative   overnight events:      PHYSICAL EXAM:  T(C): 36.7 (06-17-23 @ 08:51), Max: 37.2 (06-16-23 @ 20:57)  HR: 95 (06-17-23 @ 08:51) (88 - 98)  BP: 98/56 (06-17-23 @ 08:51) (98/55 - 103/58)  RR: 18 (06-17-23 @ 08:51) (17 - 18)  SpO2: 98% (06-17-23 @ 08:51) (98% - 99%)  Wt(kg): --  I&O's Summary    Daily     Daily     Appearance: Normal	  Cardiovascular: Normal S1 S2,RRR, No JVD, No murmurs  Respiratory: Lungs clear to auscultation	  Gastrointestinal:  Soft, Non-tender, + BS	  Extremities: Normal range of motion, No clubbing, cyanosis or edema      Home Medications:  bisacodyl 5 mg oral tablet: 2 tab(s) orally once a day as needed for  constipation (04 Jun 2023 00:48)  cinacalcet 60 mg oral tablet: 1 tab(s) orally once a day (04 Jun 2023 00:48)  donepezil 5 mg oral tablet: 1 tab(s) orally once a day (at bedtime) (04 Jun 2023 00:48)  ergocalciferol 1.25 mg (50,000 intl units) oral capsule: 1 cap(s) orally once a month (04 Jun 2023 00:48)  meloxicam 15 mg oral tablet: 1 tab(s) orally once a day as needed for  pain (04 Jun 2023 00:48)  NIFEdipine (Eqv-Adalat CC) 30 mg oral tablet, extended release: 2 tab(s) orally once a day (04 Jun 2023 00:48)  sevelamer hydrochloride 800 mg oral tablet: 1 tab(s) orally every 6 hours (04 Jun 2023 00:49)      MEDICATIONS  (STANDING):  aspirin enteric coated 81 milliGRAM(s) Oral daily  cefTRIAXone   IVPB 2000 milliGRAM(s) IV Intermittent every 24 hours  chlorhexidine 2% Cloths 1 Application(s) Topical daily  cinacalcet 30 milliGRAM(s) Oral daily  donepezil 5 milliGRAM(s) Oral at bedtime  epoetin shell-epbx (RETACRIT) Injectable 05277 Unit(s) IV Push <User Schedule>  metoprolol succinate ER 12.5 milliGRAM(s) Oral daily  midodrine. 10 milliGRAM(s) Oral <User Schedule>  pantoprazole   Suspension 40 milliGRAM(s) Oral two times a day      TELEMETRY: 	    ECG:  	  RADIOLOGY:   DIAGNOSTIC TESTING:  [ ] Echocardiogram:  [ ] Catheterization:  [ ] Stress Test:    OTHER: 	    LABS:	 	    CARDIAC MARKERS:                                      8.1    9.32  )-----------( 285      ( 17 Jun 2023 05:32 )             26.0     06-17    139  |  98  |  33<H>  ----------------------------<  65<L>  5.1   |  25  |  5.87<H>    Ca    10.2      17 Jun 2023 05:32  Phos  4.7     06-17  Mg     2.20     06-17      proBNP:     Lipid Profile:   HgA1c:     Creatinine, Serum: 5.87 mg/dL (06-17-23 @ 05:32)  Creatinine, Serum: 6.97 mg/dL (06-15-23 @ 06:15)

## 2023-06-17 NOTE — PROGRESS NOTE ADULT - ASSESSMENT
77y Male with history of ESRD on HD presents for need for dialysis. Nephrology consulted for ESRD status.    1) ESRD: HD today 6/17 with intradialytic hypotension.  Plan for next maintenance HD on 6/20.   Please inform me once MRI with janie scheduled so that I can arrange for HD after imaging (ideally would dialyze within 1 hour of imaging to decrease risk of NSF). Follow up with dialysis social worker to arrange patient to be transferred to [facility] where Yusra lift is available upon disharge. Monitor electrolytes.    2) HTN with ESRD: BP low normal. Increase midodrine to 10 mg PO pre-HD to avoid intradialytic hypotension. Monitor BP.    3) Anemia of renal disease: Hb low and patient occult positive. No IV iron given elevated ferritin. Continue with Epo 10K with HD. Monitor Hb.    4) Secondary HPT of renal origin: Serum phos acceptable with high normal serum calcium. Continue with sensipar 30 mg daily and will use low calcium bath with HD. Monitor serum calcium and phosphorus.    Poor prognosis.     [facility] NEPHROLOGY  Armando Shaffer M.D.  Kendall Morales D.O.  Lilliam Cody M.D.  Oxana Hernandez, MSN, ANP-C  [phone]  Fax: [phone] [address] [place] [address], [address]  [place] [address]

## 2023-06-17 NOTE — PROGRESS NOTE ADULT - SUBJECTIVE AND OBJECTIVE BOX
Name of Patient : MAGGIE THAO  MRN: [id number]  Date of visit: 06-17-23       Subjective: Patient seen and examined. No new events except as noted.   Doing okay     REVIEW OF SYSTEMS:  limited     MEDICATIONS:  MEDICATIONS  (STANDING):  aspirin enteric coated 81 milliGRAM(s) Oral daily  cefTRIAXone   IVPB 2000 milliGRAM(s) IV Intermittent every 24 hours  chlorhexidine 2% Cloths 1 Application(s) Topical daily  cinacalcet 30 milliGRAM(s) Oral daily  donepezil 5 milliGRAM(s) Oral at bedtime  epoetin shell-epbx (RETACRIT) Injectable 87448 Unit(s) IV Push <User Schedule>  metoprolol succinate ER 12.5 milliGRAM(s) Oral daily  midodrine. 10 milliGRAM(s) Oral <User Schedule>  pantoprazole   Suspension 40 milliGRAM(s) Oral two times a day      PHYSICAL EXAM:  T(C): 36.8 (06-17-23 @ 21:54), Max: 37.1 (06-17-23 @ 12:51)  HR: 87 (06-17-23 @ 21:54) (85 - 98)  BP: 105/57 (06-17-23 @ 21:54) (93/56 - 115/66)  RR: 17 (06-17-23 @ 21:54) (17 - 18)  SpO2: 100% (06-17-23 @ 21:54) (98% - 100%)  Wt(kg): --  I&O's Summary    17 Jun 2023 07:01  -  17 Jun 2023 22:57  --------------------------------------------------------  IN: 300 mL / OUT: 0 mL / NET: 300 mL          Appearance: Normal	  HEENT:  PERRLA   Lymphatic: No lymphadenopathy   Cardiovascular: Normal S1 S2, no JVD  Respiratory: normal effort , clear  Gastrointestinal:  Soft, Non-tender  Skin: No rashes,  warm to touch  Psychiatry:  Mood & affect appropriate  Musculuskeletal: No edema    recent labs, Imaging and EKGs personally reviewed     06-17-23 @ 07:01  -  06-17-23 @ 22:57  --------------------------------------------------------  IN: 300 mL / OUT: 0 mL / NET: 300 mL                          8.1    9.32  )-----------( 285      ( 17 Jun 2023 05:32 )             26.0               06-17    139  |  98  |  33<H>  ----------------------------<  65<L>  5.1   |  25  |  5.87<H>    Ca    10.2      17 Jun 2023 05:32  Phos  4.7     06-17  Mg     2.20     06-17

## 2023-06-18 NOTE — PROGRESS NOTE ADULT - ASSESSMENT
77y Male with history of ESRD on HD presents for need for dialysis. Nephrology consulted for ESRD status.    1) ESRD: HD today 6/17 with intradialytic hypotension.  Plan for next maintenance HD on 6/20.   Please inform me once MRI with janie scheduled so that I can arrange for HD after imaging (ideally would dialyze within 1 hour of imaging to decrease risk of NSF). Follow up with dialysis social worker to arrange patient to be transferred to [facility] where Yusra lift is available upon disharge. Monitor electrolytes.    2) HTN with ESRD: BP low normal. Increase midodrine to 10 mg PO pre-HD to avoid intradialytic hypotension. Monitor BP.    3) Anemia of renal disease: Hb low and patient occult positive. No IV iron given elevated ferritin. Continue with Epo 10K with HD. Monitor Hb.    4) Secondary HPT of renal origin: Serum phos acceptable with high normal serum calcium. Continue with sensipar 30 mg daily and will use low calcium bath with HD. Monitor serum calcium and phosphorus.    Poor prognosis.     [facility] NEPHROLOGY  Armando Shaffer M.D.  Kendall Morales D.O.  Lilliam Cody M.D.  Oxana Hernandez, MSN, ANP-C  [phone]  Fax: [phone] [address] [place] [address], [address]  [place] [address]     77y Male with history of ESRD on HD presents for need for dialysis. Nephrology consulted for ESRD status.    1) ESRD: HD today 6/17 with intradialytic hypotension.  Plan for next maintenance HD on 6/20.   Please inform me once MRI with janie scheduled so that I can arrange for HD after imaging (ideally would dialyze within 1 hour of imaging to decrease risk of NSF). Follow up with dialysis social worker to arrange patient to be transferred to [facility] where Yusra lift is available upon disharge. Monitor electrolytes.    Replete K+ (can give 40mEq PO X 1.  Will dialyze with 4K dialysate tomorrow.    2) HTN with ESRD: BP low normal. Increase midodrine to 10 mg PO pre-HD to avoid intradialytic hypotension. Monitor BP.    3) Anemia of renal disease: Hb low and patient occult positive. No IV iron given elevated ferritin. Continue with Epo 10K with HD. Monitor Hb.    4) Secondary HPT of renal origin: Serum phos acceptable with high normal serum calcium. Continue with sensipar 30 mg daily and will use low calcium bath with HD. Monitor serum calcium and phosphorus.    Poor prognosis.     [facility] NEPHROLOGY  Armando Shaffer M.D.  Kendall Morales D.O.  Lilliam Cody M.D.  Oxana Hernandez, MSN, ANP-C  [phone]  Fax: [phone] [address] [place] [address], [address]  [place] [address]

## 2023-06-18 NOTE — PROGRESS NOTE ADULT - SUBJECTIVE AND OBJECTIVE BOX
CARDIOLOGY FOLLOW UP NOTE - DR. RAMOS    Patient Name: MAGGIE THAO  Date of Service: 06-18-23 @ 11:08    no new events      Subjective:    cv: denies chest pain, dyspnea, palpitations, dizziness  pulmonary: denies cough  GI: denies abdominal pain, nausea, vomiting  vascular/legs: no edema   skin: no rash  ROS: otherwise negative   overnight events:      PHYSICAL EXAM:  T(C): 36.3 (06-18-23 @ 05:43), Max: 37.1 (06-17-23 @ 12:51)  HR: 69 (06-18-23 @ 08:08) (69 - 87)  BP: 110/61 (06-18-23 @ 08:08) (93/56 - 115/66)  RR: 18 (06-18-23 @ 05:43) (17 - 19)  SpO2: 100% (06-18-23 @ 05:43) (98% - 100%)  Wt(kg): --  I&O's Summary    17 Jun 2023 07:01  -  18 Jun 2023 07:00  --------------------------------------------------------  IN: 1150 mL / OUT: 900 mL / NET: 250 mL      Daily     Daily     Appearance: Normal	  Cardiovascular: Normal S1 S2,RRR, No JVD, No murmurs  Respiratory: Lungs clear to auscultation	  Gastrointestinal:  Soft, Non-tender, + BS	  Extremities: Normal range of motion, No clubbing, cyanosis or edema      Home Medications:  bisacodyl 5 mg oral tablet: 2 tab(s) orally once a day as needed for  constipation (04 Jun 2023 00:48)  cinacalcet 60 mg oral tablet: 1 tab(s) orally once a day (04 Jun 2023 00:48)  donepezil 5 mg oral tablet: 1 tab(s) orally once a day (at bedtime) (04 Jun 2023 00:48)  ergocalciferol 1.25 mg (50,000 intl units) oral capsule: 1 cap(s) orally once a month (04 Jun 2023 00:48)  meloxicam 15 mg oral tablet: 1 tab(s) orally once a day as needed for  pain (04 Jun 2023 00:48)  NIFEdipine (Eqv-Adalat CC) 30 mg oral tablet, extended release: 2 tab(s) orally once a day (04 Jun 2023 00:48)  sevelamer hydrochloride 800 mg oral tablet: 1 tab(s) orally every 6 hours (04 Jun 2023 00:49)      MEDICATIONS  (STANDING):  aspirin enteric coated 81 milliGRAM(s) Oral daily  cefTRIAXone   IVPB 2000 milliGRAM(s) IV Intermittent every 24 hours  chlorhexidine 2% Cloths 1 Application(s) Topical daily  cinacalcet 30 milliGRAM(s) Oral daily  donepezil 5 milliGRAM(s) Oral at bedtime  epoetin shell-epbx (RETACRIT) Injectable 46204 Unit(s) IV Push <User Schedule>  metoprolol succinate ER 12.5 milliGRAM(s) Oral daily  midodrine. 10 milliGRAM(s) Oral <User Schedule>  pantoprazole   Suspension 40 milliGRAM(s) Oral two times a day  potassium chloride    Tablet ER 40 milliEquivalent(s) Oral once      TELEMETRY: 	    ECG:  	  RADIOLOGY:   DIAGNOSTIC TESTING:  [ ] Echocardiogram:  [ ] Catheterization:  [ ] Stress Test:    OTHER: 	    LABS:	 	    CARDIAC MARKERS:                                      8.0    11.83 )-----------( 301      ( 18 Jun 2023 04:40 )             25.8     06-18    139  |  99  |  14  ----------------------------<  92  2.8<LL>   |  27  |  2.59<H>    Ca    8.3<L>      18 Jun 2023 04:40  Phos  2.0     06-18  Mg     1.90     06-18      proBNP:     Lipid Profile:   HgA1c:     Creatinine, Serum: 2.59 mg/dL (06-18-23 @ 04:40)  Creatinine, Serum: 5.87 mg/dL (06-17-23 @ 05:32)

## 2023-06-18 NOTE — PROGRESS NOTE ADULT - SUBJECTIVE AND OBJECTIVE BOX
Full note to follow. [facility] NEPHROLOGY- PROGRESS NOTE    77y Male with history of ESRD on HD presents for need for dialysis. Nephrology consulted for ESRD status.    REVIEW OF SYSTEMS: limited due to mental status.    penicillin (Rash)      Hospital Medications: Medications reviewed      VITALS:  T(F): 96.8 (06-18-23 @ 17:10), Max: 98.3 (06-17-23 @ 21:54)  HR: 77 (06-18-23 @ 17:10)  BP: 112/60 (06-18-23 @ 17:10)  RR: 18 (06-18-23 @ 17:10)  SpO2: 98% (06-18-23 @ 17:10)  Wt(kg): --    06-17 @ 07:01  -  06-18 @ 07:00  --------------------------------------------------------  IN: 1150 mL / OUT: 900 mL / NET: 250 mL        PHYSICAL EXAM:  Gen: NAD, confused  Cards: RRR, +S1/S2, no M/G/R  Resp: CTA B/L  GI: soft, NT/ND, NABS  : + SPT  Vascular: no LE edema B/L, RUE AVG + bruit/thrill        LABS:  06-18    139  |  99  |  14  ----------------------------<  92  2.8<LL>   |  27  |  2.59<H>    Ca    8.3<L>      18 Jun 2023 04:40  Phos  2.0     06-18  Mg     1.90     06-18      Creatinine Trend: 2.59 <--, 5.87 <--, 6.97 <--, 5.55 <--, 9.02 <--, 6.53 <--                        8.0    11.83 )-----------( 301      ( 18 Jun 2023 04:40 )             25.8

## 2023-06-18 NOTE — PROGRESS NOTE ADULT - SUBJECTIVE AND OBJECTIVE BOX
Name of Patient : MAGGIE THAO  MRN: [id number]  Date of visit: 06-18-23 @ 14:13      Subjective: Patient seen and examined. No new events except as noted.     REVIEW OF SYSTEMS:    CONSTITUTIONAL: No weakness, fevers or chills  EYES/ENT: No visual changes;  No vertigo or throat pain   NECK: No pain or stiffness  RESPIRATORY: No cough, wheezing, hemoptysis; No shortness of breath  CARDIOVASCULAR: No chest pain or palpitations  GASTROINTESTINAL: No abdominal or epigastric pain. No nausea, vomiting, or hematemesis; No diarrhea or constipation. No melena or hematochezia.  GENITOURINARY: No dysuria, frequency or hematuria  NEUROLOGICAL: No numbness or weakness  SKIN: No itching, burning, rashes, or lesions   All other review of systems is negative unless indicated above.    MEDICATIONS:  MEDICATIONS  (STANDING):  aspirin  chewable 81 milliGRAM(s) Oral daily  cefTRIAXone   IVPB 2000 milliGRAM(s) IV Intermittent every 24 hours  chlorhexidine 2% Cloths 1 Application(s) Topical daily  cinacalcet 30 milliGRAM(s) Oral daily  dextrose 5% + sodium chloride 0.9%. 1000 milliLiter(s) (50 mL/Hr) IV Continuous <Continuous>  dextrose 5%. 1000 milliLiter(s) (100 mL/Hr) IV Continuous <Continuous>  dextrose 5%. 1000 milliLiter(s) (50 mL/Hr) IV Continuous <Continuous>  dextrose 50% Injectable 25 Gram(s) IV Push once  dextrose 50% Injectable 12.5 Gram(s) IV Push once  dextrose 50% Injectable 25 Gram(s) IV Push once  donepezil 5 milliGRAM(s) Oral at bedtime  epoetin shell-epbx (RETACRIT) Injectable 66189 Unit(s) IV Push <User Schedule>  glucagon  Injectable 1 milliGRAM(s) IntraMuscular once  metoprolol succinate ER 12.5 milliGRAM(s) Oral daily  midodrine. 10 milliGRAM(s) Oral <User Schedule>  pantoprazole   Suspension 40 milliGRAM(s) Oral two times a day  potassium chloride    Tablet ER 40 milliEquivalent(s) Oral once      PHYSICAL EXAM:  T(C): 36.3 (06-18-23 @ 05:43), Max: 37.1 (06-17-23 @ 16:41)  HR: 69 (06-18-23 @ 08:08) (69 - 87)  BP: 110/61 (06-18-23 @ 08:08) (93/56 - 115/54)  RR: 18 (06-18-23 @ 05:43) (17 - 19)  SpO2: 100% (06-18-23 @ 05:43) (98% - 100%)  Wt(kg): --  I&O's Summary    17 Jun 2023 07:01  -  18 Jun 2023 07:00  --------------------------------------------------------  IN: 1150 mL / OUT: 900 mL / NET: 250 mL          Appearance: Normal	  HEENT:  PERRLA   Lymphatic: No lymphadenopathy   Cardiovascular: Normal S1 S2, no JVD  Respiratory: normal effort , clear  Gastrointestinal:  Soft, Non-tender  Skin: No rashes,  warm to touch  Psychiatry:  Mood & affect appropriate  Musculuskeletal: No edema    recent labs, Imaging and EKGs personally reviewed             06-17-23 @ 07:01  -  06-18-23 @ 07:00  --------------------------------------------------------  IN: 1150 mL / OUT: 900 mL / NET: 250 mL

## 2023-06-19 NOTE — PROGRESS NOTE ADULT - SUBJECTIVE AND OBJECTIVE BOX
CC: Patient is a 77y old  Male who presents with a chief complaint of needs HD (19 Jun 2023 11:33)    ID following for GBS bacteremia    Interval History/ROS: Patient remains afebrile. Leukocytosis yesterday.    Rest of ROS negative.    Allergies  penicillin (Rash)    ANTIMICROBIALS:  cefTRIAXone   IVPB 2000 every 24 hours    OTHER MEDS:  aspirin  chewable 81 milliGRAM(s) Oral daily  chlorhexidine 2% Cloths 1 Application(s) Topical daily  cinacalcet 30 milliGRAM(s) Oral daily  dextrose 5% + sodium chloride 0.9%. 1000 milliLiter(s) IV Continuous <Continuous>  dextrose 5%. 1000 milliLiter(s) IV Continuous <Continuous>  dextrose 5%. 1000 milliLiter(s) IV Continuous <Continuous>  dextrose 50% Injectable 25 Gram(s) IV Push once  dextrose 50% Injectable 12.5 Gram(s) IV Push once  dextrose 50% Injectable 25 Gram(s) IV Push once  dextrose Oral Gel 15 Gram(s) Oral once PRN  donepezil 5 milliGRAM(s) Oral at bedtime  epoetin shell-epbx (RETACRIT) Injectable 84514 Unit(s) IV Push <User Schedule>  glucagon  Injectable 1 milliGRAM(s) IntraMuscular once  guaiFENesin Oral Liquid (Sugar-Free) 100 milliGRAM(s) Oral every 6 hours PRN  metoprolol succinate ER 12.5 milliGRAM(s) Oral daily  midodrine. 10 milliGRAM(s) Oral <User Schedule>  oxyCODONE    IR 5 milliGRAM(s) Oral every 6 hours PRN  pantoprazole   Suspension 40 milliGRAM(s) Oral two times a day  sodium chloride 0.9% Bolus. 100 milliLiter(s) IV Bolus every 5 minutes PRN    PE:    Vital Signs Last 24 Hrs  T(C): 36.1 (19 Jun 2023 05:10), Max: 36.4 (18 Jun 2023 21:00)  T(F): 97 (19 Jun 2023 05:10), Max: 97.5 (18 Jun 2023 21:00)  HR: 85 (19 Jun 2023 05:10) (70 - 85)  BP: 111/65 (19 Jun 2023 05:10) (109/60 - 112/60)  BP(mean): --  RR: 18 (19 Jun 2023 05:10) (18 - 18)  SpO2: 99% (19 Jun 2023 05:10) (98% - 99%)    Parameters below as of 19 Jun 2023 05:10  Patient On (Oxygen Delivery Method): room air    Gen: Lethargic, NAD  CV: S1+S2 normal, no murmurs  Resp: Clear bilat, no resp distress  Abd: Soft, nontender, +BS  Ext: RUE AVG intact  : suprapubic catheter in place  IV/Skin: No thrombophlebitis  Neuro: no focal deficits    LABS:                          8.0    11.83 )-----------( 301      ( 18 Jun 2023 04:40 )             25.8       06-19    141  |  103  |  28<H>  ----------------------------<  93  4.9   |  27  |  4.79<H>    Ca    9.5      19 Jun 2023 00:10  Phos  4.8     06-19  Mg     2.10     06-19    MICROBIOLOGY:  v  .Blood Blood-Peripheral  06-15-23   No growth to date.  --  --      .Blood Blood-Peripheral  06-15-23   No growth to date.  --  --      .Blood Blood-Venous  06-13-23   No Growth Final  --  --      .Blood Blood  06-13-23   No Growth Final  --  --      .Blood Blood  06-11-23   Growth in aerobic and anaerobic bottles: Streptococcus agalactiae (Group  B)  See previous culture [id number]  --    Growth in anaerobic bottle: Gram positive cocci in pairs  Growth in aerobic bottle: Gram positive cocci in pairs      .Blood Blood  06-11-23   Growth in aerobic and anaerobic bottles: Streptococcus agalactiae (Group  B)  ***Blood Panel PCR results on this specimen are available  approximately 3 hours after the Gram stain result.***  Gram stain, PCR, and/or culture results may not always  correspond due to difference in methodologies.  ************************************************************  This PCR assay was performed by multiplex PCR. This  Assay tests for 66 bacterial and resistance gene targets.  Please refer to the [facility] Labs test directory  at [email or web address] for details.  --  Blood Culture PCR  Streptococcus agalactiae (Group B)    RADIOLOGY:    < from: CT Abdomen and Pelvis No Cont (06.13.23 @ 21:27) >  IMPRESSION:  Partial nephrectomy with urinary diversion in the right lower quadrant.    Bowel containing ventral supraumbilical hernia. No bowel obstruction.    No evidence of acute infectious process in the abdomen and pelvis.    < end of copied text >

## 2023-06-19 NOTE — PROGRESS NOTE ADULT - ASSESSMENT
77y Male with history of ESRD on HD presents for need for dialysis. Nephrology consulted for ESRD status.    1) ESRD: Last HD on 6/17 with intradialytic hypotension and 0.1L net fluid remval. Plan for next maintenance HD on 6/20. Please inform me once MRI with janie scheduled so that I can arrange for HD after imaging (ideally would dialyze within 1 hour of imaging to decrease risk of NSF). Follow up with dialysis social worker to arrange patient to be transferred to [facility] where Yusra lift is available upon disharge. On IVF given poor PO intake. Monitor electrolytes.    2) HTN with ESRD: BP low normal. Continue with midodrine 10 mg PO pre-HD to avoid intradialytic hypotension. Monitor BP.    3) Anemia of renal disease: Hb low and patient occult positive. No IV iron given elevated ferritin. Continue with Epo 10K with HD. Monitor Hb.    4) Secondary HPT of renal origin: Serum phos acceptable with high normal serum calcium. Continue with sensipar 30 mg daily and will use low calcium bath with HD. Monitor serum calcium and phosphorus.    Poor prognosis.     [facility] NEPHROLOGY  Armando Shaffer M.D.  Kendall Morales D.O.  Lilliam Cody M.D.  Oxana Hernandez, MSN, ANP-C  [phone]  Fax: [phone] [address] [place] [address], [address]  [place] [address]

## 2023-06-19 NOTE — PROGRESS NOTE ADULT - ASSESSMENT
77  year old male with hx of cad , ? PCI, HTN, esrd, HTN, DM, afib presents for HD.    #CAD  -cv stable no cp   -no chf on exam   -no obvious angina, decomp HF  -ECHO w moderate segm LV dysfunction along LAD distribution suggestive of prior infarct  -in light of age, fxnl status, anemia req w/u, and mental status not a candidate for further ischemic eval/cath  -would continue medical tx of cmp  -toprol xl 12.5 qd if bp can tolerate, can d/c if bp remains borderline and mido required  -cont current meds, on asa     #anemia  -+ occult. work up per GI/med  -s/p egd with treated ulcer  -gi f/u, ppi    #Afib   -not on ac  -cont to hold in light of egd findings, anemia req prbcs, fall risk   -cont asa    # ESRD on HD   -renal fu     #htn   -cont mido as needed  dvt ppx    pall eval noted    35 minutes spent on total encounter; more than 50% of the visit was spent counseling and/or coordinating care by the attending physician.

## 2023-06-19 NOTE — SWALLOW BEDSIDE ASSESSMENT ADULT - COMMENTS
Pt. lying in bed asleep, minimally aroused despite tactile and verbal stimulation. PCA at bedside and reported pt. awake and alert prior to SLP arrival.     Per chart:   "77-year-old male with history of DVT (previously on coumadin, now stopped), CAD, ESRD (on HD via RUE AVF Tu/Th/Sat), AOCD, HLD, HTN, obesity, pre-diabetes, and prostate cancer s/p suprapubic catheter presenting from [facility] w/need for HD."    -WBC HIGH per lab review  -CXR: "no focal consolidations"    Pt. known to service from previous bedside assessment 6/13 - see chart for recommendations.     Pt. unable to provide report of swallow function 2/2 current mental status. Pt. unable to follow 1-step commands with intermittent eye opening after maximal verbal and tactile cues. Pt. unable to maintain level of alert state.

## 2023-06-19 NOTE — PROGRESS NOTE ADULT - PROBLEM SELECTOR PLAN 8
- 6/15- See [facility] note. I spent 20 minutes addressing advanced care planning with patient and/or decision maker(s). Recommended DNR/DNI. Wife will think about it.  - 6/16- See [facility] note. I spent 20 minutes addressing advanced care planning with patient and/or decision maker(s). Wife decided for full code, but will rethink in light of HFrEF  - 6/19- Wife maintains she would like to pursue all available medical interventions, full code.

## 2023-06-19 NOTE — SWALLOW BEDSIDE ASSESSMENT ADULT - SWALLOW EVAL: DIAGNOSIS
Pt. was given ice chip presentations onto the lips to increase oral sensory awareness and stimulation to the oral cavity. Pt. with attempt to retrieve ice chips, however; no lingual response noted upon retrieval. Pt. also presented with moderately thick coated tsp. with impaired retrieval/stripping from utensil presentation with no subsequent lingual response. Pharyngeal stage unable to be adequately assessed given severity of oral deficits and pt.'s inability to maintain level of alert state at this time.
1. Moderate oral dysphagia for puree, moderately-thick, mildly-thick and thin liquids marked by adequate acceptance and containment, prolonged bolus manipulation, volitional bolus holding with eventual transfer given moderate-max verbal cues and adequate oral clearance. 2. Functional pharyngeal phase for puree, moderately-thick and mildly-thick liquids marked by hyolaryngeal excursion present upon palpation and no overt s/s of penetration/aspiration evidenced. 3. Moderate pharyngeal dysphagia for thin liquids marked by hyolaryngeal excursion present upon palpation and presence of overt s/s of penetration/aspiration evidenced by episode of coughing.

## 2023-06-19 NOTE — SWALLOW BEDSIDE ASSESSMENT ADULT - ORAL PHASE
Decreased anterior-posterior movement of the bolus/Delayed oral transit time
absent lingual movements

## 2023-06-19 NOTE — PROGRESS NOTE ADULT - SUBJECTIVE AND OBJECTIVE BOX
[facility] NEPHROLOGY- PROGRESS NOTE    77y Male with history of ESRD on HD presents for need for dialysis. Nephrology consulted for ESRD status.    REVIEW OF SYSTEMS: limited due to mental status.    penicillin (Rash)      Hospital Medications: Medications reviewed      VITALS:  T(F): 97 (06-19-23 @ 05:10), Max: 97.6 (06-18-23 @ 12:11)  HR: 85 (06-19-23 @ 05:10)  BP: 111/65 (06-19-23 @ 05:10)  RR: 18 (06-19-23 @ 05:10)  SpO2: 99% (06-19-23 @ 05:10)  Wt(kg): --        PHYSICAL EXAM:    Gen: NAD, lethargic  Cards: RRR, +S1/S2, no M/G/R  Resp: CTA B/L  GI: soft, NT/ND, NABS  : + SPT  Vascular: no LE edema B/L, RUE AVG + bruit/thrill        LABS:  06-19    141  |  103  |  28<H>  ----------------------------<  93  4.9   |  27  |  4.79<H>    Ca    9.5      19 Jun 2023 00:10  Phos  4.8     06-19  Mg     2.10     06-19      Creatinine Trend: 4.79 <--, 2.59 <--, 5.87 <--, 6.97 <--, 5.55 <--, 9.02 <--                        8.0    11.83 )-----------( 301      ( 18 Jun 2023 04:40 )             25.8     Urine Studies:

## 2023-06-19 NOTE — PROGRESS NOTE ADULT - PROBLEM SELECTOR PLAN 1
- patient showing signs of severe pain at times - grimacing, moaning   - c/w oxycodone 5 mg q6h PRN for severe pain

## 2023-06-19 NOTE — PROGRESS NOTE ADULT - ASSESSMENT
77 year old male with DVT, CAD, ESRD on HD, RUE AVF, AOCD, HLD, HTN, pre-diabetes, prostate ca with suprapubic catheter from NH for missed HD. Developed fevers and found to have GBS bacteremia. +R shoulder pain. Leukocytosis resolved.    CT A/P with no evidence of acute infectious process in the abdomen and pelvis.    Recommend:  #Sepsis (fever, leukocytosis) secondary to high grade GBS bacteremia  -Repeat blood cultures remain negative  -Trend WBC  -monitor fever curve  -TTE no evidence of valvular vegetation  -MRI R shoulder with contrast - will need to arrange with nephro regarding HD post study  -Continue ceftriaxone 2 grams IV q 24 hours (dose daily, but after HD on days of HD)    Isaiah Hall MD  Available through MS Teams  If no response, or after 5pm/weekends, call [phone]

## 2023-06-19 NOTE — PROGRESS NOTE ADULT - SUBJECTIVE AND OBJECTIVE BOX
Indication of Geriatrics and Palliative Medicine Services:  [X  ] Complex Medical Decision Making   [  ] Symptom/Pain management     DNR on chart: No     INTERVAL EVENTS: Patient seen this AM, comfortable in no distress. Spoke to wife Kim whether she reassessed code status given HF. Kim maintains to continue all available medical interventions, full code. She does not want to "give up."     -------------------------------------------------------------------------------------------------------    PRESENT SYMPTOMS:     [ ] No     [X ] Unable to self report      [ ] CPOT (ICU)     [ ] PAINADs     [ ] RDOS    [X ] Yes     Source if other than patient:  [X ]Family   [ ]Team     PAIN:   If blank, patient unable to specify   [ ]yes [ ]no  QOL impact-   Location -                    Aggravating factors -  Quality -  Radiation -  Timing-  Pain at most severe level (0-10 scale):  Pain at minimal acceptable level/Pain Goal (0-10 scale):     SYMPTOMS:   Dyspnea:                           [ ]Mild [ ]Moderate [ ]Severe  Anxiety:                             [ ]Mild [ ]Moderate [ ]Severe  Fatigue:                             [ ]Mild [ ]Moderate [ ]Severe  Nausea/Vomiting:              [ ]Mild [ ]Moderate [ ]Severe  Loss of appetite:                [ ]Mild [X ]Moderate [ ]Severe  Constipation:                     [ ]Mild [ ]Moderate [ ]Severe    Other Symptoms:  [X ]All other review of systems negative     Home Medications for symptoms if any:  I-Stop Reference No:(from initial)     -------------------------------------------------------------------------------------------------------    ITEMS UNCHECKED ARE NOT PRESENT    PHYSICAL:  Vital Signs Last 24 Hrs  T(C): 36.1 (19 Jun 2023 05:10), Max: 36.4 (18 Jun 2023 12:11)  T(F): 97 (19 Jun 2023 05:10), Max: 97.6 (18 Jun 2023 12:11)  HR: 85 (19 Jun 2023 05:10) (70 - 85)  BP: 111/65 (19 Jun 2023 05:10) (107/62 - 112/60)  BP(mean): --  RR: 18 (19 Jun 2023 05:10) (17 - 18)  SpO2: 99% (19 Jun 2023 05:10) (98% - 99%)    Parameters below as of 19 Jun 2023 05:10  Patient On (Oxygen Delivery Method): room air      GENERAL:  [X ]Cachexia  [X ] Frail  [X ]Awake  [X ]Oriented x 1   [X ]Lethargic  [ ]Unarousable  [ ]Verbal  [ ]Non-Verbal    BEHAVIORAL:   [ ] Anxiety  [ ] Delirium [ ] Agitation [ ] Other    HEENT:   [X ]Normal   [ ]Dry mouth   [ ]ET Tube/Trach  [ ]Oral lesions    PULMONARY:   [X ]Clear [ ]Tachypnea  [ ]Audible excessive secretions   [ ]Rhonchi        [ ]Right [ ]Left [ ]Bilateral  [ ]Crackles        [ ]Right [ ]Left [ ]Bilateral  [ ]Wheezing     [ ]Right [ ]Left [ ]Bilateral  [ ]Diminished breath sounds [ ]right [ ]left [ ]bilateral    CARDIOVASCULAR:    [X ]Regular [ ]Irregular [ ]Tachy  [ ]Aleks [ ]Murmur [ ]Other    GASTROINTESTINAL:  [X ]Soft  [ ]Distended   [ ]+BS  [X ]Non tender [ ]Tender  [ ]Other [ ]PEG [ ]OGT/ NGT      GENITOURINARY:  [ ]Normal [ ] Incontinent   [X ]Oliguria/Anuria   [ ]Martínez    MUSCULOSKELETAL:   [ ]Normal   [ ]Weakness  [X ]Bed/Wheelchair bound [ ]Edema    NEUROLOGIC:   [X ]No focal deficits  [ ]Cognitive impairment  [ ]Dysphagia [ ]Dysarthria [ ]Paresis [ ]Other     SKIN:   [ ]Normal  [ ]Rash  [ ]Other  [X ]Pressure ulcer(s)       Present on admission [X ]y [ ]n    -------------------------------------------------------------------------------------------------------    LABS:                          8.0    11.83 )-----------( 301      ( 18 Jun 2023 04:40 )             25.8     06-19    141  |  103  |  28<H>  ----------------------------<  93  4.9   |  27  |  4.79<H>    Ca    9.5      19 Jun 2023 00:10  Phos  4.8     06-19  Mg     2.10     06-19      -------------------------------------------------------------------------------------------------------    CRITICAL CARE:  [ ]Shock Present  [ ]Septic [ ]Cardiogenic [ ]Neurologic [ ]Hypovolemic [ ]Undifferentiated    [ ]Vasopressors [ ]Inotropes    [ ]Respiratory failure present [ ]Acute  [ ]Chronic [ ]Hypoxic  [ ]Hypercarbic [ ]Mixed   [ ]Mechanical Ventilation  [ ]Trach collar   [ ]Non-invasive ventilatory support   [ ]High-Flow   [ ]Oxygen mask/venti     [ ]Other organ failure   -------------------------------------------------------------------------------------------------------    RADIOLOGY & ADDITIONAL STUDIES:     < from: Upper Endoscopy (06.07.23 @ 13:22) >  Impression:          - 7 cm hiatal hernia. Anif ulcers.                       - LA Grade D esophagitis.                       - Gastropathy. Biopsied.                       - A single submucosal papule (nodule) found in the                        stomach.                       - One non-bleeding duodenal ulcer with a visible vessel.                        Injected. Treated with bipolar cautery.                       - Duodenopathy.  Recommendation:      - [facility].                       - Clear liquid diet today.                       - PPI drip for 72 h, then PPI 40mg BID x 8 weeks                      followed by daily indefinitely                       - Colonoscopy was deferred given patient's respiratory                        status, light sedation and likely etiology for anemia                        identified.            - Monitor for signs of perforation: worsening abdominal                        pain, fevers, hypotension                       - Trend CBC, CMP                       - Avoid NSAIDs                       - Transfuse for Hb > 7         - Given comorbidities, risks of repeat endoscopic                        evaluation to assess for healing of esophagitis and                        ulcer (as well as further evaluation of fundic lesion)                        may outweigh benefits. Can be readdressed on outpatient                        basis pending overall GOC.                                                                                     < end of copied text >    < from: Xray Chest 1 View AP/PA (06.03.23 @ 12:05) >  IMPRESSION:    No acute infiltrate. Right axillary vascular stent.    < end of copied text >    < from: US Abdomen Upper Quadrant Right (06.04.23 @ 15:24) >  IMPRESSION:  Cholelithiasis without evidence of cholecystitis.    The liver is normal in appearance.    Markedly echogenic right kidney with associated multiple small renal   cysts. Findings are suggestive medical renal disease.    < end of copied text >    < from: US Kidney and Bladder (06.12.23 @ 00:00) >  IMPRESSION:  *  Bilateral renal cysts and parenchymal disease.  *  No hydronephrosis.  *  Martínez catheter balloon in the right abdomen. Location of the balloon   within the [place] pouch cannot be determined sonographically.    < end of copied text >    < from: CT Abdomen and Pelvis No Cont (06.13.23 @ 21:27) >  IMPRESSION:  Partial nephrectomy with urinary diversion in the right lower quadrant.    Bowel containing ventral supraumbilical hernia. No bowel obstruction.    No evidence of acute infectious process in the abdomen and pelvis.    < end of copied text >    < from: Transthoracic Echocardiogram (06.14.23 @ 09:47) >  CONCLUSIONS:  1. Mitral annular calcification, otherwise normal mitral  valve. Mild mitral regurgitation.  2. Normal left ventricular internal dimensions and wall  thicknesses.Moderate segmental left ventricular systolic  dysfunction.There ia akinesis of the entire apex,  anteroseptum, and the remaining walls are hypokinetic.  The  anterior wall is not well visualized but appears at least  hypokinetic. LVE is 32% using biplane Patterson's method.  3. Normal right atrium. Normal tricuspid valve. Mild  eccentric tricuspid regurgitation.Estimated pulmonary  artery systolic pressure equals 38 mm Hg, assuming right  atrial pressure equals 10  mm Hg, consistent with  borderline pulmonary hypertension.  4. No pericardial effusion seen.  No evidence of valvular vegetation.  *** Compared with echocardiogram of 6/5/2023, there is a  significant decrease in LVEF and the entire anterolateral  appears hypokinetic.    < end of copied text >    -------------------------------------------------------------------------------------------------------  MEDICATIONS:     MEDICATIONS  (STANDING):  aspirin enteric coated 81 milliGRAM(s) Oral daily  cefTRIAXone   IVPB 2000 milliGRAM(s) IV Intermittent every 24 hours  chlorhexidine 2% Cloths 1 Application(s) Topical daily  cinacalcet 30 milliGRAM(s) Oral daily  donepezil 5 milliGRAM(s) Oral at bedtime  epoetin shell-epbx (RETACRIT) Injectable 82143 Unit(s) IV Push <User Schedule>  metoprolol succinate ER 12.5 milliGRAM(s) Oral daily  midodrine. 5 milliGRAM(s) Oral <User Schedule>  pantoprazole   Suspension 40 milliGRAM(s) Oral two times a day    MEDICATIONS  (PRN):  guaiFENesin Oral Liquid (Sugar-Free) 100 milliGRAM(s) Oral every 6 hours PRN Cough  sodium chloride 0.9% Bolus. 100 milliLiter(s) IV Bolus every 5 minutes PRN SBP LESS THAN or EQUAL to 90 mmHg

## 2023-06-19 NOTE — SWALLOW BEDSIDE ASSESSMENT ADULT - ASR SWALLOW RECOMMEND DIAG
Objective testing is NOT indicated given presence of overt s/s of penetration/aspiration (i.e. coughing) with thin liquids
Instrumental study not indicated as pt. unable to maintain level of alert state for bedside assessment

## 2023-06-19 NOTE — SWALLOW BEDSIDE ASSESSMENT ADULT - ADDITIONAL RECOMMENDATIONS
1). Re-consult as pt. becomes medically optimized for re-assessment of oral diet   2). This service will f/u as scheduling permits
2. This service to follow-up as schedule permits for diet advancement. 3. Medical team further advised to reconsult this department with any change in medical status and/or observed change in tolerance of recommended PO diet.

## 2023-06-19 NOTE — CHART NOTE - NSCHARTNOTEFT_GEN_A_CORE
left shoulder with swelling, soft to palpation, movable hx arthritis xrays ordered as well as lidocaine patch for left shoulder and right knee  pain left shoulder with swelling, soft to palpation, movable hx arthritis xrays ordered as well as lidocaine patch for left shoulder and right knee  pain    episodes of hypoglycemia throughout the day received d50 will change ivf to d10 & 1/2ns   will continue to monitor as pt is now npo for failed s&s

## 2023-06-19 NOTE — PROGRESS NOTE ADULT - SUBJECTIVE AND OBJECTIVE BOX
Name of Patient : MAGGIE THAO  MRN: [id number]  Date of visit: 06-19-23       Subjective: Patient seen and examined. No new events except as noted.   hypoglycemia     REVIEW OF SYSTEMS:  limited     MEDICATIONS:  MEDICATIONS  (STANDING):  aspirin  chewable 81 milliGRAM(s) Oral daily  cefTRIAXone   IVPB 2000 milliGRAM(s) IV Intermittent every 24 hours  chlorhexidine 2% Cloths 1 Application(s) Topical daily  cinacalcet 30 milliGRAM(s) Oral daily  dextrose 5% + sodium chloride 0.9%. 1000 milliLiter(s) (50 mL/Hr) IV Continuous <Continuous>  dextrose 5%. 1000 milliLiter(s) (100 mL/Hr) IV Continuous <Continuous>  dextrose 5%. 1000 milliLiter(s) (50 mL/Hr) IV Continuous <Continuous>  dextrose 50% Injectable 25 Gram(s) IV Push once  dextrose 50% Injectable 12.5 Gram(s) IV Push once  dextrose 50% Injectable 25 Gram(s) IV Push once  donepezil 5 milliGRAM(s) Oral at bedtime  epoetin shell-epbx (RETACRIT) Injectable 15934 Unit(s) IV Push <User Schedule>  glucagon  Injectable 1 milliGRAM(s) IntraMuscular once  metoprolol tartrate Injectable 2.5 milliGRAM(s) IV Push every 6 hours  midodrine. 10 milliGRAM(s) Oral <User Schedule>  pantoprazole  Injectable 40 milliGRAM(s) IV Push two times a day      PHYSICAL EXAM:  T(C): 36.1 (06-19-23 @ 05:10), Max: 36.4 (06-18-23 @ 21:00)  HR: 85 (06-19-23 @ 05:10) (70 - 85)  BP: 111/65 (06-19-23 @ 05:10) (109/60 - 112/60)  RR: 18 (06-19-23 @ 05:10) (18 - 18)  SpO2: 99% (06-19-23 @ 05:10) (98% - 99%)  Wt(kg): --  I&O's Summary    Appearance: Normal	  HEENT:  PERRLA   Lymphatic: No lymphadenopathy   Cardiovascular: Normal S1 S2, no JVD  Respiratory: normal effort , clear  Gastrointestinal:  Soft, Non-tender  Skin: No rashes,  warm to touch  Psychiatry:  Mood & affect appropriate  Musculuskeletal: No edema    recent labs, Imaging and EKGs personally reviewed                           8.0    11.83 )-----------( 301      ( 18 Jun 2023 04:40 )             25.8               06-19    141  |  103  |  28<H>  ----------------------------<  93  4.9   |  27  |  4.79<H>    Ca    9.5      19 Jun 2023 00:10  Phos  4.8     06-19  Mg     2.10     06-19

## 2023-06-19 NOTE — PROGRESS NOTE ADULT - SUBJECTIVE AND OBJECTIVE BOX
CARDIOLOGY FOLLOW UP NOTE - DR. RAMOS    Patient Name: MAGGIE THAO  Date of Service: 06-19-23 @ 14:52    Patient seen and examined    Subjective:    cv: denies chest pain, dyspnea, palpitations, dizziness  pulmonary: denies cough  GI: denies abdominal pain, nausea, vomiting  vascular/legs: no edema   skin: no rash  ROS: otherwise negative   overnight events:      PHYSICAL EXAM:  T(C): 36.1 (06-19-23 @ 05:10), Max: 36.4 (06-18-23 @ 21:00)  HR: 85 (06-19-23 @ 05:10) (70 - 85)  BP: 111/65 (06-19-23 @ 05:10) (109/60 - 112/60)  RR: 18 (06-19-23 @ 05:10) (18 - 18)  SpO2: 99% (06-19-23 @ 05:10) (98% - 99%)  Wt(kg): --  I&O's Summary    Daily     Daily     Appearance: Normal	  Cardiovascular: Normal S1 S2,RRR, No JVD, No murmurs  Respiratory: Lungs clear to auscultation	  Gastrointestinal:  Soft, Non-tender, + BS	  Extremities: Normal range of motion, No clubbing, cyanosis or edema      Home Medications:  bisacodyl 5 mg oral tablet: 2 tab(s) orally once a day as needed for  constipation (04 Jun 2023 00:48)  cinacalcet 60 mg oral tablet: 1 tab(s) orally once a day (04 Jun 2023 00:48)  donepezil 5 mg oral tablet: 1 tab(s) orally once a day (at bedtime) (04 Jun 2023 00:48)  ergocalciferol 1.25 mg (50,000 intl units) oral capsule: 1 cap(s) orally once a month (04 Jun 2023 00:48)  meloxicam 15 mg oral tablet: 1 tab(s) orally once a day as needed for  pain (04 Jun 2023 00:48)  NIFEdipine (Eqv-Adalat CC) 30 mg oral tablet, extended release: 2 tab(s) orally once a day (04 Jun 2023 00:48)  sevelamer hydrochloride 800 mg oral tablet: 1 tab(s) orally every 6 hours (04 Jun 2023 00:49)      MEDICATIONS  (STANDING):  aspirin  chewable 81 milliGRAM(s) Oral daily  cefTRIAXone   IVPB 2000 milliGRAM(s) IV Intermittent every 24 hours  chlorhexidine 2% Cloths 1 Application(s) Topical daily  cinacalcet 30 milliGRAM(s) Oral daily  dextrose 5% + sodium chloride 0.9%. 1000 milliLiter(s) (50 mL/Hr) IV Continuous <Continuous>  dextrose 5%. 1000 milliLiter(s) (100 mL/Hr) IV Continuous <Continuous>  dextrose 5%. 1000 milliLiter(s) (50 mL/Hr) IV Continuous <Continuous>  dextrose 50% Injectable 25 Gram(s) IV Push once  dextrose 50% Injectable 12.5 Gram(s) IV Push once  dextrose 50% Injectable 25 Gram(s) IV Push once  donepezil 5 milliGRAM(s) Oral at bedtime  epoetin shell-epbx (RETACRIT) Injectable 49776 Unit(s) IV Push <User Schedule>  glucagon  Injectable 1 milliGRAM(s) IntraMuscular once  metoprolol succinate ER 12.5 milliGRAM(s) Oral daily  midodrine. 10 milliGRAM(s) Oral <User Schedule>  pantoprazole   Suspension 40 milliGRAM(s) Oral two times a day      TELEMETRY: 	    ECG:  	  RADIOLOGY:   DIAGNOSTIC TESTING:  [ ] Echocardiogram:  [ ] Catheterization:  [ ] Stress Test:    OTHER: 	    LABS:	 	    CARDIAC MARKERS:                                      8.0    11.83 )-----------( 301      ( 18 Jun 2023 04:40 )             25.8     06-19    141  |  103  |  28<H>  ----------------------------<  93  4.9   |  27  |  4.79<H>    Ca    9.5      19 Jun 2023 00:10  Phos  4.8     06-19  Mg     2.10     06-19      proBNP:     Lipid Profile:   HgA1c:     Creatinine, Serum: 4.79 mg/dL (06-19-23 @ 00:10)  Creatinine, Serum: 2.59 mg/dL (06-18-23 @ 04:40)  Creatinine, Serum: 5.87 mg/dL (06-17-23 @ 05:32)

## 2023-06-20 NOTE — PROGRESS NOTE ADULT - SUBJECTIVE AND OBJECTIVE BOX
CC: Patient is a 77y old  Male who presents with a chief complaint of needs HD (20 Jun 2023 09:41)    ID following for GBS bacteremia    Interval History/ROS: Patient has no complaints. No fevers, no chills.    Rest of ROS negative.    Allergies  penicillin (Rash)    ANTIMICROBIALS:  cefTRIAXone   IVPB 2000 every 24 hours    OTHER MEDS:  aspirin  chewable 81 milliGRAM(s) Oral daily  chlorhexidine 2% Cloths 1 Application(s) Topical daily  cinacalcet 30 milliGRAM(s) Oral daily  dextrose 10% + sodium chloride 0.45%. 1000 milliLiter(s) IV Continuous <Continuous>  dextrose 5%. 1000 milliLiter(s) IV Continuous <Continuous>  dextrose 5%. 1000 milliLiter(s) IV Continuous <Continuous>  dextrose 50% Injectable 12.5 Gram(s) IV Push once  dextrose 50% Injectable 25 Gram(s) IV Push once  dextrose 50% Injectable 25 Gram(s) IV Push once  dextrose Oral Gel 15 Gram(s) Oral once PRN  donepezil 5 milliGRAM(s) Oral at bedtime  epoetin shell-epbx (RETACRIT) Injectable 08431 Unit(s) IV Push <User Schedule>  glucagon  Injectable 1 milliGRAM(s) IntraMuscular once  guaiFENesin Oral Liquid (Sugar-Free) 100 milliGRAM(s) Oral every 6 hours PRN  lidocaine   4% Patch 1 Patch Transdermal daily PRN  lidocaine   4% Patch 1 Patch Transdermal daily PRN  metoprolol tartrate Injectable 2.5 milliGRAM(s) IV Push every 6 hours  midodrine. 10 milliGRAM(s) Oral <User Schedule>  oxyCODONE    IR 5 milliGRAM(s) Oral every 6 hours PRN  pantoprazole  Injectable 40 milliGRAM(s) IV Push two times a day  sodium chloride 0.9% Bolus. 100 milliLiter(s) IV Bolus every 5 minutes PRN    PE:    Vital Signs Last 24 Hrs  T(C): 36.8 (20 Jun 2023 10:24), Max: 36.9 (19 Jun 2023 22:00)  T(F): 98.2 (20 Jun 2023 10:24), Max: 98.4 (19 Jun 2023 22:00)  HR: 86 (20 Jun 2023 10:24) (60 - 86)  BP: 106/56 (20 Jun 2023 10:24) (100/61 - 106/56)  BP(mean): --  RR: 18 (20 Jun 2023 10:24) (17 - 18)  SpO2: 100% (20 Jun 2023 10:24) (99% - 100%)    Parameters below as of 20 Jun 2023 06:00  Patient On (Oxygen Delivery Method): room air    Gen: Awake, NAD  CV: S1+S2 normal, no murmurs  Resp: Clear bilat, no resp distress  Abd: Soft, nontender, +BS  Ext: RUE AVG intact  : suprapubic catheter in place  IV/Skin: No thrombophlebitis  Neuro: no focal deficits    LABS:                          7.8    11.96 )-----------( 301      ( 19 Jun 2023 16:26 )             26.1       06-19    143  |  102  |  33<H>  ----------------------------<  105<H>  5.4<H>   |  27  |  5.79<H>    Ca    10.1      19 Jun 2023 16:26  Phos  5.5     06-19  Mg     2.20     06-19    MICROBIOLOGY:  v  .Blood Blood-Peripheral  06-15-23   No growth to date.  --  --      .Blood Blood-Peripheral  06-15-23   No growth to date.  --  --      .Blood Blood-Venous  06-13-23   No Growth Final  --  --      .Blood Blood  06-13-23   No Growth Final  --  --      .Blood Blood  06-11-23   Growth in aerobic and anaerobic bottles: Streptococcus agalactiae (Group  B)  See previous culture [id number]  --    Growth in anaerobic bottle: Gram positive cocci in pairs  Growth in aerobic bottle: Gram positive cocci in pairs      .Blood Blood  06-11-23   Growth in aerobic and anaerobic bottles: Streptococcus agalactiae (Group  B)  ***Blood Panel PCR results on this specimen are available  approximately 3 hours after the Gram stain result.***  Gram stain, PCR, and/or culture results may not always  correspond due to difference in methodologies.  ************************************************************  This PCR assay was performed by multiplex PCR. This  Assay tests for 66 bacterial and resistance gene targets.  Please refer to the [facility] test directory  at [email or web address] for details.  --  Blood Culture PCR  Streptococcus agalactiae (Group B)    RADIOLOGY:    < from: Xray Shoulder 2 Views, Left (06.19.23 @ 20:10) >  IMPRESSION:  Left shoulder soft tissue swelling. No tracking soft tissue gas   collections, radiopaque foreign bodies, or gross radiographic evidence   for osteomyelitis.    No fractures, dislocations, or AC separation.    Rotator cuff arthropathy.    Generalized osteopenia otherwise no discrete lytic or blastic lesions.    No periarticular soft tissue calcifications.  Separate metallic mesh vascular stents in mid and lower left brachial   soft tissues.    < end of copied text >

## 2023-06-20 NOTE — PROGRESS NOTE ADULT - ASSESSMENT
77  year old male with hx of cad , ? PCI, HTN, esrd, HTN, DM, afib presents for HD.    #CAD  -cv stable no cp   -no chf on exam   -no obvious angina, decomp HF  -ECHO w moderate segm LV dysfunction along LAD distribution suggestive of prior infarct  -in light of age, fxnl status, anemia req w/u, and mental status not a candidate for further ischemic eval/cath  -would continue medical tx of cmp  -lopressor IVP qd if bp can tolerate, can d/c if bp remains borderline and mido required  -change to PO meteoprolol xl 12.5 when tolerated PO   -cont current meds, on asa     #anemia  -+ occult. work up per GI/med  -s/p egd with treated ulcer  -gi f/u, ppi    #Afib   -not on ac  -cont to hold in light of egd findings, anemia req prbcs, fall risk   -cont asa    # ESRD on HD   -renal fu     #htn   -cont mido as needed    dvt ppx    pall eval noted

## 2023-06-20 NOTE — PROVIDER CONTACT NOTE (HYPOGLYCEMIA EVENT) - NS PROVIDER CONTACT NOTE-TREATMENT-HYPO
Dextrose 50% 12.5 Grams IV Push
Dextrose 50% 25 Grams IV Push
Dextrose 50% 12.5 Grams IV Push/Other (Specify)

## 2023-06-20 NOTE — PROVIDER CONTACT NOTE (HYPOGLYCEMIA EVENT) - NS PROVIDER CONTACT CONTRIBUTING FACTORS OF EPISODE
Poor oral intake within the last 24 hours/Chronic kidney disease
Patient NPO greater than 8 hours
Poor oral intake within the last 24 hours/Chronic kidney disease

## 2023-06-20 NOTE — PROGRESS NOTE ADULT - ASSESSMENT
77y Male with history of ESRD on HD presents for need for dialysis. Nephrology consulted for ESRD status.    1) ESRD: Last HD on 6/17 with intradialytic hypotension and 0.1L net fluid removal. Plan for next maintenance HD today ideally coordinated post MRI with janie once IV access obtained to decrease risk of NSF. Monitor electrolytes.    2) HTN with ESRD: BP low normal. Can resume midodrine 10 mg PO pre-HD to avoid intradialytic hypotension if NGT placed. Monitor BP.    3) Anemia of renal disease: Hb low and patient occult positive. No IV iron given elevated ferritin. Increase Epo to 12K with HD. Monitor Hb.    4) Secondary HPT of renal origin: Serum phos acceptable with high normal serum calcium. Can resume sensipar 30 mg daily if NGT placed and will use low calcium bath with HD. Monitor serum calcium and phosphorus.    Poor prognosis.     [facility]PH[facility]  Armando Shaffer M.D.  Kendall Morales D.O.  Lilliam Cody M.D.  Oxana Hernandez, MSN, ANP-C  [phone]  Fax: [phone] [place] [address], [id number]  [place] [address]

## 2023-06-20 NOTE — PROGRESS NOTE ADULT - SUBJECTIVE AND OBJECTIVE BOX
Name of Patient : MAGGIE THAO  MRN: [id number]  Date of visit: 06-20-23 @ 15:06      Subjective: Patient seen and examined. No new events except as noted.   worsening mental stat   no oral intake  on D10 for hypoglycemia     REVIEW OF SYSTEMS:  limited       MEDICATIONS:  MEDICATIONS  (STANDING):  aspirin  chewable 81 milliGRAM(s) Oral daily  cefTRIAXone   IVPB 2000 milliGRAM(s) IV Intermittent every 24 hours  chlorhexidine 2% Cloths 1 Application(s) Topical daily  cinacalcet 30 milliGRAM(s) Oral daily  dextrose 10% + sodium chloride 0.45%. 1000 milliLiter(s) (50 mL/Hr) IV Continuous <Continuous>  dextrose 5%. 1000 milliLiter(s) (50 mL/Hr) IV Continuous <Continuous>  dextrose 5%. 1000 milliLiter(s) (100 mL/Hr) IV Continuous <Continuous>  dextrose 50% Injectable 12.5 Gram(s) IV Push once  dextrose 50% Injectable 25 Gram(s) IV Push once  dextrose 50% Injectable 25 Gram(s) IV Push once  donepezil 5 milliGRAM(s) Oral at bedtime  epoetin shell-epbx (RETACRIT) Injectable 59697 Unit(s) IV Push <User Schedule>  glucagon  Injectable 1 milliGRAM(s) IntraMuscular once  metoprolol tartrate Injectable 2.5 milliGRAM(s) IV Push every 6 hours  midodrine. 10 milliGRAM(s) Oral <User Schedule>  pantoprazole  Injectable 40 milliGRAM(s) IV Push two times a day      PHYSICAL EXAM:  T(C): 36.8 (06-20-23 @ 10:24), Max: 36.9 (06-19-23 @ 22:00)  HR: 86 (06-20-23 @ 10:24) (60 - 86)  BP: 106/56 (06-20-23 @ 10:24) (100/61 - 106/56)  RR: 18 (06-20-23 @ 10:24) (17 - 18)  SpO2: 100% (06-20-23 @ 10:24) (99% - 100%)  Wt(kg): --  I&O's Summary        Appearance: frail 	  HEENT:  PERRLA   Lymphatic: No lymphadenopathy   Cardiovascular: Normal S1 S2, no JVD  Respiratory: normal effort , clear  Gastrointestinal:  Soft, Non-tender  Skin: No rashes,  warm to touch  Psychiatry:  lethargic   Musculuskeletal: No edema    recent labs, Imaging and EKGs personally reviewed                           7.8    11.96 )-----------( 301      ( 19 Jun 2023 16:26 )             26.1               06-19    143  |  102  |  33<H>  ----------------------------<  105<H>  5.4<H>   |  27  |  5.79<H>    Ca    10.1      19 Jun 2023 16:26  Phos  5.5     06-19  Mg     2.20     06-19

## 2023-06-20 NOTE — PROGRESS NOTE ADULT - SUBJECTIVE AND OBJECTIVE BOX
CARDIOLOGY FOLLOW UP - Dr. Clements  DATE OF SERVICE: 6/20/23     no acute cv events       REVIEW OF SYSTEMS:  CONSTITUTIONAL: No fever, weight loss, or fatigue  RESPIRATORY: No cough, wheezing, chills or hemoptysis; No Shortness of Breath  CARDIOVASCULAR: No chest pain, palpitations, passing out, dizziness, or leg swelling  GASTROINTESTINAL: No abdominal or epigastric pain. No nausea, vomiting, or hematemesis; No diarrhea or constipation. No melena or hematochezia.  VASCULAR: No edema     PHYSICAL EXAM:  T(C): 36.8 (06-20-23 @ 06:00), Max: 36.9 (06-19-23 @ 22:00)  HR: 62 (06-20-23 @ 06:00) (60 - 64)  BP: 103/62 (06-20-23 @ 06:00) (100/61 - 105/62)  RR: 18 (06-20-23 @ 06:00) (16 - 18)  SpO2: 100% (06-20-23 @ 06:00) (99% - 100%)  Wt(kg): --  I&O's Summary      Appearance: Normal	  Cardiovascular: Normal S1 S2, irreg   Respiratory: Lungs clear to auscultation	  Gastrointestinal:  Soft, Non-tender, + BS	  Extremities: Normal range of motion, No clubbing, cyanosis or edema      Home Medications:  bisacodyl 5 mg oral tablet: 2 tab(s) orally once a day as needed for  constipation (04 Jun 2023 00:48)  cinacalcet 60 mg oral tablet: 1 tab(s) orally once a day (04 Jun 2023 00:48)  donepezil 5 mg oral tablet: 1 tab(s) orally once a day (at bedtime) (04 Jun 2023 00:48)  ergocalciferol 1.25 mg (50,000 intl units) oral capsule: 1 cap(s) orally once a month (04 Jun 2023 00:48)  meloxicam 15 mg oral tablet: 1 tab(s) orally once a day as needed for  pain (04 Jun 2023 00:48)  NIFEdipine (Eqv-Adalat CC) 30 mg oral tablet, extended release: 2 tab(s) orally once a day (04 Jun 2023 00:48)  sevelamer hydrochloride 800 mg oral tablet: 1 tab(s) orally every 6 hours (04 Jun 2023 00:49)      MEDICATIONS  (STANDING):  aspirin  chewable 81 milliGRAM(s) Oral daily  cefTRIAXone   IVPB 2000 milliGRAM(s) IV Intermittent every 24 hours  chlorhexidine 2% Cloths 1 Application(s) Topical daily  cinacalcet 30 milliGRAM(s) Oral daily  dextrose 10% + sodium chloride 0.45%. 1000 milliLiter(s) (50 mL/Hr) IV Continuous <Continuous>  dextrose 5%. 1000 milliLiter(s) (50 mL/Hr) IV Continuous <Continuous>  dextrose 5%. 1000 milliLiter(s) (100 mL/Hr) IV Continuous <Continuous>  dextrose 50% Injectable 25 Gram(s) IV Push once  dextrose 50% Injectable 25 Gram(s) IV Push once  dextrose 50% Injectable 12.5 Gram(s) IV Push once  donepezil 5 milliGRAM(s) Oral at bedtime  epoetin shell-epbx (RETACRIT) Injectable 48539 Unit(s) IV Push <User Schedule>  glucagon  Injectable 1 milliGRAM(s) IntraMuscular once  metoprolol tartrate Injectable 2.5 milliGRAM(s) IV Push every 6 hours  midodrine. 10 milliGRAM(s) Oral <User Schedule>  pantoprazole  Injectable 40 milliGRAM(s) IV Push two times a day      TELEMETRY: afib hr 80	    ECG:  	  RADIOLOGY:   DIAGNOSTIC TESTING:  [ ] Echocardiogram:  [ ]  Catheterization:  [ ] Stress Test:    OTHER: 	    LABS:	 	                            7.8    11.96 )-----------( 301      ( 19 Jun 2023 16:26 )             26.1     06-19    143  |  102  |  33<H>  ----------------------------<  105<H>  5.4<H>   |  27  |  5.79<H>    Ca    10.1      19 Jun 2023 16:26  Phos  5.5     06-19  Mg     2.20     06-19

## 2023-06-20 NOTE — PROVIDER CONTACT NOTE (OTHER) - ASSESSMENT
Patients BP is 91/60,  pre dialysis. Patient asymptomatic and denies chest pain. Patients vital signs not stable enough to receive HD.

## 2023-06-20 NOTE — PROVIDER CONTACT NOTE (HYPOGLYCEMIA EVENT) - NS PROVIDER CONTACT RECOMMEND-HYPO
none at this time
pt was ordered for 10% dextrose w 0.45 NACL but IV infiltrated. Provider came and assessed pt and confirmed IV infiltration. As per provider, FS has been conducted on the PT earlobes. 
none at this time

## 2023-06-20 NOTE — CHART NOTE - NSCHARTNOTEFT_GEN_A_CORE
Patient is currently NPO after failing S&S. Patient was started D10 drip during previous shift. Patient's FS 67 -->61. RN activated hypoglycemia protocol and was given dextrose push. RN then notified that patient's IV became infiltrated. patient seen at bedside. Patient alert and awake. Patient with confusion at bedside.   LUE: L forearm appears swollen, no erythema. +radial pulse.   Advised RN to elevate the arm and warm compresses. Will monitor closely    ADDENDUM  FS: 96 -->91. RN attempting IV access.

## 2023-06-20 NOTE — PROVIDER CONTACT NOTE (OTHER) - SITUATION
Pt IV infiltrated while RN was treating pt Hypoglycemia. Provider assessed pt and instructed RN to put in new IV. Original IV was inserted ultrasound guided. FS done in earlobe as per provider.

## 2023-06-20 NOTE — PROVIDER CONTACT NOTE (HYPOGLYCEMIA EVENT) - NS PROVIDER CONTACT BACKGROUND-HYPO
Age: 77y    Gender: Male    POCT Blood Glucose:  87 mg/dL (06-19-23 @ 19:22)  64 mg/dL (06-19-23 @ 17:50)  69 mg/dL (06-19-23 @ 17:48)  81 mg/dL (06-19-23 @ 12:03)  81 mg/dL (06-19-23 @ 08:32)  36 mg/dL (06-19-23 @ 08:01)  <25 mg/dL (06-19-23 @ 08:00)      eMAR:  dextrose 50% Injectable   25 Gram(s) IV Push (06-19-23 @ 08:09)    dextrose 50% Injectable   12.5 Gram(s) IV Push (06-19-23 @ 18:02)    
Age: 77y    Gender: Male    POCT Blood Glucose:  87 mg/dL (06-20-23 @ 08:02)  95 mg/dL (06-20-23 @ 05:37)  78 mg/dL (06-20-23 @ 04:11)  94 mg/dL (06-19-23 @ 23:53)  96 mg/dL (06-19-23 @ 21:15)  61 mg/dL (06-19-23 @ 20:44)  67 mg/dL (06-19-23 @ 20:42)  <25 mg/dL (06-19-23 @ 20:31)      eMAR:  dextrose 50% Injectable   12.5 Gram(s) IV Push (06-19-23 @ 18:02)    dextrose 50% Injectable   12.5 Gram(s) IV Push (06-19-23 @ 20:52)    
Age: 77y    Gender: Male    POCT Blood Glucose:  87 mg/dL (06-19-23 @ 19:22)  64 mg/dL (06-19-23 @ 17:50)  69 mg/dL (06-19-23 @ 17:48)  81 mg/dL (06-19-23 @ 12:03)  81 mg/dL (06-19-23 @ 08:32)  36 mg/dL (06-19-23 @ 08:01)  <25 mg/dL (06-19-23 @ 08:00)      eMAR:  dextrose 50% Injectable   25 Gram(s) IV Push (06-19-23 @ 08:09)    dextrose 50% Injectable   12.5 Gram(s) IV Push (06-19-23 @ 18:02)

## 2023-06-20 NOTE — PROGRESS NOTE ADULT - SUBJECTIVE AND OBJECTIVE BOX
[facility] NEPHROLOGY- PROGRESS NOTE    77y Male with history of ESRD on HD presents for need for dialysis. Nephrology consulted for ESRD status.    REVIEW OF SYSTEMS: limited due to mental status.    penicillin (Rash)      Hospital Medications: Medications reviewed      VITALS:  T(F): 98.2 (06-20-23 @ 10:24), Max: 98.4 (06-19-23 @ 22:00)  HR: 86 (06-20-23 @ 10:24)  BP: 106/56 (06-20-23 @ 10:24)  RR: 18 (06-20-23 @ 10:24)  SpO2: 100% (06-20-23 @ 10:24)  Wt(kg): --        PHYSICAL EXAM:    Gen: NAD confused  Cards: RRR, +S1/S2, no M/G/R  Resp: CTA B/L  GI: soft, NT/ND, NABS  : + SPT  Vascular: no LE edema B/L, RUE AVG + bruit/thrill, L shoulder/arm edematous        LABS:  06-19    141  |  103  |  28<H>  ----------------------------<  93  4.9   |  27  |  4.79<H>    Ca    9.5      19 Jun 2023 00:10  Phos  4.8     06-19  Mg     2.10     06-19      Creatinine Trend: 4.79 <--, 2.59 <--, 5.87 <--, 6.97 <--, 5.55 <--, 9.02 <--                        8.0    11.83 )-----------( 301      ( 18 Jun 2023 04:40 )             25.8     Urine Studies:

## 2023-06-20 NOTE — CHART NOTE - NSCHARTNOTEFT_GEN_A_CORE
us guided IV placed in L arm yesterday, per staff infiltrated last evening with arm swelling duplex results pending.  failed s&s - npo, spoke with Kim re GOC she wants everything to be done for her .  Discussed placing a tube as POLLY has fistula for HD and LUE r/o dvt, Kim wanted tube placed.  When attempting to place tube pt did not tolerate and was thrashing his head about, with assistance of 2 unable to place as he was stating "no, no, no".  Kim arrived to the hospital spoke with her at bedside with family, explained current situation, Mr. Gautam stated his wife makes the decisions, he asked to speak to Kim privately.  Upon my return all questions were answered, discussed GOC, aspiration, no iv access, non healing wounds, hypoglycemia us guided IV placed in L arm yesterday, per staff infiltrated last evening with arm swelling duplex results pending.  failed s&s - npo, spoke with Kim re GOC she wants everything to be done for her .  Discussed placing a tube as POLLY has fistula for HD and LUE r/o dvt, Kim wanted tube placed.  When attempting to place tube pt did not tolerate and was thrashing his head about, with assistance of 2 unable to place as he was stating "no, no, no".  Kim arrived to the hospital spoke with her at bedside with family, explained current situation, Mr. Gautam stated his wife makes the decisions, he asked to speak to Kim privately.  Upon my return all questions were answered, discussed GOC, aspiration, pleasure feeds, no iv access, non healing wounds, hypoglycemia, abx.  family wanted to think about it overnight.      addendum to above 6:50pm   found to have a left radial vein dvt.  hx of bleeding and anemia will start heparin 5000units q8.  family requested feeding tube be placed.  neo placed with + auscultation in stomach awaiting cxr.  nutrition consult in am us guided IV placed in L arm yesterday, per staff infiltrated last evening with arm swelling duplex results pending.  failed s&s - npo, spoke with Kim re GOC she wants everything to be done for her .  Discussed placing a ngt tube as POLLY has fistula for HD and LUE r/o dvt, Kim wanted tube placed.  When attempting to place tube pt did not tolerate and was thrashing his head about, with assistance of 2 unable to place as he was stating "no, no, no".  Kim arrived to the hospital spoke with her at bedside with family, explained current situation, Mr. Gautam stated his wife makes the decisions, he asked to speak to Kim privately.  Upon my return all questions were answered, discussed GOC, aspiration, pleasure feeds, no iv access, non healing wounds, hypoglycemia, abx.  family wanted to think about it overnight.      addendum to above 6:50pm   found to have a left radial vein dvt.  hx of bleeding and anemia will start heparin 5000units q8.  family requested feeding tube be placed.  neo placed 65cm with + auscultation in stomach awaiting cxr.  nutrition consult in am

## 2023-06-20 NOTE — PROGRESS NOTE ADULT - ASSESSMENT
77 year old male with DVT, CAD, ESRD on HD, RUE AVF, AOCD, HLD, HTN, pre-diabetes, prostate ca with suprapubic catheter from NH for missed HD. Developed fevers and found to have GBS bacteremia. +R shoulder pain improving. Leukocytosis.    CT A/P with no evidence of acute infectious process in the abdomen and pelvis.    Recommend:  #Sepsis (fever, leukocytosis) secondary to high grade GBS bacteremia  -Repeat blood cultures remain negative  -Trend WBC  -monitor fever curve  -TTE no evidence of valvular vegetation  -MRI b/l shoulders with contrast - will need to arrange with nephro regarding HD post study  -Continue ceftriaxone 2 grams IV q 24 hours (dose daily, but after HD on days of HD)    Isaiah Hall MD  Available through MS Teams  If no response, or after 5pm/weekends, call [phone]

## 2023-06-21 NOTE — PROGRESS NOTE ADULT - ASSESSMENT
77  year old male with hx of cad , ? PCI, HTN, esrd, HTN, DM, afib presents for HD.    #CAD  -cv stable no cp   -no chf on exam   -no obvious angina, decomp HF  -ECHO w moderate segm LV dysfunction along LAD distribution suggestive of prior infarct  -in light of age, fxnl status, anemia req w/u, and mental status not a candidate for further ischemic eval/cath  -would continue medical tx of cmp  -lopressor IVP qd if bp can tolerate  -c/w mido to augment BP  -change to PO lopressor 25 mg BID when tolerated PO   -cont current meds, on asa     #anemia  -+ occult. work up per GI/med  -s/p egd with treated ulcer  -gi f/u, ppi    #Afib   -not on ac  -cont to hold in light of egd findings, anemia req prbcs, fall risk   -cont asa  -c/w BB for rate control     # ESRD on HD   -renal fu     #htn   -cont mido as needed    dvt ppx    pall eval noted

## 2023-06-21 NOTE — CHART NOTE - NSCHARTNOTEFT_GEN_A_CORE
found to have a dvt in the left radial vein, RUE has fistula for HD.  May place IV in left upper arm

## 2023-06-21 NOTE — PROGRESS NOTE ADULT - ASSESSMENT
77y Male with history of ESRD on HD presents for need for dialysis. Nephrology consulted for ESRD status.    1) ESRD: Last HD on 6/17 with intradialytic hypotension and 0.1L net fluid removal. HD cancelled on 6/20 due to hypotension. Plan for HD today. Will need to coordinate HD with MRI with janie to decrease risk of NSF. Monitor electrolytes.    2) HTN with ESRD: BP low normal. Resume midodrine 10 mg PO pre-HD to avoid intradialytic hypotension. Monitor BP.    3) Anemia of renal disease: Hb low and patient occult positive. No IV iron given elevated ferritin. Continue with Epo 12K with HD. Monitor Hb.    4) Secondary HPT of renal origin: Serum phos acceptable with high normal serum calcium. Resume sensipar 30 mg daily and will use low calcium bath with HD. Monitor serum calcium and phosphorus.    Poor prognosis.     [facility] NEPHROLOGY  Armando Shaffer M.D.  DANNY MarlowO.  Lilliam Cody M.D.  Oxana Hernandez, MSN, ANP-C  [phone]  Fax: [phone] [place] [address], [address]  [place] [address]

## 2023-06-21 NOTE — PROGRESS NOTE ADULT - SUBJECTIVE AND OBJECTIVE BOX
[Nutrition/ Exercise/ Weight Management] : nutrition, exercise, weight management [Body Image] : body image [Vitamins/Supplements] : vitamins/supplements [Breast Self Exam] : breast self exam [Confidentiality] : confidentiality [STD (testing, results, tx)] : STD (testing, results, tx) [Lab Results] : lab results [Medication Management] : medication management [Other ___] : [unfilled] Name of Patient : MAGGIE THAO  MRN: [id number]  Date of visit: 06-21-23 @ 14:39      Subjective: Patient seen and examined. No new events except as noted.   NGT placed last night   feeding   monitor clinically       REVIEW OF SYSTEMS:  limited     MEDICATIONS:  MEDICATIONS  (STANDING):  aspirin  chewable 81 milliGRAM(s) Oral daily  ceFAZolin   IVPB 3000 milliGRAM(s) IV Intermittent <User Schedule>  ceFAZolin   IVPB 2000 milliGRAM(s) IV Intermittent <User Schedule>  chlorhexidine 2% Cloths 1 Application(s) Topical daily  cinacalcet 30 milliGRAM(s) Oral daily  dextrose 10% + sodium chloride 0.45%. 1000 milliLiter(s) (50 mL/Hr) IV Continuous <Continuous>  dextrose 5%. 1000 milliLiter(s) (50 mL/Hr) IV Continuous <Continuous>  dextrose 5%. 1000 milliLiter(s) (100 mL/Hr) IV Continuous <Continuous>  dextrose 50% Injectable 12.5 Gram(s) IV Push once  dextrose 50% Injectable 25 Gram(s) IV Push once  dextrose 50% Injectable 25 Gram(s) IV Push once  donepezil 5 milliGRAM(s) Oral at bedtime  epoetin shell-epbx (RETACRIT) Injectable 33868 Unit(s) IV Push <User Schedule>  glucagon  Injectable 1 milliGRAM(s) IntraMuscular once  heparin   Injectable 5000 Unit(s) SubCutaneous every 8 hours  metoprolol tartrate 25 milliGRAM(s) Oral two times a day  midodrine. 10 milliGRAM(s) Oral <User Schedule>  pantoprazole   Suspension 40 milliGRAM(s) Oral daily      PHYSICAL EXAM:  T(C): 36.6 (06-21-23 @ 11:03), Max: 36.7 (06-20-23 @ 17:00)  HR: 100 (06-21-23 @ 11:03) (65 - 100)  BP: 157/76 (06-21-23 @ 11:03) (100/60 - 157/76)  RR: 18 (06-21-23 @ 11:03) (17 - 18)  SpO2: 100% (06-21-23 @ 11:03) (99% - 100%)  Wt(kg): --  I&O's Summary        Appearance:  frail   HEENT:  NGT   Lymphatic: No lymphadenopathy   Cardiovascular: Normal S1 S2, no JVD  Respiratory: normal effort , clear  Gastrointestinal:  Soft, Non-tender  Skin: No rashes,  warm to touch  Psychiatry:  Mood & affect appropriate  Musculuskeletal: No edema    recent labs, Imaging and EKGs personally reviewed                           7.8    11.96 )-----------( 301      ( 19 Jun 2023 16:26 )             26.1               06-19    143  |  102  |  33<H>  ----------------------------<  105<H>  5.4<H>   |  27  |  5.79<H>    Ca    10.1      19 Jun 2023 16:26  Phos  5.5     06-19  Mg     2.20     06-19

## 2023-06-21 NOTE — CHART NOTE - NSCHARTNOTEFT_GEN_A_CORE
Source: Patient [ ]    Family [ ]     other [x ] RN, Chart review    Current Diet : Diet, NPO (06-19-23 @ 15:35) [Active]    Height (cm): 185.4 (07 Jun 2023 12:17)  Weight (kg): 79.1kg (6/20), 75.9kg (6/16), 74.5kg (6/10), 77kg (6/6)  BMI (kg/m2): 23.0 (6/20/2023)    Nutrition Note: 77-year-old male with history of DVT (previously on coumadin, now stopped), CAD, ESRD (on HD via RUE AVF Tu/Th/Sat), AOCD, HLD, HTN, obesity, pre-diabetes, and prostate cancer s/p suprapubic catheter presenting from [facility] w/need for HD, per chart.     Patient is sleeping during visit. Collateral obtained from RN. RN reports patient has no GI distress noted at this time, last bowel movement 6/21/2023. As per swallow evaluation dated 6/19, recommended NPO, consider non-oral means of nutrition/hydration. Per internal medicine note dated 6/20, IR/GI consulted for possible PEG, plan for NGT. NGT is placed on 6/21/2023. Recommend initiate tube feeding, when medically feasible. TF recommendation: initiate Nepro @20ml/hr, increase 5ml q4h, as tolerated, until goal rate of 57ml/hr x 24hrs (1368ml, 2463kcal, 110gm protein, 995ml free water from feed). Free water flushes per MD discretion. Noted patient has elevated potassium-5.4(6/19), phos- 5.5(6/19), consider phos and potassium binder. Noted weight fluctuation of +2.1kg/+2.7%BW x 2 weeks, possibly 2/2 fluid shift. Noted patient is on HD, and has 2+ edema to left arm (per RN flow sheet), will continue to monitor weight trend.     __________________ Pertinent Medications__________________   MEDICATIONS  (STANDING):  aspirin  chewable 81 milliGRAM(s) Oral daily  ceFAZolin   IVPB 2000 milliGRAM(s) IV Intermittent <User Schedule>  ceFAZolin   IVPB 3000 milliGRAM(s) IV Intermittent <User Schedule>  chlorhexidine 2% Cloths 1 Application(s) Topical daily  cinacalcet 30 milliGRAM(s) Oral daily  dextrose 10% + sodium chloride 0.45%. 1000 milliLiter(s) (50 mL/Hr) IV Continuous <Continuous>  dextrose 5%. 1000 milliLiter(s) (50 mL/Hr) IV Continuous <Continuous>  dextrose 5%. 1000 milliLiter(s) (100 mL/Hr) IV Continuous <Continuous>  dextrose 50% Injectable 12.5 Gram(s) IV Push once  dextrose 50% Injectable 25 Gram(s) IV Push once  dextrose 50% Injectable 25 Gram(s) IV Push once  donepezil 5 milliGRAM(s) Oral at bedtime  epoetin shell-epbx (RETACRIT) Injectable 18257 Unit(s) IV Push <User Schedule>  glucagon  Injectable 1 milliGRAM(s) IntraMuscular once  heparin   Injectable 5000 Unit(s) SubCutaneous every 8 hours  metoprolol tartrate 25 milliGRAM(s) Oral two times a day  midodrine. 10 milliGRAM(s) Oral <User Schedule>  pantoprazole   Suspension 40 milliGRAM(s) Oral daily    MEDICATIONS  (PRN):  dextrose Oral Gel 15 Gram(s) Oral once PRN Blood Glucose LESS THAN 70 milliGRAM(s)/deciliter  guaiFENesin Oral Liquid (Sugar-Free) 100 milliGRAM(s) Oral every 6 hours PRN Cough  lidocaine   4% Patch 1 Patch Transdermal daily PRN left shoulder pain  lidocaine   4% Patch 1 Patch Transdermal daily PRN right knee pain  oxyCODONE    IR 5 milliGRAM(s) Oral every 6 hours PRN Severe Pain (7 - 10)  sodium chloride 0.9% Bolus. 100 milliLiter(s) IV Bolus every 5 minutes PRN SBP LESS THAN or EQUAL to 90 mmHg      __________________ Pertinent Labs__________________   06-19 Na143 mmol/L Glu 105 mg/dL<H> K+ 5.4 mmol/L<H> Cr  5.79 mg/dL<H> BUN 33 mg/dL<H> 06-19 Phos 5.5 mg/dL<H> 06-04 Chol 126 mg/dL LDL --    HDL 30 mg/dL<L> Trig 123 mg/dL        Skin: As per RN flow sheet, patient has left trochanter wound.     Estimated Needs:   [x ] no change since previous assessment  Admitted weight: 176lbs/79.8kg   Estimated Energy need: 30-35kccal/kg/day= 2394-2793kcal/day  Estimated protein need: 1.2-1.4gm/kg/day= .7gm/day     Previous Nutrition Diagnosis:  [ x] Increased Nutrient Needs [ x] Severe Malnutrition   Nutrition Diagnosis is [x ] ongoing   New Nutrition Diagnosis: [x ] not applicable    Interventions:   Recommend  [x ] Initiate tube feeding when medically feasible : Nepro @20ml/hr, increase 5ml q4h, as tolerated, until goal rate of 57ml/hr x 24hrs (1368ml, 2463kcal, 110gm protein, 995ml free water from feed). Free water flushes per MD discretion.   [x] Consider adding nephro-nannette, for micronutrient support.   [x] Monitor electrolytes. Consider adding phos binder, potassium binder.   [x] When TF initiated, monitor TF tolerance.   [x] RD remains available, consult nutrition services if needed.     Monitoring and Evaluation:   [ x] TF tolerance [x] weights [x ] follow up per protocol  [x ] other: bowel movement, skin integrity, labs.

## 2023-06-21 NOTE — CHART NOTE - NSCHARTNOTEFT_GEN_A_CORE
[facility] MEDICINE NIGHT COVERAGE    Patient seen at bedside. Pt removed NGT. New NGT measured at 48cm. NGT lubricated and placed into right nare without difficulty. Patient tolerated procedure well. NGT flushed with air with auscultation over stomach to verify placement. CXR STAT ordered to confirm placement. Unsecured mittens placed.     PRELIM CXR NGT just past the GE junction in the proximal stomach. NGT advanced to 53cm.       T(C): 36.7 (06-20-23 @ 20:30), Max: 36.8 (06-20-23 @ 06:00)  HR: 76 (06-20-23 @ 20:30) (62 - 86)  BP: 105/55 (06-20-23 @ 20:30) (100/60 - 106/56)  RR: 18 (06-20-23 @ 20:30) (17 - 18)  SpO2: 99% (06-20-23 @ 20:30) (99% - 100%)    Deondre Ramey PA-C  Department of Medicine  [facility]   In House Page [phone]

## 2023-06-21 NOTE — PROVIDER CONTACT NOTE (OTHER) - ASSESSMENT
PIV placement requested via US with LUE DVT in Forearm Radial Vein. L Forearm Radial Vein non compressible. L Elbow swollen upon assessment. Brachial Vein compressible and no swelling noted in GIOVANNY.

## 2023-06-21 NOTE — PROGRESS NOTE ADULT - SUBJECTIVE AND OBJECTIVE BOX
[facility] NEPHROLOGY- PROGRESS NOTE    77y Male with history of ESRD on HD presents for need for dialysis. Nephrology consulted for ESRD status.    REVIEW OF SYSTEMS: limited due to mental status.    penicillin (Rash)      Hospital Medications: Medications reviewed      VITALS:  T(F): 98.1 (06-20-23 @ 20:30), Max: 98.2 (06-20-23 @ 10:24)  HR: 76 (06-20-23 @ 20:30)  BP: 105/55 (06-20-23 @ 20:30)  RR: 18 (06-20-23 @ 20:30)  SpO2: 99% (06-20-23 @ 20:30)  Wt(kg): --      PHYSICAL EXAM:    Gen: NAD, lethargic, + NGT  Cards: RRR, +S1/S2, no M/G/R  Resp: CTA B/L  GI: soft, NT/ND, NABS  : + SPT  Vascular: no LE edema B/L, RUE AVG + bruit/thrill, L shoulder/arm edematous        LABS:  06-19    141  |  103  |  28<H>  ----------------------------<  93  4.9   |  27  |  4.79<H>    Ca    9.5      19 Jun 2023 00:10  Phos  4.8     06-19  Mg     2.10     06-19      Creatinine Trend: 4.79 <--, 2.59 <--, 5.87 <--, 6.97 <--, 5.55 <--, 9.02 <--                        8.0    11.83 )-----------( 301      ( 18 Jun 2023 04:40 )             25.8     Urine Studies:

## 2023-06-21 NOTE — PROGRESS NOTE ADULT - SUBJECTIVE AND OBJECTIVE BOX
CARDIOLOGY FOLLOW UP - Dr. Clements  DATE OF SERVICE: 6/21/23    CCno acute cv events   hr up to 130s   missed am dose of BB today      REVIEW OF SYSTEMS:  CONSTITUTIONAL: No fever, weight loss, or fatigue  RESPIRATORY: No cough, wheezing, chills or hemoptysis; No Shortness of Breath  CARDIOVASCULAR: No chest pain, palpitations, passing out, dizziness, or leg swelling  GASTROINTESTINAL: No abdominal or epigastric pain. No nausea, vomiting, or hematemesis; No diarrhea or constipation. No melena or hematochezia.  VASCULAR: No edema     PHYSICAL EXAM:  T(C): 36.7 (06-20-23 @ 20:30), Max: 36.8 (06-20-23 @ 10:24)  HR: 76 (06-20-23 @ 20:30) (65 - 86)  BP: 105/55 (06-20-23 @ 20:30) (100/60 - 106/56)  RR: 18 (06-20-23 @ 20:30) (17 - 18)  SpO2: 99% (06-20-23 @ 20:30) (99% - 100%)  Wt(kg): --  I&O's Summary      Appearance: Normal	  Cardiovascular: Normal S1 S2 irreg   Respiratory: diminished   Gastrointestinal:  Soft, Non-tender, + BS Ng tube	  Extremities: Normal range of motion, No clubbing, cyanosis or edema      Home Medications:  bisacodyl 5 mg oral tablet: 2 tab(s) orally once a day as needed for  constipation (04 Jun 2023 00:48)  cinacalcet 60 mg oral tablet: 1 tab(s) orally once a day (04 Jun 2023 00:48)  donepezil 5 mg oral tablet: 1 tab(s) orally once a day (at bedtime) (04 Jun 2023 00:48)  ergocalciferol 1.25 mg (50,000 intl units) oral capsule: 1 cap(s) orally once a month (04 Jun 2023 00:48)  meloxicam 15 mg oral tablet: 1 tab(s) orally once a day as needed for  pain (04 Jun 2023 00:48)  NIFEdipine (Eqv-Adalat CC) 30 mg oral tablet, extended release: 2 tab(s) orally once a day (04 Jun 2023 00:48)  sevelamer hydrochloride 800 mg oral tablet: 1 tab(s) orally every 6 hours (04 Jun 2023 00:49)      MEDICATIONS  (STANDING):  aspirin  chewable 81 milliGRAM(s) Oral daily  cefTRIAXone   IVPB 2000 milliGRAM(s) IV Intermittent every 24 hours  chlorhexidine 2% Cloths 1 Application(s) Topical daily  cinacalcet 30 milliGRAM(s) Oral daily  dextrose 10% + sodium chloride 0.45%. 1000 milliLiter(s) (50 mL/Hr) IV Continuous <Continuous>  dextrose 5%. 1000 milliLiter(s) (50 mL/Hr) IV Continuous <Continuous>  dextrose 5%. 1000 milliLiter(s) (100 mL/Hr) IV Continuous <Continuous>  dextrose 50% Injectable 12.5 Gram(s) IV Push once  dextrose 50% Injectable 25 Gram(s) IV Push once  dextrose 50% Injectable 25 Gram(s) IV Push once  donepezil 5 milliGRAM(s) Oral at bedtime  epoetin shell-epbx (RETACRIT) Injectable 78274 Unit(s) IV Push <User Schedule>  glucagon  Injectable 1 milliGRAM(s) IntraMuscular once  heparin   Injectable 5000 Unit(s) SubCutaneous every 8 hours  metoprolol tartrate Injectable 2.5 milliGRAM(s) IV Push every 6 hours  midodrine. 10 milliGRAM(s) Oral <User Schedule>  pantoprazole  Injectable 40 milliGRAM(s) IV Push two times a day      TELEMETRY afib hr up to 130s  hr 90s : 	    ECG:  	  RADIOLOGY:   DIAGNOSTIC TESTING:  [ ] Echocardiogram:  [ ]  Catheterization:  [ ] Stress Test:    OTHER: 	    LABS:	 	                            7.8    11.96 )-----------( 301      ( 19 Jun 2023 16:26 )             26.1     06-19    143  |  102  |  33<H>  ----------------------------<  105<H>  5.4<H>   |  27  |  5.79<H>    Ca    10.1      19 Jun 2023 16:26  Phos  5.5     06-19  Mg     2.20     06-19

## 2023-06-22 NOTE — PROGRESS NOTE ADULT - ASSESSMENT
77  year old male with hx of cad , ? PCI, HTN, esrd, HTN, DM, afib presents for HD.    #CAD  -cv stable no cp   -no chf on exam   -no obvious angina, decomp HF  -ECHO w moderate segm LV dysfunction along LAD distribution suggestive of prior infarct  -in light of age, fxnl status, anemia req w/u, and mental status not a candidate for further ischemic eval/cath  -would continue medical tx of cmp  -lopressor IVP qd if bp can tolerate  -c/w mido to augment BP  -change to PO lopressor 25 mg BID when tolerated PO   -cont current meds, on asa     #anemia  -+ occult. work up per GI/med  -s/p egd with treated ulcer  -gi f/u, ppi    #Afib   -not on ac  -cont to hold in light of egd findings, anemia req prbcs, fall risk   -cont asa  -c/w BB for rate control     # ESRD on HD   -renal fu     #htn   -cont mido as needed    dvt ppx    pall eval noted    35 minutes spent on total encounter; more than 50% of the visit was spent counseling and/or coordinating care by the attending physician.

## 2023-06-22 NOTE — PROGRESS NOTE ADULT - SUBJECTIVE AND OBJECTIVE BOX
Name of Patient : MAGGIE THAO  MRN: [id number]  Date of visit: 06-22-23       Subjective: Patient seen and examined. No new events except as noted.   tolerating tube feeding     MEDICATIONS:  MEDICATIONS  (STANDING):  aspirin  chewable 81 milliGRAM(s) Oral daily  chlorhexidine 2% Cloths 1 Application(s) Topical daily  dextrose 5%. 1000 milliLiter(s) (50 mL/Hr) IV Continuous <Continuous>  dextrose 5%. 1000 milliLiter(s) (100 mL/Hr) IV Continuous <Continuous>  dextrose 50% Injectable 12.5 Gram(s) IV Push once  dextrose 50% Injectable 25 Gram(s) IV Push once  dextrose 50% Injectable 25 Gram(s) IV Push once  donepezil 5 milliGRAM(s) Oral at bedtime  epoetin shell-epbx (RETACRIT) Injectable 46074 Unit(s) IV Push <User Schedule>  heparin   Injectable 5000 Unit(s) SubCutaneous every 8 hours  metoprolol tartrate 25 milliGRAM(s) Oral two times a day  midodrine. 10 milliGRAM(s) Oral <User Schedule>  pantoprazole   Suspension 40 milliGRAM(s) Oral daily      PHYSICAL EXAM:  T(C): 36.7 (06-22-23 @ 17:16), Max: 36.8 (06-22-23 @ 02:10)  HR: 81 (06-22-23 @ 17:16) (75 - 87)  BP: 112/43 (06-22-23 @ 17:16) (112/43 - 151/61)  RR: 17 (06-22-23 @ 17:16) (17 - 18)  SpO2: 99% (06-22-23 @ 17:16) (98% - 99%)  Wt(kg): --  I&O's Summary    21 Jun 2023 07:01  -  22 Jun 2023 07:00  --------------------------------------------------------  IN: 800 mL / OUT: 630 mL / NET: 170 mL    22 Jun 2023 07:01  -  22 Jun 2023 19:02  --------------------------------------------------------  IN: 340 mL / OUT: 0 mL / NET: 340 mL          Appearance: arousable   HEENT:  PERRLA , NGT  Lymphatic: No lymphadenopathy   Cardiovascular: Normal S1 S2, no JVD  Respiratory: normal effort , clear  Gastrointestinal:  Soft, Non-tender  Skin: No rashes,  warm to touch  Psychiatry:  Mood & affect appropriate  Musculuskeletal: No edema    recent labs, Imaging and EKGs personally reviewed       06-21-23 @ 07:01  -  06-22-23 @ 07:00  --------------------------------------------------------  IN: 800 mL / OUT: 630 mL / NET: 170 mL    06-22-23 @ 07:01  -  06-22-23 @ 19:02  --------------------------------------------------------  IN: 340 mL / OUT: 0 mL / NET: 340 mL                          8.1    10.36 )-----------( 293      ( 22 Jun 2023 03:00 )             27.7               06-22    141  |  100  |  28<H>  ----------------------------<  91  5.3   |  29  |  4.86<H>    Ca    9.3      22 Jun 2023 03:00  Phos  5.1     06-22  Mg     2.10     06-22                         Urinalysis Basic - ( 22 Jun 2023 03:00 )    Color: x / Appearance: x / SG: x / pH: x  Gluc: 91 mg/dL / Ketone: x  / Bili: x / Urobili: x   Blood: x / Protein: x / Nitrite: x   Leuk Esterase: x / RBC: x / WBC x   Sq Epi: x / Non Sq Epi: x / Bacteria: x

## 2023-06-22 NOTE — PROGRESS NOTE ADULT - ASSESSMENT
77 year old male with DVT, CAD, ESRD on HD, RUE AVF, AOCD, HLD, HTN, pre-diabetes, prostate ca with suprapubic catheter from NH for missed HD. Developed fevers and found to have GBS bacteremia. +R shoulder pain improving. Leukocytosis.    CT A/P with no evidence of acute infectious process in the abdomen and pelvis.    Recommend:  #Sepsis (fever, leukocytosis) secondary to high grade GBS bacteremia  -Repeat blood cultures remain negative  -Trend WBC  -monitor fever curve  -TTE no evidence of valvular vegetation  -MRI b/l shoulders with contrast - will need to arrange with nephro regarding HD post study  -Continue cefazolin 1 gram IV q 24 hours (dose daily, but after HD on days of HD)    Isaiah Hall MD  Available through MS Teams  If no response, or after 5pm/weekends, call [phone]

## 2023-06-22 NOTE — PROGRESS NOTE ADULT - SUBJECTIVE AND OBJECTIVE BOX
CARDIOLOGY FOLLOW UP NOTE - DR. RAMOS    Patient Name: MAGGIE THAO  Date of Service: 23 @ 11:04    Patient seen and examined    Subjective:    cv: denies chest pain, dyspnea, palpitations, dizziness  pulmonary: denies cough  GI: denies abdominal pain, nausea, vomiting  vascular/legs: no edema   skin: no rash  ROS: otherwise negative   overnight events:      PHYSICAL EXAM:  T(C): 36.6 (23 @ 05:40), Max: 36.8 (23 @ 02:10)  HR: 83 (23 @ 05:40) (61 - 89)  BP: 124/53 (23 @ 05:40) (108/69 - 151/61)  RR: 18 (23 @ 05:40) (17 - 19)  SpO2: 99% (23 @ 05:40) (98% - 99%)  Wt(kg): --  I&O's Summary    2023 07:01  -  2023 07:00  --------------------------------------------------------  IN: 800 mL / OUT: 630 mL / NET: 170 mL      Daily     Daily Weight in k.3 (2023 18:00)    Appearance: Normal	  Cardiovascular: Normal S1 S2,RRR, No JVD, No murmurs  Respiratory: Lungs clear to auscultation	  Gastrointestinal:  Soft, Non-tender, + BS	  Extremities: Normal range of motion, No clubbing, cyanosis or edema      Home Medications:  bisacodyl 5 mg oral tablet: 2 tab(s) orally once a day as needed for  constipation (2023 00:48)  cinacalcet 60 mg oral tablet: 1 tab(s) orally once a day (2023 00:48)  donepezil 5 mg oral tablet: 1 tab(s) orally once a day (at bedtime) (2023 00:48)  ergocalciferol 1.25 mg (50,000 intl units) oral capsule: 1 cap(s) orally once a month (2023 00:48)  meloxicam 15 mg oral tablet: 1 tab(s) orally once a day as needed for  pain (2023 00:48)  NIFEdipine (Eqv-Adalat CC) 30 mg oral tablet, extended release: 2 tab(s) orally once a day (2023 00:48)  sevelamer hydrochloride 800 mg oral tablet: 1 tab(s) orally every 6 hours (2023 00:49)      MEDICATIONS  (STANDING):  aspirin  chewable 81 milliGRAM(s) Oral daily  ceFAZolin   IVPB 1000 milliGRAM(s) IV Intermittent once  chlorhexidine 2% Cloths 1 Application(s) Topical daily  cinacalcet 30 milliGRAM(s) Oral daily  dextrose 5%. 1000 milliLiter(s) (50 mL/Hr) IV Continuous <Continuous>  dextrose 5%. 1000 milliLiter(s) (100 mL/Hr) IV Continuous <Continuous>  dextrose 50% Injectable 25 Gram(s) IV Push once  dextrose 50% Injectable 25 Gram(s) IV Push once  dextrose 50% Injectable 12.5 Gram(s) IV Push once  donepezil 5 milliGRAM(s) Oral at bedtime  epoetin shell-epbx (RETACRIT) Injectable 34200 Unit(s) IV Push <User Schedule>  heparin   Injectable 5000 Unit(s) SubCutaneous every 8 hours  metoprolol tartrate 25 milliGRAM(s) Oral two times a day  midodrine. 10 milliGRAM(s) Oral <User Schedule>  pantoprazole   Suspension 40 milliGRAM(s) Oral daily      TELEMETRY: 	    ECG:  	  RADIOLOGY:   DIAGNOSTIC TESTING:  [ ] Echocardiogram:  [ ] Catheterization:  [ ] Stress Test:    OTHER: 	    LABS:	 	    CARDIAC MARKERS:                                      8.1    10.36 )-----------( 293      ( 2023 03:00 )             27.7     -    141  |  100  |  28<H>  ----------------------------<  91  5.3   |  29  |  4.86<H>    Ca    9.3      2023 03:00  Phos  5.1     06-  Mg     2.10     -      proBNP:     Lipid Profile:   HgA1c:     Creatinine: 4.86 mg/dL (23 @ 03:00)  Creatinine: 8.57 mg/dL (23 @ 14:50)  Creatinine, Serum: 5.79 mg/dL (23 @ 16:26)

## 2023-06-22 NOTE — PROGRESS NOTE ADULT - SUBJECTIVE AND OBJECTIVE BOX
[facility] NEPHROLOGY- PROGRESS NOTE    77y Male with history of ESRD on HD presents for need for dialysis. Nephrology consulted for ESRD status.    REVIEW OF SYSTEMS: limited due to mental status.    penicillin (Rash)      Hospital Medications: Medications reviewed      VITALS:  T(F): 97.9 (06-22-23 @ 12:03), Max: 98.3 (06-22-23 @ 02:10)  HR: 81 (06-22-23 @ 12:03)  BP: 122/49 (06-22-23 @ 12:03)  RR: 18 (06-22-23 @ 12:03)  SpO2: 99% (06-22-23 @ 12:03)  Wt(kg): --    06-21 @ 07:01  -  06-22 @ 07:00  --------------------------------------------------------  IN: 800 mL / OUT: 630 mL / NET: 170 mL    06-22 @ 07:01  -  06-22 @ 14:42  --------------------------------------------------------  IN: 340 mL / OUT: 0 mL / NET: 340 mL        PHYSICAL EXAM:    Gen: NAD, lethargic, + NGT  Cards: RRR, +S1/S2, no M/G/R  Resp: CTA B/L  GI: soft, NT/ND, NABS  : + SPT  Vascular: no LE edema B/L, RUE AVG + bruit/thrill, L shoulder/arm edematous        LABS:  06-22    141  |  100  |  28<H>  ----------------------------<  91  5.3   |  29  |  4.86<H>    Ca    9.3      22 Jun 2023 03:00  Phos  5.1     06-22  Mg     2.10     06-22      Creatinine Trend: 4.86 <--, 8.57 <--, 5.79 <--, 4.79 <--, 2.59 <--, 5.87 <--                        8.1    10.36 )-----------( 293      ( 22 Jun 2023 03:00 )             27.7     Urine Studies:  Urinalysis Basic - ( 22 Jun 2023 03:00 )    Color:  / Appearance:  / SG:  / pH:   Gluc: 91 mg/dL / Ketone:   / Bili:  / Urobili:    Blood:  / Protein:  / Nitrite:    Leuk Esterase:  / RBC:  / WBC    Sq Epi:  / Non Sq Epi:  / Bacteria:

## 2023-06-22 NOTE — PROGRESS NOTE ADULT - ASSESSMENT
77y Male with history of ESRD on HD presents for need for dialysis. Nephrology consulted for ESRD status.    1) ESRD: Last HD on 6/21 with intradialytic hypotension and patient net positive 0.3L. Plan for next maintenance HD on 6/23 after MRI with janie to decrease risk of NSF. Monitor electrolytes.    2) HTN with ESRD: BP low normal. Continue with midodrine 10 mg PO pre-HD to avoid intradialytic hypotension. Monitor BP.    3) Anemia of renal disease: Hb low and patient occult positive. No IV iron given elevated ferritin. Continue with Epo 12K with HD. Monitor Hb.    4) Secondary HPT of renal origin: Serum phos improving. Discontinue sensipar 30 mg daily given low normal iPTH and will use low calcium bath with HD. Monitor serum calcium and phosphorus.    Poor prognosis.     [facility] NEPHROLOGY  Armando Shaffer M.D.  Kendall Morales D.O.  Lilliam Cody M.D.  Oxana Hernandez, MSN, ANP-C  [phone]  Fax: [phone] [place] [address], [address]  [place] [phone]

## 2023-06-22 NOTE — PROGRESS NOTE ADULT - SUBJECTIVE AND OBJECTIVE BOX
CC: Patient is a 77y old  Male who presents with a chief complaint of needs HD (22 Jun 2023 14:42)    ID following for GBS bacteremia    Interval History/ROS: Patient has no new complaints. No fevers, no chills.    Rest of ROS negative.    Allergies  penicillin (Rash)    ANTIMICROBIALS:      OTHER MEDS:  aspirin  chewable 81 milliGRAM(s) Oral daily  chlorhexidine 2% Cloths 1 Application(s) Topical daily  dextrose 5%. 1000 milliLiter(s) IV Continuous <Continuous>  dextrose 5%. 1000 milliLiter(s) IV Continuous <Continuous>  dextrose 50% Injectable 12.5 Gram(s) IV Push once  dextrose 50% Injectable 25 Gram(s) IV Push once  dextrose 50% Injectable 25 Gram(s) IV Push once  dextrose Oral Gel 15 Gram(s) Oral once PRN  donepezil 5 milliGRAM(s) Oral at bedtime  epoetin shell-epbx (RETACRIT) Injectable 89151 Unit(s) IV Push <User Schedule>  guaiFENesin Oral Liquid (Sugar-Free) 100 milliGRAM(s) Oral every 6 hours PRN  heparin   Injectable 5000 Unit(s) SubCutaneous every 8 hours  lidocaine   4% Patch 1 Patch Transdermal daily PRN  lidocaine   4% Patch 1 Patch Transdermal daily PRN  metoprolol tartrate 25 milliGRAM(s) Oral two times a day  midodrine. 10 milliGRAM(s) Oral <User Schedule>  oxyCODONE    IR 5 milliGRAM(s) Oral every 6 hours PRN  pantoprazole   Suspension 40 milliGRAM(s) Oral daily  sodium chloride 0.9% Bolus. 100 milliLiter(s) IV Bolus every 5 minutes PRN    PE:    Vital Signs Last 24 Hrs  T(C): 36.6 (22 Jun 2023 12:03), Max: 36.8 (22 Jun 2023 02:10)  T(F): 97.9 (22 Jun 2023 12:03), Max: 98.3 (22 Jun 2023 02:10)  HR: 81 (22 Jun 2023 12:03) (75 - 87)  BP: 122/49 (22 Jun 2023 12:03) (117/77 - 151/61)  BP(mean): --  RR: 18 (22 Jun 2023 12:03) (17 - 18)  SpO2: 99% (22 Jun 2023 12:03) (98% - 99%)    Parameters below as of 22 Jun 2023 12:03  Patient On (Oxygen Delivery Method): room air    Gen: Lethargic, NGT in place., NAD  CV: S1+S2 normal, no murmurs  Resp: Clear bilat, no resp distress  Abd: Soft, nontender, +BS  Ext: RUE AVG intact  : suprapubic catheter in place  IV/Skin: No thrombophlebitis  Neuro: no focal deficits    LABS:                          8.1    10.36 )-----------( 293      ( 22 Jun 2023 03:00 )             27.7       06-22    141  |  100  |  28<H>  ----------------------------<  91  5.3   |  29  |  4.86<H>    Ca    9.3      22 Jun 2023 03:00  Phos  5.1     06-22  Mg     2.10     06-22        Urinalysis Basic - ( 22 Jun 2023 03:00 )    Color: x / Appearance: x / SG: x / pH: x  Gluc: 91 mg/dL / Ketone: x  / Bili: x / Urobili: x   Blood: x / Protein: x / Nitrite: x   Leuk Esterase: x / RBC: x / WBC x   Sq Epi: x / Non Sq Epi: x / Bacteria: x        MICROBIOLOGY:  v  .Blood Blood-Peripheral  06-15-23   No Growth Final  --  --      .Blood Blood-Peripheral  06-15-23   No Growth Final  --  --      .Blood Blood-Venous  06-13-23   No Growth Final  --  --      .Blood Blood  06-13-23   No Growth Final  --  --      .Blood Blood  06-11-23   Growth in aerobic and anaerobic bottles: Streptococcus agalactiae (Group  B)  See previous culture [id number]  --    Growth in anaerobic bottle: Gram positive cocci in pairs  Growth in aerobic bottle: Gram positive cocci in pairs      .Blood Blood  06-11-23   Growth in aerobic and anaerobic bottles: Streptococcus agalactiae (Group  B)  ***Blood Panel PCR results on this specimen are available  approximately 3 hours after the Gram stain result.***  Gram stain, PCR, and/or culture results may not always  correspond due to difference in methodologies.  ************************************************************  This PCR assay was performed by multiplex PCR. This  Assay tests for 66 bacterial and resistance gene targets.  Please refer to the [facility] test directory  at [email or web address] for details.  --  Blood Culture PCR  Streptococcus agalactiae (Group B)    RADIOLOGY:    < from: Xray Chest 1 View- PORTABLE-Urgent (Xray Chest 1 View- PORTABLE-Urgent .) (06.21.23 @ 05:11) >  IMPRESSION:  NG tube tip in proximal stomach on most recent radiograph.  Clear lungs.      < end of copied text >

## 2023-06-23 NOTE — PROGRESS NOTE ADULT - ASSESSMENT
77y Male with history of ESRD on HD presents for need for dialysis. Nephrology consulted for ESRD status.    1) ESRD: Last HD on 6/21 with intradialytic hypotension and patient net positive 0.3L. Plan for next maintenance HD today after MRI with janie to decrease risk of NSF (if IV access obtained). Monitor electrolytes.    2) HTN with ESRD: BP low normal. Continue with midodrine 10 mg PO pre-HD to avoid intradialytic hypotension. Monitor BP.    3) Anemia of renal disease: Hb low and patient occult positive. No IV iron given elevated ferritin. Continue with Epo 12K with HD. Monitor Hb.    4) Secondary HPT of renal origin: Serum phos improving. Holding sensipar 30 mg daily given low normal iPTH and will use low calcium bath with HD. Monitor serum calcium and phosphorus.    Poor prognosis.     [facility] NEPHROLOGY  Armando Shaffer M.D.  Kendall Morales D.O.  Lilliam Cody M.D.  Oxana Hernandez, MSN, ANP-C  [phone]  Fax: [phone] [place] [address], [address]  [place] [phone]

## 2023-06-23 NOTE — PROGRESS NOTE ADULT - SUBJECTIVE AND OBJECTIVE BOX
CC: Patient is a 77y old  Male who presents with a chief complaint of needs HD (23 Jun 2023 12:43)    ID following for GBS bacteremia    Interval History/ROS: Patient remains with NGT. No fevers.    Rest of ROS negative.    Allergies  penicillin (Rash)    ANTIMICROBIALS:  ceFAZolin   IVPB 2000 <User Schedule>  ceFAZolin   IVPB 3000 <User Schedule>    OTHER MEDS:  aspirin  chewable 81 milliGRAM(s) Oral daily  chlorhexidine 2% Cloths 1 Application(s) Topical daily  dextrose 5%. 1000 milliLiter(s) IV Continuous <Continuous>  dextrose 5%. 1000 milliLiter(s) IV Continuous <Continuous>  dextrose 50% Injectable 12.5 Gram(s) IV Push once  dextrose 50% Injectable 25 Gram(s) IV Push once  dextrose 50% Injectable 25 Gram(s) IV Push once  dextrose Oral Gel 15 Gram(s) Oral once PRN  donepezil 5 milliGRAM(s) Oral at bedtime  epoetin shell-epbx (RETACRIT) Injectable 67422 Unit(s) IV Push <User Schedule>  guaiFENesin Oral Liquid (Sugar-Free) 100 milliGRAM(s) Oral every 6 hours PRN  heparin   Injectable 5000 Unit(s) SubCutaneous every 8 hours  lidocaine   4% Patch 1 Patch Transdermal daily PRN  lidocaine   4% Patch 1 Patch Transdermal daily PRN  metoprolol tartrate 25 milliGRAM(s) Oral two times a day  midodrine. 10 milliGRAM(s) Oral <User Schedule>  pantoprazole   Suspension 40 milliGRAM(s) Oral daily  sodium chloride 0.9% Bolus. 100 milliLiter(s) IV Bolus every 5 minutes PRN    PE:    Vital Signs Last 24 Hrs  T(C): 36.4 (23 Jun 2023 11:00), Max: 36.7 (22 Jun 2023 17:16)  T(F): 97.6 (23 Jun 2023 11:00), Max: 98.1 (23 Jun 2023 06:00)  HR: 87 (23 Jun 2023 11:00) (70 - 87)  BP: 100/50 (23 Jun 2023 11:00) (100/50 - 139/43)  BP(mean): --  RR: 18 (23 Jun 2023 11:00) (17 - 18)  SpO2: 99% (23 Jun 2023 11:00) (96% - 99%)    Parameters below as of 23 Jun 2023 11:00  Patient On (Oxygen Delivery Method): room air    Gen: Lethargic, NGT in place., NAD  CV: S1+S2 normal, no murmurs  Resp: Clear bilat, no resp distress  Abd: Soft, nontender, +BS  Ext: RUE AVG intact  : suprapubic catheter in place  IV/Skin: No thrombophlebitis    LABS:                          7.8    9.05  )-----------( 165      ( 23 Jun 2023 07:09 )             27.0       06-22    141  |  100  |  28<H>  ----------------------------<  91  5.3   |  29  |  4.86<H>    Ca    9.3      22 Jun 2023 03:00  Phos  5.1     06-22  Mg     2.10     06-22        Urinalysis Basic - ( 22 Jun 2023 03:00 )    Color: x / Appearance: x / SG: x / pH: x  Gluc: 91 mg/dL / Ketone: x  / Bili: x / Urobili: x   Blood: x / Protein: x / Nitrite: x   Leuk Esterase: x / RBC: x / WBC x   Sq Epi: x / Non Sq Epi: x / Bacteria: x        MICROBIOLOGY:  v  .Blood Blood-Peripheral  06-15-23   No Growth Final  --  --      .Blood Blood-Peripheral  06-15-23   No Growth Final  --  --      .Blood Blood-Venous  06-13-23   No Growth Final  --  --      .Blood Blood  06-13-23   No Growth Final  --  --      .Blood Blood  06-11-23   Growth in aerobic and anaerobic bottles: Streptococcus agalactiae (Group  B)  See previous culture [id number]  --    Growth in anaerobic bottle: Gram positive cocci in pairs  Growth in aerobic bottle: Gram positive cocci in pairs      .Blood Blood  06-11-23   Growth in aerobic and anaerobic bottles: Streptococcus agalactiae (Group  B)  ***Blood Panel PCR results on this specimen are available  approximately 3 hours after the Gram stain result.***  Gram stain, PCR, and/or culture results may not always  correspond due to difference in methodologies.  ************************************************************  This PCR assay was performed by multiplex PCR. This  Assay tests for 66 bacterial and resistance gene targets.  Please refer to the [facility] test directory  at [email or web address] for details.  --  Blood Culture PCR  Streptococcus agalactiae (Group B)    RADIOLOGY:    < from: Xray Chest 1 View- PORTABLE-Urgent (Xray Chest 1 View- PORTABLE-Urgent .) (06.21.23 @ 05:11) >  IMPRESSION:  NG tube tip in proximal stomach on most recent radiograph.  Clear lungs.    < end of copied text >

## 2023-06-23 NOTE — PROGRESS NOTE ADULT - SUBJECTIVE AND OBJECTIVE BOX
Name of Patient : MAGGIE THAO  MRN: [id number]  Date of visit: 06-23-23 @ 14:40      Subjective: Patient seen and examined. No new events except as noted.   worsening mental stat   no IV access  on NGT feeding     REVIEW OF SYSTEMS:  limited     MEDICATIONS:  MEDICATIONS  (STANDING):  aspirin  chewable 81 milliGRAM(s) Oral daily  ceFAZolin   IVPB 2000 milliGRAM(s) IV Intermittent <User Schedule>  ceFAZolin   IVPB 3000 milliGRAM(s) IV Intermittent <User Schedule>  chlorhexidine 2% Cloths 1 Application(s) Topical daily  dextrose 5%. 1000 milliLiter(s) (50 mL/Hr) IV Continuous <Continuous>  dextrose 5%. 1000 milliLiter(s) (100 mL/Hr) IV Continuous <Continuous>  dextrose 50% Injectable 25 Gram(s) IV Push once  dextrose 50% Injectable 25 Gram(s) IV Push once  dextrose 50% Injectable 12.5 Gram(s) IV Push once  donepezil 5 milliGRAM(s) Oral at bedtime  epoetin shell-epbx (RETACRIT) Injectable 30425 Unit(s) IV Push <User Schedule>  heparin   Injectable 5000 Unit(s) SubCutaneous every 8 hours  metoprolol tartrate 25 milliGRAM(s) Oral two times a day  midodrine. 10 milliGRAM(s) Oral <User Schedule>  pantoprazole   Suspension 40 milliGRAM(s) Oral daily      PHYSICAL EXAM:  T(C): 36.4 (06-23-23 @ 11:00), Max: 36.7 (06-22-23 @ 17:16)  HR: 87 (06-23-23 @ 11:00) (70 - 87)  BP: 100/50 (06-23-23 @ 11:00) (100/50 - 139/43)  RR: 18 (06-23-23 @ 11:00) (17 - 18)  SpO2: 99% (06-23-23 @ 11:00) (96% - 99%)  Wt(kg): --  I&O's Summary    22 Jun 2023 07:01  -  23 Jun 2023 07:00  --------------------------------------------------------  IN: 976 mL / OUT: 0 mL / NET: 976 mL          Appearance: frail  HEENT:  PERRLA , NGT   Lymphatic: No lymphadenopathy   Cardiovascular: Normal S1 S2, no JVD  Respiratory: normal effort , clear  Gastrointestinal:  Soft, Non-tender  Skin: No rashes,  warm to touch  Psychiatry:  Mood & affect appropriate  Musculuskeletal: No edema    recent labs, Imaging and EKGs personally reviewed     06-22-23 @ 07:01  -  06-23-23 @ 07:00  --------------------------------------------------------  IN: 976 mL / OUT: 0 mL / NET: 976 mL                          7.8    9.05  )-----------( 165      ( 23 Jun 2023 07:09 )             27.0               06-22    141  |  100  |  28<H>  ----------------------------<  91  5.3   |  29  |  4.86<H>    Ca    9.3      22 Jun 2023 03:00  Phos  5.1     06-22  Mg     2.10     06-22                         Urinalysis Basic - ( 22 Jun 2023 03:00 )    Color: x / Appearance: x / SG: x / pH: x  Gluc: 91 mg/dL / Ketone: x  / Bili: x / Urobili: x   Blood: x / Protein: x / Nitrite: x   Leuk Esterase: x / RBC: x / WBC x   Sq Epi: x / Non Sq Epi: x / Bacteria: x

## 2023-06-23 NOTE — CHART NOTE - NSCHARTNOTEFT_GEN_A_CORE
Notified by RN that pt was called for MRI but has no IV access, also unable to get IV antibiotics due to this.   RN attempted IV access, as well as 3 other nurses and the nurse manager, however, unsuccessful.  ACP escalated to JOSE MARIA TREVIZO who came bedside, also unable to obtain IV access.    Antibiotics regimen changed to be coordinated with dialysis since no IV access.  MRI currently on hold until IV access obtained.

## 2023-06-23 NOTE — PROGRESS NOTE ADULT - ASSESSMENT
77 year old male with DVT, CAD, ESRD on HD, RUE AVF, AOCD, HLD, HTN, pre-diabetes, prostate ca with suprapubic catheter from NH for missed HD. Developed fevers and found to have GBS bacteremia. +R shoulder pain improving. Leukocytosis.    CT A/P with no evidence of acute infectious process in the abdomen and pelvis.  Difficult PIV line placement    Recommend:  #Sepsis (fever, leukocytosis) secondary to high grade GBS bacteremia  -Repeat blood cultures remain negative  -Trend WBC  -monitor fever curve  -TTE no evidence of valvular vegetation  -MRI b/l shoulders with contrast - will need to arrange with nephro regarding HD post study  -Given difficult to place PIV lines, can administer cefazolin 2 grams IV with HD on Mon/ Wed and 3 grams on Fri with HD.    Isaiah Hall MD  Available through MS Teams  If no response, or after 5pm/weekends, call [phone]

## 2023-06-23 NOTE — PROGRESS NOTE ADULT - SUBJECTIVE AND OBJECTIVE BOX
[facility] NEPHROLOGY- PROGRESS NOTE    77y Male with history of ESRD on HD presents for need for dialysis. Nephrology consulted for ESRD status.    REVIEW OF SYSTEMS: limited due to mental status.    penicillin (Rash)      Hospital Medications: Medications reviewed      VITALS:  T(F): 97.6 (06-23-23 @ 11:00), Max: 98.1 (06-23-23 @ 06:00)  HR: 87 (06-23-23 @ 11:00)  BP: 100/50 (06-23-23 @ 11:00)  RR: 18 (06-23-23 @ 11:00)  SpO2: 99% (06-23-23 @ 11:00)  Wt(kg): --    06-22 @ 07:01  -  06-23 @ 07:00  --------------------------------------------------------  IN: 976 mL / OUT: 0 mL / NET: 976 mL        PHYSICAL EXAM:    Gen: NAD, lethargic, + NGT  Cards: RRR, +S1/S2, no M/G/R  Resp: CTA B/L  GI: soft, NT/ND, NABS  : + SPT  Vascular: no LE edema B/L, RUE AVG + bruit/thrill, L shoulder/arm edematous        LABS:  06-22    141  |  100  |  28<H>  ----------------------------<  91  5.3   |  29  |  4.86<H>    Ca    9.3      22 Jun 2023 03:00  Phos  5.1     06-22  Mg     2.10     06-22      Creatinine Trend: 4.86 <--, 8.57 <--, 5.79 <--, 4.79 <--, 2.59 <--, 5.87 <--                        7.8    9.05  )-----------( 165      ( 23 Jun 2023 07:09 )             27.0     Urine Studies:  Urinalysis Basic - ( 22 Jun 2023 03:00 )    Color:  / Appearance:  / SG:  / pH:   Gluc: 91 mg/dL / Ketone:   / Bili:  / Urobili:    Blood:  / Protein:  / Nitrite:    Leuk Esterase:  / RBC:  / WBC    Sq Epi:  / Non Sq Epi:  / Bacteria:

## 2023-06-23 NOTE — PROGRESS NOTE ADULT - SUBJECTIVE AND OBJECTIVE BOX
CARDIOLOGY FOLLOW UP - Dr. Clements  Date of Service: 6/23/23  CC: no events    Review of Systems:  Constitutional: No fever, weight loss, or fatigue  Respiratory: No cough, wheezing, or hemoptysis, no shortness of breath  Cardiovascular: No chest pain, palpitations, passing out, dizziness, or leg swelling  Gastrointestinal: No abd or epigastric pain. No nausea, vomiting, or hematemesis; no diarrhea or consiptaiton, no melena or hematochezia  Vascular: No edema     TELEMETRY:    PHYSICAL EXAM:  T(C): 36.4 (06-23-23 @ 11:00), Max: 36.7 (06-22-23 @ 17:16)  HR: 87 (06-23-23 @ 11:00) (70 - 87)  BP: 100/50 (06-23-23 @ 11:00) (100/50 - 139/43)  RR: 18 (06-23-23 @ 11:00) (17 - 18)  SpO2: 99% (06-23-23 @ 11:00) (96% - 99%)  Wt(kg): --  I&O's Summary    22 Jun 2023 07:01  -  23 Jun 2023 07:00  --------------------------------------------------------  IN: 976 mL / OUT: 0 mL / NET: 976 mL        Appearance: Normal	  Cardiovascular: Normal S1 S2,RRR, No JVD, No murmurs  Respiratory: Lungs clear to auscultation	  Gastrointestinal:  Soft, Non-tender, + BS	  Extremities: Normal range of motion, No clubbing, cyanosis or edema  Vascular: Peripheral pulses palpable 2+ bilaterally       Home Medications:  bisacodyl 5 mg oral tablet: 2 tab(s) orally once a day as needed for  constipation (04 Jun 2023 00:48)  cinacalcet 60 mg oral tablet: 1 tab(s) orally once a day (04 Jun 2023 00:48)  donepezil 5 mg oral tablet: 1 tab(s) orally once a day (at bedtime) (04 Jun 2023 00:48)  ergocalciferol 1.25 mg (50,000 intl units) oral capsule: 1 cap(s) orally once a month (04 Jun 2023 00:48)  meloxicam 15 mg oral tablet: 1 tab(s) orally once a day as needed for  pain (04 Jun 2023 00:48)  NIFEdipine (Eqv-Adalat CC) 30 mg oral tablet, extended release: 2 tab(s) orally once a day (04 Jun 2023 00:48)  sevelamer hydrochloride 800 mg oral tablet: 1 tab(s) orally every 6 hours (04 Jun 2023 00:49)        MEDICATIONS  (STANDING):  aspirin  chewable 81 milliGRAM(s) Oral daily  ceFAZolin   IVPB 1000 milliGRAM(s) IV Intermittent every 24 hours  chlorhexidine 2% Cloths 1 Application(s) Topical daily  dextrose 5%. 1000 milliLiter(s) (50 mL/Hr) IV Continuous <Continuous>  dextrose 5%. 1000 milliLiter(s) (100 mL/Hr) IV Continuous <Continuous>  dextrose 50% Injectable 25 Gram(s) IV Push once  dextrose 50% Injectable 25 Gram(s) IV Push once  dextrose 50% Injectable 12.5 Gram(s) IV Push once  donepezil 5 milliGRAM(s) Oral at bedtime  epoetin shell-epbx (RETACRIT) Injectable 06045 Unit(s) IV Push <User Schedule>  heparin   Injectable 5000 Unit(s) SubCutaneous every 8 hours  metoprolol tartrate 25 milliGRAM(s) Oral two times a day  midodrine. 10 milliGRAM(s) Oral <User Schedule>  pantoprazole   Suspension 40 milliGRAM(s) Oral daily        EKG:  RADIOLOGY:  DIAGNOSTIC TESTING:  [ ] Echocardiogram:  [ ] Catherterization:  [ ] Stress Test:  OTHER:     LABS:	 	                          7.8    9.05  )-----------( 165      ( 23 Jun 2023 07:09 )             27.0     06-22    141  |  100  |  28<H>  ----------------------------<  91  5.3   |  29  |  4.86<H>    Ca    9.3      22 Jun 2023 03:00  Phos  5.1     06-22  Mg     2.10     06-22            CARDIAC MARKERS:

## 2023-06-23 NOTE — PROGRESS NOTE ADULT - PROBLEM SELECTOR PLAN 1
positive gram positive bacteremia  antibiotics switched to Rocephin  ID eval appreciated   repeat Cx  MRI shoulder, unabl to obtain, no IV access   contrast extravasation post MRI, monitor clinically   pulse noted  vascular eval called, appreciated recs

## 2023-06-24 NOTE — PROGRESS NOTE ADULT - SUBJECTIVE AND OBJECTIVE BOX
Name of Patient : MAGGIE THAO  MRN: [id number]  Date of visit: 06-24-23       Subjective: Patient seen and examined. No new events except as noted.   frail       MEDICATIONS:  MEDICATIONS  (STANDING):  aspirin  chewable 81 milliGRAM(s) Oral daily  ceFAZolin   IVPB 2000 milliGRAM(s) IV Intermittent <User Schedule>  ceFAZolin   IVPB 3000 milliGRAM(s) IV Intermittent <User Schedule>  chlorhexidine 2% Cloths 1 Application(s) Topical daily  dextrose 5%. 1000 milliLiter(s) (100 mL/Hr) IV Continuous <Continuous>  dextrose 5%. 1000 milliLiter(s) (50 mL/Hr) IV Continuous <Continuous>  dextrose 50% Injectable 12.5 Gram(s) IV Push once  dextrose 50% Injectable 25 Gram(s) IV Push once  dextrose 50% Injectable 25 Gram(s) IV Push once  donepezil 5 milliGRAM(s) Oral at bedtime  epoetin shell-epbx (RETACRIT) Injectable 83644 Unit(s) IV Push <User Schedule>  heparin   Injectable 5000 Unit(s) SubCutaneous every 8 hours  metoprolol tartrate 25 milliGRAM(s) Oral two times a day  midodrine. 10 milliGRAM(s) Oral <User Schedule>  pantoprazole   Suspension 40 milliGRAM(s) Oral daily      PHYSICAL EXAM:  T(C): 36.8 (06-24-23 @ 09:55), Max: 36.8 (06-24-23 @ 09:55)  HR: 90 (06-24-23 @ 09:55) (86 - 90)  BP: 123/85 (06-24-23 @ 09:55) (92/48 - 146/51)  RR: 17 (06-24-23 @ 09:55) (16 - 18)  SpO2: 98% (06-24-23 @ 09:55) (95% - 98%)  Wt(kg): --  I&O's Summary    23 Jun 2023 07:01  -  24 Jun 2023 07:00  --------------------------------------------------------  IN: 1284 mL / OUT: 450 mL / NET: 834 mL          Appearance: frail   HEENT:  NGT   Lymphatic: No lymphadenopathy   Cardiovascular: Normal S1 S2, no JVD  Respiratory: normal effort , clear  Gastrointestinal:  Soft, Non-tender  Skin: No rashes,  warm to touch  Psychiatry:  Mood & affect appropriate  Musculuskeletal: No edema    recent labs, Imaging and EKGs personally reviewed     06-23-23 @ 07:01  -  06-24-23 @ 07:00  --------------------------------------------------------  IN: 1284 mL / OUT: 450 mL / NET: 834 mL                          8.2    11.71 )-----------( 246      ( 24 Jun 2023 07:08 )             28.1               06-24    142  |  101  |  21  ----------------------------<  102<H>  4.1   |  29  |  3.57<H>    Ca    9.7      24 Jun 2023 07:08  Phos  2.8     06-24  Mg     2.10     06-24                         Urinalysis Basic - ( 24 Jun 2023 07:08 )    Color: x / Appearance: x / SG: x / pH: x  Gluc: 102 mg/dL / Ketone: x  / Bili: x / Urobili: x   Blood: x / Protein: x / Nitrite: x   Leuk Esterase: x / RBC: x / WBC x   Sq Epi: x / Non Sq Epi: x / Bacteria: x

## 2023-06-24 NOTE — PROGRESS NOTE ADULT - SUBJECTIVE AND OBJECTIVE BOX
CARDIOLOGY FOLLOW UP - Dr. Clements  Date of Service: 6/24/2023  CC: no events    Review of Systems:  Constitutional: No fever, weight loss, or fatigue  Respiratory: No cough, wheezing, or hemoptysis, no shortness of breath  Cardiovascular: No chest pain, palpitations, passing out, dizziness, or leg swelling  Gastrointestinal: No abd or epigastric pain. No nausea, vomiting, or hematemesis; no diarrhea or consiptaiton, no melena or hematochezia  Vascular: No edema     TELEMETRY:    PHYSICAL EXAM:  T(C): 36.7 (06-24-23 @ 06:30), Max: 36.7 (06-24-23 @ 06:30)  HR: 89 (06-24-23 @ 06:30) (86 - 89)  BP: 141/74 (06-24-23 @ 06:30) (92/48 - 146/86)  RR: 16 (06-24-23 @ 06:30) (16 - 18)  SpO2: 95% (06-24-23 @ 06:30) (95% - 99%)  Wt(kg): --  I&O's Summary    23 Jun 2023 07:01  -  24 Jun 2023 07:00  --------------------------------------------------------  IN: 1284 mL / OUT: 450 mL / NET: 834 mL        Appearance: Normal	  Cardiovascular: Normal S1 S2,RRR, No JVD, No murmurs  Respiratory: Lungs clear to auscultation	  Gastrointestinal:  Soft, Non-tender, + BS	  Extremities: Normal range of motion, No clubbing, cyanosis or edema  Vascular: Peripheral pulses palpable 2+ bilaterally       Home Medications:  bisacodyl 5 mg oral tablet: 2 tab(s) orally once a day as needed for  constipation (04 Jun 2023 00:48)  cinacalcet 60 mg oral tablet: 1 tab(s) orally once a day (04 Jun 2023 00:48)  donepezil 5 mg oral tablet: 1 tab(s) orally once a day (at bedtime) (04 Jun 2023 00:48)  ergocalciferol 1.25 mg (50,000 intl units) oral capsule: 1 cap(s) orally once a month (04 Jun 2023 00:48)  meloxicam 15 mg oral tablet: 1 tab(s) orally once a day as needed for  pain (04 Jun 2023 00:48)  NIFEdipine (Eqv-Adalat CC) 30 mg oral tablet, extended release: 2 tab(s) orally once a day (04 Jun 2023 00:48)  sevelamer hydrochloride 800 mg oral tablet: 1 tab(s) orally every 6 hours (04 Jun 2023 00:49)        MEDICATIONS  (STANDING):  aspirin  chewable 81 milliGRAM(s) Oral daily  ceFAZolin   IVPB 2000 milliGRAM(s) IV Intermittent <User Schedule>  ceFAZolin   IVPB 3000 milliGRAM(s) IV Intermittent <User Schedule>  chlorhexidine 2% Cloths 1 Application(s) Topical daily  dextrose 5%. 1000 milliLiter(s) (50 mL/Hr) IV Continuous <Continuous>  dextrose 5%. 1000 milliLiter(s) (100 mL/Hr) IV Continuous <Continuous>  dextrose 50% Injectable 12.5 Gram(s) IV Push once  dextrose 50% Injectable 25 Gram(s) IV Push once  dextrose 50% Injectable 25 Gram(s) IV Push once  donepezil 5 milliGRAM(s) Oral at bedtime  epoetin shell-epbx (RETACRIT) Injectable 58437 Unit(s) IV Push <User Schedule>  heparin   Injectable 5000 Unit(s) SubCutaneous every 8 hours  metoprolol tartrate 25 milliGRAM(s) Oral two times a day  midodrine. 10 milliGRAM(s) Oral <User Schedule>  pantoprazole   Suspension 40 milliGRAM(s) Oral daily        EKG:  RADIOLOGY:  DIAGNOSTIC TESTING:  [ ] Echocardiogram:  [ ] Catherterization:  [ ] Stress Test:  OTHER:     LABS:	 	                          7.8    9.05  )-----------( 165      ( 23 Jun 2023 07:09 )             27.0                 CARDIAC MARKERS:

## 2023-06-24 NOTE — PROGRESS NOTE ADULT - SUBJECTIVE AND OBJECTIVE BOX
[facility] NEPHROLOGY- PROGRESS NOTE    77y Male with history of ESRD on HD presents for need for dialysis. Nephrology consulted for ESRD status.    REVIEW OF SYSTEMS: limited due to mental status.    penicillin (Rash)      Hospital Medications: Medications reviewed      VITALS:  T(F): 98.3 (06-24-23 @ 09:55), Max: 98.3 (06-24-23 @ 09:55)  HR: 90 (06-24-23 @ 09:55)  BP: 123/85 (06-24-23 @ 09:55)  RR: 17 (06-24-23 @ 09:55)  SpO2: 98% (06-24-23 @ 09:55)  Wt(kg): --    06-23 @ 07:01  -  06-24 @ 07:00  --------------------------------------------------------  IN: 1284 mL / OUT: 450 mL / NET: 834 mL        PHYSICAL EXAM:    Gen: NAD, lethargic, + NGT  Cards: RRR, +S1/S2, no M/G/R  Resp: CTA B/L  GI: soft, NT/ND, NABS  : + SPT  Vascular: no LE edema B/L, RUE AVG + bruit/thrill, L shoulder/arm edematous        LABS:  06-24    142  |  101  |  21  ----------------------------<  102<H>  4.1   |  29  |  3.57<H>    Ca    9.7      24 Jun 2023 07:08  Phos  2.8     06-24  Mg     2.10     06-24      Creatinine Trend: 3.57 <--, 4.86 <--, 8.57 <--, 5.79 <--, 4.79 <--, 2.59 <--                        8.2    11.71 )-----------( 246      ( 24 Jun 2023 07:08 )             28.1     Urine Studies:  Urinalysis Basic - ( 24 Jun 2023 07:08 )    Color:  / Appearance:  / SG:  / pH:   Gluc: 102 mg/dL / Ketone:   / Bili:  / Urobili:    Blood:  / Protein:  / Nitrite:    Leuk Esterase:  / RBC:  / WBC    Sq Epi:  / Non Sq Epi:  / Bacteria:

## 2023-06-24 NOTE — PROGRESS NOTE ADULT - ASSESSMENT
77y Male with history of ESRD on HD presents for need for dialysis. Nephrology consulted for ESRD status.    1) ESRD: Last HD on 6/23 with intradialytic hypotension and patient net positive 0.2L. Plan for next maintenance HD on 6/26 after MRI with janie to decrease risk of NSF (if IV access obtained). Patient not tolerating fluid removal with HD despite midodrine. Recommend GOC follow up. Monitor electrolytes.    2) HTN with ESRD: BP low normal. Continue with midodrine 10 mg PO pre-HD to avoid intradialytic hypotension. Monitor BP.    3) Anemia of renal disease: Hb low and patient occult positive. No IV iron given elevated ferritin. Continue with Epo 12K with HD. Monitor Hb.    4) Secondary HPT of renal origin: Serum phos improving. Holding sensipar 30 mg daily given low normal iPTH and will use low calcium bath with HD. Monitor serum calcium and phosphorus.    Poor prognosis.     [facility] NEPHROLOGY  Armando Shaffer M.D.  Kendall Morales D.O.  Lilliam Cody M.D.  Oxana Hernandez, MSN, ANP-C  [phone]  Fax: [phone] [place] [address], [address]  [place] [address]

## 2023-06-24 NOTE — PROGRESS NOTE ADULT - PROBLEM SELECTOR PLAN 1
positive gram positive bacteremia  antibiotics switched to Rocephin  ID eval appreciated   repeat Cx  MRI shoulder, unable to obtain, no IV access   contrast extravasation post MRI, monitor clinically   pulse noted  vascular eval called, appreciated recs

## 2023-06-25 NOTE — PROVIDER CONTACT NOTE (OTHER) - ASSESSMENT
Patient A&Ox1, no s/s of chest pain or shortness of breath at this time.  Patient coughing with tube feeds running.  NG tube measured to 54 at tip of nose, should be at 53 as per ACP Joseitha.

## 2023-06-25 NOTE — CHART NOTE - NSCHARTNOTEFT_GEN_A_CORE
Patient's NGT feeds were this AM for coughing and questionable placement of NGT. CXR showed: NG tube in place. Clear lungs. Patient seen at bedside. No signs of coughing or any distress or SOB. NGT noted to be at 52CM. Air bubble auscultated. Will restart tube at 10mL and may need to decrease goal rate if patient continues to have coughing. Discussed with Dr. Ma Patient's NGT feeds were this AM for coughing and questionable placement of NGT. CXR showed: NG tube in place. Clear lungs. Patient seen at bedside. No signs of coughing or any distress or SOB. NGT noted to be at 53 cm. NGT secured. Air bubble auscultated. Will restart tube at 10mL and may need to decrease goal rate if patient continues to have coughing. Discussed with Dr. Ma

## 2023-06-25 NOTE — PROGRESS NOTE ADULT - ASSESSMENT
77y Male with history of ESRD on HD presents for need for dialysis. Nephrology consulted for ESRD status.    1) ESRD: Last HD on 6/23 with intradialytic hypotension and patient net positive 0.2L. Plan for next maintenance HD on 6/26 after MRI with janie to decrease risk of NSF (if IV access obtained). Patient not tolerating fluid removal with HD despite midodrine. Recommend GOC follow up. Monitor electrolytes.    2) HTN with ESRD: BP low normal. Continue with midodrine 10 mg PO pre-HD to avoid intradialytic hypotension. Monitor BP.    3) Anemia of renal disease: Hb low and patient occult positive. No IV iron given elevated ferritin. Continue with Epo 12K with HD. Would transfuse PRBC with HD on 6/26. Monitor Hb.    4) Secondary HPT of renal origin: Serum phos improving. Holding sensipar 30 mg daily given low normal iPTH and will use low calcium bath with HD. Monitor serum calcium and phosphorus.    Poor prognosis.     [facility] NEPHROLOGY  Armando Shaffer M.D.  Kendall Morales D.O.  Lilliam Cody M.D.  Oxana Hernandez, MSN, ANP-C  [phone]  Fax: [phone] [address] [place] [address], [address]  [place] [address]     77y Male with history of ESRD on HD presents for need for dialysis. Nephrology consulted for ESRD status.    1) ESRD: Last HD on 6/23 with intradialytic hypotension and patient net positive 0.2L. Plan for next maintenance HD on 6/26 after MRI with janie to decrease risk of NSF (if IV access obtained). Patient not tolerating fluid removal with HD despite midodrine. Recommend GOC follow up. Monitor electrolytes.    2) HTN with ESRD: BP low normal. Continue with midodrine 10 mg PO pre-HD to avoid intradialytic hypotension. Monitor BP.    3) Anemia of renal disease: Hb low and patient occult positive. No IV iron given elevated ferritin. Increase Epo to 14K with HD. Would transfuse PRBC with HD on 6/26. Monitor Hb.    4) Secondary HPT of renal origin: Serum phos improving. Holding sensipar 30 mg daily given low normal iPTH and will use low calcium bath with HD. Monitor serum calcium and phosphorus.    Poor prognosis.     [facility] NEPHROLOGY  Armando Shaffer M.D.  Kendall Morales D.O.  Lilliam Cody M.D.  Oxana Hernandez, MSN, ANP-C  [phone]  Fax: [phone] [address] [place] [address], [address]  [place] [address]

## 2023-06-25 NOTE — PROGRESS NOTE ADULT - SUBJECTIVE AND OBJECTIVE BOX
[facility] NEPHROLOGY- PROGRESS NOTE    77y Male with history of ESRD on HD presents for need for dialysis. Nephrology consulted for ESRD status.    REVIEW OF SYSTEMS: limited due to mental status.    penicillin (Rash)      Hospital Medications: Medications reviewed      VITALS:  T(F): 98.3 (06-25-23 @ 10:31), Max: 98.8 (06-24-23 @ 22:30)  HR: 76 (06-25-23 @ 10:31)  BP: 111/51 (06-25-23 @ 10:31)  RR: 18 (06-25-23 @ 10:31)  SpO2: 99% (06-25-23 @ 10:31)  Wt(kg): --    06-24 @ 07:01  -  06-25 @ 07:00  --------------------------------------------------------  IN: 684 mL / OUT: 107 mL / NET: 577 mL        PHYSICAL EXAM:    Gen: NAD, confused but more alert today, + NGT  Cards: RRR, +S1/S2, no M/G/R  Resp: CTA B/L  GI: soft, NT/ND, NABS  : + SPT  Vascular: no LE edema B/L, RUE AVG + bruit/thrill, L shoulder/arm edematous        LABS:  06-25    142  |  100  |  38<H>  ----------------------------<  95  3.9   |  29  |  5.02<H>    Ca    10.2      25 Jun 2023 08:12  Phos  3.4     06-25  Mg     2.30     06-25      Creatinine Trend: 5.02 <--, 3.57 <--, 4.86 <--, 8.57 <--, 5.79 <--, 4.79 <--                        7.1    11.85 )-----------( 263      ( 25 Jun 2023 08:12 )             24.2     Urine Studies:  Urinalysis Basic - ( 25 Jun 2023 08:12 )    Color:  / Appearance:  / SG:  / pH:   Gluc: 95 mg/dL / Ketone:   / Bili:  / Urobili:    Blood:  / Protein:  / Nitrite:    Leuk Esterase:  / RBC:  / WBC    Sq Epi:  / Non Sq Epi:  / Bacteria:

## 2023-06-25 NOTE — PROGRESS NOTE ADULT - SUBJECTIVE AND OBJECTIVE BOX
Name of Patient : MAGGIE THAO  MRN: [id number]  Date of visit: 06-25-23      Subjective: Patient seen and examined. No new events except as noted.   disucssed with Renal, unable to remove any fluid during HD  frail     REVIEW OF SYSTEMS:  limited     MEDICATIONS:  MEDICATIONS  (STANDING):  aspirin  chewable 81 milliGRAM(s) Oral daily  ceFAZolin   IVPB 2000 milliGRAM(s) IV Intermittent <User Schedule>  ceFAZolin   IVPB 3000 milliGRAM(s) IV Intermittent <User Schedule>  chlorhexidine 2% Cloths 1 Application(s) Topical daily  dextrose 5%. 1000 milliLiter(s) (50 mL/Hr) IV Continuous <Continuous>  dextrose 5%. 1000 milliLiter(s) (100 mL/Hr) IV Continuous <Continuous>  dextrose 50% Injectable 12.5 Gram(s) IV Push once  dextrose 50% Injectable 25 Gram(s) IV Push once  dextrose 50% Injectable 25 Gram(s) IV Push once  donepezil 5 milliGRAM(s) Oral at bedtime  epoetin shell-epbx (RETACRIT) Injectable 98749 Unit(s) IV Push <User Schedule>  heparin   Injectable 5000 Unit(s) SubCutaneous every 8 hours  metoprolol tartrate 25 milliGRAM(s) Oral two times a day  midodrine. 10 milliGRAM(s) Oral <User Schedule>  pantoprazole   Suspension 40 milliGRAM(s) Oral daily      PHYSICAL EXAM:  T(C): 36.8 (06-25-23 @ 10:31), Max: 37.1 (06-24-23 @ 22:30)  HR: 76 (06-25-23 @ 10:31) (76 - 86)  BP: 111/51 (06-25-23 @ 10:31) (111/51 - 125/58)  RR: 18 (06-25-23 @ 10:31) (17 - 18)  SpO2: 99% (06-25-23 @ 10:31) (98% - 99%)  Wt(kg): --  I&O's Summary    24 Jun 2023 07:01  -  25 Jun 2023 07:00  --------------------------------------------------------  IN: 684 mL / OUT: 107 mL / NET: 577 mL          Appearance: frail  HEENT:  PERRLA , NGT  Lymphatic: No lymphadenopathy   Cardiovascular: Normal S1 S2, no JVD  Respiratory: normal effort , clear  Gastrointestinal:  Soft, Non-tender  Skin: No rashes,  warm to touch  Musculuskeletal: No edema    recent labs, Imaging and EKGs personally reviewed       06-24-23 @ 07:01  -  06-25-23 @ 07:00  --------------------------------------------------------  IN: 684 mL / OUT: 107 mL / NET: 577 mL                          7.1    11.85 )-----------( 263      ( 25 Jun 2023 08:12 )             24.2               06-25    142  |  100  |  38<H>  ----------------------------<  95  3.9   |  29  |  5.02<H>    Ca    10.2      25 Jun 2023 08:12  Phos  3.4     06-25  Mg     2.30     06-25                         Urinalysis Basic - ( 25 Jun 2023 08:12 )    Color: x / Appearance: x / SG: x / pH: x  Gluc: 95 mg/dL / Ketone: x  / Bili: x / Urobili: x   Blood: x / Protein: x / Nitrite: x   Leuk Esterase: x / RBC: x / WBC x   Sq Epi: x / Non Sq Epi: x / Bacteria: x

## 2023-06-25 NOTE — PROGRESS NOTE ADULT - SUBJECTIVE AND OBJECTIVE BOX
CARDIOLOGY FOLLOW UP - Dr. Clements  Date of Service: 6/25/23  CC: no events    Review of Systems:  Constitutional: No fever, weight loss, or fatigue  Respiratory: No cough, wheezing, or hemoptysis, no shortness of breath  Cardiovascular: No chest pain, palpitations, passing out, dizziness, or leg swelling  Gastrointestinal: No abd or epigastric pain. No nausea, vomiting, or hematemesis; no diarrhea or consiptaiton, no melena or hematochezia  Vascular: No edema     TELEMETRY:    PHYSICAL EXAM:  T(C): 36.8 (06-25-23 @ 10:31), Max: 37.1 (06-24-23 @ 22:30)  HR: 76 (06-25-23 @ 10:31) (76 - 86)  BP: 111/51 (06-25-23 @ 10:31) (111/51 - 125/58)  RR: 18 (06-25-23 @ 10:31) (17 - 18)  SpO2: 99% (06-25-23 @ 10:31) (98% - 99%)  Wt(kg): --  I&O's Summary    24 Jun 2023 07:01  -  25 Jun 2023 07:00  --------------------------------------------------------  IN: 684 mL / OUT: 107 mL / NET: 577 mL        Appearance: Normal	  Cardiovascular: Normal S1 S2,RRR, No JVD, No murmurs  Respiratory: Lungs clear to auscultation	  Gastrointestinal:  Soft, Non-tender, + BS	  Extremities: Normal range of motion, No clubbing, cyanosis or edema  Vascular: Peripheral pulses palpable 2+ bilaterally       Home Medications:  bisacodyl 5 mg oral tablet: 2 tab(s) orally once a day as needed for  constipation (04 Jun 2023 00:48)  cinacalcet 60 mg oral tablet: 1 tab(s) orally once a day (04 Jun 2023 00:48)  donepezil 5 mg oral tablet: 1 tab(s) orally once a day (at bedtime) (04 Jun 2023 00:48)  ergocalciferol 1.25 mg (50,000 intl units) oral capsule: 1 cap(s) orally once a month (04 Jun 2023 00:48)  meloxicam 15 mg oral tablet: 1 tab(s) orally once a day as needed for  pain (04 Jun 2023 00:48)  NIFEdipine (Eqv-Adalat CC) 30 mg oral tablet, extended release: 2 tab(s) orally once a day (04 Jun 2023 00:48)  sevelamer hydrochloride 800 mg oral tablet: 1 tab(s) orally every 6 hours (04 Jun 2023 00:49)        MEDICATIONS  (STANDING):  aspirin  chewable 81 milliGRAM(s) Oral daily  ceFAZolin   IVPB 2000 milliGRAM(s) IV Intermittent <User Schedule>  ceFAZolin   IVPB 3000 milliGRAM(s) IV Intermittent <User Schedule>  chlorhexidine 2% Cloths 1 Application(s) Topical daily  dextrose 5%. 1000 milliLiter(s) (50 mL/Hr) IV Continuous <Continuous>  dextrose 5%. 1000 milliLiter(s) (100 mL/Hr) IV Continuous <Continuous>  dextrose 50% Injectable 25 Gram(s) IV Push once  dextrose 50% Injectable 25 Gram(s) IV Push once  dextrose 50% Injectable 12.5 Gram(s) IV Push once  donepezil 5 milliGRAM(s) Oral at bedtime  epoetin shell-epbx (RETACRIT) Injectable 02439 Unit(s) IV Push <User Schedule>  heparin   Injectable 5000 Unit(s) SubCutaneous every 8 hours  metoprolol tartrate 25 milliGRAM(s) Oral two times a day  midodrine. 10 milliGRAM(s) Oral <User Schedule>  pantoprazole   Suspension 40 milliGRAM(s) Oral daily        EKG:  RADIOLOGY:  DIAGNOSTIC TESTING:  [ ] Echocardiogram:  [ ] Catherterization:  [ ] Stress Test:  OTHER:     LABS:	 	                          7.1    11.85 )-----------( 263      ( 25 Jun 2023 08:12 )             24.2     06-25    142  |  100  |  38<H>  ----------------------------<  95  3.9   |  29  |  5.02<H>    Ca    10.2      25 Jun 2023 08:12  Phos  3.4     06-25  Mg     2.30     06-25            CARDIAC MARKERS:

## 2023-06-26 NOTE — PROGRESS NOTE ADULT - PROBLEM SELECTOR PLAN 4
infectious W/U  CXR clear  lactate wnl
appreciate renal recs  interviewed/examined at HD  monitor I/Os, daily weights  renal diet
appreciate renal recs  interviewed/examined at HD  monitor I/Os, daily weights  renal diet
- 2/2 anemia of chronic disease, GIB
appreciate renal recs  interviewed/examined at HD  monitor I/Os, daily weights  renal diet
infectious W/U  CXR clear  lactate wnl
appreciate renal recs  interviewed/examined at HD  monitor I/Os, daily weights  renal diet
infectious W/U  CXR clear  lactate wnl
appreciate renal recs  interviewed/examined at HD  monitor I/Os, daily weights  renal diet
appreciate renal recs  interviewed/examined at HD  monitor I/Os, daily weights  renal diet
check UA  CXR clear  lactate wnl
infectious W/U  CXR clear  lactate wnl    UA  Pan CX  Cipro till Cx results
appreciate renal recs  interviewed/examined at HD  monitor I/Os, daily weights  renal diet
infectious W/U  CXR clear  lactate wnl
appreciate renal recs  interviewed/examined at HD  monitor I/Os, daily weights  renal diet
infectious W/U  CXR clear  lactate wnl
appreciate renal recs  interviewed/examined at HD  monitor I/Os, daily weights  renal diet
- 2/2 anemia of chronic disease, GIB
infectious W/U  CXR clear  lactate wnl
appreciate renal recs  interviewed/examined at HD  monitor I/Os, daily weights    HD not ne able to remove fluid out. follow up renal follow up
appreciate renal recs  interviewed/examined at HD  monitor I/Os, daily weights  renal diet
appreciate renal recs  interviewed/examined at HD  monitor I/Os, daily weights    HD not ne able to remove fluid out. follow up renal follow up
appreciate renal recs  interviewed/examined at HD  monitor I/Os, daily weights  renal diet

## 2023-06-26 NOTE — PROGRESS NOTE ADULT - PROBLEM SELECTOR PLAN 5
infectious W/U  CXR clear  lactate wnl    UA  Pan CX
infectious W/U  CXR clear  lactate wnl    UA  Pan CX  Cipro till Cx results
appreciate cards recs  ecg w/aflutter, ?repol abnormalities, ST abnormality V3-V5, check trop  no chest pain  monitor on tele  TTE
appreciate cards recs  ecg w/aflutter, ?repol abnormalities, ST abnormality V3-V5, check trop  no chest pain  monitor on tele  TTE
infectious W/U  CXR clear  lactate wnl    UA  Pan CX  Cipro till Cx results
appreciate cards recs  ecg w/aflutter, ?repol abnormalities, ST abnormality V3-V5, check trop  no chest pain  add on ck/ckmb; repeat CE  monitor on tele  TTE
infectious W/U  CXR clear  lactate wnl    UA  Pan CX
- Group B Strep bacteremia   - possible source R shoulder   - Pending MRI R shoulder
infectious W/U  CXR clear  lactate wnl    UA  Pan CX
- Group B Strep bacteremia   - possible source R shoulder   - Pending MRI R shoulder
infectious W/U  CXR clear  lactate wnl    UA  Pan CX  Cipro till Cx results
appreciate cards recs  ecg w/aflutter, ?repol abnormalities, ST abnormality V3-V5, check trop  no chest pain  monitor on tele  TTE
infectious W/U  CXR clear  lactate wnl    UA  Pan CX
infectious W/U  CXR clear  lactate wnl    UA  Pan CX  Cipro till Cx results
infectious W/U  CXR clear  lactate wnl    UA  Pan CX
infectious W/U  CXR clear  lactate wnl    UA  Pan CX  Cipro till Cx results
appreciate cards recs  ecg w/aflutter, ?repol abnormalities, ST abnormality V3-V5, check trop  no chest pain  monitor on tele  TTE
infectious W/U  CXR clear  lactate wnl    UA  Pan CX  Cipro till Cx results
appreciate cards recs  ecg w/aflutter, ?repol abnormalities, ST abnormality V3-V5, check trop  no chest pain  monitor on tele  TTE
infectious W/U  CXR clear  lactate wnl    UA  Pan CX
appreciate cards recs  ecg w/aflutter, ?repol abnormalities, ST abnormality V3-V5, check trop  no chest pain  add on ck/ckmb; repeat CE  monitor on tele  TTE
appreciate cards recs  ecg w/aflutter, ?repol abnormalities, ST abnormality V3-V5, check trop  no chest pain  monitor on tele  TTE
infectious W/U  CXR clear  lactate wnl    UA  Pan CX

## 2023-06-26 NOTE — PROGRESS NOTE ADULT - PROBLEM SELECTOR PLAN 12
Resume sensipar 60 mg daily and home phosphorus binders.   Monitor serum calcium and phosphorus.
DVT and GI PPX
Resume sensipar 60 mg daily and home phosphorus binders.   Monitor serum calcium and phosphorus.
DVT and GI PPX
Resume sensipar 60 mg daily and home phosphorus binders.   Monitor serum calcium and phosphorus.
Resume sensipar 60 mg daily and home phosphorus binders.   Monitor serum calcium and phosphorus.
DVT and GI PPX
Resume sensipar 60 mg daily and home phosphorus binders.   Monitor serum calcium and phosphorus.
DVT and GI PPX
Resume sensipar 60 mg daily and home phosphorus binders.   Monitor serum calcium and phosphorus.
DVT and GI PPX
Resume sensipar 60 mg daily and home phosphorus binders.   Monitor serum calcium and phosphorus.
DVT and GI PPX
Resume sensipar 60 mg daily and home phosphorus binders.   Monitor serum calcium and phosphorus.
Resume sensipar 60 mg daily and home phosphorus binders.   Monitor serum calcium and phosphorus.
DVT and GI PPX
DVT and GI PPX
Resume sensipar 60 mg daily and home phosphorus binders.   Monitor serum calcium and phosphorus.
Resume sensipar 60 mg daily and home phosphorus binders.   Monitor serum calcium and phosphorus.

## 2023-06-26 NOTE — ADVANCED PRACTICE NURSE CONSULT - ASSESSMENT
Patient is alert. Midline insertion along with risks, benefits, possible complications and infection prevention explained to patient wife Kim (via telephone [phone], 6/26/23 @ 11:20am) who verbalized understanding. All questions addressed and patient wife gave verbal consent to place midline. Left arm cleansed with CHG. Using sterile technique under ultra sound guidance, placed PowerGlide Pro Midline 20G /10cm into Left Brachial vein. Brisk blood return and flushed with 20Mls of normal saline. Minimal blood loss and patient tolerated procedure well. CHG dressing placed. All sharps accounted for. Report given to district RN and ordering provider. LOT#: [id number] , REF#: [id number]

## 2023-06-26 NOTE — PROGRESS NOTE ADULT - PROBLEM SELECTOR PROBLEM 5
SIRS (systemic inflammatory response syndrome)
SIRS (systemic inflammatory response syndrome)
Atrial flutter
Atrial flutter
SIRS (systemic inflammatory response syndrome)
SIRS (systemic inflammatory response syndrome)
Atrial flutter
Bacteremia
SIRS (systemic inflammatory response syndrome)
SIRS (systemic inflammatory response syndrome)
Atrial flutter
Bacteremia
SIRS (systemic inflammatory response syndrome)
SIRS (systemic inflammatory response syndrome)
Atrial flutter
SIRS (systemic inflammatory response syndrome)
Atrial flutter
SIRS (systemic inflammatory response syndrome)
Atrial flutter
Atrial flutter
SIRS (systemic inflammatory response syndrome)
VATS (video-assisted thoracoscopic surgery)

## 2023-06-26 NOTE — PROGRESS NOTE ADULT - PROBLEM SELECTOR PROBLEM 6
Atrial flutter
Prostate cancer
Atrial flutter
Prostate cancer
Atrial flutter
Atrial flutter
Prostate cancer
Heart failure with reduced ejection fraction
Atrial flutter
Heart failure with reduced ejection fraction
Atrial flutter
Atrial flutter
Prostate cancer
Prostate cancer
Atrial flutter
Prostate cancer

## 2023-06-26 NOTE — PROGRESS NOTE ADULT - PROBLEM SELECTOR PROBLEM 1
Back pain
Anemia of chronic disease
Bacteremia
Bacteremia
Anemia of chronic disease
Bacteremia
Anemia of chronic disease
Back pain
Bacteremia
WDL
Bacteremia
Anemia of chronic disease
Bacteremia
Anemia of chronic disease
Bacteremia

## 2023-06-26 NOTE — PROGRESS NOTE ADULT - PROBLEM SELECTOR PROBLEM 2
Anemia of chronic disease
Anemia of chronic disease
Elevated troponin
Anemia of chronic disease
Elevated troponin
ESRD on hemodialysis
Elevated troponin
ESRD on hemodialysis
Elevated troponin
Anemia of chronic disease
Anemia of chronic disease
Elevated troponin
Anemia of chronic disease
Elevated troponin
Anemia of chronic disease
Anemia of chronic disease

## 2023-06-26 NOTE — PROGRESS NOTE ADULT - PROBLEM SELECTOR PLAN 3
- 2/2 duodenal ulcer, esophagitis   - s/p EGD
appreciate renal recs  interviewed/examined at HD  monitor I/Os, daily weights  renal diet
ekg w/ST elevations,  trops added onto labs  no chest pain  cont ASA   monitor on tele  echo
- 2/2 duodenal ulcer, esophagitis   - s/p EGD
appreciate renal recs  interviewed/examined at HD  monitor I/Os, daily weights  renal diet
appreciate renal recs  interviewed/examined at HD  monitor I/Os, daily weights  renal diet pending dysphagia screen
ekg w/ST elevations,  trops added onto labs  no chest pain  cont ASA   monitor on tele  echo
appreciate renal recs  interviewed/examined at HD  monitor I/Os, daily weights  renal diet
ekg w/ST elevations,  trops added onto labs  no chest pain  cont ASA   monitor on tele  echo
ekg w/ST elevations,  trops added onto labs  no chest pain  cont ASA   monitor on tele  echo noted
ekg w/ST elevations,  trops added onto labs  no chest pain  cont ASA   monitor on tele  echo
appreciate renal recs  interviewed/examined at HD  monitor I/Os, daily weights  renal diet
appreciate renal recs  interviewed/examined at HD  monitor I/Os, daily weights  renal diet
ekg w/ST elevations,  trops added onto labs  no chest pain  cont ASA   monitor on tele  echo
appreciate renal recs  interviewed/examined at HD  monitor I/Os, daily weights  renal diet
ekg w/ST elevations,  trops added onto labs  no chest pain  cont ASA   monitor on tele  echo
appreciate renal recs  interviewed/examined at HD  monitor I/Os, daily weights  renal diet
ekg w/ST elevations,  trops added onto labs  no chest pain  cont ASA   monitor on tele  echo

## 2023-06-26 NOTE — PROGRESS NOTE ADULT - PROBLEM SELECTOR PROBLEM 8
ACP (advance care planning)
Secondary hypertension due to renal disease
Secondary hypertension due to renal disease
Hypothyroid
Secondary hypertension due to renal disease
Hypothyroid
Hypothyroid
Secondary hypertension due to renal disease
ACP (advance care planning)
Hypothyroid
Secondary hypertension due to renal disease
Hypothyroid
Secondary hypertension due to renal disease
Hypothyroid
Secondary hypertension due to renal disease
Secondary hypertension due to renal disease

## 2023-06-26 NOTE — ADVANCED PRACTICE NURSE CONSULT - REASON FOR CONSULT
Midline placement for long term abx and IV fluids
Patient seen on skin care rounds after wound care referral received for assessment of skin impairment and recommendations of topical management. Patient H/O of DVT (previously on coumadin, now stopped), CAD, ESRD (on HD via RUE AVF Tu/Th/Sat), AOCD, HLD, HTN, obesity, pre-diabetes, and prostate cancer s/p suprapubic catheter presenting from [facility] w/need for HD. Patient seen by Nephrology for HD, infectious disease for GBS bacteremia, urology for tube exchange for [place] pouch- exchanged on 66/10, cardiology for elevated troponin and gastroenterology for anemia. Chart reviewed: WBC 5.64, H/H 10.3/32.9, platelets 265, US duplex LLE (-)DVT, BMI 23.2, Serum albumin 2.7, Kamar 11.

## 2023-06-26 NOTE — PROGRESS NOTE ADULT - PROBLEM SELECTOR PROBLEM 9
Hyperlipidemia
Hyperlipidemia
Secondary hypertension due to renal disease
Hyperlipidemia
Hyperlipidemia
Secondary hypertension due to renal disease
Hyperlipidemia
Secondary hypertension due to renal disease
Hyperlipidemia
Secondary hypertension due to renal disease
Hyperlipidemia
Palliative care encounter
Secondary hypertension due to renal disease
Secondary hypertension due to renal disease
Palliative care encounter
Secondary hypertension due to renal disease
Hyperlipidemia

## 2023-06-26 NOTE — CHART NOTE - NSCHARTNOTEFT_GEN_A_CORE
As discussed with Dr. Ma:    Palliative recalled to address goals of care with family as patient no tolerating HD with fluid removal despite midodrine support as documented by Nephro.  IR consulted for midline as floor staff have been unsuccessful on multiple attempts.     Kayla Ocasio PA-C  Department of Internal Medicine  pager [phone], available on [employer] TEAMS

## 2023-06-26 NOTE — PROGRESS NOTE ADULT - ASSESSMENT
77  year old male with hx of cad , ? PCI, HTN, esrd, HTN, DM, afib presents for HD.    #CAD  -cv stable no cp   -no chf on exam   -no obvious angina, decomp HF  -ECHO w moderate segm LV dysfunction along LAD distribution suggestive of prior infarct  -in light of age, fxnl status, anemia req w/u, and mental status not a candidate for further ischemic eval/cath  -would continue medical tx of cmp  -cont POlopressor 25 mg BID   -cont mido to support BP   -cont current meds, on asa     #anemia  -+ occult. work up per GI/med  -s/p egd with treated ulcer  -gi f/u, ppi    #Afib   -not on ac  -cont to hold in light of egd findings, anemia req prbcs, fall risk   -cont asa  -c/w BB for rate control     # ESRD on HD   -renal fu     #htn   -cont mido as needed    dvt ppx    pall eval noted    35 minutes spent on total encounter; more than 50% of the visit was spent counseling and/or coordinating care by the attending physician.

## 2023-06-26 NOTE — PROGRESS NOTE ADULT - PROBLEM SELECTOR PROBLEM 3
Elevated troponin
ESRD on hemodialysis
Elevated troponin
Elevated troponin
ESRD on hemodialysis
Elevated troponin
Gastrointestinal bleed
ESRD on hemodialysis
ESRD on hemodialysis
Gastrointestinal bleed
Elevated troponin
Elevated troponin
ESRD on hemodialysis
Elevated troponin
ESRD on hemodialysis

## 2023-06-26 NOTE — PROGRESS NOTE ADULT - PROBLEM SELECTOR PROBLEM 10
Hyperlipidemia
Dementia
Hyperlipidemia
Dementia
Hyperlipidemia
Dementia
Dementia
Hyperlipidemia
Dementia
Hyperlipidemia
Dementia
Hyperlipidemia
Dementia
Dementia
Hyperlipidemia
Hyperlipidemia

## 2023-06-26 NOTE — PROGRESS NOTE ADULT - PROBLEM SELECTOR PROBLEM 7
Hypothyroid
Prostate cancer
Hypothyroid
Prostate cancer
Prostate cancer
Hypothyroid
Prostate cancer
Debility
Prostate cancer
Hypothyroid
Hypothyroid
Prostate cancer
Hypothyroid
Prostate cancer
Debility
Prostate cancer
Prostate cancer
Hypothyroid
Prostate cancer
Hypothyroid
Prostate cancer

## 2023-06-26 NOTE — PROGRESS NOTE ADULT - PROBLEM SELECTOR PLAN 2
with noted (+)FOBT, possible GIB component   Epo per renal recs  hold antihypertensives for now pending GI w/u  PPI  trend H/H   maintain active T&S  GI eval appreciated, S/P EGD, ? bleeding ulcer  PPI drip, d/c on oral PPI after 72 hrs  1 unit PRBC done 06/10, repeat CBC noted, stable
ekg w/ST elevations,  trops added onto labs  no chest pain  add on ck/ckmb; repeat CE  cont ASA   monitor on tele  echo
ekg w/ST elevations,  trops added onto labs  no chest pain  cont ASA   monitor on tele  echo
ekg w/ST elevations,  trops added onto labs  no chest pain  cont ASA   monitor on tele  echo
with noted (+)FOBT, possible GIB component   Epo per renal recs  hold antihypertensives for now pending GI w/u  PPI  trend H/H   maintain active T&S  GI eval appreciated, S/P EGD, ? bleeding ulcer  PPI drip, d/c on oral PPI after 72 hrs  1 unit PRBC done 06/10, repeat CBC noted, stable  no oral intake, worsening mental stat  NGT placed, feeding resume, monitor for mental stat improvement   IR/GI for possible PEG   - IV and oral hydration, monitor hypoglycemia
with noted (+)FOBT, possible GIB component   Epo per renal recs  hold antihypertensives for now pending GI w/u  PPI  trend H/H   maintain active T&S  GI eval appreciated, S/P EGD, ? bleeding ulcer  PPI drip, d/c on oral PPI after 72 hrs  1 unit PRBC done 06/10, repeat CBC noted, stable  no oral intake, worsening mental stat  plan for NGT   IR/GI for possible PEG   - IV and oral hydration, monitor hypoglycemia
ekg w/ST elevations,  trops added onto labs  no chest pain  add on ck/ckmb; repeat CE  cont ASA   monitor on tele  echo
ekg w/ST elevations,  trops added onto labs  no chest pain  cont ASA   monitor on tele  echo
with noted (+)FOBT, possible GIB component   Epo per renal recs  hold antihypertensives for now pending GI w/u  PPI  trend H/H   maintain active T&S  GI eval appreciated, S/P EGD, ? bleeding ulcer  PPI drip, d/c on oral PPI after 72 hrs  1 unit PRBC done 06/10, repeat CBC noted, stable  no oral intake, worsening mental stat  NGT placed, feeding resume, monitor for mental stat improvement   IR/GI for possible PEG   - IV and oral hydration, monitor hypoglycemia  - worsening mental stat, palliative follow up, discussed with wife.
- presented for HD as his [facility] does not have [facility] to do HD, looking for new center  - low BP on midodrine  - per wife, has been on HD >15 years
with noted (+)FOBT, possible GIB component   Epo per renal recs  hold antihypertensives for now pending GI w/u  PPI  trend H/H   maintain active T&S  GI eval appreciated, S/P EGD, ? bleeding ulcer  PPI drip, d/c on oral PPI after 72 hrs  1 unit PRBC done 06/10, repeat CBC noted, stable
with noted (+)FOBT, possible GIB component   Epo per renal recs  hold antihypertensives for now pending GI w/u  PPI  trend H/H   maintain active T&S  GI eval appreciated, S/P EGD, ? bleeding ulcer  PPI drip, d/c on oral PPI after 72 hrs  1 unit PRBC done 06/10, repeat CBC noted, stable  no oral intake, worsening mental stat  NGT placed, feeding resume, monitor for mental stat improvement   IR/GI for possible PEG   - IV and oral hydration, monitor hypoglycemia  - worsening mental stat, palliative follow up, discussed with wife.
with noted (+)FOBT, possible GIB component   Epo per renal recs  hold antihypertensives for now pending GI w/u  PPI  trend H/H   maintain active T&S  GI eval appreciated, S/P EGD, ? bleeding ulcer  PPI drip, d/c on oral PPI after 72 hrs  1 unit PRBC done 06/10, repeat CBC noted, stable
with noted (+)FOBT, possible GIB component   Epo per renal recs  hold antihypertensives for now pending GI w/u  PPI  trend H/H   maintain active T&S  GI eval appreciated, S/P EGD, ? bleeding ulcer  PPI drip, d/c on oral PPI after 72 hrs  1 unit PRBC done 06/10, repeat CBC noted, stable
ekg w/ST elevations,  trops added onto labs  no chest pain  cont ASA   monitor on tele  echo
with noted (+)FOBT, possible GIB component   Epo per renal recs  hold antihypertensives for now pending GI w/u  PPI  trend H/H   maintain active T&S  GI eval appreciated, S/P EGD, ? bleeding ulcer  PPI drip, d/c on oral PPI after 72 hrs  1 unit PRBC done 06/10, repeat CBC noted, stable
- presented for HD as his [facility] does not have [facility] to do HD, looking for new center  - low BP on midodrine  - per wife, has been on HD >15 years
with noted (+)FOBT, possible GIB component   Epo per renal recs  hold antihypertensives for now pending GI w/u  PPI  trend H/H   maintain active T&S  GI eval appreciated, S/P EGD, ? bleeding ulcer  PPI drip, d/c on oral PPI after 72 hrs  1 unit PRBC done 06/10, repeat CBC noted, stable
with noted (+)FOBT, possible GIB component   Epo per renal recs  hold antihypertensives for now pending GI w/u  PPI  trend H/H   maintain active T&S  GI eval appreciated, S/P EGD, ? bleeding ulcer  PPI drip, d/c on oral PPI after 72 hrs  1 unit PRBC done 06/10, repeat CBC noted, stable  no oral intake, worsening mental stat  NGT placed, feeding resume, monitor for mental stat improvement   IR/GI for possible PEG   - IV and oral hydration, monitor hypoglycemia  - worsening mental stat, palliative follow up, discussed with wife.
ekg w/ST elevations,  trops added onto labs  no chest pain  cont ASA   monitor on tele  echo
with noted (+)FOBT, possible GIB component   Epo per renal recs  hold antihypertensives for now pending GI w/u  PPI  trend H/H   maintain active T&S  GI eval appreciated, S/P EGD, ? bleeding ulcer  PPI drip, d/c on oral PPI after 72 hrs  1 unit PRBC done 06/10, repeat CBC noted, stable  no oral intake, worsening mental stat  NGT placed, feeding resume, monitor for mental stat improvement   IR/GI for possible PEG   - IV and oral hydration, monitor hypoglycemia
with noted (+)FOBT, possible GIB component   Epo per renal recs  hold antihypertensives for now pending GI w/u  PPI  trend H/H   maintain active T&S  GI eval appreciated, S/P EGD, ? bleeding ulcer  PPI drip, d/c on oral PPI after 72 hrs  1 unit PRBC done 06/10, repeat CBC
ekg w/ST elevations,  trops added onto labs  no chest pain  cont ASA   monitor on tele  echo
with noted (+)FOBT, possible GIB component   Epo per renal recs  hold antihypertensives for now pending GI w/u  PPI  trend H/H   maintain active T&S  GI eval appreciated, S/P EGD, ? bleeding ulcer  PPI drip, d/c on oral PPI after 72 hrs  1 unit PRBC done 06/10, repeat CBC noted, stable
with noted (+)FOBT, possible GIB component   Epo per renal recs  hold antihypertensives for now pending GI w/u  PPI  trend H/H   maintain active T&S  GI eval appreciated, S/P EGD, ? bleeding ulcer  PPI drip, d/c on oral PPI after 72 hrs  1 unit PRBC done 06/10, repeat CBC noted, stable  no oral intake, worsening mental stat  NGT placed, feeding resume, monitor for mental stat improvement   IR/GI for possible PEG   - IV and oral hydration, monitor hypoglycemia  - worsening mental stat, palliative follow up, discussed with wife.

## 2023-06-26 NOTE — PROGRESS NOTE ADULT - PROBLEM SELECTOR PLAN 9
BP meds as tolerated  card follow up
verify home meds, restart  given bitemporal wasting on exam and facility report of protein-calorie malnutrition
Following for CMDM     Patient is seriously ill with   High risk of complications, further morbidity and mortality.
BP meds as tolerated  card follow up
BP meds as tolerated  card follow up
verify home meds, restart  given bitemporal wasting on exam and facility report of protein-calorie malnutrition
BP meds as tolerated  card follow up
verify home meds, restart  given bitemporal wasting on exam and facility report of protein-calorie malnutrition
BP meds as tolerated  card follow up
Following for CMDM     Patient is seriously ill with   High risk of complications, further morbidity and mortality.    Palliative signing off
verify home meds, restart  given bitemporal wasting on exam and facility report of protein-calorie malnutrition
verify home meds, restart  given bitemporal wasting on exam and facility report of protein-calorie malnutrition
BP meds as tolerated  card follow up
verify home meds, restart  given bitemporal wasting on exam and facility report of protein-calorie malnutrition
verify home meds, restart  given bitemporal wasting on exam and facility report of protein-calorie malnutrition
BP meds as tolerated  card follow up
verify home meds, restart  given bitemporal wasting on exam and facility report of protein-calorie malnutrition
BP meds as tolerated  card follow up

## 2023-06-26 NOTE — PROGRESS NOTE ADULT - PROBLEM SELECTOR PLAN 7
TFTs
s/p suprapubic cath
TFTs
TFTs
s/p suprapubic cath
TFTs
s/p suprapubic cath
- poor functional status PPS 30%  - HFrEF, ESRD, bacteremia, anemia   - high risk of skin breakdown, f/u wound care recs
TFTs
s/p suprapubic cath
- poor functional status PPS 30%  - HFrEF, ESRD, bacteremia, anemia   - high risk of skin breakdown, f/u wound care recs
s/p suprapubic cath
TFTs
s/p suprapubic cath

## 2023-06-26 NOTE — PROGRESS NOTE ADULT - SUBJECTIVE AND OBJECTIVE BOX
[facility] NEPHROLOGY- PROGRESS NOTE    77y Male with history of ESRD on HD presents for need for dialysis. Nephrology consulted for ESRD status.    REVIEW OF SYSTEMS: limited due to mental status.    penicillin (Rash)      Hospital Medications: Medications reviewed      VITALS:  T(F): 98.7 (06-26-23 @ 10:36), Max: 98.7 (06-26-23 @ 10:36)  HR: 89 (06-26-23 @ 10:36)  BP: 111/54 (06-26-23 @ 10:36)  RR: 17 (06-26-23 @ 10:36)  SpO2: 99% (06-26-23 @ 10:36)  Wt(kg): --    06-25 @ 07:01  -  06-26 @ 07:00  --------------------------------------------------------  IN: 0 mL / OUT: 55 mL / NET: -55 mL        PHYSICAL EXAM:    Gen: NAD, confused, + NGT  Cards: RRR, +S1/S2, no M/G/R  Resp: CTA B/L  GI: soft, NT/ND, NABS  : + SPT  Vascular: no LE edema B/L, RUE AVG + bruit/thrill, L shoulder/arm edematous      LABS:  06-26    139  |  103  |  47<H>  ----------------------------<  82  5.0   |  27  |  6.28<H>    Ca    10.4      26 Jun 2023 06:10  Phos  4.8     06-26  Mg     2.50     06-26      Creatinine Trend: 6.28 <--, 5.02 <--, 3.57 <--, 4.86 <--, 8.57 <--, 5.79 <--                        7.2    11.05 )-----------( 297      ( 26 Jun 2023 06:10 )             24.6     Urine Studies:  Urinalysis Basic - ( 26 Jun 2023 06:10 )    Color:  / Appearance:  / SG:  / pH:   Gluc: 82 mg/dL / Ketone:   / Bili:  / Urobili:    Blood:  / Protein:  / Nitrite:    Leuk Esterase:  / RBC:  / WBC    Sq Epi:  / Non Sq Epi:  / Bacteria:

## 2023-06-26 NOTE — PROGRESS NOTE ADULT - SUBJECTIVE AND OBJECTIVE BOX
Name of Patient : MAGGIE THAO  MRN: [id number]  Date of visit: 06-26-23 @ 12:19      Subjective: Patient seen and examined. No new events except as noted.   frail  feeding resumed      REVIEW OF SYSTEMS:  limited       MEDICATIONS:  MEDICATIONS  (STANDING):  aspirin  chewable 81 milliGRAM(s) Oral daily  ceFAZolin   IVPB 2000 milliGRAM(s) IV Intermittent <User Schedule>  ceFAZolin   IVPB 3000 milliGRAM(s) IV Intermittent <User Schedule>  chlorhexidine 2% Cloths 1 Application(s) Topical daily  dextrose 5%. 1000 milliLiter(s) (100 mL/Hr) IV Continuous <Continuous>  dextrose 5%. 1000 milliLiter(s) (50 mL/Hr) IV Continuous <Continuous>  dextrose 50% Injectable 12.5 Gram(s) IV Push once  dextrose 50% Injectable 25 Gram(s) IV Push once  dextrose 50% Injectable 25 Gram(s) IV Push once  donepezil 5 milliGRAM(s) Oral at bedtime  epoetin shell-epbx (RETACRIT) Injectable 53316 Unit(s) IV Push <User Schedule>  heparin   Injectable 5000 Unit(s) SubCutaneous every 8 hours  metoprolol tartrate 25 milliGRAM(s) Oral two times a day  midodrine. 10 milliGRAM(s) Oral <User Schedule>  pantoprazole   Suspension 40 milliGRAM(s) Oral daily      PHYSICAL EXAM:  T(C): 37.1 (06-26-23 @ 10:36), Max: 37.1 (06-26-23 @ 10:36)  HR: 89 (06-26-23 @ 10:36) (89 - 91)  BP: 111/54 (06-26-23 @ 10:36) (108/53 - 137/50)  RR: 17 (06-26-23 @ 10:36) (17 - 17)  SpO2: 99% (06-26-23 @ 10:36) (98% - 99%)  Wt(kg): --  I&O's Summary    25 Jun 2023 07:01  -  26 Jun 2023 07:00  --------------------------------------------------------  IN: 0 mL / OUT: 55 mL / NET: -55 mL          Appearance: arousable   HEENT:  NGT   Lymphatic: No lymphadenopathy   Cardiovascular: Normal S1 S2, no JVD  Respiratory: normal effort , clear  Gastrointestinal:  Soft, Non-tender  Skin: No rashes,  warm to touch  Psychiatry:  Mood & affect appropriate  Musculuskeletal: No edema    recent labs, Imaging and EKGs personally reviewed     06-25-23 @ 07:01  -  06-26-23 @ 07:00  --------------------------------------------------------  IN: 0 mL / OUT: 55 mL / NET: -55 mL                          7.2    11.05 )-----------( 297      ( 26 Jun 2023 06:10 )             24.6               06-26    139  |  103  |  47<H>  ----------------------------<  82  5.0   |  27  |  6.28<H>    Ca    10.4      26 Jun 2023 06:10  Phos  4.8     06-26  Mg     2.50     06-26                         Urinalysis Basic - ( 26 Jun 2023 06:10 )    Color: x / Appearance: x / SG: x / pH: x  Gluc: 82 mg/dL / Ketone: x  / Bili: x / Urobili: x   Blood: x / Protein: x / Nitrite: x   Leuk Esterase: x / RBC: x / WBC x   Sq Epi: x / Non Sq Epi: x / Bacteria: x

## 2023-06-26 NOTE — PROGRESS NOTE ADULT - PROBLEM SELECTOR PLAN 6
appreciate cards recs  ecg w/aflutter, ?repol abnormalities, ST abnormality V3-V5, check trop  no chest pain  monitor on tele  TTE
- severe LV dysfunction, hypokinesis, worse from prior
appreciate cards recs  ecg w/aflutter, ?repol abnormalities, ST abnormality V3-V5, check trop  no chest pain  monitor on tele  TTE
s/p suprapubic cath  check UA given WBC/SIRS
appreciate cards recs  ecg w/aflutter, ?repol abnormalities, ST abnormality V3-V5, check trop  no chest pain  monitor on tele  TTE
- severe LV dysfunction, hypokinesis, worse from prior
appreciate cards recs  ecg w/aflutter, ?repol abnormalities, ST abnormality V3-V5, check trop  no chest pain  monitor on tele  TTE
s/p suprapubic cath
appreciate cards recs  ecg w/aflutter, ?repol abnormalities, ST abnormality V3-V5, check trop  no chest pain  monitor on tele  TTE
s/p suprapubic cath
s/p suprapubic cath  check UA given WBC/SIRS
appreciate cards recs  ecg w/aflutter, ?repol abnormalities, ST abnormality V3-V5, check trop  no chest pain  monitor on tele  TTE
s/p suprapubic cath
appreciate cards recs  ecg w/aflutter, ?repol abnormalities, ST abnormality V3-V5, check trop  no chest pain  monitor on tele  TTE
s/p suprapubic cath
appreciate cards recs  ecg w/aflutter, ?repol abnormalities, ST abnormality V3-V5, check trop  no chest pain  monitor on tele  TTE
s/p suprapubic cath  check UA given WBC/SIRS
appreciate cards recs  ecg w/aflutter, ?repol abnormalities, ST abnormality V3-V5, check trop  no chest pain  monitor on tele  TTE
appreciate cards recs  ecg w/aflutter, ?repol abnormalities, ST abnormality V3-V5, check trop  no chest pain  monitor on tele  TTE
s/p suprapubic cath  check UA given WBC/SIRS

## 2023-06-26 NOTE — PROGRESS NOTE ADULT - ASSESSMENT
77y Male with history of ESRD on HD presents for need for dialysis. Nephrology consulted for ESRD status.    1) ESRD: Last HD on 6/23 with intradialytic hypotension and patient net positive 0.2L. Plan for next maintenance HD today after MRI with janie to decrease risk of NSF. Patient not tolerating fluid removal with HD despite midodrine. Palliative reconsulted. Monitor electrolytes.    2) HTN with ESRD: BP low normal. Continue with midodrine 10 mg PO pre-HD to avoid intradialytic hypotension. Monitor BP.    3) Anemia of renal disease: Hb low and patient occult positive. No IV iron given elevated ferritin. Continue with Epo 14K with HD. For PRBC with HD today. Monitor Hb.    4) Secondary HPT of renal origin: Serum phos improving. Holding sensipar 30 mg daily given low normal iPTH and will use low calcium bath with HD. Monitor serum calcium and phosphorus.    Poor prognosis.     [facility] NEPHROLOGY  Armando Shaffer M.D.  Kendall Morales D.O.  Lilliam Cody M.D.  Oxana Hernandez, MSN, ANP-C  [phone]  Fax: [phone] [place] [address], [address]  [place] [address]

## 2023-06-26 NOTE — PROGRESS NOTE ADULT - PROBLEM SELECTOR PROBLEM 12
Need for prophylactic measure
Hyperparathyroidism due to ESRD on dialysis
Need for prophylactic measure
Hyperparathyroidism due to ESRD on dialysis
Need for prophylactic measure
Hyperparathyroidism due to ESRD on dialysis
Need for prophylactic measure
Need for prophylactic measure
Hyperparathyroidism due to ESRD on dialysis
Hyperparathyroidism due to ESRD on dialysis
Need for prophylactic measure
Hyperparathyroidism due to ESRD on dialysis
Need for prophylactic measure
Need for prophylactic measure
Hyperparathyroidism due to ESRD on dialysis

## 2023-06-26 NOTE — PROGRESS NOTE ADULT - PROBLEM SELECTOR PROBLEM 11
Dementia
Hyperparathyroidism due to ESRD on dialysis
Dementia
Hyperparathyroidism due to ESRD on dialysis
Dementia
Dementia
Hyperparathyroidism due to ESRD on dialysis
Hyperparathyroidism due to ESRD on dialysis
Dementia
Hyperparathyroidism due to ESRD on dialysis
Hyperparathyroidism due to ESRD on dialysis
Dementia
Dementia
Hyperparathyroidism due to ESRD on dialysis
Dementia
Hyperparathyroidism due to ESRD on dialysis
Dementia
Dementia

## 2023-06-27 NOTE — PROGRESS NOTE ADULT - ASSESSMENT
A/P     Problem/Plan - 1:  ·  Problem: Bacteremia.   repeat Blood c/s neg   ID following   unable to get any PIV , so plan is to give Abx Cefazolin during HD days as per ID recommendation till 7/11/23     # Anemia of chronic disease./ PUD  - with noted (+)FOBT, possible GIB component   -NGT feeding   feeding held today 2/2 coughing   IR/GI for possible PEG   - IV and oral hydration, monitor hypoglycemia    #CAD/ Elevated troponin./ Atrial flutter   -cardio following   conservative rx 2/2 multiple comorbid condition   not candidate for AC     #ESRD on hemodialysis.   -appreciate renal recs  interviewed/examined at HD  monitor I/Os, daily weights    # Prostate cancer.   - s/p suprapubic cath.    # Hypothyroid.   c/w synthroid     # Dementia.   supportive care     # Hyperparathyroidism due to ESRD on dialysis.   ·  Plan: Resume sensipar 60 mg daily and home phosphorus binders.   Monitor serum calcium and phosphorus.

## 2023-06-27 NOTE — PROGRESS NOTE ADULT - ASSESSMENT
77  year old male with hx of cad , ? PCI, HTN, esrd, HTN, DM, afib presents for HD.    #CAD  -cv stable no cp   -no chf on exam   -no obvious angina, decomp HF  -ECHO w moderate segm LV dysfunction along LAD distribution suggestive of prior infarct  -in light of age, fxnl status, anemia req w/u, and mental status not a candidate for further ischemic eval/cath  -would continue medical tx of cmp  -cont PO lopressor 25 mg BID   -cont mido to support BP   -cont current meds, on asa     #anemia  -+ occult. work up per GI/med  -s/p egd with treated ulcer  -gi f/u, ppi    #Afib   -not on ac  -cont to hold in light of egd findings, anemia req prbcs, fall risk   -cont asa  -c/w BB for rate control     # ESRD on HD   -renal fu     #htn   -cont mido as needed    dvt ppx    pall eval noted

## 2023-06-27 NOTE — PROGRESS NOTE ADULT - SUBJECTIVE AND OBJECTIVE BOX
Patient is a 77y old  Male who presents with a chief complaint of needs HD (27 Jun 2023 12:51)      INTERVAL HPI/OVERNIGHT EVENTS:  T(C): 36.7 (06-27-23 @ 20:30), Max: 37.1 (06-27-23 @ 10:57)  HR: 86 (06-27-23 @ 20:30) (72 - 90)  BP: 110/57 (06-27-23 @ 20:30) (98/55 - 116/46)  RR: 17 (06-27-23 @ 20:30) (17 - 18)  SpO2: 100% (06-27-23 @ 20:30) (99% - 100%)  Wt(kg): --  I&O's Summary    26 Jun 2023 07:01  -  27 Jun 2023 07:00  --------------------------------------------------------  IN: 700 mL / OUT: 2240 mL / NET: -1540 mL    27 Jun 2023 07:01  -  27 Jun 2023 23:53  --------------------------------------------------------  IN: 0 mL / OUT: 641 mL / NET: -641 mL        PAST MEDICAL & SURGICAL HISTORY:  HTN (hypertension)      Prostate cancer  prostate cancer      Obesity      PVD (peripheral vascular disease)      CAD (coronary artery disease)  LAD stent      Pre-diabetes      ESRD (end stage renal disease) on dialysis      Essential hypertension      History of DVT of lower extremity      History of prostatectomy      AV fistula  h/o creation in 2005      H/O cardiac catheterization  4/25/2014 non obstructive disease      H/O abdominal surgery  10/10/17          SOCIAL HISTORY  Alcohol:  Tobacco:  Illicit substance use:    FAMILY HISTORY:    REVIEW OF SYSTEMS:  CONSTITUTIONAL: No fever, weight loss, or fatigue  EYES: No eye pain, visual disturbances, or discharge  ENMT:  No difficulty hearing, tinnitus, vertigo; No sinus or throat pain  NECK: No pain or stiffness  RESPIRATORY: No cough, wheezing, chills or hemoptysis; No shortness of breath  CARDIOVASCULAR: No chest pain, palpitations, dizziness, or leg swelling  GASTROINTESTINAL: No abdominal or epigastric pain. No nausea, vomiting, or hematemesis; No diarrhea or constipation. No melena or hematochezia.  GENITOURINARY: No dysuria, frequency, hematuria, or incontinence  NEUROLOGICAL: No headaches, memory loss, loss of strength, numbness, or tremors  SKIN: No itching, burning, rashes, or lesions   LYMPH NODES: No enlarged glands  ENDOCRINE: No heat or cold intolerance; No hair loss  MUSCULOSKELETAL: No joint pain or swelling; No muscle, back, or extremity pain  PSYCHIATRIC: No depression, anxiety, mood swings, or difficulty sleeping  HEME/LYMPH: No easy bruising, or bleeding gums  ALLERY AND IMMUNOLOGIC: No hives or eczema    RADIOLOGY & ADDITIONAL TESTS:    Imaging Personally Reviewed:  [ ] YES  [ ] NO    Consultant(s) Notes Reviewed:  [ ] YES  [ ] NO    PHYSICAL EXAM:  GENERAL: NAD, well-groomed, well-developed  HEAD:  Atraumatic, Normocephalic  EYES: EOMI, PERRLA, conjunctiva and sclera clear  ENMT: No tonsillar erythema, exudates, or enlargement; Moist mucous membranes, Good dentition, No lesions  NECK: Supple, No JVD, Normal thyroid  NERVOUS SYSTEM:  Alert & Oriented X3, Good concentration; Motor Strength 5/5 B/L upper and lower extremities; DTRs 2+ intact and symmetric  CHEST/LUNG: Clear to percussion bilaterally; No rales, rhonchi, wheezing, or rubs  HEART: Regular rate and rhythm; No murmurs, rubs, or gallops  ABDOMEN: Soft, Nontender, Nondistended; Bowel sounds present  EXTREMITIES:  2+ Peripheral Pulses, No clubbing, cyanosis, or edema  LYMPH: No lymphadenopathy noted  SKIN: No rashes or lesions    LABS:                        8.2    10.02 )-----------( 275      ( 27 Jun 2023 05:45 )             26.8     06-27    136  |  97<L>  |  31<H>  ----------------------------<  90  4.3   |  25  |  3.81<H>    Ca    9.5      27 Jun 2023 05:45  Phos  3.2     06-27  Mg     2.20 06-27        Urinalysis Basic - ( 27 Jun 2023 05:45 )    Color: x / Appearance: x / SG: x / pH: x  Gluc: 90 mg/dL / Ketone: x  / Bili: x / Urobili: x   Blood: x / Protein: x / Nitrite: x   Leuk Esterase: x / RBC: x / WBC x   Sq Epi: x / Non Sq Epi: x / Bacteria: x      CAPILLARY BLOOD GLUCOSE      POCT Blood Glucose.: 119 mg/dL (27 Jun 2023 23:47)  POCT Blood Glucose.: 122 mg/dL (27 Jun 2023 17:46)  POCT Blood Glucose.: 108 mg/dL (27 Jun 2023 12:22)  POCT Blood Glucose.: 113 mg/dL (27 Jun 2023 05:39)        Urinalysis Basic - ( 27 Jun 2023 05:45 )    Color: x / Appearance: x / SG: x / pH: x  Gluc: 90 mg/dL / Ketone: x  / Bili: x / Urobili: x   Blood: x / Protein: x / Nitrite: x   Leuk Esterase: x / RBC: x / WBC x   Sq Epi: x / Non Sq Epi: x / Bacteria: x        MEDICATIONS  (STANDING):  aspirin  chewable 81 milliGRAM(s) Oral daily  ceFAZolin   IVPB 2000 milliGRAM(s) IV Intermittent <User Schedule>  ceFAZolin   IVPB 3000 milliGRAM(s) IV Intermittent <User Schedule>  chlorhexidine 2% Cloths 1 Application(s) Topical daily  dextrose 5%. 1000 milliLiter(s) (50 mL/Hr) IV Continuous <Continuous>  dextrose 5%. 1000 milliLiter(s) (100 mL/Hr) IV Continuous <Continuous>  dextrose 50% Injectable 12.5 Gram(s) IV Push once  dextrose 50% Injectable 25 Gram(s) IV Push once  dextrose 50% Injectable 25 Gram(s) IV Push once  donepezil 5 milliGRAM(s) Oral at bedtime  epoetin shell-epbx (RETACRIT) Injectable 47310 Unit(s) IV Push <User Schedule>  heparin   Injectable 5000 Unit(s) SubCutaneous every 8 hours  metoprolol tartrate 25 milliGRAM(s) Oral two times a day  midodrine. 10 milliGRAM(s) Oral <User Schedule>  pantoprazole   Suspension 40 milliGRAM(s) Oral daily    MEDICATIONS  (PRN):  acetaminophen     Tablet .. 650 milliGRAM(s) Oral every 6 hours PRN Mild Pain (1 - 3), Moderate Pain (4 - 6)  dextrose Oral Gel 15 Gram(s) Oral once PRN Blood Glucose LESS THAN 70 milliGRAM(s)/deciliter  guaiFENesin Oral Liquid (Sugar-Free) 100 milliGRAM(s) Oral every 6 hours PRN Cough  lidocaine   4% Patch 1 Patch Transdermal daily PRN left shoulder pain  lidocaine   4% Patch 1 Patch Transdermal daily PRN right knee pain  oxyCODONE    IR 5 milliGRAM(s) Oral every 6 hours PRN Severe Pain (7 - 10)  sodium chloride 0.9% Bolus. 100 milliLiter(s) IV Bolus every 5 minutes PRN SBP LESS THAN or EQUAL to 90 mmHg      Care Discussed with Consultants/Other Providers [ ] YES  [ ] NO

## 2023-06-27 NOTE — PROGRESS NOTE ADULT - SUBJECTIVE AND OBJECTIVE BOX
CARDIOLOGY FOLLOW UP - Dr. Clements  DATE OF SERVICE: 6/27/23    CC no acute cv events       REVIEW OF SYSTEMS:  obed given mental status      PHYSICAL EXAM:  T(C): 36.7 (06-27-23 @ 05:19), Max: 37.1 (06-26-23 @ 10:36)  HR: 90 (06-27-23 @ 05:19) (69 - 90)  BP: 109/52 (06-27-23 @ 05:19) (101/50 - 133/57)  RR: 18 (06-27-23 @ 05:19) (17 - 18)  SpO2: 100% (06-27-23 @ 05:19) (99% - 100%)  Wt(kg): --  I&O's Summary    26 Jun 2023 07:01  -  27 Jun 2023 07:00  --------------------------------------------------------  IN: 700 mL / OUT: 2240 mL / NET: -1540 mL        Appearance:  NAD   Cardiovascular: Normal S1 S2,RR   Respiratory: Lungs clear to auscultation	  Gastrointestinal:  Soft, Non-tender, + BS	  Extremities: Normal range of motion, No clubbing, cyanosis or edema      Home Medications:  bisacodyl 5 mg oral tablet: 2 tab(s) orally once a day as needed for  constipation (04 Jun 2023 00:48)  cinacalcet 60 mg oral tablet: 1 tab(s) orally once a day (04 Jun 2023 00:48)  donepezil 5 mg oral tablet: 1 tab(s) orally once a day (at bedtime) (04 Jun 2023 00:48)  ergocalciferol 1.25 mg (50,000 intl units) oral capsule: 1 cap(s) orally once a month (04 Jun 2023 00:48)  meloxicam 15 mg oral tablet: 1 tab(s) orally once a day as needed for  pain (04 Jun 2023 00:48)  NIFEdipine (Eqv-Adalat CC) 30 mg oral tablet, extended release: 2 tab(s) orally once a day (04 Jun 2023 00:48)  sevelamer hydrochloride 800 mg oral tablet: 1 tab(s) orally every 6 hours (04 Jun 2023 00:49)      MEDICATIONS  (STANDING):  aspirin  chewable 81 milliGRAM(s) Oral daily  ceFAZolin   IVPB 2000 milliGRAM(s) IV Intermittent <User Schedule>  ceFAZolin   IVPB 3000 milliGRAM(s) IV Intermittent <User Schedule>  chlorhexidine 2% Cloths 1 Application(s) Topical daily  dextrose 5%. 1000 milliLiter(s) (50 mL/Hr) IV Continuous <Continuous>  dextrose 5%. 1000 milliLiter(s) (100 mL/Hr) IV Continuous <Continuous>  dextrose 50% Injectable 25 Gram(s) IV Push once  dextrose 50% Injectable 25 Gram(s) IV Push once  dextrose 50% Injectable 12.5 Gram(s) IV Push once  donepezil 5 milliGRAM(s) Oral at bedtime  epoetin shell-epbx (RETACRIT) Injectable 71468 Unit(s) IV Push <User Schedule>  heparin   Injectable 5000 Unit(s) SubCutaneous every 8 hours  metoprolol tartrate 25 milliGRAM(s) Oral two times a day  midodrine. 10 milliGRAM(s) Oral <User Schedule>  pantoprazole   Suspension 40 milliGRAM(s) Oral daily      TELEMETRY: afl hr 80	    ECG:  	  RADIOLOGY:   DIAGNOSTIC TESTING:  [ ] Echocardiogram:  [ ]  Catheterization:  [ ] Stress Test:    OTHER: 	    LABS:	 	                            8.2    10.02 )-----------( 275      ( 27 Jun 2023 05:45 )             26.8     06-27    136  |  97<L>  |  31<H>  ----------------------------<  90  4.3   |  25  |  3.81<H>    Ca    9.5      27 Jun 2023 05:45  Phos  3.2     06-27  Mg     2.20     06-27

## 2023-06-27 NOTE — PROGRESS NOTE ADULT - SUBJECTIVE AND OBJECTIVE BOX
[facility] NEPHROLOGY- PROGRESS NOTE    77y Male with history of ESRD on HD presents for need for dialysis. Nephrology consulted for ESRD status.    REVIEW OF SYSTEMS: limited due to mental status.    penicillin (Rash)      Hospital Medications: Medications reviewed      VITALS:  T(F): 98.8 (06-27-23 @ 10:57), Max: 98.8 (06-27-23 @ 10:57)  HR: 87 (06-27-23 @ 10:57)  BP: 116/46 (06-27-23 @ 10:57)  RR: 18 (06-27-23 @ 10:57)  SpO2: 99% (06-27-23 @ 10:57)  Wt(kg): --    06-26 @ 07:01  -  06-27 @ 07:00  --------------------------------------------------------  IN: 700 mL / OUT: 2240 mL / NET: -1540 mL      PHYSICAL EXAM:    Gen: NAD, confused, + NGT  Cards: RRR, +S1/S2, no M/G/R  Resp: CTA B/L  GI: soft, NT/ND, NABS  : + SPT  Vascular: no LE edema B/L, RUE AVG + bruit/thrill, L shoulder/arm edematous      LABS:  06-27    136  |  97<L>  |  31<H>  ----------------------------<  90  4.3   |  25  |  3.81<H>    Ca    9.5      27 Jun 2023 05:45  Phos  3.2     06-27  Mg     2.20     06-27      Creatinine Trend: 3.81 <--, 6.28 <--, 5.02 <--, 3.57 <--, 4.86 <--, 8.57 <--                        8.2    10.02 )-----------( 275      ( 27 Jun 2023 05:45 )             26.8     Urine Studies:  Urinalysis Basic - ( 27 Jun 2023 05:45 )    Color:  / Appearance:  / SG:  / pH:   Gluc: 90 mg/dL / Ketone:   / Bili:  / Urobili:    Blood:  / Protein:  / Nitrite:    Leuk Esterase:  / RBC:  / WBC    Sq Epi:  / Non Sq Epi:  / Bacteria:

## 2023-06-27 NOTE — PROGRESS NOTE ADULT - ASSESSMENT
77y Male with history of ESRD on HD presents for need for dialysis. Nephrology consulted for ESRD status.    1) ESRD: Last HD on 6/26 tolerated well with 1.5L removed. Plan for next maintenance HD on 6/28 after MRI with janie to decrease risk of NSF. Will monitor hemodynamics on HD and if not tolerating, will need to readdress GOC. Monitor electrolytes.    2) HTN with ESRD: BP low normal. Continue with midodrine 10 mg PO pre-HD to avoid intradialytic hypotension. Monitor BP.    3) Anemia of renal disease: Hb low and patient occult positive. No IV iron given elevated ferritin. Continue with Epo 14K with HD. Monitor Hb.    4) Secondary HPT of renal origin: Serum phos improving. Holding sensipar 30 mg daily given low normal iPTH and will use low calcium bath with HD. Monitor serum calcium and phosphorus.    Poor prognosis.     [facility] NEPHROLOGY  Armando Shaffer M.D.  Kendall Morales D.O.  Lilliam Cody M.D.  Oxana Hernandez, OWEN, ANP-C  [phone]  Fax: [phone]    [facility] [place] [address], [id number]  [place] [address]

## 2023-06-27 NOTE — CHART NOTE - NSCHARTNOTEFT_GEN_A_CORE
Source: Patient A&Ox 1  Family [ ]     RN [x ]    Chart [x ]    Reason for Follow-Up Assessment: severe protein calorie malnutrition     77-year-old male with history of DVT (previously on coumadin, now stopped), CAD, ESRD (on HD via RUE AVF Tu/Th/Sat), AOCD, HLD, HTN, obesity, pre-diabetes, and prostate cancer s/p suprapubic catheter presenting from [facility] w/need for HD. Midline placed 6/26. Not tolerating fluid removal with HD per nephrology note with poor prognosis.      Diet, NPO with Tube Feed:   Tube Feeding Modality: Nasogastric  Nepro with Carb Steady (NEPRORTH)  Total Volume for 24 Hours (mL): 1368  Continuous  Starting Tube Feed Rate {mL per Hour}: 10  Increase Tube Feed Rate by (mL): 5     Every 4 hours  Until Goal Tube Feed Rate (mL per Hour): 57  Tube Feed Duration (in Hours): 24  Tube Feed Start Time: 18:00 (06-25-23 @ 19:46)      GI: WDL. Last BM 6/24        Enteral /Parenteral Nutrition: 1368 mL, 2421 rancho, 111 gm pro.     Anthropometrics:     Height (cm): 185.4 (06-07)  Weight (kg): 75.4 kg (6/27), 79.1kg (6/20), 75.9kg (6/16), 74.5kg (6/10), 77kg (6/6)  BMI (kg/m2): 23.2 (06-07)    Edema: 1+ generalized   Pressure Injuries: none    __________________ Pertinent Medications__________________   MEDICATIONS  (STANDING):  aspirin  chewable 81 milliGRAM(s) Oral daily  ceFAZolin   IVPB 2000 milliGRAM(s) IV Intermittent <User Schedule>  ceFAZolin   IVPB 3000 milliGRAM(s) IV Intermittent <User Schedule>  chlorhexidine 2% Cloths 1 Application(s) Topical daily  dextrose 5%. 1000 milliLiter(s) (50 mL/Hr) IV Continuous <Continuous>  dextrose 5%. 1000 milliLiter(s) (100 mL/Hr) IV Continuous <Continuous>  dextrose 50% Injectable 25 Gram(s) IV Push once  dextrose 50% Injectable 25 Gram(s) IV Push once  dextrose 50% Injectable 12.5 Gram(s) IV Push once  donepezil 5 milliGRAM(s) Oral at bedtime  epoetin shell-epbx (RETACRIT) Injectable 77737 Unit(s) IV Push <User Schedule>  heparin   Injectable 5000 Unit(s) SubCutaneous every 8 hours  metoprolol tartrate 25 milliGRAM(s) Oral two times a day  midodrine. 10 milliGRAM(s) Oral <User Schedule>  pantoprazole   Suspension 40 milliGRAM(s) Oral daily    MEDICATIONS  (PRN):  acetaminophen     Tablet .. 650 milliGRAM(s) Oral every 6 hours PRN Mild Pain (1 - 3), Moderate Pain (4 - 6)  dextrose Oral Gel 15 Gram(s) Oral once PRN Blood Glucose LESS THAN 70 milliGRAM(s)/deciliter  guaiFENesin Oral Liquid (Sugar-Free) 100 milliGRAM(s) Oral every 6 hours PRN Cough  lidocaine   4% Patch 1 Patch Transdermal daily PRN right knee pain  lidocaine   4% Patch 1 Patch Transdermal daily PRN left shoulder pain  oxyCODONE    IR 5 milliGRAM(s) Oral every 6 hours PRN Severe Pain (7 - 10)  sodium chloride 0.9% Bolus. 100 milliLiter(s) IV Bolus every 5 minutes PRN SBP LESS THAN or EQUAL to 90 mmHg      __________________ Pertinent Labs__________________   06-27 Na136 mmol/L Glu 90 mg/dL K+ 4.3 mmol/L Cr  3.81 mg/dL<H> BUN 31 mg/dL<H> 06-27 Phos 3.2 mg/dL 06-04 Chol 126 mg/dL LDL --    HDL 30 mg/dL<L> Trig 123 mg/dL        POCT Blood Glucose.: 113 mg/dL (06-27-23 @ 05:39)  POCT Blood Glucose.: 97 mg/dL (06-26-23 @ 23:47)  POCT Blood Glucose.: 99 mg/dL (06-26-23 @ 19:16)  POCT Blood Glucose.: 119 mg/dL (06-26-23 @ 15:05)  POCT Blood Glucose.: 107 mg/dL (06-26-23 @ 12:33)  POCT Blood Glucose.: 89 mg/dL (06-26-23 @ 06:12)  POCT Blood Glucose.: 79 mg/dL (06-25-23 @ 23:24)  POCT Blood Glucose.: 94 mg/dL (06-25-23 @ 17:33)  POCT Blood Glucose.: 105 mg/dL (06-25-23 @ 12:16)  POCT Blood Glucose.: 108 mg/dL (06-25-23 @ 06:30)  POCT Blood Glucose.: 118 mg/dL (06-24-23 @ 23:27)  POCT Blood Glucose.: 132 mg/dL (06-24-23 @ 16:58)        Previous Nutrition Diagnosis: severe protein calorie malnutrition     Nutrition Diagnosis is [x ] ongoing        Recommendations:  1.         Monitoring and Evaluation:      [ x] Tolerance to diet prescription [x ] weights [x ] follow up per protocol  [ ] other: Source: Patient A&Ox1  Family [ ]     RN [x ]    Chart [x ]    Reason for Follow-Up Assessment: severe protein calorie malnutrition     77-year-old male with history of DVT (previously on coumadin, now stopped), CAD, ESRD (on HD via RUE AVF Tu/Th/Sat), AOCD, HLD, HTN, obesity, pre-diabetes, and prostate cancer s/p suprapubic catheter presenting from [facility] w/need for HD. Midline placed 6/26. Not tolerating fluid removal with HD per nephrology note with poor prognosis.       Nepro running @ 45 mL/hr. Expected to reach goal by tonight. Per RN, pt is tolerating. No reported GI issues (nausea/vomiting/diarrhea/constipation.)        Diet, NPO with Tube Feed:   Tube Feeding Modality: Nasogastric  Nepro with Carb Steady (NEPRORTH)  Total Volume for 24 Hours (mL): 1368  Continuous  Starting Tube Feed Rate {mL per Hour}: 10  Increase Tube Feed Rate by (mL): 5     Every 4 hours  Until Goal Tube Feed Rate (mL per Hour): 57  Tube Feed Duration (in Hours): 24  Tube Feed Start Time: 18:00 (06-25-23 @ 19:46)      GI: WDL. Last BM 6/24      Enteral /Parenteral Nutrition: 1368 mL, 2421 rancho, 111 gm pro.     Anthropometrics:     Height (cm): 185.4 (06-07)  Weight (kg): 75.4 kg (6/27), 79.1kg (6/20), 75.9kg (6/16), 74.5kg (6/10), 77kg (6/6)  BMI (kg/m2): 23.2 (06-07)    Edema: 1+ generalized   Pressure Injuries: none    __________________ Pertinent Medications__________________   MEDICATIONS  (STANDING):  aspirin  chewable 81 milliGRAM(s) Oral daily  ceFAZolin   IVPB 2000 milliGRAM(s) IV Intermittent <User Schedule>  ceFAZolin   IVPB 3000 milliGRAM(s) IV Intermittent <User Schedule>  chlorhexidine 2% Cloths 1 Application(s) Topical daily  dextrose 5%. 1000 milliLiter(s) (50 mL/Hr) IV Continuous <Continuous>  dextrose 5%. 1000 milliLiter(s) (100 mL/Hr) IV Continuous <Continuous>  dextrose 50% Injectable 25 Gram(s) IV Push once  dextrose 50% Injectable 25 Gram(s) IV Push once  dextrose 50% Injectable 12.5 Gram(s) IV Push once  donepezil 5 milliGRAM(s) Oral at bedtime  epoetin shell-epbx (RETACRIT) Injectable 73216 Unit(s) IV Push <User Schedule>  heparin   Injectable 5000 Unit(s) SubCutaneous every 8 hours  metoprolol tartrate 25 milliGRAM(s) Oral two times a day  midodrine. 10 milliGRAM(s) Oral <User Schedule>  pantoprazole   Suspension 40 milliGRAM(s) Oral daily    MEDICATIONS  (PRN):  acetaminophen     Tablet .. 650 milliGRAM(s) Oral every 6 hours PRN Mild Pain (1 - 3), Moderate Pain (4 - 6)  dextrose Oral Gel 15 Gram(s) Oral once PRN Blood Glucose LESS THAN 70 milliGRAM(s)/deciliter  guaiFENesin Oral Liquid (Sugar-Free) 100 milliGRAM(s) Oral every 6 hours PRN Cough  lidocaine   4% Patch 1 Patch Transdermal daily PRN right knee pain  lidocaine   4% Patch 1 Patch Transdermal daily PRN left shoulder pain  oxyCODONE    IR 5 milliGRAM(s) Oral every 6 hours PRN Severe Pain (7 - 10)  sodium chloride 0.9% Bolus. 100 milliLiter(s) IV Bolus every 5 minutes PRN SBP LESS THAN or EQUAL to 90 mmHg      __________________ Pertinent Labs__________________   06-27 Na136 mmol/L Glu 90 mg/dL K+ 4.3 mmol/L Cr  3.81 mg/dL<H> BUN 31 mg/dL<H> 06-27 Phos 3.2 mg/dL 06-04 Chol 126 mg/dL LDL --    HDL 30 mg/dL<L> Trig 123 mg/dL        POCT Blood Glucose.: 113 mg/dL (06-27-23 @ 05:39)  POCT Blood Glucose.: 97 mg/dL (06-26-23 @ 23:47)  POCT Blood Glucose.: 99 mg/dL (06-26-23 @ 19:16)  POCT Blood Glucose.: 119 mg/dL (06-26-23 @ 15:05)  POCT Blood Glucose.: 107 mg/dL (06-26-23 @ 12:33)  POCT Blood Glucose.: 89 mg/dL (06-26-23 @ 06:12)  POCT Blood Glucose.: 79 mg/dL (06-25-23 @ 23:24)  POCT Blood Glucose.: 94 mg/dL (06-25-23 @ 17:33)  POCT Blood Glucose.: 105 mg/dL (06-25-23 @ 12:16)  POCT Blood Glucose.: 108 mg/dL (06-25-23 @ 06:30)  POCT Blood Glucose.: 118 mg/dL (06-24-23 @ 23:27)  POCT Blood Glucose.: 132 mg/dL (06-24-23 @ 16:58)        Previous Nutrition Diagnosis: severe protein calorie malnutrition     Nutrition Diagnosis is [x ] ongoing        Recommendations:    1. Continue Nepro @ 57 mL/hr x 24 hrs.   2. Free water per MD.           Monitoring and Evaluation:      [ x] Tolerance to diet prescription [x ] weights [x ] follow up per protocol  [ ] other:

## 2023-06-28 NOTE — PROGRESS NOTE ADULT - ASSESSMENT
77  year old male with hx of cad , ? PCI, HTN, esrd, HTN, DM, afib presents for HD.    #CAD  -cv stable no cp   -no chf on exam   -no obvious angina, decomp HF  -ECHO w moderate segm LV dysfunction along LAD distribution suggestive of prior infarct  -in light of age, fxnl status, anemia req w/u, and mental status not a candidate for further ischemic eval/cath  -would continue medical tx of cmp  -cont PO lopressor 25 mg BID   -cont mido to support BP   -cont current meds, on asa     #anemia  -+ occult. work up per GI/med  -s/p egd with treated ulcer  -gi f/u, ppi    #Afib   -not on ac  -cont to hold in light of egd findings, anemia req prbcs, fall risk   -cont asa  -c/w BB for rate control     # ESRD on HD   -renal fu     #htn   -cont mido as needed    dvt ppx    pall eval noted    35 minutes spent on total encounter; more than 50% of the visit was spent counseling and/or coordinating care by the attending physician.

## 2023-06-28 NOTE — PROGRESS NOTE ADULT - SUBJECTIVE AND OBJECTIVE BOX
[facility] NEPHROLOGY- PROGRESS NOTE    77y Male with history of ESRD on HD presents for need for dialysis. Nephrology consulted for ESRD status.    REVIEW OF SYSTEMS: limited due to mental status.    penicillin (Rash)      Hospital Medications: Medications reviewed      VITALS:  T(F): 98.2 (06-28-23 @ 05:40), Max: 98.8 (06-27-23 @ 10:57)  HR: 84 (06-28-23 @ 05:40)  BP: 97/53 (06-28-23 @ 05:40)  RR: 18 (06-28-23 @ 05:40)  SpO2: 100% (06-28-23 @ 05:40)  Wt(kg): --    06-27 @ 07:01  -  06-28 @ 07:00  --------------------------------------------------------  IN: 0 mL / OUT: 641 mL / NET: -641 mL        PHYSICAL EXAM:    Gen: NAD, confused, + NGT  Cards: RRR, +S1/S2, no M/G/R  Resp: CTA B/L  GI: soft, NT/ND, NABS  : + SPT  Vascular: no LE edema B/L, RUE AVG + bruit/thrill, L shoulder/arm edematous        LABS:  06-28    135  |  100  |  52<H>  ----------------------------<  100<H>  4.9   |  24  |  5.19<H>    Ca    9.9      28 Jun 2023 06:30  Phos  4.6     06-28  Mg     2.40     06-28      Creatinine Trend: 5.19 <--, 3.81 <--, 6.28 <--, 5.02 <--, 3.57 <--, 4.86 <--, 8.57 <--                        8.2    10.02 )-----------( 275      ( 27 Jun 2023 05:45 )             26.8     Urine Studies:  Urinalysis Basic - ( 28 Jun 2023 06:30 )    Color:  / Appearance:  / SG:  / pH:   Gluc: 100 mg/dL / Ketone:   / Bili:  / Urobili:    Blood:  / Protein:  / Nitrite:    Leuk Esterase:  / RBC:  / WBC    Sq Epi:  / Non Sq Epi:  / Bacteria:

## 2023-06-28 NOTE — PROVIDER CONTACT NOTE (OTHER) - ASSESSMENT
patient sitting upright in high fowlers, tube feeds on hold, intermittently coughing, ng tube unglogged by using warm water

## 2023-06-28 NOTE — PROGRESS NOTE ADULT - ASSESSMENT
A/P     Problem/Plan - 1:  ·  Problem: Bacteremia.   repeat Blood c/s neg   ID following   unable to get any PIV , so plan is to give Abx Cefazolin during HD days as per ID recommendation till 7/11/23     # Anemia of chronic disease./ PUD  - with noted (+)FOBT, possible GIB component   -NGT feeding   feeding held today 2/2 coughing   IR/GI for possible PEG   - IV and oral hydration, monitor hypoglycemia    #CAD/ Elevated troponin./ Atrial flutter   -cardio following   conservative rx 2/2 multiple comorbid condition   not candidate for AC     #ESRD on hemodialysis.   -appreciate renal recs  interviewed/examined at HD  monitor I/Os, daily weights    # Prostate cancer.   - s/p suprapubic cath.    # Hypothyroid.   c/w synthroid     # Dementia.   supportive care     # Hyperparathyroidism due to ESRD on dialysis.   ·  Plan: Resume sensipar 60 mg daily and home phosphorus binders.   Monitor serum calcium and phosphorus.    dispo: please consult IR for peg tube placement

## 2023-06-28 NOTE — PROGRESS NOTE ADULT - ASSESSMENT
77y Male with history of ESRD on HD presents for need for dialysis. Nephrology consulted for ESRD status.    1) ESRD: Last HD on 6/26 tolerated well with 1.5L removed. Plan for next maintenance HD today after MRI with janie to decrease risk of NSF. Will monitor hemodynamics on HD and if not tolerating, will need to readdress GOC. Monitor electrolytes.    2) HTN with ESRD: BP low normal. Continue with midodrine 10 mg PO pre-HD to avoid intradialytic hypotension. Monitor BP.    3) Anemia of renal disease: Hb low and patient occult positive. No IV iron given elevated ferritin. Continue with Epo 14K with HD. Monitor Hb.    4) Secondary HPT of renal origin: Serum phos improving. Holding sensipar 30 mg daily given low normal iPTH and will use low calcium bath with HD. Monitor serum calcium and phosphorus.    Poor prognosis.     [facility] NEPHROLOGY  Armando Shaffer M.D.  Kendall Morales D.O.  Lilliam Cody M.D.  Oxana Hernandez, MSN, ANP-C  [phone]  Fax: [phone] [address] [place] [address], [address]  [place] [address]

## 2023-06-28 NOTE — PROGRESS NOTE ADULT - SUBJECTIVE AND OBJECTIVE BOX
Patient is a 77y old  Male who presents with a chief complaint of needs HD (28 Jun 2023 16:55)      INTERVAL HPI/OVERNIGHT EVENTS:  persistent coughing on tube feeding , so TF held   afebrile     T(C): 36.7 (06-29-23 @ 00:08), Max: 37.6 (06-28-23 @ 20:27)  HR: 102 (06-29-23 @ 00:08) (78 - 102)  BP: 126/73 (06-29-23 @ 00:08) (93/53 - 126/73)  RR: 18 (06-29-23 @ 00:08) (17 - 19)  SpO2: 100% (06-28-23 @ 19:30) (100% - 100%)  Wt(kg): --  I&O's Summary    27 Jun 2023 07:01  -  28 Jun 2023 07:00  --------------------------------------------------------  IN: 0 mL / OUT: 641 mL / NET: -641 mL    28 Jun 2023 07:01  -  29 Jun 2023 01:52  --------------------------------------------------------  IN: 1000 mL / OUT: 2000 mL / NET: -1000 mL        PAST MEDICAL & SURGICAL HISTORY:  HTN (hypertension)      Prostate cancer  prostate cancer      Obesity      PVD (peripheral vascular disease)      CAD (coronary artery disease)  LAD stent      Pre-diabetes      ESRD (end stage renal disease) on dialysis      Essential hypertension      History of DVT of lower extremity      History of prostatectomy      AV fistula  h/o creation in 2005      H/O cardiac catheterization  4/25/2014 non obstructive disease      H/O abdominal surgery  10/10/17          SOCIAL HISTORY  Alcohol:  Tobacco:  Illicit substance use:    FAMILY HISTORY:    REVIEW OF SYSTEMS:  CONSTITUTIONAL: No fever, weight loss, or fatigue  EYES: No eye pain, visual disturbances, or discharge  ENMT:  No difficulty hearing, tinnitus, vertigo; No sinus or throat pain  NECK: No pain or stiffness  RESPIRATORY: No cough, wheezing, chills or hemoptysis; No shortness of breath  CARDIOVASCULAR: No chest pain, palpitations, dizziness, or leg swelling  GASTROINTESTINAL: No abdominal or epigastric pain. No nausea, vomiting, or hematemesis; No diarrhea or constipation. No melena or hematochezia.  GENITOURINARY: No dysuria, frequency, hematuria, or incontinence  NEUROLOGICAL: No headaches, memory loss, loss of strength, numbness, or tremors  SKIN: No itching, burning, rashes, or lesions   LYMPH NODES: No enlarged glands  ENDOCRINE: No heat or cold intolerance; No hair loss  MUSCULOSKELETAL: No joint pain or swelling; No muscle, back, or extremity pain  PSYCHIATRIC: No depression, anxiety, mood swings, or difficulty sleeping  HEME/LYMPH: No easy bruising, or bleeding gums  ALLERY AND IMMUNOLOGIC: No hives or eczema    RADIOLOGY & ADDITIONAL TESTS:    Imaging Personally Reviewed:  [ ] YES  [ ] NO    Consultant(s) Notes Reviewed:  [ ] YES  [ ] NO    PHYSICAL EXAM:  GENERAL: NAD, well-groomed, well-developed  HEAD:  Atraumatic, Normocephalic  EYES: EOMI, PERRLA, conjunctiva and sclera clear  ENMT: No tonsillar erythema, exudates, or enlargement; Moist mucous membranes, Good dentition, No lesions  NECK: Supple, No JVD, Normal thyroid  NERVOUS SYSTEM:  Alert & Oriented X3, Good concentration; Motor Strength 5/5 B/L upper and lower extremities; DTRs 2+ intact and symmetric  CHEST/LUNG: Clear to percussion bilaterally; No rales, rhonchi, wheezing, or rubs  HEART: Regular rate and rhythm; No murmurs, rubs, or gallops  ABDOMEN: Soft, Nontender, Nondistended; Bowel sounds present  EXTREMITIES:  2+ Peripheral Pulses, No clubbing, cyanosis, or edema  LYMPH: No lymphadenopathy noted  SKIN: No rashes or lesions    LABS:                        8.2    10.02 )-----------( 275      ( 27 Jun 2023 05:45 )             26.8     06-28    135  |  100  |  52<H>  ----------------------------<  100<H>  4.9   |  24  |  5.19<H>    Ca    9.9      28 Jun 2023 06:30  Phos  4.6     06-28  Mg     2.40     06-28        Urinalysis Basic - ( 28 Jun 2023 06:30 )    Color: x / Appearance: x / SG: x / pH: x  Gluc: 100 mg/dL / Ketone: x  / Bili: x / Urobili: x   Blood: x / Protein: x / Nitrite: x   Leuk Esterase: x / RBC: x / WBC x   Sq Epi: x / Non Sq Epi: x / Bacteria: x      CAPILLARY BLOOD GLUCOSE      POCT Blood Glucose.: 89 mg/dL (29 Jun 2023 00:46)  POCT Blood Glucose.: 93 mg/dL (28 Jun 2023 23:24)  POCT Blood Glucose.: 96 mg/dL (28 Jun 2023 19:36)  POCT Blood Glucose.: 98 mg/dL (28 Jun 2023 18:11)  POCT Blood Glucose.: 93 mg/dL (28 Jun 2023 12:12)  POCT Blood Glucose.: 117 mg/dL (28 Jun 2023 07:02)        Urinalysis Basic - ( 28 Jun 2023 06:30 )    Color: x / Appearance: x / SG: x / pH: x  Gluc: 100 mg/dL / Ketone: x  / Bili: x / Urobili: x   Blood: x / Protein: x / Nitrite: x   Leuk Esterase: x / RBC: x / WBC x   Sq Epi: x / Non Sq Epi: x / Bacteria: x        MEDICATIONS  (STANDING):  aspirin  chewable 81 milliGRAM(s) Oral daily  ceFAZolin   IVPB 2000 milliGRAM(s) IV Intermittent <User Schedule>  ceFAZolin   IVPB 3000 milliGRAM(s) IV Intermittent <User Schedule>  chlorhexidine 2% Cloths 1 Application(s) Topical daily  dextrose 5%. 1000 milliLiter(s) (100 mL/Hr) IV Continuous <Continuous>  dextrose 5%. 1000 milliLiter(s) (50 mL/Hr) IV Continuous <Continuous>  dextrose 50% Injectable 25 Gram(s) IV Push once  dextrose 50% Injectable 12.5 Gram(s) IV Push once  dextrose 50% Injectable 25 Gram(s) IV Push once  donepezil 5 milliGRAM(s) Oral at bedtime  epoetin shell (EPOGEN) Injectable 05079 Unit(s) IV Push <User Schedule>  heparin   Injectable 5000 Unit(s) SubCutaneous every 8 hours  metoprolol tartrate 25 milliGRAM(s) Oral two times a day  midodrine. 10 milliGRAM(s) Oral <User Schedule>  pantoprazole   Suspension 40 milliGRAM(s) Oral daily    MEDICATIONS  (PRN):  acetaminophen     Tablet .. 650 milliGRAM(s) Oral every 6 hours PRN Mild Pain (1 - 3), Moderate Pain (4 - 6)  dextrose Oral Gel 15 Gram(s) Oral once PRN Blood Glucose LESS THAN 70 milliGRAM(s)/deciliter  guaiFENesin Oral Liquid (Sugar-Free) 100 milliGRAM(s) Oral every 6 hours PRN Cough  lidocaine   4% Patch 1 Patch Transdermal daily PRN left shoulder pain  lidocaine   4% Patch 1 Patch Transdermal daily PRN right knee pain  oxyCODONE    IR 5 milliGRAM(s) Oral every 6 hours PRN Severe Pain (7 - 10)  sodium chloride 0.9% Bolus. 100 milliLiter(s) IV Bolus every 5 minutes PRN SBP LESS THAN or EQUAL to 90 mmHg      Care Discussed with Consultants/Other Providers [ ] YES  [ ] NO

## 2023-06-28 NOTE — PROGRESS NOTE ADULT - SUBJECTIVE AND OBJECTIVE BOX
CC: Patient is a 77y old  Male who presents with a chief complaint of needs HD (28 Jun 2023 10:33)    ID following for GBS bacteremia    Interval History/ROS: Patient remains with NGT. no new complaints. No fevers.    Rest of ROS negative.    Allergies  penicillin (Rash)    ANTIMICROBIALS:  ceFAZolin   IVPB 2000 <User Schedule>  ceFAZolin   IVPB 3000 <User Schedule>    OTHER MEDS:  acetaminophen     Tablet .. 650 milliGRAM(s) Oral every 6 hours PRN  aspirin  chewable 81 milliGRAM(s) Oral daily  chlorhexidine 2% Cloths 1 Application(s) Topical daily  dextrose 5%. 1000 milliLiter(s) IV Continuous <Continuous>  dextrose 5%. 1000 milliLiter(s) IV Continuous <Continuous>  dextrose 50% Injectable 25 Gram(s) IV Push once  dextrose 50% Injectable 12.5 Gram(s) IV Push once  dextrose 50% Injectable 25 Gram(s) IV Push once  dextrose Oral Gel 15 Gram(s) Oral once PRN  donepezil 5 milliGRAM(s) Oral at bedtime  epoetin shell (EPOGEN) Injectable 74885 Unit(s) IV Push <User Schedule>  guaiFENesin Oral Liquid (Sugar-Free) 100 milliGRAM(s) Oral every 6 hours PRN  heparin   Injectable 5000 Unit(s) SubCutaneous every 8 hours  lidocaine   4% Patch 1 Patch Transdermal daily PRN  lidocaine   4% Patch 1 Patch Transdermal daily PRN  metoprolol tartrate 25 milliGRAM(s) Oral two times a day  midodrine. 10 milliGRAM(s) Oral <User Schedule>  oxyCODONE    IR 5 milliGRAM(s) Oral every 6 hours PRN  pantoprazole   Suspension 40 milliGRAM(s) Oral daily  sodium chloride 0.9% Bolus. 100 milliLiter(s) IV Bolus every 5 minutes PRN      PE:    Vital Signs Last 24 Hrs  T(C): 36.8 (28 Jun 2023 11:22), Max: 36.8 (28 Jun 2023 05:40)  T(F): 98.3 (28 Jun 2023 11:22), Max: 98.3 (28 Jun 2023 11:22)  HR: 78 (28 Jun 2023 11:22) (72 - 86)  BP: 105/49 (28 Jun 2023 11:22) (97/53 - 110/57)  BP(mean): --  RR: 18 (28 Jun 2023 11:22) (17 - 18)  SpO2: 100% (28 Jun 2023 11:22) (100% - 100%)    Parameters below as of 28 Jun 2023 05:40  Patient On (Oxygen Delivery Method): room air    Gen: Awake, NGT in place  CV: S1+S2 normal, no murmurs  Resp: Clear bilat, no resp distress  Abd: Soft, nontender, +BS  Ext: No LE edema, no wounds  : suprapubic catheter  IV/Skin: No thrombophlebitis, bilateral shoulders without tenderness, no erythema, no warmth  Neuro: no focal deficits    LABS:                          8.2    10.02 )-----------( 275      ( 27 Jun 2023 05:45 )             26.8       06-28    135  |  100  |  52<H>  ----------------------------<  100<H>  4.9   |  24  |  5.19<H>    Ca    9.9      28 Jun 2023 06:30  Phos  4.6     06-28  Mg     2.40     06-28        Urinalysis Basic - ( 28 Jun 2023 06:30 )    Color: x / Appearance: x / SG: x / pH: x  Gluc: 100 mg/dL / Ketone: x  / Bili: x / Urobili: x   Blood: x / Protein: x / Nitrite: x   Leuk Esterase: x / RBC: x / WBC x   Sq Epi: x / Non Sq Epi: x / Bacteria: x        MICROBIOLOGY:  v  .Blood Blood-Peripheral  06-15-23   No Growth Final  --  --      .Blood Blood-Peripheral  06-15-23   No Growth Final  --  --      .Blood Blood-Venous  06-13-23   No Growth Final  --  --      .Blood Blood  06-13-23   No Growth Final  --  --      .Blood Blood  06-11-23   Growth in aerobic and anaerobic bottles: Streptococcus agalactiae (Group  B)  See previous culture [id number]  --    Growth in anaerobic bottle: Gram positive cocci in pairs  Growth in aerobic bottle: Gram positive cocci in pairs      .Blood Blood  06-11-23   Growth in aerobic and anaerobic bottles: Streptococcus agalactiae (Group  B)  ***Blood Panel PCR results on this specimen are available  approximately 3 hours after the Gram stain result.***  Gram stain, PCR, and/or culture results may not always  correspond due to difference in methodologies.  ************************************************************  This PCR assay was performed by multiplex PCR. This  Assay tests for 66 bacterial and resistance gene targets.  Please refer to the [facility] test directory  at [email or web address] for details.  --  Blood Culture PCR  Streptococcus agalactiae (Group B)    RADIOLOGY:    < from: Xray Chest 1 View- PORTABLE-Urgent (Xray Chest 1 View- PORTABLE-Urgent .) (06.25.23 @ 06:42) >  IMPRESSION:  NG tube in place.  Clear lungs.      < end of copied text >

## 2023-06-28 NOTE — CHART NOTE - NSCHARTNOTEFT_GEN_A_CORE
Pt on tube feeds via NGT, pt with coughing with feeds at 55, will decrease to 45 and reevaluate coughing.      Addendum: pt continues to cough while in 45 tube feeds, will hold feeds

## 2023-06-28 NOTE — PROGRESS NOTE ADULT - ASSESSMENT
77 year old male with DVT, CAD, ESRD on HD, RUE AVF, AOCD, HLD, HTN, pre-diabetes, prostate ca with suprapubic catheter from NH for missed HD. Developed fevers and found to have GBS bacteremia. +R shoulder pain improved. Leukocytosis resolved.    CT A/P with no evidence of acute infectious process in the abdomen and pelvis.  Difficult PIV line placement  Agitated, with mittens, difficult to obtain MRI at this time.    Recommend:  #Sepsis (fever, leukocytosis) secondary to high grade GBS bacteremia  -Repeat blood cultures remain negative  -Unable to obtain MRI shoulders due to agitation  -Trend WBC  -monitor fever curve  -TTE no evidence of valvular vegetation  -Given difficult to place PIV lines, can administer cefazolin 2 grams IV with HD on Mon/ Wed and 3 grams on Fri with HD to complete 4 weeks on 7/11 - can be arranged on discharge with outpatient HD center    Isaiah Hall MD  Available through MS Teams  If no response, or after 5pm/weekends, call [phone]    Plan discussed with primary team.

## 2023-06-28 NOTE — PROVIDER CONTACT NOTE (OTHER) - SITUATION
patient has suprapubic catheter, anuric, cath site open to air but has purulent drainage around site

## 2023-06-29 NOTE — CHART NOTE - NSCHARTNOTEFT_GEN_A_CORE
Called by RN for patient with hypotension, febrile to 100.4F.  IV tylenol, RVP, blood culture, repeat labs ordered.  Patient less responsive. ICU consult called.  Rapid response called.   Spoke with patients wife Kim, confirmed her wishes of FULL CODE.

## 2023-06-29 NOTE — PROCEDURE NOTE - NSSITEPREP_SKIN_A_CORE
povidone iodine (if allergic to chlorhexidine)
chlorhexidine/Adherence to aseptic technique: hand hygiene prior to donning barriers (gown, gloves), don cap and mask, sterile drape over patient
chlorhexidine/Adherence to aseptic technique: hand hygiene prior to donning barriers (gown, gloves), don cap and mask, sterile drape over patient

## 2023-06-29 NOTE — CHART NOTE - NSCHARTNOTEFT_GEN_A_CORE
: Alexis    INDICATION: Shock, Respiratory failure    PROCEDURE:  [X] LIMITED ECHO  [X] LIMITED CHEST  [ ] LIMITED RETROPERITONEAL  [ ] LIMITED ABDOMINAL  [ ] LIMITED DVT  [ ] NEEDLE GUIDANCE VASCULAR  [ ] NEEDLE GUIDANCE THORACENTESIS  [ ] NEEDLE GUIDANCE PARACENTESIS  [ ] NEEDLE GUIDANCE PERICARDIOCENTESIS  [ ] OTHER    FINDINGS: A line predominant pattern anterolaterally. Severely reduced LV systolic function with LVOT VTI 8.5 to 9.5 cm. Akinetic apex with dilation. RV smaller than LV in size. TAPSE 1.8cm.       INTERPRETATION: Normal lung US. Severely reduced LV systolic function. Mixed shock    Images stored on [employer].    Jose Espinoza MD  Pulmonary & Critical Care

## 2023-06-29 NOTE — PROVIDER CONTACT NOTE (OTHER) - NAME OF MD/NP/PA/DO NOTIFIED:
ACP
Corina TAYLOR
Kalia Fonseca
Mattie Saravia
ACP
CLAUDIA Pierre
Marcia Quijano
Marcia Quijano (#[id number])
Mattie Saravia
aniyah garcia
Mar Aquino
Marcia Quijano (TEAMS)
CLAUDIA Urbano
Lashell TREVIZO
Nephrologist Dr. Morales
[facility] acp
Marcia Quijano (Teams)
Deondre Ramey
Marcia Quijano

## 2023-06-29 NOTE — PROGRESS NOTE ADULT - SUBJECTIVE AND OBJECTIVE BOX
Gastroenterology/Hepatology Progress Note    Interval Events: GI called for episode of hematemesis this morning. Patient was also reported to have clots in his stool. Hgb decreased from 8.2 yesterday to 7.1.   RRT was called earlier this morning for hypotension to systolic in the 70s. He was also found to be febrile to 100.4. Patient now intubated, on pressors, and admitted to MICU.    Allergies:  penicillin (Rash)    Hospital Medications:  acetaminophen     Tablet .. 650 milliGRAM(s) Oral every 6 hours PRN  ceFAZolin   IVPB 2000 milliGRAM(s) IV Intermittent <User Schedule>  ceFAZolin   IVPB 3000 milliGRAM(s) IV Intermittent <User Schedule>  chlorhexidine 2% Cloths 1 Application(s) Topical daily  dextrose 5%. 1000 milliLiter(s) IV Continuous <Continuous>  dextrose 5%. 1000 milliLiter(s) IV Continuous <Continuous>  dextrose 50% Injectable 25 Gram(s) IV Push once  dextrose 50% Injectable 12.5 Gram(s) IV Push once  dextrose 50% Injectable 25 Gram(s) IV Push once  dextrose Oral Gel 15 Gram(s) Oral once PRN  donepezil 5 milliGRAM(s) Oral at bedtime  epoetin shell (EPOGEN) Injectable 48624 Unit(s) IV Push <User Schedule>  guaiFENesin Oral Liquid (Sugar-Free) 100 milliGRAM(s) Oral every 6 hours PRN  lactated ringers. 1000 milliLiter(s) IV Continuous <Continuous>  lidocaine   4% Patch 1 Patch Transdermal daily PRN  lidocaine   4% Patch 1 Patch Transdermal daily PRN  metoprolol tartrate 25 milliGRAM(s) Oral two times a day  midodrine 15 milliGRAM(s) Oral once  midodrine. 10 milliGRAM(s) Oral <User Schedule>  oxyCODONE    IR 5 milliGRAM(s) Oral every 6 hours PRN  pantoprazole  Injectable 80 milliGRAM(s) IV Push once  pantoprazole Infusion 8 mG/Hr IV Continuous <Continuous>  sodium chloride 0.9% Bolus. 100 milliLiter(s) IV Bolus every 5 minutes PRN    ROS: 14 point ROS negative unless otherwise state in subjective    PHYSICAL EXAM:   Vital Signs:  Vital Signs Last 24 Hrs  T(C): 38 (2023 09:59), Max: 38 (2023 09:58)  T(F): 100.4 (2023 09:59), Max: 100.4 (2023 09:58)  HR: 95 (2023 09:59) (78 - 102)  BP: 99/80 (2023 09:59) (74/46 - 126/73)  BP(mean): --  RR: 24 (2023 09:59) (17 - 25)  SpO2: 100% (2023 09:59) (100% - 100%)    Parameters below as of 2023 09:59  Patient On (Oxygen Delivery Method): room air    Daily     Daily Weight in k.5 (2023 00:08)    GENERAL:  No acute distress  HEENT:  NCAT, no scleral icterus  CHEST: no resp distress  HEART:  RRR  ABDOMEN:  Soft, non-tender, non-distended   EXTREMITIES:  No cyanosis, clubbing, or edema  SKIN:  No rash/erythema/ecchymoses   NEURO:  Alert and oriented x 0, lethargic but arousable to voice  FRITZ: brown stool    LABS:                        8.6    16.49 )-----------( 319      ( 2023 10:30 )             28.3     Mean Cell Volume: 89.3 fL (-23 @ 10:30)        138  |  100  |  34<H>  ----------------------------<  225<H>  3.7   |  26  |  3.55<H>    Ca    9.3      2023 05:35  Phos  3.9       Mg     2.40           PT/INR - ( 2023 05:35 )   PT: 16.6 sec;   INR: 1.43 ratio       PTT - ( 2023 05:35 )  PTT:28.5 sec  Urinalysis Basic - ( 2023 05:35 )    Color: x / Appearance: x / SG: x / pH: x  Gluc: 225 mg/dL / Ketone: x  / Bili: x / Urobili: x   Blood: x / Protein: x / Nitrite: x   Leuk Esterase: x / RBC: x / WBC x   Sq Epi: x / Non Sq Epi: x / Bacteria: x    Imaging:  [facility]  _______________________________________________________________________________  Patient Name: Ankit Gautam        Procedure Date: 2023 1:22 PM  MRN: [id number]                     Account Number: [id number]  YOB: 1945              Admit Type: Inpatient  Room: [place] [address]                         Gender: Male  Attending MD: STAR ZEPEDA MD        _______________________________________________________________________________     Procedure:          Upper GI endoscopy  Indications:         Anemia  Providers:           STAR ZEPEDA MD, Jake Joyce (Fellow)  Medicines:           Monitored Anesthesia Care  Complications:       No immediate complications.  Procedure:           Pre-Anesthesia Assessment:                       - Prior to the procedure, a History and Physical was                        performed, and patient medications and allergies were                        reviewed. The patient's tolerance of previous anesthesia                       was also reviewed. The risks and benefits of the                        procedure and the sedation options and risks were                        discussed with the patient's wife. All questions were                        answered, and informed consent was obtained. Prior                        Anticoagulants: The patient last took aspirin on the day                        of the procedure. ASA Grade Assessment: III - A patient                        with severe systemic disease. After reviewing the risks                        and benefits, the patient was deemed in satisfactory                        condition to undergo the procedure.                       After obtaining informed consent, the endoscope was             passed under direct vision. Throughout the procedure,                        the patient's blood pressure, pulse, and oxygen                        saturations were monitored continuously. The Endoscope                        was introducedthrough the mouth, and advanced to the                        second part of duodenum. The upper GI endoscopy was                        accomplished without difficulty. The patient tolerated                        the procedure with light sedation.                                                                                   Findings:       A 7 cm, Hill grade IV hiatal hernia was present. Small naif ulcers        were seen.       LA Grade D (one or more mucosal breaks involving at least 75% of        esophageal circumference) esophagitis with no bleeding was found in the        lower third of the esophagus.       The exam of the esophagus was otherwise normal.       Diffuse moderate gastropathy characterized by erosions, erythema,      friability and shallow ulcerations was found in the entire examined        stomach. Biopsies were taken with a cold forceps for Helicobacter pylori        testing.       A single umbilicated submucosal papule (nodule) with no bleeding and no    stigmata of recent bleeding was found in the gastric fundus.       The exam of the stomach was otherwise normal.       One giant, non-bleeding cratered duodenal ulcer (approximately 3 cm in        greatest diameter) with a visible vessel was found in the duodenal bulb        extending into the duodenal sweep, with several pigmented spots and a        suspected vissible vessel. Upon close examination the vessel began to        bleed. Area was successfully injected with 4 mL of a 1:10,000 solution       of epinephrine for hemostasis. Coagulation for hemostasis of bleeding        using bipolar probe was successful.       Diffuse severe duodenopathy characterized by erosions and erythema was        found in the duodenal bulb.       The exam of the duodenum was otherwise normal.                                                                                   Impression:          - 7 cm hiatal hernia. Naif ulcers.                       - LA Grade D esophagitis.                       - Gastropathy. Biopsied.                       - A single submucosal papule (nodule) found in the                        stomach.                       - One non-bleeding duodenal ulcer with a visible vessel.                        Injected. Treated with bipolar cautery.                       - Duodenopathy.  Recommendation:      - [facility].                       - Clear liquid diet today.                       - PPI drip for 72 h, then PPI 40mg BID x 8 weeks                      followed by daily indefinitely                       - Colonoscopy was deferred given patient's respiratory                        status, light sedation and likely etiology for anemia                        identified.            - Monitor for signs of perforation: worsening abdominal                        pain, fevers, hypotension                       - Trend CBC, CMP                       - Avoid NSAIDs                       - Transfuse for Hb > 7         - Given comorbidities, risks of repeat endoscopic                        evaluation to assess for healing of esophagitis and                        ulcer (as well as further evaluation of fundic lesion)                        may outweigh benefits. Can be readdressed on outpatient                        basis pending overall GOC.                                                                                   Attending Participation:       I was present and participated during the entire procedure from        insertion to removal of the endoscope.                                                                                  _________________  STAR ZEPEDA MD  2023 2:32:54 PM  This report has been signed electronically.  Number of Addenda: 0    Note Initiated On: 2023 1:22 PM

## 2023-06-29 NOTE — PROGRESS NOTE ADULT - SUBJECTIVE AND OBJECTIVE BOX
CARDIOLOGY FOLLOW UP - Dr. Clements  DATE OF SERVICE: 6/29/23    CC no cp or sob      REVIEW OF SYSTEMS:  CONSTITUTIONAL: No fever, weight loss, or fatigue  RESPIRATORY: No cough, wheezing, chills or hemoptysis; No Shortness of Breath  CARDIOVASCULAR: No chest pain, palpitations, passing out, dizziness, or leg swelling  GASTROINTESTINAL: No abdominal or epigastric pain. No nausea, vomiting, or hematemesis; No diarrhea or constipation. No melena or hematochezia.  VASCULAR: No edema     PHYSICAL EXAM:  T(C): 37.6 (06-29-23 @ 06:00), Max: 37.6 (06-28-23 @ 20:27)  HR: 91 (06-29-23 @ 06:00) (78 - 102)  BP: 120/96 (06-29-23 @ 06:00) (93/53 - 126/73)  RR: 25 (06-29-23 @ 06:00) (17 - 25)  SpO2: 100% (06-29-23 @ 06:00) (100% - 100%)  Wt(kg): --  I&O's Summary    28 Jun 2023 07:01  -  29 Jun 2023 07:00  --------------------------------------------------------  IN: 1000 mL / OUT: 2000 mL / NET: -1000 mL        Appearance: Normal	  Cardiovascular: Normal S1 S2,RRR, No JVD, No murmurs  Respiratory: Lungs clear to auscultation	  Gastrointestinal:  Soft, Non-tender, + BS	  Extremities: Normal range of motion, No clubbing, cyanosis or edema      Home Medications:  bisacodyl 5 mg oral tablet: 2 tab(s) orally once a day as needed for  constipation (04 Jun 2023 00:48)  cinacalcet 60 mg oral tablet: 1 tab(s) orally once a day (04 Jun 2023 00:48)  donepezil 5 mg oral tablet: 1 tab(s) orally once a day (at bedtime) (04 Jun 2023 00:48)  ergocalciferol 1.25 mg (50,000 intl units) oral capsule: 1 cap(s) orally once a month (04 Jun 2023 00:48)  meloxicam 15 mg oral tablet: 1 tab(s) orally once a day as needed for  pain (04 Jun 2023 00:48)  NIFEdipine (Eqv-Adalat CC) 30 mg oral tablet, extended release: 2 tab(s) orally once a day (04 Jun 2023 00:48)  sevelamer hydrochloride 800 mg oral tablet: 1 tab(s) orally every 6 hours (04 Jun 2023 00:49)      MEDICATIONS  (STANDING):  ceFAZolin   IVPB 3000 milliGRAM(s) IV Intermittent <User Schedule>  ceFAZolin   IVPB 2000 milliGRAM(s) IV Intermittent <User Schedule>  chlorhexidine 2% Cloths 1 Application(s) Topical daily  dextrose 5%. 1000 milliLiter(s) (100 mL/Hr) IV Continuous <Continuous>  dextrose 5%. 1000 milliLiter(s) (50 mL/Hr) IV Continuous <Continuous>  dextrose 50% Injectable 25 Gram(s) IV Push once  dextrose 50% Injectable 12.5 Gram(s) IV Push once  dextrose 50% Injectable 25 Gram(s) IV Push once  donepezil 5 milliGRAM(s) Oral at bedtime  epoetin shell (EPOGEN) Injectable 59814 Unit(s) IV Push <User Schedule>  lactated ringers. 1000 milliLiter(s) (1000 mL/Hr) IV Continuous <Continuous>  metoprolol tartrate 25 milliGRAM(s) Oral two times a day  midodrine. 10 milliGRAM(s) Oral <User Schedule>  pantoprazole  Injectable 80 milliGRAM(s) IV Push once  pantoprazole Infusion 8 mG/Hr (10 mL/Hr) IV Continuous <Continuous>      TELEMETRY: af 110 	    ECG:  	  RADIOLOGY:   DIAGNOSTIC TESTING:  [ ] Echocardiogram:  [ ]  Catheterization:  [ ] Stress Test:    OTHER: 	    LABS:	 	    CKMB Units: 1.1 ng/mL (06-29 @ 05:35)  Creatine Kinase, Serum: 50 U/L [30 - 200] (06-29 @ 05:35)  Troponin T, High Sensitivity Result: 668 ng/L (06-29 @ 05:35)                          7.1    11.88 )-----------( 270      ( 29 Jun 2023 05:35 )             24.2     06-29    138  |  100  |  34<H>  ----------------------------<  225<H>  3.7   |  26  |  3.55<H>    Ca    9.3      29 Jun 2023 05:35  Phos  3.9     06-29  Mg     2.40     06-29      PT/INR - ( 29 Jun 2023 05:35 )   PT: 16.6 sec;   INR: 1.43 ratio         PTT - ( 29 Jun 2023 05:35 )  PTT:28.5 sec         CARDIOLOGY FOLLOW UP - Dr. Clements  DATE OF SERVICE: 6/29/23    CC events noted sp RRT THIS AM called this morning for hypotension to systolic 60-70s      REVIEW OF SYSTEMS:  obed    PHYSICAL EXAM:  T(C): 37.6 (06-29-23 @ 06:00), Max: 37.6 (06-28-23 @ 20:27)  HR: 91 (06-29-23 @ 06:00) (78 - 102)  BP: 120/96 (06-29-23 @ 06:00) (93/53 - 126/73)  RR: 25 (06-29-23 @ 06:00) (17 - 25)  SpO2: 100% (06-29-23 @ 06:00) (100% - 100%)  Wt(kg): --  I&O's Summary    28 Jun 2023 07:01  -  29 Jun 2023 07:00  --------------------------------------------------------  IN: 1000 mL / OUT: 2000 mL / NET: -1000 mL        Appearance: ill appearing 	  Cardiovascular: Normal S1 S2,RRR,  Respiratory: Lungs clear to auscultation	  Gastrointestinal:  Soft, Non-tender, + BS	  Extremities: Normal range of motion, No clubbing, cyanosis or edema      Home Medications:  bisacodyl 5 mg oral tablet: 2 tab(s) orally once a day as needed for  constipation (04 Jun 2023 00:48)  cinacalcet 60 mg oral tablet: 1 tab(s) orally once a day (04 Jun 2023 00:48)  donepezil 5 mg oral tablet: 1 tab(s) orally once a day (at bedtime) (04 Jun 2023 00:48)  ergocalciferol 1.25 mg (50,000 intl units) oral capsule: 1 cap(s) orally once a month (04 Jun 2023 00:48)  meloxicam 15 mg oral tablet: 1 tab(s) orally once a day as needed for  pain (04 Jun 2023 00:48)  NIFEdipine (Eqv-Adalat CC) 30 mg oral tablet, extended release: 2 tab(s) orally once a day (04 Jun 2023 00:48)  sevelamer hydrochloride 800 mg oral tablet: 1 tab(s) orally every 6 hours (04 Jun 2023 00:49)      MEDICATIONS  (STANDING):  ceFAZolin   IVPB 3000 milliGRAM(s) IV Intermittent <User Schedule>  ceFAZolin   IVPB 2000 milliGRAM(s) IV Intermittent <User Schedule>  chlorhexidine 2% Cloths 1 Application(s) Topical daily  dextrose 5%. 1000 milliLiter(s) (100 mL/Hr) IV Continuous <Continuous>  dextrose 5%. 1000 milliLiter(s) (50 mL/Hr) IV Continuous <Continuous>  dextrose 50% Injectable 25 Gram(s) IV Push once  dextrose 50% Injectable 12.5 Gram(s) IV Push once  dextrose 50% Injectable 25 Gram(s) IV Push once  donepezil 5 milliGRAM(s) Oral at bedtime  epoetin shell (EPOGEN) Injectable 34632 Unit(s) IV Push <User Schedule>  lactated ringers. 1000 milliLiter(s) (1000 mL/Hr) IV Continuous <Continuous>  metoprolol tartrate 25 milliGRAM(s) Oral two times a day  midodrine. 10 milliGRAM(s) Oral <User Schedule>  pantoprazole  Injectable 80 milliGRAM(s) IV Push once  pantoprazole Infusion 8 mG/Hr (10 mL/Hr) IV Continuous <Continuous>      TELEMETRY: af 110 	    ECG:  	  RADIOLOGY:   DIAGNOSTIC TESTING:  [ ] Echocardiogram:  [ ]  Catheterization:  [ ] Stress Test:    OTHER: 	    LABS:	 	    CKMB Units: 1.1 ng/mL (06-29 @ 05:35)  Creatine Kinase, Serum: 50 U/L [30 - 200] (06-29 @ 05:35)  Troponin T, High Sensitivity Result: 668 ng/L (06-29 @ 05:35)                          7.1    11.88 )-----------( 270      ( 29 Jun 2023 05:35 )             24.2     06-29    138  |  100  |  34<H>  ----------------------------<  225<H>  3.7   |  26  |  3.55<H>    Ca    9.3      29 Jun 2023 05:35  Phos  3.9     06-29  Mg     2.40     06-29      PT/INR - ( 29 Jun 2023 05:35 )   PT: 16.6 sec;   INR: 1.43 ratio         PTT - ( 29 Jun 2023 05:35 )  PTT:28.5 sec

## 2023-06-29 NOTE — PROGRESS NOTE ADULT - TIME BILLING
Agree with above NP note.  events noted  RRT for hypotension  sbp now improved  trend heme, prbcs as needed  r/o gi bleed  cont mido, inc as needed  bb for rate control  med f/u

## 2023-06-29 NOTE — PROVIDER CONTACT NOTE (OTHER) - REASON
Pt IV infiltrated, PT hypoglycemic, all meds held
unable to draw AM labs
PIV placement with LUE DVT
Pt had 6 beats
Pt has an oral temp of 99.7 and HR of 103
Unable to draw AM labs, pt is hard stick
unable to administer blood, pt does not have a functional IV
Patient spit out his pantoprazole tablet.
Patient's Ultrasound guided IV is no longer working
Patient's left deltoid is swollen. No signs of infiltration near IV access. Still awaiting IV nurse to input a new IV
Pt had 5 beats of V-tach and 13 beats of V-tach
Clots found in bowel movement
Pt coughing with tube feeds
purulent drainage pubic cath site
Patient's blood transfusion consent form Is incomplete.
Patient's blood transfusion consent form Is incomplete.
Unable to dialyze patient due to hypotension and tachycardia
patient bp low
patient ng tube clogging/ patient coughing a lot

## 2023-06-29 NOTE — PROCEDURE NOTE - PROCEDURE DATE TIME, MLM
29-Jun-2023 11:13
29-Jun-2023
29-Jun-2023 13:38
29-Jun-2023 18:00
29-Jun-2023
29-Jun-2023 15:15
10-Sixto-2023 18:36

## 2023-06-29 NOTE — PROGRESS NOTE ADULT - ASSESSMENT
77y Male with history of ESRD on HD presents for need for dialysis. Nephrology consulted for ESRD status.    1) ESRD: Last HD on 6/28 terminated prematurely due to clotted lines with 1L removed. Plan for next maintenance HD on 6/30 after MRI with janie to decrease risk of NSF. Defer addition of heparin with HD given concerns for GIB. Will monitor hemodynamics on HD and if not tolerating, will need to readdress GOC. Monitor electrolytes.    2) HTN with ESRD: BP low normal. Continue with midodrine 10 mg PO pre-HD to avoid intradialytic hypotension. Monitor BP.    3) Anemia of renal disease: Hb low and patient occult positive. No IV iron given elevated ferritin. Continue with Epo 14K with HD. PRBC prn. Monitor Hb.    4) Secondary HPT of renal origin: Serum phos improving. Holding sensipar 30 mg daily given low normal iPTH and will use low calcium bath with HD. Monitor serum calcium and phosphorus.    Poor prognosis.     [facility] NEPHROLOGY  Armando Shaffer M.D.  Kendall Morales D.O.  Lilliam Cody M.D.  Oxana Hernandez, MSN, ANP-C  [phone]  Fax: [phone]    [facility] [place] [address], [address]  [place] [address]

## 2023-06-29 NOTE — PROVIDER CONTACT NOTE (OTHER) - ACTION/TREATMENT ORDERED:
As per Dr. Morales, will reschedule HD treatment for 6/21
As per provider Marcia Quijano, will come to the bedside to complete the blood transfusion consent. RN will continue to monitor.
none at this time
ACP notified. Will continue to monitor.
SANTHOSH Casas notified. Benefits outweigh risks. PIV to be placed above DVT in L upper extremity.
ACP notified. EKG performed. Metoprolol administered.
As per provider Marcia Quijano, will come to the bedside to complete the blood transfusion consent. RN will continue to monitor.
CLAUDIA Motta ordered RN to hold tube feeds while waiting for chest xray to verify tube feed placement.
ACP notified. Morning labs to be drawn.
IV Tylenol ordered.
RRT called
As per provider Marcia Quijano ([employer]), will change pantoprazole tablet does to suspension. RN will continue to monitor.
As per provider Marcia Quijano, contact IV nurse to insert a new IV and then administer blood as ordered. RN will continue to monitor.
As per provider Marcia Quijano, no further interventions needed at this time. RN will continue to monitor.
Unit manager notified. all meds held and FS were done on the earlobe as per provider.
none at this time

## 2023-06-29 NOTE — PROGRESS NOTE ADULT - SUBJECTIVE AND OBJECTIVE BOX
Patient is a 77y old  Male who presents with a chief complaint of needs HD (29 Jun 2023 15:15)      INTERVAL HPI/OVERNIGHT EVENTS: s/p RRT for hypoxic resp failure , Intubated and transfer to MICU   T(C): 36.8 (06-29-23 @ 20:00), Max: 38 (06-29-23 @ 09:58)  HR: 107 (06-29-23 @ 22:00) (82 - 112)  BP: 75/21 (06-29-23 @ 12:15) (74/46 - 126/73)  RR: 15 (06-29-23 @ 22:00) (12 - 25)  SpO2: 100% (06-29-23 @ 22:00) (99% - 100%)  Wt(kg): --  I&O's Summary    28 Jun 2023 07:01  -  29 Jun 2023 07:00  --------------------------------------------------------  IN: 1000 mL / OUT: 2000 mL / NET: -1000 mL    29 Jun 2023 07:01  -  29 Jun 2023 22:44  --------------------------------------------------------  IN: 1701.9 mL / OUT: 0 mL / NET: 1701.9 mL        PAST MEDICAL & SURGICAL HISTORY:  HTN (hypertension)      Prostate cancer  prostate cancer      Obesity      PVD (peripheral vascular disease)      CAD (coronary artery disease)  LAD stent      Pre-diabetes      ESRD (end stage renal disease) on dialysis      Essential hypertension      History of DVT of lower extremity      History of prostatectomy      AV fistula  h/o creation in 2005      H/O cardiac catheterization  4/25/2014 non obstructive disease      H/O abdominal surgery  10/10/17          SOCIAL HISTORY  Alcohol:  Tobacco:  Illicit substance use:    FAMILY HISTORY:    REVIEW OF SYSTEMS:  CONSTITUTIONAL: No fever, weight loss, or fatigue  EYES: No eye pain, visual disturbances, or discharge  ENMT:  No difficulty hearing, tinnitus, vertigo; No sinus or throat pain  NECK: No pain or stiffness  RESPIRATORY: No cough, wheezing, chills or hemoptysis; No shortness of breath  CARDIOVASCULAR: No chest pain, palpitations, dizziness, or leg swelling  GASTROINTESTINAL: No abdominal or epigastric pain. No nausea, vomiting, or hematemesis; No diarrhea or constipation. No melena or hematochezia.  GENITOURINARY: No dysuria, frequency, hematuria, or incontinence  NEUROLOGICAL: No headaches, memory loss, loss of strength, numbness, or tremors  SKIN: No itching, burning, rashes, or lesions   LYMPH NODES: No enlarged glands  ENDOCRINE: No heat or cold intolerance; No hair loss  MUSCULOSKELETAL: No joint pain or swelling; No muscle, back, or extremity pain  PSYCHIATRIC: No depression, anxiety, mood swings, or difficulty sleeping  HEME/LYMPH: No easy bruising, or bleeding gums  ALLERY AND IMMUNOLOGIC: No hives or eczema    RADIOLOGY & ADDITIONAL TESTS:    Imaging Personally Reviewed:  [ ] YES  [ ] NO    Consultant(s) Notes Reviewed:  [ ] YES  [ ] NO    PHYSICAL EXAM:  GENERAL: NAD, well-groomed, well-developed  HEAD:  Atraumatic, Normocephalic  EYES: EOMI, PERRLA, conjunctiva and sclera clear  ENMT: No tonsillar erythema, exudates, or enlargement; Moist mucous membranes, Good dentition, No lesions  NECK: Supple, No JVD, Normal thyroid  NERVOUS SYSTEM:  Alert & Oriented X3, Good concentration; Motor Strength 5/5 B/L upper and lower extremities; DTRs 2+ intact and symmetric  CHEST/LUNG: Clear to percussion bilaterally; No rales, rhonchi, wheezing, or rubs  HEART: Regular rate and rhythm; No murmurs, rubs, or gallops  ABDOMEN: Soft, Nontender, Nondistended; Bowel sounds present  EXTREMITIES:  2+ Peripheral Pulses, No clubbing, cyanosis, or edema  LYMPH: No lymphadenopathy noted  SKIN: No rashes or lesions    LABS:                        10.2   27.26 )-----------( 337      ( 29 Jun 2023 19:28 )             31.9     06-29    138  |  100  |  47<H>  ----------------------------<  251<H>  4.2   |  22  |  3.96<H>    Ca    9.7      29 Jun 2023 19:28  Phos  4.3     06-29  Mg     2.20     06-29    TPro  6.9  /  Alb  2.5<L>  /  TBili  0.7  /  DBili  x   /  AST  29  /  ALT  <5<L>  /  AlkPhos  117  06-29    PT/INR - ( 29 Jun 2023 14:22 )   PT: 17.5 sec;   INR: 1.50 ratio         PTT - ( 29 Jun 2023 05:35 )  PTT:28.5 sec  Urinalysis Basic - ( 29 Jun 2023 19:28 )    Color: x / Appearance: x / SG: x / pH: x  Gluc: 251 mg/dL / Ketone: x  / Bili: x / Urobili: x   Blood: x / Protein: x / Nitrite: x   Leuk Esterase: x / RBC: x / WBC x   Sq Epi: x / Non Sq Epi: x / Bacteria: x      CAPILLARY BLOOD GLUCOSE      POCT Blood Glucose.: 96 mg/dL (29 Jun 2023 10:25)  POCT Blood Glucose.: 165 mg/dL (29 Jun 2023 05:42)  POCT Blood Glucose.: 50 mg/dL (29 Jun 2023 05:18)  POCT Blood Glucose.: 89 mg/dL (29 Jun 2023 00:46)  POCT Blood Glucose.: 93 mg/dL (28 Jun 2023 23:24)    ABG - ( 29 Jun 2023 19:28 )  pH, Arterial: 7.34  pH, Blood: x     /  pCO2: 47    /  pO2: 157   / HCO3: 25    / Base Excess: -0.7  /  SaO2: 99.2                Urinalysis Basic - ( 29 Jun 2023 19:28 )    Color: x / Appearance: x / SG: x / pH: x  Gluc: 251 mg/dL / Ketone: x  / Bili: x / Urobili: x   Blood: x / Protein: x / Nitrite: x   Leuk Esterase: x / RBC: x / WBC x   Sq Epi: x / Non Sq Epi: x / Bacteria: x        MEDICATIONS  (STANDING):  cefepime   IVPB      chlorhexidine 2% Cloths 1 Application(s) Topical daily  dextrose 5%. 1000 milliLiter(s) (100 mL/Hr) IV Continuous <Continuous>  dextrose 5%. 1000 milliLiter(s) (50 mL/Hr) IV Continuous <Continuous>  dextrose 50% Injectable 25 Gram(s) IV Push once  dextrose 50% Injectable 12.5 Gram(s) IV Push once  dextrose 50% Injectable 25 Gram(s) IV Push once  epoetin shell (EPOGEN) Injectable 57100 Unit(s) IV Push <User Schedule>  midodrine. 10 milliGRAM(s) Oral <User Schedule>  nitroglycerin    2% Ointment 1 Inch(s) Transdermal once  norepinephrine Infusion 1 MICROgram(s)/kG/Min (74.8 mL/Hr) IV Continuous <Continuous>  pantoprazole  Injectable 80 milliGRAM(s) IV Push once  pantoprazole Infusion 8 mG/Hr (10 mL/Hr) IV Continuous <Continuous>  propofol Infusion 30 MICROgram(s)/kG/Min (14.4 mL/Hr) IV Continuous <Continuous>  vasopressin Infusion 0.04 Unit(s)/Min (6 mL/Hr) IV Continuous <Continuous>    MEDICATIONS  (PRN):  dextrose Oral Gel 15 Gram(s) Oral once PRN Blood Glucose LESS THAN 70 milliGRAM(s)/deciliter  lidocaine   4% Patch 1 Patch Transdermal daily PRN left shoulder pain  lidocaine   4% Patch 1 Patch Transdermal daily PRN right knee pain  sodium chloride 0.9% Bolus. 100 milliLiter(s) IV Bolus every 5 minutes PRN SBP LESS THAN or EQUAL to 90 mmHg      Care Discussed with Consultants/Other Providers [ ] YES  [ ] NO

## 2023-06-29 NOTE — PROGRESS NOTE ADULT - ASSESSMENT
77  year old male with hx of cad , ? PCI, HTN, esrd, HTN, DM, afib presents for HD.    #CAD  -cv stable no cp   -no chf on exam   -no obvious angina, decomp HF  -ECHO w moderate segm LV dysfunction along LAD distribution suggestive of prior infarct  -in light of age, fxnl status, anemia req w/u, and mental status not a candidate for further ischemic eval/cath  -would continue medical tx of cmp  -cont PO lopressor 25 mg BID   -cont mido to support BP   -cont current meds, on asa     #anemia  -+ occult. work up per GI/med  -s/p egd with treated ulcer  -gi f/u, ppi    #Afib   -not on ac  -cont to hold in light of egd findings, anemia req prbcs, fall risk   -cont asa  -c/w BB for rate control -- rate mildly elevated today--- Missed am dose-- PLEASE GIVE DOSE NOW    # ESRD on HD   -renal fu     #htn   -cont mido as needed    dvt ppx    pall eval noted   77  year old male with hx of cad , ? PCI, HTN, esrd, HTN, DM, afib presents for HD.    #CAD  -cv stable no cp   -no chf on exam   -no obvious angina, decomp HF  -ECHO w moderate segm LV dysfunction along LAD distribution suggestive of prior infarct  -in light of age, fxnl status, anemia req w/u, and mental status not a candidate for further ischemic eval/cath  -would continue medical tx of cmp  -cont PO lopressor 25 mg BID   -cont mido to support BP   -cont current meds, on asa   -sp rrt 6/29 noted- elevated trop noted in the setting of ESRD, ANEMIA    #anemia  -+ occult. work up per GI/med  -s/p egd with treated ulcer  -gi f/u, ppi  -sp rrt 6/29- hypotensive- sp 1 unit PRBC     #Afib   -not on ac  -cont to hold in light of egd findings, anemia req prbcs, fall risk   -cont asa  -c/w BB for rate control -- rate mildly elevated today--- Missed am dose-- PLEASE GIVE DOSE NOW if BP stable     # ESRD on HD   -renal fu     #htn   -cont mido as needed    dvt ppx    pall eval noted

## 2023-06-29 NOTE — PROGRESS NOTE ADULT - ASSESSMENT
A/P    Hypoxic resp failure s/p intubation   s/p RRT for hypoxia   now transfer to MICU      Problem/Plan - 1:  ·  Problem: Bacteremia.   repeat Blood c/s neg   ID following   unable to get any PIV , so plan is to give Abx Cefazolin during HD days as per ID recommendation till 7/11/23     # Anemia of chronic disease./ PUD  - with noted (+)FOBT, possible GIB component   -NGT feeding   feeding held today 2/2 coughing   IR/GI for possible PEG   - IV and oral hydration, monitor hypoglycemia    #CAD/ Elevated troponin./ Atrial flutter   -cardio following   conservative rx 2/2 multiple comorbid condition   not candidate for AC     #ESRD on hemodialysis.   -appreciate renal recs  interviewed/examined at HD  monitor I/Os, daily weights    # Prostate cancer.   - s/p suprapubic cath.    # Hypothyroid.   c/w synthroid     # Dementia.   supportive care     # Hyperparathyroidism due to ESRD on dialysis.   ·  Plan: Resume sensipar 60 mg daily and home phosphorus binders.   Monitor serum calcium and phosphorus.    dispo: intubated and transfer to MICU , remain full code

## 2023-06-29 NOTE — PROVIDER CONTACT NOTE (OTHER) - DATE AND TIME:
09-Jun-2023 18:05
10-Sixto-2023 08:27
12-Jun-2023 03:48
25-Jun-2023 05:21
29-Jun-2023 09:50
09-Jun-2023 14:00
09-Jun-2023 14:49
10-Sixto-2023 00:00
27-Jun-2023 20:35
11-Jun-2023 05:54
20-Jun-2023 20:00
28-Jun-2023 10:30
28-Jun-2023 19:52
10-Sixto-2023 16:04
22-Jun-2023 02:20
21-Jun-2023 13:00
09-Jun-2023 16:30
19-Jun-2023 21:00
29-Jun-2023 04:30

## 2023-06-29 NOTE — RAPID RESPONSE TEAM SUMMARY - NSSITUATIONBACKGROUNDRRT_GEN_ALL_CORE
77-year-old male with history of DVT (previously on coumadin, now stopped), duodenal ulcer, CAD, ESRD (on HD via RUE AVF Tu/Th/Sat), AOCD, HLD, HTN, obesity, pre-diabetes, and prostate cancer s/p suprapubic catheter presenting from [facility] w/need for HD. Also found to have be bacteremic and being treated with IV cefazolin. RRT called for hypotension with SBP in the 60s and hypoxia to the 80s. Pt placed on non-rebreather with improvement in oxygen saturation to 100%. Pt also febrile to 100.4 during RRT. Started on 1L of IVF bolus and urgent prbc ordered. Pt with minimal mental status, responding to pain. Pt also with foul smelling suprapubic catheter on exam. Concern for hypotension 2/2 hypovolemic shock from GI bleed vs septic shock from new infection. MICU consulted and recommend stabilizing BP and intubating patient due to mental status. Pt started on levo gtt with improvement of SBP to the 90s. Labs collected during RRT. Primary team called family, who confirmed full code including pressors. Anesthesia called and patient intubated for airway protection, oxygen saturation remained at 100%. Pt given 1U of prbc, levo increased to 1 and SBP improved to 100s. Pt transferred to MICU for further care. Recommend following up with GI for need for urgent scope, following up labs drawn during RRT, starting broad spectrum abx (Vanc, Zosyn) for concern for new infection causing septic shock.

## 2023-06-29 NOTE — PROVIDER CONTACT NOTE (OTHER) - ASSESSMENT
patient noted to be obtunded, responding only to pain patient noted to be obtunded, responding only to pain, warm to touch

## 2023-06-29 NOTE — PROGRESS NOTE ADULT - SUBJECTIVE AND OBJECTIVE BOX
CC: Patient is a 77y old  Male who presents with a chief complaint of needs HD (29 Jun 2023 11:15)    ID following for GBS bacteremia    Interval History/ROS: patient with clots per rectum and hematemesis. RRT called, pt intubated and in MICU. Febrile.    Rest of ROS negative.    Allergies  penicillin (Rash)    ANTIMICROBIALS:  cefepime   IVPB 1000 once  cefepime   IVPB    vancomycin  IVPB 1000 once    OTHER MEDS:  chlorhexidine 2% Cloths 1 Application(s) Topical daily  dextrose 5%. 1000 milliLiter(s) IV Continuous <Continuous>  dextrose 5%. 1000 milliLiter(s) IV Continuous <Continuous>  dextrose 50% Injectable 25 Gram(s) IV Push once  dextrose 50% Injectable 12.5 Gram(s) IV Push once  dextrose 50% Injectable 25 Gram(s) IV Push once  dextrose Oral Gel 15 Gram(s) Oral once PRN  epoetin shell (EPOGEN) Injectable 73509 Unit(s) IV Push <User Schedule>  lidocaine   4% Patch 1 Patch Transdermal daily PRN  lidocaine   4% Patch 1 Patch Transdermal daily PRN  midodrine. 10 milliGRAM(s) Oral <User Schedule>  nitroglycerin    2% Ointment 1 Inch(s) Transdermal once  norepinephrine Infusion 1 MICROgram(s)/kG/Min IV Continuous <Continuous>  pantoprazole  Injectable 80 milliGRAM(s) IV Push once  pantoprazole Infusion 8 mG/Hr IV Continuous <Continuous>  propofol Infusion 30 MICROgram(s)/kG/Min IV Continuous <Continuous>  sodium chloride 0.9% Bolus. 100 milliLiter(s) IV Bolus every 5 minutes PRN  vasopressin Infusion 0.04 Unit(s)/Min IV Continuous <Continuous>    PE:    Vital Signs Last 24 Hrs  T(C): 38 (29 Jun 2023 09:59), Max: 38 (29 Jun 2023 09:58)  T(F): 100.4 (29 Jun 2023 09:59), Max: 100.4 (29 Jun 2023 09:58)  HR: 96 (29 Jun 2023 13:00) (80 - 102)  BP: 113/90 (29 Jun 2023 12:00) (74/46 - 126/73)  BP(mean): 94 (29 Jun 2023 12:00) (81 - 94)  RR: 21 (29 Jun 2023 12:00) (17 - 25)  SpO2: 100% (29 Jun 2023 12:00) (99% - 100%)    Parameters below as of 29 Jun 2023 12:00  Patient On (Oxygen Delivery Method): ventilator    O2 Concentration (%): 100    Gen: Intubated, NAD  CV: S1+S2 normal, no murmurs  Resp: Coarse breath sounds  Abd: Soft, nontender, +BS  Ext: No LE edema, no wounds  : No Martínez  IV/Skin: No thrombophlebitis  Neuro: no focal deficits    LABS:                          9.5    21.17 )-----------( 334      ( 29 Jun 2023 13:05 )             29.8       06-29    139  |  101  |  45<H>  ----------------------------<  153<H>  4.3   |  24  |  3.75<H>    Ca    10.2      29 Jun 2023 13:05  Phos  4.3     06-29  Mg     2.20     06-29    TPro  6.9  /  Alb  2.3<L>  /  TBili  0.8  /  DBili  x   /  AST  35  /  ALT  <5<L>  /  AlkPhos  119  06-29      Urinalysis Basic - ( 29 Jun 2023 13:05 )    Color: x / Appearance: x / SG: x / pH: x  Gluc: 153 mg/dL / Ketone: x  / Bili: x / Urobili: x   Blood: x / Protein: x / Nitrite: x   Leuk Esterase: x / RBC: x / WBC x   Sq Epi: x / Non Sq Epi: x / Bacteria: x        MICROBIOLOGY:  v  .Blood Blood-Peripheral  06-15-23   No Growth Final  --  --      .Blood Blood-Peripheral  06-15-23   No Growth Final  --  --      .Blood Blood-Venous  06-13-23   No Growth Final  --  --      .Blood Blood  06-13-23   No Growth Final  --  --      .Blood Blood  06-11-23   Growth in aerobic and anaerobic bottles: Streptococcus agalactiae (Group  B)  See previous culture [id number]  --    Growth in anaerobic bottle: Gram positive cocci in pairs  Growth in aerobic bottle: Gram positive cocci in pairs      .Blood Blood  06-11-23   Growth in aerobic and anaerobic bottles: Streptococcus agalactiae (Group  B)  ***Blood Panel PCR results on this specimen are available  approximately 3 hours after the Gram stain result.***  Gram stain, PCR, and/or culture results may not always  correspond due to difference in methodologies.  ************************************************************  This PCR assay was performed by multiplex PCR. This  Assay tests for 66 bacterial and resistance gene targets.  Please refer to the [facility] test directory  at [email or web address] for details.  --  Blood Culture PCR  Streptococcus agalactiae (Group B)    RADIOLOGY:    < from: Xray Chest 1 View- PORTABLE-Urgent (Xray Chest 1 View- PORTABLE-Urgent .) (06.29.23 @ 13:33) >  FINDINGS:  An endotracheal tube has been placed since the last study and its tip is   above the latonya. Enteric tube entering the stomach.    The left IJ line has entered the right subclavian vein instead of the SVC   and adjustment recommended.    Lungs show no change and there is no effusion or pneumothorax.    < end of copied text >

## 2023-06-29 NOTE — CHART NOTE - NSCHARTNOTEFT_GEN_A_CORE
Called by RN, patient passed clots per rectum this morning. Patient remained at baseline mental status. Upon Called by RN, patient passed clots per rectum this morning followed by an episode of hematemesis. Patient remained at baseline mental status. RRT called for hypotension to SBP 70's upon arrival. S/p 1 unit of PRBC with pressure bag. S/p 80 Protonix followed by Protonix gtt. Stat labs obtained with drop in hemoglobin from 8.3 to 7.1. Patient has Hx of duodenal ulcer and esophagitis with GI signed off on patient. Heparin SQ and ASA is now discontinued. Will continue with CBC q 6 hours.   Dr. Mccormick and family notified. Family would still like to keep patient is full code. Will continue to have Goals of care discussions.     SANTHOSH Butt  Department of Medicine   In House # [id number]

## 2023-06-29 NOTE — CHART NOTE - NSCHARTNOTEFT_GEN_A_CORE
Medicine Accept Note    Transfer from: ( x ) Medicine    (  ) Telemetry     (   ) RCU        (    ) Palliative         (   ) Stroke Unit          (   ) __________________    Accepting physician: Dr. SEALS:    SUBJECTIVE DATA:    OBJECTIVE DATA:    Vital Signs Last 24 Hrs  T(C): 38 (29 Jun 2023 09:59), Max: 38 (29 Jun 2023 09:58)  T(F): 100.4 (29 Jun 2023 09:59), Max: 100.4 (29 Jun 2023 09:58)  HR: 95 (29 Jun 2023 09:59) (78 - 102)  BP: 99/80 (29 Jun 2023 09:59) (74/46 - 126/73)  BP(mean): --  RR: 24 (29 Jun 2023 09:59) (17 - 25)  SpO2: 100% (29 Jun 2023 09:59) (100% - 100%)    Parameters below as of 29 Jun 2023 09:59  Patient On (Oxygen Delivery Method): room air      I&O's Summary    28 Jun 2023 07:01  -  29 Jun 2023 07:00  --------------------------------------------------------  IN: 1000 mL / OUT: 2000 mL / NET: -1000 mL        Allergies:      MEDICATIONS  (STANDING):  ceFAZolin   IVPB 2000 milliGRAM(s) IV Intermittent <User Schedule>  ceFAZolin   IVPB 3000 milliGRAM(s) IV Intermittent <User Schedule>  chlorhexidine 2% Cloths 1 Application(s) Topical daily  dextrose 5%. 1000 milliLiter(s) (100 mL/Hr) IV Continuous <Continuous>  dextrose 5%. 1000 milliLiter(s) (50 mL/Hr) IV Continuous <Continuous>  dextrose 50% Injectable 25 Gram(s) IV Push once  dextrose 50% Injectable 12.5 Gram(s) IV Push once  dextrose 50% Injectable 25 Gram(s) IV Push once  donepezil 5 milliGRAM(s) Oral at bedtime  epoetin shell (EPOGEN) Injectable 48742 Unit(s) IV Push <User Schedule>  lactated ringers. 1000 milliLiter(s) (1000 mL/Hr) IV Continuous <Continuous>  metoprolol tartrate 25 milliGRAM(s) Oral two times a day  midodrine 15 milliGRAM(s) Oral once  midodrine. 10 milliGRAM(s) Oral <User Schedule>  pantoprazole  Injectable 80 milliGRAM(s) IV Push once  pantoprazole Infusion 8 mG/Hr (10 mL/Hr) IV Continuous <Continuous>    MEDICATIONS  (PRN):  acetaminophen     Tablet .. 650 milliGRAM(s) Oral every 6 hours PRN Mild Pain (1 - 3), Moderate Pain (4 - 6)  dextrose Oral Gel 15 Gram(s) Oral once PRN Blood Glucose LESS THAN 70 milliGRAM(s)/deciliter  guaiFENesin Oral Liquid (Sugar-Free) 100 milliGRAM(s) Oral every 6 hours PRN Cough  lidocaine   4% Patch 1 Patch Transdermal daily PRN left shoulder pain  lidocaine   4% Patch 1 Patch Transdermal daily PRN right knee pain  oxyCODONE    IR 5 milliGRAM(s) Oral every 6 hours PRN Severe Pain (7 - 10)  sodium chloride 0.9% Bolus. 100 milliLiter(s) IV Bolus every 5 minutes PRN SBP LESS THAN or EQUAL to 90 mmHg      LABS                                            8.6                   Neurophils% (auto):   x      (06-29 @ 10:30):    16.49)-----------(319          Lymphocytes% (auto):  x                                             28.3                   Eosinphils% (auto):   x        Manual%: Neutrophils x    ; Lymphocytes x    ; Eosinophils x    ; Bands%: x    ; Blasts x                                    138    |  100    |  34                  Calcium: 9.3   / iCa: x      (06-29 @ 05:35)    ----------------------------<  225       Magnesium: 2.40                             3.7     |  26     |  3.55             Phosphorous: 3.9        ( 06-29 @ 05:35 )   PT: 16.6 sec;   INR: 1.43 ratio  aPTT: 28.5 sec      EKG:  Telemetry:  Echo:  Cardiac Cath:  Stress Test:  Imaging    ASSESSMENT & PLAN: MICU Transfer Accept Note    Transfer from: ( x ) Medicine    (  ) Telemetry     (   ) RCU        (    ) Palliative         (   ) Stroke Unit          (   ) __________________    Accepting physician: Dr. Espinoza      HPI:  MAGGIE THAO is a 77y Male with a hx of HTN, HLD, ESRD, CAD, AFib, T2DM, and hx of prostate ca who initially presented for hemodialysis.       PMH:  HTN (hypertension)  HLD (hyperlipidemia)  ESRD (end stage renal disease) on dialysis  Prostate cancer  Obesity  Atrial fibrillation  PVD (peripheral vascular disease)  CAD (coronary artery disease)  ESRD (end stage renal disease) on dialysis  Paroxysmal atrial fibrillation  Essential hypertension  Type 2 diabetes mellitus without complication, without long-term current use of insulin  History of DVT of lower extremity      Surgical Hx:  History of prostatectomy  AV fistula  AV fistula  H/O cardiac catheterization  No significant past surgical history  H/O abdominal surgery      Allergies:  penicillin (Rash)      Medications:  ceFAZolin   IVPB 2000 milliGRAM(s) IV Intermittent <User Schedule>  ceFAZolin   IVPB 3000 milliGRAM(s) IV Intermittent <User Schedule>  chlorhexidine 2% Cloths 1 Application(s) Topical daily  dextrose 5%. 1000 milliLiter(s) IV Continuous <Continuous>  dextrose 5%. 1000 milliLiter(s) IV Continuous <Continuous>  dextrose 50% Injectable 25 Gram(s) IV Push once  dextrose 50% Injectable 12.5 Gram(s) IV Push once  dextrose 50% Injectable 25 Gram(s) IV Push once  dextrose Oral Gel 15 Gram(s) Oral once PRN  donepezil 5 milliGRAM(s) Oral at bedtime  epoetin shell (EPOGEN) Injectable 29811 Unit(s) IV Push <User Schedule>  lidocaine   4% Patch 1 Patch Transdermal daily PRN  lidocaine   4% Patch 1 Patch Transdermal daily PRN  metoprolol tartrate 25 milliGRAM(s) Oral two times a day  midodrine. 10 milliGRAM(s) Oral <User Schedule>  norepinephrine Infusion 1 MICROgram(s)/kG/Min IV Continuous <Continuous>  oxyCODONE    IR 5 milliGRAM(s) Oral every 6 hours PRN  pantoprazole  Injectable 80 milliGRAM(s) IV Push once  pantoprazole Infusion 8 mG/Hr IV Continuous <Continuous>  propofol Infusion 30 MICROgram(s)/kG/Min IV Continuous <Continuous>  sodium chloride 0.9% Bolus. 100 milliLiter(s) IV Bolus every 5 minutes PRN      OBJECTIVE:    Vitals:  T(C): 38 (06-29-23 @ 09:59), Max: 38 (06-29-23 @ 09:58)  HR: 82 (06-29-23 @ 11:34) (80 - 102)  BP: 99/80 (06-29-23 @ 09:59) (74/46 - 126/73)  RR: 24 (06-29-23 @ 09:59) (17 - 25)  SpO2: 99% (06-29-23 @ 11:34) (99% - 100%)    I/O's:  06-28-23 @ 07:01  -  06-29-23 @ 07:00  --------------------------------------------------------  IN: 1000 mL / OUT: 2000 mL / NET: -1000 mL    Physical Exam:  CONSTITUTIONAL: Well-appearing, no apparent distress.  SKIN: No rashes or ecchymosis.  EYES: PERRLA. EOMI. No scleral icterus.  ENT: No JVD. Supple, no thyromegaly. No discharge or glossitis. Oral mucosa with moist membranes. Hearing intact bilaterally.  LYMPH: No lymphadenopathy.  CV: RRR. Normal S1 and S2. No murmurs, rubs, or gallops. No edema. Pulses equal bilaterally.  PULM: Clear to auscultation throughout. Respirations unlabored. No wheezing, rales, or rhonchi.  GI: Soft, non-tender. No palpable masses. No hematosplenomegaly. No abnormal bowel sounds.  MSK: Normal gait. No cyanosis or clubbing. Normal ROM. No spinal tenderness.  NEURO: CN II-XII intact. Strength 5/5 throughout. Sensation normal throughout. Reflexes +2 throughout.  PSYCH: A+O x3. Appropriate insight/judgment. Mood and affect appropriate. Recent/remote memory intact.    Labs:                        8.6    16.49 )-----------( 319      ( 29 Jun 2023 10:30 )             28.3     06-29    135  |  97<L>  |  39<H>  ----------------------------<  104<H>  4.8   |  21<L>  |  3.72<H>    Ca    9.5      29 Jun 2023 10:30  Phos  4.0     06-29  Mg     2.20     06-29    TPro  7.0  /  Alb  1.9<L>  /  TBili  0.7  /  DBili  x   /  AST  50<H>  /  ALT  <5  /  AlkPhos  115  06-29      Radiology/Procedures:      ASSESSMENT/PLAN:  MAGGIE THAO is a 77y Male with a hx of *** who presents with ***    NEURO:      CARDIOVASCULAR:      PULMONARY:  Mode: AC/ CMV (Assist Control/ Continuous Mandatory Ventilation)  RR (machine): 12  TV (machine): 380  FiO2: 100  PEEP: 5  ITime: 0.6  MAP: 10  PIP: 21      GI:  Diet:     RENAL:    06-28-23 @ 07:01  -  06-29-23 @ 07:00  --------------------------------------------------------  IN: 1000 mL / OUT: 2000 mL / NET: -1000 mL        ENDO:      HEME/ONC/RHEUM:      ID:      SKIN/MSK:      ETHICS:  Full Code MICU Transfer Accept Note    Transfer from: ( x ) Medicine    (  ) Telemetry     (   ) RCU        (    ) Palliative         (   ) Stroke Unit          (   ) __________________    Accepting physician: Dr. Espinoza      HPI:  MAGGIE THAO is a 77y Male with a hx of HTN, HLD, ESRD (on HD via RUE AVF Tu/Th/Sat), CAD (previously on coumadin, now stopped), AFib, T2DM, and hx of prostate ca s/p suprapubic catheter who initially presented for hemodialysis on 6/3. Hospital course has been complicated by anemia 2/2 duodenal ulcer and Strep agalactiae bacteremia (start 6/11) now requiring MICU level of care 2/2 hypotension and intubation.       PMH:  HTN (hypertension)  HLD (hyperlipidemia)  ESRD (end stage renal disease) on dialysis  Prostate cancer  Obesity  Atrial fibrillation  PVD (peripheral vascular disease)  CAD (coronary artery disease)  ESRD (end stage renal disease) on dialysis  Paroxysmal atrial fibrillation  Essential hypertension  Type 2 diabetes mellitus without complication, without long-term current use of insulin  History of DVT of lower extremity      Surgical Hx:  History of prostatectomy  AV fistula  AV fistula  H/O cardiac catheterization  No significant past surgical history  H/O abdominal surgery      Allergies:  penicillin (Rash)      Medications:  ceFAZolin   IVPB 2000 milliGRAM(s) IV Intermittent <User Schedule>  ceFAZolin   IVPB 3000 milliGRAM(s) IV Intermittent <User Schedule>  chlorhexidine 2% Cloths 1 Application(s) Topical daily  dextrose 5%. 1000 milliLiter(s) IV Continuous <Continuous>  dextrose 5%. 1000 milliLiter(s) IV Continuous <Continuous>  dextrose 50% Injectable 25 Gram(s) IV Push once  dextrose 50% Injectable 12.5 Gram(s) IV Push once  dextrose 50% Injectable 25 Gram(s) IV Push once  dextrose Oral Gel 15 Gram(s) Oral once PRN  donepezil 5 milliGRAM(s) Oral at bedtime  epoetin shell (EPOGEN) Injectable 46958 Unit(s) IV Push <User Schedule>  lidocaine   4% Patch 1 Patch Transdermal daily PRN  lidocaine   4% Patch 1 Patch Transdermal daily PRN  metoprolol tartrate 25 milliGRAM(s) Oral two times a day  midodrine. 10 milliGRAM(s) Oral <User Schedule>  norepinephrine Infusion 1 MICROgram(s)/kG/Min IV Continuous <Continuous>  oxyCODONE    IR 5 milliGRAM(s) Oral every 6 hours PRN  pantoprazole  Injectable 80 milliGRAM(s) IV Push once  pantoprazole Infusion 8 mG/Hr IV Continuous <Continuous>  propofol Infusion 30 MICROgram(s)/kG/Min IV Continuous <Continuous>  sodium chloride 0.9% Bolus. 100 milliLiter(s) IV Bolus every 5 minutes PRN      OBJECTIVE:    Vitals:  T(C): 38 (06-29-23 @ 09:59), Max: 38 (06-29-23 @ 09:58)  HR: 82 (06-29-23 @ 11:34) (80 - 102)  BP: 99/80 (06-29-23 @ 09:59) (74/46 - 126/73)  RR: 24 (06-29-23 @ 09:59) (17 - 25)  SpO2: 99% (06-29-23 @ 11:34) (99% - 100%)    I/O's:  06-28-23 @ 07:01  -  06-29-23 @ 07:00  --------------------------------------------------------  IN: 1000 mL / OUT: 2000 mL / NET: -1000 mL    Physical Exam:  CONSTITUTIONAL: Well-appearing, no apparent distress.  SKIN: No rashes or ecchymosis.  EYES: PERRLA. EOMI. No scleral icterus.  ENT: No JVD. Supple, no thyromegaly. No discharge or glossitis. Oral mucosa with moist membranes. Hearing intact bilaterally.  LYMPH: No lymphadenopathy.  CV: RRR. Normal S1 and S2. No murmurs, rubs, or gallops. No edema. Pulses equal bilaterally.  PULM: Clear to auscultation throughout. Respirations unlabored. No wheezing, rales, or rhonchi.  GI: Soft, non-tender. No palpable masses. No hematosplenomegaly. No abnormal bowel sounds.  MSK: Normal gait. No cyanosis or clubbing. Normal ROM. No spinal tenderness.  NEURO: CN II-XII intact. Strength 5/5 throughout. Sensation normal throughout. Reflexes +2 throughout.  PSYCH: A+O x3. Appropriate insight/judgment. Mood and affect appropriate. Recent/remote memory intact.    Labs:                        8.6    16.49 )-----------( 319      ( 29 Jun 2023 10:30 )             28.3     06-29    135  |  97<L>  |  39<H>  ----------------------------<  104<H>  4.8   |  21<L>  |  3.72<H>    Ca    9.5      29 Jun 2023 10:30  Phos  4.0     06-29  Mg     2.20     06-29    TPro  7.0  /  Alb  1.9<L>  /  TBili  0.7  /  DBili  x   /  AST  50<H>  /  ALT  <5  /  AlkPhos  115  06-29      Radiology/Procedures:      ASSESSMENT/PLAN:  MAGGIE THAO is a 77y Male with a hx of *** who presents with ***    NEURO:      CARDIOVASCULAR:      PULMONARY:  Mode: AC/ CMV (Assist Control/ Continuous Mandatory Ventilation)  RR (machine): 12  TV (machine): 380  FiO2: 100  PEEP: 5  ITime: 0.6  MAP: 10  PIP: 21      GI:  Diet:     RENAL:    06-28-23 @ 07:01  -  06-29-23 @ 07:00  --------------------------------------------------------  IN: 1000 mL / OUT: 2000 mL / NET: -1000 mL        ENDO:      HEME/ONC/RHEUM:      ID:      SKIN/MSK:      ETHICS:  Full Code MICU Transfer Accept Note    Transfer from: ( x ) Medicine    (  ) Telemetry     (   ) RCU        (    ) Palliative         (   ) Stroke Unit          (   ) __________________    Accepting physician: Dr. Espinoza      HPI:  MAGGIE THAO is a 77y Male with a hx of HTN, HLD, ESRD (on HD via RUE AVF Tu/Th/Sat), CAD (previously on coumadin, now stopped), AFib, T2DM, and hx of prostate ca s/p suprapubic catheter who initially presented for hemodialysis on 6/3. Hospital course has been complicated by anemia 2/2 duodenal ulcer and Strep agalactiae bacteremia (start 6/11) now requiring MICU level of care 2/2 hypotension and intubation.     Rapid response 6/29 early morning for hypotension to systolic 60-70s, tachycardic to 100s, and febrile. Given 1 amp D50. Patient was obtunded and required rebreather throughout the RRT. There is concern for hypotension 2/2 GIB as patient had episode of hematemesis and clots in his stool. HgB decreased from 8.2 yday to 7.1. Additionally patient was given 1u pRBC and IV pantoprazole 80mg. BP improved to 113/81 with improvement in HR. EKG concerning for new BBB.     2nd rapid response called 6/29 late morning for respiratory failure and patient was emergently intubated, started on pressors, and transferred to the MICU.    PMH:  HTN (hypertension)  HLD (hyperlipidemia)  ESRD (end stage renal disease) on dialysis  Prostate cancer  Obesity  Atrial fibrillation  PVD (peripheral vascular disease)  CAD (coronary artery disease)  ESRD (end stage renal disease) on dialysis  Paroxysmal atrial fibrillation  Essential hypertension  Type 2 diabetes mellitus without complication, without long-term current use of insulin  History of DVT of lower extremity      Surgical Hx:  History of prostatectomy  AV fistula  AV fistula  H/O cardiac catheterization  No significant past surgical history  H/O abdominal surgery      Allergies:  penicillin (Rash)      Medications:  ceFAZolin   IVPB 2000 milliGRAM(s) IV Intermittent <User Schedule>  ceFAZolin   IVPB 3000 milliGRAM(s) IV Intermittent <User Schedule>  chlorhexidine 2% Cloths 1 Application(s) Topical daily  dextrose 5%. 1000 milliLiter(s) IV Continuous <Continuous>  dextrose 5%. 1000 milliLiter(s) IV Continuous <Continuous>  dextrose 50% Injectable 25 Gram(s) IV Push once  dextrose 50% Injectable 12.5 Gram(s) IV Push once  dextrose 50% Injectable 25 Gram(s) IV Push once  dextrose Oral Gel 15 Gram(s) Oral once PRN  donepezil 5 milliGRAM(s) Oral at bedtime  epoetin shell (EPOGEN) Injectable 07446 Unit(s) IV Push <User Schedule>  lidocaine   4% Patch 1 Patch Transdermal daily PRN  lidocaine   4% Patch 1 Patch Transdermal daily PRN  metoprolol tartrate 25 milliGRAM(s) Oral two times a day  midodrine. 10 milliGRAM(s) Oral <User Schedule>  norepinephrine Infusion 1 MICROgram(s)/kG/Min IV Continuous <Continuous>  oxyCODONE    IR 5 milliGRAM(s) Oral every 6 hours PRN  pantoprazole  Injectable 80 milliGRAM(s) IV Push once  pantoprazole Infusion 8 mG/Hr IV Continuous <Continuous>  propofol Infusion 30 MICROgram(s)/kG/Min IV Continuous <Continuous>  sodium chloride 0.9% Bolus. 100 milliLiter(s) IV Bolus every 5 minutes PRN      OBJECTIVE:    Vitals:  T(C): 38 (06-29-23 @ 09:59), Max: 38 (06-29-23 @ 09:58)  HR: 82 (06-29-23 @ 11:34) (80 - 102)  BP: 99/80 (06-29-23 @ 09:59) (74/46 - 126/73)  RR: 24 (06-29-23 @ 09:59) (17 - 25)  SpO2: 99% (06-29-23 @ 11:34) (99% - 100%)    I/O's:  06-28-23 @ 07:01  -  06-29-23 @ 07:00  --------------------------------------------------------  IN: 1000 mL / OUT: 2000 mL / NET: -1000 mL    Physical Exam:  CONSTITUTIONAL: Well-appearing, no apparent distress.  SKIN: No rashes or ecchymosis.  EYES: PERRLA. EOMI. No scleral icterus.  ENT: No JVD. Supple, no thyromegaly. No discharge or glossitis. Oral mucosa with moist membranes. Hearing intact bilaterally.  LYMPH: No lymphadenopathy.  CV: RRR. Normal S1 and S2. No murmurs, rubs, or gallops. No edema. Pulses equal bilaterally.  PULM: Clear to auscultation throughout. Respirations unlabored. No wheezing, rales, or rhonchi.  GI: Soft, non-tender. No palpable masses. No hematosplenomegaly. No abnormal bowel sounds.  MSK: Normal gait. No cyanosis or clubbing. Normal ROM. No spinal tenderness.  NEURO: CN II-XII intact. Strength 5/5 throughout. Sensation normal throughout. Reflexes +2 throughout.  PSYCH: A+O x3. Appropriate insight/judgment. Mood and affect appropriate. Recent/remote memory intact.    Labs:                        8.6    16.49 )-----------( 319      ( 29 Jun 2023 10:30 )             28.3     06-29    135  |  97<L>  |  39<H>  ----------------------------<  104<H>  4.8   |  21<L>  |  3.72<H>    Ca    9.5      29 Jun 2023 10:30  Phos  4.0     06-29  Mg     2.20     06-29    TPro  7.0  /  Alb  1.9<L>  /  TBili  0.7  /  DBili  x   /  AST  50<H>  /  ALT  <5  /  AlkPhos  115  06-29      Radiology/Procedures:      ASSESSMENT/PLAN:  MAGGIE THAO is a 77y Male with a hx of HTN, HLD, ESRD (on HD via RUE AVF Tu/Th/Sat), CAD (previously on coumadin, now stopped), AFib, T2DM, and hx of prostate ca s/p suprapubic catheter who initially presented for hemodialysis on 6/3.    NEURO:  #Dementia  AxO x1 at baseline. Sedated and intubated 6/29.  - Propofol   - Donepezil 5mg daily    CARDIOVASCULAR:  #Shock  Likely 2/2 sepsis. +Strep agalactiae on previous cultures, however recent cultures had no growth.  - Abx regimen as below  - Vasopressin  - Norepinephrine  - Midodrine    #Elevated troponin  #Atrial flutter  #AFib  AFlutter caught on EKG. No chest pain, but another EKG demonstrated ST elevations. Cardiology noted elevated trops most likely 2/2 ESRD and recommended patient be off of anticoagulation 2/2 fall risk.  - Cardiology following    #CAD  TTE demonstrated mod segmental LV dysfunction along LAD distribution suggestive of prior infarct. Not a candidate for further ischemic eval per cards.  - hold lopressor 25mg BID given shock    PULMONARY:  #Intubation  Intubated 6/29 2/2 respiratory failure.    Mode: AC/ CMV (Assist Control/ Continuous Mandatory Ventilation)  RR (machine): 12  TV (machine): 380  FiO2: 100  PEEP: 5  ITime: 0.6  MAP: 10  PIP: 21    GI:  Diet: NPO per GI  #Normocytic anemia 2/2 duodenal ulcer  Noted +FOBT and GI consulted for possible GIB component. EGD demonstrated a duodenal ulcer s/p cauterization of visibile vessel. Started on PPI drip for 72hrs then transitioned to PO doses. GI reconsulted 6/29 for hematemesis, clots in stool, and drop in Hg.  - Transfuse for HgB <8  - per GI, restart to IV PPI gtt  - GI following    RENAL:  #ESRD  On HD via RUE AVF Tu/Th/Sat). Give abx after HD on HD days.  - Nephrology following  - Ego 14K w/ HD    #Prostate cancer s/p suprapubic catheter  #Anuria  Chronic wright within [place] pouch that urology exchanged on 6/10    ENDO:  #Hypothyroid  - continue home synthroid    #Hyperparathyroidism 2/2 ESRD on HD  - Continue sensipar 60mg daily and home phosphorus binders    #T2DM  - patient NPO    HEME/ONC/RHEUM:  #Normocytic anemia 2/2 duodenal ulcer as above    ID:  #Strep agalactiae bacteremia  Found to be bacteremic 6/11 and cultures grew strep agalactiae. Patient started on cefazolin 1g daily until 7/11 (give after HD on HD days) per ID.  - 6/13 and 6/15 BCx neg  - Continue cefazolin IV 1g daily until 7/11 (give after HD on HD days)    SKIN/MSK:  - No acute concerns    ETHICS:  Full Code MICU Transfer Accept Note    Transfer from: ( x ) Medicine    (  ) Telemetry     (   ) RCU        (    ) Palliative         (   ) Stroke Unit          (   ) __________________    Accepting physician: Dr. Espinoza      HPI:  MAGGIE THAO is a 77y Male with a hx of HTN, HLD, ESRD (on HD via RUE AVF Tu/Th/Sat), CAD (previously on coumadin, now stopped), AFib, T2DM, and hx of prostate ca s/p suprapubic catheter who initially presented for hemodialysis on 6/3. Hospital course has been complicated by anemia 2/2 duodenal ulcer and Strep agalactiae bacteremia (start 6/11) now requiring MICU level of care 2/2 hypotension and intubation.     Rapid response 6/29 early morning for hypotension to systolic 60-70s, tachycardic to 100s, and febrile. Given 1 amp D50. Patient was obtunded and required rebreather throughout the RRT. There is concern for hypotension 2/2 GIB as patient had episode of hematemesis and clots in his stool. HgB decreased from 8.2 yday to 7.1. Additionally patient was given 1u pRBC and IV pantoprazole 80mg. BP improved to 113/81 with improvement in HR. EKG concerning for new BBB.     2nd rapid response called 6/29 late morning for respiratory failure and concern for airway protection. Patient was emergently intubated, started on pressors, and transferred to the MICU.    PMH:  HTN (hypertension)  HLD (hyperlipidemia)  ESRD (end stage renal disease) on dialysis  Prostate cancer  Obesity  Atrial fibrillation  PVD (peripheral vascular disease)  CAD (coronary artery disease)  ESRD (end stage renal disease) on dialysis  Paroxysmal atrial fibrillation  Essential hypertension  Type 2 diabetes mellitus without complication, without long-term current use of insulin  History of DVT of lower extremity      Surgical Hx:  History of prostatectomy  AV fistula  AV fistula  H/O cardiac catheterization  No significant past surgical history  H/O abdominal surgery      Allergies:  penicillin (Rash)      Medications:  ceFAZolin   IVPB 2000 milliGRAM(s) IV Intermittent <User Schedule>  ceFAZolin   IVPB 3000 milliGRAM(s) IV Intermittent <User Schedule>  chlorhexidine 2% Cloths 1 Application(s) Topical daily  dextrose 5%. 1000 milliLiter(s) IV Continuous <Continuous>  dextrose 5%. 1000 milliLiter(s) IV Continuous <Continuous>  dextrose 50% Injectable 25 Gram(s) IV Push once  dextrose 50% Injectable 12.5 Gram(s) IV Push once  dextrose 50% Injectable 25 Gram(s) IV Push once  dextrose Oral Gel 15 Gram(s) Oral once PRN  donepezil 5 milliGRAM(s) Oral at bedtime  epoetin shell (EPOGEN) Injectable 93662 Unit(s) IV Push <User Schedule>  lidocaine   4% Patch 1 Patch Transdermal daily PRN  lidocaine   4% Patch 1 Patch Transdermal daily PRN  metoprolol tartrate 25 milliGRAM(s) Oral two times a day  midodrine. 10 milliGRAM(s) Oral <User Schedule>  norepinephrine Infusion 1 MICROgram(s)/kG/Min IV Continuous <Continuous>  oxyCODONE    IR 5 milliGRAM(s) Oral every 6 hours PRN  pantoprazole  Injectable 80 milliGRAM(s) IV Push once  pantoprazole Infusion 8 mG/Hr IV Continuous <Continuous>  propofol Infusion 30 MICROgram(s)/kG/Min IV Continuous <Continuous>  sodium chloride 0.9% Bolus. 100 milliLiter(s) IV Bolus every 5 minutes PRN      OBJECTIVE:    Vitals:  T(C): 38 (06-29-23 @ 09:59), Max: 38 (06-29-23 @ 09:58)  HR: 82 (06-29-23 @ 11:34) (80 - 102)  BP: 99/80 (06-29-23 @ 09:59) (74/46 - 126/73)  RR: 24 (06-29-23 @ 09:59) (17 - 25)  SpO2: 99% (06-29-23 @ 11:34) (99% - 100%)    I/O's:  06-28-23 @ 07:01  -  06-29-23 @ 07:00  --------------------------------------------------------  IN: 1000 mL / OUT: 2000 mL / NET: -1000 mL    Physical Exam:  CONSTITUTIONAL: sedated and intubated.  SKIN: No rashes or ecchymosis.  EYES: PERRLA. No scleral icterus.  ENT: No JVD. Supple, no thyromegaly. No discharge or glossitis. Oral mucosa with moist membranes.  LYMPH: No lymphadenopathy.  CV: RRR. Normal S1 and S2. No murmurs, rubs, or gallops. No edema. Pulses equal bilaterally.  PULM: Clear to auscultation throughout. Respirations unlabored. No wheezing, rales, or rhonchi.  GI: Soft, non-tender. No palpable masses. No hematosplenomegaly.  MSK: No cyanosis or clubbing.  NEURO: sedated.  PSYCH: sedated. AxO x1 at baseline. Dementia.    Labs:                        8.6    16.49 )-----------( 319      ( 29 Jun 2023 10:30 )             28.3     06-29    135  |  97<L>  |  39<H>  ----------------------------<  104<H>  4.8   |  21<L>  |  3.72<H>    Ca    9.5      29 Jun 2023 10:30  Phos  4.0     06-29  Mg     2.20     06-29    TPro  7.0  /  Alb  1.9<L>  /  TBili  0.7  /  DBili  x   /  AST  50<H>  /  ALT  <5  /  AlkPhos  115  06-29      Radiology/Procedures: Reviewed.      ASSESSMENT/PLAN:  MAGGIE THAO is a 77y Male with a hx of HTN, HLD, ESRD (on HD via RUE AVF Tu/Th/Sat), CAD (previously on coumadin, now stopped), AFib, T2DM, and hx of prostate ca s/p suprapubic catheter who initially presented for hemodialysis on 6/3.    NEURO:  #Dementia  AxO x1 at baseline. Sedated and intubated 6/29.  - Propofol   - Hold donepezil 5mg daily    CARDIOVASCULAR:  #Shock  Likely multifactorial including hypovolemic, cardiogenic, and distributive. Recent episode of hematemesis and drop of Hg concerning for recurrent GIB. Bedside echo demonstrated poor heart function. Given fluids, 2u pRBC, and 1u platelets.  - Abx regimen as below  - Vasopressin  - Norepinephrine  - Wean as tolerated    #Elevated troponin  #Atrial flutter  #AFib  AFlutter caught on EKG. No chest pain, but another EKG demonstrated ST elevations. Cardiology noted elevated trops most likely 2/2 ESRD and recommended patient be off of anticoagulation 2/2 fall risk.  - Cardiology following    #CAD  TTE demonstrated mod segmental LV dysfunction along LAD distribution suggestive of prior infarct. Not a candidate for further ischemic eval per cards.  - hold lopressor 25mg BID given shock    PULMONARY:  #Intubation  Intubated 6/29 2/2 respiratory failure and concern for airway protection given AMS.  - wean as tolerated    Mode: AC/ CMV (Assist Control/ Continuous Mandatory Ventilation)  RR (machine): 12  TV (machine): 380  FiO2: 100  PEEP: 5    GI:  Diet: NPO  #Normocytic anemia 2/2 duodenal ulcer  Hg 7.2 on admission. Noted +FOBT and GI consulted for possible GIB component. EGD 6/7 demonstrated a duodenal ulcer s/p cauterization of visibile vessel. Started on PPI drip for 72hrs then transitioned to PO doses. GI reconsulted 6/29 for hematemesis, clots in stool, and drop in Hg.  - Transfuse for HgB <8  - per GI, restart IV PPI gtt  - GI following  - EGD today    RENAL:  #ESRD  On HD via RUE AVF Tu/Th/Sat outpatient. Been receiving HD M/W/F inpatient. Give abx after HD on HD days. Most recent HD on 6/28.  - Nephrology following  - Would need CRRT if dialysis needed. Assess day by day.  - Ego 14K w/ HD    #Prostate cancer s/p suprapubic catheter  #Anuria  Chronic wright within [place] that urology exchanged on 6/10    ENDO:  #Hyperparathyroidism 2/2 ESRD on HD  - Continue sensipar 60mg daily and home phosphorus binders    #T2DM  - FBG q6h  - Low SSI    HEME/ONC/RHEUM:  #Normocytic anemia 2/2 duodenal ulcer as above  - hold ASA in setting of concern of GIB  - given 2u pRBC and 1u platelets 6/29    ID:  #Strep agalactiae bacteremia  Found to be bacteremic 6/11 and cultures grew strep agalactiae. Patient started on cefazolin 1g daily until 7/11 (give after HD on HD days) per ID.  - 6/13 and 6/15 BCx neg  - Obtain BCx  - Discontinue cefazolin  - Start broad spectrum with cefepime 1g daily and vancomycin (pending vanc level) given concern for sepsis contributing to shock  - Vancomycin level with goal of 15-20    SKIN/MSK:  - Monitor for ulcers. Consult wound care if needed    ETHICS:  Full Code.  Discussed with wife (healthcare proxy). MICU Transfer Accept Note    Transfer from: ( x ) Medicine    (  ) Telemetry     (   ) RCU        (    ) Palliative         (   ) Stroke Unit          (   ) __________________    Accepting physician: Dr. Espinoza      HPI:  MAGGIE THAO is a 77y Male with a hx of HTN, HLD, ESRD (on HD via RUE AVF Tu/Th/Sat), CAD (previously on coumadin, now stopped), AFib, T2DM, and hx of prostate ca s/p suprapubic catheter who initially presented for hemodialysis on 6/3. Hospital course has been complicated by anemia 2/2 duodenal ulcer and Strep agalactiae bacteremia (start 6/11) now requiring MICU level of care 2/2 hypotension and intubation.     Rapid response 6/29 early morning for hypotension to systolic 60-70s, tachycardic to 100s, and febrile. Given 1 amp D50. Patient was obtunded and required rebreather throughout the RRT. There is concern for hypotension 2/2 GIB as patient had episode of hematemesis and clots in his stool. HgB decreased from 8.2 yday to 7.1. Additionally patient was given 1u pRBC and IV pantoprazole 80mg. BP improved to 113/81 with improvement in HR. EKG concerning for new BBB.     2nd rapid response called 6/29 late morning for respiratory failure and concern for airway protection. Patient was emergently intubated, started on pressors, and transferred to the MICU.    PMH:  HTN (hypertension)  HLD (hyperlipidemia)  ESRD (end stage renal disease) on dialysis  Prostate cancer  Obesity  Atrial fibrillation  PVD (peripheral vascular disease)  CAD (coronary artery disease)  ESRD (end stage renal disease) on dialysis  Paroxysmal atrial fibrillation  Essential hypertension  Type 2 diabetes mellitus without complication, without long-term current use of insulin  History of DVT of lower extremity      Surgical Hx:  History of prostatectomy  AV fistula  AV fistula  H/O cardiac catheterization  No significant past surgical history  H/O abdominal surgery      Allergies:  penicillin (Rash)      Medications:  ceFAZolin   IVPB 2000 milliGRAM(s) IV Intermittent <User Schedule>  ceFAZolin   IVPB 3000 milliGRAM(s) IV Intermittent <User Schedule>  chlorhexidine 2% Cloths 1 Application(s) Topical daily  dextrose 5%. 1000 milliLiter(s) IV Continuous <Continuous>  dextrose 5%. 1000 milliLiter(s) IV Continuous <Continuous>  dextrose 50% Injectable 25 Gram(s) IV Push once  dextrose 50% Injectable 12.5 Gram(s) IV Push once  dextrose 50% Injectable 25 Gram(s) IV Push once  dextrose Oral Gel 15 Gram(s) Oral once PRN  donepezil 5 milliGRAM(s) Oral at bedtime  epoetin shell (EPOGEN) Injectable 06194 Unit(s) IV Push <User Schedule>  lidocaine   4% Patch 1 Patch Transdermal daily PRN  lidocaine   4% Patch 1 Patch Transdermal daily PRN  metoprolol tartrate 25 milliGRAM(s) Oral two times a day  midodrine. 10 milliGRAM(s) Oral <User Schedule>  norepinephrine Infusion 1 MICROgram(s)/kG/Min IV Continuous <Continuous>  oxyCODONE    IR 5 milliGRAM(s) Oral every 6 hours PRN  pantoprazole  Injectable 80 milliGRAM(s) IV Push once  pantoprazole Infusion 8 mG/Hr IV Continuous <Continuous>  propofol Infusion 30 MICROgram(s)/kG/Min IV Continuous <Continuous>  sodium chloride 0.9% Bolus. 100 milliLiter(s) IV Bolus every 5 minutes PRN      OBJECTIVE:    Vitals:  T(C): 38 (06-29-23 @ 09:59), Max: 38 (06-29-23 @ 09:58)  HR: 82 (06-29-23 @ 11:34) (80 - 102)  BP: 99/80 (06-29-23 @ 09:59) (74/46 - 126/73)  RR: 24 (06-29-23 @ 09:59) (17 - 25)  SpO2: 99% (06-29-23 @ 11:34) (99% - 100%)    I/O's:  06-28-23 @ 07:01  -  06-29-23 @ 07:00  --------------------------------------------------------  IN: 1000 mL / OUT: 2000 mL / NET: -1000 mL    Physical Exam:  CONSTITUTIONAL: sedated and intubated.  SKIN: No rashes or ecchymosis.  EYES: PERRLA. No scleral icterus.  ENT: No JVD. Supple, no thyromegaly. No discharge or glossitis. Oral mucosa with moist membranes.  LYMPH: No lymphadenopathy.  CV: RRR. Normal S1 and S2. No murmurs, rubs, or gallops. No edema. Pulses equal bilaterally.  PULM: Clear to auscultation throughout. Respirations unlabored. No wheezing, rales, or rhonchi.  GI: Soft, non-tender. No palpable masses. No hematosplenomegaly.  MSK: No cyanosis or clubbing.  NEURO: sedated.  PSYCH: sedated. AxO x1 at baseline. Dementia.    Labs:                        8.6    16.49 )-----------( 319      ( 29 Jun 2023 10:30 )             28.3     06-29    135  |  97<L>  |  39<H>  ----------------------------<  104<H>  4.8   |  21<L>  |  3.72<H>    Ca    9.5      29 Jun 2023 10:30  Phos  4.0     06-29  Mg     2.20     06-29    TPro  7.0  /  Alb  1.9<L>  /  TBili  0.7  /  DBili  x   /  AST  50<H>  /  ALT  <5  /  AlkPhos  115  06-29      Radiology/Procedures: Reviewed.      ASSESSMENT/PLAN:  MAGGIE THAO is a 77y Male with a hx of HTN, HLD, ESRD (on HD via RUE AVF Tu/Th/Sat), CAD (previously on coumadin, now stopped), AFib, T2DM, and hx of prostate ca s/p suprapubic catheter who initially presented for hemodialysis on 6/3.    NEURO:  #Dementia  AxO x1 at baseline. Sedated and intubated 6/29.  - Propofol   - Hold donepezil 5mg daily    CARDIOVASCULAR:  #Shock  Likely multifactorial including hypovolemic, cardiogenic, and distributive. Recent episode of hematemesis and drop of Hg concerning for recurrent GIB. Bedside echo demonstrated poor heart function. Given fluids, 2u pRBC, and 1u platelets.  - Abx regimen as below  - Vasopressin  - Norepinephrine  - Wean as tolerated    #Elevated troponin  #Atrial flutter  #AFib  AFlutter caught on EKG. No chest pain, but another EKG demonstrated ST elevations. Cardiology noted elevated trops most likely 2/2 ESRD and recommended patient be off of anticoagulation 2/2 fall risk.  - Cardiology following    #CAD  TTE demonstrated mod segmental LV dysfunction along LAD distribution suggestive of prior infarct. Not a candidate for further ischemic eval per cards.  - hold lopressor 25mg BID given shock    PULMONARY:  #Intubation  Intubated 6/29 2/2 respiratory failure and concern for airway protection given AMS.  - wean as tolerated    Mode: AC/ CMV (Assist Control/ Continuous Mandatory Ventilation)  RR (machine): 12  TV (machine): 380  FiO2: 100  PEEP: 5    GI:  Diet: NPO  #Normocytic anemia 2/2 duodenal ulcer  Hg 7.2 on admission. Noted +FOBT and GI consulted for possible GIB component. EGD 6/7 demonstrated a duodenal ulcer s/p cauterization of visibile vessel. Started on PPI drip for 72hrs then transitioned to PO doses. GI reconsulted 6/29 for hematemesis, clots in stool, and drop in Hg.  - Transfuse for HgB <8  - per GI, restart IV PPI gtt  - GI following  - EGD today    RENAL:  #ESRD  On HD via RUE AVF Tu/Th/Sat outpatient. Been receiving HD M/W/F inpatient. Give abx after HD on HD days. Most recent HD on 6/28.  - Nephrology following  - Would need CRRT if dialysis needed. Assess day by day.  - Ego 14K w/ HD    #Prostate cancer s/p suprapubic catheter  #Anuria  Chronic wright within [place] that urology exchanged on 6/10    ENDO:  #Hyperparathyroidism 2/2 ESRD on HD  - Continue sensipar 60mg daily and home phosphorus binders    #T2DM  - FBG q6h  - Low SSI    HEME/ONC/RHEUM:  #Normocytic anemia 2/2 duodenal ulcer as above  - hold ASA in setting of concern of GIB  - given 2u pRBC and 1u platelets 6/29    ID:  #Strep agalactiae bacteremia  Found to be bacteremic 6/11 and cultures grew strep agalactiae. Patient started on cefazolin 1g daily until 7/11 (give after HD on HD days) per ID.  - 6/13 and 6/15 BCx neg  - Obtain BCx  - Discontinue cefazolin  - Start broad spectrum with cefepime 1g daily and vancomycin (pending vanc level) given concern for sepsis contributing to shock  - Vancomycin level with goal of 15-20    SKIN/MSK:  - Monitor for ulcers. Consult wound care if needed    ETHICS:  Full Code.  Discussed with wife (healthcare proxy).    ######################################ATTENDING ATTESTATION#################################################  Patient seen and examined at bedside with the MICU Resident. Agree with above.     Patient is a 76 yo M w/ HTN, HLD, CAD, DVT (no longer on AC), ESRD, Prostate Ca s/p suprapubic catheter who was initially admitted on 6/3 after p/w HD requrements and socail placement for [facility] HD center. Course was c/b Group B Strep bacteremia with repeat cultures cleared on Cefazolin, anemia, duodenal ulcer (s/p EGD and cautery on 6/7) and new severe systolic HF (EF 35%). Patient now admitted to MICU after developement of profound shock in setting of new hematemesis and hematochezia.     #Shock - Multifactorial in setting of likely new sepsis, hemorrhagic and cardiogenic. POCUS with severely reduced LV ssytolic function with LVOT VTI 8.5 to 9.5 cm.   #Sepsis  #GIB  #ADHF  - c/w vasopressors, currently on Levo and Vaso, wean as tolerated  - May trial inotropic agents if pressor requirements increase  - Will expand antibiotics to Vanc/Cefepime  - f/u pancultures  - Transfuse PRBC to maintain Hb > 8  - Will transfuse platelets given ASA use and continued bleeding  - f/u GI plans for endoscopic evaluation  - Trend CBC q6h for now  - Hold any AC and antiplatelet agents    #Acute respiratory failure  - c/w mechanical ventilatory support, wean as tolerated    #ESRD - Last HD yesterday. No urgent need for HD currently  - Will reassess daily, may need CRRT given high pressor requirements    #GOC - Extensive GOC with wife today who still would like to pursue all aggressive measures. Full Code    #DVT ppx - SCDs    #POCUS - See full POCUS note    Total Critical Time Spent: 110 Minutes    Jose Espinoza MD  Pulmonary & Critical Care MICU Transfer Accept Note    Transfer from: ( x ) Medicine    (  ) Telemetry     (   ) RCU        (    ) Palliative         (   ) Stroke Unit          (   ) __________________    Accepting physician: Dr. Espinoza      HPI:  MAGGIE THAO is a 77y Male with a hx of HTN, HLD, ESRD (on HD via RUE AVF Tu/Th/Sat), CAD (previously on coumadin, now stopped), AFib, T2DM, and hx of prostate ca s/p suprapubic catheter who initially presented for hemodialysis on 6/3. Hospital course has been complicated by anemia 2/2 duodenal ulcer and Strep agalactiae bacteremia (start 6/11) now requiring MICU level of care 2/2 hypotension and intubation.     Rapid response 6/29 early morning for hypotension to systolic 60-70s, tachycardic to 100s, and febrile. Given 1 amp D50. Patient was obtunded and required rebreather throughout the RRT. There is concern for hypotension 2/2 GIB as patient had episode of hematemesis and clots in his stool. HgB decreased from 8.2 yday to 7.1. Additionally patient was given 1u pRBC and IV pantoprazole 80mg. BP improved to 113/81 with improvement in HR. EKG concerning for new BBB.     2nd rapid response called 6/29 late morning for respiratory failure and concern for airway protection. Patient was emergently intubated, started on pressors, and transferred to the MICU.    PMH:  HTN (hypertension)  HLD (hyperlipidemia)  ESRD (end stage renal disease) on dialysis  Prostate cancer  Obesity  Atrial fibrillation  PVD (peripheral vascular disease)  CAD (coronary artery disease)  ESRD (end stage renal disease) on dialysis  Paroxysmal atrial fibrillation  Essential hypertension  Type 2 diabetes mellitus without complication, without long-term current use of insulin  History of DVT of lower extremity      Surgical Hx:  History of prostatectomy  AV fistula  AV fistula  H/O cardiac catheterization  No significant past surgical history  H/O abdominal surgery      Allergies:  penicillin (Rash)      Medications:  ceFAZolin   IVPB 2000 milliGRAM(s) IV Intermittent <User Schedule>  ceFAZolin   IVPB 3000 milliGRAM(s) IV Intermittent <User Schedule>  chlorhexidine 2% Cloths 1 Application(s) Topical daily  dextrose 5%. 1000 milliLiter(s) IV Continuous <Continuous>  dextrose 5%. 1000 milliLiter(s) IV Continuous <Continuous>  dextrose 50% Injectable 25 Gram(s) IV Push once  dextrose 50% Injectable 12.5 Gram(s) IV Push once  dextrose 50% Injectable 25 Gram(s) IV Push once  dextrose Oral Gel 15 Gram(s) Oral once PRN  donepezil 5 milliGRAM(s) Oral at bedtime  epoetin shell (EPOGEN) Injectable 38911 Unit(s) IV Push <User Schedule>  lidocaine   4% Patch 1 Patch Transdermal daily PRN  lidocaine   4% Patch 1 Patch Transdermal daily PRN  metoprolol tartrate 25 milliGRAM(s) Oral two times a day  midodrine. 10 milliGRAM(s) Oral <User Schedule>  norepinephrine Infusion 1 MICROgram(s)/kG/Min IV Continuous <Continuous>  oxyCODONE    IR 5 milliGRAM(s) Oral every 6 hours PRN  pantoprazole  Injectable 80 milliGRAM(s) IV Push once  pantoprazole Infusion 8 mG/Hr IV Continuous <Continuous>  propofol Infusion 30 MICROgram(s)/kG/Min IV Continuous <Continuous>  sodium chloride 0.9% Bolus. 100 milliLiter(s) IV Bolus every 5 minutes PRN      OBJECTIVE:    Vitals:  T(C): 38 (06-29-23 @ 09:59), Max: 38 (06-29-23 @ 09:58)  HR: 82 (06-29-23 @ 11:34) (80 - 102)  BP: 99/80 (06-29-23 @ 09:59) (74/46 - 126/73)  RR: 24 (06-29-23 @ 09:59) (17 - 25)  SpO2: 99% (06-29-23 @ 11:34) (99% - 100%)    I/O's:  06-28-23 @ 07:01  -  06-29-23 @ 07:00  --------------------------------------------------------  IN: 1000 mL / OUT: 2000 mL / NET: -1000 mL    Physical Exam:  CONSTITUTIONAL: sedated and intubated.  SKIN: No rashes or ecchymosis.  EYES: PERRLA. No scleral icterus.  ENT: No JVD. Supple, no thyromegaly. No discharge or glossitis. Oral mucosa with moist membranes.  LYMPH: No lymphadenopathy.  CV: RRR. Normal S1 and S2. No murmurs, rubs, or gallops. No edema. Pulses equal bilaterally.  PULM: Clear to auscultation throughout. Respirations unlabored. No wheezing, rales, or rhonchi.  GI: Soft, non-tender. No palpable masses. No hematosplenomegaly.  MSK: No cyanosis or clubbing.  NEURO: sedated.  PSYCH: sedated. AxO x1 at baseline. Dementia.    Labs:                        8.6    16.49 )-----------( 319      ( 29 Jun 2023 10:30 )             28.3     06-29    135  |  97<L>  |  39<H>  ----------------------------<  104<H>  4.8   |  21<L>  |  3.72<H>    Ca    9.5      29 Jun 2023 10:30  Phos  4.0     06-29  Mg     2.20     06-29    TPro  7.0  /  Alb  1.9<L>  /  TBili  0.7  /  DBili  x   /  AST  50<H>  /  ALT  <5  /  AlkPhos  115  06-29      Radiology/Procedures: Reviewed.      ASSESSMENT/PLAN:  MAGGIE THAO is a 77y Male with a hx of HTN, HLD, ESRD (on HD via RUE AVF Tu/Th/Sat), CAD (previously on coumadin, now stopped), AFib, T2DM, and hx of prostate ca s/p suprapubic catheter who initially presented for hemodialysis on 6/3.    NEURO:  #Dementia  AxO x1 at baseline. Sedated and intubated 6/29.  - Propofol   - Hold donepezil 5mg daily    CARDIOVASCULAR:  #Shock  Likely multifactorial including hypovolemic, cardiogenic, and distributive. Recent episode of hematemesis and drop of Hg concerning for recurrent GIB. Bedside echo demonstrated poor heart function. Given fluids, 2u pRBC, and 1u platelets.  - Abx regimen as below  - Vasopressin  - Norepinephrine  - Wean as tolerated    #Elevated troponin  #Atrial flutter  #AFib  AFlutter caught on EKG. No chest pain, but another EKG demonstrated ST elevations. Cardiology noted elevated trops most likely 2/2 ESRD and recommended patient be off of anticoagulation 2/2 fall risk.  - Cardiology following    #CAD  TTE demonstrated mod segmental LV dysfunction along LAD distribution suggestive of prior infarct. Not a candidate for further ischemic eval per cards.  - hold lopressor 25mg BID given shock    PULMONARY:  #Intubation  Intubated 6/29 2/2 respiratory failure and concern for airway protection given AMS.  - wean as tolerated    Mode: AC/ CMV (Assist Control/ Continuous Mandatory Ventilation)  RR (machine): 12  TV (machine): 380  FiO2: 100  PEEP: 5    GI:  Diet: NPO  #Normocytic anemia 2/2 duodenal ulcer  Hg 7.2 on admission. Noted +FOBT and GI consulted for possible GIB component. EGD 6/7 demonstrated a duodenal ulcer s/p cauterization of visibile vessel. Started on PPI drip for 72hrs then transitioned to PO doses. GI reconsulted 6/29 for hematemesis, clots in stool, and drop in Hg.  - Transfuse for HgB <8  - per GI, restart IV PPI gtt  - GI following  - EGD today    RENAL:  #ESRD  On HD via RUE AVF Tu/Th/Sat outpatient. Been receiving HD M/W/F inpatient. Give abx after HD on HD days. Most recent HD on 6/28.  - Nephrology following  - Would need CRRT if dialysis needed. Assess day by day.  - Ego 14K w/ HD    #Prostate cancer s/p suprapubic catheter  #Anuria  Chronic wright within [place] that urology exchanged on 6/10    ENDO:  #Hyperparathyroidism 2/2 ESRD on HD  - Continue sensipar 60mg daily and home phosphorus binders    #T2DM  - FBG q6h  - Low SSI    HEME/ONC/RHEUM:  #Normocytic anemia 2/2 duodenal ulcer as above  - hold ASA in setting of concern of GIB  - given 2u pRBC and 1u platelets 6/29    ID:  #Strep agalactiae bacteremia  Found to be bacteremic 6/11 and cultures grew strep agalactiae. Patient started on cefazolin 1g daily until 7/11 (give after HD on HD days) per ID.  - 6/13 and 6/15 BCx neg  - Obtain BCx  - Discontinue cefazolin  - Start broad spectrum with cefepime 1g daily and vancomycin (pending vanc level) given concern for sepsis contributing to shock  - Vancomycin level with goal of 15-20    SKIN/MSK:  - Monitor for ulcers. Consult wound care if needed    ETHICS:  Full Code.  Discussed with wife (healthcare proxy).    ######################################ATTENDING ATTESTATION#################################################  Patient seen and examined at bedside with the MICU Resident. Agree with above.     Patient is a 76 yo M w/ HTN, HLD, CAD, DVT (no longer on AC), ESRD, Prostate Ca s/p suprapubic catheter who was initially admitted on 6/3 after p/w HD requrements and socail placement for [facility] HD center. Course was c/b Group B Strep bacteremia with repeat cultures cleared on Cefazolin, anemia, duodenal ulcer (s/p EGD and cautery on 6/7) and new severe systolic HF (EF 35%). Patient now admitted to MICU after developement of profound shock in setting of new hematemesis and hematochezia.     #Shock - Multifactorial in setting of likely new sepsis, hemorrhagic and cardiogenic. POCUS with severely reduced LV ssytolic function with LVOT VTI 8.5 to 9.5 cm.   #Sepsis  #GIB  #ADHF  - c/w vasopressors, currently on Levo and Vaso, wean as tolerated  - May trial inotropic agents if pressor requirements increase  - Will expand antibiotics to Vanc/Cefepime  - f/u pancultures  - Transfuse PRBC to maintain Hb > 8  - Will transfuse platelets given ASA use and continued bleeding  - f/u GI plans for endoscopic evaluation  - Trend CBC q6h for now  - Hold any AC and antiplatelet agents  - Will check coags and fibrinogen    #Acute respiratory failure  - c/w mechanical ventilatory support, wean as tolerated    #ESRD - Last HD yesterday. No urgent need for HD currently  - Will reassess daily, may need CRRT given high pressor requirements    #GOC - Extensive GOC with wife today who still would like to pursue all aggressive measures. Full Code    #DVT ppx - SCDs    #POCUS - See full POCUS note    Total Critical Time Spent: 110 Minutes    Jose Espinoza MD  Pulmonary & Critical Care MICU Transfer Accept Note    Transfer from: ( x ) Medicine    (  ) Telemetry     (   ) RCU        (    ) Palliative         (   ) Stroke Unit          (   ) __________________    Accepting physician: Dr. Espinoza      HPI:  MAGGIE THAO is a 77y Male with a hx of HTN, HLD, ESRD (on HD via RUE AVF Tu/Th/Sat), CAD (previously on coumadin, now stopped), AFib, T2DM, and hx of prostate ca s/p suprapubic catheter who initially presented for hemodialysis on 6/3. Hospital course has been complicated by anemia 2/2 duodenal ulcer and Strep agalactiae bacteremia (start 6/11) now requiring MICU level of care 2/2 hypotension and intubation.     Rapid response 6/29 early morning for hypotension to systolic 60-70s, tachycardic to 100s, and febrile. Given 1 amp D50. Patient was obtunded and required rebreather throughout the RRT. There is concern for hypotension 2/2 GIB as patient had episode of hematemesis and clots in his stool. HgB decreased from 8.2 yday to 7.1. Additionally patient was given 1u pRBC and IV pantoprazole 80mg. BP improved to 113/81 with improvement in HR. EKG concerning for new BBB.     2nd rapid response called 6/29 late morning for respiratory failure and concern for airway protection. Patient was emergently intubated, started on pressors, and transferred to the MICU.    PMH:  HTN (hypertension)  HLD (hyperlipidemia)  ESRD (end stage renal disease) on dialysis  Prostate cancer  Obesity  Atrial fibrillation  PVD (peripheral vascular disease)  CAD (coronary artery disease)  ESRD (end stage renal disease) on dialysis  Paroxysmal atrial fibrillation  Essential hypertension  Type 2 diabetes mellitus without complication, without long-term current use of insulin  History of DVT of lower extremity      Surgical Hx:  History of prostatectomy  AV fistula  AV fistula  H/O cardiac catheterization  No significant past surgical history  H/O abdominal surgery      Allergies:  penicillin (Rash)      Medications:  ceFAZolin   IVPB 2000 milliGRAM(s) IV Intermittent <User Schedule>  ceFAZolin   IVPB 3000 milliGRAM(s) IV Intermittent <User Schedule>  chlorhexidine 2% Cloths 1 Application(s) Topical daily  dextrose 5%. 1000 milliLiter(s) IV Continuous <Continuous>  dextrose 5%. 1000 milliLiter(s) IV Continuous <Continuous>  dextrose 50% Injectable 25 Gram(s) IV Push once  dextrose 50% Injectable 12.5 Gram(s) IV Push once  dextrose 50% Injectable 25 Gram(s) IV Push once  dextrose Oral Gel 15 Gram(s) Oral once PRN  donepezil 5 milliGRAM(s) Oral at bedtime  epoetin shell (EPOGEN) Injectable 15313 Unit(s) IV Push <User Schedule>  lidocaine   4% Patch 1 Patch Transdermal daily PRN  lidocaine   4% Patch 1 Patch Transdermal daily PRN  metoprolol tartrate 25 milliGRAM(s) Oral two times a day  midodrine. 10 milliGRAM(s) Oral <User Schedule>  norepinephrine Infusion 1 MICROgram(s)/kG/Min IV Continuous <Continuous>  oxyCODONE    IR 5 milliGRAM(s) Oral every 6 hours PRN  pantoprazole  Injectable 80 milliGRAM(s) IV Push once  pantoprazole Infusion 8 mG/Hr IV Continuous <Continuous>  propofol Infusion 30 MICROgram(s)/kG/Min IV Continuous <Continuous>  sodium chloride 0.9% Bolus. 100 milliLiter(s) IV Bolus every 5 minutes PRN      OBJECTIVE:    Vitals:  T(C): 38 (06-29-23 @ 09:59), Max: 38 (06-29-23 @ 09:58)  HR: 82 (06-29-23 @ 11:34) (80 - 102)  BP: 99/80 (06-29-23 @ 09:59) (74/46 - 126/73)  RR: 24 (06-29-23 @ 09:59) (17 - 25)  SpO2: 99% (06-29-23 @ 11:34) (99% - 100%)    I/O's:  06-28-23 @ 07:01  -  06-29-23 @ 07:00  --------------------------------------------------------  IN: 1000 mL / OUT: 2000 mL / NET: -1000 mL    Physical Exam:  CONSTITUTIONAL: sedated and intubated.  SKIN: No rashes or ecchymosis.  EYES: PERRLA. No scleral icterus.  ENT: No JVD. Supple, no thyromegaly. No discharge or glossitis. Oral mucosa with moist membranes.  LYMPH: No lymphadenopathy.  CV: RRR. Normal S1 and S2. No murmurs, rubs, or gallops. No edema. Pulses equal bilaterally.  PULM: Clear to auscultation throughout. Respirations unlabored. No wheezing, rales, or rhonchi.  GI: Soft, non-tender. No palpable masses. No hematosplenomegaly.  MSK: No cyanosis or clubbing.  NEURO: sedated.  PSYCH: sedated. AxO x1 at baseline. Dementia.    Labs:                        8.6    16.49 )-----------( 319      ( 29 Jun 2023 10:30 )             28.3     06-29    135  |  97<L>  |  39<H>  ----------------------------<  104<H>  4.8   |  21<L>  |  3.72<H>    Ca    9.5      29 Jun 2023 10:30  Phos  4.0     06-29  Mg     2.20     06-29    TPro  7.0  /  Alb  1.9<L>  /  TBili  0.7  /  DBili  x   /  AST  50<H>  /  ALT  <5  /  AlkPhos  115  06-29      Radiology/Procedures: Reviewed.      ASSESSMENT/PLAN:  MAGGIE THAO is a 77y Male with a hx of HTN, HLD, ESRD (on HD via RUE AVF Tu/Th/Sat), CAD (previously on coumadin, now stopped), AFib, T2DM, and hx of prostate ca s/p suprapubic catheter who initially presented for hemodialysis on 6/3.    NEURO:  #Dementia  AxO x1 at baseline. Sedated and intubated 6/29.  - Propofol   - Hold donepezil 5mg daily    CARDIOVASCULAR:  #Shock  Likely multifactorial including hypovolemic, cardiogenic, and distributive. Recent episode of hematemesis and drop of Hg concerning for recurrent GIB. Bedside echo demonstrated poor heart function. Given fluids, 2u pRBC, and 1u platelets.  - Abx regimen as below  - Vasopressin  - Norepinephrine  - Wean as tolerated    #Elevated troponin  #Atrial flutter  #AFib  AFlutter caught on EKG. No chest pain, but another EKG demonstrated ST elevations. Cardiology noted elevated trops most likely 2/2 ESRD and recommended patient be off of anticoagulation 2/2 fall risk.  - Cardiology following    #CAD  TTE demonstrated mod segmental LV dysfunction along LAD distribution suggestive of prior infarct. Not a candidate for further ischemic eval per cards.  - hold lopressor 25mg BID given shock    PULMONARY:  #Intubation  Intubated 6/29 2/2 respiratory failure and concern for airway protection given AMS.  - wean as tolerated    Mode: AC/ CMV (Assist Control/ Continuous Mandatory Ventilation)  RR (machine): 12  TV (machine): 380  FiO2: 100  PEEP: 5    GI:  Diet: NPO  #Normocytic anemia 2/2 duodenal ulcer  Hg 7.2 on admission. Noted +FOBT and GI consulted for possible GIB component. EGD 6/7 demonstrated a duodenal ulcer s/p cauterization of visibile vessel. Started on PPI drip for 72hrs then transitioned to PO doses. GI reconsulted 6/29 for hematemesis, clots in stool, and drop in Hg.  - Transfuse for HgB <8  - per GI, restart IV PPI gtt  - GI following  - EGD today    RENAL:  #ESRD  On HD via RUE AVF Tu/Th/Sat outpatient. Been receiving HD M/W/F inpatient. Give abx after HD on HD days. Most recent HD on 6/28.  - Nephrology following  - Would need CRRT if dialysis needed. Assess day by day.  - Ego 14K w/ HD    #Prostate cancer s/p suprapubic catheter  #Anuria  Chronic wright within [place] pouch that urology exchanged on 6/10    ENDO:  #Hyperparathyroidism 2/2 ESRD on HD  - Continue sensipar 60mg daily and home phosphorus binders    #T2DM  - FBG q6h  - Low SSI    HEME/ONC/RHEUM:  #Normocytic anemia 2/2 duodenal ulcer as above  - hold ASA in setting of concern of GIB  - given 2u pRBC and 1u platelets 6/29    ID:  #Strep agalactiae bacteremia  Found to be bacteremic 6/11 and cultures grew strep agalactiae. Patient started on cefazolin 1g daily until 7/11 (give after HD on HD days) per ID.  - 6/13 and 6/15 BCx neg  - Obtain BCx  - Discontinue cefazolin  - Start broad spectrum with cefepime 1g daily and vancomycin (pending vanc level) given concern for sepsis contributing to shock  - Vancomycin level with goal of 15-20    SKIN/MSK:  - Monitor for ulcers. Consult wound care if needed    ETHICS:  Full Code.  Discussed with wife (healthcare proxy).    ######################################ATTENDING ATTESTATION#################################################  Patient seen and examined at bedside with the MICU Resident. Agree with above.     Patient is a 78 yo M w/ HTN, HLD, CAD, DVT (no longer on AC), ESRD, Prostate Ca s/p suprapubic catheter who was initially admitted on 6/3 after p/w HD requrements and socail placement for [facility] HD center. Course was c/b Group B Strep bacteremia with repeat cultures cleared on Cefazolin, anemia, duodenal ulcer (s/p EGD and cautery on 6/7) and new severe systolic HF (EF 35%). Patient now admitted to MICU after developement of profound shock in setting of new hematemesis and hematochezia.     #Shock - Multifactorial in setting of likely new sepsis, hemorrhagic and cardiogenic. POCUS with severely reduced LV ssytolic function with LVOT VTI 8.5 to 9.5 cm.   #Sepsis  #GIB  #ADHF  - c/w vasopressors, currently on Levo and Vaso, wean as tolerated  - May trial inotropic agents if pressor requirements increase  - Will expand antibiotics to Vanc/Cefepime  - f/u pancultures  - Transfuse PRBC to maintain Hb > 8  - Will transfuse platelets given ASA use and continued bleeding  - f/u GI plans for endoscopic evaluation  - Trend CBC q6h for now  - c/w PPI gtt  - Hold any AC and antiplatelet agents  - Will check coags and fibrinogen  - Will check cardiac enzymes and EKG    #Acute respiratory failure  - c/w mechanical ventilatory support, wean as tolerated    #ESRD - Last HD yesterday. No urgent need for HD currently  - Will reassess daily, may need CRRT given high pressor requirements    #GOC - Extensive GOC with wife today who still would like to pursue all aggressive measures. Full Code    #DVT ppx - SCDs    #POCUS - See full POCUS note    Total Critical Time Spent: 110 Minutes    Jose Espinoza MD  Pulmonary & Critical Care

## 2023-06-29 NOTE — PROVIDER CONTACT NOTE (OTHER) - RECOMMENDATIONS
none at this time
As per provider Marcia Quijano, contact IV nurse to insert a new IV and then administer blood as ordered. RN will continue to monitor.
RN escalated the situation to ADN. ARLENE tried to reinsert IV at 5:30 am but was unable to do so. RN also asked two other unit nurses to help but no one was able to get IV in. Day shift was notified.
rn cleaned with soap/water/ bard wipes and chg
As per provider Marcia Quijano, no further interventions needed at this time. RN will continue to monitor.
Notify PA
ACP notified.
ACP notified.
Reschedule HD for tomorrow 6/21
ACP notified.
Order tylenol.
As per provider Marcia Quijano (TEAMS), will change pantoprazole tablet does to suspension. RN will continue to monitor.
As per provider Marcia Quijano, will come to the bedside to complete the blood transfusion consent. RN will continue to monitor.
As per provider Marcia Quijano, will come to the bedside to complete the blood transfusion consent. RN will continue to monitor.
Hold tube feeds and monitor cough off tube feeds

## 2023-06-29 NOTE — PROGRESS NOTE ADULT - SUBJECTIVE AND OBJECTIVE BOX
[facility] NEPHROLOGY- PROGRESS NOTE    77y Male with history of ESRD on HD presents for need for dialysis. Nephrology consulted for ESRD status.    Overnight patient with vomiting and passing clots from below started on PPI gtt and hypotensive s/p IVF bolus.    REVIEW OF SYSTEMS: limited due to mental status.    penicillin (Rash)      Hospital Medications: Medications reviewed      VITALS:  T(F): 99.6 (06-29-23 @ 06:00), Max: 99.6 (06-28-23 @ 20:27)  HR: 91 (06-29-23 @ 06:00)  BP: 120/96 (06-29-23 @ 06:00)  RR: 25 (06-29-23 @ 06:00)  SpO2: 100% (06-29-23 @ 06:00)  Wt(kg): --    06-28 @ 07:01  -  06-29 @ 07:00  --------------------------------------------------------  IN: 1000 mL / OUT: 2000 mL / NET: -1000 mL        PHYSICAL EXAM:    Gen: NAD, confused, + NGT  Cards: RRR, +S1/S2, no M/G/R  Resp: CTA B/L  GI: soft, NT/ND, NABS  : + SPT  Vascular: no LE edema B/L, RUE AVG + bruit/thrill, L shoulder/arm edematous        LABS:  06-29    138  |  100  |  34<H>  ----------------------------<  225<H>  3.7   |  26  |  3.55<H>    Ca    9.3      29 Jun 2023 05:35  Phos  3.9     06-29  Mg     2.40     06-29      Creatinine Trend: 3.55 <--, 5.19 <--, 3.81 <--, 6.28 <--, 5.02 <--, 3.57 <--                        7.1    11.88 )-----------( 270      ( 29 Jun 2023 05:35 )             24.2     Urine Studies:  Urinalysis Basic - ( 29 Jun 2023 05:35 )    Color:  / Appearance:  / SG:  / pH:   Gluc: 225 mg/dL / Ketone:   / Bili:  / Urobili:    Blood:  / Protein:  / Nitrite:    Leuk Esterase:  / RBC:  / WBC    Sq Epi:  / Non Sq Epi:  / Bacteria:

## 2023-06-29 NOTE — PROCEDURE NOTE - NSPROCDETAILS_GEN_ALL_CORE
patient pre-oxygenated, tube inserted, placement confirmed
location identified, draped/prepped, sterile technique used, needle inserted/introduced/positive blood return obtained via catheter/connected to a pressurized flush line/sutured in place/hemostasis with direct pressure, dressing applied/Seldinger technique/all materials/supplies accounted for at end of procedure
nasogastric/audible air bolus
sterile technique, indwelling urinary device inserted
guidewire recovered/lumen(s) aspirated and flushed/sterile dressing applied/sterile technique, catheter placed/ultrasound guidance with use of sterile gel and probe cove
nasogastric/placement confirmed by auscultation/connected to suction

## 2023-06-29 NOTE — PROCEDURE NOTE - NSBRONCHPROCDETAILS_GEN_A_CORE_FT
ETT tube cuff noted to be at level of VC on glidescope. Bronchoscope inserted through ETT. ETT then advanced over bronchoscope until 4cm above main latonya. ETT then noted to be in good position and resecured. Airway evaluation revealed moderate amount of thick clear secretions therapeutically suctioned. Bronchoscope then withdrawn from ETT.

## 2023-06-29 NOTE — PROVIDER CONTACT NOTE (CRITICAL VALUE NOTIFICATION) - DATE AND TIME:
04-Jun-2023 00:23
04-Jun-2023 01:48
29-Jun-2023 11:00
29-Jun-2023 11:17
29-Jun-2023 05:59
29-Jun-2023 08:23

## 2023-06-29 NOTE — PROVIDER CONTACT NOTE (CRITICAL VALUE NOTIFICATION) - NAME OF MD/NP/PA/DO NOTIFIED:
Mattie Saravia
[facility] [id number]
Megan Dunn
CLAUDIA Estevez
[facility]
CLAUDIA Pierre [id number]

## 2023-06-29 NOTE — PROVIDER CONTACT NOTE (CRITICAL VALUE NOTIFICATION) - ASSESSMENT
Pt lactate 4.8 Pt had hematemesis and blood in stool. Rapid called,
no acute changes noted patient currently resting in bed comfortably
ptt QNS

## 2023-06-29 NOTE — PROGRESS NOTE ADULT - ASSESSMENT
77 year old male with DVT, CAD, ESRD on HD, RUE AVF, AOCD, HLD, HTN, pre-diabetes, prostate ca with suprapubic catheter from NH for missed HD. Developed fevers and found to have GBS bacteremia. +R shoulder pain improved. Leukocytosis resolved.    CT A/P with no evidence of acute infectious process in the abdomen and pelvis.  Difficult PIV line placement  Agitated, with mittens, difficult to obtain MRI at this time  RRT called 6/29 for hypotension, was having clots per rectum and hematemesis now intubated in MICU    Recommend:  #Hypotension, hypoxic resp failure  -Now intubated in MICU, check blood cultures x 2 sets  -Agree with broadening abx - vanco by level, cefepime 1 gram IV q 24 hours  -MRSA nasal swab  -GI evaluation    #GBS bacteremia  -Unable to obtain MRI shoulders due to agitation, can check XR R shoulder  -Trend WBC  -monitor fever curve  -TTE no evidence of valvular vegetation    Isaiah Hall MD  Available through MS Teams  If no response, or after 5pm/weekends, call [phone]    Prognosis guarded.

## 2023-06-29 NOTE — PROGRESS NOTE ADULT - ATTENDING COMMENTS
Patient known to GI service from earlier in admission. Underwent EGD for anemia evaluation at which time he was found to have severe LA Grade D esophagitis and large DU with VV s/p epinephrine and bipolar Patient known to GI service from earlier in admission. Underwent EGD for anemia evaluation at which time he was found to have severe LA Grade D esophagitis and large DU with VV s/p epinephrine and bipolar cautery. No overt bleeding reported until last 24H - noted to have hematemesis and BRBPR with associated hypotension. Patient intubated for airway protection and transferred to ICU for pressor support. Also noted to be febrile. Suspect recent decompensation may be multifactorial, including sepsis (fever, rising leukocytosis) as well as acute GI blood loss. Plan for EGD once medically optimized. High dose PPI. Maintain NPO.  Additional recommendations as above. Discussed with wife at bedside.

## 2023-06-29 NOTE — PROGRESS NOTE ADULT - ASSESSMENT
77y Male with dementia, DVT (off AC), CAD-?stent, afib ESRD-HD TTS, AOCD, HLD, HTN, preDM, prostate CA s/p suprapubic cath, p-SBO 2017 who initially presented to the ED for HD (his HD center does not have a Yusra lift that he requires). Noted to have Hgb 7.2 for which GI was consulted.     # Duodenal ulcer - s/p cauterization of visible vessel. Patient's Hgb was stable since EGD on 6/7. Now GI called back for episode of hematemesis, blood clots in stool.   # Normocytic anemia   # Elevated trops > 10,000 with unclear cardiac history  # HBcAb - patient denies prior knowledge, no liver lesions on US    Recommendations:  - trend CBC, keep active T&S, transfuse for Hgb<8 given history of CAD  - IV PPI gtt  - keep NPO  -      GI will continue to follow.     All recommendations are tentative until note is attested by attending.     Maria E Amezquita, PGY5  Gastroenterology/Hepatology Fellow  Available on [employer] Teams  [phone] ([employer] Short Range Pager)  [phone] (Long Range Pager)    After 5pm, please contact the on-call GI fellow. [phone]    77y Male with dementia, DVT (off AC), CAD-?stent, afib ESRD-HD TTS, AOCD, HLD, HTN, preDM, prostate CA s/p suprapubic cath, p-SBO 2017 who initially presented to the ED for HD (his HD center does not have a Yusra lift that he requires). Noted to have Hgb 7.2 for which GI was consulted.     # Duodenal ulcer - s/p cauterization of visible vessel. Patient's Hgb was stable since EGD on 6/7. Now GI called back for episode of hematemesis, blood clots in stool.   - patient admitted to MICU, again noted to have episodes of maroon stool. Intubated and on pressors. Hgb improved with pRBC transfusion  # Normocytic anemia   # Elevated trops > 10,000 with unclear cardiac history  # HBcAb - patient denies prior knowledge, no liver lesions on US    Recommendations:  - trend CBC, keep active T&S, transfuse for Hgb<8 given history of CAD  - IV PPI gtt  - keep NPO  - plan for EGD today in MICU  - recommend gastric lavage w/ OGT prior to procedure to evacuate blood and clots     GI will continue to follow.     All recommendations are tentative until note is attested by attending.     Maria E Amezquita, PGY5  Gastroenterology/Hepatology Fellow  Available on [employer] Teams  [phone] ([employer] Short Range Pager)  [phone] (Long Range Pager)    After 5pm, please contact the on-call GI fellow. [phone]

## 2023-06-29 NOTE — PROCEDURE NOTE - NSAPPROACH_GEN_A_CORE
percutaneous
via natural/artificial opening

## 2023-06-29 NOTE — RAPID RESPONSE TEAM SUMMARY - NSSITUATIONBACKGROUNDRRT_GEN_ALL_CORE
77-year-old male with history of DVT (previously on coumadin, now stopped), duodenal ulcer, CAD, ESRD (on HD via RUE AVF Tu/Th/Sat), AOCD, HLD, HTN, obesity, pre-diabetes, and prostate cancer s/p suprapubic catheter presenting from [facility] w/need for HD. Also found to have be bacteremic and being treated with IV abx.    RRT called this morning for hypotension to systolic 60-70s. Upon arrival, primary team by bedside. Notified that pt passed clots per rectum earlier followed by an episode of hematemesis prior to RRT. VS with T 96.1, BP systolic 60s, tachycardic to 100s. FS 50 s/p 1amp D50. Other VS noncontributory. Pt at baseline mental status. Protecting airway throughout the RRT. Concern for hypotension 2/2 GI bleeding I/s/o known duodenal ulcer. Per primary team, pt was earlier on Heparin SQ and ASA. Access was obtained during RRT and morning labs sent. Primary team had already ordered pRBC, which arrived during RRT and so pRBC were given via pressure-bag. Additionally, pt received pantoprazole 80mg IVP followed by PPI gtt. Pt blood pressure improved to 113/81 with improvement in HR. Pt continued to have BP >65 throughout the RRT. Given patient improvement and stability, decision was made to end the RRT in collaboration with primary team, ARLENE, and the nursing staff. All were in agreement and RRT was ended.    Recommendation:  [] f/u STAT CBC obtained during RRT. Transfuse as appropriate  [] Monitor CBC q6h at this time  [] C/w PPI  [] GI evaluation  [] Hold AC at this time 77-year-old male with history of DVT (previously on coumadin, now stopped), duodenal ulcer, CAD, ESRD (on HD via RUE AVF Tu/Th/Sat), AOCD, HLD, HTN, obesity, pre-diabetes, and prostate cancer s/p suprapubic catheter presenting from [facility] w/need for HD. Also found to have be bacteremic and being treated with IV abx.    RRT called this morning for hypotension to systolic 60-70s. Upon arrival, primary team by bedside. Notified that pt passed clots per rectum earlier followed by an episode of hematemesis prior to RRT. VS with T 96.1, BP systolic 60s, tachycardic to 100s. FS 50 s/p 1amp D50. Other VS noncontributory. Pt at baseline mental status. Protecting airway throughout the RRT. Concern for hypotension 2/2 GI bleeding I/s/o known duodenal ulcer. Per primary team, pt was earlier on Heparin SQ and ASA. Access was obtained during RRT and morning labs sent. Primary team had already ordered pRBC, which arrived during RRT and so pRBC were given via pressure-bag. Additionally, pt received pantoprazole 80mg IVP followed by PPI gtt. Pt blood pressure improved to 113/81 with improvement in HR. Pt continued to have BP >65 throughout the RRT. After pt stabilized, EKG was obtained which was concerning for new BBB. Trops were added to morning labs.     Given patient's improvement and stability, decision was made to end the RRT in collaboration with primary team, ARLENE, and the nursing staff. All were in agreement and RRT was ended.    Recommendation:  [] f/u STAT CBC obtained during RRT. Transfuse as appropriate  [] Monitor CBC q6h at this time  [] C/w PPI  [] GI evaluation  [] Hold AC at this time  [] f/u cardiac markers and consider cardiology consult as appropriate

## 2023-06-29 NOTE — PROVIDER CONTACT NOTE (OTHER) - BACKGROUND
77 year old male admitted for weakness
77 year old male admitted for weakness
PMH  -ESRD  -HTN  -History of DVT of lower extremity  -Pre-diabetes  -CAD
pt is a hard stick, unable to place IV, despite using vein finder
(Admit Diagnosis) Weakness  (PMH) History of DVT of lower extremity  (PMH) ESRD (end stage renal disease) on dialysis  (PMH) Essential hypertension
77 year old male admitted for weakness
77 year old male admitted for weakness
pt is a hardstick
77 year old male admitted for weakness
Pt AOx2, history of dementia admitted due to weakness. PMH of CAD, HTN and ESRD on HD. Multiple hypoglycemic episodes.
Pt admitted for HD, has a possible UTI.
patient was a RRT this Am for GI bleed
patient tube feeds held for repeated coughing
patient has suprapubic catheter, anuric, cath site open to air but has purulent drainage around site.
77 year old male admitted for weakness
Patient 78 yo M admitted for HD, found to have anemia d/t duodenal ulcer s/p EGD. PMH ESRD on HD, CAD, PVD, hx of DVT of lower extremity

## 2023-06-29 NOTE — PROCEDURE NOTE - NSINFORMCONSENT_GEN_A_CORE
This was an emergent procedure.
Benefits, risks, and possible complications of procedure explained to patient/caregiver who verbalized understanding and gave verbal consent.
This was an emergent procedure.
This was an emergent procedure.

## 2023-06-29 NOTE — PROVIDER CONTACT NOTE (CRITICAL VALUE NOTIFICATION) - ACTION/TREATMENT ORDERED:
ACP notified. No further orders
CBC came back at 7.4, follow CBC. Repeat CBC with AM labs
continue to monitor, pt going to micu
provider aware, no new orders at this time
Awaiting MD orders
provider notified

## 2023-06-29 NOTE — PROVIDER CONTACT NOTE (CRITICAL VALUE NOTIFICATION) - SITUATION
trop 66
Pt's venous hemoglobin is 6.7
Lactate 4.8
Pt's troponin is greater than 10,000
pt has active gi bleed, received 1u of prbc and 1l LR, now getting another prbc unit
ptt QNS

## 2023-06-30 NOTE — PROGRESS NOTE ADULT - SUBJECTIVE AND OBJECTIVE BOX
CC: Patient is a 77y old  Male who presents with a chief complaint of needs HD (30 Jun 2023 13:21)    ID following for GBS bacteremia    Interval History/ROS: Patient remains in MICU, on two pressors. No fevers today. Remains intubated, sedated.    Rest of ROS negative.    Allergies  penicillin (Rash)    ANTIMICROBIALS:  cefepime   IVPB    cefepime   IVPB 1000 every 24 hours    OTHER MEDS:  chlorhexidine 2% Cloths 1 Application(s) Topical daily  dextrose 5%. 1000 milliLiter(s) IV Continuous <Continuous>  dextrose 5%. 1000 milliLiter(s) IV Continuous <Continuous>  dextrose 50% Injectable 25 Gram(s) IV Push once  dextrose 50% Injectable 12.5 Gram(s) IV Push once  dextrose 50% Injectable 25 Gram(s) IV Push once  dextrose Oral Gel 15 Gram(s) Oral once PRN  epoetin shell (EPOGEN) Injectable 51788 Unit(s) IV Push <User Schedule>  lidocaine   4% Patch 1 Patch Transdermal daily PRN  lidocaine   4% Patch 1 Patch Transdermal daily PRN  midodrine. 10 milliGRAM(s) Oral <User Schedule>  nitroglycerin    2% Ointment 1 Inch(s) Transdermal once  norepinephrine Infusion 1 MICROgram(s)/kG/Min IV Continuous <Continuous>  pantoprazole  Injectable 80 milliGRAM(s) IV Push once  pantoprazole Infusion 8 mG/Hr IV Continuous <Continuous>  propofol Infusion 30 MICROgram(s)/kG/Min IV Continuous <Continuous>  sodium chloride 0.9% Bolus. 100 milliLiter(s) IV Bolus every 5 minutes PRN  vasopressin Infusion 0.04 Unit(s)/Min IV Continuous <Continuous>    PE:    Vital Signs Last 24 Hrs  T(C): 36.2 (30 Jun 2023 12:00), Max: 36.8 (29 Jun 2023 20:00)  T(F): 97.1 (30 Jun 2023 12:00), Max: 98.2 (29 Jun 2023 20:00)  HR: 97 (30 Jun 2023 17:00) (96 - 124)  BP: --  BP(mean): --  RR: 14 (30 Jun 2023 17:00) (13 - 34)  SpO2: 100% (30 Jun 2023 17:00) (99% - 100%)    Parameters below as of 30 Jun 2023 17:00  Patient On (Oxygen Delivery Method): ventilator    O2 Concentration (%): 40    Gen: Intubated, NAD  CV: S1+S2 normal, no murmurs  Resp: Coarse breath sounds  Abd: Soft, nontender, +BS  Ext: No LE edema, no wounds  : suprapubic catheter  IV/Skin: No thrombophlebitis    LABS:                          9.9    22.77 )-----------( 296      ( 30 Jun 2023 10:00 )             31.2       06-30    135  |  98  |  49<H>  ----------------------------<  218<H>  4.8   |  20<L>  |  4.45<H>    Ca    10.0      30 Jun 2023 01:40  Phos  6.4     06-30  Mg     2.20     06-30    TPro  7.3  /  Alb  2.3<L>  /  TBili  0.6  /  DBili  x   /  AST  30  /  ALT  <5  /  AlkPhos  116  06-30      Urinalysis Basic - ( 30 Jun 2023 01:40 )    Color: x / Appearance: x / SG: x / pH: x  Gluc: 218 mg/dL / Ketone: x  / Bili: x / Urobili: x   Blood: x / Protein: x / Nitrite: x   Leuk Esterase: x / RBC: x / WBC x   Sq Epi: x / Non Sq Epi: x / Bacteria: x        MICROBIOLOGY:  v  .Blood Blood-Peripheral  06-15-23   No Growth Final  --  --      .Blood Blood-Peripheral  06-15-23   No Growth Final  --  --      .Blood Blood-Venous  06-13-23   No Growth Final  --  --      .Blood Blood  06-13-23   No Growth Final  --  --      .Blood Blood  06-11-23   Growth in aerobic and anaerobic bottles: Streptococcus agalactiae (Group  B)  See previous culture [id number]  --    Growth in anaerobic bottle: Gram positive cocci in pairs  Growth in aerobic bottle: Gram positive cocci in pairs      .Blood Blood  06-11-23   Growth in aerobic and anaerobic bottles: Streptococcus agalactiae (Group  B)  ***Blood Panel PCR results on this specimen are available  approximately 3 hours after the Gram stain result.***  Gram stain, PCR, and/or culture results may not always  correspond due to difference in methodologies.  ************************************************************  This PCR assay was performed by multiplex PCR. This  Assay tests for 66 bacterial and resistance gene targets.  Please refer to the [facility] test directory  at [email or web address] for details.  --  Blood Culture PCR  Streptococcus agalactiae (Group B)    RADIOLOGY:    < from: Xray Chest 1 View- PORTABLE-Urgent (Xray Chest 1 View- PORTABLE-Urgent .) (06.29.23 @ 23:31) >  IMPRESSION:  External tubes, lines and/or devices described as above.  No focal consolidations.    < end of copied text >

## 2023-06-30 NOTE — PROGRESS NOTE ADULT - SUBJECTIVE AND OBJECTIVE BOX
Patient is a 77y old  Male who presents with a chief complaint of needs HD (30 Jun 2023 17:06)      INTERVAL HPI/OVERNIGHT EVENTS:  T(C): 37.2 (06-30-23 @ 20:00), Max: 37.2 (06-30-23 @ 20:00)  HR: 97 (06-30-23 @ 22:00) (96 - 124)  BP: --  RR: 13 (06-30-23 @ 22:00) (12 - 34)  SpO2: 100% (06-30-23 @ 19:00) (99% - 100%)  Wt(kg): --  I&O's Summary    29 Jun 2023 07:01  -  30 Jun 2023 07:00  --------------------------------------------------------  IN: 2524.2 mL / OUT: 600 mL / NET: 1924.2 mL    30 Jun 2023 07:01  -  30 Jun 2023 23:51  --------------------------------------------------------  IN: 961.6 mL / OUT: 0 mL / NET: 961.6 mL        PAST MEDICAL & SURGICAL HISTORY:  HTN (hypertension)      Prostate cancer  prostate cancer      Obesity      PVD (peripheral vascular disease)      CAD (coronary artery disease)  LAD stent      Pre-diabetes      ESRD (end stage renal disease) on dialysis      Essential hypertension      History of DVT of lower extremity      History of prostatectomy      AV fistula  h/o creation in 2005      H/O cardiac catheterization  4/25/2014 non obstructive disease      H/O abdominal surgery  10/10/17          SOCIAL HISTORY  Alcohol:  Tobacco:  Illicit substance use:    FAMILY HISTORY:    REVIEW OF SYSTEMS:  CONSTITUTIONAL: No fever, weight loss, or fatigue  EYES: No eye pain, visual disturbances, or discharge  ENMT:  No difficulty hearing, tinnitus, vertigo; No sinus or throat pain  NECK: No pain or stiffness  RESPIRATORY: No cough, wheezing, chills or hemoptysis; No shortness of breath  CARDIOVASCULAR: No chest pain, palpitations, dizziness, or leg swelling  GASTROINTESTINAL: No abdominal or epigastric pain. No nausea, vomiting, or hematemesis; No diarrhea or constipation. No melena or hematochezia.  GENITOURINARY: No dysuria, frequency, hematuria, or incontinence  NEUROLOGICAL: No headaches, memory loss, loss of strength, numbness, or tremors  SKIN: No itching, burning, rashes, or lesions   LYMPH NODES: No enlarged glands  ENDOCRINE: No heat or cold intolerance; No hair loss  MUSCULOSKELETAL: No joint pain or swelling; No muscle, back, or extremity pain  PSYCHIATRIC: No depression, anxiety, mood swings, or difficulty sleeping  HEME/LYMPH: No easy bruising, or bleeding gums  ALLERY AND IMMUNOLOGIC: No hives or eczema    RADIOLOGY & ADDITIONAL TESTS:    Imaging Personally Reviewed:  [ ] YES  [ ] NO    Consultant(s) Notes Reviewed:  [ ] YES  [ ] NO    PHYSICAL EXAM:  GENERAL: NAD, well-groomed, well-developed  HEAD:  Atraumatic, Normocephalic  EYES: EOMI, PERRLA, conjunctiva and sclera clear  ENMT: No tonsillar erythema, exudates, or enlargement; Moist mucous membranes, Good dentition, No lesions  NECK: Supple, No JVD, Normal thyroid  NERVOUS SYSTEM:  Alert & Oriented X3, Good concentration; Motor Strength 5/5 B/L upper and lower extremities; DTRs 2+ intact and symmetric  CHEST/LUNG: Clear to percussion bilaterally; No rales, rhonchi, wheezing, or rubs  HEART: Regular rate and rhythm; No murmurs, rubs, or gallops  ABDOMEN: Soft, Nontender, Nondistended; Bowel sounds present  EXTREMITIES:  2+ Peripheral Pulses, No clubbing, cyanosis, or edema  LYMPH: No lymphadenopathy noted  SKIN: No rashes or lesions    LABS:                        9.1    20.92 )-----------( 263      ( 30 Jun 2023 23:24 )             29.2     06-30    135  |  98  |  49<H>  ----------------------------<  218<H>  4.8   |  20<L>  |  4.45<H>    Ca    10.0      30 Jun 2023 01:40  Phos  6.4     06-30  Mg     2.20     06-30    TPro  7.3  /  Alb  2.3<L>  /  TBili  0.6  /  DBili  x   /  AST  30  /  ALT  <5  /  AlkPhos  116  06-30    PT/INR - ( 29 Jun 2023 14:22 )   PT: 17.5 sec;   INR: 1.50 ratio         PTT - ( 29 Jun 2023 05:35 )  PTT:28.5 sec  Urinalysis Basic - ( 30 Jun 2023 01:40 )    Color: x / Appearance: x / SG: x / pH: x  Gluc: 218 mg/dL / Ketone: x  / Bili: x / Urobili: x   Blood: x / Protein: x / Nitrite: x   Leuk Esterase: x / RBC: x / WBC x   Sq Epi: x / Non Sq Epi: x / Bacteria: x      CAPILLARY BLOOD GLUCOSE      POCT Blood Glucose.: 185 mg/dL (30 Jun 2023 17:48)  POCT Blood Glucose.: 160 mg/dL (30 Jun 2023 07:11)  POCT Blood Glucose.: 168 mg/dL (30 Jun 2023 03:23)  POCT Blood Glucose.: 206 mg/dL (30 Jun 2023 03:21)    ABG - ( 30 Jun 2023 23:24 )  pH, Arterial: 7.38  pH, Blood: x     /  pCO2: 42    /  pO2: 159   / HCO3: 25    / Base Excess: -0.4  /  SaO2: 99.4                Urinalysis Basic - ( 30 Jun 2023 01:40 )    Color: x / Appearance: x / SG: x / pH: x  Gluc: 218 mg/dL / Ketone: x  / Bili: x / Urobili: x   Blood: x / Protein: x / Nitrite: x   Leuk Esterase: x / RBC: x / WBC x   Sq Epi: x / Non Sq Epi: x / Bacteria: x        MEDICATIONS  (STANDING):  cefepime   IVPB      cefepime   IVPB 1000 milliGRAM(s) IV Intermittent every 24 hours  chlorhexidine 2% Cloths 1 Application(s) Topical daily  dextrose 5%. 1000 milliLiter(s) (100 mL/Hr) IV Continuous <Continuous>  dextrose 5%. 1000 milliLiter(s) (50 mL/Hr) IV Continuous <Continuous>  dextrose 50% Injectable 25 Gram(s) IV Push once  dextrose 50% Injectable 12.5 Gram(s) IV Push once  dextrose 50% Injectable 25 Gram(s) IV Push once  epoetin shell (EPOGEN) Injectable 81799 Unit(s) IV Push <User Schedule>  midodrine. 10 milliGRAM(s) Oral <User Schedule>  nitroglycerin    2% Ointment 1 Inch(s) Transdermal once  norepinephrine Infusion 1 MICROgram(s)/kG/Min (74.8 mL/Hr) IV Continuous <Continuous>  pantoprazole  Injectable 80 milliGRAM(s) IV Push once  pantoprazole Infusion 8 mG/Hr (10 mL/Hr) IV Continuous <Continuous>  propofol Infusion 30 MICROgram(s)/kG/Min (14.4 mL/Hr) IV Continuous <Continuous>  sodium bicarbonate  Injectable 50 milliEquivalent(s) IV Push once  sodium chloride 0.9% Bolus 500 milliLiter(s) IV Bolus once  vasopressin Infusion 0.04 Unit(s)/Min (6 mL/Hr) IV Continuous <Continuous>    MEDICATIONS  (PRN):  dextrose Oral Gel 15 Gram(s) Oral once PRN Blood Glucose LESS THAN 70 milliGRAM(s)/deciliter  lidocaine   4% Patch 1 Patch Transdermal daily PRN left shoulder pain  lidocaine   4% Patch 1 Patch Transdermal daily PRN right knee pain  sodium chloride 0.9% Bolus. 100 milliLiter(s) IV Bolus every 5 minutes PRN SBP LESS THAN or EQUAL to 90 mmHg      Care Discussed with Consultants/Other Providers [ ] YES  [ ] NO

## 2023-06-30 NOTE — CONSULT NOTE ADULT - ASSESSMENT
Interventional Radiology    Evaluate for Procedure: GDA embolization    HPI: a 77y Male with a hx of HTN, HLD, ESRD (on HD via RUE AVF Tu/Th/Sat), CAD (previously on coumadin, now stopped), AFib, T2DM, and hx of prostate ca s/p suprapubic catheter. EGD demonstrated large duodenal ulcer s/p cauterization. IR consulted for possible GDA embolization.     Allergies: penicillin (Rash)    Medications (Abx/Cardiac/Anticoagulation/Blood Products)    aspirin  chewable: 81 milliGRAM(s) Oral (06-28 @ 19:37)  ceFAZolin   IVPB: 100 mL/Hr IV Intermittent (06-29 @ 01:16)  cefepime   IVPB: 100 mL/Hr IV Intermittent (06-29 @ 16:00)  heparin   Injectable: 5000 Unit(s) SubCutaneous (06-29 @ 00:48)  midodrine.: 10 milliGRAM(s) Oral (06-28 @ 19:39)  norepinephrine Infusion: 74.8 mL/Hr IV Continuous (06-29 @ 14:51)  phentolamine Injectable: 5 milliGRAM(s) SubCutaneous (06-29 @ 14:40)  vancomycin  IVPB: 250 mL/Hr IV Intermittent (06-29 @ 16:00)    Data:    T(C): 35.6  HR: 109  BP: 75/21  RR: 34  SpO2: 99%    -WBC 23.06 / HgB 10.5 / Hct 33.8 / Plt 353  -Na 135 / Cl 98 / BUN 49 / Glucose 218  -K 4.8 / CO2 20 / Cr 4.45  -ALT <5 / Alk Phos 116 / T.Bili 0.6  -INR 1.50 / PTT --      Radiology:     Assessment/Plan:   77y Male with a hx of HTN, HLD, ESRD (on HD via RUE AVF Tu/Th/Sat), CAD (previously on coumadin, now stopped), AFib, T2DM, and hx of prostate ca s/p suprapubic catheter. EGD demonstrated large duodenal ulcer s/p cauterization. IR consulted for possible GDA embolization.   -- no acute intervention at this time, as patient is stable and there is no acute bleeding  -- low threshold for GDA embolization if patient continues to bleed  -- please re-consult for embolization if patient's clinical condition changes     d/w Dr. Ekta Rodriguez-  Duane Drummond DO/PABLO, PGY-5  Vascular and Interventional Radiology   Available on [employer] Teams    - Non-emergent consults: Place IR consult order in [place]  - Emergent issues (pager): [facility] [phone]; [facility] [phone]; [phone]  - Scheduling questions: [facility] [phone]; [facility] [phone]  - Clinic/outpatient booking: [facility] [phone]; [facility] [phone]

## 2023-06-30 NOTE — PROGRESS NOTE ADULT - SUBJECTIVE AND OBJECTIVE BOX
Interval Events:    HPI:  77-year-old male with history of DVT (previously on coumadin, now stopped), CAD, ESRD (on HD via RUE AVF Tu/Th/Sat), AOCD, HLD, HTN, obesity, pre-diabetes, and prostate cancer s/p suprapubic catheter presenting from [facility] w/need for HD. Patient's current HD does not have Yusra lift. Last HD 6/01. Patient is currently without complaint, A&Ox1.     In the ED VS:  99.7  67-91  /43-49  16-18  %RA (03 Jun 2023 19:20)        REVIEW OF SYSTEMS:  Constitutional: [ ] negative [ ] fevers [ ] chills [ ] weight loss [ ] weight gain  HEENT: [ ] negative [ ] dry eyes [ ] eye irritation [ ] postnasal drip [ ] nasal congestion  CV: [ ] negative  [ ] chest pain [ ] orthopnea [ ] palpitations [ ] murmur  Resp: [ ] negative [ ] cough [ ] shortness of breath [ ] dyspnea [ ] wheezing [ ] sputum [ ] hemoptysis  GI: [ ] negative [ ] nausea [ ] vomiting [ ] diarrhea [ ] constipation [ ] abd pain [ ] dysphagia   : [ ] negative [ ] dysuria [ ] nocturia [ ] hematuria [ ] increased urinary frequency  Musculoskeletal: [ ] negative [ ] back pain [ ] myalgias [ ] arthralgias [ ] fracture  Skin: [ ] negative [ ] rash [ ] itch  Neurological: [ ] negative [ ] headache [ ] dizziness [ ] syncope [ ] weakness [ ] numbness  Psychiatric: [ ] negative [ ] anxiety [ ] depression  Endocrine: [ ] negative [ ] diabetes [ ] thyroid problem  Hematologic/Lymphatic: [ ] negative [ ] anemia [ ] bleeding problem  Allergic/Immunologic: [ ] negative [ ] itchy eyes [ ] nasal discharge [ ] hives [ ] angioedema  [ ] All other systems negative  [ ] Unable to assess ROS because ________    OBJECTIVE:  ICU Vital Signs Last 24 Hrs  T(C): 36.4 (30 Jun 2023 04:00), Max: 38 (29 Jun 2023 09:58)  T(F): 97.5 (30 Jun 2023 04:00), Max: 100.4 (29 Jun 2023 09:58)  HR: 102 (30 Jun 2023 06:00) (82 - 113)  BP: 75/21 (29 Jun 2023 12:15) (74/46 - 113/90)  BP(mean): 27 (29 Jun 2023 12:15) (27 - 94)  ABP: 108/60 (30 Jun 2023 06:00) (85/72 - 194/76)  ABP(mean): 71 (30 Jun 2023 06:00) (50 - 107)  RR: 19 (30 Jun 2023 06:00) (12 - 24)  SpO2: 100% (30 Jun 2023 06:00) (99% - 100%)    O2 Parameters below as of 30 Jun 2023 06:00  Patient On (Oxygen Delivery Method): ventilator    O2 Concentration (%): 40      Mode: AC/ CMV (Assist Control/ Continuous Mandatory Ventilation), RR (machine): 12, TV (machine): 380, FiO2: 40, PEEP: 5, ITime: 0.64, MAP: 9, PIP: 21    06-28 @ 07:01 - 06-29 @ 07:00  --------------------------------------------------------  IN: 1000 mL / OUT: 2000 mL / NET: -1000 mL    06-29 @ 07:01  -  06-30 @ 06:52  --------------------------------------------------------  IN: 2441.7 mL / OUT: 600 mL / NET: 1841.7 mL      CAPILLARY BLOOD GLUCOSE      POCT Blood Glucose.: 168 mg/dL (30 Jun 2023 03:23)        PHYSICAL EXAM:  GENERAL: NAD, well-groomed, well-developed  HEAD:  Atraumatic, Normocephalic  EYES: EOMI, PERRLA, conjunctiva and sclera clear  ENMT: No tonsillar erythema, exudates, or enlargement; Moist mucous membranes, Good dentition, No lesions  NECK: Supple, No JVD, Normal thyroid  NERVOUS SYSTEM:  Alert & Oriented X3, Good concentration; Motor Strength 5/5 B/L upper and lower extremities; DTRs 2+ intact and symmetric  CHEST/LUNG: Clear to percussion bilaterally; No rales, rhonchi, wheezing, or rubs  HEART: Regular rate and rhythm; No murmurs, rubs, or gallops  ABDOMEN: Soft, Nontender, Nondistended; Bowel sounds present  EXTREMITIES:  2+ Peripheral Pulses, No clubbing, cyanosis, or edema  LYMPH: No lymphadenopathy noted  SKIN: No rashes or lesions      HOSPITAL MEDICATIONS:  Standing Meds:  cefepime   IVPB 1000 milliGRAM(s) IV Intermittent every 24 hours  cefepime   IVPB      chlorhexidine 2% Cloths 1 Application(s) Topical daily  dextrose 5%. 1000 milliLiter(s) IV Continuous <Continuous>  dextrose 5%. 1000 milliLiter(s) IV Continuous <Continuous>  dextrose 50% Injectable 25 Gram(s) IV Push once  dextrose 50% Injectable 12.5 Gram(s) IV Push once  dextrose 50% Injectable 25 Gram(s) IV Push once  epoetin shell (EPOGEN) Injectable 01298 Unit(s) IV Push <User Schedule>  midodrine. 10 milliGRAM(s) Oral <User Schedule>  nitroglycerin    2% Ointment 1 Inch(s) Transdermal once  norepinephrine Infusion 1 MICROgram(s)/kG/Min IV Continuous <Continuous>  pantoprazole  Injectable 80 milliGRAM(s) IV Push once  pantoprazole Infusion 8 mG/Hr IV Continuous <Continuous>  propofol Infusion 30 MICROgram(s)/kG/Min IV Continuous <Continuous>  vasopressin Infusion 0.04 Unit(s)/Min IV Continuous <Continuous>      PRN Meds:  dextrose Oral Gel 15 Gram(s) Oral once PRN  lidocaine   4% Patch 1 Patch Transdermal daily PRN  lidocaine   4% Patch 1 Patch Transdermal daily PRN  sodium chloride 0.9% Bolus. 100 milliLiter(s) IV Bolus every 5 minutes PRN      LABS:                        10.5   23.06 )-----------( 353      ( 30 Jun 2023 01:40 )             33.8     Hgb Trend: 10.5<--, 10.2<--, 9.5<--, 8.6<--, 7.1<--  06-30    135  |  98  |  49<H>  ----------------------------<  218<H>  4.8   |  20<L>  |  4.45<H>    Ca    10.0      30 Jun 2023 01:40  Phos  6.4     06-30  Mg     2.20     06-30    TPro  7.3  /  Alb  2.3<L>  /  TBili  0.6  /  DBili  x   /  AST  30  /  ALT  <5  /  AlkPhos  116  06-30    Creatinine Trend: 4.45<--, 3.96<--, 3.75<--, 3.72<--, 3.55<--, 5.19<--  PT/INR - ( 29 Jun 2023 14:22 )   PT: 17.5 sec;   INR: 1.50 ratio         PTT - ( 29 Jun 2023 05:35 )  PTT:28.5 sec  Urinalysis Basic - ( 30 Jun 2023 01:40 )    Color: x / Appearance: x / SG: x / pH: x  Gluc: 218 mg/dL / Ketone: x  / Bili: x / Urobili: x   Blood: x / Protein: x / Nitrite: x   Leuk Esterase: x / RBC: x / WBC x   Sq Epi: x / Non Sq Epi: x / Bacteria: x      Arterial Blood Gas:  06-30 @ 01:40  7.36/42/193/24/99.2/-1.7  ABG lactate: --  Arterial Blood Gas:  06-29 @ 19:28  7.34/47/157/25/99.2/-0.7  ABG lactate: --  Arterial Blood Gas:  06-29 @ 13:05  7.38/47/320/28/99.5/2.2  ABG lactate: --    Venous Blood Gas:  06-29 @ 10:38  7.37/53/38/31/57.6  VBG Lactate: 5.7  Venous Blood Gas:  06-29 @ 05:35  7.37/52/26/30/36.6  VBG Lactate: 4.8      MICROBIOLOGY:     RADIOLOGY:  [ ] Reviewed and interpreted by me           Interval Events: Tachycardic, hypotensive requiring increased pressors once HD started this AM. Not tolerating HD, dialysis stopped.     HPI:  77-year-old male with history of DVT (previously on coumadin, now stopped), CAD, ESRD (on HD via RUE AVF Tu/Th/Sat), AOCD, HLD, HTN, obesity, pre-diabetes, and prostate cancer s/p suprapubic catheter presenting from [facility] w/need for HD. Patient's current HD does not have Yusra lift. Last HD 6/01. Patient is currently without complaint, A&Ox1.     In the ED VS:  99.7  67-91  /43-49  16-18  %RA (03 Jun 2023 19:20)        REVIEW OF SYSTEMS:  Constitutional: [ ] negative [ ] fevers [ ] chills [ ] weight loss [ ] weight gain  HEENT: [ ] negative [ ] dry eyes [ ] eye irritation [ ] postnasal drip [ ] nasal congestion  CV: [ ] negative  [ ] chest pain [ ] orthopnea [ ] palpitations [ ] murmur  Resp: [ ] negative [ ] cough [ ] shortness of breath [ ] dyspnea [ ] wheezing [ ] sputum [ ] hemoptysis  GI: [ ] negative [ ] nausea [ ] vomiting [ ] diarrhea [ ] constipation [ ] abd pain [ ] dysphagia   : [ ] negative [ ] dysuria [ ] nocturia [ ] hematuria [ ] increased urinary frequency  Musculoskeletal: [ ] negative [ ] back pain [ ] myalgias [ ] arthralgias [ ] fracture  Skin: [ ] negative [ ] rash [ ] itch  Neurological: [ ] negative [ ] headache [ ] dizziness [ ] syncope [ ] weakness [ ] numbness  Psychiatric: [ ] negative [ ] anxiety [ ] depression  Endocrine: [ ] negative [ ] diabetes [ ] thyroid problem  Hematologic/Lymphatic: [ ] negative [ ] anemia [ ] bleeding problem  Allergic/Immunologic: [ ] negative [ ] itchy eyes [ ] nasal discharge [ ] hives [ ] angioedema  [ ] All other systems negative  [ ] Unable to assess ROS because ________    OBJECTIVE:  ICU Vital Signs Last 24 Hrs  T(C): 36.4 (30 Jun 2023 04:00), Max: 38 (29 Jun 2023 09:58)  T(F): 97.5 (30 Jun 2023 04:00), Max: 100.4 (29 Jun 2023 09:58)  HR: 102 (30 Jun 2023 06:00) (82 - 113)  BP: 75/21 (29 Jun 2023 12:15) (74/46 - 113/90)  BP(mean): 27 (29 Jun 2023 12:15) (27 - 94)  ABP: 108/60 (30 Jun 2023 06:00) (85/72 - 194/76)  ABP(mean): 71 (30 Jun 2023 06:00) (50 - 107)  RR: 19 (30 Jun 2023 06:00) (12 - 24)  SpO2: 100% (30 Jun 2023 06:00) (99% - 100%)    O2 Parameters below as of 30 Jun 2023 06:00  Patient On (Oxygen Delivery Method): ventilator    O2 Concentration (%): 40      Mode: AC/ CMV (Assist Control/ Continuous Mandatory Ventilation), RR (machine): 12, TV (machine): 380, FiO2: 40, PEEP: 5, ITime: 0.64, MAP: 9, PIP: 21    06-28 @ 07:01 - 06-29 @ 07:00  --------------------------------------------------------  IN: 1000 mL / OUT: 2000 mL / NET: -1000 mL    06-29 @ 07:01 - 06-30 @ 06:52  --------------------------------------------------------  IN: 2441.7 mL / OUT: 600 mL / NET: 1841.7 mL      CAPILLARY BLOOD GLUCOSE      POCT Blood Glucose.: 168 mg/dL (30 Jun 2023 03:23)        PHYSICAL EXAM:  GENERAL: NAD, well-groomed, well-developed  HEAD:  Atraumatic, Normocephalic  EYES: EOMI, PERRLA, conjunctiva and sclera clear  ENMT: No tonsillar erythema, exudates, or enlargement; Moist mucous membranes, Good dentition, No lesions  NECK: Supple, No JVD, Normal thyroid  NERVOUS SYSTEM:  Alert & Oriented X3, Good concentration; Motor Strength 5/5 B/L upper and lower extremities; DTRs 2+ intact and symmetric  CHEST/LUNG: Clear to percussion bilaterally; No rales, rhonchi, wheezing, or rubs  HEART: Regular rate and rhythm; No murmurs, rubs, or gallops  ABDOMEN: Soft, Nontender, Nondistended; Bowel sounds present  EXTREMITIES:  2+ Peripheral Pulses, No clubbing, cyanosis, or edema  LYMPH: No lymphadenopathy noted  SKIN: No rashes or lesions      HOSPITAL MEDICATIONS:  Standing Meds:  cefepime   IVPB 1000 milliGRAM(s) IV Intermittent every 24 hours  cefepime   IVPB      chlorhexidine 2% Cloths 1 Application(s) Topical daily  dextrose 5%. 1000 milliLiter(s) IV Continuous <Continuous>  dextrose 5%. 1000 milliLiter(s) IV Continuous <Continuous>  dextrose 50% Injectable 25 Gram(s) IV Push once  dextrose 50% Injectable 12.5 Gram(s) IV Push once  dextrose 50% Injectable 25 Gram(s) IV Push once  epoetin shell (EPOGEN) Injectable 30871 Unit(s) IV Push <User Schedule>  midodrine. 10 milliGRAM(s) Oral <User Schedule>  nitroglycerin    2% Ointment 1 Inch(s) Transdermal once  norepinephrine Infusion 1 MICROgram(s)/kG/Min IV Continuous <Continuous>  pantoprazole  Injectable 80 milliGRAM(s) IV Push once  pantoprazole Infusion 8 mG/Hr IV Continuous <Continuous>  propofol Infusion 30 MICROgram(s)/kG/Min IV Continuous <Continuous>  vasopressin Infusion 0.04 Unit(s)/Min IV Continuous <Continuous>      PRN Meds:  dextrose Oral Gel 15 Gram(s) Oral once PRN  lidocaine   4% Patch 1 Patch Transdermal daily PRN  lidocaine   4% Patch 1 Patch Transdermal daily PRN  sodium chloride 0.9% Bolus. 100 milliLiter(s) IV Bolus every 5 minutes PRN      LABS:                        10.5   23.06 )-----------( 353      ( 30 Jun 2023 01:40 )             33.8     Hgb Trend: 10.5<--, 10.2<--, 9.5<--, 8.6<--, 7.1<--  06-30    135  |  98  |  49<H>  ----------------------------<  218<H>  4.8   |  20<L>  |  4.45<H>    Ca    10.0      30 Jun 2023 01:40  Phos  6.4     06-30  Mg     2.20     06-30    TPro  7.3  /  Alb  2.3<L>  /  TBili  0.6  /  DBili  x   /  AST  30  /  ALT  <5  /  AlkPhos  116  06-30    Creatinine Trend: 4.45<--, 3.96<--, 3.75<--, 3.72<--, 3.55<--, 5.19<--  PT/INR - ( 29 Jun 2023 14:22 )   PT: 17.5 sec;   INR: 1.50 ratio         PTT - ( 29 Jun 2023 05:35 )  PTT:28.5 sec  Urinalysis Basic - ( 30 Jun 2023 01:40 )    Color: x / Appearance: x / SG: x / pH: x  Gluc: 218 mg/dL / Ketone: x  / Bili: x / Urobili: x   Blood: x / Protein: x / Nitrite: x   Leuk Esterase: x / RBC: x / WBC x   Sq Epi: x / Non Sq Epi: x / Bacteria: x      Arterial Blood Gas:  06-30 @ 01:40  7.36/42/193/24/99.2/-1.7  ABG lactate: --  Arterial Blood Gas:  06-29 @ 19:28  7.34/47/157/25/99.2/-0.7  ABG lactate: --  Arterial Blood Gas:  06-29 @ 13:05  7.38/47/320/28/99.5/2.2  ABG lactate: --    Venous Blood Gas:  06-29 @ 10:38  7.37/53/38/31/57.6  VBG Lactate: 5.7  Venous Blood Gas:  06-29 @ 05:35  7.37/52/26/30/36.6  VBG Lactate: 4.8      MICROBIOLOGY:     RADIOLOGY:  [ ] Reviewed and interpreted by me

## 2023-06-30 NOTE — PROGRESS NOTE ADULT - ASSESSMENT
77 year old male with DVT, CAD, ESRD on HD, RUE AVF, AOCD, HLD, HTN, pre-diabetes, prostate ca with suprapubic catheter from NH for missed HD. Developed fevers and found to have GBS bacteremia. +R shoulder pain improved. Leukocytosis resolved.    CT A/P with no evidence of acute infectious process in the abdomen and pelvis.  Difficult PIV line placement  Agitated, with mittens, difficult to obtain MRI at this time  RRT called 6/29 for hypotension, was having clots per rectum and hematemesis now intubated in MICU    Recommend:  #Shock, hypoxic resp failure, GIB  -blood cultures x 2 sets  -Sputum culture  -Continue vanco by level, cefepime 1 gram IV q 24 hours  -MRSA nasal swab  -GI/ IR following    #GBS bacteremia  -Unable to obtain MRI shoulders due to agitation, can check XR R shoulder  -Trend WBC  -monitor fever curve  -TTE no evidence of valvular vegetation  -Initial plan was to complete 4 weeks of abx on 7/11 prior to icu admission - will tailor plan according to clinical course     Isaiah Hall MD  Available through MS Teams  If no response, or after 5pm/weekends, call [phone]    Prognosis guarded.

## 2023-06-30 NOTE — CONSULT NOTE ADULT - CONSULT REASON
GBS bacteremia
GDA embolization
anemia
ESRD status
r/o compartment syndrome
complex medical decision making in the setting of serious illness
cards eval

## 2023-06-30 NOTE — CONSULT NOTE ADULT - CONSULT REQUESTED DATE/TIME
03-Jun-2023 11:38
04-Jun-2023 07:37
12-Jun-2023 10:52
15-Sixto-2023 09:23
03-Jun-2023 11:20
13-Jun-2023 23:18
30-Jun-2023 09:13

## 2023-06-30 NOTE — PROGRESS NOTE ADULT - SUBJECTIVE AND OBJECTIVE BOX
CARDIOLOGY FOLLOW UP NOTE - DR. RAMOS    Patient Name: MAGGIE THAO  Date of Service: 23 @ 12:13    events noted  intubated, sedated    Subjective:  uto  PHYSICAL EXAM:  T(C): 35.6 (23 @ 08:30), Max: 36.8 (23 @ 20:00)  HR: 96 (23 @ 11:43) (96 - 124)  BP: 75/21 (23 @ 12:15) (75/21 - 75/21)  RR: 14 (23 @ 10:00) (12 - 34)  SpO2: 100% (23 @ 11:43) (99% - 100%)  Wt(kg): --  I&O's Summary    2023 07:  -  2023 07:00  --------------------------------------------------------  IN: 2524.2 mL / OUT: 600 mL / NET: 1924.2 mL    2023 07:  -  2023 12:13  --------------------------------------------------------  IN: 243.2 mL / OUT: 0 mL / NET: 243.2 mL      Daily     Daily Weight in k.4 (2023 05:00)    Appearance: intubated, sedated  Cardiovascular: Normal S1 S2,RRR, No JVD, No murmurs  Respiratory: Lungs clear to auscultation	  Gastrointestinal:  Soft, Non-tender, + BS	  Extremities: Normal range of motion, No clubbing, cyanosis or edema      Home Medications:  bisacodyl 5 mg oral tablet: 2 tab(s) orally once a day as needed for  constipation (2023 00:48)  cinacalcet 60 mg oral tablet: 1 tab(s) orally once a day (2023 00:48)  donepezil 5 mg oral tablet: 1 tab(s) orally once a day (at bedtime) (2023 00:48)  ergocalciferol 1.25 mg (50,000 intl units) oral capsule: 1 cap(s) orally once a month (2023 00:48)  meloxicam 15 mg oral tablet: 1 tab(s) orally once a day as needed for  pain (2023 00:48)  NIFEdipine (Eqv-Adalat CC) 30 mg oral tablet, extended release: 2 tab(s) orally once a day (2023 00:48)  sevelamer hydrochloride 800 mg oral tablet: 1 tab(s) orally every 6 hours (2023 00:49)      MEDICATIONS  (STANDING):  cefepime   IVPB 1000 milliGRAM(s) IV Intermittent every 24 hours  cefepime   IVPB      chlorhexidine 2% Cloths 1 Application(s) Topical daily  dextrose 5%. 1000 milliLiter(s) (100 mL/Hr) IV Continuous <Continuous>  dextrose 5%. 1000 milliLiter(s) (50 mL/Hr) IV Continuous <Continuous>  dextrose 50% Injectable 25 Gram(s) IV Push once  dextrose 50% Injectable 12.5 Gram(s) IV Push once  dextrose 50% Injectable 25 Gram(s) IV Push once  epoetin shell (EPOGEN) Injectable 56039 Unit(s) IV Push <User Schedule>  midodrine. 10 milliGRAM(s) Oral <User Schedule>  nitroglycerin    2% Ointment 1 Inch(s) Transdermal once  norepinephrine Infusion 1 MICROgram(s)/kG/Min (74.8 mL/Hr) IV Continuous <Continuous>  pantoprazole  Injectable 80 milliGRAM(s) IV Push once  pantoprazole Infusion 8 mG/Hr (10 mL/Hr) IV Continuous <Continuous>  propofol Infusion 30 MICROgram(s)/kG/Min (14.4 mL/Hr) IV Continuous <Continuous>  vasopressin Infusion 0.04 Unit(s)/Min (6 mL/Hr) IV Continuous <Continuous>      TELEMETRY: 	    ECG:  	  RADIOLOGY:   DIAGNOSTIC TESTING:  [ ] Echocardiogram:  [ ] Catheterization:  [ ] Stress Test:    OTHER: 	    LABS:	 	    CARDIAC MARKERS:        Troponin T, High Sensitivity Result: 660 ng/L ( @ 14:41)  Troponin T, High Sensitivity Result: 668 ng/L ( @ 05:35)                                9.9    22.77 )-----------( 296      ( 2023 10:00 )             31.2         135  |  98  |  49<H>  ----------------------------<  218<H>  4.8   |  20<L>  |  4.45<H>    Ca    10.0      2023 01:40  Phos  6.4       Mg     2.20         TPro  7.3  /  Alb  2.3<L>  /  TBili  0.6  /  DBili  x   /  AST  30  /  ALT  <5  /  AlkPhos  116      proBNP:   PT/INR - ( 2023 14:22 )   PT: 17.5 sec;   INR: 1.50 ratio         PTT - ( 2023 05:35 )  PTT:28.5 sec  Lipid Profile:   HgA1c:     Creatinine: 4.45 mg/dL (23 @ 01:40)  Creatinine: 3.96 mg/dL (23 @ 19:28)  Creatinine: 3.75 mg/dL (23 @ 13:05)  Creatinine: 3.72 mg/dL (23 @ 10:30)  Creatinine: 3.55 mg/dL (23 @ 05:35)  Creatinine: 5.19 mg/dL (23 @ 06:30)

## 2023-06-30 NOTE — CONSULT NOTE ADULT - PROVIDER SPECIALTY LIST ADULT
Palliative Care
Cardiology
Infectious Disease
Nephrology
Vascular Surgery
Gastroenterology
Intervent Radiology

## 2023-06-30 NOTE — PROGRESS NOTE ADULT - SUBJECTIVE AND OBJECTIVE BOX
Gastroenterology/Hepatology Progress Note    Interval Events: Patient without BMs overnight. Remains on levo/vaso, intubated and sedated.     Allergies:  penicillin (Rash)    Hospital Medications:  cefepime   IVPB 1000 milliGRAM(s) IV Intermittent every 24 hours  cefepime   IVPB      chlorhexidine 2% Cloths 1 Application(s) Topical daily  dextrose 5%. 1000 milliLiter(s) IV Continuous <Continuous>  dextrose 5%. 1000 milliLiter(s) IV Continuous <Continuous>  dextrose 50% Injectable 25 Gram(s) IV Push once  dextrose 50% Injectable 12.5 Gram(s) IV Push once  dextrose 50% Injectable 25 Gram(s) IV Push once  dextrose Oral Gel 15 Gram(s) Oral once PRN  epoetin shell (EPOGEN) Injectable 32860 Unit(s) IV Push <User Schedule>  lidocaine   4% Patch 1 Patch Transdermal daily PRN  lidocaine   4% Patch 1 Patch Transdermal daily PRN  midodrine. 10 milliGRAM(s) Oral <User Schedule>  nitroglycerin    2% Ointment 1 Inch(s) Transdermal once  norepinephrine Infusion 1 MICROgram(s)/kG/Min IV Continuous <Continuous>  pantoprazole  Injectable 80 milliGRAM(s) IV Push once  pantoprazole Infusion 8 mG/Hr IV Continuous <Continuous>  propofol Infusion 30 MICROgram(s)/kG/Min IV Continuous <Continuous>  sodium chloride 0.9% Bolus. 100 milliLiter(s) IV Bolus every 5 minutes PRN  vasopressin Infusion 0.04 Unit(s)/Min IV Continuous <Continuous>    ROS: 14 point ROS negative unless otherwise state in subjective    PHYSICAL EXAM:   Vital Signs:  Vital Signs Last 24 Hrs  T(C): 35.6 (2023 08:30), Max: 36.8 (2023 20:00)  T(F): 96 (2023 08:30), Max: 98.2 (2023 20:00)  HR: 105 (2023 10:00) (82 - 124)  BP: 75/21 (2023 12:15) (75/21 - 113/90)  BP(mean): 27 (2023 12:15) (27 - 94)  RR: 14 (2023 10:00) (12 - 34)  SpO2: 100% (2023 10:00) (99% - 100%)    Parameters below as of 2023 10:00  Patient On (Oxygen Delivery Method): ventilator    O2 Concentration (%): 40  Daily     Daily Weight in k.4 (2023 05:00)    GENERAL:  No acute distress, intubated/sedated  HEENT:  NCAT, no scleral icterus  CHEST: no resp distress  HEART:  RRR  ABDOMEN:  Soft, non-tender, non-distended   EXTREMITIES:  No cyanosis, clubbing, or edema  SKIN:  No rash/erythema/ecchymoses   NEURO:  Alert and oriented x 0    LABS:                        10.5   23.06 )-----------( 353      ( 2023 01:40 )             33.8     Mean Cell Volume: 86.2 fL (-23 @ 01:40)        135  |  98  |  49<H>  ----------------------------<  218<H>  4.8   |  20<L>  |  4.45<H>    Ca    10.0      2023 01:40  Phos  6.4     06-  Mg     2.20     30    TPro  7.3  /  Alb  2.3<L>  /  TBili  0.6  /  DBili  x   /  AST  30  /  ALT  <5  /  AlkPhos  116  06-30    LIVER FUNCTIONS - ( 2023 01:40 )  Alb: 2.3 g/dL / Pro: 7.3 g/dL / ALK PHOS: 116 U/L / ALT: <5 U/L / AST: 30 U/L / GGT: x           PT/INR - ( 2023 14:22 )   PT: 17.5 sec;   INR: 1.50 ratio         PTT - ( 2023 05:35 )  PTT:28.5 sec  Urinalysis Basic - ( 2023 01:40 )    Color: x / Appearance: x / SG: x / pH: x  Gluc: 218 mg/dL / Ketone: x  / Bili: x / Urobili: x   Blood: x / Protein: x / Nitrite: x   Leuk Esterase: x / RBC: x / WBC x   Sq Epi: x / Non Sq Epi: x / Bacteria: x    Imaging:  reviewed         Gastroenterology/Hepatology Progress Note    Interval Events: Patient without BMs overnight. Remains on levo/vaso, intubated and sedated.     Allergies:  penicillin (Rash)    Hospital Medications:  cefepime   IVPB 1000 milliGRAM(s) IV Intermittent every 24 hours  cefepime   IVPB      chlorhexidine 2% Cloths 1 Application(s) Topical daily  dextrose 5%. 1000 milliLiter(s) IV Continuous <Continuous>  dextrose 5%. 1000 milliLiter(s) IV Continuous <Continuous>  dextrose 50% Injectable 25 Gram(s) IV Push once  dextrose 50% Injectable 12.5 Gram(s) IV Push once  dextrose 50% Injectable 25 Gram(s) IV Push once  dextrose Oral Gel 15 Gram(s) Oral once PRN  epoetin shell (EPOGEN) Injectable 52358 Unit(s) IV Push <User Schedule>  lidocaine   4% Patch 1 Patch Transdermal daily PRN  lidocaine   4% Patch 1 Patch Transdermal daily PRN  midodrine. 10 milliGRAM(s) Oral <User Schedule>  nitroglycerin    2% Ointment 1 Inch(s) Transdermal once  norepinephrine Infusion 1 MICROgram(s)/kG/Min IV Continuous <Continuous>  pantoprazole  Injectable 80 milliGRAM(s) IV Push once  pantoprazole Infusion 8 mG/Hr IV Continuous <Continuous>  propofol Infusion 30 MICROgram(s)/kG/Min IV Continuous <Continuous>  sodium chloride 0.9% Bolus. 100 milliLiter(s) IV Bolus every 5 minutes PRN  vasopressin Infusion 0.04 Unit(s)/Min IV Continuous <Continuous>    ROS: 14 point ROS negative unless otherwise state in subjective    PHYSICAL EXAM:   Vital Signs:  Vital Signs Last 24 Hrs  T(C): 35.6 (2023 08:30), Max: 36.8 (2023 20:00)  T(F): 96 (2023 08:30), Max: 98.2 (2023 20:00)  HR: 105 (2023 10:00) (82 - 124)  BP: 75/21 (2023 12:15) (75/21 - 113/90)  BP(mean): 27 (2023 12:15) (27 - 94)  RR: 14 (2023 10:00) (12 - 34)  SpO2: 100% (2023 10:00) (99% - 100%)    Parameters below as of 2023 10:00  Patient On (Oxygen Delivery Method): ventilator    O2 Concentration (%): 40  Daily     Daily Weight in k.4 (2023 05:00)    GENERAL:  No acute distress, intubated/sedated  HEENT:  NCAT, no scleral icterus  CHEST: no resp distress  HEART:  RRR  ABDOMEN:  Soft, non-tender, non-distended   EXTREMITIES:  No cyanosis, clubbing, or edema  SKIN:  No rash/erythema/ecchymoses   NEURO:  Alert and oriented x 0, sedated    LABS:                        10.5   23.06 )-----------( 353      ( 2023 01:40 )             33.8     Mean Cell Volume: 86.2 fL (- @ 01:40)        135  |  98  |  49<H>  ----------------------------<  218<H>  4.8   |  20<L>  |  4.45<H>    Ca    10.0      2023 01:40  Phos  6.4     06-  Mg     2.20     30    TPro  7.3  /  Alb  2.3<L>  /  TBili  0.6  /  DBili  x   /  AST  30  /  ALT  <5  /  AlkPhos  116  06-30    LIVER FUNCTIONS - ( 2023 01:40 )  Alb: 2.3 g/dL / Pro: 7.3 g/dL / ALK PHOS: 116 U/L / ALT: <5 U/L / AST: 30 U/L / GGT: x           PT/INR - ( 2023 14:22 )   PT: 17.5 sec;   INR: 1.50 ratio         PTT - ( 2023 05:35 )  PTT:28.5 sec  Urinalysis Basic - ( 2023 01:40 )    Color: x / Appearance: x / SG: x / pH: x  Gluc: 218 mg/dL / Ketone: x  / Bili: x / Urobili: x   Blood: x / Protein: x / Nitrite: x   Leuk Esterase: x / RBC: x / WBC x   Sq Epi: x / Non Sq Epi: x / Bacteria: x

## 2023-06-30 NOTE — PROGRESS NOTE ADULT - ASSESSMENT
77y Male with dementia, DVT (off AC), CAD-?stent, afib ESRD-HD TTS, AOCD, HLD, HTN, preDM, prostate CA s/p suprapubic cath, p-SBO 2017 who initially presented to the ED for HD (his HD center does not have a Yusra lift that he requires). Noted to have Hgb 7.2 for which GI was consulted.     # Duodenal ulcer - s/p cauterization of visible vessel. Patient's Hgb was stable since EGD on 6/7. Now GI called back for episode of hematemesis, blood clots in stool.   - patient admitted to MICU, again noted to have episodes of maroon stool. Intubated and on pressors. Hgb improved with pRBC transfusion  - patient s/p repeat EGD on 6/29: Hematin (altered blood/coffee-ground-like material) and large clot in the entire stomach. One giant 3 cm non-bleeding duodenal ulcer with three visible vessels. Purostat applied over the ulcer. The ulcer did not appear to have healed (in fact, appeared worse) compared to prior examination.  # Normocytic anemia   # Elevated trops > 10,000 with unclear cardiac history  # HBcAb - patient denies prior knowledge, no liver lesions on US    Recommendations:  - trend CBC, keep active T&S, transfuse for Hgb<8 given history of CAD  - IV PPI gtt  - recommend IR evaluation for possible empiric GDA embolization given very high risk for rebleeding     GI will continue to follow.     All recommendations are tentative until note is attested by attending.     Maria E Amezquita, PGY5  Gastroenterology/Hepatology Fellow  Available on Microsoft Teams  [phone] ([employer] Short Range Pager)  [phone] (Long Range Pager)    After 5pm, please contact the on-call GI fellow. [phone]

## 2023-06-30 NOTE — PROGRESS NOTE ADULT - ASSESSMENT
77  year old male with hx of cad , ? PCI, HTN, esrd, HTN, DM, afib presents for HD.    #CAD, shock   -ECHO w moderate segm LV dysfunction along LAD distribution suggestive of prior infarct  -now intubated/sedated on vasopressors in setting of likely acute gi bleed, hypovlemic shock   -defer any ischemic eval in light of age, fxnl status, anemia req w/u, active GI bleed, mental status, and inability to safely give periprocedureal A/C, antiplat  -continue medical tx of cmp  -cont mido to support BP, vasopressors as needed  -off asa    #anemia, GI bleed  -s/p repeat egd with large ulcer, s/p treatment  -prbc's per icu/gi  -ppi  -gi f/u    #Afib   -not on ac  -rate control with bb as tolerates    # ESRD on HD   -renal fu     dvt ppx    pall eval noted      care per icu       35 minutes spent on total encounter; more than 50% of the visit was spent counseling and/or coordinating care by the attending physician.

## 2023-06-30 NOTE — PROGRESS NOTE ADULT - ASSESSMENT
a 77y Male with a hx of HTN, HLD, ESRD (on HD via RUE AVF Tu/Th/Sat), CAD (previously on coumadin, now stopped), AFib, T2DM, and hx of prostate ca s/p suprapubic catheter who initially presented for hemodialysis on 6/3.    NEURO:  #Dementia  AxO x1 at baseline. Sedated and intubated 6/29.  - Propofol   - Hold donepezil 5mg daily    CARDIOVASCULAR:  #Shock  Likely multifactorial including hypovolemic, cardiogenic, and distributive. Recent episode of hematemesis and drop of Hg concerning for recurrent GIB. Bedside echo demonstrated poor heart function. Given fluids, 2u pRBC, and 1u platelets.  - Abx regimen as below  - Vasopressin  - Norepinephrine  - Wean as tolerated    #Elevated troponin  #Atrial flutter  #AFib  AFlutter caught on EKG. No chest pain, but another EKG demonstrated ST elevations. Cardiology noted elevated trops most likely 2/2 ESRD and recommended patient be off of anticoagulation 2/2 fall risk.  - Cardiology following    #CAD  TTE demonstrated mod segmental LV dysfunction along LAD distribution suggestive of prior infarct. Not a candidate for further ischemic eval per cards.  - hold lopressor 25mg BID given shock    PULMONARY:  #Intubation  Intubated 6/29 2/2 respiratory failure and concern for airway protection given AMS.  - wean as tolerated    Mode: AC/ CMV (Assist Control/ Continuous Mandatory Ventilation)  RR (machine): 12  TV (machine): 380  FiO2: 100  PEEP: 5    GI:  Diet: NPO  #Normocytic anemia 2/2 duodenal ulcer  Hg 7.2 on admission. Noted +FOBT and GI consulted for possible GIB component. EGD 6/7 demonstrated a duodenal ulcer s/p cauterization of visibile vessel. Started on PPI drip for 72hrs then transitioned to PO doses. GI reconsulted 6/29 for hematemesis, clots in stool, and drop in Hg.  - Transfuse for HgB <8  - per GI, restart IV PPI gtt  - GI following  - EGD today    RENAL:  #ESRD  On HD via RUE AVF Tu/Th/Sat outpatient. Been receiving HD M/W/F inpatient. Give abx after HD on HD days. Most recent HD on 6/28.  - Nephrology following  - Would need CRRT if dialysis needed. Assess day by day.  - Ego 14K w/ HD    #Prostate cancer s/p suprapubic catheter  #Anuria  Chronic wright within [place] pouch that urology exchanged on 6/10    ENDO:  #Hyperparathyroidism 2/2 ESRD on HD  - Continue sensipar 60mg daily and home phosphorus binders    #T2DM  - FBG q6h  - Low SSI    HEME/ONC/RHEUM:  #Normocytic anemia 2/2 duodenal ulcer as above  - hold ASA in setting of concern of GIB  - given 2u pRBC and 1u platelets 6/29    ID:  #Strep agalactiae bacteremia  Found to be bacteremic 6/11 and cultures grew strep agalactiae. Patient started on cefazolin 1g daily until 7/11 (give after HD on HD days) per ID.  - 6/13 and 6/15 BCx neg  - Obtain BCx  - Discontinue cefazolin  - Start broad spectrum with cefepime 1g daily and vancomycin (pending vanc level) given concern for sepsis contributing to shock  - Vancomycin level with goal of 15-20    SKIN/MSK:  - Monitor for ulcers. Consult wound care if needed    ETHICS:  Full Code.  Discussed with wife (healthcare proxy).     a 77y Male with a hx of HTN, HLD, ESRD (on HD via RUE AVF Tu/Th/Sat), CAD (previously on coumadin, now stopped), AFib, T2DM, and hx of prostate ca s/p suprapubic catheter who initially presented for hemodialysis on 6/3.    NEURO:  #Dementia  AxO x1 at baseline. Sedated and intubated 6/29.  - Propofol   - Hold donepezil 5mg daily    CARDIOVASCULAR:  #Shock  Likely multifactorial including hypovolemic, cardiogenic, and distributive. Recent episode of hematemesis and drop of Hg concerning for recurrent GIB. Bedside echo demonstrated poor heart function. Given fluids, 2u pRBC, and 1u platelets.  - Abx regimen as below  - requiring pressors, titrate vasopressin, norepinephrine to maintain map >65  - Wean as tolerated    #Elevated troponin  #Atrial flutter  #AFib  AFlutter caught on EKG. No chest pain, but another EKG demonstrated ST elevations. Cardiology noted elevated trops most likely 2/2 ESRD and recommended patient be off of anticoagulation 2/2 fall risk.  - Cardiology following    #CAD  TTE demonstrated mod segmental LV dysfunction along LAD distribution suggestive of prior infarct. Not a candidate for further ischemic eval per cards.  - hold lopressor 25mg BID given shock    PULMONARY:  #Intubation  Intubated 6/29 2/2 respiratory failure and concern for airway protection given AMS.  - wean as tolerated    Mode: AC/ CMV (Assist Control/ Continuous Mandatory Ventilation)  RR (machine): 12  TV (machine): 380  FiO2: 100  PEEP: 5    GI:  Diet: NPO  #Normocytic anemia 2/2 duodenal ulcer  Hg 7.2 on admission. Noted +FOBT and GI consulted for possible GIB component. EGD 6/7 demonstrated a duodenal ulcer s/p cauterization of visibile vessel. Started on PPI drip for 72hrs then transitioned to PO doses. GI reconsulted 6/29 for hematemesis, clots in stool, and drop in Hg.  - Transfuse for HgB <8  - per GI, restart IV PPI gtt  - GI following   - IR consulted- pt not candidate or IR embolization at this time given stable at this time, can reconsult if active bleed   - EGD today    RENAL:  #ESRD  On HD via RUE AVF Tu/Th/Sat outpatient. Been receiving HD M/W/F inpatient. Give abx after HD on HD days.   - Nephrology following- did not tolerate HD (6/30) and not offering  CRRT at this timed. Assess day by day.  - Most recent HD on 6/28, attempted HD 6/30    #Prostate cancer s/p suprapubic catheter  #Anuria  Chronic wright within [place] pouch that urology exchanged on 6/10    ENDO:  #Hyperparathyroidism 2/2 ESRD on HD  - Holding sensipar given low normal iPTH and will use low calcium bath with HD.    #T2DM  - FBG q6h  - Low SSI    HEME/ONC/RHEUM:  #Normocytic anemia 2/2 duodenal ulcer as above  - hold ASA in setting of concern of GIB  - given 2u pRBC and 1u platelets 6/29    ID:  #Strep agalactiae bacteremia  Found to be bacteremic 6/11 and cultures grew strep agalactiae. Patient started on cefazolin 1g daily until 7/11 (give after HD on HD days) per ID.  - 6/13 and 6/15 BCx neg  - Obtain BCx- pending   - Discontinue cefazolin (6/21-6/29)  - Start broad spectrum with cefepime 1g daily (6/29-  ) and vancomycin (6/29- )  given concern for sepsis contributing to shock  - Vancomycin level with goal of 15-20-  can assess level in AM    SKIN/MSK:  - Monitor for ulcers. Consult wound care if needed    ETHICS:  Full Code.  Discussed with wife (healthcare proxy).  -Ongoing GOC discussions with sister Jennyfer and wife Aicha, state family would like to wait another day or two to assess for chance of recovery, if there is no improvement seen will consider DNR/DNI status

## 2023-06-30 NOTE — PROGRESS NOTE ADULT - SUBJECTIVE AND OBJECTIVE BOX
[facility] NEPHROLOGY- PROGRESS NOTE    77y Male with history of ESRD on HD presents for need for dialysis. Nephrology consulted for ESRD status.    Patient hypotensive and hypoxic s/p intubation and started on pressors. Transferred to MICU for further evaluation.    REVIEW OF SYSTEMS: Unable to obtain as patient intubated.    penicillin (Rash)      Hospital Medications: Medications reviewed      VITALS:  T(F): 96 (06-30-23 @ 08:30), Max: 98.2 (06-29-23 @ 20:00)  HR: 96 (06-30-23 @ 12:00)  BP: --  RR: 14 (06-30-23 @ 12:00)  SpO2: 100% (06-30-23 @ 12:00)  Wt(kg): --    06-29 @ 07:01  -  06-30 @ 07:00  --------------------------------------------------------  IN: 2524.2 mL / OUT: 600 mL / NET: 1924.2 mL    06-30 @ 07:01  -  06-30 @ 13:22  --------------------------------------------------------  IN: 243.2 mL / OUT: 0 mL / NET: 243.2 mL        PHYSICAL EXAM:    Gen: Intubated and sedated  Cards: RRR, +S1/S2, no M/G/R  Resp: CTA B/L  GI: soft, NT/ND, NABS  : + SPT  Vascular: no LE edema B/L, RUE AVG + bruit/thrill, L shoulder/arm edematous        LABS:  06-30    135  |  98  |  49<H>  ----------------------------<  218<H>  4.8   |  20<L>  |  4.45<H>    Ca    10.0      30 Jun 2023 01:40  Phos  6.4     06-30  Mg     2.20     06-30    TPro  7.3  /  Alb  2.3<L>  /  TBili  0.6  /  DBili      /  AST  30  /  ALT  <5  /  AlkPhos  116  06-30    Creatinine Trend: 4.45 <--, 3.96 <--, 3.75 <--, 3.72 <--, 3.55 <--, 5.19 <--, 3.81 <--, 6.28 <--, 5.02 <--, 3.57 <--                        9.9    22.77 )-----------( 296      ( 30 Jun 2023 10:00 )             31.2     Urine Studies:  Urinalysis Basic - ( 30 Jun 2023 01:40 )    Color:  / Appearance:  / SG:  / pH:   Gluc: 218 mg/dL / Ketone:   / Bili:  / Urobili:    Blood:  / Protein:  / Nitrite:    Leuk Esterase:  / RBC:  / WBC    Sq Epi:  / Non Sq Epi:  / Bacteria:

## 2023-06-30 NOTE — CHART NOTE - NSCHARTNOTEFT_GEN_A_CORE
: Alexis    INDICATION: Respiratory Failure, Shock    PROCEDURE:  [X] LIMITED ECHO  [ ] LIMITED CHEST  [ ] LIMITED RETROPERITONEAL  [ ] LIMITED ABDOMINAL  [ ] LIMITED DVT  [ ] NEEDLE GUIDANCE VASCULAR  [ ] NEEDLE GUIDANCE THORACENTESIS  [ ] NEEDLE GUIDANCE PARACENTESIS  [ ] NEEDLE GUIDANCE PERICARDIOCENTESIS  [ ] OTHER    FINDINGS: Severely reduced LV systolic function with LVOT VTI 9.7 cm. Akinetic apex with dilation. RV smaller than LV in size. IVC 1.8 cm      INTERPRETATION: Severely reduced LV systolic function. Mixed shock    Images stored on Qpath.    Jose Espinoza MD  Pulmonary & Critical Care.

## 2023-06-30 NOTE — PROGRESS NOTE ADULT - ASSESSMENT
77y Male with history of ESRD on HD presents for need for dialysis. Nephrology consulted for ESRD status.    1) ESRD: Last HD this morning terminated after 1 hour due to tachycardia. Patient currently unstable to tolerate further HD. Will assess daily. Monitor electrolytes.    2) Hypotension: BP low normal. Pressors as per ICU. Monitor BP.    3) Anemia of renal disease: Hb improving s/p PRBC. No IV iron given elevated ferritin. Continue with Epo 14K with HD. Monitor Hb.    4) Secondary HPT of renal origin: Phosphorus uncontrolled. Can start renvela powder once diet started (currently NPO). Holding sensipar 30 mg daily given low normal iPTH and will use low calcium bath with HD. Monitor serum calcium and phosphorus.    Poor prognosis.     [facility] NEPHROLOGY  Armando Shaffer M.D.  Kendall Morales D.O.  Lilliam Cody M.D.  Oxana Hernandez, OWEN, ANP-C  [phone]  Fax: [phone]    [address] [place] [address], [address]  [place] [address]

## 2023-07-01 NOTE — PROGRESS NOTE ADULT - SUBJECTIVE AND OBJECTIVE BOX
CARDIOLOGY FOLLOW UP - Dr. Clements  Date of Service: 7/1/23  CC: events noted, increasing pressor requirement, ++ melena    Review of Systems:  Constitutional: No fever, weight loss, or fatigue  Respiratory: No cough, wheezing, or hemoptysis, no shortness of breath  Cardiovascular: No chest pain, palpitations, passing out, dizziness, or leg swelling  Gastrointestinal: No abd or epigastric pain. No nausea, vomiting, or hematemesis; no diarrhea or consiptaiton, no melena or hematochezia  Vascular: No edema     TELEMETRY:    PHYSICAL EXAM:  T(C): 36.6 (07-01-23 @ 02:30), Max: 37.2 (06-30-23 @ 20:00)  HR: 95 (07-01-23 @ 06:00) (93 - 124)  BP: --  RR: 15 (07-01-23 @ 06:00) (12 - 34)  SpO2: 100% (06-30-23 @ 19:00) (99% - 100%)  Wt(kg): --  I&O's Summary    30 Jun 2023 07:01  -  01 Jul 2023 07:00  --------------------------------------------------------  IN: 2275.7 mL / OUT: 0 mL / NET: 2275.7 mL        Appearance: Normal	  Cardiovascular: Normal S1 S2,RRR, No JVD, No murmurs  Respiratory: Lungs clear to auscultation	  Gastrointestinal:  Soft, Non-tender, + BS	  Extremities: Normal range of motion, No clubbing, cyanosis or edema  Vascular: Peripheral pulses palpable 2+ bilaterally       Home Medications:  bisacodyl 5 mg oral tablet: 2 tab(s) orally once a day as needed for  constipation (04 Jun 2023 00:48)  cinacalcet 60 mg oral tablet: 1 tab(s) orally once a day (04 Jun 2023 00:48)  donepezil 5 mg oral tablet: 1 tab(s) orally once a day (at bedtime) (04 Jun 2023 00:48)  ergocalciferol 1.25 mg (50,000 intl units) oral capsule: 1 cap(s) orally once a month (04 Jun 2023 00:48)  meloxicam 15 mg oral tablet: 1 tab(s) orally once a day as needed for  pain (04 Jun 2023 00:48)  NIFEdipine (Eqv-Adalat CC) 30 mg oral tablet, extended release: 2 tab(s) orally once a day (04 Jun 2023 00:48)  sevelamer hydrochloride 800 mg oral tablet: 1 tab(s) orally every 6 hours (04 Jun 2023 00:49)        MEDICATIONS  (STANDING):  cefepime   IVPB      cefepime   IVPB 1000 milliGRAM(s) IV Intermittent every 24 hours  chlorhexidine 2% Cloths 1 Application(s) Topical daily  dextrose 5%. 1000 milliLiter(s) (100 mL/Hr) IV Continuous <Continuous>  dextrose 5%. 1000 milliLiter(s) (50 mL/Hr) IV Continuous <Continuous>  dextrose 50% Injectable 25 Gram(s) IV Push once  dextrose 50% Injectable 12.5 Gram(s) IV Push once  dextrose 50% Injectable 25 Gram(s) IV Push once  epoetin shell (EPOGEN) Injectable 10677 Unit(s) IV Push <User Schedule>  midodrine. 10 milliGRAM(s) Oral <User Schedule>  nitroglycerin    2% Ointment 1 Inch(s) Transdermal once  norepinephrine Infusion 1 MICROgram(s)/kG/Min (74.8 mL/Hr) IV Continuous <Continuous>  pantoprazole  Injectable 80 milliGRAM(s) IV Push once  pantoprazole Infusion 8 mG/Hr (10 mL/Hr) IV Continuous <Continuous>  propofol Infusion 30 MICROgram(s)/kG/Min (14.4 mL/Hr) IV Continuous <Continuous>  vasopressin Infusion 0.04 Unit(s)/Min (6 mL/Hr) IV Continuous <Continuous>        EKG:  RADIOLOGY:  DIAGNOSTIC TESTING:  [ ] Echocardiogram:  [ ] Catherterization:  [ ] Stress Test:  OTHER:     LABS:	 	                          9.3    21.44 )-----------( 227      ( 01 Jul 2023 06:12 )             29.7     07-01    139  |  102  |  58<H>  ----------------------------<  174<H>  4.6   |  22  |  4.98<H>    Ca    9.7      01 Jul 2023 04:26  Phos  6.6     07-01  Mg     2.20     07-01    TPro  6.5  /  Alb  2.1<L>  /  TBili  0.7  /  DBili  x   /  AST  32  /  ALT  <5  /  AlkPhos  101  07-01      PT/INR - ( 30 Jun 2023 23:30 )   PT: 17.9 sec;   INR: 1.54 ratio         PTT - ( 30 Jun 2023 23:30 )  PTT:29.1 sec    CARDIAC MARKERS:

## 2023-07-01 NOTE — PROGRESS NOTE ADULT - ASSESSMENT
A/P    Hypoxic resp failure s/p intubation   s/p RRT for hypoxia   MICU team following     # Bacteremia.   repeat Blood c/s neg   ID following   unable to get any PIV , so plan is to give Abx Cefazolin during HD days as per ID recommendation till 7/11/23     # Anemia of chronic disease./ PUD  - with noted (+)FOBT, possible GIB component   -NGT feeding   feeding held today 2/2 coughing   IR/GI for possible PEG   - IV and oral hydration, monitor hypoglycemia    #CAD/ Elevated troponin./ Atrial flutter   -cardio following   conservative rx 2/2 multiple comorbid condition   not candidate for AC     #ESRD on hemodialysis.   -appreciate renal recs  interviewed/examined at HD  monitor I/Os, daily weights    # Prostate cancer.   - s/p suprapubic cath.    # Hypothyroid.   c/w synthroid     # Dementia.   supportive care     # Hyperparathyroidism due to ESRD on dialysis.   ·  Plan: Resume sensipar 60 mg daily and home phosphorus binders.   Monitor serum calcium and phosphorus.

## 2023-07-01 NOTE — PROGRESS NOTE ADULT - SUBJECTIVE AND OBJECTIVE BOX
Interval Events:  -continues to have melena   -GOC addressed with wife, now DNR    HPI:  77-year-old male with history of DVT (previously on coumadin, now stopped), CAD, ESRD (on HD via RUE AVF Tu/Th/Sat), AOCD, HLD, HTN, obesity, pre-diabetes, and prostate cancer s/p suprapubic catheter presenting from [facility] w/need for HD. Patient's current HD does not have Yusra lift. Last HD 6/01. Patient is currently without complaint, A&Ox1.     In the ED VS:  99.7  67-91  /43-49  16-18  %RA (03 Jun 2023 19:20)      REVIEW OF SYSTEMS:    [x ] Unable to assess ROS because patient is intubated/sedated     OBJECTIVE:  ICU Vital Signs Last 24 Hrs  T(C): 37 (01 Jul 2023 08:00), Max: 37.2 (30 Jun 2023 20:00)  T(F): 98.6 (01 Jul 2023 08:00), Max: 99 (30 Jun 2023 20:00)  HR: 93 (01 Jul 2023 10:00) (93 - 105)  BP: --  BP(mean): --  ABP: 107/42 (01 Jul 2023 10:00) (75/34 - 133/45)  ABP(mean): 58 (01 Jul 2023 10:00) (44 - 74)  RR: 13 (01 Jul 2023 10:00) (12 - 30)  SpO2: 99% (01 Jul 2023 10:00) (99% - 100%)    O2 Parameters below as of 01 Jul 2023 10:00  Patient On (Oxygen Delivery Method): ventilator    O2 Concentration (%): 40      Mode: AC/ CMV (Assist Control/ Continuous Mandatory Ventilation), RR (machine): 12, TV (machine): 380, FiO2: 40, PEEP: 5, ITime: 0.7, MAP: 8, PIP: 17    06-30 @ 07:01  -  07-01 @ 07:00  --------------------------------------------------------  IN: 2275.7 mL / OUT: 0 mL / NET: 2275.7 mL    07-01 @ 07:01 - 07-01 @ 09:57  --------------------------------------------------------  IN: 223.8 mL / OUT: 0 mL / NET: 223.8 mL      CAPILLARY BLOOD GLUCOSE      POCT Blood Glucose.: 151 mg/dL (01 Jul 2023 07:32)      Physical Exam:   Constitutional: ill appearing, no acute distress   HEENT: + PERRLA, EOMI, no drainage or redness  Neck: supple,  No JVD  Respiratory: Ventilator assisted breath Sounds equal & dimished bilaterally to auscultation, no accessory muscle use noted  Cardiovascular: Regular rate, regular rhythm, normal S1, S2; no murmurs or rub  Gastrointestinal: Soft, non-tender, non distended, no hepatosplenomegaly, normal bowel sounds  Extremities: + 2 peripheral edema, no cyanosis, no clubbing   Vascular: Equal and normal pulses: 2+ peripheral pulses throughout  Neurological: sedated/intubated  Psychiatric: calm, normal mood, normal affect  Musculoskeletal: No joint swelling or deformity; no limitation of movement  Skin: warm, dry, well perfused, no rashes    HOSPITAL MEDICATIONS:  Standing Meds:  cefepime   IVPB 1000 milliGRAM(s) IV Intermittent every 24 hours  cefepime   IVPB      chlorhexidine 2% Cloths 1 Application(s) Topical daily  dextrose 5%. 1000 milliLiter(s) IV Continuous <Continuous>  dextrose 5%. 1000 milliLiter(s) IV Continuous <Continuous>  dextrose 50% Injectable 25 Gram(s) IV Push once  dextrose 50% Injectable 12.5 Gram(s) IV Push once  dextrose 50% Injectable 25 Gram(s) IV Push once  epoetin shell (EPOGEN) Injectable 20390 Unit(s) IV Push <User Schedule>  midodrine. 10 milliGRAM(s) Oral <User Schedule>  nitroglycerin    2% Ointment 1 Inch(s) Transdermal once  norepinephrine Infusion 1 MICROgram(s)/kG/Min IV Continuous <Continuous>  pantoprazole  Injectable 80 milliGRAM(s) IV Push once  pantoprazole Infusion 8 mG/Hr IV Continuous <Continuous>  propofol Infusion 30 MICROgram(s)/kG/Min IV Continuous <Continuous>  vasopressin Infusion 0.04 Unit(s)/Min IV Continuous <Continuous>      PRN Meds:  dextrose Oral Gel 15 Gram(s) Oral once PRN  lidocaine   4% Patch 1 Patch Transdermal daily PRN  lidocaine   4% Patch 1 Patch Transdermal daily PRN  sodium chloride 0.9% Bolus. 100 milliLiter(s) IV Bolus every 5 minutes PRN      LABS:                        9.3    21.44 )-----------( 227      ( 01 Jul 2023 06:12 )             29.7     Hgb Trend: 9.3<--, 9.3<--, 9.1<--, 9.4<--, 9.9<--  07-01    139  |  102  |  58<H>  ----------------------------<  174<H>  4.6   |  22  |  4.98<H>    Ca    9.7      01 Jul 2023 04:26  Phos  6.6     07-01  Mg     2.20     07-01    TPro  6.5  /  Alb  2.1<L>  /  TBili  0.7  /  DBili  x   /  AST  32  /  ALT  <5  /  AlkPhos  101  07-01    Creatinine Trend: 4.98<--, 4.45<--, 3.96<--, 3.75<--, 3.72<--, 3.55<--  PT/INR - ( 30 Jun 2023 23:30 )   PT: 17.9 sec;   INR: 1.54 ratio         PTT - ( 30 Jun 2023 23:30 )  PTT:29.1 sec  Urinalysis Basic - ( 01 Jul 2023 04:26 )    Color: x / Appearance: x / SG: x / pH: x  Gluc: 174 mg/dL / Ketone: x  / Bili: x / Urobili: x   Blood: x / Protein: x / Nitrite: x   Leuk Esterase: x / RBC: x / WBC x   Sq Epi: x / Non Sq Epi: x / Bacteria: x      Arterial Blood Gas:  07-01 @ 04:26  7.36/45/161/25/99.1/-0.2  ABG lactate: --  Arterial Blood Gas:  06-30 @ 23:24  7.38/42/159/25/99.4/-0.4  ABG lactate: --  Arterial Blood Gas:  06-30 @ 18:00  7.39/42/150/25/98.9/0.3  ABG lactate: --  Arterial Blood Gas:  06-30 @ 10:00  7.38/43/164/25/100.0/0.1  ABG lactate: --  Arterial Blood Gas:  06-30 @ 01:40  7.36/42/193/24/99.2/-1.7  ABG lactate: --  Arterial Blood Gas:  06-29 @ 19:28  7.34/47/157/25/99.2/-0.7  ABG lactate: --  Arterial Blood Gas:  06-29 @ 13:05  7.38/47/320/28/99.5/2.2  ABG lactate: --    Venous Blood Gas:  06-29 @ 10:38  7.37/53/38/31/57.6  VBG Lactate: 5.7

## 2023-07-01 NOTE — PROGRESS NOTE ADULT - SUBJECTIVE AND OBJECTIVE BOX
Patient is a 77y old  Male who presents with a chief complaint of needs HD (01 Jul 2023 10:36)      INTERVAL HPI/OVERNIGHT EVENTS:  T(C): 38.4 (07-02-23 @ 00:00), Max: 38.4 (07-02-23 @ 00:00)  HR: 89 (07-02-23 @ 03:00) (88 - 96)  BP: --  RR: 12 (07-02-23 @ 03:00) (12 - 15)  SpO2: 100% (07-02-23 @ 03:00) (99% - 100%)  Wt(kg): --  I&O's Summary    30 Jun 2023 07:01  -  01 Jul 2023 07:00  --------------------------------------------------------  IN: 2275.7 mL / OUT: 0 mL / NET: 2275.7 mL    01 Jul 2023 07:01  -  02 Jul 2023 04:22  --------------------------------------------------------  IN: 1230.3 mL / OUT: 300 mL / NET: 930.3 mL        PAST MEDICAL & SURGICAL HISTORY:  HTN (hypertension)      Prostate cancer  prostate cancer      Obesity      PVD (peripheral vascular disease)      CAD (coronary artery disease)  LAD stent      Pre-diabetes      ESRD (end stage renal disease) on dialysis      Essential hypertension      History of DVT of lower extremity      History of prostatectomy      AV fistula  h/o creation in 2005      H/O cardiac catheterization  4/25/2014 non obstructive disease      H/O abdominal surgery  10/10/17          SOCIAL HISTORY  Alcohol:  Tobacco:  Illicit substance use:    FAMILY HISTORY:    REVIEW OF SYSTEMS:  CONSTITUTIONAL: No fever, weight loss, or fatigue  EYES: No eye pain, visual disturbances, or discharge  ENMT:  No difficulty hearing, tinnitus, vertigo; No sinus or throat pain  NECK: No pain or stiffness  RESPIRATORY: No cough, wheezing, chills or hemoptysis; No shortness of breath  CARDIOVASCULAR: No chest pain, palpitations, dizziness, or leg swelling  GASTROINTESTINAL: No abdominal or epigastric pain. No nausea, vomiting, or hematemesis; No diarrhea or constipation. No melena or hematochezia.  GENITOURINARY: No dysuria, frequency, hematuria, or incontinence  NEUROLOGICAL: No headaches, memory loss, loss of strength, numbness, or tremors  SKIN: No itching, burning, rashes, or lesions   LYMPH NODES: No enlarged glands  ENDOCRINE: No heat or cold intolerance; No hair loss  MUSCULOSKELETAL: No joint pain or swelling; No muscle, back, or extremity pain  PSYCHIATRIC: No depression, anxiety, mood swings, or difficulty sleeping  HEME/LYMPH: No easy bruising, or bleeding gums  ALLERY AND IMMUNOLOGIC: No hives or eczema    RADIOLOGY & ADDITIONAL TESTS:    Imaging Personally Reviewed:  [ ] YES  [ ] NO    Consultant(s) Notes Reviewed:  [ ] YES  [ ] NO    PHYSICAL EXAM:  GENERAL: NAD, well-groomed, well-developed  HEAD:  Atraumatic, Normocephalic  EYES: EOMI, PERRLA, conjunctiva and sclera clear  ENMT: No tonsillar erythema, exudates, or enlargement; Moist mucous membranes, Good dentition, No lesions  NECK: Supple, No JVD, Normal thyroid  NERVOUS SYSTEM:  Alert & Oriented X3, Good concentration; Motor Strength 5/5 B/L upper and lower extremities; DTRs 2+ intact and symmetric  CHEST/LUNG: Clear to percussion bilaterally; No rales, rhonchi, wheezing, or rubs  HEART: Regular rate and rhythm; No murmurs, rubs, or gallops  ABDOMEN: Soft, Nontender, Nondistended; Bowel sounds present  EXTREMITIES:  2+ Peripheral Pulses, No clubbing, cyanosis, or edema  LYMPH: No lymphadenopathy noted  SKIN: No rashes or lesions    LABS:                        8.8    15.84 )-----------( 199      ( 02 Jul 2023 00:36 )             28.4     07-02    140  |  102  |  66<H>  ----------------------------<  161<H>  5.0   |  20<L>  |  5.80<H>    Ca    9.6      02 Jul 2023 00:36  Phos  7.1     07-02  Mg     2.30     07-02    TPro  6.4  /  Alb  2.2<L>  /  TBili  1.0  /  DBili  x   /  AST  29  /  ALT  <5  /  AlkPhos  101  07-02    PT/INR - ( 02 Jul 2023 00:36 )   PT: 15.9 sec;   INR: 1.37 ratio         PTT - ( 02 Jul 2023 00:36 )  PTT:26.5 sec  Urinalysis Basic - ( 02 Jul 2023 00:36 )    Color: x / Appearance: x / SG: x / pH: x  Gluc: 161 mg/dL / Ketone: x  / Bili: x / Urobili: x   Blood: x / Protein: x / Nitrite: x   Leuk Esterase: x / RBC: x / WBC x   Sq Epi: x / Non Sq Epi: x / Bacteria: x      CAPILLARY BLOOD GLUCOSE      POCT Blood Glucose.: 136 mg/dL (02 Jul 2023 03:43)  POCT Blood Glucose.: 150 mg/dL (01 Jul 2023 19:11)  POCT Blood Glucose.: 151 mg/dL (01 Jul 2023 07:32)    ABG - ( 02 Jul 2023 00:36 )  pH, Arterial: 7.34  pH, Blood: x     /  pCO2: 43    /  pO2: 154   / HCO3: 23    / Base Excess: -2.5  /  SaO2: 100.0               Urinalysis Basic - ( 02 Jul 2023 00:36 )    Color: x / Appearance: x / SG: x / pH: x  Gluc: 161 mg/dL / Ketone: x  / Bili: x / Urobili: x   Blood: x / Protein: x / Nitrite: x   Leuk Esterase: x / RBC: x / WBC x   Sq Epi: x / Non Sq Epi: x / Bacteria: x        MEDICATIONS  (STANDING):  ceFAZolin   IVPB 1000 milliGRAM(s) IV Intermittent every 12 hours  chlorhexidine 2% Cloths 1 Application(s) Topical daily  dextrose 5%. 1000 milliLiter(s) (50 mL/Hr) IV Continuous <Continuous>  dextrose 5%. 1000 milliLiter(s) (100 mL/Hr) IV Continuous <Continuous>  dextrose 50% Injectable 12.5 Gram(s) IV Push once  dextrose 50% Injectable 25 Gram(s) IV Push once  dextrose 50% Injectable 25 Gram(s) IV Push once  epoetin shell (EPOGEN) Injectable 51267 Unit(s) IV Push <User Schedule>  midodrine. 10 milliGRAM(s) Oral <User Schedule>  nitroglycerin    2% Ointment 1 Inch(s) Transdermal once  norepinephrine Infusion 0.59 MICROgram(s)/kG/Min (44.2 mL/Hr) IV Continuous <Continuous>  pantoprazole  Injectable 80 milliGRAM(s) IV Push once  pantoprazole Infusion 8 mG/Hr (10 mL/Hr) IV Continuous <Continuous>  propofol Infusion 30 MICROgram(s)/kG/Min (14.4 mL/Hr) IV Continuous <Continuous>  vasopressin Infusion 0.04 Unit(s)/Min (6 mL/Hr) IV Continuous <Continuous>    MEDICATIONS  (PRN):  dextrose Oral Gel 15 Gram(s) Oral once PRN Blood Glucose LESS THAN 70 milliGRAM(s)/deciliter  lidocaine   4% Patch 1 Patch Transdermal daily PRN right knee pain  lidocaine   4% Patch 1 Patch Transdermal daily PRN left shoulder pain  sodium chloride 0.9% Bolus. 100 milliLiter(s) IV Bolus every 5 minutes PRN SBP LESS THAN or EQUAL to 90 mmHg      Care Discussed with Consultants/Other Providers [ ] YES  [ ] NO

## 2023-07-01 NOTE — PROGRESS NOTE ADULT - ASSESSMENT
77y Male with history of ESRD on HD presents for need for dialysis. Nephrology consulted for ESRD status.    1) ESRD: Last HD 6/30 terminated after 1 hour due to tachycardia. Patient currently unstable to tolerate further HD. Will assess daily. Monitor electrolytes.    2) Hypotension: BP low normal. Pressors as per ICU. Monitor BP.    3) Anemia of renal disease: Hb improving s/p 1u PRBC this am. No IV iron given elevated ferritin. Continue with Epo 14K with HD. Monitor Hb.    4) Secondary HPT of renal origin: Phosphorus uncontrolled. Can start renvela powder once diet started (currently NPO). Holding sensipar 30 mg daily given low normal iPTH. Monitor serum calcium and phosphorus.    Poor prognosis.     [facility] NEPHROLOGY  Armando Shaffer M.D.  Kendall Morales D.O.  Lilliam Cody M.D.  Oxana Hernandez, MSN, ANP-C  [phone]  Fax: [phone]    [facility] [place] [address], [address]  [place] [address]

## 2023-07-01 NOTE — CHART NOTE - NSCHARTNOTEFT_GEN_A_CORE
Pt was noted with sudden drop in Blood pressure (levophed requirements increased from 0.45 to 0.7 Pt also on Vasopressin) also noted large amount of melena on exam. CBC CMP COAGS/Fibrinogen and  ABG were sent, IR contacted and family was called. Pt's wife Aicha Reilly called me back 20 minutes after and informed that she does not want any aggrieve interventions including Interventional Radiology. She concluded that she want medical management for now (like pressors and blood products) but no IR or anything aggressive, and made Pt DNR.   For now will continue with pressors will transfuse 1PRBC and 1FFP and reassess. Pt family will elaborate on more detailed GOC in am. Pt was noted with sudden drop in Blood pressure (levophed requirements increased from 0.45 to 0.7 Pt also on Vasopressin) also noted large amount of melena on exam. CBC CMP COAGS/Fibrinogen and  ABG were sent, IR contacted and family was called. Pt's wife Kim Gautam called me back 20 minutes after and informed that she does not want any aggrieve interventions including Interventional Radiology. She concluded that she want medical management for now (like pressors and blood products) but no IR or anything aggressive, and made Pt DNR.   For now will continue with pressors will transfuse 1PRBC and 1FFP and reassess. Pt family will elaborate on more detailed GOC in am.    Attending Attestation:   I spoke to Kim Gautam as well and she confirmed that she wishes him to be DNR. She confirmed she would not want any invasive procedures including IR intervention. Will continue medical management for now. Family to come in person today to discuss GOC.

## 2023-07-01 NOTE — PROGRESS NOTE ADULT - SUBJECTIVE AND OBJECTIVE BOX
[facility] NEPHROLOGY- PROGRESS NOTE    77y Male with history of ESRD on HD presents for need for dialysis. Nephrology consulted for ESRD status.    Patient hypotensive and hypoxic s/p intubation and started on pressors. Transferred to MICU for further evaluation.    REVIEW OF SYSTEMS: Unable to obtain as patient intubated.    penicillin (Rash)      Hospital Medications: Medications reviewed      VITALS:  T(F): 98.6 (07-01-23 @ 08:00), Max: 99 (06-30-23 @ 20:00)  HR: 95 (07-01-23 @ 10:28)  BP: --  RR: 13 (07-01-23 @ 10:00)  SpO2: 100% (07-01-23 @ 10:28)  Wt(kg): --    06-30 @ 07:01  -  07-01 @ 07:00  --------------------------------------------------------  IN: 2275.7 mL / OUT: 0 mL / NET: 2275.7 mL    07-01 @ 07:01  -  07-01 @ 10:37  --------------------------------------------------------  IN: 223.8 mL / OUT: 0 mL / NET: 223.8 mL      PHYSICAL EXAM:    Gen: Intubated and sedated  Cards: RRR, +S1/S2, no M/G/R  Resp: mechanical BS B/L  GI: soft, NT/ND, NABS  : + SPT  Vascular: no LE edema B/L, RUE AVG + bruit/thrill, L shoulder/arm edematous        LABS:  07-01    139  |  102  |  58<H>  ----------------------------<  174<H>  4.6   |  22  |  4.98<H>    Ca    9.7      01 Jul 2023 04:26  Phos  6.6     07-01  Mg     2.20     07-01    TPro  6.5  /  Alb  2.1<L>  /  TBili  0.7  /  DBili      /  AST  32  /  ALT  <5  /  AlkPhos  101  07-01    Creatinine Trend: 4.98 <--, 4.45 <--, 3.96 <--, 3.75 <--, 3.72 <--, 3.55 <--, 5.19 <--, 3.81 <--, 6.28 <--, 5.02 <--                        9.3    21.44 )-----------( 227      ( 01 Jul 2023 06:12 )             29.7     Urine Studies:  Urinalysis Basic - ( 01 Jul 2023 04:26 )    Color:  / Appearance:  / SG:  / pH:   Gluc: 174 mg/dL / Ketone:   / Bili:  / Urobili:    Blood:  / Protein:  / Nitrite:    Leuk Esterase:  / RBC:  / WBC    Sq Epi:  / Non Sq Epi:  / Bacteria:

## 2023-07-01 NOTE — PROGRESS NOTE ADULT - ASSESSMENT
77y Male with a hx of HTN, HLD, ESRD (on HD via RUE AVF Tu/Th/Sat), CAD (previously on coumadin, now stopped), AFib, T2DM, and hx of prostate ca s/p suprapubic catheter who initially presented for hemodialysis on 6/3.    NEURO:  #Dementia  AxO x1 at baseline. Sedated and intubated 6/29 for altered mental status in the setting of hypotension and GIB   - c/w Propofol gtt    - Hold donepezil 5mg daily    CARDIOVASCULAR:  #Shock  Likely multifactorial including hypovolemic, cardiogenic, and distributive. Recent episode of hematemesis and drop of Hg concerning for recurrent GIB. Bedside echo demonstrated poor heart function. Given fluids, 2u pRBC, and 1u platelets.  - Abx regimen as below  - requiring pressors, titrate vasopressin, norepinephrine to maintain map >65  - Wean as tolerated    #Elevated troponin  #Atrial flutter  #AFib  AFlutter caught on EKG. No chest pain, but another EKG demonstrated ST elevations. Cardiology noted elevated trops most likely 2/2 ESRD and recommended patient be off of anticoagulation 2/2 fall risk.  - Cardiology following    #CAD  TTE demonstrated mod segmental LV dysfunction along LAD distribution suggestive of prior infarct. Not a candidate for further ischemic eval per cards.  - hold lopressor 25mg BID given shock    PULMONARY:  #Intubation  Intubated 6/29 2/2 respiratory failure and concern for airway protection given AMS.  - c/w current vent settings       GI:  #Normocytic anemia 2/2 duodenal ulcer  Hg 7.2 on admission. Noted +FOBT and GI consulted for possible GIB component. EGD 6/7 demonstrated a duodenal ulcer s/p cauterization of visibile vessel. Started on PPI drip for 72hrs then transitioned to PO doses. GI reconsulted 6/29 for hematemesis, clots in stool, and drop in Hg.  - Transfuse for HgB <8  - per GI, restart IV PPI gtt  - GI following   - EGD on 6/29 hematin (altered blood/coffee-ground-like material) and large clot in the entire stomach with 1 giant 3 cm non-bleeding duodenal ulcer with three visible vessels and Purostat was applied over the ulcer  - IR consulted- pt not candidate or IR embolization at this time given stable at this time, can reconsult if active bleed     RENAL:  #ESRD  On HD via RUE AVF Tu/Th/Sat outpatient. Been receiving HD M/W/F inpatient. Give abx after HD on HD days.   - Nephrology following- did not tolerate HD (6/30) and not offering  CRRT at this timed. Assess daily   - Most recent HD on 6/28    #Prostate cancer s/p suprapubic catheter  #Anuria  Chronic wright within [place] pouch that urology exchanged on 6/10    ENDO:  #Hyperparathyroidism 2/2 ESRD on HD  - Holding sensipar given low normal iPTH and will use low calcium bath with HD.    #T2DM  - FBG q6h  - Low SSI    HEME/ONC/RHEUM:  #Normocytic anemia 2/2 duodenal ulcer as above  - hold ASA in setting of concern of GIB  - given 2u pRBC and 1u platelets 6/29    ID:  #Strep agalactiae bacteremia  Found to be bacteremic 6/11 and cultures grew strep agalactiae. Patient started on cefazolin 1g daily until 7/11 (give after HD on HD days) per ID.  - 6/13 and 6/15 BCx neg  - Obtain BCx- pending   - Discontinue cefazolin (6/21-6/29)  - Start broad spectrum with cefepime 1g daily (6/29-  ) and vancomycin (6/29- )  given concern for sepsis contributing to shock  - Vancomycin level with goal of 15-20  pending vanco level today     SKIN/MSK:  - Monitor for ulcers. Consult wound care if needed    ETHICS:  Full Code.  Discussed with wife (healthcare proxy).  -Ongoing GOC discussions with sister Jennyfer and wife Aicha, state family would like to wait another day or two to assess for chance of recovery, if there is no improvement seen will consider DNR/DNI status  -overnight team addressed GOC and continued melena, wife agreed to DNR at this time, will have further GOC today    77y Male with a hx of HTN, HLD, ESRD (on HD via RUE AVF Tu/Th/Sat), CAD (previously on coumadin, now stopped), AFib, T2DM, and hx of prostate ca s/p suprapubic catheter who initially presented for hemodialysis on 6/3.    NEURO:  #Dementia  AxO x1 at baseline. Sedated and intubated 6/29 for altered mental status in the setting of hypotension and GIB   - c/w Propofol gtt    - Hold donepezil 5mg daily    CARDIOVASCULAR:  #Shock  Likely multifactorial including hypovolemic, cardiogenic, and distributive. Recent episode of hematemesis and drop of Hg concerning for recurrent GIB. Bedside echo demonstrated poor heart function. Given fluids, 2u pRBC, and 1u platelets.  - Abx regimen as below  - requiring pressors on vasopressin and norepinephrine to maintain -110  - Wean as tolerated    #Elevated troponin  #Atrial flutter  #AFib  AFlutter caught on EKG. No chest pain, but another EKG demonstrated ST elevations. Cardiology noted elevated trops most likely 2/2 ESRD and recommended patient be off of anticoagulation 2/2 fall risk.  - Cardiology following    #CAD  TTE demonstrated mod segmental LV dysfunction along LAD distribution suggestive of prior infarct. Not a candidate for further ischemic eval per cards.  - hold lopressor 25mg BID given shock    PULMONARY:  #Intubation  Intubated 6/29 2/2 respiratory failure and concern for airway protection given AMS.  - c/w current vent settings  -minimal secretions        GI:  #Normocytic anemia 2/2 duodenal ulcer  Hg 7.2 on admission. Noted +FOBT and GI consulted for possible GIB component. EGD 6/7 demonstrated a duodenal ulcer s/p cauterization of visibile vessel. Started on PPI drip for 72hrs then transitioned to PO doses. GI reconsulted 6/29 for hematemesis, clots in stool, and drop in Hg.  - Transfuse for HgB <8  - per GI, restart IV PPI gtt  - GI following   - EGD on 6/29 hematin (altered blood/coffee-ground-like material) and large clot in the entire stomach with 1 giant 3 cm non-bleeding duodenal ulcer with three visible vessels and Purostat was applied over the ulcer  - IR consulted- pt not candidate or IR embolization at this time given stable at this time    RENAL:  #ESRD  On HD via RUE AVF Tu/Th/Sat outpatient. Been receiving HD M/W/F inpatient. Give abx after HD on HD days.   - Nephrology following- did not tolerate HD (6/30) and not offering  CRRT at this time. Assess daily   - Most recent HD session on 6/28    #Prostate cancer s/p suprapubic catheter  #Anuria  Chronic wright within [place] pouch that urology exchanged on 6/10    ENDO:  #Hyperparathyroidism 2/2 ESRD on HD  - Holding sensipar given low normal iPTH and will use low calcium bath with HD.    #T2DM  - FBG q6h  - Low SSI    HEME/ONC/RHEUM:  #Normocytic anemia 2/2 duodenal ulcer as above  - hold ASA in setting of concern of GIB  - given total of 3u pRBC and 1u platelets 6/29    ID:  #Strep agalactiae bacteremia  Found to be bacteremic 6/11 and cultures grew strep agalactiae. Patient started on cefazolin 1g daily until 7/11 (give after HD on HD days) per ID.  - 6/13 and 6/15 BCx neg  -6/30 BCx- pending   - Discontinue cefazolin (6/21-6/29)  - Started broad spectrum with cefepime 1g daily (6/29-  ) and vancomycin (6/29- )  given concern for sepsis contributing to shock, will stop and change back to cefazolin       SKIN/MSK:  - pressure ulcers to sacral area  -wound team following     ETHICS:  Full Code.  Discussed with wife (healthcare proxy).  -Ongoing GOC discussions with sister Jennyfer and wife Aicha, state family would like to wait another day or two to assess for chance of recovery, if there is no improvement seen will consider DNR/DNI status  -overnight team addressed GOC and continued melena, wife agreed to DNR at this time, will have further GOC today

## 2023-07-02 NOTE — GOALS OF CARE CONVERSATION - ADVANCED CARE PLANNING - CONVERSATION DETAILS
Spoke with wife Aicha at bedside with other family members (sister, nephew, and niece in law). They expressed that they know the patient has been sick and suffering for so long, and that he lived a wonderful life. They would like the patient to palliatively extubated, and made as comfortable as possible to allow him to pass peacefully. All questions were answered and emotional support provided.

## 2023-07-02 NOTE — PROGRESS NOTE ADULT - CRITICAL CARE ATTENDING COMMENT
Patient is a 78 yo M w/ HTN, HLD, CAD, DVT (no longer on AC), ESRD, Prostate Ca s/p suprapubic catheter who was initially admitted on 6/3 after p/w HD requrements and socail placement for [facility] HD center. Course was c/b Group B Strep bacteremia with repeat cultures cleared on Cefazolin, anemia, duodenal ulcer (s/p EGD and cautery on 6/7) and new severe systolic HF (EF 35%). Patient now admitted to MICU after developement of profound shock in setting of new hematemesis and hematochezia.     #Shock - Multifactorial in setting of likely new sepsis, hemorrhagic and cardiogenic. POCUS with severely reduced LV ssytolic function with LVOT VTI 8.5 to 9.5 cm.   #Sepsis  #GIB - s/p EGD 6/29 which showed giant DU with visible vessels  #ADHF  - c/w vasopressors, currently on Levo and Vaso, wean as tolerated  - May trial inotropic agents if pressor requirements increase  - c/w Vanc/Cefepime for now  - f/u pancultures  - Transfuse PRBC to maintain Hb > 8  - IR consult for empiric embolization as per GI recs  - f/u GI recs  - Trend CBC q6h for now  - c/w NPO and PPI gtt  - Hold any AC and antiplatelet agents    #Acute respiratory failure  - c/w mechanical ventilatory support, wean as tolerated    #ESRD - Last HD 6/28. No urgent need for HD currently  - Will reassess daily, may need CRRT given high pressor requirements but likely poor candidate, will discuss with renal    #GOC - Extensive GOC with wife today who still would like to pursue all aggressive measures. Full Code    #DVT ppx - SCDs    Jose Espinoza MD  Pulmonary & Critical Care
76 yo M w/ HTN, HLD, CAD, DVT (no longer on AC), ESRD, Prostate Ca s/p suprapubic catheter who was initially admitted on 6/3 after p/w HD requirements and social placement for [facility] HD center. Course was c/b Group B Strep bacteremia with repeat cultures cleared on Cefazolin, anemia, duodenal ulcer (s/p EGD and cautery on 6/7) and new severe systolic HF (EF 35%). Patient now admitted to MICU after development of profound shock in setting of new hematemesis and hematochezia.     # hemorraghic shock  due to UGIB  # acute decompensated systolic heart failure  # ESRD on HD  # acute hypoxemic respiratory failure due to shock  - still having melena but h/h relatively stable  - c/w vasopressors  - did not tolerate iHD given very high tachycardia and blood pressure lability  - c/w full vent support for now, keep sedated  - PPI pushes  - per IR, no intervention at this time    ongoing GOC with wife and family - dnr/dni.  Possible palliative extubation today given overall poor prognosis
78 yo M w/ HTN, HLD, CAD, DVT (no longer on AC), ESRD, Prostate Ca s/p suprapubic catheter who was initially admitted on 6/3 after p/w HD requirements and social placement for [facility] HD center. Course was c/b Group B Strep bacteremia with repeat cultures cleared on Cefazolin, anemia, duodenal ulcer (s/p EGD and cautery on 6/7) and new severe systolic HF (EF 35%). Patient now admitted to MICU after development of profound shock in setting of new hematemesis and hematochezia.     # hemorraghic shock  due to UGIB  # acute decompensated systolic heart failure  # ESRD on HD  # acute hypoxemic respiratory failure due to shock  - still having melena but h/h relatively stable this AM  - c/w vasopressors  - did not tolerate iHD given very high tachycardia and blood pressure lability  - c/w full vent support for now, keep sedated  - PPI gtt  - per IR, no intervention at this time    ongoing GOC with wife and family - dnr/dni

## 2023-07-02 NOTE — PROGRESS NOTE ADULT - ASSESSMENT
77y Male with a hx of HTN, HLD, ESRD (on HD via RUE AVF Tu/Th/Sat), CAD (previously on coumadin, now stopped), AFib, T2DM, and hx of prostate ca s/p suprapubic catheter who initially presented for hemodialysis on 6/3.    NEURO:  #Dementia  AxO x1 at baseline. Sedated and intubated 6/29 for altered mental status in the setting of hypotension and GIB   - c/w Propofol gtt    - Hold donepezil 5mg daily    CARDIOVASCULAR:  #Shock  Likely multifactorial including hypovolemic, cardiogenic, and distributive. Recent episode of hematemesis and drop of Hg concerning for recurrent GIB. Bedside echo demonstrated poor heart function. Given fluids, 2u pRBC, and 1u platelets.  - Abx regimen as below  - requiring pressors on vasopressin and norepinephrine to maintain -110  - Wean as tolerated    #Elevated troponin  #Atrial flutter  #AFib  AFlutter caught on EKG. No chest pain, but another EKG demonstrated ST elevations. Cardiology noted elevated trops most likely 2/2 ESRD and recommended patient be off of anticoagulation 2/2 fall risk.  - Cardiology following    #CAD  TTE demonstrated mod segmental LV dysfunction along LAD distribution suggestive of prior infarct. Not a candidate for further ischemic eval per cards.  - hold lopressor 25mg BID given shock    PULMONARY:  #Intubation  Intubated 6/29 2/2 respiratory failure and concern for airway protection given AMS.  - c/w current vent settings  -minimal secretions        GI:  #Normocytic anemia 2/2 duodenal ulcer  Hg 7.2 on admission. Noted +FOBT and GI consulted for possible GIB component. EGD 6/7 demonstrated a duodenal ulcer s/p cauterization of visibile vessel. Started on PPI drip for 72hrs then transitioned to PO doses. GI reconsulted 6/29 for hematemesis, clots in stool, and drop in Hg.  - Transfuse for HgB <8  - per GI, restart IV PPI gtt  - GI following   - EGD on 6/29 hematin (altered blood/coffee-ground-like material) and large clot in the entire stomach with 1 giant 3 cm non-bleeding duodenal ulcer with three visible vessels and Purostat was applied over the ulcer  - IR consulted- pt not candidate or IR embolization at this time given stable at this time    RENAL:  #ESRD  On HD via RUE AVF Tu/Th/Sat outpatient. Been receiving HD M/W/F inpatient. Give abx after HD on HD days.   - Nephrology following- did not tolerate HD (6/30) and not offering  CRRT at this time. Assess daily   - Most recent HD session on 6/28    #Prostate cancer s/p suprapubic catheter  #Anuria  Chronic wright within [place] pouch that urology exchanged on 6/10    ENDO:  #Hyperparathyroidism 2/2 ESRD on HD  - Holding sensipar given low normal iPTH and will use low calcium bath with HD.    #T2DM  - FBG q6h  - Low SSI    HEME/ONC/RHEUM:  #Normocytic anemia 2/2 duodenal ulcer as above  - hold ASA in setting of concern of GIB  - given total of 3u pRBC and 1u platelets 6/29    ID:  #Strep agalactiae bacteremia  Found to be bacteremic 6/11 and cultures grew strep agalactiae. Patient started on cefazolin 1g daily until 7/11 (give after HD on HD days) per ID.  - 6/13 and 6/15 BCx neg  -6/30 BCx- pending   - Discontinue cefazolin (6/21-6/29)  - Started broad spectrum with cefepime 1g daily (6/29-  ) and vancomycin (6/29- )  given concern for sepsis contributing to shock, will stop and change back to cefazolin       SKIN/MSK:  - pressure ulcers to sacral area  -wound team following     ETHICS:  Full Code.  Discussed with wife (healthcare proxy).  -Ongoing GOC discussions with sister Jennyfer and wife Aicha, state family would like to wait another day or two to assess for chance of recovery, if there is no improvement seen will consider DNR/DNI status  -overnight team addressed GOC and continued melena, wife agreed to DNR at this time  -plan for siblings and wife to come in today for possible palliative extubation

## 2023-07-02 NOTE — PROGRESS NOTE ADULT - ASSESSMENT
77  year old male with hx of cad , ? PCI, HTN, esrd, HTN, DM, afib presents for HD.    #CAD, shock   -ECHO w moderate segm LV dysfunction along LAD distribution suggestive of prior infarct  -now intubated/sedated on vasopressors in setting of likely acute gi bleed, hypovlemic shock   -defer any ischemic eval in light of age, fxnl status, anemia req w/u, active GI bleed, mental status, and inability to safely give periprocedureal A/C, antiplat  -continue medical tx of cmp  -cont  vasopressors as needed  -off asa    #anemia, GI bleed  -s/p repeat egd with large ulcer, s/p treatment  -prbc's per icu/gi  -ppi  -gi f/u    #Afib   -not on ac  -rate control with bb as tolerates    # ESRD on HD   -renal fu     dvt ppx    pall eval noted      care per icu       35 minutes spent on total encounter; more than 50% of the visit was spent counseling and/or coordinating care by the attending physician.

## 2023-07-02 NOTE — PROGRESS NOTE ADULT - SUBJECTIVE AND OBJECTIVE BOX
CARDIOLOGY FOLLOW UP - Dr. Clements  Date of Service: 7/2/2023  CC: no events    Review of Systems:  Constitutional: No fever, weight loss, or fatigue  Respiratory: No cough, wheezing, or hemoptysis, no shortness of breath  Cardiovascular: No chest pain, palpitations, passing out, dizziness, or leg swelling  Gastrointestinal: No abd or epigastric pain. No nausea, vomiting, or hematemesis; no diarrhea or consiptaiton, no melena or hematochezia  Vascular: No edema     TELEMETRY:    PHYSICAL EXAM:  T(C): 37.8 (07-02-23 @ 04:00), Max: 38.4 (07-02-23 @ 00:00)  HR: 80 (07-02-23 @ 08:00) (76 - 96)  BP: --  RR: 12 (07-02-23 @ 08:00) (12 - 14)  SpO2: 100% (07-02-23 @ 08:00) (99% - 100%)  Wt(kg): --  I&O's Summary    01 Jul 2023 07:01  -  02 Jul 2023 07:00  --------------------------------------------------------  IN: 1631.2 mL / OUT: 300 mL / NET: 1331.2 mL    02 Jul 2023 07:01  -  02 Jul 2023 09:01  --------------------------------------------------------  IN: 48.5 mL / OUT: 0 mL / NET: 48.5 mL        Appearance: Normal	  Cardiovascular: Normal S1 S2,RRR, No JVD, No murmurs  Respiratory: Lungs clear to auscultation	  Gastrointestinal:  Soft, Non-tender, + BS	  Extremities: Normal range of motion, No clubbing, cyanosis or edema  Vascular: Peripheral pulses palpable 2+ bilaterally       Home Medications:  bisacodyl 5 mg oral tablet: 2 tab(s) orally once a day as needed for  constipation (04 Jun 2023 00:48)  cinacalcet 60 mg oral tablet: 1 tab(s) orally once a day (04 Jun 2023 00:48)  donepezil 5 mg oral tablet: 1 tab(s) orally once a day (at bedtime) (04 Jun 2023 00:48)  ergocalciferol 1.25 mg (50,000 intl units) oral capsule: 1 cap(s) orally once a month (04 Jun 2023 00:48)  meloxicam 15 mg oral tablet: 1 tab(s) orally once a day as needed for  pain (04 Jun 2023 00:48)  NIFEdipine (Eqv-Adalat CC) 30 mg oral tablet, extended release: 2 tab(s) orally once a day (04 Jun 2023 00:48)  sevelamer hydrochloride 800 mg oral tablet: 1 tab(s) orally every 6 hours (04 Jun 2023 00:49)        MEDICATIONS  (STANDING):  ceFAZolin   IVPB 1000 milliGRAM(s) IV Intermittent every 12 hours  chlorhexidine 2% Cloths 1 Application(s) Topical daily  dextrose 5%. 1000 milliLiter(s) (50 mL/Hr) IV Continuous <Continuous>  dextrose 5%. 1000 milliLiter(s) (100 mL/Hr) IV Continuous <Continuous>  dextrose 50% Injectable 12.5 Gram(s) IV Push once  dextrose 50% Injectable 25 Gram(s) IV Push once  dextrose 50% Injectable 25 Gram(s) IV Push once  epoetin shell (EPOGEN) Injectable 57645 Unit(s) IV Push <User Schedule>  nitroglycerin    2% Ointment 1 Inch(s) Transdermal once  norepinephrine Infusion 0.59 MICROgram(s)/kG/Min (44.2 mL/Hr) IV Continuous <Continuous>  pantoprazole  Injectable 40 milliGRAM(s) IV Push two times a day  propofol Infusion 30 MICROgram(s)/kG/Min (14.4 mL/Hr) IV Continuous <Continuous>  vasopressin Infusion 0.04 Unit(s)/Min (6 mL/Hr) IV Continuous <Continuous>        EKG:  RADIOLOGY:  DIAGNOSTIC TESTING:  [ ] Echocardiogram:  [ ] Catherterization:  [ ] Stress Test:  OTHER:     LABS:	 	                          8.8    15.84 )-----------( 199      ( 02 Jul 2023 00:36 )             28.4     07-02    140  |  102  |  66<H>  ----------------------------<  161<H>  5.0   |  20<L>  |  5.80<H>    Ca    9.6      02 Jul 2023 00:36  Phos  7.1     07-02  Mg     2.30     07-02    TPro  6.4  /  Alb  2.2<L>  /  TBili  1.0  /  DBili  x   /  AST  29  /  ALT  <5  /  AlkPhos  101  07-02      PT/INR - ( 02 Jul 2023 00:36 )   PT: 15.9 sec;   INR: 1.37 ratio         PTT - ( 02 Jul 2023 00:36 )  PTT:26.5 sec    CARDIAC MARKERS:

## 2023-07-02 NOTE — DISCHARGE NOTE FOR THE EXPIRED PATIENT - HOSPITAL COURSE
77y Male with a hx of HTN, HLD, ESRD (on HD via RUE AVF //Sat), CAD (previously on coumadin, now stopped), AFib, T2DM, and hx of prostate ca s/p suprapubic catheter who initially presented for hemodialysis on 6/3. Hospital course has been complicated by anemia 2/2 duodenal ulcer and Strep agalactiae bacteremia on . Rapid response on  early morning for hypotension to systolic 60-70s, tachycardic to 100s, and febrile. Given 1 amp D50. Patient was obtunded and required rebreather throughout the RRT. There is concern for hypotension 2/2 GIB as patient had episode of hematemesis and clots in his stool. HgB decreased from 8.2 to 7.1. Additionally patient was given 1u pRBC and IV pantoprazole 80mg. BP improved to 113/81 with improvement in HR. EKG concerning for new BBB. 2nd rapid response called  late morning for respiratory failure and concern for airway protection. Patient was emergently intubated, started on high dose pressors, and transferred to the MICU for urgent EGD and further ICU care. Throughout ICU course, patient was found to have non bleeding duodenal ulcer with 3 visible vessels could not cauterize was only able to place purostat, not an IR candidate for procedure. Continued to have melena, unable to tolerate HD due to prfound hypotension despite 2 pressors and rapid a. fib. After multiple GOC conversations family agreed to provide comfort measures and palliatively extubate on . Patient  on  @ 1538

## 2023-07-02 NOTE — PROGRESS NOTE ADULT - SUBJECTIVE AND OBJECTIVE BOX
Interval Events:   -NGT placed on low intermittent suction     HPI:  77-year-old male with history of DVT (previously on coumadin, now stopped), CAD, ESRD (on HD via RUE AVF Tu/Th/Sat), AOCD, HLD, HTN, obesity, pre-diabetes, and prostate cancer s/p suprapubic catheter presenting from [facility] w/need for HD. Patient's current HD does not have Yusra lift. Last HD 6/01. Patient is currently without complaint, A&Ox1.     In the ED VS:  99.7  67-91  /43-49  16-18  %RA (03 Jun 2023 19:20)      REVIEW OF SYSTEMS:    [x ] Unable to assess ROS because patient is intubated/sedated     OBJECTIVE:  ICU Vital Signs Last 24 Hrs  T(C): 37.8 (02 Jul 2023 04:00), Max: 38.4 (02 Jul 2023 00:00)  T(F): 100.1 (02 Jul 2023 04:00), Max: 101.2 (02 Jul 2023 00:00)  HR: 76 (02 Jul 2023 06:00) (76 - 96)  BP: --  BP(mean): --  ABP: 108/43 (02 Jul 2023 06:00) (97/41 - 120/47)  ABP(mean): 63 (02 Jul 2023 06:00) (57 - 73)  RR: 12 (02 Jul 2023 06:00) (12 - 15)  SpO2: 100% (02 Jul 2023 06:00) (99% - 100%)    O2 Parameters below as of 02 Jul 2023 06:00  Patient On (Oxygen Delivery Method): ventilator    O2 Concentration (%): 40      Mode: AC/ CMV (Assist Control/ Continuous Mandatory Ventilation), RR (machine): 12, TV (machine): 380, FiO2: 40, PEEP: 5, ITime: 0.7, MAP: 9, PIP: 17    07-01 @ 07:01  -  07-02 @ 07:00  --------------------------------------------------------  IN: 1582.7 mL / OUT: 300 mL / NET: 1282.7 mL      CAPILLARY BLOOD GLUCOSE      POCT Blood Glucose.: 136 mg/dL (02 Jul 2023 03:43)      Physical Exam:   Constitutional: ill appearing, no acute distress   HEENT: + PERRLA, EOMI, no drainage or redness  Neck: supple,  No JVD  Respiratory: Ventilator assisted breath Sounds equal & dimished bilaterally to auscultation, no accessory muscle use noted  Cardiovascular: Regular rate, regular rhythm, normal S1, S2; no murmurs or rub  Gastrointestinal: Soft, non-tender, non distended, no hepatosplenomegaly, normal bowel sounds  Extremities: + 2 peripheral edema, no cyanosis, no clubbing   Vascular: Equal and normal pulses: 2+ peripheral pulses throughout  Neurological: sedated/intubated  Musculoskeletal: No joint swelling or deformity; no limitation of movement  Skin: warm, dry, well perfused, no rashes, sacral ulcers       HOSPITAL MEDICATIONS:  Standing Meds:  ceFAZolin   IVPB 1000 milliGRAM(s) IV Intermittent every 12 hours  chlorhexidine 2% Cloths 1 Application(s) Topical daily  dextrose 5%. 1000 milliLiter(s) IV Continuous <Continuous>  dextrose 5%. 1000 milliLiter(s) IV Continuous <Continuous>  dextrose 50% Injectable 12.5 Gram(s) IV Push once  dextrose 50% Injectable 25 Gram(s) IV Push once  dextrose 50% Injectable 25 Gram(s) IV Push once  epoetin shell (EPOGEN) Injectable 72015 Unit(s) IV Push <User Schedule>  nitroglycerin    2% Ointment 1 Inch(s) Transdermal once  norepinephrine Infusion 0.59 MICROgram(s)/kG/Min IV Continuous <Continuous>  pantoprazole  Injectable 40 milliGRAM(s) IV Push two times a day  propofol Infusion 30 MICROgram(s)/kG/Min IV Continuous <Continuous>  vasopressin Infusion 0.04 Unit(s)/Min IV Continuous <Continuous>      PRN Meds:  dextrose Oral Gel 15 Gram(s) Oral once PRN  sodium chloride 0.9% Bolus. 100 milliLiter(s) IV Bolus every 5 minutes PRN      LABS:                        8.8    15.84 )-----------( 199      ( 02 Jul 2023 00:36 )             28.4     Hgb Trend: 8.8<--, 9.0<--, 9.3<--, 9.3<--, 9.1<--  07-02    140  |  102  |  66<H>  ----------------------------<  161<H>  5.0   |  20<L>  |  5.80<H>    Ca    9.6      02 Jul 2023 00:36  Phos  7.1     07-02  Mg     2.30     07-02    TPro  6.4  /  Alb  2.2<L>  /  TBili  1.0  /  DBili  x   /  AST  29  /  ALT  <5  /  AlkPhos  101  07-02    Creatinine Trend: 5.80<--, 4.98<--, 4.45<--, 3.96<--, 3.75<--, 3.72<--  PT/INR - ( 02 Jul 2023 00:36 )   PT: 15.9 sec;   INR: 1.37 ratio         PTT - ( 02 Jul 2023 00:36 )  PTT:26.5 sec  Urinalysis Basic - ( 02 Jul 2023 00:36 )    Color: x / Appearance: x / SG: x / pH: x  Gluc: 161 mg/dL / Ketone: x  / Bili: x / Urobili: x   Blood: x / Protein: x / Nitrite: x   Leuk Esterase: x / RBC: x / WBC x   Sq Epi: x / Non Sq Epi: x / Bacteria: x      Arterial Blood Gas:  07-02 @ 00:36  7.34/43/154/23/100.0/-2.5  ABG lactate: --  Arterial Blood Gas:  07-01 @ 04:26  7.36/45/161/25/99.1/-0.2  ABG lactate: --  Arterial Blood Gas:  06-30 @ 23:24  7.38/42/159/25/99.4/-0.4  ABG lactate: --  Arterial Blood Gas:  06-30 @ 18:00  7.39/42/150/25/98.9/0.3  ABG lactate: --  Arterial Blood Gas:  06-30 @ 10:00  7.38/43/164/25/100.0/0.1  ABG lactate: --        MICROBIOLOGY:     Culture - Blood (collected 30 Jun 2023 18:25)  Source: .Blood Blood-Peripheral  Preliminary Report (02 Jul 2023 01:03):    No growth at 24 hours    Culture - Blood (collected 30 Jun 2023 18:20)  Source: .Blood Blood-Venous  Preliminary Report (02 Jul 2023 01:03):    No growth at 24 hours

## 2023-07-02 NOTE — PROGRESS NOTE ADULT - NUTRITIONAL ASSESSMENT
This patient has been assessed with a concern for Malnutrition and has been determined to have a diagnosis/diagnoses of Severe protein-calorie malnutrition.    This patient is being managed with:   Diet NPO with Tube Feed-  Tube Feeding Modality: Nasogastric  Nepro with Carb Steady (NEPRORTH)  Total Volume for 24 Hours (mL): 1368  Continuous  Starting Tube Feed Rate {mL per Hour}: 20  Increase Tube Feed Rate by (mL): 5     Every 4 hours  Until Goal Tube Feed Rate (mL per Hour): 57  Tube Feed Duration (in Hours): 24  Tube Feed Start Time: 18:00  Entered: Jun 21 2023  5:09PM  
This patient has been assessed with a concern for Malnutrition and has been determined to have a diagnosis/diagnoses of Severe protein-calorie malnutrition.    This patient is being managed with:   Diet NPO-  Entered: Jun 29 2023 11:18AM  
This patient has been assessed with a concern for Malnutrition and has been determined to have a diagnosis/diagnoses of Severe protein-calorie malnutrition.    This patient is being managed with:   Diet NPO with Tube Feed-  Tube Feeding Modality: Nasogastric  Nepro with Carb Steady (NEPRORTH)  Total Volume for 24 Hours (mL): 1368  Continuous  Starting Tube Feed Rate {mL per Hour}: 10  Increase Tube Feed Rate by (mL): 5     Every 4 hours  Until Goal Tube Feed Rate (mL per Hour): 57  Tube Feed Duration (in Hours): 24  Tube Feed Start Time: 18:00  Entered: Jun 25 2023  7:45PM  
This patient has been assessed with a concern for Malnutrition and has been determined to have a diagnosis/diagnoses of Severe protein-calorie malnutrition.    This patient is being managed with:   Diet NPO with Tube Feed-  Tube Feeding Modality: Nasogastric  Nepro with Carb Steady (NEPRORTH)  Total Volume for 24 Hours (mL): 1368  Continuous  Starting Tube Feed Rate {mL per Hour}: 10  Increase Tube Feed Rate by (mL): 5     Every 4 hours  Until Goal Tube Feed Rate (mL per Hour): 57  Tube Feed Duration (in Hours): 24  Tube Feed Start Time: 18:00  Entered: Jun 25 2023  7:45PM  
This patient has been assessed with a concern for Malnutrition and has been determined to have a diagnosis/diagnoses of Severe protein-calorie malnutrition.    This patient is being managed with:   Diet NPO-  Entered: Jun 29 2023 11:18AM  
This patient has been assessed with a concern for Malnutrition and has been determined to have a diagnosis/diagnoses of Severe protein-calorie malnutrition.    This patient is being managed with:   Diet Pureed-  Mildly Thick Liquids (MILDTHICKLIQS)  For patients receiving Renal Replacement - No Protein Restr No Conc K No Conc Phos Low Sodium (RENAL)  Entered: Jun 13 2023  5:11PM  
This patient has been assessed with a concern for Malnutrition and has been determined to have a diagnosis/diagnoses of Severe protein-calorie malnutrition.    This patient is being managed with:   Diet NPO with Tube Feed-  Tube Feeding Modality: Nasogastric  Nepro with Carb Steady (NEPRORTH)  Total Volume for 24 Hours (mL): 1368  Continuous  Starting Tube Feed Rate {mL per Hour}: 20  Increase Tube Feed Rate by (mL): 5     Every 4 hours  Until Goal Tube Feed Rate (mL per Hour): 57  Tube Feed Duration (in Hours): 24  Tube Feed Start Time: 18:00  Entered: Jun 21 2023  5:09PM  
This patient has been assessed with a concern for Malnutrition and has been determined to have a diagnosis/diagnoses of Severe protein-calorie malnutrition.    This patient is being managed with:   Diet NPO with Tube Feed-  Tube Feeding Modality: Nasogastric  Nepro with Carb Steady (NEPRORTH)  Total Volume for 24 Hours (mL): 1368  Continuous  Starting Tube Feed Rate {mL per Hour}: 20  Increase Tube Feed Rate by (mL): 5     Every 4 hours  Until Goal Tube Feed Rate (mL per Hour): 57  Tube Feed Duration (in Hours): 24  Tube Feed Start Time: 18:00  Entered: Jun 21 2023  5:09PM  
This patient has been assessed with a concern for Malnutrition and has been determined to have a diagnosis/diagnoses of Severe protein-calorie malnutrition.    This patient is being managed with:   Diet Pureed-  Mildly Thick Liquids (MILDTHICKLIQS)  For patients receiving Renal Replacement - No Protein Restr No Conc K No Conc Phos Low Sodium (RENAL)  Entered: Jun 13 2023  5:11PM  
This patient has been assessed with a concern for Malnutrition and has been determined to have a diagnosis/diagnoses of Severe protein-calorie malnutrition.    This patient is being managed with:   Diet NPO-  Entered: Jun 19 2023  3:35PM  
This patient has been assessed with a concern for Malnutrition and has been determined to have a diagnosis/diagnoses of Severe protein-calorie malnutrition.    This patient is being managed with:   Diet NPO with Tube Feed-  Tube Feeding Modality: Nasogastric  Nepro with Carb Steady (NEPRORTH)  Total Volume for 24 Hours (mL): 1368  Continuous  Starting Tube Feed Rate {mL per Hour}: 20  Increase Tube Feed Rate by (mL): 5     Every 4 hours  Until Goal Tube Feed Rate (mL per Hour): 57  Tube Feed Duration (in Hours): 24  Tube Feed Start Time: 18:00  Entered: Jun 21 2023  5:09PM  
This patient has been assessed with a concern for Malnutrition and has been determined to have a diagnosis/diagnoses of Severe protein-calorie malnutrition.    This patient is being managed with:   Diet Pureed-  Mildly Thick Liquids (MILDTHICKLIQS)  For patients receiving Renal Replacement - No Protein Restr No Conc K No Conc Phos Low Sodium (RENAL)  Entered: Jun 13 2023  5:11PM  
This patient has been assessed with a concern for Malnutrition and has been determined to have a diagnosis/diagnoses of Severe protein-calorie malnutrition.    This patient is being managed with:   Diet NPO with Tube Feed-  Tube Feeding Modality: Nasogastric  Nepro with Carb Steady (NEPRORTH)  Total Volume for 24 Hours (mL): 1368  Continuous  Starting Tube Feed Rate {mL per Hour}: 10  Increase Tube Feed Rate by (mL): 5     Every 4 hours  Until Goal Tube Feed Rate (mL per Hour): 57  Tube Feed Duration (in Hours): 24  Tube Feed Start Time: 18:00  Entered: Jun 25 2023  7:45PM  
This patient has been assessed with a concern for Malnutrition and has been determined to have a diagnosis/diagnoses of Severe protein-calorie malnutrition.    This patient is being managed with:   Diet NPO-  Entered: Jun 19 2023  3:35PM  
This patient has been assessed with a concern for Malnutrition and has been determined to have a diagnosis/diagnoses of Severe protein-calorie malnutrition.    This patient is being managed with:   Diet Pureed-  Mildly Thick Liquids (MILDTHICKLIQS)  For patients receiving Renal Replacement - No Protein Restr No Conc K No Conc Phos Low Sodium (RENAL)  Entered: Jun 13 2023  5:11PM  
This patient has been assessed with a concern for Malnutrition and has been determined to have a diagnosis/diagnoses of Severe protein-calorie malnutrition.    This patient is being managed with:   Diet NPO-  Entered: Jun 19 2023  3:35PM

## 2023-07-02 NOTE — PROGRESS NOTE ADULT - PROVIDER SPECIALTY LIST ADULT
Cardiology
Gastroenterology
Infectious Disease
Infectious Disease
Internal Medicine
Nephrology
Cardiology
Critical Care
Gastroenterology
Infectious Disease
Internal Medicine
Nephrology
Vascular Surgery
Cardiology
Critical Care
Infectious Disease
Internal Medicine
Nephrology
Cardiology
Gastroenterology
Gastroenterology
Infectious Disease
MICU
Nephrology
Internal Medicine
Palliative Care
Palliative Care
Internal Medicine

## 2023-07-21 NOTE — H&P ADULT. - ENDOCRINE
Continue routine labs no changes needed.  Letter sent for next lab appointment 10/09/23      ----- Message from Jessica Reed MD sent at 7/20/2023  5:48 PM CDT -----  Please inform patient results are OK. Continue routine labs.   negative

## 2023-07-27 NOTE — PROGRESS NOTE ADULT - NSPROGADDITIONALINFOA_GEN_ALL_CORE
Transitional Care Management Telephone Call Attempt    Discharge Date: 7/26/23  Discharge Location: [facility]: [facility]     Intractable low back pain     Call Attempt Date: 7/27/2023  Call Attempt: First  
discussed with family will call palliative care for GOC
follow up with palliative team   discussed with renal team, unable to remove any fluid during HD   need to have discussion for end of life     Dr Mccormick will be covering me from 06/27 till 07/09 . ill return on 07/10
Discussed with patient' wife by ACP team. would like to do everything to keep him alive. answered all questions

## 2023-08-23 NOTE — CONSULT NOTE ADULT - NEGATIVE GASTROINTESTINAL SYMPTOMS
Nexplanon placed today without issues.  Will use backup contraception for first week.  Discussed common side effects of breakthrough bleeding/spotting with nexplanon.  
no nausea/no vomiting/no diarrhea/no jaundice/no melena/no hematochezia

## 2023-09-15 NOTE — PROVIDER CONTACT NOTE (CRITICAL VALUE NOTIFICATION) - DATE AND TIME:
21-Jun-2017 11:45 21-Jun-2017 11:47 [facility] Propranolol Counseling:  I discussed with the patient the risks of propranolol including but not limited to low heart rate, low blood pressure, low blood sugar, restlessness and increased cold sensitivity. They should call the office if they experience any of these side effects.
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
## 2023-10-09 NOTE — ED PROVIDER NOTE - DISCUSSED CLINICAL AND RADIOLOGICAL FINDINGS WITH, MDM
Refill Decision Note   Audrey Alcantara  is requesting a refill authorization.  Brief Assessment and Rationale for Refill:  Approve     Medication Therapy Plan:         Comments:     Note composed:8:45 AM 10/09/2023            patient
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
## 2024-01-30 NOTE — DISCHARGE NOTE FOR THE EXPIRED PATIENT - NS PATIENT DEATH CRITERIA
Ortho Note    Pt comfortable without complaints, pain controlled  Denies CP, SOB, N/V, numbness/tingling     Vital Signs Last 24 Hrs  T(C): 36.8 (01-30-24 @ 05:48), Max: 36.8 (01-30-24 @ 05:48)  T(F): 98.3 (01-30-24 @ 05:48), Max: 98.3 (01-30-24 @ 05:48)  HR: 68 (01-30-24 @ 05:48) (68 - 68)  BP: 160/88 (01-30-24 @ 05:48) (160/88 - 160/88)  BP(mean): 112 (01-30-24 @ 05:48) (112 - 112)  RR: 18 (01-30-24 @ 05:48) (18 - 18)  SpO2: 97% (01-30-24 @ 05:48) (97% - 97%)  I&O's Summary    29 Jan 2024 07:01  -  30 Jan 2024 07:00  --------------------------------------------------------  IN: 0 mL / OUT: 900 mL / NET: -900 mL        Physical Exam:  -Kerlix dressing changed and c/d/i, unable to express any drainage from wound, wound stuffed with new packing  -Stable erythema L hand  -SILT throughout L hand  -Pulses intact  -Stable extensor lag to L thumb / index finger                          13.3   11.03 )-----------( 150      ( 29 Jan 2024 05:30 )             39.1     01-29    139  |  106  |  7   ----------------------------<  96  3.6   |  23  |  0.75    Ca    8.2<L>      29 Jan 2024 13:52  Phos  3.3     01-29  Mg     2.0     01-29        A/P: 86F with worsening rim enhancing fluid collection along dorsum of L hand, extensor tendon involvement.     -S/p bedside I&D performed, which showed sanguinous drainage with pus  -Wound closing, daily dressing changes, no need for packing  -c/w IV Abx  -Ortho will continue to follow      Ortho Pager [phone] Patient is dead based on Cardiopulmonary criteria including absent breath sounds, pulselessness and/or asystole

## 2024-05-15 NOTE — ED ADULT NURSE NOTE - NS TRANSFER PATIENT BELONGINGS
Results have been released via [employer]. Please verify that these have been viewed by patient. If not, please call patient with results.     I have sent a message to them with the following interpretation (see below).    I have reviewed your recent CXR which showed resolution of previously noted opacities/pneumonia.      Please do not hesitate to call or message with any additional questions or concerns.    Aide Stephens PA-C
None

## 2024-05-15 NOTE — H&P ADULT - I WAS PHYSICALLY PRESENT FOR THE KEY PORTIONS OF THE EVALUATION AND MANAGEMENT (E/M) SERVICE PROVIDED.  I AGREE WITH THE ABOVE HISTORY, PHYSICAL, AND PLAN WHICH I HAVE REVIEWED AND EDITED WHERE APPROPRIATE
Mohs Case Number: [id number] Date Of Previous Biopsy (Optional): 03/05/2024 Previous Accession (Optional): [id number] Biopsy Photograph Reviewed: Yes Referring Physician (Optional): Jaimee Griffith Consent Type: Consent 1 (Standard) Eye Shield Used: No Surgeon Performing Repair (Optional): Ruperto Valencia Initial Size Of Lesion: 1 X Size Of Lesion In Cm (Optional): 0.9 Number Of Stages: 3 Primary Defect Length In Cm (Final Defect Size - Required For Flaps/Grafts): 1.9 Primary Defect Width In Cm (Final Defect Size - Required For Flaps/Grafts): 1.8 Primary Defect Depth In Cm (Optional But Required For Some Insurers): 0 Repair Type: Flap Which Instrument Did You Use For Dermabrasion?: Wire Brush Which Eyelid Repair Cpt Are You Using?: 94789 Oculoplastic Surgeon Procedure Text (A): After obtaining clear surgical margins the patient was sent to oculoplastics for surgical repair.  The patient understands they will receive post-surgical care and follow-up from the referring physician's office. Otolaryngologist Procedure Text (A): After obtaining clear surgical margins the patient was sent to otolaryngology for surgical repair.  The patient understands they will receive post-surgical care and follow-up from the referring physician's office. Plastic Surgeon Procedure Text (A): After obtaining clear surgical margins the patient was sent to plastics for surgical repair.  The patient understands they will receive post-surgical care and follow-up from the referring physician's office. Mid-Level Procedure Text (A): After obtaining clear surgical margins the patient was sent to a mid-level provider for surgical repair.  The patient understands they will receive post-surgical care and follow-up from the mid-level provider. Provider Procedure Text (A): After obtaining clear surgical margins the defect was repaired by another provider. Asc Procedure Text (A): After obtaining clear surgical margins the patient was sent to an ASC for surgical repair.  The patient understands they will receive post-surgical care and follow-up from the ASC physician. Suturegard Retention Suture: 2-0 Nylon Retention Suture Bite Size: 3 mm Length To Time In Minutes Device Was In Place: 10 Undermining Type: Entire Wound Debridement Text: The wound edges were debrided prior to proceeding with the closure to facilitate wound healing. Helical Rim Text: The closure involved the helical rim. Vermilion Border Text: The closure involved the vermilion border. Nostril Rim Text: The closure involved the nostril rim. Retention Suture Text: Retention sutures were placed to support the closure and prevent dehiscence. Flap Type: Nasalis-Muscle-Based Myocutaneous Island Pedicle Flap Secondary Defect Length In Cm (Required For Flaps): 2.8 Secondary Defect Width In Cm (Required For Flaps): 1.7 Area H Indication Text: Tumors in this location are included in Area H (eyelids, eyebrows, nose, lips, chin, ear, pre-auricular, post-auricular, temple, genitalia, hands, feet, ankles and areola).  Tissue conservation is critical in these anatomic locations. Area M Indication Text: Tumors in this location are included in Area M (cheek, forehead, scalp, neck, jawline and pretibial skin).  Mohs surgery is indicated for tumors in these anatomic locations. Area L Indication Text: Tumors in this location are included in Area L (trunk and extremities).  Mohs surgery is indicated for larger tumors, or tumors with aggressive histologic features, in these anatomic locations. Tumor Debulked?: curette Depth Of Tumor Invasion (For Histology): epidermis Perineural Invasion (For Histology - Be Specific If Possible): absent Special Stains Stage 1 - Results: Base On Clearance Noted Above Stage 2: Number Of Blocks?: 2 Stage 2: Additional Anesthesia Type: 1% lidocaine with epinephrine Stage 4: Number Of Blocks?: 5 Staging Info: By selecting yes to the question above you will include information on AJCC 8 tumor staging in your Mohs note. Information on tumor staging will be automatically added for SCCs on the head and neck. AJCC 8 includes tumor size, tumor depth, perineural involvement and bone invasion. Tumor Depth: Less than 6mm from granular layer and no invasion beyond the subcutaneous fat Was The Patient On Physician Recommended Anticoagulation Therapy?: Please Select the Appropriate Response Medical Necessity Statement: Based on my medical judgement, Mohs surgery is the most appropriate treatment for this cancer compared to other treatments. Alternatives Discussed Intro (Do Not Add Period): I discussed alternative treatments to Mohs surgery and specifically discussed the risks and benefits of Consent 1/Introductory Paragraph: The rationale for Mohs was explained to the patient and consent was obtained. The risks, benefits and alternatives to therapy were discussed in detail. Specifically, the risks of infection, scarring, bleeding, prolonged wound healing, incomplete removal, allergy to anesthesia, nerve injury and recurrence were addressed. Prior to the procedure, the treatment site was clearly identified and confirmed by the patient. All components of Universal Protocol/PAUSE Rule completed. Consent 2/Introductory Paragraph: Mohs surgery was explained to the patient and consent was obtained. The risks, benefits and alternatives to therapy were discussed in detail. Specifically, the risks of infection, scarring, bleeding, prolonged wound healing, incomplete removal, allergy to anesthesia, nerve injury and recurrence were addressed. Prior to the procedure, the treatment site was clearly identified and confirmed by the patient. All components of Universal Protocol/PAUSE Rule completed. Consent 3/Introductory Paragraph: I gave the patient a chance to ask questions they had about the procedure.  Following this I explained the Mohs procedure and consent was obtained. The risks, benefits and alternatives to therapy were discussed in detail. Specifically, the risks of infection, scarring, bleeding, prolonged wound healing, incomplete removal, allergy to anesthesia, nerve injury and recurrence were addressed. Prior to the procedure, the treatment site was clearly identified and confirmed by the patient. All components of Universal Protocol/PAUSE Rule completed. Consent (Temporal Branch)/Introductory Paragraph: The rationale for Mohs was explained to the patient and consent was obtained. The risks, benefits and alternatives to therapy were discussed in detail. Specifically, the risks of damage to the temporal branch of the facial nerve, infection, scarring, bleeding, prolonged wound healing, incomplete removal, allergy to anesthesia, and recurrence were addressed. Prior to the procedure, the treatment site was clearly identified and confirmed by the patient. All components of Universal Protocol/PAUSE Rule completed. Consent (Marginal Mandibular)/Introductory Paragraph: The rationale for Mohs was explained to the patient and consent was obtained. The risks, benefits and alternatives to therapy were discussed in detail. Specifically, the risks of damage to the marginal mandibular branch of the facial nerve, infection, scarring, bleeding, prolonged wound healing, incomplete removal, allergy to anesthesia, and recurrence were addressed. Prior to the procedure, the treatment site was clearly identified and confirmed by the patient. All components of Universal Protocol/PAUSE Rule completed. Consent (Spinal Accessory)/Introductory Paragraph: The rationale for Mohs was explained to the patient and consent was obtained. The risks, benefits and alternatives to therapy were discussed in detail. Specifically, the risks of damage to the spinal accessory nerve, infection, scarring, bleeding, prolonged wound healing, incomplete removal, allergy to anesthesia, and recurrence were addressed. Prior to the procedure, the treatment site was clearly identified and confirmed by the patient. All components of Universal Protocol/PAUSE Rule completed. Consent (Near Eyelid Margin)/Introductory Paragraph: The rationale for Mohs was explained to the patient and consent was obtained. The risks, benefits and alternatives to therapy were discussed in detail. Specifically, the risks of ectropion or eyelid deformity, infection, scarring, bleeding, prolonged wound healing, incomplete removal, allergy to anesthesia, nerve injury and recurrence were addressed. Prior to the procedure, the treatment site was clearly identified and confirmed by the patient. All components of Universal Protocol/PAUSE Rule completed. Consent (Ear)/Introductory Paragraph: The rationale for Mohs was explained to the patient and consent was obtained. The risks, benefits and alternatives to therapy were discussed in detail. Specifically, the risks of ear deformity, infection, scarring, bleeding, prolonged wound healing, incomplete removal, allergy to anesthesia, nerve injury and recurrence were addressed. Prior to the procedure, the treatment site was clearly identified and confirmed by the patient. All components of Universal Protocol/PAUSE Rule completed. Consent (Nose)/Introductory Paragraph: The rationale for Mohs was explained to the patient and consent was obtained. The risks, benefits and alternatives to therapy were discussed in detail. Specifically, the risks of nasal deformity, changes in the flow of air through the nose, infection, scarring, bleeding, prolonged wound healing, incomplete removal, allergy to anesthesia, nerve injury and recurrence were addressed. Prior to the procedure, the treatment site was clearly identified and confirmed by the patient. All components of Universal Protocol/PAUSE Rule completed. Consent (Lip)/Introductory Paragraph: The rationale for Mohs was explained to the patient and consent was obtained. The risks, benefits and alternatives to therapy were discussed in detail. Specifically, the risks of lip deformity, changes in the oral aperture, infection, scarring, bleeding, prolonged wound healing, incomplete removal, allergy to anesthesia, nerve injury and recurrence were addressed. Prior to the procedure, the treatment site was clearly identified and confirmed by the patient. All components of Universal Protocol/PAUSE Rule completed. Consent (Scalp)/Introductory Paragraph: The rationale for Mohs was explained to the patient and consent was obtained. The risks, benefits and alternatives to therapy were discussed in detail. Specifically, the risks of changes in hair growth pattern secondary to repair, infection, scarring, bleeding, prolonged wound healing, incomplete removal, allergy to anesthesia, nerve injury and recurrence were addressed. Prior to the procedure, the treatment site was clearly identified and confirmed by the patient. All components of Universal Protocol/PAUSE Rule completed. Detail Level: Detailed Postop Diagnosis: same Additional Anesthesia Volume In Cc: 6 Hemostasis: Electrocautery and electrocoagulation Estimated Blood Loss (Cc): minimal Statement Selected Repair Anesthesia Method: local infiltration Brow Lift Text: A midfrontal incision was made medially to the defect to allow access to the tissues just superior to the left eyebrow. Following careful dissection inferiorly in a supraperiosteal plane to the level of the left eyebrow, several 3-0 monocryl sutures were used to resuspend the eyebrow orbicularis oculi muscular unit to the superior frontal bone periosteum. This resulted in an appropriate reapproximation of static eyebrow symmetry and correction of the left brow ptosis. Deep Sutures: 5-0 Vicryl Epidermal Sutures: 6-0 Plain Gut Epidermal Closure: running Suturegard Intro: Intraoperative tissue expansion was performed, utilizing the SUTUREGARD device, in order to reduce wound tension. Suturegard Body: The suture ends were repeatedly re-tightened and re-clamped to achieve the desired tissue expansion. Hemigard Intro: Due to skin fragility and wound tension, it was decided to use HEMIGARD adhesive retention suture devices to permit a linear closure. The skin was cleaned and dried for a 6cm distance away from the wound. Excessive hair, if present, was removed to allow for adhesion. Hemigard Postcare Instructions: The HEMIGARD strips are to remain completely dry for at least 5-7 days. Donor Site Anesthesia Type: same as repair anesthesia Epidermal Closure Graft Donor Site (Optional): simple interrupted Graft Donor Site Bandage (Optional-Leave Blank If You Don't Want In Note): Telfa and a pressure bandage were applied to the donor site. Closure 2 Information: This tab is for additional flaps and grafts, including complex repair and grafts and complex repair and flaps. You can also specify a different location for the additional defect, if the location is the same you do not need to select a new one. We will insert the automated text for the repair you select below just as we do for solitary flaps and grafts. Please note that at this time if you select a location with a different insurance zone you will need to override the ICD10 and CPT if appropriate. Closure 3 Information: This tab is for additional flaps and grafts above and beyond our usual structured repairs.  Please note if you enter information here it will not currently bill and you will need to add the billing information manually. Wound Care: Petrolatum Dressing: dry sterile dressing Suture Removal: 7 days Unna Boot Text: An Unna boot was placed to help immobilize the limb and facilitate more rapid healing. Home Suture Removal Text: Patient was provided instructions on removing sutures and will remove their sutures at home.  If they have any questions or difficulties they will call the office. Post-Care Instructions: I reviewed with the patient in detail post-care instructions. Patient is not to engage in any heavy lifting, exercise, or swimming for the next 14 days. Should the patient develop any fevers, chills, bleeding, severe pain patient will contact the office immediately. Pain Refusal Text: I offered to prescribe pain medication but the patient refused to take this medication. Mauc Instructions: By selecting yes to the question below the MAUC number will be added into the note.  This will be calculated automatically based on the diagnosis chosen, the size entered, the body zone selected (H,M,L) and the specific indications you chose. You will also have the option to override the Mohs AUC if you disagree with the automatically calculated number and this option is found in the Case Summary tab. Where Do You Want The Question To Include Opioid Counseling Located?: Case Summary Tab Eye Protection Verbiage: Before proceeding with the stage, a plastic scleral shield was inserted. The globe was anesthetized with a few drops of 1% lidocaine with 1:100,000 epinephrine. Then, an appropriate sized scleral shield was chosen and coated with lacrilube ointment. The shield was gently inserted and left in place for the duration of each stage. After the stage was completed, the shield was gently removed. Mohs Method Verbiage: An incision at a 45 degree angle following the standard Mohs approach was done and the specimen was harvested as a microscopic controlled layer. Surgeon/Pathologist Verbiage (Will Incorporate Name Of Surgeon From Intro If Not Blank): operated in two distinct and integrated capacities as the surgeon and pathologist. Mohs Histo Method Verbiage: Each section was then chromacoded and processed in the Mohs lab using the Mohs protocol and submitted for frozen section, utilizing hematoxylin and eosin histochemical stains. Subsequent Stages Histo Method Verbiage: Using a similar technique to that described above, a thin layer of tissue was removed from all areas where tumor was visible on the previous stage.  The tissue was again oriented, mapped, dyed, and processed as above. Mohs Rapid Report Verbiage: The area of clinically evident tumor was marked with skin marking ink and appropriately hatched.  The initial incision was made following the Mohs approach through the skin.  The specimen was taken to the lab, divided into the necessary number of pieces, chromacoded and processed according to the Mohs protocol.  This was repeated in successive stages until a tumor free defect was achieved. Complex Repair Preamble Text (Leave Blank If You Do Not Want): Extensive wide undermining was performed. Intermediate Repair Preamble Text (Leave Blank If You Do Not Want): Undermining was performed with blunt dissection. Non-Graft Cartilage Fenestration Text: The cartilage was fenestrated with a 2mm punch biopsy to help facilitate healing. Graft Cartilage Fenestration Text: The cartilage was fenestrated with a 2mm punch biopsy to help facilitate graft survival and healing. Secondary Intention Text (Leave Blank If You Do Not Want): The defect will heal with secondary intention. No Repair - Repaired With Adjacent Surgical Defect Text (Leave Blank If You Do Not Want): After obtaining clear surgical margins the defect was repaired concurrently with another surgical defect which was in close approximation. Adjacent Tissue Transfer Text: The defect edges were debeveled with a #15 scalpel blade. Given the location of the defect and the proximity to free margins an adjacent tissue transfer was deemed most appropriate. Using a sterile surgical marker, an appropriate flap was drawn incorporating the defect and placing the expected incisions within the relaxed skin tension lines where possible. The area thus outlined was incised deep to adipose tissue with a #15 scalpel blade. The skin margins were undermined to an appropriate distance in all directions utilizing iris scissors and carried over to close the primary defect. Advancement Flap (Single) Text: The defect edges were debeveled with a #15 scalpel blade. Given the location of the defect and the proximity to free margins a single advancement flap was deemed most appropriate. Using a sterile surgical marker, an appropriate advancement flap was drawn incorporating the defect and placing the expected incisions within the relaxed skin tension lines where possible. The area thus outlined was incised deep to adipose tissue with a #15 scalpel blade. The skin margins were undermined to an appropriate distance in all directions utilizing iris scissors. Following this, the designed flap was advanced and carried over into the primary defect and sutured into place. Advancement Flap (Double) Text: The defect edges were debeveled with a #15 scalpel blade. Given the location of the defect and the proximity to free margins a double advancement flap was deemed most appropriate. Using a sterile surgical marker, the appropriate advancement flaps were drawn incorporating the defect and placing the expected incisions within the relaxed skin tension lines where possible. The area thus outlined was incised deep to adipose tissue with a #15 scalpel blade. The skin margins were undermined to an appropriate distance in all directions utilizing iris scissors. Following this, the designed flaps were advanced and carried over into the primary defect and sutured into place. Burow's Advancement Flap Text: The defect edges were debeveled with a #15 scalpel blade. Given the location of the defect and the proximity to free margins a Burow's advancement flap was deemed most appropriate. Using a sterile surgical marker, the appropriate advancement flap was drawn incorporating the defect and placing the expected incisions within the relaxed skin tension lines where possible. The area thus outlined was incised deep to adipose tissue with a #15 scalpel blade. The skin margins were undermined to an appropriate distance in all directions utilizing iris scissors. Following this, the designed flap was advanced and carried over into the primary defect and sutured into place. Chonodrocutaneous Helical Advancement Flap Text: The defect edges were debeveled with a #15 scalpel blade. Given the location of the defect and the proximity to free margins a chondrocutaneous helical advancement flap was deemed most appropriate. Using a sterile surgical marker, the appropriate advancement flap was drawn incorporating the defect and placing the expected incisions within the relaxed skin tension lines where possible. The area thus outlined was incised deep to adipose tissue with a #15 scalpel blade. The skin margins were undermined to an appropriate distance in all directions utilizing iris scissors. Following this, the designed flap was advanced and carried over into the primary defect and sutured into place. Crescentic Advancement Flap Text: The defect edges were debeveled with a #15 scalpel blade. Given the location of the defect and the proximity to free margins a crescentic advancement flap was deemed most appropriate. Using a sterile surgical marker, the appropriate advancement flap was drawn incorporating the defect and placing the expected incisions within the relaxed skin tension lines where possible. The area thus outlined was incised deep to adipose tissue with a #15 scalpel blade. The skin margins were undermined to an appropriate distance in all directions utilizing iris scissors. Following this, the designed flap was advanced and carried over into the primary defect and sutured into place. A-T Advancement Flap Text: The defect edges were debeveled with a #15 scalpel blade. Given the location of the defect, shape of the defect and the proximity to free margins an A-T advancement flap was deemed most appropriate. Using a sterile surgical marker, an appropriate advancement flap was drawn incorporating the defect and placing the expected incisions within the relaxed skin tension lines where possible. The area thus outlined was incised deep to adipose tissue with a #15 scalpel blade. The skin margins were undermined to an appropriate distance in all directions utilizing iris scissors. Following this, the designed flap was advanced and carried over into the primary defect and sutured into place. O-T Advancement Flap Text: The defect edges were debeveled with a #15 scalpel blade. Given the location of the defect, shape of the defect and the proximity to free margins an O-T advancement flap was deemed most appropriate. Using a sterile surgical marker, an appropriate advancement flap was drawn incorporating the defect and placing the expected incisions within the relaxed skin tension lines where possible. The area thus outlined was incised deep to adipose tissue with a #15 scalpel blade. The skin margins were undermined to an appropriate distance in all directions utilizing iris scissors. Following this, the designed flap was advanced and carried over into the primary defect and sutured into place. O-L Flap Text: The defect edges were debeveled with a #15 scalpel blade. Given the location of the defect, shape of the defect and the proximity to free margins an O-L flap was deemed most appropriate. Using a sterile surgical marker, an appropriate advancement flap was drawn incorporating the defect and placing the expected incisions within the relaxed skin tension lines where possible. The area thus outlined was incised deep to adipose tissue with a #15 scalpel blade. The skin margins were undermined to an appropriate distance in all directions utilizing iris scissors. Following this, the designed flap was advanced and carried over into the primary defect and sutured into place. O-Z Flap Text: The defect edges were debeveled with a #15 scalpel blade. Given the location of the defect, shape of the defect and the proximity to free margins an O-Z flap was deemed most appropriate. Using a sterile surgical marker, an appropriate transposition flap was drawn incorporating the defect and placing the expected incisions within the relaxed skin tension lines where possible. The area thus outlined was incised deep to adipose tissue with a #15 scalpel blade. The skin margins were undermined to an appropriate distance in all directions utilizing iris scissors. Following this, the designed flap was carried over into the primary defect and sutured into place. Double O-Z Flap Text: The defect edges were debeveled with a #15 scalpel blade. Given the location of the defect, shape of the defect and the proximity to free margins a Double O-Z flap was deemed most appropriate. Using a sterile surgical marker, an appropriate transposition flap was drawn incorporating the defect and placing the expected incisions within the relaxed skin tension lines where possible. The area thus outlined was incised deep to adipose tissue with a #15 scalpel blade. The skin margins were undermined to an appropriate distance in all directions utilizing iris scissors. Following this, the designed flap was carried over into the primary defect and sutured into place. V-Y Flap Text: The defect edges were debeveled with a #15 scalpel blade. Given the location of the defect, shape of the defect and the proximity to free margins a V-Y flap was deemed most appropriate. Using a sterile surgical marker, an appropriate advancement flap was drawn incorporating the defect and placing the expected incisions within the relaxed skin tension lines where possible. The area thus outlined was incised deep to adipose tissue with a #15 scalpel blade. The skin margins were undermined to an appropriate distance in all directions utilizing iris scissors. Following this, the designed flap was advanced and carried over into the primary defect and sutured into place. Advancement-Rotation Flap Text: The defect edges were debeveled with a #15 scalpel blade. Given the location of the defect, shape of the defect and the proximity to free margins an advancement-rotation flap was deemed most appropriate. Using a sterile surgical marker, an appropriate flap was drawn incorporating the defect and placing the expected incisions within the relaxed skin tension lines where possible. The area thus outlined was incised deep to adipose tissue with a #15 scalpel blade. The skin margins were undermined to an appropriate distance in all directions utilizing iris scissors. Following this, the designed flap was carried over into the primary defect and sutured into place. Mercedes Flap Text: The defect edges were debeveled with a #15 scalpel blade. Given the location of the defect, shape of the defect and the proximity to free margins a Mercedes flap was deemed most appropriate. Using a sterile surgical marker, an appropriate advancement flap was drawn incorporating the defect and placing the expected incisions within the relaxed skin tension lines where possible. The area thus outlined was incised deep to adipose tissue with a #15 scalpel blade. The skin margins were undermined to an appropriate distance in all directions utilizing iris scissors. Following this, the designed flap was advanced and carried over into the primary defect and sutured into place. Modified Advancement Flap Text: The defect edges were debeveled with a #15 scalpel blade. Given the location of the defect, shape of the defect and the proximity to free margins a modified advancement flap was deemed most appropriate. Using a sterile surgical marker, an appropriate advancement flap was drawn incorporating the defect and placing the expected incisions within the relaxed skin tension lines where possible. The area thus outlined was incised deep to adipose tissue with a #15 scalpel blade. The skin margins were undermined to an appropriate distance in all directions utilizing iris scissors. Following this, the designed flap was advanced and carried over into the primary defect and sutured into place. Mucosal Advancement Flap Text: Given the location of the defect, shape of the defect and the proximity to free margins a mucosal advancement flap was deemed most appropriate. Incisions were made with a 15 blade scalpel in the appropriate fashion along the cutaneous vermilion border and the mucosal lip. The remaining actinically damaged mucosal tissue was excised.  The mucosal advancement flap was then elevated to the gingival sulcus with care taken to preserve the neurovascular structures and advanced into the primary defect. Care was taken to ensure that precise realignment of the vermilion border was achieved. Peng Advancement Flap Text: The defect edges were debeveled with a #15 scalpel blade. Given the location of the defect, shape of the defect and the proximity to free margins a Peng advancement flap was deemed most appropriate. Using a sterile surgical marker, an appropriate advancement flap was drawn incorporating the defect and placing the expected incisions within the relaxed skin tension lines where possible. The area thus outlined was incised deep to adipose tissue with a #15 scalpel blade. The skin margins were undermined to an appropriate distance in all directions utilizing iris scissors. Following this, the designed flap was advanced and carried over into the primary defect and sutured into place. Hatchet Flap Text: The defect edges were debeveled with a #15 scalpel blade. Given the location of the defect, shape of the defect and the proximity to free margins a hatchet flap was deemed most appropriate. Using a sterile surgical marker, an appropriate hatchet flap was drawn incorporating the defect and placing the expected incisions within the relaxed skin tension lines where possible. The area thus outlined was incised deep to adipose tissue with a #15 scalpel blade. The skin margins were undermined to an appropriate distance in all directions utilizing iris scissors. Following this, the designed flap was carried over into the primary defect and sutured into place. Rotation Flap Text: The defect edges were debeveled with a #15 scalpel blade. Given the location of the defect, shape of the defect and the proximity to free margins a rotation flap was deemed most appropriate. Using a sterile surgical marker, an appropriate rotation flap was drawn incorporating the defect and placing the expected incisions within the relaxed skin tension lines where possible. The area thus outlined was incised deep to adipose tissue with a #15 scalpel blade. The skin margins were undermined to an appropriate distance in all directions utilizing iris scissors. Following this, the designed flap was carried over into the primary defect and sutured into place. Bilateral Rotation Flap Text: The defect edges were debeveled with a #15 scalpel blade. Given the location of the defect, shape of the defect and the proximity to free margins a bilateral rotation flap was deemed most appropriate. Using a sterile surgical marker, an appropriate rotation flap was drawn incorporating the defect and placing the expected incisions within the relaxed skin tension lines where possible. The area thus outlined was incised deep to adipose tissue with a #15 scalpel blade. The skin margins were undermined to an appropriate distance in all directions utilizing iris scissors. Following this, the designed flap was carried over into the primary defect and sutured into place. Spiral Flap Text: The defect edges were debeveled with a #15 scalpel blade. Given the location of the defect, shape of the defect and the proximity to free margins a spiral flap was deemed most appropriate. Using a sterile surgical marker, an appropriate rotation flap was drawn incorporating the defect and placing the expected incisions within the relaxed skin tension lines where possible. The area thus outlined was incised deep to adipose tissue with a #15 scalpel blade. The skin margins were undermined to an appropriate distance in all directions utilizing iris scissors. Following this, the designed flap was carried over into the primary defect and sutured into place. Staged Advancement Flap Text: The defect edges were debeveled with a #15 scalpel blade. Given the location of the defect, shape of the defect and the proximity to free margins a staged advancement flap was deemed most appropriate. Using a sterile surgical marker, an appropriate advancement flap was drawn incorporating the defect and placing the expected incisions within the relaxed skin tension lines where possible. The area thus outlined was incised deep to adipose tissue with a #15 scalpel blade. The skin margins were undermined to an appropriate distance in all directions utilizing iris scissors. Following this, the designed flap was carried over into the primary defect and sutured into place. Star Wedge Flap Text: The defect edges were debeveled with a #15 scalpel blade. Given the location of the defect, shape of the defect and the proximity to free margins a star wedge flap was deemed most appropriate. Using a sterile surgical marker, an appropriate rotation flap was drawn incorporating the defect and placing the expected incisions within the relaxed skin tension lines where possible. The area thus outlined was incised deep to adipose tissue with a #15 scalpel blade. The skin margins were undermined to an appropriate distance in all directions utilizing iris scissors. Following this, the designed flap was carried over into the primary defect and sutured into place. Transposition Flap Text: The defect edges were debeveled with a #15 scalpel blade. Given the location of the defect and the proximity to free margins a transposition flap was deemed most appropriate. Using a sterile surgical marker, an appropriate transposition flap was drawn incorporating the defect. The area thus outlined was incised deep to adipose tissue with a #15 scalpel blade. The skin margins were undermined to an appropriate distance in all directions utilizing iris scissors. Following this, the designed flap was carried over into the primary defect and sutured into place. Muscle Hinge Flap Text: The defect edges were debeveled with a #15 scalpel blade.  Given the size, depth and location of the defect and the proximity to free margins a muscle hinge flap was deemed most appropriate. Using a sterile surgical marker, an appropriate hinge flap was drawn incorporating the defect. The area thus outlined was incised with a #15 scalpel blade. The skin margins were undermined to an appropriate distance in all directions utilizing iris scissors. Following this, the designed flap was carried into the primary defect and sutured into place. Mustarde Flap Text: The defect edges were debeveled with a #15 scalpel blade.  Given the size, depth and location of the defect and the proximity to free margins a Mustarde flap was deemed most appropriate. Using a sterile surgical marker, an appropriate flap was drawn incorporating the defect. The area thus outlined was incised with a #15 scalpel blade. The skin margins were undermined to an appropriate distance in all directions utilizing iris scissors. Following this, the designed flap was carried into the primary defect and sutured into place. Nasal Turnover Hinge Flap Text: The defect edges were debeveled with a #15 scalpel blade.  Given the size, depth, location of the defect and the defect being full thickness a nasal turnover hinge flap was deemed most appropriate. Using a sterile surgical marker, an appropriate hinge flap was drawn incorporating the defect. The area thus outlined was incised with a #15 scalpel blade. The flap was designed to recreate the nasal mucosal lining and the alar rim. The skin margins were undermined to an appropriate distance in all directions utilizing iris scissors. Following this, the designed flap was carried over into the primary defect and sutured into place Nasalis-Muscle-Based Myocutaneous Island Pedicle Flap Text: Using a #15 blade, an incision was made around the donor flap to the level of the nasalis muscle. Wide lateral undermining was then performed in both the subcutaneous plane above the nasalis muscle, and in a submuscular plane just above periosteum. This allowed the formation of a free nasalis muscle axial pedicle (based on the angular artery) which was still attached to the actual cutaneous flap, increasing its mobility and vascular viability. Hemostasis was obtained with pinpoint electrocoagulation. The flap was mobilized into position and the pivotal anchor points positioned and stabilized with buried interrupted sutures. Subcutaneous and dermal tissues were closed in a multilayered fashion with sutures. Tissue redundancies were excised, and the epidermal edges were apposed without significant tension and sutured with sutures. Nasalis Myocutaneous Flap Text: Using a #15 blade, an incision was made around the donor flap to the level of the nasalis muscle. Wide lateral undermining was then performed in both the subcutaneous plane above the nasalis muscle, and in a submuscular plane just above periosteum. This allowed the formation of a free nasalis muscle axial pedicle which was still attached to the actual cutaneous flap, increasing its mobility and vascular viability. Hemostasis was obtained with pinpoint electrocoagulation. The flap was mobilized into position and the pivotal anchor points positioned and stabilized with buried interrupted sutures. Subcutaneous and dermal tissues were closed in a multilayered fashion with sutures. Tissue redundancies were excised, and the epidermal edges were apposed without significant tension and sutured with sutures. Orbicularis Oris Muscle Flap Text: The defect edges were debeveled with a #15 scalpel blade.  Given that the defect affected the competency of the oral sphincter an orbicularis oris muscle flap was deemed most appropriate to restore this competency and normal muscle function.  Using a sterile surgical marker, an appropriate flap was drawn incorporating the defect. The area thus outlined was incised with a #15 scalpel blade. Following this, the designed flap was carried over into the primary defect and sutured into place. Melolabial Transposition Flap Text: The defect edges were debeveled with a #15 scalpel blade. Given the location of the defect and the proximity to free margins a melolabial flap was deemed most appropriate. Using a sterile surgical marker, an appropriate melolabial transposition flap was drawn incorporating the defect. The area thus outlined was incised deep to adipose tissue with a #15 scalpel blade. The skin margins were undermined to an appropriate distance in all directions utilizing iris scissors. Following this, the designed flap was carried over into the primary defect and sutured into place. Rectangular Flap Text: The defect edges were debeveled with a #15 scalpel blade. Given the location of the defect and the proximity to free margins a rectangular flap was deemed most appropriate. Using a sterile surgical marker, an appropriate rectangular flap was drawn incorporating the defect. The area thus outlined was incised deep to adipose tissue with a #15 scalpel blade. The skin margins were undermined to an appropriate distance in all directions utilizing iris scissors. Following this, the designed flap was carried over into the primary defect and sutured into place. Rhombic Flap Text: The defect edges were debeveled with a #15 scalpel blade. Given the location of the defect and the proximity to free margins a rhombic flap was deemed most appropriate. Using a sterile surgical marker, an appropriate rhombic flap was drawn incorporating the defect. The area thus outlined was incised deep to adipose tissue with a #15 scalpel blade. The skin margins were undermined to an appropriate distance in all directions utilizing iris scissors. Following this, the designed flap was carried over into the primary defect and sutured into place. Rhomboid Transposition Flap Text: The defect edges were debeveled with a #15 scalpel blade. Given the location of the defect and the proximity to free margins a rhomboid transposition flap was deemed most appropriate. Using a sterile surgical marker, an appropriate rhomboid flap was drawn incorporating the defect. The area thus outlined was incised deep to adipose tissue with a #15 scalpel blade. The skin margins were undermined to an appropriate distance in all directions utilizing iris scissors. Following this, the designed flap was carried over into the primary defect and sutured into place. Bi-Rhombic Flap Text: The defect edges were debeveled with a #15 scalpel blade. Given the location of the defect and the proximity to free margins a bi-rhombic flap was deemed most appropriate. Using a sterile surgical marker, an appropriate rhombic flap was drawn incorporating the defect. The area thus outlined was incised deep to adipose tissue with a #15 scalpel blade. The skin margins were undermined to an appropriate distance in all directions utilizing iris scissors. Following this, the designed flap was carried over into the primary defect and sutured into place. Helical Rim Advancement Flap Text: The defect edges were debeveled with a #15 blade scalpel.  Given the location of the defect and the proximity to free margins (helical rim) a double helical rim advancement flap was deemed most appropriate. Using a sterile surgical marker, the appropriate advancement flaps were drawn incorporating the defect and placing the expected incisions between the helical rim and antihelix where possible.  The area thus outlined was incised through and through with a #15 scalpel blade.  With a skin hook and iris scissors, the flaps were gently and sharply undermined and freed up. Folllowing this, the designed flaps were carried over into the primary defect and sutured into place. Bilateral Helical Rim Advancement Flap Text: The defect edges were debeveled with a #15 blade scalpel.  Given the location of the defect and the proximity to free margins (helical rim) a bilateral helical rim advancement flap was deemed most appropriate. Using a sterile surgical marker, the appropriate advancement flaps were drawn incorporating the defect and placing the expected incisions between the helical rim and antihelix where possible.  The area thus outlined was incised through and through with a #15 scalpel blade.  With a skin hook and iris scissors, the flaps were gently and sharply undermined and freed up. Following this, the designed flaps were placed into the primary defect and sutured into place. Ear Star Wedge Flap Text: The defect edges were debeveled with a #15 blade scalpel.  Given the location of the defect and the proximity to free margins (helical rim) an ear star wedge flap was deemed most appropriate. Using a sterile surgical marker, the appropriate flap was drawn incorporating the defect and placing the expected incisions between the helical rim and antihelix where possible.  The area thus outlined was incised through and through with a #15 scalpel blade. Following this, the designed flap was carried over into the primary defect and sutured into place. Banner Transposition Flap Text: The defect edges were debeveled with a #15 scalpel blade. Given the location of the defect and the proximity to free margins a Banner transposition flap was deemed most appropriate. Using a sterile surgical marker, an appropriate flap was drawn around the defect. The area thus outlined was incised deep to adipose tissue with a #15 scalpel blade. The skin margins were undermined to an appropriate distance in all directions utilizing iris scissors. Following this, the designed flap was carried into the primary defect and sutured into place. Bilobed Flap Text: The defect edges were debeveled with a #15 scalpel blade. Given the location of the defect and the proximity to free margins a bilobe flap was deemed most appropriate. Using a sterile surgical marker, an appropriate bilobe flap drawn around the defect. The area thus outlined was incised deep to adipose tissue with a #15 scalpel blade. The skin margins were undermined to an appropriate distance in all directions utilizing iris scissors. Following this, the designed flap was carried over into the primary defect and sutured into place. Bilobed Transposition Flap Text: The defect edges were debeveled with a #15 scalpel blade. Given the location of the defect and the proximity to free margins a bilobed transposition flap was deemed most appropriate. Using a sterile surgical marker, an appropriate bilobe flap drawn around the defect. The area thus outlined was incised deep to adipose tissue with a #15 scalpel blade. The skin margins were undermined to an appropriate distance in all directions utilizing iris scissors. Following this, the designed flap was carried over into the primary defect and sutured into place. Trilobed Flap Text: The defect edges were debeveled with a #15 scalpel blade. Given the location of the defect and the proximity to free margins a trilobed flap was deemed most appropriate. Using a sterile surgical marker, an appropriate trilobed flap was drawn around the defect. The area thus outlined was incised deep to adipose tissue with a #15 scalpel blade. The skin margins were undermined to an appropriate distance in all directions utilizing iris scissors. Following this, the designed flap was carried into the primary defect and sutured into place. Dorsal Nasal Flap Text: The defect edges were debeveled with a #15 scalpel blade. Given the location of the defect and the proximity to free margins a dorsal nasal flap was deemed most appropriate. Using a sterile surgical marker, an appropriate dorsal nasal flap was drawn around the defect. The area thus outlined was incised deep to adipose tissue with a #15 scalpel blade. The skin margins were undermined to an appropriate distance in all directions utilizing iris scissors. Following this, the designed flap was carried into the primary defect and sutured into place. Island Pedicle Flap Text: The defect edges were debeveled with a #15 scalpel blade. Given the location of the defect, shape of the defect and the proximity to free margins an island pedicle advancement flap was deemed most appropriate. Using a sterile surgical marker, an appropriate advancement flap was drawn incorporating the defect, outlining the appropriate donor tissue and placing the expected incisions within the relaxed skin tension lines where possible. The area thus outlined was incised deep to adipose tissue with a #15 scalpel blade. The skin margins were undermined to an appropriate distance in all directions around the primary defect and laterally outward around the island pedicle utilizing iris scissors.  There was minimal undermining beneath the pedicle flap. Following this, the flap was carried over into the primary defect and sutured into place. Island Pedicle Flap With Canthal Suspension Text: The defect edges were debeveled with a #15 scalpel blade. Given the location of the defect, shape of the defect and the proximity to free margins an island pedicle advancement flap was deemed most appropriate. Using a sterile surgical marker, an appropriate advancement flap was drawn incorporating the defect, outlining the appropriate donor tissue and placing the expected incisions within the relaxed skin tension lines where possible. The area thus outlined was incised deep to adipose tissue with a #15 scalpel blade. The skin margins were undermined to an appropriate distance in all directions around the primary defect and laterally outward around the island pedicle utilizing iris scissors.  There was minimal undermining beneath the pedicle flap. A suspension suture was placed in the canthal tendon to prevent tension and prevent ectropion. Following this, the designed flap was placed into the primary defect and sutured into place. Alar Island Pedicle Flap Text: The defect edges were debeveled with a #15 scalpel blade. Given the location of the defect, shape of the defect and the proximity to the alar rim an island pedicle advancement flap was deemed most appropriate. Using a sterile surgical marker, an appropriate advancement flap was drawn incorporating the defect, outlining the appropriate donor tissue and placing the expected incisions within the nasal ala running parallel to the alar rim. The area thus outlined was incised with a #15 scalpel blade. The skin margins were undermined minimally to an appropriate distance in all directions around the primary defect and laterally outward around the island pedicle utilizing iris scissors.  There was minimal undermining beneath the pedicle flap. Following this, the designed flap was carried over into the primary defect and sutured into place. Double Island Pedicle Flap Text: The defect edges were debeveled with a #15 scalpel blade. Given the location of the defect, shape of the defect and the proximity to free margins a double island pedicle advancement flap was deemed most appropriate. Using a sterile surgical marker, an appropriate advancement flap was drawn incorporating the defect, outlining the appropriate donor tissue and placing the expected incisions within the relaxed skin tension lines where possible. The area thus outlined was incised deep to adipose tissue with a #15 scalpel blade. The skin margins were undermined to an appropriate distance in all directions around the primary defect and laterally outward around the island pedicle utilizing iris scissors.  There was minimal undermining beneath the pedicle flap. Following this, the flap was carried over into the primary defect and sutured into place. Island Pedicle Flap-Requiring Vessel Identification Text: The defect edges were debeveled with a #15 scalpel blade. Given the location of the defect, shape of the defect and the proximity to free margins an island pedicle advancement flap was deemed most appropriate. Using a sterile surgical marker, an appropriate advancement flap was drawn, based on the axial vessel mentioned above, incorporating the defect, outlining the appropriate donor tissue and placing the expected incisions within the relaxed skin tension lines where possible. The area thus outlined was incised deep to adipose tissue with a #15 scalpel blade. The skin margins were undermined to an appropriate distance in all directions around the primary defect and laterally outward around the island pedicle utilizing iris scissors.  There was minimal undermining beneath the pedicle flap. Following this, the designed flap was carried over into the primary defect and sutured into place. Keystone Flap Text: The defect edges were debeveled with a #15 scalpel blade. Given the location of the defect, shape of the defect a keystone flap was deemed most appropriate. Using a sterile surgical marker, an appropriate keystone flap was drawn incorporating the defect, outlining the appropriate donor tissue and placing the expected incisions within the relaxed skin tension lines where possible. The area thus outlined was incised deep to adipose tissue with a #15 scalpel blade. The skin margins were undermined to an appropriate distance in all directions around the primary defect and laterally outward around the flap utilizing iris scissors. Following this, the designed flap was carried into the primary defect and sutured into place. O-T Plasty Text: The defect edges were debeveled with a #15 scalpel blade. Given the location of the defect, shape of the defect and the proximity to free margins an O-T plasty was deemed most appropriate. Using a sterile surgical marker, an appropriate O-T plasty was drawn incorporating the defect and placing the expected incisions within the relaxed skin tension lines where possible. The area thus outlined was incised deep to adipose tissue with a #15 scalpel blade. The skin margins were undermined to an appropriate distance in all directions utilizing iris scissors. Following this, the designed flap was carried over into the primary defect and sutured into place. O-Z Plasty Text: The defect edges were debeveled with a #15 scalpel blade. Given the location of the defect, shape of the defect and the proximity to free margins an O-Z plasty (double transposition flap) was deemed most appropriate. Using a sterile surgical marker, the appropriate transposition flaps were drawn incorporating the defect and placing the expected incisions within the relaxed skin tension lines where possible. The area thus outlined was incised deep to adipose tissue with a #15 scalpel blade. The skin margins were undermined to an appropriate distance in all directions utilizing iris scissors. Hemostasis was achieved with electrocautery. The flaps were then transposed and carried over into place, one clockwise and the other counterclockwise, and anchored with interrupted buried subcutaneous sutures. Double O-Z Plasty Text: The defect edges were debeveled with a #15 scalpel blade. Given the location of the defect, shape of the defect and the proximity to free margins a Double O-Z plasty (double transposition flap) was deemed most appropriate. Using a sterile surgical marker, the appropriate transposition flaps were drawn incorporating the defect and placing the expected incisions within the relaxed skin tension lines where possible. The area thus outlined was incised deep to adipose tissue with a #15 scalpel blade. The skin margins were undermined to an appropriate distance in all directions utilizing iris scissors. Hemostasis was achieved with electrocautery. The flaps were then transposed and carried over into place, one clockwise and the other counterclockwise, and anchored with interrupted buried subcutaneous sutures. V-Y Plasty Text: The defect edges were debeveled with a #15 scalpel blade. Given the location of the defect, shape of the defect and the proximity to free margins an V-Y advancement flap was deemed most appropriate. Using a sterile surgical marker, an appropriate advancement flap was drawn incorporating the defect and placing the expected incisions within the relaxed skin tension lines where possible. The area thus outlined was incised deep to adipose tissue with a #15 scalpel blade. The skin margins were undermined to an appropriate distance in all directions utilizing iris scissors. Following this, the designed flap was advanced and carried over into the primary defect and sutured into place. H Plasty Text: Given the location of the defect, shape of the defect and the proximity to free margins a H-plasty was deemed most appropriate for repair. Using a sterile surgical marker, the appropriate advancement arms of the H-plasty were drawn incorporating the defect and placing the expected incisions within the relaxed skin tension lines where possible. The area thus outlined was incised deep to adipose tissue with a #15 scalpel blade. The skin margins were undermined to an appropriate distance in all directions utilizing iris scissors.  The opposing advancement arms were then advanced and carried over into place in opposite direction and anchored with interrupted buried subcutaneous sutures. W Plasty Text: The lesion was extirpated to the level of the fat with a #15 scalpel blade. Given the location of the defect, shape of the defect and the proximity to free margins a W-plasty was deemed most appropriate for repair. Using a sterile surgical marker, the appropriate transposition arms of the W-plasty were drawn incorporating the defect and placing the expected incisions within the relaxed skin tension lines where possible. The area thus outlined was incised deep to adipose tissue with a #15 scalpel blade. The skin margins were undermined to an appropriate distance in all directions utilizing iris scissors. The opposing transposition arms were then transposed and carried over into place in opposite direction and anchored with interrupted buried subcutaneous sutures. Z Plasty Text: The lesion was extirpated to the level of the fat with a #15 scalpel blade. Given the location of the defect, shape of the defect and the proximity to free margins a Z-plasty was deemed most appropriate for repair. Using a sterile surgical marker, the appropriate transposition arms of the Z-plasty were drawn incorporating the defect and placing the expected incisions within the relaxed skin tension lines where possible. The area thus outlined was incised deep to adipose tissue with a #15 scalpel blade. The skin margins were undermined to an appropriate distance in all directions utilizing iris scissors. The opposing transposition arms were then transposed and carried over into place in opposite direction and anchored with interrupted buried subcutaneous sutures. Double Z Plasty Text: The lesion was extirpated to the level of the fat with a #15 scalpel blade. Given the location of the defect, shape of the defect and the proximity to free margins a double Z-plasty was deemed most appropriate for repair. Using a sterile surgical marker, the appropriate transposition arms of the double Z-plasty were drawn incorporating the defect and placing the expected incisions within the relaxed skin tension lines where possible. The area thus outlined was incised deep to adipose tissue with a #15 scalpel blade. The skin margins were undermined to an appropriate distance in all directions utilizing iris scissors. The opposing transposition arms were then transposed and carried over into place in opposite direction and anchored with interrupted buried subcutaneous sutures. Zygomaticofacial Flap Text: Given the location of the defect, shape of the defect and the proximity to free margins a zygomaticofacial flap was deemed most appropriate for repair. Using a sterile surgical marker, the appropriate flap was drawn incorporating the defect and placing the expected incisions within the relaxed skin tension lines where possible. The area thus outlined was incised deep to adipose tissue with a #15 scalpel blade with preservation of a vascular pedicle.  The skin margins were undermined to an appropriate distance in all directions utilizing iris scissors. The flap was then carried over into the defect and anchored with interrupted buried subcutaneous sutures. Cheek Interpolation Flap Text: A decision was made to reconstruct the defect utilizing an interpolation axial flap and a staged reconstruction.  A telfa template was made of the defect.  This telfa template was then used to outline the Cheek Interpolation flap.  The donor area for the pedicle flap was then injected with anesthesia.  The flap was excised through the skin and subcutaneous tissue down to the layer of the underlying musculature.  The interpolation flap was carefully excised within this deep plane to maintain its blood supply.  The edges of the donor site were undermined.   The donor site was closed in a primary fashion.  The pedicle was then rotated into position and sutured.  Once the tube was sutured into place, adequate blood supply was confirmed with blanching and refill.  The pedicle was then wrapped with xeroform gauze and dressed appropriately with a telfa and gauze bandage to ensure continued blood supply and protect the attached pedicle. Cheek-To-Nose Interpolation Flap Text: A decision was made to reconstruct the defect utilizing an interpolation axial flap and a staged reconstruction.  A telfa template was made of the defect.  This telfa template was then used to outline the Cheek-To-Nose Interpolation flap.  The donor area for the pedicle flap was then injected with anesthesia.  The flap was excised through the skin and subcutaneous tissue down to the layer of the underlying musculature.  The interpolation flap was carefully excised within this deep plane to maintain its blood supply.  The edges of the donor site were undermined.   The donor site was closed in a primary fashion.  The pedicle was then rotated into position and sutured.  Once the tube was sutured into place, adequate blood supply was confirmed with blanching and refill.  The pedicle was then wrapped with xeroform gauze and dressed appropriately with a telfa and gauze bandage to ensure continued blood supply and protect the attached pedicle. Interpolation Flap Text: A decision was made to reconstruct the defect utilizing an interpolation axial flap and a staged reconstruction.  A telfa template was made of the defect.  This telfa template was then used to outline the interpolation flap.  The donor area for the pedicle flap was then injected with anesthesia.  The flap was excised through the skin and subcutaneous tissue down to the layer of the underlying musculature.  The interpolation flap was carefully excised within this deep plane to maintain its blood supply.  The edges of the donor site were undermined.   The donor site was closed in a primary fashion.  The pedicle was then rotated into position and sutured.  Once the tube was sutured into place, adequate blood supply was confirmed with blanching and refill.  The pedicle was then wrapped with xeroform gauze and dressed appropriately with a telfa and gauze bandage to ensure continued blood supply and protect the attached pedicle. Melolabial Interpolation Flap Text: A decision was made to reconstruct the defect utilizing an interpolation axial flap and a staged reconstruction.  A telfa template was made of the defect.  This telfa template was then used to outline the melolabial interpolation flap.  The donor area for the pedicle flap was then injected with anesthesia.  The flap was excised through the skin and subcutaneous tissue down to the layer of the underlying musculature.  The pedicle flap was carefully excised within this deep plane to maintain its blood supply.  The edges of the donor site were undermined.   The donor site was closed in a primary fashion.  The pedicle was then rotated into position and sutured.  Once the tube was sutured into place, adequate blood supply was confirmed with blanching and refill.  The pedicle was then wrapped with xeroform gauze and dressed appropriately with a telfa and gauze bandage to ensure continued blood supply and protect the attached pedicle. Mastoid Interpolation Flap Text: A decision was made to reconstruct the defect utilizing an interpolation axial flap and a staged reconstruction.  A telfa template was made of the defect.  This telfa template was then used to outline the mastoid interpolation flap.  The donor area for the pedicle flap was then injected with anesthesia.  The flap was excised through the skin and subcutaneous tissue down to the layer of the underlying musculature.  The pedicle flap was carefully excised within this deep plane to maintain its blood supply.  The edges of the donor site were undermined.   The donor site was closed in a primary fashion.  The pedicle was then rotated into position and sutured.  Once the tube was sutured into place, adequate blood supply was confirmed with blanching and refill.  The pedicle was then wrapped with xeroform gauze and dressed appropriately with a telfa and gauze bandage to ensure continued blood supply and protect the attached pedicle. Posterior Auricular Interpolation Flap Text: A decision was made to reconstruct the defect utilizing an interpolation axial flap and a staged reconstruction.  A telfa template was made of the defect.  This telfa template was then used to outline the posterior auricular interpolation flap.  The donor area for the pedicle flap was then injected with anesthesia.  The flap was excised through the skin and subcutaneous tissue down to the layer of the underlying musculature.  The pedicle flap was carefully excised within this deep plane to maintain its blood supply.  The edges of the donor site were undermined.   The donor site was closed in a primary fashion.  The pedicle was then rotated into position and sutured.  Once the tube was sutured into place, adequate blood supply was confirmed with blanching and refill.  The pedicle was then wrapped with xeroform gauze and dressed appropriately with a telfa and gauze bandage to ensure continued blood supply and protect the attached pedicle. Paramedian Forehead Flap Text: A decision was made to reconstruct the defect utilizing an interpolation axial flap and a staged reconstruction.  A telfa template was made of the defect.  This telfa template was then used to outline the paramedian forehead pedicle flap.  The donor area for the pedicle flap was then injected with anesthesia.  The flap was excised through the skin and subcutaneous tissue down to the layer of the underlying musculature.  The pedicle flap was carefully excised within this deep plane to maintain its blood supply.  The edges of the donor site were undermined.   The donor site was closed in a primary fashion.  The pedicle was then rotated into position and sutured.  Once the tube was sutured into place, adequate blood supply was confirmed with blanching and refill.  The pedicle was then wrapped with xeroform gauze and dressed appropriately with a telfa and gauze bandage to ensure continued blood supply and protect the attached pedicle. Abbe Flap (Upper To Lower Lip) Text: The defect of the lower lip was assessed and measured.  Given the location and size of the defect, an Abbe flap was deemed most appropriate. Using a sterile surgical marker, an appropriate Abbe flap was measured and drawn on the upper lip. Local anesthesia was then infiltrated.  A scalpel was then used to incise the upper lip through and through the skin, vermilion, muscle and mucosa, leaving the flap pedicled on the opposite side.  The flap was then rotated and transferred to the lower lip defect.  The flap was then sutured into place with a three layer technique, closing the orbicularis oris muscle layer with subcutaneous buried sutures, followed by a mucosal layer and an epidermal layer. Abbe Flap (Lower To Upper Lip) Text: The defect of the upper lip was assessed and measured.  Given the location and size of the defect, an Abbe flap was deemed most appropriate. Using a sterile surgical marker, an appropriate Abbe flap was measured and drawn on the lower lip. Local anesthesia was then infiltrated. A scalpel was then used to incise the upper lip through and through the skin, vermilion, muscle and mucosa, leaving the flap pedicled on the opposite side.  The flap was then rotated and transferred to the lower lip defect.  The flap was then sutured into place with a three layer technique, closing the orbicularis oris muscle layer with subcutaneous buried sutures, followed by a mucosal layer and an epidermal layer. Estlander Flap (Upper To Lower Lip) Text: The defect of the lower lip was assessed and measured.  Given the location and size of the defect, an Estlander flap was deemed most appropriate. Using a sterile surgical marker, an appropriate Estlander flap was measured and drawn on the upper lip. Local anesthesia was then infiltrated. A scalpel was then used to incise the lateral aspect of the flap, through skin, muscle and mucosa, leaving the flap pedicled medially.  The flap was then rotated and positioned to fill the lower lip defect.  The flap was then sutured into place with a three layer technique, closing the orbicularis oris muscle layer with subcutaneous buried sutures, followed by a mucosal layer and an epidermal layer. Cheiloplasty (Less Than 50%) Text: A decision was made to reconstruct the defect with a  cheiloplasty.  The defect was undermined extensively.  Additional orbicularis oris muscle was excised with a 15 blade scalpel.  The defect was converted into a full thickness wedge, of less than 50% of the vertical height of the lip, to facilite a better cosmetic result.  Small vessels were then tied off with 5-0 monocyrl. The orbicularis oris, superficial fascia, adipose and dermis were then reapproximated.  After the deeper layers were approximated the epidermis was reapproximated with particular care given to realign the vermilion border. Cheiloplasty (Complex) Text: A decision was made to reconstruct the defect with a  cheiloplasty.  The defect was undermined extensively.  Additional orbicularis oris muscle was excised with a 15 blade scalpel.  The defect was converted into a full thickness wedge to facilite a better cosmetic result.  Small vessels were then tied off with 5-0 monocyrl. The orbicularis oris, superficial fascia, adipose and dermis were then reapproximated.  After the deeper layers were approximated the epidermis was reapproximated with particular care given to realign the vermilion border. Ear Wedge Repair Text: A wedge excision was completed by carrying down an excision through the full thickness of the ear and cartilage with an inward facing Burow's triangle. The wound was then closed in a layered fashion. Full Thickness Lip Wedge Repair (Flap) Text: Given the location of the defect and the proximity to free margins a full thickness wedge repair was deemed most appropriate. Using a sterile surgical marker, the appropriate repair was drawn incorporating the defect and placing the expected incisions perpendicular to the vermilion border.  The vermilion border was also meticulously outlined to ensure appropriate reapproximation during the repair.  The area thus outlined was incised through and through with a #15 scalpel blade.  The muscularis and dermis were reaproximated with deep sutures following hemostasis. Care was taken to realign the vermilion border before proceeding with the superficial closure.  Once the vermilion was realigned the superfical and mucosal closure was finished. Ftsg Text: The defect edges were debeveled with a #15 scalpel blade. Given the location of the defect, shape of the defect and the proximity to free margins a full thickness skin graft was deemed most appropriate. Using a sterile surgical marker, the primary defect shape was transferred to the donor site. The area thus outlined was incised deep to adipose tissue with a #15 scalpel blade.  The harvested graft was then trimmed of adipose tissue until only dermis and epidermis was left.  The skin margins of the secondary defect were undermined to an appropriate distance in all directions utilizing iris scissors.  The secondary defect was closed with interrupted buried subcutaneous sutures.  The skin edges were then re-apposed with running  sutures.  The skin graft was then placed in the primary defect and oriented appropriately. Split-Thickness Skin Graft Text: The defect edges were debeveled with a #15 scalpel blade. Given the location of the defect, shape of the defect and the proximity to free margins a split thickness skin graft was deemed most appropriate. Using a sterile surgical marker, the primary defect shape was transferred to the donor site. The split thickness graft was then harvested.  The skin graft was then placed in the primary defect and oriented appropriately. Pinch Graft Text: The defect edges were debeveled with a #15 scalpel blade. Given the location of the defect, shape of the defect and the proximity to free margins a pinch graft was deemed most appropriate. Using a sterile surgical marker, the primary defect shape was transferred to the donor site. The area thus outlined was incised deep to adipose tissue with a #15 scalpel blade.  The harvested graft was then trimmed of adipose tissue until only dermis and epidermis was left. The skin margins of the secondary defect were undermined to an appropriate distance in all directions utilizing iris scissors.  The secondary defect was closed with interrupted buried subcutaneous sutures.  The skin edges were then re-apposed with running  sutures.  The skin graft was then placed in the primary defect and oriented appropriately. Burow's Graft Text: The defect edges were debeveled with a #15 scalpel blade. Given the location of the defect, shape of the defect, the proximity to free margins and the presence of a standing cone deformity a Burow's skin graft was deemed most appropriate. The standing cone was removed and this tissue was then trimmed to the shape of the primary defect. The adipose tissue was also removed until only dermis and epidermis were left.  The skin margins of the secondary defect were undermined to an appropriate distance in all directions utilizing iris scissors.  The secondary defect was closed with interrupted buried subcutaneous sutures.  The skin edges were then re-apposed with running  sutures.  The skin graft was then placed in the primary defect and oriented appropriately. Cartilage Graft Text: The defect edges were debeveled with a #15 scalpel blade. Given the location of the defect, shape of the defect, the fact the defect involved a full thickness cartilage defect a cartilage graft was deemed most appropriate.  An appropriate donor site was identified, cleansed, and anesthetized. The cartilage graft was then harvested and transferred to the recipient site, oriented appropriately and then sutured into place.  The secondary defect was then repaired using a primary closure. Composite Graft Text: The defect edges were debeveled with a #15 scalpel blade. Given the location of the defect, shape of the defect, the proximity to free margins and the fact the defect was full thickness a composite graft was deemed most appropriate.  The defect was outline and then transferred to the donor site.  A full thickness graft was then excised from the donor site. The graft was then placed in the primary defect, oriented appropriately and then sutured into place.  The secondary defect was then repaired using a primary closure. Epidermal Autograft Text: The defect edges were debeveled with a #15 scalpel blade. Given the location of the defect, shape of the defect and the proximity to free margins an epidermal autograft was deemed most appropriate. Using a sterile surgical marker, the primary defect shape was transferred to the donor site. The epidermal graft was then harvested.  The skin graft was then placed in the primary defect and oriented appropriately. Dermal Autograft Text: The defect edges were debeveled with a #15 scalpel blade. Given the location of the defect, shape of the defect and the proximity to free margins a dermal autograft was deemed most appropriate. Using a sterile surgical marker, the primary defect shape was transferred to the donor site. The area thus outlined was incised deep to adipose tissue with a #15 scalpel blade.  The harvested graft was then trimmed of adipose and epidermal tissue until only dermis was left.  The skin graft was then placed in the primary defect and oriented appropriately. Skin Substitute Text: The defect edges were debeveled with a #15 scalpel blade. Given the location of the defect, shape of the defect and the proximity to free margins a skin substitute graft was deemed most appropriate.  The graft material was trimmed to fit the size of the defect. The graft was then placed in the primary defect and oriented appropriately. Tissue Cultured Epidermal Autograft Text: The defect edges were debeveled with a #15 scalpel blade. Given the location of the defect, shape of the defect and the proximity to free margins a tissue cultured epidermal autograft was deemed most appropriate.  The graft was then trimmed to fit the size of the defect.  The graft was then placed in the primary defect and oriented appropriately. Xenograft Text: The defect edges were debeveled with a #15 scalpel blade. Given the location of the defect, shape of the defect and the proximity to free margins a xenograft was deemed most appropriate.  The graft was then trimmed to fit the size of the defect.  The graft was then placed in the primary defect and oriented appropriately. Purse String (Simple) Text: Given the location of the defect and the characteristics of the surrounding skin a purse string closure was deemed most appropriate.  Undermining was performed circumferentially around the surgical defect.  A purse string suture was then placed and tightened. Purse String (Intermediate) Text: Given the location of the defect and the characteristics of the surrounding skin a purse string intermediate closure was deemed most appropriate.  Undermining was performed circumferentially around the surgical defect.  A purse string suture was then placed and tightened. Partial Purse String (Simple) Text: Given the location of the defect and the characteristics of the surrounding skin a simple purse string closure was deemed most appropriate.  Undermining was performed circumferentially around the surgical defect.  A purse string suture was then placed and tightened. Wound tension only allowed a partial closure of the circular defect. Partial Purse String (Intermediate) Text: Given the location of the defect and the characteristics of the surrounding skin an intermediate purse string closure was deemed most appropriate.  Undermining was performed circumferentially around the surgical defect.  A purse string suture was then placed and tightened. Wound tension only allowed a partial closure of the circular defect. Localized Dermabrasion With Wire Brush Text: The patient was draped in routine manner.  Localized dermabrasion using 3 x 17 mm wire brush was performed in routine manner to papillary dermis. This spot dermabrasion is being performed to complete skin cancer reconstruction. It also will eliminate the other sun damaged precancerous cells that are known to be part of the regional effect of a lifetime's worth of sun exposure. This localized dermabrasion is therapeutic and should not be considered cosmetic in any regard. Localized Dermabrasion With Sand Papertext: The patient was draped in routine manner.  Localized dermabrasion using sterile sand paper was performed in routine manner to papillary dermis. This spot dermabrasion is being performed to complete skin cancer reconstruction. It also will eliminate the other sun damaged precancerous cells that are known to be part of the regional effect of a lifetime's worth of sun exposure. This localized dermabrasion is therapeutic and should not be considered cosmetic in any regard. Localized Dermabrasion With 15 Blade Text: The patient was draped in routine manner.  Localized dermabrasion using a 15 blade was performed in routine manner to papillary dermis. This spot dermabrasion is being performed to complete skin cancer reconstruction. It also will eliminate the other sun damaged precancerous cells that are known to be part of the regional effect of a lifetime's worth of sun exposure. This localized dermabrasion is therapeutic and should not be considered cosmetic in any regard. Tarsorrhaphy Text: A tarsorrhaphy was performed using Frost sutures. Intermediate Repair And Flap Additional Text (Will Appearing After The Standard Complex Repair Text): The intermediate repair was not sufficient to completely close the primary defect. The remaining additional defect was repaired with the flap mentioned below. Intermediate Repair And Graft Additional Text (Will Appearing After The Standard Complex Repair Text): The intermediate repair was not sufficient to completely close the primary defect. The remaining additional defect was repaired with the graft mentioned below. Complex Repair And Flap Additional Text (Will Appearing After The Standard Complex Repair Text): The complex repair was not sufficient to completely close the primary defect. The remaining additional defect was repaired with the flap mentioned below. Complex Repair And Graft Additional Text (Will Appearing After The Standard Complex Repair Text): The complex repair was not sufficient to completely close the primary defect. The remaining additional defect was repaired with the graft mentioned below. Eyelid Full Thickness Repair - 66493: The eyelid defect was full thickness which required a wedge repair of the eyelid. Special care was taken to ensure that the eyelid margin was realligned when placing sutures. Eyelid Partial Thickness Repair - [id number]: The eyelid defect was partial thickness which required a wedge repair of the eyelid. Special care was taken to ensure that the eyelid margin was realligned when placing sutures. Manual Repair Warning Statement: We plan on removing the manually selected variable below in favor of our much easier automatic structured text blocks found in the previous tab. We decided to do this to help make the flow better and give you the full power of structured data. Manual selection is never going to be ideal in our platform and I would encourage you to avoid using manual selection from this point on, especially since I will be sunsetting this feature. It is important that you do one of two things with the customized text below. First, you can save all of the text in a word file so you can have it for future reference. Second, transfer the text to the appropriate area in the Library tab. Lastly, if there is a flap or graft type which we do not have you need to let us know right away so I can add it in before the variable is hidden. No need to panic, we plan to give you roughly 6 months to make the change. Same Histology In Subsequent Stages Text: The pattern and morphology of the tumor is as described in the first stage. No Residual Tumor Seen Histology Text: No persisting tumor was seen upon examination. Since the skin cancer was completely excised in Mohs surgery, there was no malignancy seen at the margin for which the histology can be described; only normal skin was seen, including the normal components of hair follicles, sebaceous glands, epidermis, dermis, and subcutaneous tissue. Inflammation Suggestive Of Cancer Camouflage Histology Text: There was a dense lymphocytic infiltrate which prevented adequate histologic evaluation of adjacent structures. Bcc Histology Text: There were numerous aggregates of basaloid cells. Bcc Infiltrative Histology Text: Full thickness epidermis is visualized around the specimen. Atypical basaloid cells with hyper-chromatic nuclei and mitotic figures are present in small infiltrative nests, extending into or beyond the papillary-reticular dermal interface, as indicated on the mohs map, consistent with aggressive basal cell carcinoma invasive to the deep dermis. Bcc Infundibulocystic Histology Text: : Bcc  Nodular Histology Text: Full thickness epidermis is visualized around the entire specimen. Normal hair follicles and sebaceous glands are present. In the dermis there are atypical basaloid cells in nests invading the papillary dermis, extending into or beyond the papillary- reticular dermal interface, consistent with basal cell carcinoma, as indicated on the mohs map. Bcc Superficial Histology Text: Full thickness epidermis is visualized around the entire specimen. Atypical basaloid nests are present emanating from the follicular epithelium, as indicated on the Mohs map, invasive to the mid dermis consistent with superficial follicular basal cell carcinoma. Scc Histology Text: Full thickness epidermis is visualized around the specimen. Neoplasticism epithelium with prominent squamatization extending into the dermis and into or beyond the [papillary- reticular dermal interface is present, as indicated in the Mohs map, consistent with squamous cell carcinoma. Scc In Situ Histology Text: Full thickness epidermis is visualized around the entire specimen. Normal hair follicles ad sebaceous glands are present. Full thickness keratinocyte atypia is seen, as indicated on the Mohs map consistent with squamous cell carcinoma in situ. Melanoma Histology Text: Atypical melanocytes are present within the epidermis at increased density. Kennedy of atypical melanocytes with focal pagetoid and follicular extension are seen, invasive melanoma was absent from the dermis in the sections examined Merkel Cell Carcinoma Histology Text: Sheets and small aggregates of cells with an infiltrative patter. The cells have scant cytoplasm and contain nuclei with a “salt and pepper” chromatin pattern. Afx Histology Text: Atypical spindle cells with irregular hyper-chromatic nuclei infiltrate the papillary dermis; deep invasion is not seen, consistent with atypical fibroxanthoma. Dfsp Histology Text: Monomorphic spindle cell proliferation infiltrating the dermis Mart-1 - Positive Histology Text: MART-1 staining demonstrates areas of higher density and clustering of melanocytes with Pagetoid spread upwards within the epidermis. The surgical margins are positive for tumor cells. Mart-1 - Negative Histology Text: MART-1 staining demonstrates a normal density and pattern of melanocytes along the dermal-epidermal junction. The surgical margins are negative for tumor cells. Information: Selecting Yes will display possible errors in your note based on the variables you have selected. This validation is only offered as a suggestion for you. PLEASE NOTE THAT THE VALIDATION TEXT WILL BE REMOVED WHEN YOU FINALIZE YOUR NOTE. IF YOU WANT TO FAX A PRELIMINARY NOTE YOU WILL NEED TO TOGGLE THIS TO 'NO' IF YOU DO NOT WANT IT IN YOUR FAXED NOTE. Bill 59 Modifier?: No - Continue to Bill 79 Modifier

## 2024-09-06 NOTE — H&P ADULT - DOES THIS PATIENT HAVE A HISTORY OF OR HAS BEEN DX WITH HEART FAILURE?
The patient is Stable - Low risk of patient condition declining or worsening    Shift Goals  Clinical Goals: Pain management  Patient Goals: pain control, rest  Family Goals: update on plan of care    Progress made toward(s) clinical / shift goals:    Problem: Knowledge Deficit - Standard  Goal: Patient and family/care givers will demonstrate understanding of plan of care, disease process/condition, diagnostic tests and medications  Outcome: Progressing  Note: Oriented patient and family to unit. Updated on plan of care.      Problem: Respiratory  Goal: Patient will achieve/maintain optimum respiratory ventilation and gas exchange  Outcome: Progressing  Note: Patient able to maintain adequate oxygen saturations and work of breathing.      Problem: Pain - Standard  Goal: Alleviation of pain or a reduction in pain to the patient’s comfort goal  Outcome: Progressing  Note: Pt pain improved with scheduled pain meds.        Patient is not progressing towards the following goals:      Problem: Skin Integrity  Goal: Skin integrity is maintained or improved  Outcome: Not Progressing  Note: Pt with multiple scattered abrasions throughout body. No active bleeding.       no

## 2025-01-09 NOTE — PROGRESS NOTE ADULT - SUBJECTIVE AND OBJECTIVE BOX
[facility] NEPHROLOGY- CONSULTATION NOTE    Patient is a 71y Male with ESRD on HD with ventral hernia with parital SBO.  Pt to go for surgery tomorrow.  HD in the am beforehand.  Pt denies pain now.  He reports feeling hungry.    REVIEW OF SYSTEMS:  CONSTITUTIONAL: No weakness, fevers or chills  EYES/ENT: No visual changes;  No vertigo or throat pain   NECK: No pain or stiffness  RESPIRATORY: No cough, wheezing, hemoptysis; No shortness of breath  CARDIOVASCULAR: No chest pain or palpitations.  GASTROINTESTINAL: abd pain has mostly resolved  GENITOURINARY: No dysuria, frequency, foamy urine, urinary urgency, incontinence or hematuria  NEUROLOGICAL: No numbness or weakness.  Poor memory at baseline per A.  SKIN: No itching, burning, rashes, or lesions   VASCULAR: No bilateral lower extremity edema.   All other review of systems is negative unless indicated above.    VITALS:  T(F): 98.2 (10-09-17 @ 14:38), Max: 98.2 (10-09-17 @ 04:18)  HR: 73 (10-09-17 @ 14:38)  BP: 127/67 (10-09-17 @ 14:38)  RR: 17 (10-09-17 @ 14:38)  SpO2: 99% (10-09-17 @ 14:38)  Wt(kg): --    PHYSICAL EXAM:  Constitutional: NAD  HEENT: anicteric sclera, oropharynx clear, MMM  Neck: No JVD  Respiratory: CTAB, no wheezes, rales or rhonchi  Cardiovascular: S1, S2, RRR  Gastrointestinal: BS+, soft, ND, + large ventral hernia, not reducible, tender.  Extremities: No cyanosis or clubbing. No peripheral edema  Neurological: A/O x 3, no focal deficits  Psychiatric: Normal mood, normal affect  : No CVA tenderness. No wright.   Skin: No rashes  Vascular Access:  RUE AVG benign, +thrill/bruit    LABS:  10-09    136  |  100  |  56<H>  ----------------------------<  85  4.3   |  24  |  8.54<H>    Ca    9.5      09 Oct 2017 10:57  Phos  5.3     10-09  Mg     2.2     10-09    TPro  8.5<H>  /  Alb  3.3<L>  /  TBili  0.5  /  DBili      /  AST  13  /  ALT  14  /  AlkPhos  304<H>  10-09    Creatinine Trend: 8.54 <--, 6.98 <--                        10.8   3.9   )-----------( 144      ( 09 Oct 2017 10:57 )             36.3 No

## 2025-01-31 NOTE — PROGRESS NOTE ADULT - PROBLEM/PLAN-4
Assumed care of patient. Found to be non-interactive with staff. Officer at bedside.    DISPLAY PLAN FREE TEXT

## 2025-02-27 NOTE — ED ADULT TRIAGE NOTE - NSWEIGHTCALCTOOLDRUG_GEN_A_CORE
Received call from [email or web address] lab for critical potassium level from labs today.   K+: 2.5   No hemolysis on specimen     To covering Dr. Palma,  Please advise for patient     used
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
## 2025-04-25 NOTE — DISCHARGE NOTE ADULT - NS AS DC PROVIDER CONTACT Y/N MULTI
"INCISION AND DRAINAGE    Date/Time: 4/25/2025 3:40 PM    Performed by: Luis Barbour MD  Authorized by: Luis Barbour MD    Time out: Immediately prior to procedure a "time out" was called to verify the correct patient, procedure, equipment, support staff and site/side marked as required.    Consent Done?:  Yes (Verbal)    Type:  Abscess  Body area:  Trunk  Location details:  Left breast  Anesthesia:  Local infiltration  Local anesthetic: Lidocaine 1% with epinephrine  Anesthetic total (ml):  5  Scalpel size:  15  Incision type:  Elliptical  Incision depth: subcutaneous    Complexity:  Simple  Drainage:  Pus (Sebaceous cyst)  Drainage amount:  Scant  Wound treatment:  Incision, drainage and wound left open  Packing material: Antibiotic ointment.  Pain Assessment: 2    " Yes

## 2025-06-10 NOTE — DISCHARGE NOTE ADULT - MEDICATION SUMMARY - MEDICATIONS TO STOP TAKING
amiodarone 100 mg oral tablet: 1 tab(s) orally once a day  clopidogrel 75 mg oral tablet: 1 tab(s) orally once a day  ezetimibe 10 mg oral tablet: 1 tab(s) orally once a day (in the evening)  furosemide 40 mg oral tablet: 1 tab(s) orally prn  gabapentin 800 mg oral tablet: 1 tab(s) orally 3 times a day  Iron 1 tab daily:   montelukast 10 mg oral tablet: 1 tab(s) orally once a day (at bedtime)  pantoprazole 40 mg oral delayed release tablet: 1 tab(s) orally 2 times a day  pravastatin 10 mg oral tablet: 1 tab(s) orally once a day (in the evening)  spironolactone 25 mg oral tablet: 1 tab(s) orally once a day  Vitamin D3 25 mcg (1000 intl units) oral capsule: 1 cap(s) orally once a day  Xarelto 20 mg oral tablet: 1 tab(s) orally once a day  zolpidem 10 mg oral tablet: 1 tab(s) orally once a day (at bedtime) as needed for  insomnia MDD: 1  
I will STOP taking the medications listed below when I get home from the hospital:  None

## 2025-07-30 NOTE — RAPID RESPONSE TEAM SUMMARY - NSAIRWAYBREATHINGRRT_GEN_ALL_CORE
What Type Of Note Output Would You Prefer (Optional)?: Standard Output Hpi Title: Evaluation of Skin Lesions How Severe Are Your Spot(S)?: mild Have Your Spot(S) Been Treated In The Past?: has not been treated Intubated

## (undated) DEVICE — Device

## (undated) DEVICE — LUBRICATING JELLY HR ONE SHOT 3G

## (undated) DEVICE — CONTAINER FORMALIN 80ML YELLOW

## (undated) DEVICE — CLAMP BX HOT RAD JAW 3

## (undated) DEVICE — TUBING IV SET GRAVITY 3Y 100" MACRO

## (undated) DEVICE — CATH IV SAFE BC 22G X 1" (BLUE)

## (undated) DEVICE — UNDERPAD LINEN SAVER 17 X 24"

## (undated) DEVICE — ATTACH DISTAL DISP

## (undated) DEVICE — DRSG BANDAID 0.75X3"

## (undated) DEVICE — SALIVA EJECTOR (BLUE)

## (undated) DEVICE — PACK IV START WITH CHG

## (undated) DEVICE — BIOPSY FORCEP RADIAL JAW 4 STANDARD WITH NEEDLE

## (undated) DEVICE — BASIN EMESIS 10IN GRADUATED MAUVE

## (undated) DEVICE — BITE BLOCK ADULT 20 X 27MM (GREEN)

## (undated) DEVICE — BIOPSY FORCEP COLD DISP

## (undated) DEVICE — ELCTR ECG CONDUCTIVE ADHESIVE

## (undated) DEVICE — DRSG 2X2

## (undated) DEVICE — CATH HEMOSTAT GLD PROBE 7FR 300CM

## (undated) DEVICE — LINE BREATHE SAMPLNG

## (undated) DEVICE — ELCTR GROUNDING PAD ADULT COVIDIEN

## (undated) DEVICE — GOWN LG

## (undated) DEVICE — DENTURE CUP PINK

## (undated) DEVICE — CATH GL0-TIP SPR

## (undated) DEVICE — TUBING SUCTION NONCONDUCTIVE 6MM X 12FT

## (undated) DEVICE — 3D MATRIX ADAPTER SYRINGE

## (undated) DEVICE — TUBING MEDI-VAC W MAXIGRIP CONNECTORS 1/4"X6'

## (undated) DEVICE — DRSG CURITY GAUZE SPONGE 4 X 4" 12-PLY NON-STERILE